# Patient Record
Sex: FEMALE | Race: BLACK OR AFRICAN AMERICAN | NOT HISPANIC OR LATINO | Employment: OTHER | URBAN - METROPOLITAN AREA
[De-identification: names, ages, dates, MRNs, and addresses within clinical notes are randomized per-mention and may not be internally consistent; named-entity substitution may affect disease eponyms.]

---

## 2017-01-22 PROBLEM — Z79.01 ANTICOAGULATED: Status: ACTIVE | Noted: 2017-01-22

## 2017-06-29 ENCOUNTER — HOSPITAL ENCOUNTER (OUTPATIENT)
Dept: PREADMISSION TESTING | Facility: OTHER | Age: 82
Discharge: HOME | End: 2017-06-29
Attending: INTERNAL MEDICINE

## 2017-06-29 VITALS
OXYGEN SATURATION: 95 % | BODY MASS INDEX: 29.19 KG/M2 | DIASTOLIC BLOOD PRESSURE: 56 MMHG | HEIGHT: 67 IN | TEMPERATURE: 98 F | SYSTOLIC BLOOD PRESSURE: 83 MMHG | HEART RATE: 47 BPM | WEIGHT: 186 LBS

## 2017-06-29 DIAGNOSIS — I20.0 UNSTABLE ANGINA: Primary | ICD-10-CM

## 2017-06-29 PROBLEM — N18.30 CHRONIC RENAL FAILURE, STAGE 3 (MODERATE): Status: ACTIVE | Noted: 2017-06-29

## 2017-06-29 LAB
ANION GAP SERPL CALC-SCNC: 7 MMOL/L
BASOPHILS # BLD AUTO: 0.03 K/UL
BASOPHILS NFR BLD: 0.6 %
BUN SERPL-MCNC: 40 MG/DL
CALCIUM SERPL-MCNC: 10 MG/DL
CHLORIDE SERPL-SCNC: 109 MMOL/L
CO2 SERPL-SCNC: 25 MMOL/L
CREAT SERPL-MCNC: 1.9 MG/DL
DIFFERENTIAL METHOD: NORMAL
EOSINOPHIL # BLD AUTO: 0.1 K/UL
EOSINOPHIL NFR BLD: 2.5 %
ERYTHROCYTE [DISTWIDTH] IN BLOOD BY AUTOMATED COUNT: 14.4 %
EST. GFR  (AFRICAN AMERICAN): 28 ML/MIN/1.73 M^2
EST. GFR  (NON AFRICAN AMERICAN): 24 ML/MIN/1.73 M^2
GLUCOSE SERPL-MCNC: 93 MG/DL
HCT VFR BLD AUTO: 38.9 %
HGB BLD-MCNC: 12.5 G/DL
LYMPHOCYTES # BLD AUTO: 2.5 K/UL
LYMPHOCYTES NFR BLD: 47 %
MCH RBC QN AUTO: 29.4 PG
MCHC RBC AUTO-ENTMCNC: 32.1 %
MCV RBC AUTO: 92 FL
MONOCYTES # BLD AUTO: 0.5 K/UL
MONOCYTES NFR BLD: 8.8 %
NEUTROPHILS # BLD AUTO: 2.1 K/UL
NEUTROPHILS NFR BLD: 40.9 %
PLATELET # BLD AUTO: 175 K/UL
PMV BLD AUTO: 11.8 FL
POTASSIUM SERPL-SCNC: 4.7 MMOL/L
RBC # BLD AUTO: 4.25 M/UL
SODIUM SERPL-SCNC: 141 MMOL/L
WBC # BLD AUTO: 5.21 K/UL

## 2017-06-29 PROCEDURE — 85025 COMPLETE CBC W/AUTO DIFF WBC: CPT

## 2017-06-29 PROCEDURE — 36415 COLL VENOUS BLD VENIPUNCTURE: CPT

## 2017-06-29 PROCEDURE — 80048 BASIC METABOLIC PNL TOTAL CA: CPT

## 2017-06-29 NOTE — DISCHARGE INSTRUCTIONS
PRE-ADMIT TESTING -  [phone]    [address]  [facility]        OUTPATIENT SURGERY UNIT - [phone]    Your surgery has been scheduled at [facility]. We are pleased to have the opportunity to serve you. For Further Information please call [phone].    On the day of surgery please report to the Information Desk on the 1st floor.    · CONTACT YOUR PHYSICIAN'S OFFICE THE DAY PRIOR TO YOUR SURGERY TO OBTAIN YOUR ARRIVAL TIME.     · The evening before surgery do not eat anything after 9 p.m. ( this includes hard candy, chewing gum and mints).  You may only have GATORADE, POWERADE AND WATER  from 9 p.m. until you leave your home.   DO NOT DRINK ANY LIQUIDS ON THE WAY TO THE HOSPITAL.      SPECIAL MEDICATION INSTRUCTIONS: TAKE medications checked off by the Anesthesiologist on your Medication List.    Angiogram Patients: Take medications as instructed by your physician, including aspirin.     Surgery Patients:    If you take ASPIRIN - Your PHYSICIAN/SURGEON will need to inform you IF/OR when you need to stop taking aspirin prior to your surgery.     Do Not take any medications containing IBUPROFEN.  Do Not Wear any make-up or dark nail polish   (especially eye make-up) to surgery. If you come to surgery with makeup on you will be required to remove the makeup or nail polish.    Do not shave your surgical area at least 5 days prior to your surgery. The surgical prep will be performed at the hospital according to Infection Control regulations.    Leave all valuables at home.   Do Not wear any jewelry or watches, including any metal in body piercings.  Contact Lens must be removed before surgery. Either do not wear the contact lens or bring a case and solution for storage.  Please bring a container for eyeglasses or dentures as required.  Bring any paperwork your physician has provided, such as consent forms,  history and physicals, doctor's orders, etc.   Bring comfortable clothes  that are loose fitting to wear upon discharge. Take into consideration the type of surgery being performed.  Maintain your diet as advised per your physician the day prior to surgery.      Adequate rest the night before surgery is advised.   Park in the Parking lot behind the hospital or in the [facility] across the street from the parking lot. Parking is complimentary.  If you will be discharged the same day as your procedure, please arrange for a responsible adult to drive you home or to accompany you if traveling by taxi.   YOU WILL NOT BE PERMITTED TO DRIVE OR TO LEAVE THE HOSPITAL ALONE AFTER SURGERY.   It is strongly recommended that you arrange for someone to remain with you for the first 24 hrs following your surgery.       Thank you for your cooperation.  The Staff of [facility].        Bathing Instructions                                                                 Please shower the evening before and morning of your procedure with    ANTIBACTERIAL SOAP. ( DIAL, etc )  Concentrate on the surgical area   for at least 3 minutes and rinse completely. Dry off as usual.   Do not use any deodorant, powder, body lotions, perfume, after shave or    cologne.

## 2017-06-30 ENCOUNTER — HOSPITAL ENCOUNTER (OUTPATIENT)
Facility: OTHER | Age: 82
Discharge: HOME | End: 2017-07-01
Attending: INTERNAL MEDICINE | Admitting: INTERNAL MEDICINE
Payer: MEDICARE

## 2017-06-30 DIAGNOSIS — I20.0 UNSTABLE ANGINA: Primary | ICD-10-CM

## 2017-06-30 DIAGNOSIS — I25.10 CAD (CORONARY ARTERY DISEASE): ICD-10-CM

## 2017-06-30 DIAGNOSIS — E78.5 HYPERLIPIDEMIA: ICD-10-CM

## 2017-06-30 LAB
CORONARY STENOSIS: ABNORMAL
CORONARY STENT: YES

## 2017-06-30 PROCEDURE — 63600175 PHARM REV CODE 636 W HCPCS

## 2017-06-30 PROCEDURE — 25000003 PHARM REV CODE 250

## 2017-06-30 PROCEDURE — 25500020 PHARM REV CODE 255

## 2017-06-30 PROCEDURE — 25000003 PHARM REV CODE 250: Performed by: INTERNAL MEDICINE

## 2017-06-30 PROCEDURE — 33210 INSERT ELECTRD/PM CATH SNGL: CPT

## 2017-06-30 RX ORDER — HYDROCODONE BITARTRATE AND ACETAMINOPHEN 10; 325 MG/1; MG/1
1 TABLET ORAL EVERY 4 HOURS PRN
Status: DISCONTINUED | OUTPATIENT
Start: 2017-06-30 | End: 2017-07-01 | Stop reason: HOSPADM

## 2017-06-30 RX ORDER — ASPIRIN 81 MG/1
81 TABLET ORAL DAILY
Status: DISCONTINUED | OUTPATIENT
Start: 2017-07-01 | End: 2017-07-01 | Stop reason: HOSPADM

## 2017-06-30 RX ORDER — ASPIRIN 81 MG/1
81 TABLET ORAL DAILY
Status: DISCONTINUED | OUTPATIENT
Start: 2017-07-02 | End: 2017-07-01 | Stop reason: HOSPADM

## 2017-06-30 RX ORDER — FOLIC ACID 1 MG/1
1 TABLET ORAL DAILY
Status: DISCONTINUED | OUTPATIENT
Start: 2017-07-01 | End: 2017-07-01 | Stop reason: HOSPADM

## 2017-06-30 RX ORDER — MAG HYDROX/ALUMINUM HYD/SIMETH 200-200-20
15 SUSPENSION, ORAL (FINAL DOSE FORM) ORAL EVERY 6 HOURS PRN
Status: DISCONTINUED | OUTPATIENT
Start: 2017-06-30 | End: 2017-07-01 | Stop reason: HOSPADM

## 2017-06-30 RX ORDER — ZOLPIDEM TARTRATE 5 MG/1
5 TABLET ORAL NIGHTLY PRN
Status: DISCONTINUED | OUTPATIENT
Start: 2017-06-30 | End: 2017-07-01 | Stop reason: HOSPADM

## 2017-06-30 RX ORDER — LOSARTAN POTASSIUM 50 MG/1
100 TABLET ORAL DAILY
Status: DISCONTINUED | OUTPATIENT
Start: 2017-07-01 | End: 2017-07-01 | Stop reason: HOSPADM

## 2017-06-30 RX ORDER — NITROGLYCERIN 0.4 MG/1
0.4 TABLET SUBLINGUAL EVERY 5 MIN PRN
Status: DISCONTINUED | OUTPATIENT
Start: 2017-06-30 | End: 2017-07-01 | Stop reason: HOSPADM

## 2017-06-30 RX ORDER — SPIRONOLACTONE 25 MG/1
25 TABLET ORAL DAILY
Status: ON HOLD | COMMUNITY
End: 2017-07-01 | Stop reason: HOSPADM

## 2017-06-30 RX ORDER — ALPRAZOLAM 0.25 MG/1
0.25 TABLET ORAL EVERY 8 HOURS PRN
Status: DISCONTINUED | OUTPATIENT
Start: 2017-06-30 | End: 2017-07-01 | Stop reason: HOSPADM

## 2017-06-30 RX ORDER — SODIUM CHLORIDE 9 MG/ML
INJECTION, SOLUTION INTRAVENOUS CONTINUOUS
Status: ACTIVE | OUTPATIENT
Start: 2017-06-30 | End: 2017-07-01

## 2017-06-30 RX ORDER — DIPHENHYDRAMINE HYDROCHLORIDE 50 MG/ML
25 INJECTION INTRAMUSCULAR; INTRAVENOUS EVERY 6 HOURS PRN
Status: DISCONTINUED | OUTPATIENT
Start: 2017-06-30 | End: 2017-07-01 | Stop reason: HOSPADM

## 2017-06-30 RX ORDER — NAPROXEN SODIUM 220 MG/1
81 TABLET, FILM COATED ORAL
Status: COMPLETED | OUTPATIENT
Start: 2017-06-30 | End: 2017-06-30

## 2017-06-30 RX ORDER — CLOPIDOGREL BISULFATE 75 MG/1
75 TABLET ORAL ONCE
Status: DISCONTINUED | OUTPATIENT
Start: 2017-06-30 | End: 2017-06-30

## 2017-06-30 RX ORDER — ATORVASTATIN CALCIUM 20 MG/1
40 TABLET, FILM COATED ORAL DAILY
Status: DISCONTINUED | OUTPATIENT
Start: 2017-07-01 | End: 2017-07-01 | Stop reason: HOSPADM

## 2017-06-30 RX ORDER — SODIUM CHLORIDE 9 MG/ML
INJECTION, SOLUTION INTRAVENOUS CONTINUOUS
Status: DISCONTINUED | OUTPATIENT
Start: 2017-06-30 | End: 2017-06-30

## 2017-06-30 RX ORDER — DIAZEPAM 5 MG/1
5 TABLET ORAL
Status: COMPLETED | OUTPATIENT
Start: 2017-06-30 | End: 2017-06-30

## 2017-06-30 RX ORDER — ONDANSETRON 2 MG/ML
4 INJECTION INTRAMUSCULAR; INTRAVENOUS EVERY 12 HOURS PRN
Status: DISCONTINUED | OUTPATIENT
Start: 2017-06-30 | End: 2017-07-01 | Stop reason: HOSPADM

## 2017-06-30 RX ORDER — NAPROXEN SODIUM 220 MG/1
162 TABLET, FILM COATED ORAL
Status: DISCONTINUED | OUTPATIENT
Start: 2017-06-30 | End: 2017-06-30 | Stop reason: HOSPADM

## 2017-06-30 RX ORDER — ALLOPURINOL 300 MG/1
300 TABLET ORAL DAILY
Status: DISCONTINUED | OUTPATIENT
Start: 2017-07-01 | End: 2017-07-01 | Stop reason: HOSPADM

## 2017-06-30 RX ORDER — AMIODARONE HYDROCHLORIDE 200 MG/1
200 TABLET ORAL DAILY
Status: DISCONTINUED | OUTPATIENT
Start: 2017-07-01 | End: 2017-07-01 | Stop reason: HOSPADM

## 2017-06-30 RX ORDER — CLOPIDOGREL BISULFATE 75 MG/1
75 TABLET ORAL DAILY
Status: DISCONTINUED | OUTPATIENT
Start: 2017-07-02 | End: 2017-07-01 | Stop reason: HOSPADM

## 2017-06-30 RX ORDER — DIPHENHYDRAMINE HCL 25 MG
25 CAPSULE ORAL
Status: COMPLETED | OUTPATIENT
Start: 2017-06-30 | End: 2017-06-30

## 2017-06-30 RX ORDER — HYDRALAZINE HYDROCHLORIDE 20 MG/ML
10 INJECTION INTRAMUSCULAR; INTRAVENOUS EVERY 4 HOURS PRN
Status: DISCONTINUED | OUTPATIENT
Start: 2017-06-30 | End: 2017-07-01 | Stop reason: HOSPADM

## 2017-06-30 RX ORDER — CARVEDILOL 6.25 MG/1
6.25 TABLET ORAL 2 TIMES DAILY
Status: DISCONTINUED | OUTPATIENT
Start: 2017-06-30 | End: 2017-07-01 | Stop reason: HOSPADM

## 2017-06-30 RX ORDER — ATROPINE SULFATE 0.1 MG/ML
0.5 INJECTION INTRAVENOUS
Status: DISCONTINUED | OUTPATIENT
Start: 2017-06-30 | End: 2017-07-01 | Stop reason: HOSPADM

## 2017-06-30 RX ORDER — HYDROCODONE BITARTRATE AND ACETAMINOPHEN 5; 325 MG/1; MG/1
1 TABLET ORAL EVERY 4 HOURS PRN
Status: DISCONTINUED | OUTPATIENT
Start: 2017-06-30 | End: 2017-07-01 | Stop reason: HOSPADM

## 2017-06-30 RX ORDER — CLOPIDOGREL BISULFATE 75 MG/1
300 TABLET ORAL ONCE
Status: DISCONTINUED | OUTPATIENT
Start: 2017-06-30 | End: 2017-07-01 | Stop reason: HOSPADM

## 2017-06-30 RX ORDER — NITROGLYCERIN 0.4 MG/1
0.4 TABLET SUBLINGUAL EVERY 5 MIN PRN
Status: DISCONTINUED | OUTPATIENT
Start: 2017-06-30 | End: 2017-06-30 | Stop reason: HOSPADM

## 2017-06-30 RX ADMIN — ASPIRIN 81 MG CHEWABLE TABLET 81 MG: 81 TABLET CHEWABLE at 02:06

## 2017-06-30 RX ADMIN — CARVEDILOL 6.25 MG: 6.25 TABLET, FILM COATED ORAL at 07:06

## 2017-06-30 RX ADMIN — HYDROCODONE BITARTRATE AND ACETAMINOPHEN 1 TABLET: 10; 325 TABLET ORAL at 10:06

## 2017-06-30 RX ADMIN — DIPHENHYDRAMINE HYDROCHLORIDE 25 MG: 25 CAPSULE ORAL at 02:06

## 2017-06-30 RX ADMIN — SODIUM CHLORIDE: 0.9 INJECTION, SOLUTION INTRAVENOUS at 07:06

## 2017-06-30 RX ADMIN — ALPRAZOLAM 0.25 MG: 0.25 TABLET ORAL at 11:06

## 2017-06-30 RX ADMIN — DIAZEPAM 5 MG: 5 TABLET ORAL at 02:06

## 2017-06-30 NOTE — NURSING
Pt received fro cath lab to ICU bed 12. Awake and responsive.  Arterial and venous sheath inplace to right femoral site.  golfball sized hematoma present at site.

## 2017-06-30 NOTE — H&P (VIEW-ONLY)
Subjective:    Patient ID:  Anahy Evans is a 83 y.o. female     HPI Here for Chest pain at rest.   Two nights ago, developed a tight heavy feeling in the center of the chest radiating to the back and to the left arm, relieved by NTG.  Stopped Xarelto, Dr Valerio wanted to switch to Eliquis (creat was 1.52)    H/O CYNTHIA Left Cx in 2010    Current Outpatient Prescriptions   Medication Sig    acetaminophen (TYLENOL) 500 MG tablet Take 1 tablet (500 mg total) by mouth every 6 (six) hours as needed.    allopurinol (ZYLOPRIM) 300 MG tablet Take 300 mg by mouth once daily.    amiodarone (PACERONE) 100 MG Tab Take 200 mg by mouth once daily.     aspirin (ECOTRIN) 81 MG EC tablet Take 81 mg by mouth once daily.    atorvastatin (LIPITOR) 20 MG tablet Take 40 mg by mouth once daily.     calcium carbonate (OS-GILMA) 600 mg (1,500 mg) Tab Take 600 mg by mouth 2 (two) times daily with meals.    fish oil-omega-3 fatty acids 300-1,000 mg capsule Take 2 g by mouth once daily.    folic acid (FOLVITE) 1 MG tablet Take 1 mg by mouth once daily.    hydrochlorothiazide (HYDRODIURIL) 25 MG tablet     losartan (COZAAR) 25 MG tablet Take 50 mg by mouth once daily.     metoprolol tartrate (LOPRESSOR) 50 MG tablet Take 50 mg by mouth 2 (two) times daily.    multivitamin capsule Take 1 capsule by mouth once daily.    rivaroxaban (XARELTO) 15 mg Tab Take 15 mg by mouth daily with dinner or evening meal.     No current facility-administered medications for this visit.          Review of Systems   Constitution: Negative for chills, decreased appetite, fever, weight gain and weight loss.   HENT: Negative for congestion, headaches, hearing loss and sore throat.    Eyes: Negative for blurred vision, double vision and visual disturbance.   Cardiovascular: Positive for chest pain. Negative for claudication, dyspnea on exertion, leg swelling, palpitations and syncope.   Respiratory: Negative for cough, hemoptysis, shortness of breath,  "sputum production and wheezing.    Endocrine: Negative for cold intolerance and heat intolerance.   Hematologic/Lymphatic: Negative for bleeding problem. Does not bruise/bleed easily.   Skin: Negative for color change, dry skin, flushing and itching.   Musculoskeletal: Negative for back pain, joint pain and myalgias.   Gastrointestinal: Negative for abdominal pain, anorexia, constipation, diarrhea, dysphagia, nausea and vomiting.        No bleeding per rectum   Genitourinary: Negative for dysuria, flank pain, frequency, hematuria and nocturia.   Neurological: Negative for dizziness, light-headedness, loss of balance, seizures and tremors.   Psychiatric/Behavioral: Negative for altered mental status and depression.         Vitals:    06/29/17 1200   BP: 117/64   Pulse: (!) 58   Weight: 84.4 kg (186 lb)   Height: 5' 7" (1.702 m)     Objective:    Physical Exam   Constitutional: She is oriented to person, place, and time. She appears well-developed and well-nourished.   HENT:   Head: Normocephalic and atraumatic.   Nose: Nose normal.   Mouth/Throat: Oropharynx is clear and moist.   Eyes: Conjunctivae and EOM are normal. Pupils are equal, round, and reactive to light.   Neck: Neck supple. No tracheal deviation present. No thyromegaly present.   Cardiovascular: Normal rate, regular rhythm and intact distal pulses.  Exam reveals no gallop and no friction rub.    No murmur heard.  Pulmonary/Chest: No respiratory distress. She has no wheezes. She has no rales. She exhibits no tenderness.   Abdominal: Soft. Bowel sounds are normal. She exhibits no distension and no mass. There is no tenderness. There is no rebound and no guarding.   Musculoskeletal: Normal range of motion.   Lymphadenopathy:     She has no cervical adenopathy.   Neurological: She is alert and oriented to person, place, and time.   Skin: Skin is warm and dry.   Psychiatric: Her behavior is normal.         Assessment:       1. Coronary artery disease involving " native coronary artery of native heart without angina pectoris    2. PAF (paroxysmal atrial fibrillation)    3. Essential hypertension    4. Chest pain at rest    5. Other and unspecified hyperlipidemia    6. Chronic renal failure, stage 3 (moderate)         Plan:       Rx Plavix 300 mg today  Angio tomorrow, possible PCI  Will Rx Eliquis after

## 2017-06-30 NOTE — PLAN OF CARE
Patient prefers to have daughter Marixa present for discharge teaching. Please contact them [phone].

## 2017-06-30 NOTE — INTERVAL H&P NOTE
The patient has been examined and the H&P has been reviewed:    I concur with the findings and no changes have occurred since H&P was written.    Anesthesia/Surgery risks, benefits and alternative options discussed and understood by patient/family.          Active Hospital Problems    Diagnosis  POA    Unstable angina [I20.0]  Yes      Resolved Hospital Problems    Diagnosis Date Resolved POA   No resolved problems to display.

## 2017-07-01 VITALS
BODY MASS INDEX: 29.27 KG/M2 | SYSTOLIC BLOOD PRESSURE: 144 MMHG | OXYGEN SATURATION: 97 % | HEART RATE: 46 BPM | DIASTOLIC BLOOD PRESSURE: 69 MMHG | WEIGHT: 186.5 LBS | RESPIRATION RATE: 34 BRPM | TEMPERATURE: 98 F | HEIGHT: 67 IN

## 2017-07-01 LAB
ANION GAP SERPL CALC-SCNC: 10 MMOL/L
BUN SERPL-MCNC: 32 MG/DL
CALCIUM SERPL-MCNC: 9.4 MG/DL
CHLORIDE SERPL-SCNC: 109 MMOL/L
CO2 SERPL-SCNC: 21 MMOL/L
CREAT SERPL-MCNC: 1.6 MG/DL
DIASTOLIC DYSFUNCTION: NO
EST. GFR  (AFRICAN AMERICAN): 34 ML/MIN/1.73 M^2
EST. GFR  (NON AFRICAN AMERICAN): 30 ML/MIN/1.73 M^2
ESTIMATED PA SYSTOLIC PRESSURE: 22.09
GLUCOSE SERPL-MCNC: 85 MG/DL
HCT VFR BLD AUTO: 37.5 %
HGB BLD-MCNC: 12.3 G/DL
POTASSIUM SERPL-SCNC: 4.4 MMOL/L
RETIRED EF AND QEF - SEE NOTES: 55 (ref 55–65)
SODIUM SERPL-SCNC: 140 MMOL/L

## 2017-07-01 PROCEDURE — 94761 N-INVAS EAR/PLS OXIMETRY MLT: CPT

## 2017-07-01 PROCEDURE — 80048 BASIC METABOLIC PNL TOTAL CA: CPT

## 2017-07-01 PROCEDURE — 36415 COLL VENOUS BLD VENIPUNCTURE: CPT

## 2017-07-01 PROCEDURE — 85018 HEMOGLOBIN: CPT

## 2017-07-01 PROCEDURE — 85014 HEMATOCRIT: CPT

## 2017-07-01 PROCEDURE — 93306 TTE W/DOPPLER COMPLETE: CPT

## 2017-07-01 PROCEDURE — 25000003 PHARM REV CODE 250: Performed by: INTERNAL MEDICINE

## 2017-07-01 RX ORDER — CARVEDILOL 6.25 MG/1
6.25 TABLET ORAL
Status: DISCONTINUED | OUTPATIENT
Start: 2017-07-01 | End: 2017-11-25 | Stop reason: HOSPADM

## 2017-07-01 RX ORDER — AMIODARONE HYDROCHLORIDE 200 MG/1
100 TABLET ORAL DAILY
Qty: 30 TABLET | Refills: 6 | Status: SHIPPED | OUTPATIENT
Start: 2017-07-01

## 2017-07-01 RX ORDER — CLOPIDOGREL BISULFATE 75 MG/1
75 TABLET ORAL DAILY
Qty: 30 TABLET | Refills: 6 | Status: SHIPPED | OUTPATIENT
Start: 2017-07-02 | End: 2017-07-26 | Stop reason: SDUPTHER

## 2017-07-01 RX ORDER — CARVEDILOL 6.25 MG/1
6.25 TABLET ORAL 2 TIMES DAILY
Qty: 60 TABLET | Refills: 6 | Status: SHIPPED | OUTPATIENT
Start: 2017-07-01 | End: 2017-07-26 | Stop reason: SDUPTHER

## 2017-07-01 RX ADMIN — ALLOPURINOL 300 MG: 300 TABLET ORAL at 08:07

## 2017-07-01 RX ADMIN — ASPIRIN 81 MG: 81 TABLET, COATED ORAL at 08:07

## 2017-07-01 RX ADMIN — CARVEDILOL 6.25 MG: 6.25 TABLET, FILM COATED ORAL at 11:07

## 2017-07-01 RX ADMIN — ATORVASTATIN CALCIUM 40 MG: 20 TABLET, FILM COATED ORAL at 08:07

## 2017-07-01 RX ADMIN — FOLIC ACID 1 MG: 1 TABLET ORAL at 08:07

## 2017-07-01 NOTE — PLAN OF CARE
Problem: Patient Care Overview  Goal: Plan of Care Review  Outcome: Ongoing (interventions implemented as appropriate)  Mrs. Higginbotham rested well overnight. Vitals stable. Pain well controlled through current regimen. Right groin sheath site with dressing CDI. Small hematoma present at site. Bed rest up at 0730. Voids per bedpan without difficulty. Repositions in bed independently. Up to date with POC.

## 2017-07-01 NOTE — PROGRESS NOTES
Pt resting in bed   Right groin with dressing no active drainage  No hematoma   Pedal pulses present  Foot warm to the touch  In no acute distress   Call bell within reach

## 2017-07-01 NOTE — PLAN OF CARE
Problem: Fall Risk (Adult)  Goal: Identify Related Risk Factors and Signs and Symptoms  Related risk factors and signs and symptoms are identified upon initiation of Human Response Clinical Practice Guideline (CPG)   Outcome: Ongoing (interventions implemented as appropriate)  Was on bedrest   Now able to ambulate   Side rails up  Call bell within reach   Instructed to call for help prior to getting out of the bed   At patients bedside

## 2017-07-01 NOTE — PROGRESS NOTES
Reviewed discharge instructions with the patient   Verbalizes understanding  Went over medications as well  Change of dose and some discontinued   See avs for details  Pt dressed and sitting in the bedside chair  Daughter Malina called to  the patient

## 2017-07-01 NOTE — PROGRESS NOTES
Paged Dr Canela regarding b/p 129/80 map 87 heart rate 48-50  Has losartan 100mg, coreg 6.25 mg, and amiodarone 200mg  due this am- these meds were held until clarified with Dr Canela

## 2017-07-01 NOTE — PROGRESS NOTES
Pt discharged to home daughter at the bedside  All questions answered  Right groin dressing intact   Will follow up with MD in two weeks  Off the unit with nurse to front for pickup

## 2017-07-11 NOTE — DISCHARGE SUMMARY
Buddy was seen in the office last week with the complaint of tight squeezing sensation in the center of her chest that occurred at rest. This was typical of angina she has had in the past.  She sought further medical attention in the office and was admitted for coronary angiography.  In the past she had a mid left anterior descending coronary artery stent placed during an acute evolving anterior wall myocardial infarction in 1999.  Subsequently she had placement of a drug-eluting stent in the left circumflex coronary artery.  She has been angina free.  She has had hypertensive heart disease, has had paroxysmal atrial fibrillation.  She is to be on several stroke, Dr. Bear Lopez recently switched her over to Trace was which she has not started as yet.  She was given a prescription for Plavix, advised to continue taking aspirin, and was admitted for angiography.  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  The left circumflex coronary artery stent was patent.  The ostial left anterior descending coronary artery exhibited a high grade stenosis.  This vessel was wired, and underwent balloon angioplasty and placement of a 3.0 mm resolute stent.  Excellent radiographic results were obtained. She had an uneventful course in the intensive care unit thereafter.  The following morning she was noted to have a BUNof 32 and a creatinine of 1.6 the BUN was 40 with a creatinine of 1.9 the day before the procedure.  The plan was to keep her on aspirin and Plavix.  We will consider switching to aspirin and Eliquis after the first 6 months of DAPT.  She will continue her previous regimen of ALLOPURINOL atorvastatin Folic Acid,  amiodarone losartan metoprolol.  These Spironolactone and Xarelto will be withheld.  I will see her for follow-up in the office in 2 weeks.  She was discharged in a  stable and satisfactory condition.

## 2017-11-22 ENCOUNTER — HOSPITAL ENCOUNTER (OUTPATIENT)
Facility: OTHER | Age: 82
Discharge: HOME | End: 2017-11-25
Attending: EMERGENCY MEDICINE | Admitting: EMERGENCY MEDICINE
Payer: MEDICARE

## 2017-11-22 DIAGNOSIS — E78.5 HYPERLIPIDEMIA: ICD-10-CM

## 2017-11-22 DIAGNOSIS — I20.0 UNSTABLE ANGINA: ICD-10-CM

## 2017-11-22 DIAGNOSIS — R07.9 CHEST PAIN, UNSPECIFIED TYPE: Primary | ICD-10-CM

## 2017-11-22 DIAGNOSIS — R07.9 CHEST PAIN: ICD-10-CM

## 2017-11-22 LAB
ALBUMIN SERPL BCP-MCNC: 3.5 G/DL
ALP SERPL-CCNC: 69 U/L
ALT SERPL W/O P-5'-P-CCNC: 23 U/L
ANION GAP SERPL CALC-SCNC: 9 MMOL/L
AST SERPL-CCNC: 18 U/L
BASOPHILS # BLD AUTO: 0.02 K/UL
BASOPHILS NFR BLD: 0.3 %
BILIRUB SERPL-MCNC: 0.4 MG/DL
BNP SERPL-MCNC: 97 PG/ML
BUN SERPL-MCNC: 30 MG/DL
CALCIUM SERPL-MCNC: 9.3 MG/DL
CHLORIDE SERPL-SCNC: 108 MMOL/L
CO2 SERPL-SCNC: 24 MMOL/L
CREAT SERPL-MCNC: 1.9 MG/DL
DIFFERENTIAL METHOD: ABNORMAL
EOSINOPHIL # BLD AUTO: 0.2 K/UL
EOSINOPHIL NFR BLD: 2.7 %
ERYTHROCYTE [DISTWIDTH] IN BLOOD BY AUTOMATED COUNT: 14.3 %
EST. GFR  (AFRICAN AMERICAN): 28 ML/MIN/1.73 M^2
EST. GFR  (NON AFRICAN AMERICAN): 24 ML/MIN/1.73 M^2
GLUCOSE SERPL-MCNC: 94 MG/DL
HCT VFR BLD AUTO: 36.8 %
HGB BLD-MCNC: 12.2 G/DL
LYMPHOCYTES # BLD AUTO: 2.5 K/UL
LYMPHOCYTES NFR BLD: 42.7 %
MCH RBC QN AUTO: 29.8 PG
MCHC RBC AUTO-ENTMCNC: 33.2 G/DL
MCV RBC AUTO: 90 FL
MONOCYTES # BLD AUTO: 0.4 K/UL
MONOCYTES NFR BLD: 7.5 %
NEUTROPHILS # BLD AUTO: 2.8 K/UL
NEUTROPHILS NFR BLD: 46.6 %
PLATELET # BLD AUTO: 167 K/UL
PMV BLD AUTO: 11 FL
POTASSIUM SERPL-SCNC: 4.6 MMOL/L
PROT SERPL-MCNC: 7.8 G/DL
RBC # BLD AUTO: 4.1 M/UL
SODIUM SERPL-SCNC: 141 MMOL/L
TROPONIN I SERPL DL<=0.01 NG/ML-MCNC: 0.02 NG/ML
WBC # BLD AUTO: 5.9 K/UL

## 2017-11-22 PROCEDURE — 80053 COMPREHEN METABOLIC PANEL: CPT

## 2017-11-22 PROCEDURE — 83880 ASSAY OF NATRIURETIC PEPTIDE: CPT

## 2017-11-22 PROCEDURE — 93005 ELECTROCARDIOGRAM TRACING: CPT

## 2017-11-22 PROCEDURE — 25000003 PHARM REV CODE 250: Performed by: EMERGENCY MEDICINE

## 2017-11-22 PROCEDURE — 93010 ELECTROCARDIOGRAM REPORT: CPT | Mod: ,,, | Performed by: INTERNAL MEDICINE

## 2017-11-22 PROCEDURE — G0378 HOSPITAL OBSERVATION PER HR: HCPCS

## 2017-11-22 PROCEDURE — 99285 EMERGENCY DEPT VISIT HI MDM: CPT | Mod: 25

## 2017-11-22 PROCEDURE — 84484 ASSAY OF TROPONIN QUANT: CPT

## 2017-11-22 PROCEDURE — 85025 COMPLETE CBC W/AUTO DIFF WBC: CPT

## 2017-11-22 RX ORDER — ACETAMINOPHEN 500 MG
1 TABLET ORAL DAILY
COMMUNITY

## 2017-11-22 RX ORDER — ATORVASTATIN CALCIUM 40 MG/1
40 TABLET, FILM COATED ORAL DAILY
COMMUNITY

## 2017-11-22 RX ORDER — NITROGLYCERIN 0.4 MG/1
0.4 TABLET SUBLINGUAL
Status: COMPLETED | OUTPATIENT
Start: 2017-11-22 | End: 2017-11-22

## 2017-11-22 RX ORDER — AMLODIPINE BESYLATE 5 MG/1
5 TABLET ORAL DAILY
Status: ON HOLD | COMMUNITY
End: 2019-05-16 | Stop reason: HOSPADM

## 2017-11-22 RX ORDER — MECLIZINE HCL 12.5 MG 12.5 MG/1
12.5 TABLET ORAL 3 TIMES DAILY PRN
Status: ON HOLD | COMMUNITY
End: 2020-02-17 | Stop reason: HOSPADM

## 2017-11-22 RX ORDER — ASPIRIN 325 MG
325 TABLET ORAL
Status: COMPLETED | OUTPATIENT
Start: 2017-11-22 | End: 2017-11-22

## 2017-11-22 RX ORDER — VALSARTAN 320 MG/1
320 TABLET ORAL DAILY
COMMUNITY
End: 2019-02-12

## 2017-11-22 RX ADMIN — NITROGLYCERIN 0.4 MG: 0.4 TABLET SUBLINGUAL at 10:11

## 2017-11-22 RX ADMIN — ASPIRIN 325 MG ORAL TABLET 325 MG: 325 PILL ORAL at 09:11

## 2017-11-23 LAB
TROPONIN I SERPL DL<=0.01 NG/ML-MCNC: 0.02 NG/ML
TROPONIN I SERPL DL<=0.01 NG/ML-MCNC: 0.05 NG/ML
TROPONIN I SERPL DL<=0.01 NG/ML-MCNC: 0.07 NG/ML

## 2017-11-23 PROCEDURE — 84484 ASSAY OF TROPONIN QUANT: CPT

## 2017-11-23 PROCEDURE — 25000003 PHARM REV CODE 250: Performed by: EMERGENCY MEDICINE

## 2017-11-23 PROCEDURE — 36415 COLL VENOUS BLD VENIPUNCTURE: CPT

## 2017-11-23 PROCEDURE — G0378 HOSPITAL OBSERVATION PER HR: HCPCS

## 2017-11-23 RX ORDER — ATORVASTATIN CALCIUM 20 MG/1
40 TABLET, FILM COATED ORAL DAILY
Status: DISCONTINUED | OUTPATIENT
Start: 2017-11-23 | End: 2017-11-25 | Stop reason: HOSPADM

## 2017-11-23 RX ORDER — FOLIC ACID 1 MG/1
1 TABLET ORAL DAILY
Status: DISCONTINUED | OUTPATIENT
Start: 2017-11-23 | End: 2017-11-25 | Stop reason: HOSPADM

## 2017-11-23 RX ORDER — AMIODARONE HYDROCHLORIDE 100 MG/1
100 TABLET ORAL DAILY
Status: DISCONTINUED | OUTPATIENT
Start: 2017-11-23 | End: 2017-11-25 | Stop reason: HOSPADM

## 2017-11-23 RX ORDER — ASPIRIN 81 MG/1
81 TABLET ORAL DAILY
Status: DISCONTINUED | OUTPATIENT
Start: 2017-11-23 | End: 2017-11-25 | Stop reason: HOSPADM

## 2017-11-23 RX ORDER — ALLOPURINOL 300 MG/1
300 TABLET ORAL DAILY
Status: DISCONTINUED | OUTPATIENT
Start: 2017-11-23 | End: 2017-11-25 | Stop reason: HOSPADM

## 2017-11-23 RX ORDER — SPIRONOLACTONE 25 MG/1
25 TABLET ORAL
Status: DISCONTINUED | OUTPATIENT
Start: 2017-11-24 | End: 2017-11-25 | Stop reason: HOSPADM

## 2017-11-23 RX ORDER — CARVEDILOL 12.5 MG/1
12.5 TABLET ORAL 2 TIMES DAILY WITH MEALS
Status: DISCONTINUED | OUTPATIENT
Start: 2017-11-23 | End: 2017-11-25 | Stop reason: HOSPADM

## 2017-11-23 RX ORDER — SODIUM CHLORIDE 9 MG/ML
1000 INJECTION, SOLUTION INTRAVENOUS
Status: COMPLETED | OUTPATIENT
Start: 2017-11-23 | End: 2017-11-23

## 2017-11-23 RX ORDER — VALSARTAN 80 MG/1
320 TABLET ORAL DAILY
Status: DISCONTINUED | OUTPATIENT
Start: 2017-11-23 | End: 2017-11-25 | Stop reason: HOSPADM

## 2017-11-23 RX ORDER — NITROGLYCERIN 0.4 MG/1
0.4 TABLET SUBLINGUAL EVERY 5 MIN PRN
Status: DISCONTINUED | OUTPATIENT
Start: 2017-11-23 | End: 2017-11-25 | Stop reason: HOSPADM

## 2017-11-23 RX ORDER — CLOPIDOGREL BISULFATE 75 MG/1
75 TABLET ORAL DAILY
Status: DISCONTINUED | OUTPATIENT
Start: 2017-11-23 | End: 2017-11-25 | Stop reason: HOSPADM

## 2017-11-23 RX ORDER — AMLODIPINE BESYLATE 5 MG/1
5 TABLET ORAL DAILY
Status: DISCONTINUED | OUTPATIENT
Start: 2017-11-23 | End: 2017-11-25 | Stop reason: HOSPADM

## 2017-11-23 RX ADMIN — ATORVASTATIN CALCIUM 40 MG: 20 TABLET, FILM COATED ORAL at 08:11

## 2017-11-23 RX ADMIN — ASPIRIN 81 MG: 81 TABLET, COATED ORAL at 08:11

## 2017-11-23 RX ADMIN — VALSARTAN 320 MG: 80 TABLET, FILM COATED ORAL at 08:11

## 2017-11-23 RX ADMIN — CARVEDILOL 12.5 MG: 12.5 TABLET, FILM COATED ORAL at 08:11

## 2017-11-23 RX ADMIN — CARVEDILOL 12.5 MG: 12.5 TABLET, FILM COATED ORAL at 06:11

## 2017-11-23 RX ADMIN — ALLOPURINOL 300 MG: 300 TABLET ORAL at 11:11

## 2017-11-23 RX ADMIN — FOLIC ACID 1 MG: 1 TABLET ORAL at 08:11

## 2017-11-23 RX ADMIN — SODIUM CHLORIDE 1000 ML: 0.9 INJECTION, SOLUTION INTRAVENOUS at 01:11

## 2017-11-23 RX ADMIN — NITROGLYCERIN 0.4 MG: 0.4 TABLET SUBLINGUAL at 12:11

## 2017-11-23 RX ADMIN — CLOPIDOGREL BISULFATE 75 MG: 75 TABLET, FILM COATED ORAL at 08:11

## 2017-11-23 RX ADMIN — AMIODARONE HYDROCHLORIDE 100 MG: 100 TABLET ORAL at 11:11

## 2017-11-23 RX ADMIN — AMLODIPINE BESYLATE 5 MG: 5 TABLET ORAL at 08:11

## 2017-11-23 NOTE — ED TRIAGE NOTES
"Pt presents to ED with c/o midsternal chest pain x 2 days. Pt reports she woke up with "tightness" in her chest yesterday and today, reports she thought it was reflux. Pt reports taking SL Nitroglycerin that relieved the pain both today and yesterday, reports Hx of CAD and HTN, reports previous MI and cardiac stents with most recent stent place in June of 2017. Pt denies CP at this time, denies SOB, denies n/v or diaphoresis. Pt AAO x4.     "

## 2017-11-23 NOTE — NURSING
Remains free from fall, injury, and skin breakdown. Voiding via bedpan; minimized movement as it seemed to engage chest pain.  VSS on RA throughout the night.  Chest pain well controlled with sublingual nitro. Tele maintained; all alarms active and audible. SCDs in place. Plan of care reviewed with patient and all questions answered. Bed low, locked w/ bed alarm on. Call light within reach. Purposeful rounding performed. Resting comfortably in bed, no other complaints at this time.

## 2017-11-23 NOTE — PLAN OF CARE
Problem: Patient Care Overview  Goal: Plan of Care Review  Pt has not had any c/o of chest pain or any other pain today. Tolerating getting up on bedside commode. Remains on telemetry c sinus bradycardia. Pt tolerating cardiac diet and instructed on NPO p MN for cath lab in am. All meds taken . Vs remain stable. Plan of care reviewed and verbalizes understanding of NPO p MN status and procedure for cath. Dr Canela here to see pt and explain procedure.   THERESA

## 2017-11-23 NOTE — ED PROVIDER NOTES
"Encounter Date: 11/22/2017    SCRIBE #1 NOTE: I, Carmencita Hobson , am scribing for, and in the presence of, Dr. Claudio.       History     Chief Complaint   Patient presents with    Chest Pain     since yesterday, woke up with all over chest pain yesterday morning, took 1 nitro yesterday and 1 today with relief     Time seen by provider: 9:12 PM    This is a 84 y.o. female who presents with complaint of intermittent chest pain that began yesterday. Pt noticed pain when she woke up."Burning" pain radiates to the left elbow, lasts one hour in duration, and is consistent with prior episodes. She reports back pain, but denies diaphoresis, nausea, vomiting, abdominal pain, leg swelling, SOB, or numbness. Chest pain became progressively better after the patient took TUMS and a dose of her daughter's GERD medication, and has not reoccurred since she took NTG one hour ago. The patient reports taking aspirin (81 mg) daily. She follows up with Dr. Canela, underwent cardiac stent placement 6/30/17, and reports history of MI.       The history is provided by the patient and a relative (daughter at bedside).     Review of patient's allergies indicates:   Allergen Reactions    Clindamycin Anaphylaxis    Pcn [penicillins] Rash     Past Medical History:   Diagnosis Date    Cervical cancer 1968    breast cancer right    Coronary artery disease     Gout     High cholesterol     Hypertension     MI (myocardial infarction)      Past Surgical History:   Procedure Laterality Date    BREAST LUMPECTOMY      right    CARDIAC SURGERY      multiple cardiac stents    CORONARY STENT PLACEMENT       History reviewed. No pertinent family history.  Social History   Substance Use Topics    Smoking status: Former Smoker    Smokeless tobacco: Never Used    Alcohol use Yes      Comment: rare     Review of Systems   Constitutional: Negative for chills, diaphoresis and fever.   HENT: Negative for congestion and sore throat.    Eyes: Negative " for photophobia and redness.   Respiratory: Negative for cough and shortness of breath.    Cardiovascular: Positive for chest pain. Negative for leg swelling.   Gastrointestinal: Negative for abdominal pain, nausea and vomiting.   Genitourinary: Negative for dysuria.   Musculoskeletal: Positive for back pain.        Positive for pain to the left elbow.   Skin: Negative for rash.   Neurological: Negative for weakness, light-headedness, numbness and headaches.   Psychiatric/Behavioral: Negative for confusion.       Physical Exam     Initial Vitals [11/22/17 2049]   BP Pulse Resp Temp SpO2   (!) 159/78 66 18 98.1 °F (36.7 °C) 100 %      MAP       105         Physical Exam    Nursing note and vitals reviewed.  Constitutional: She appears well-developed and well-nourished. She is not diaphoretic. No distress.   HENT:   Head: Normocephalic and atraumatic.   Right Ear: External ear normal.   Left Ear: External ear normal.   Eyes: EOM are normal. Pupils are equal, round, and reactive to light. Right eye exhibits no discharge. Left eye exhibits no discharge.   Neck: Normal range of motion.   Cardiovascular: Normal rate, regular rhythm and normal heart sounds. Exam reveals no gallop and no friction rub.    No murmur heard.  Pulses:       Radial pulses are 2+ on the right side, and 2+ on the left side.   Pulmonary/Chest: Breath sounds normal. No respiratory distress. She has no wheezes. She has no rhonchi. She has no rales. She exhibits no tenderness.   Abdominal: Soft. There is no tenderness. There is no rebound and no guarding.   Musculoskeletal: Normal range of motion. She exhibits no edema or tenderness.   No lower extremity edema.    Neurological: She is alert and oriented to person, place, and time.   Skin: Skin is warm and dry. No rash and no abscess noted. No erythema. No pallor.   Psychiatric: She has a normal mood and affect. Her behavior is normal. Judgment and thought content normal.         ED Course    Procedures  Labs Reviewed   CBC W/ AUTO DIFFERENTIAL - Abnormal; Notable for the following:        Result Value    Hematocrit 36.8 (*)     All other components within normal limits   COMPREHENSIVE METABOLIC PANEL - Abnormal; Notable for the following:     BUN, Bld 30 (*)     Creatinine 1.9 (*)     eGFR if  28 (*)     eGFR if non  24 (*)     All other components within normal limits   TROPONIN I   B-TYPE NATRIURETIC PEPTIDE     Imaging Results          X-Ray Chest AP Portable (Final result)  Result time 11/22/17 21:14:36    Final result by Glenny Calle MD (11/22/17 21:14:36)                 Impression:      Hypoventilatory exam, no convincing acute cardiopulmonary process.        Electronically signed by: GLENNY CALLE MD  Date:     11/22/17  Time:    21:14              Narrative:    Chest AP portable    Indication:Chest pain    Comparison:2/7/2014    Findings:  The cardiomediastinal silhouette is stable in configuration, noting calcification of the aortic arch.  There is no pleural effusion.  The trachea is midline.  The lungs are symmetrically expanded bilaterally with coarse interstitial attenuation, likely accentuated by shallow inspiratory effort. No large focal consolidation seen.  There is no pneumothorax.  The osseous structures remarkable for degenerative changes.                              EKG Readings: (Independently Interpreted)   Initial Reading: No STEMI.   Normal sinus rhythm at a rate of 60 bpm. First degree AV block. Compared to EKG from 2/10/14, bradycardia has resolved and first degree AV block is new.       X-Rays:   Independently Interpreted Readings:   Chest X-Ray: No effusions or opacities.      Medical Decision Making:   Clinical Tests:   Lab Tests: Ordered and Reviewed  Radiological Study: Ordered and Reviewed  Medical Tests: Ordered and Reviewed  ED Management:  Elderly patient with significant cardiac history presents with stuttering chest pain for  the past day.  Relieved with nitroglycerin several times.  Radiates to left arm.  EKG has no ischemic changes.  Initial troponin negative.  Discussed with cardiology who will admit.    MARISSA Claudio M.D.  11/23/2017  2:18 AM      10:59 PM- discussed and consulted case with Dr. Canela, who will admit the patient to his service.             Scribe Attestation:   Scribe #1: I performed the above scribed service and the documentation accurately describes the services I performed. I attest to the accuracy of the note.    Attending Attestation:           Physician Attestation for Scribe:  Physician Attestation Statement for Scribe #1: I, Dr. Claudio, reviewed documentation, as scribed by Carmencita Hobson  in my presence, and it is both accurate and complete.                 ED Course      Clinical Impression:     1. Chest pain, unspecified type    2. Chest pain                                 Angelito Claudio MD  11/23/17 0219

## 2017-11-23 NOTE — NURSING
Dr Canela paged to notify that pt has had a bump up in her last troponin level to 0.054. Pt denies any chest pain , no cardiac symptoms. Vs stable, took all am meds. Remains on telemetry c SB noted on monitor. Voiding on bedside commode chair. Color good, no diaphoresis, denies pain anywhere.  RLA

## 2017-11-23 NOTE — PLAN OF CARE
DC Planning:    Per record review, patient has been seen by Dr. Canela for care on out-patient basis. Patient's daughter Marixa is primary caregiver. No needs identified at this time. MD at bedside upon attempt to meet with patient earlier this morning. CM to follow closely and remain supportive.     11/23/17 1405   Discharge Assessment   Assessment Type Discharge Planning Assessment   Assessment information obtained from? Medical Record   Prior to hospitilization cognitive status: Unable to Assess   Prior to hospitalization functional status: Independent   Current cognitive status: Unable to Assess   Able to Return to Prior Arrangements unable to determine at this time (comments)   Is patient able to care for self after discharge? Unable to determine at this time (comments)   Who are your caregiver(s) and their phone number(s)? Marixa xavier Anaya, [phone]   Patient currently being followed by outpatient case management? No   Patient currently receives any other outside agency services? No   Do you have any problems affording any of your prescribed medications? TBD   Does the patient have transportation home? Yes   Transportation Available family or friend will provide   Discharge Plan A Home   Patient/Family In Agreement With Plan unable to assess

## 2017-11-23 NOTE — H&P
HISTORY OF PRESENT ILLNESS:  Ms. Evans is an 84-year-old lady that was in her   usual state of good health until the last 48 hours when she has described   intermittent episodes of a heavy pressure sensation in the center of her chest   radiating to the left elbow.  She initially was awakened with a burning pressure   sensation in the chest, lasting for about an hour.  She felt that this may have   been indigestion, she took TUMS and also her daughters reflux medication, and   had no relief.  She took a nitroglycerin tablet prior to coming to the hospital,   and had prompt relief of pain.  After coming to the hospital, she has had   several episodes of pain one upon defecation and another on walking to the   bathroom.  She is concerned that the pain is similar to that when she had a   stent of a proximal left anterior descending coronary artery in June 2017.    PAST HISTORY:  She has had cervical cancer, right breast cancer, gout, high   cholesterol, and hypertension.  In 1999, she had an anterior wall myocardial   infarction and had a stent in the left anterior descending coronary artery, she   had a drug-eluting stent placed in the left circumflex coronary artery in 2010,   and a drug-eluting stent placed in the proximal left anterior descending   coronary artery in June 2017.  She is a nonsmoker and does not drink alcohol.    PHYSICAL EXAMINATION:  GENERAL:  Reveals an alert, pleasant lady in no apparent acute distress.  VITAL SIGNS:  Blood pressure of 150/70 and a pulse of 70 beats per minute.  HEENT:  The sclerae is nonicteric.  The conjunctivae pink.  The ENT exam is   unremarkable.  NECK:  Supple.  There is no jugular venous distention.  The carotid upstroke is   brisk.  There are no carotid bruits.  CHEST:  Clear.  HEART:  Size is grossly normal.  S1 and S2 are normal.  There is no audible   murmur or gallop.  ABDOMEN:  Soft and nontender.  The bowel sounds are normal.  EXTREMITIES:  There is no clubbing,  cyanosis or edema of feet.  NEUROLOGIC:  Grossly, the neurological exam is intact.    IMPRESSION ON ADMISSION:  1.  Unstable angina pectoris versus a Non-STEMI.  2.  Coronary artery disease, having had previous stents in the left anterior   descending and left circumflex coronary arteries.  3.  Essential hypertension.  4.  Chronic renal failure.  5.  Paroxysmal atrial fibrillation.  6.  Previous breast cancer.  7.  Previous cervical cancer.  8.  Hyperlipidemia.  9.  Gout.      SB/HN  dd: 11/23/2017 11:36:18 (CST)  td: 11/23/2017 11:52:48 (CST)  Doc ID   #[id number]  Job ID #[id number]    CC:

## 2017-11-24 ENCOUNTER — SURGERY (OUTPATIENT)
Age: 82
End: 2017-11-24

## 2017-11-24 LAB
ANION GAP SERPL CALC-SCNC: 7 MMOL/L
BUN SERPL-MCNC: 26 MG/DL
CALCIUM SERPL-MCNC: 8.7 MG/DL
CHLORIDE SERPL-SCNC: 109 MMOL/L
CK MB SERPL-MCNC: 1.3 NG/ML
CK MB SERPL-RTO: 2 %
CK SERPL-CCNC: 65 U/L
CO2 SERPL-SCNC: 23 MMOL/L
CORONARY STENOSIS: ABNORMAL
CORONARY STENT: YES
CREAT SERPL-MCNC: 1.4 MG/DL
EST. GFR  (AFRICAN AMERICAN): 40 ML/MIN/1.73 M^2
EST. GFR  (NON AFRICAN AMERICAN): 35 ML/MIN/1.73 M^2
GLUCOSE SERPL-MCNC: 88 MG/DL
POTASSIUM SERPL-SCNC: 4.1 MMOL/L
SODIUM SERPL-SCNC: 139 MMOL/L
TROPONIN I SERPL DL<=0.01 NG/ML-MCNC: 0.06 NG/ML

## 2017-11-24 PROCEDURE — 63600175 PHARM REV CODE 636 W HCPCS

## 2017-11-24 PROCEDURE — 93010 ELECTROCARDIOGRAM REPORT: CPT | Mod: ,,, | Performed by: INTERNAL MEDICINE

## 2017-11-24 PROCEDURE — 82553 CREATINE MB FRACTION: CPT

## 2017-11-24 PROCEDURE — 25000003 PHARM REV CODE 250

## 2017-11-24 PROCEDURE — C1760 CLOSURE DEV, VASC: HCPCS

## 2017-11-24 PROCEDURE — 99152 MOD SED SAME PHYS/QHP 5/>YRS: CPT

## 2017-11-24 PROCEDURE — G0378 HOSPITAL OBSERVATION PER HR: HCPCS

## 2017-11-24 PROCEDURE — 80048 BASIC METABOLIC PNL TOTAL CA: CPT

## 2017-11-24 PROCEDURE — 99900035 HC TECH TIME PER 15 MIN (STAT)

## 2017-11-24 PROCEDURE — 27201224 CATH LAB PROCEDURE

## 2017-11-24 PROCEDURE — 93005 ELECTROCARDIOGRAM TRACING: CPT

## 2017-11-24 PROCEDURE — 25500020 PHARM REV CODE 255

## 2017-11-24 PROCEDURE — 25000003 PHARM REV CODE 250: Performed by: EMERGENCY MEDICINE

## 2017-11-24 PROCEDURE — 25000003 PHARM REV CODE 250: Performed by: INTERNAL MEDICINE

## 2017-11-24 PROCEDURE — 36415 COLL VENOUS BLD VENIPUNCTURE: CPT

## 2017-11-24 PROCEDURE — 84484 ASSAY OF TROPONIN QUANT: CPT

## 2017-11-24 RX ORDER — ATROPINE SULFATE 0.1 MG/ML
0.5 INJECTION INTRAVENOUS
Status: DISCONTINUED | OUTPATIENT
Start: 2017-11-24 | End: 2017-11-25 | Stop reason: HOSPADM

## 2017-11-24 RX ORDER — CLOPIDOGREL BISULFATE 75 MG/1
75 TABLET ORAL DAILY
Status: DISCONTINUED | OUTPATIENT
Start: 2017-11-26 | End: 2017-11-24 | Stop reason: SDUPTHER

## 2017-11-24 RX ORDER — NITROGLYCERIN 0.4 MG/1
0.4 TABLET SUBLINGUAL EVERY 5 MIN PRN
Status: DISCONTINUED | OUTPATIENT
Start: 2017-11-24 | End: 2017-11-24 | Stop reason: SDUPTHER

## 2017-11-24 RX ORDER — ONDANSETRON 2 MG/ML
4 INJECTION INTRAMUSCULAR; INTRAVENOUS EVERY 12 HOURS PRN
Status: DISCONTINUED | OUTPATIENT
Start: 2017-11-24 | End: 2017-11-25 | Stop reason: HOSPADM

## 2017-11-24 RX ORDER — DIPHENHYDRAMINE HYDROCHLORIDE 50 MG/ML
25 INJECTION INTRAMUSCULAR; INTRAVENOUS EVERY 6 HOURS PRN
Status: DISCONTINUED | OUTPATIENT
Start: 2017-11-24 | End: 2017-11-25 | Stop reason: HOSPADM

## 2017-11-24 RX ORDER — HYDROCODONE BITARTRATE AND ACETAMINOPHEN 5; 325 MG/1; MG/1
1 TABLET ORAL EVERY 4 HOURS PRN
Status: DISCONTINUED | OUTPATIENT
Start: 2017-11-24 | End: 2017-11-25 | Stop reason: HOSPADM

## 2017-11-24 RX ORDER — ASPIRIN 81 MG/1
81 TABLET ORAL DAILY
Status: DISCONTINUED | OUTPATIENT
Start: 2017-11-26 | End: 2017-11-24 | Stop reason: SDUPTHER

## 2017-11-24 RX ORDER — HYDROCODONE BITARTRATE AND ACETAMINOPHEN 10; 325 MG/1; MG/1
1 TABLET ORAL EVERY 4 HOURS PRN
Status: DISCONTINUED | OUTPATIENT
Start: 2017-11-24 | End: 2017-11-25 | Stop reason: HOSPADM

## 2017-11-24 RX ORDER — SODIUM CHLORIDE 9 MG/ML
INJECTION, SOLUTION INTRAVENOUS CONTINUOUS
Status: ACTIVE | OUTPATIENT
Start: 2017-11-24 | End: 2017-11-24

## 2017-11-24 RX ORDER — MAG HYDROX/ALUMINUM HYD/SIMETH 200-200-20
15 SUSPENSION, ORAL (FINAL DOSE FORM) ORAL EVERY 6 HOURS PRN
Status: DISCONTINUED | OUTPATIENT
Start: 2017-11-24 | End: 2017-11-25 | Stop reason: HOSPADM

## 2017-11-24 RX ORDER — ACETAMINOPHEN 325 MG/1
650 TABLET ORAL EVERY 4 HOURS PRN
Status: DISCONTINUED | OUTPATIENT
Start: 2017-11-24 | End: 2017-11-25 | Stop reason: HOSPADM

## 2017-11-24 RX ORDER — ALPRAZOLAM 0.25 MG/1
0.25 TABLET ORAL EVERY 8 HOURS PRN
Status: DISCONTINUED | OUTPATIENT
Start: 2017-11-24 | End: 2017-11-25 | Stop reason: HOSPADM

## 2017-11-24 RX ORDER — ALLOPURINOL 300 MG/1
TABLET ORAL
Status: DISPENSED
Start: 2017-11-24 | End: 2017-11-24

## 2017-11-24 RX ADMIN — SODIUM CHLORIDE: 0.9 INJECTION, SOLUTION INTRAVENOUS at 04:11

## 2017-11-24 RX ADMIN — ALLOPURINOL 300 MG: 300 TABLET ORAL at 10:11

## 2017-11-24 RX ADMIN — FOLIC ACID 1 MG: 1 TABLET ORAL at 10:11

## 2017-11-24 RX ADMIN — SPIRONOLACTONE 25 MG: 25 TABLET, FILM COATED ORAL at 04:11

## 2017-11-24 RX ADMIN — ATORVASTATIN CALCIUM 40 MG: 20 TABLET, FILM COATED ORAL at 10:11

## 2017-11-24 RX ADMIN — CARVEDILOL 12.5 MG: 12.5 TABLET, FILM COATED ORAL at 04:11

## 2017-11-24 RX ADMIN — CARVEDILOL 12.5 MG: 12.5 TABLET, FILM COATED ORAL at 08:11

## 2017-11-24 RX ADMIN — NITROGLYCERIN 0.4 MG: 0.4 TABLET SUBLINGUAL at 04:11

## 2017-11-24 RX ADMIN — NITROGLYCERIN 0.4 MG: 0.4 TABLET SUBLINGUAL at 07:11

## 2017-11-24 RX ADMIN — VALSARTAN 320 MG: 80 TABLET, FILM COATED ORAL at 10:11

## 2017-11-24 RX ADMIN — ASPIRIN 81 MG: 81 TABLET, COATED ORAL at 10:11

## 2017-11-24 RX ADMIN — AMLODIPINE BESYLATE 5 MG: 5 TABLET ORAL at 10:11

## 2017-11-24 RX ADMIN — CLOPIDOGREL BISULFATE 75 MG: 75 TABLET, FILM COATED ORAL at 10:11

## 2017-11-24 RX ADMIN — AMIODARONE HYDROCHLORIDE 100 MG: 100 TABLET ORAL at 10:11

## 2017-11-24 NOTE — PLAN OF CARE
Problem: Patient Care Overview  Goal: Plan of Care Review  Patient received on room air with adequate saturation post procedure. EKG completed./

## 2017-11-24 NOTE — NURSING
Remains free from fall, injury, and skin breakdown. Voiding via bedside commode.  VSS on RA throughout the night. 1 episode chest pain well controlled with sublingual nitro. Tele maintained; all alarms active and audible. Plan of care reviewed with patient and all questions answered. Bed low, locked w/ bed alarm on. Call light within reach. Purposeful rounding performed. Resting comfortably in bed, no other complaints at this time.

## 2017-11-24 NOTE — NURSING
Called report to [facility].  Will send patient's belongings when she arrives on the unit from Cath Lab, and  telemetry box at the same time.

## 2017-11-24 NOTE — INTERVAL H&P NOTE
The patient has been examined and the H&P has been reviewed:    I concur with the findings and no changes have occurred since H&P was written.    Anesthesia/Surgery risks, benefits and alternative options discussed and understood by patient/family.          Active Hospital Problems    Diagnosis  POA    Chest pain [R07.9]  Yes      Resolved Hospital Problems    Diagnosis Date Resolved POA   No resolved problems to display.

## 2017-11-24 NOTE — PLAN OF CARE
Problem: Patient Care Overview  Goal: Plan of Care Review  Outcome: Ongoing (interventions implemented as appropriate)  Pt complained of chest pain 10/10 after getting up to bedside commode; admin nitro SL; relief of symptoms reported; pain 2/10.  Pt bathed herself, maintained NPO for procedure later today, bed in low locked position, non skid socks to feet, call light within reach, no needs voiced at this time.

## 2017-11-25 VITALS
TEMPERATURE: 98 F | HEIGHT: 66 IN | RESPIRATION RATE: 21 BRPM | OXYGEN SATURATION: 96 % | DIASTOLIC BLOOD PRESSURE: 71 MMHG | WEIGHT: 191.5 LBS | SYSTOLIC BLOOD PRESSURE: 158 MMHG | BODY MASS INDEX: 30.78 KG/M2 | HEART RATE: 64 BPM

## 2017-11-25 LAB
ANION GAP SERPL CALC-SCNC: 8 MMOL/L
BUN SERPL-MCNC: 23 MG/DL
CALCIUM SERPL-MCNC: 9.4 MG/DL
CHLORIDE SERPL-SCNC: 107 MMOL/L
CK MB SERPL-MCNC: 1.5 NG/ML
CK MB SERPL-RTO: 2.5 %
CK SERPL-CCNC: 59 U/L
CO2 SERPL-SCNC: 22 MMOL/L
CREAT SERPL-MCNC: 1.2 MG/DL
EST. GFR  (AFRICAN AMERICAN): 48 ML/MIN/1.73 M^2
EST. GFR  (NON AFRICAN AMERICAN): 42 ML/MIN/1.73 M^2
GLUCOSE SERPL-MCNC: 82 MG/DL
HCT VFR BLD AUTO: 37.3 %
HGB BLD-MCNC: 12.2 G/DL
POTASSIUM SERPL-SCNC: 3.9 MMOL/L
SODIUM SERPL-SCNC: 137 MMOL/L
TROPONIN I SERPL DL<=0.01 NG/ML-MCNC: 0.12 NG/ML

## 2017-11-25 PROCEDURE — 93010 ELECTROCARDIOGRAM REPORT: CPT | Mod: ,,, | Performed by: INTERNAL MEDICINE

## 2017-11-25 PROCEDURE — 93005 ELECTROCARDIOGRAM TRACING: CPT

## 2017-11-25 PROCEDURE — 82553 CREATINE MB FRACTION: CPT

## 2017-11-25 PROCEDURE — 36415 COLL VENOUS BLD VENIPUNCTURE: CPT

## 2017-11-25 PROCEDURE — 80048 BASIC METABOLIC PNL TOTAL CA: CPT

## 2017-11-25 PROCEDURE — 25000003 PHARM REV CODE 250: Performed by: EMERGENCY MEDICINE

## 2017-11-25 PROCEDURE — 84484 ASSAY OF TROPONIN QUANT: CPT

## 2017-11-25 PROCEDURE — 85018 HEMOGLOBIN: CPT

## 2017-11-25 PROCEDURE — 85014 HEMATOCRIT: CPT

## 2017-11-25 RX ADMIN — ALLOPURINOL 300 MG: 300 TABLET ORAL at 08:11

## 2017-11-25 RX ADMIN — CLOPIDOGREL BISULFATE 75 MG: 75 TABLET, FILM COATED ORAL at 08:11

## 2017-11-25 RX ADMIN — AMLODIPINE BESYLATE 5 MG: 5 TABLET ORAL at 08:11

## 2017-11-25 RX ADMIN — CARVEDILOL 12.5 MG: 12.5 TABLET, FILM COATED ORAL at 08:11

## 2017-11-25 RX ADMIN — ATORVASTATIN CALCIUM 40 MG: 20 TABLET, FILM COATED ORAL at 08:11

## 2017-11-25 RX ADMIN — FOLIC ACID 1 MG: 1 TABLET ORAL at 08:11

## 2017-11-25 RX ADMIN — ASPIRIN 81 MG: 81 TABLET, COATED ORAL at 08:11

## 2017-11-25 RX ADMIN — VALSARTAN 320 MG: 80 TABLET, FILM COATED ORAL at 08:11

## 2017-11-25 RX ADMIN — AMIODARONE HYDROCHLORIDE 100 MG: 100 TABLET ORAL at 08:11

## 2017-11-25 NOTE — NURSING
Pt discharged to home   Disconnected from cardiac monitor   Saline lock removed  Reviewed AVS   Pt did state that she had stopped her spirolactone but then states that she was told to take it every other day since she was having vertigo   But admits that she takes it everyday   I instructed the pt to follow up with the nephrologist and follow instructions provided   Discharge instructions for cath provided   In no distress   Off the unit per wheelchair with the nurse   Family pickup per family car in front of hospital

## 2017-11-25 NOTE — PLAN OF CARE
11/25/17 1127   Medicare Message   Important Message from Medicare regarding Discharge Appeal Rights Given to patient/caregiver;Explained to patient/caregiver;Signed/date by patient/caregiver   Date IMM was signed 11/25/17   Time IMM was signed 0800

## 2017-11-25 NOTE — PLAN OF CARE
11/25/17 1041   Final Note   Assessment Type Final Discharge Note   Discharge Disposition Home   Hospital Follow Up  Appt(s) scheduled? Yes   Discharge plans and expectations educations in teach back method with documentation complete? Yes   Right Care Referral Info   Post Acute Recommendation No Care   Referral Type se avs

## 2017-11-25 NOTE — DISCHARGE SUMMARY
HISTORY OF PRESENT ILLNESS:  For 2 days prior to this admission, she had   exertional heavy pressure sensation in the center of her chest radiating to the   left elbow.  She recognized these as typical of angina and initially she took   Tums and her daughters reflux medication with no relief, when she took   nitroglycerin for exertional chest tightness, she had prompt relief of pain,   sought further medical attention in the Emergency Room.  In the 24 hours prior   to this admission, she would have angina upon defecation, and upon walking to   the bathroom.  The nature of the pain was similar to the angina she had when she   had a stent placed in the proximal left anterior descending coronary artery in   June of 2017.  She had an anterior infarct in 1999, at which time she had a   stent placed in the left anterior descending coronary artery.  She had a   drug-eluting stent placed in the left circumflex coronary artery in 2010 and in   June of 2017, she had a stent placed in the proximal left anterior descending   coronary artery.  She had a creatinine on admission of 1.9.  She was given IV   fluids, taken to the cardiac Catheterization Laboratory.  The preCath creatinine   was 1.4.  The left anterior descending coronary artery was noted to have a 90+   % stenosis at its ostium.  This was wired using a BMW wire, dilated first with a   3.0 balloon.  There was a waste in this balloon, and this balloon was then   replaced by a 2.5 AngioSculpt balloon.  This was dilated up to 14 atmosphere   pressure and adequate balloon expansion was noted.  Thereafter, a 3.5 x 15 mm   long Resolute stent was placed in the ostial left circumflex coronary artery and   dilated to 14 atmosphere pressure.  Excellent radiographic results were   obtained.  Note, there was a mild ostial left circumflex stenosis, which was   unchanged in both before and after the procedure.  The proximal right coronary   artery was totally occluded with some  left to right filling.  Left   ventriculogram was not performed.  The followup creatinine the next day was 1.2.    Mynx closure of the right groin arteriotomy site was achieved and adequate   hemostasis was achieved.  The morning after the procedure, she was angina free,   tolerated ambulation well and at the time of discharge, her home medications,   which included aspirin, clopidogrel, atorvastatin, valsartan, amlodipine and   carvedilol were all continued.  I will see her for followup in the office in 2   weeks.      KRISTIE  dd: 11/25/2017 09:30:38 (CST)  td: 11/25/2017 10:39:02 (CST)  Doc ID   #[id number]  Job ID #[id number]    CC:

## 2018-01-27 PROCEDURE — 93005 ELECTROCARDIOGRAM TRACING: CPT

## 2018-01-27 PROCEDURE — 99284 EMERGENCY DEPT VISIT MOD MDM: CPT | Mod: 25

## 2018-01-28 ENCOUNTER — HOSPITAL ENCOUNTER (EMERGENCY)
Facility: OTHER | Age: 83
Discharge: HOME | End: 2018-01-28
Attending: EMERGENCY MEDICINE
Payer: MEDICARE

## 2018-01-28 VITALS
BODY MASS INDEX: 29.19 KG/M2 | OXYGEN SATURATION: 98 % | RESPIRATION RATE: 18 BRPM | SYSTOLIC BLOOD PRESSURE: 143 MMHG | HEIGHT: 67 IN | HEART RATE: 62 BPM | DIASTOLIC BLOOD PRESSURE: 65 MMHG | WEIGHT: 186 LBS | TEMPERATURE: 98 F

## 2018-01-28 DIAGNOSIS — N18.30 CHRONIC RENAL FAILURE, STAGE 3 (MODERATE): ICD-10-CM

## 2018-01-28 DIAGNOSIS — R42 DIZZINESS: Primary | ICD-10-CM

## 2018-01-28 DIAGNOSIS — I10 ESSENTIAL HYPERTENSION: ICD-10-CM

## 2018-01-28 LAB
ALBUMIN SERPL BCP-MCNC: 3.8 G/DL
ALP SERPL-CCNC: 63 U/L
ALT SERPL W/O P-5'-P-CCNC: 22 U/L
ANION GAP SERPL CALC-SCNC: 9 MMOL/L
AST SERPL-CCNC: 20 U/L
BACTERIA #/AREA URNS HPF: NORMAL /HPF
BASOPHILS # BLD AUTO: 0.02 K/UL
BASOPHILS NFR BLD: 0.4 %
BILIRUB SERPL-MCNC: 0.6 MG/DL
BILIRUB UR QL STRIP: NEGATIVE
BUN SERPL-MCNC: 34 MG/DL
CALCIUM SERPL-MCNC: 9.4 MG/DL
CHLORIDE SERPL-SCNC: 109 MMOL/L
CLARITY UR: CLEAR
CO2 SERPL-SCNC: 23 MMOL/L
COLOR UR: YELLOW
CREAT SERPL-MCNC: 2 MG/DL
DIFFERENTIAL METHOD: ABNORMAL
EOSINOPHIL # BLD AUTO: 0.2 K/UL
EOSINOPHIL NFR BLD: 4.2 %
ERYTHROCYTE [DISTWIDTH] IN BLOOD BY AUTOMATED COUNT: 15 %
EST. GFR  (AFRICAN AMERICAN): 26 ML/MIN/1.73 M^2
EST. GFR  (NON AFRICAN AMERICAN): 22 ML/MIN/1.73 M^2
GLUCOSE SERPL-MCNC: 108 MG/DL
GLUCOSE UR QL STRIP: NEGATIVE
HCT VFR BLD AUTO: 35.8 %
HGB BLD-MCNC: 11.4 G/DL
HGB UR QL STRIP: NEGATIVE
KETONES UR QL STRIP: NEGATIVE
LEUKOCYTE ESTERASE UR QL STRIP: ABNORMAL
LYMPHOCYTES # BLD AUTO: 1.7 K/UL
LYMPHOCYTES NFR BLD: 30.3 %
MCH RBC QN AUTO: 29.3 PG
MCHC RBC AUTO-ENTMCNC: 31.8 G/DL
MCV RBC AUTO: 92 FL
MICROSCOPIC COMMENT: NORMAL
MONOCYTES # BLD AUTO: 0.6 K/UL
MONOCYTES NFR BLD: 10.2 %
NEUTROPHILS # BLD AUTO: 3.1 K/UL
NEUTROPHILS NFR BLD: 54.5 %
NITRITE UR QL STRIP: NEGATIVE
PH UR STRIP: 6 [PH] (ref 5–8)
PLATELET # BLD AUTO: 170 K/UL
PMV BLD AUTO: 11.1 FL
POTASSIUM SERPL-SCNC: 4.4 MMOL/L
PROT SERPL-MCNC: 7.6 G/DL
PROT UR QL STRIP: ABNORMAL
RBC # BLD AUTO: 3.89 M/UL
SODIUM SERPL-SCNC: 141 MMOL/L
SP GR UR STRIP: <=1.005 (ref 1–1.03)
URN SPEC COLLECT METH UR: ABNORMAL
UROBILINOGEN UR STRIP-ACNC: NEGATIVE EU/DL
WBC # BLD AUTO: 5.67 K/UL
WBC #/AREA URNS HPF: 3 /HPF (ref 0–5)

## 2018-01-28 PROCEDURE — 93010 ELECTROCARDIOGRAM REPORT: CPT | Mod: ,,, | Performed by: INTERNAL MEDICINE

## 2018-01-28 PROCEDURE — 25000003 PHARM REV CODE 250: Performed by: EMERGENCY MEDICINE

## 2018-01-28 PROCEDURE — 96360 HYDRATION IV INFUSION INIT: CPT

## 2018-01-28 PROCEDURE — 85025 COMPLETE CBC W/AUTO DIFF WBC: CPT

## 2018-01-28 PROCEDURE — 80053 COMPREHEN METABOLIC PANEL: CPT

## 2018-01-28 PROCEDURE — 81000 URINALYSIS NONAUTO W/SCOPE: CPT

## 2018-01-28 RX ORDER — MECLIZINE HYDROCHLORIDE 25 MG/1
50 TABLET ORAL
Status: COMPLETED | OUTPATIENT
Start: 2018-01-28 | End: 2018-01-28

## 2018-01-28 RX ADMIN — MECLIZINE HYDROCHLORIDE 50 MG: 25 TABLET ORAL at 03:01

## 2018-01-28 RX ADMIN — SODIUM CHLORIDE 1000 ML: 0.9 INJECTION, SOLUTION INTRAVENOUS at 03:01

## 2018-01-28 NOTE — ED NOTES
Pt c/o dizziness today. Pt states she usually gets dizzy when her B/P is low, but it wasn't today. Pt also said she took her vertigo medicine today, but it didn't work. NO trauma. Pt is A & O x 3, denies SOB, respirations are even and unlabored. Skin is warm and dry w/ pink mucosa. VS. AUGUSTA x 3mm. BBS- CTA. Abd- SNT. PSM x 4 exts. Pt denies numbness/tingling. Bed is locked and in the low position w/ the side rails up and locked for safety. Pt given a prophylactic emesis bag. Pt is connected to the pulse ox, B/P cuff and EKG monitor. Will continue to monitor closely.

## 2018-01-28 NOTE — ED NOTES
Pt in the semi- fowlers position c/o dizziness, but states it has decreased. Pt denies SOB, respirations are even and non- labored. Skin is warm and dry w/ pink mucosa.  Pt denies pain/ discomfort. Pt ambulated to and from the restroom w/out assistance. NO staggered gait. Pt stood up slowly and was able to walk to the the restroom. Pt states the dizziness continues to get better. Will continue to monitor closely.

## 2018-01-28 NOTE — ED NOTES
Pt sitting up in bed, states she feels so much better, the dizziness is almost completely gone. Pt denies SOB, chest pain, abd pain and N/V/D. Pt states she is able to move her head L to R and R to L w/out hardly any dizziness. Bathroom needs addressed. Will continue to monitor closely.

## 2018-01-28 NOTE — ED NOTES
Pt in semi- fowlers position, A & O x 3, denies SOB, states her dizziness is still present. Respirations are even and unlabored. Skin is warm and dry w/ pink mucosa. VS. Pt is connected to the pulse ox, B/P cuff and EKG monitor. Pt denies pain/discomfort.  Bathroom needs addressed. Bed is locked and in the low position w/ the side rails up and locked for pt safety. Call bell on pts lap. Will continue to monitor closely.

## 2018-01-28 NOTE — ED NOTES
Pt in the semi- fowlers position A & O x 3, states her dizziness has greatly decreased. Pt denies pain, SOB, chest pain and N/V/D. Pt is connected to the pulse ox, B/P cuff and EKG monitor. Call bell is @ the BS. Bathroom needs were addressed. Will continue to monitor pt closely.

## 2018-01-28 NOTE — ED PROVIDER NOTES
"Encounter Date: 1/27/2018    SCRIBE #1 NOTE: I, Marge Cardenas, am scribing for, and in the presence of, Dr. Mitchell.       History     Chief Complaint   Patient presents with    Dizziness     with hypotension x 2 days SBP low 100's at home.      Time seen by provider: 3:00 AM    This is a 84 y.o. female who presents with complaint of dizziness which started yesterday and worsened when the patient woke up this morning. Patient states that dizziness was exacerbated with attempting to do dishes and ambulate around the house. She additionally reports some nausea and light-headedness ("like I was going to pass out"). She says that she currently feels "much better." Patient states that her blood pressure has been unusually low for the past 2-3 weeks. She also reports history of vertigo and states that symptoms are consistent with previous episodes. She reports taking meclizine this morning. Patient denies fever, chills, vomiting, headache, ear pain, ear ringing, cough, congestion, rhinorrhea, abdominal pain, diarrhea, constipation, chest pain, or shortness of breath. She has no additional complaints at this time.      The history is provided by the patient.     Review of patient's allergies indicates:   Allergen Reactions    Clindamycin Anaphylaxis    Pcn [penicillins] Rash     Past Medical History:   Diagnosis Date    Cervical cancer 1968    breast cancer right    Coronary artery disease     Gout     High cholesterol     Hypertension     MI (myocardial infarction)      Past Surgical History:   Procedure Laterality Date    BREAST LUMPECTOMY      right    CARDIAC SURGERY      multiple cardiac stents    CORONARY STENT PLACEMENT       No family history on file.  Social History   Substance Use Topics    Smoking status: Former Smoker    Smokeless tobacco: Never Used    Alcohol use Yes      Comment: rare     Review of Systems   Constitutional: Negative for chills and fever.   HENT: Negative for congestion, ear " pain and rhinorrhea.         Negative for ear ringing.   Respiratory: Negative for cough and shortness of breath.    Cardiovascular: Negative for chest pain.   Gastrointestinal: Positive for nausea. Negative for abdominal pain, constipation, diarrhea and vomiting.   Neurological: Positive for dizziness and light-headedness. Negative for headaches.       Physical Exam     Initial Vitals [01/27/18 2336]   BP Pulse Resp Temp SpO2   (!) 179/86 64 18 98.2 °F (36.8 °C) 100 %      MAP       117         Physical Exam    Nursing note and vitals reviewed.  Constitutional: She appears well-developed and well-nourished. She is not diaphoretic. No distress.   HENT:   Head: Normocephalic and atraumatic.   Left Ear: External ear normal.   Mouth/Throat: Oropharynx is clear and moist.   Mild clouding to right TM.   Eyes: EOM are normal. Pupils are equal, round, and reactive to light. Right eye exhibits no discharge. Left eye exhibits no discharge.   Neck: Normal range of motion. Neck supple.   Cardiovascular: Normal rate, regular rhythm, normal heart sounds and intact distal pulses. Exam reveals no gallop and no friction rub.    No murmur heard.  Pulmonary/Chest: Breath sounds normal. No respiratory distress. She has no wheezes. She has no rhonchi. She has no rales.   Abdominal: Soft. Bowel sounds are normal. She exhibits no distension. There is no tenderness. There is no rebound and no guarding.   Musculoskeletal: Normal range of motion. She exhibits no edema or tenderness.   Neurological: She is alert and oriented to person, place, and time. No cranial nerve deficit or sensory deficit.   No pronator drift. Normal finger-to-nose. No nystagmus. CNII-XII intact. Negative Tru-Hallpike test. Normal speech. Symmetric lower extremity strength.    Skin: Skin is warm and dry. Capillary refill takes less than 2 seconds. No rash and no abscess noted. No erythema. No pallor.   Psychiatric: She has a normal mood and affect. Her behavior is  normal. Judgment and thought content normal.         ED Course   Procedures  Labs Reviewed   CBC W/ AUTO DIFFERENTIAL - Abnormal; Notable for the following:        Result Value    RBC 3.89 (*)     Hemoglobin 11.4 (*)     Hematocrit 35.8 (*)     MCHC 31.8 (*)     RDW 15.0 (*)     All other components within normal limits   COMPREHENSIVE METABOLIC PANEL - Abnormal; Notable for the following:     BUN, Bld 34 (*)     Creatinine 2.0 (*)     eGFR if  26 (*)     eGFR if non  22 (*)     All other components within normal limits   URINALYSIS - Abnormal; Notable for the following:     Specific Gravity, UA <=1.005 (*)     Protein, UA Trace (*)     Leukocytes, UA 1+ (*)     All other components within normal limits   URINALYSIS MICROSCOPIC     EKG Readings: (Independently Interpreted)   Initial Reading: No STEMI.   Sinus rhythm with first degree AV block. Rate of 61 bpm. Normal axis. No STEMI. Unchanged from prior 2017          Medical Decision Making:   Initial Assessment:   Urgent evaluation of 84-year-old female with history of cervical cancer, breast cancer, hyperlipidemia, hypertension, MI with stent placement and vertigo here with complaints of dizziness worse than usual.  Patient reports onset yesterday morning, with mild room spinning sensation, not completely resolved with meclizine.  Patient denies headaches, no chest pain, no recent illness, no tinnitus, weakness, or recent falls.  Currently patient endorses feeling improved on my examination, notable for well-appearing elderly female, with nonfocal neurologic exam, no nystagmus nor Hutchinson-Hallpike to evoke symptoms.  I do not suspect acute CVA nor cardiac pathology at this time.  Independently Interpreted Test(s):   I have ordered and independently interpreted EKG Reading(s) - see prior notes  Clinical Tests:   Lab Tests: Ordered and Reviewed  Medical Tests: Ordered and Reviewed  ED Management:  Labs notable for creatinine 2, previously  1.9, otherwise no acute anemia, no UTI.  Agrees to follow with primary care physician, strict return precautions given and understood.            Scribe Attestation:   Scribe #1: I performed the above scribed service and the documentation accurately describes the services I performed. I attest to the accuracy of the note.    Attending Attestation:           Physician Attestation for Scribe:  Physician Attestation Statement for Scribe #1: I, Dr. Mitchell, reviewed documentation, as scribed by Marge Cardenas in my presence, and it is both accurate and complete.                 ED Course      Clinical Impression:     1. Dizziness    2. Essential hypertension    3. Chronic renal failure, stage 3 (moderate)          Disposition:   Disposition: Discharged  Condition: Stable                        Gretta Mitchell MD  01/28/18 0412

## 2018-01-29 ENCOUNTER — PES CALL (OUTPATIENT)
Dept: ADMINISTRATIVE | Facility: CLINIC | Age: 83
End: 2018-01-29

## 2018-02-14 ENCOUNTER — OFFICE VISIT (OUTPATIENT)
Dept: CARDIOLOGY | Facility: CLINIC | Age: 83
End: 2018-02-14
Attending: INTERNAL MEDICINE
Payer: MEDICARE

## 2018-02-14 VITALS
HEIGHT: 67 IN | HEART RATE: 52 BPM | SYSTOLIC BLOOD PRESSURE: 95 MMHG | DIASTOLIC BLOOD PRESSURE: 57 MMHG | BODY MASS INDEX: 29.51 KG/M2 | WEIGHT: 188 LBS

## 2018-02-14 DIAGNOSIS — N18.30 CHRONIC RENAL FAILURE, STAGE 3 (MODERATE): ICD-10-CM

## 2018-02-14 DIAGNOSIS — I48.0 PAF (PAROXYSMAL ATRIAL FIBRILLATION): ICD-10-CM

## 2018-02-14 DIAGNOSIS — I25.10 ATHEROSCLEROSIS OF NATIVE CORONARY ARTERY OF NATIVE HEART WITHOUT ANGINA PECTORIS: ICD-10-CM

## 2018-02-14 DIAGNOSIS — I25.10 CORONARY ARTERY DISEASE INVOLVING NATIVE CORONARY ARTERY OF NATIVE HEART WITHOUT ANGINA PECTORIS: Primary | ICD-10-CM

## 2018-02-14 DIAGNOSIS — I10 ESSENTIAL HYPERTENSION: ICD-10-CM

## 2018-02-14 DIAGNOSIS — Z79.01 ANTICOAGULATED: ICD-10-CM

## 2018-02-14 DIAGNOSIS — I48.0 PAROXYSMAL ATRIAL FIBRILLATION: ICD-10-CM

## 2018-02-14 PROCEDURE — 1159F MED LIST DOCD IN RCRD: CPT | Mod: S$GLB,,, | Performed by: INTERNAL MEDICINE

## 2018-02-14 PROCEDURE — 99213 OFFICE O/P EST LOW 20 MIN: CPT | Mod: S$GLB,,, | Performed by: INTERNAL MEDICINE

## 2018-02-14 PROCEDURE — 3008F BODY MASS INDEX DOCD: CPT | Mod: S$GLB,,, | Performed by: INTERNAL MEDICINE

## 2018-02-19 RX ORDER — CLOPIDOGREL BISULFATE 75 MG/1
75 TABLET ORAL DAILY
Qty: 90 TABLET | Refills: 3 | Status: SHIPPED | OUTPATIENT
Start: 2018-02-19 | End: 2018-03-15 | Stop reason: SDUPTHER

## 2018-02-19 RX ORDER — CARVEDILOL 12.5 MG/1
12.5 TABLET ORAL 2 TIMES DAILY WITH MEALS
Qty: 180 TABLET | Refills: 3 | Status: SHIPPED | OUTPATIENT
Start: 2018-02-19 | End: 2018-06-11

## 2018-02-19 NOTE — PROGRESS NOTES
Subjective:    Patient ID:  Anahy Evans is a 84 y.o. female     HPI  Here for F/U of recent CYNTHIA proximal LAD (in 11/2017), HBP, PAF,    I feel well. No complaints.    Current Outpatient Prescriptions   Medication Sig    acetaminophen (TYLENOL) 500 MG tablet Take 1 tablet (500 mg total) by mouth every 6 (six) hours as needed.    allopurinol (ZYLOPRIM) 300 MG tablet Take 300 mg by mouth once daily.    amiodarone (PACERONE) 200 MG Tab Take 0.5 tablets (100 mg total) by mouth once daily.    amLODIPine (NORVASC) 5 MG tablet Take 5 mg by mouth once daily.    aspirin (ECOTRIN) 81 MG EC tablet Take 81 mg by mouth once daily.    atorvastatin (LIPITOR) 40 MG tablet Take 40 mg by mouth once daily.    carvedilol (COREG) 12.5 MG tablet Take 12.5 mg by mouth 2 (two) times daily with meals.    cholecalciferol, vitamin D3, (VITAMIN D3) 2,000 unit Cap Take 1 capsule by mouth once daily.    clopidogrel (PLAVIX) 75 mg tablet Take 1 tablet (75 mg total) by mouth once daily.    folic acid (FOLVITE) 1 MG tablet Take 1 mg by mouth once daily.    meclizine (ANTIVERT) 12.5 mg tablet Take 12.5 mg by mouth 3 (three) times daily as needed.    nitroGLYCERIN (NITROSTAT) 0.4 MG SL tablet Place 0.4 mg under the tongue every 5 (five) minutes as needed for Chest pain.    spironolactone (ALDACTONE) 25 MG tablet Take 25 mg by mouth every Mon, Wed, Fri.    valsartan (DIOVAN) 320 MG tablet Take 320 mg by mouth once daily.     No current facility-administered medications for this visit.          Review of Systems   Constitution: Negative for chills, decreased appetite, fever, weight gain and weight loss.   HENT: Negative for congestion, hearing loss and sore throat.    Eyes: Negative for blurred vision, double vision and visual disturbance.   Cardiovascular: Negative for chest pain, claudication, dyspnea on exertion, leg swelling, palpitations and syncope.   Respiratory: Negative for cough, hemoptysis, shortness of breath, sputum  "production and wheezing.    Endocrine: Negative for cold intolerance and heat intolerance.   Hematologic/Lymphatic: Negative for bleeding problem. Does not bruise/bleed easily.   Skin: Negative for color change, dry skin, flushing and itching.   Musculoskeletal: Negative for back pain, joint pain and myalgias.   Gastrointestinal: Negative for abdominal pain, anorexia, constipation, diarrhea, dysphagia, nausea and vomiting.        No bleeding per rectum   Genitourinary: Negative for dysuria, flank pain, frequency, hematuria and nocturia.   Neurological: Negative for dizziness, headaches, light-headedness, loss of balance, seizures and tremors.   Psychiatric/Behavioral: Negative for altered mental status and depression.         Vitals:    02/14/18 1119   BP: (!) 95/57   Pulse: (!) 52   Weight: 85.3 kg (188 lb)   Height: 5' 7" (1.702 m)     Objective:    Physical Exam   Constitutional: She is oriented to person, place, and time. She appears well-developed and well-nourished.   HENT:   Head: Normocephalic and atraumatic.   Nose: Nose normal.   Mouth/Throat: Oropharynx is clear and moist.   Eyes: Conjunctivae and EOM are normal. Pupils are equal, round, and reactive to light.   Neck: Neck supple. No tracheal deviation present. No thyromegaly present.   Cardiovascular: Normal rate, regular rhythm and intact distal pulses.  Exam reveals no gallop and no friction rub.    No murmur heard.  Pulmonary/Chest: No respiratory distress. She has no wheezes. She has no rales. She exhibits no tenderness.   Abdominal: Soft. Bowel sounds are normal. She exhibits no distension and no mass. There is no tenderness. There is no rebound and no guarding.   Musculoskeletal: Normal range of motion.   Lymphadenopathy:     She has no cervical adenopathy.   Neurological: She is alert and oriented to person, place, and time.   Skin: Skin is warm and dry.   Psychiatric: Her behavior is normal.         Assessment:       1. Coronary artery disease " involving native coronary artery of native heart without angina pectoris    2. Atherosclerosis of native coronary artery of native heart without angina pectoris    3. Paroxysmal atrial fibrillation        5. Essential hypertension    6. Chronic renal failure, stage 3 (moderate)    7. Anticoagulated         Plan:       Stable  Continue the same

## 2018-03-15 DIAGNOSIS — I48.0 PAROXYSMAL ATRIAL FIBRILLATION: ICD-10-CM

## 2018-03-15 DIAGNOSIS — I25.10 ATHEROSCLEROSIS OF NATIVE CORONARY ARTERY OF NATIVE HEART WITHOUT ANGINA PECTORIS: ICD-10-CM

## 2018-03-15 RX ORDER — CLOPIDOGREL BISULFATE 75 MG/1
75 TABLET ORAL DAILY
Qty: 90 TABLET | Refills: 3 | Status: SHIPPED | OUTPATIENT
Start: 2018-03-15 | End: 2019-02-12

## 2018-05-16 ENCOUNTER — OFFICE VISIT (OUTPATIENT)
Dept: CARDIOLOGY | Facility: CLINIC | Age: 83
End: 2018-05-16
Attending: INTERNAL MEDICINE
Payer: MEDICARE

## 2018-05-16 VITALS
BODY MASS INDEX: 29.35 KG/M2 | HEIGHT: 67 IN | DIASTOLIC BLOOD PRESSURE: 88 MMHG | SYSTOLIC BLOOD PRESSURE: 142 MMHG | WEIGHT: 187 LBS | HEART RATE: 54 BPM

## 2018-05-16 DIAGNOSIS — N18.30 CHRONIC RENAL FAILURE, STAGE 3 (MODERATE): ICD-10-CM

## 2018-05-16 DIAGNOSIS — I10 ESSENTIAL HYPERTENSION: ICD-10-CM

## 2018-05-16 DIAGNOSIS — I25.10 CORONARY ARTERY DISEASE INVOLVING NATIVE CORONARY ARTERY OF NATIVE HEART WITHOUT ANGINA PECTORIS: Primary | ICD-10-CM

## 2018-05-16 DIAGNOSIS — I48.0 PAF (PAROXYSMAL ATRIAL FIBRILLATION): ICD-10-CM

## 2018-05-16 PROCEDURE — 3077F SYST BP >= 140 MM HG: CPT | Mod: CPTII,S$GLB,, | Performed by: INTERNAL MEDICINE

## 2018-05-16 PROCEDURE — 99214 OFFICE O/P EST MOD 30 MIN: CPT | Mod: S$GLB,,, | Performed by: INTERNAL MEDICINE

## 2018-05-16 PROCEDURE — 3079F DIAST BP 80-89 MM HG: CPT | Mod: CPTII,S$GLB,, | Performed by: INTERNAL MEDICINE

## 2018-05-16 NOTE — PROGRESS NOTES
Subjective:    Patient ID:  Anahy Evans is a 84 y.o. female     HPI  here for follow-up of coronary artery disease, had a drug-eluting stent placed in the left proximal left anterior descending coronary artery for unstable angina in November of 2017, essential hypertension, paroxysmally atrial fibrillation, chronic renal failure stage 3.    I get lightheaded when my blood pressure is low.  I must reduce my blood pressure medications that do not allow me to feel good.  I have no further chest pain or breathlessness.    Current Outpatient Prescriptions   Medication Sig    acetaminophen (TYLENOL) 500 MG tablet Take 1 tablet (500 mg total) by mouth every 6 (six) hours as needed.    allopurinol (ZYLOPRIM) 300 MG tablet Take 300 mg by mouth once daily.    amiodarone (PACERONE) 200 MG Tab Take 0.5 tablets (100 mg total) by mouth once daily.    amLODIPine (NORVASC) 5 MG tablet Take 5 mg by mouth once daily.    aspirin (ECOTRIN) 81 MG EC tablet Take 81 mg by mouth once daily.    atorvastatin (LIPITOR) 40 MG tablet Take 40 mg by mouth once daily.    carvedilol (COREG) 12.5 MG tablet Take 1 tablet (12.5 mg total) by mouth 2 (two) times daily with meals.    cholecalciferol, vitamin D3, (VITAMIN D3) 2,000 unit Cap Take 1 capsule by mouth once daily.    clopidogrel (PLAVIX) 75 mg tablet Take 1 tablet (75 mg total) by mouth once daily.    folic acid (FOLVITE) 1 MG tablet Take 1 mg by mouth once daily.    meclizine (ANTIVERT) 12.5 mg tablet Take 12.5 mg by mouth 3 (three) times daily as needed.    nitroGLYCERIN (NITROSTAT) 0.4 MG SL tablet Place 0.4 mg under the tongue every 5 (five) minutes as needed for Chest pain.    spironolactone (ALDACTONE) 25 MG tablet Take 25 mg by mouth every other day.     valsartan (DIOVAN) 320 MG tablet Take 320 mg by mouth once daily.     No current facility-administered medications for this visit.          Review of Systems   Constitution: Negative for chills, decreased appetite,  "fever, weight gain and weight loss.   HENT: Negative for congestion, hearing loss and sore throat.    Eyes: Negative for blurred vision, double vision and visual disturbance.   Cardiovascular: Negative for chest pain, claudication, dyspnea on exertion, leg swelling, palpitations and syncope.   Respiratory: Negative for cough, hemoptysis, shortness of breath, sputum production and wheezing.    Endocrine: Negative for cold intolerance and heat intolerance.   Hematologic/Lymphatic: Negative for bleeding problem. Does not bruise/bleed easily.   Skin: Negative for color change, dry skin, flushing and itching.   Musculoskeletal: Negative for back pain, joint pain and myalgias.   Gastrointestinal: Negative for abdominal pain, anorexia, constipation, diarrhea, dysphagia, nausea and vomiting.        No bleeding per rectum   Genitourinary: Negative for dysuria, flank pain, frequency, hematuria and nocturia.   Neurological: Positive for light-headedness. Negative for dizziness, headaches, loss of balance, seizures and tremors.   Psychiatric/Behavioral: Negative for altered mental status and depression.     Vitals:    05/16/18 1114   BP: (!) 142/88   Pulse: (!) 54   Weight: 84.8 kg (187 lb)   Height: 5' 7" (1.702 m)        Objective:    Physical Exam   Constitutional: She is oriented to person, place, and time. She appears well-developed and well-nourished.   HENT:   Head: Normocephalic and atraumatic.   Nose: Nose normal.   Mouth/Throat: Oropharynx is clear and moist.   Eyes: Conjunctivae and EOM are normal. Pupils are equal, round, and reactive to light.   Neck: Neck supple. No tracheal deviation present. No thyromegaly present.   Cardiovascular: Normal rate, regular rhythm and intact distal pulses.  Exam reveals no gallop and no friction rub.    No murmur heard.  Pulmonary/Chest: No respiratory distress. She has no wheezes. She has no rales. She exhibits no tenderness.   Abdominal: Soft. Bowel sounds are normal. She exhibits " no distension and no mass. There is no tenderness. There is no rebound and no guarding.   Musculoskeletal: Normal range of motion.   Lymphadenopathy:     She has no cervical adenopathy.   Neurological: She is alert and oriented to person, place, and time.   Skin: Skin is warm and dry.   Psychiatric: Her behavior is normal.         Assessment:       1. Coronary artery disease involving native coronary artery of native heart without angina pectoris    2. PAF (paroxysmal atrial fibrillation)    3. Essential hypertension    4. Chronic renal failure, stage 3 (moderate)         Plan:       Reduce spironolactone to 25 mg every other day  Continue the rest.

## 2018-06-11 ENCOUNTER — OFFICE VISIT (OUTPATIENT)
Dept: CARDIOLOGY | Facility: CLINIC | Age: 83
End: 2018-06-11
Attending: INTERNAL MEDICINE
Payer: MEDICARE

## 2018-06-11 VITALS
SYSTOLIC BLOOD PRESSURE: 135 MMHG | DIASTOLIC BLOOD PRESSURE: 78 MMHG | BODY MASS INDEX: 29.66 KG/M2 | HEART RATE: 49 BPM | WEIGHT: 189 LBS | HEIGHT: 67 IN

## 2018-06-11 DIAGNOSIS — N18.30 CHRONIC RENAL FAILURE, STAGE 3 (MODERATE): ICD-10-CM

## 2018-06-11 DIAGNOSIS — I48.0 PAF (PAROXYSMAL ATRIAL FIBRILLATION): ICD-10-CM

## 2018-06-11 DIAGNOSIS — I10 ESSENTIAL HYPERTENSION: ICD-10-CM

## 2018-06-11 DIAGNOSIS — R42 DIZZINESS: ICD-10-CM

## 2018-06-11 DIAGNOSIS — I25.10 CORONARY ARTERY DISEASE INVOLVING NATIVE CORONARY ARTERY OF NATIVE HEART WITHOUT ANGINA PECTORIS: ICD-10-CM

## 2018-06-11 DIAGNOSIS — I48.0 PAROXYSMAL ATRIAL FIBRILLATION: Primary | ICD-10-CM

## 2018-06-11 PROCEDURE — 3078F DIAST BP <80 MM HG: CPT | Mod: CPTII,S$GLB,, | Performed by: INTERNAL MEDICINE

## 2018-06-11 PROCEDURE — 93000 ELECTROCARDIOGRAM COMPLETE: CPT | Mod: S$GLB,,, | Performed by: INTERNAL MEDICINE

## 2018-06-11 PROCEDURE — 99214 OFFICE O/P EST MOD 30 MIN: CPT | Mod: S$GLB,,, | Performed by: INTERNAL MEDICINE

## 2018-06-11 PROCEDURE — 3075F SYST BP GE 130 - 139MM HG: CPT | Mod: CPTII,S$GLB,, | Performed by: INTERNAL MEDICINE

## 2018-06-11 RX ORDER — CARVEDILOL 6.25 MG/1
6.25 TABLET ORAL 2 TIMES DAILY WITH MEALS
Status: ON HOLD | COMMUNITY
End: 2019-05-16 | Stop reason: HOSPADM

## 2018-06-12 PROBLEM — R42 DIZZINESS: Status: ACTIVE | Noted: 2018-06-12

## 2018-06-12 NOTE — PROGRESS NOTES
Subjective:    Patient ID:  Anahy Evans is a 84 y.o. female     HPI  here for follow-up of coronary artery disease, status post drug-eluting stent placement in the proximal left anterior descending coronary artery in November of 2017, paroxysmal atrial fibrillation, essential hypertension, stage III chronic renal failure, dizziness.    I feel well but I still have episodic dizziness.  I do not like to take meclizine.    Current Outpatient Prescriptions   Medication Sig    carvedilol (COREG) 6.25 MG tablet Take 6.25 mg by mouth 2 (two) times daily with meals.    acetaminophen (TYLENOL) 500 MG tablet Take 1 tablet (500 mg total) by mouth every 6 (six) hours as needed.    allopurinol (ZYLOPRIM) 300 MG tablet Take 300 mg by mouth once daily.    amiodarone (PACERONE) 200 MG Tab Take 0.5 tablets (100 mg total) by mouth once daily.    amLODIPine (NORVASC) 5 MG tablet Take 5 mg by mouth once daily.    aspirin (ECOTRIN) 81 MG EC tablet Take 81 mg by mouth once daily.    atorvastatin (LIPITOR) 40 MG tablet Take 40 mg by mouth once daily.    cholecalciferol, vitamin D3, (VITAMIN D3) 2,000 unit Cap Take 1 capsule by mouth once daily.    clopidogrel (PLAVIX) 75 mg tablet Take 1 tablet (75 mg total) by mouth once daily.    folic acid (FOLVITE) 1 MG tablet Take 1 mg by mouth once daily.    meclizine (ANTIVERT) 12.5 mg tablet Take 12.5 mg by mouth 3 (three) times daily as needed.    nitroGLYCERIN (NITROSTAT) 0.4 MG SL tablet Place 0.4 mg under the tongue every 5 (five) minutes as needed for Chest pain.    spironolactone (ALDACTONE) 25 MG tablet Take 25 mg by mouth every other day.     valsartan (DIOVAN) 320 MG tablet Take 320 mg by mouth once daily.     No current facility-administered medications for this visit.          Review of Systems   Constitution: Negative for chills, decreased appetite, fever, weight gain and weight loss.   HENT: Negative for congestion, hearing loss and sore throat.    Eyes: Negative for  "blurred vision, double vision and visual disturbance.   Cardiovascular: Negative for chest pain, claudication, dyspnea on exertion, leg swelling, palpitations and syncope.   Respiratory: Negative for cough, hemoptysis, shortness of breath, sputum production and wheezing.    Endocrine: Negative for cold intolerance and heat intolerance.   Hematologic/Lymphatic: Negative for bleeding problem. Does not bruise/bleed easily.   Skin: Negative for color change, dry skin, flushing and itching.   Musculoskeletal: Negative for back pain, joint pain and myalgias.   Gastrointestinal: Negative for abdominal pain, anorexia, constipation, diarrhea, dysphagia, nausea and vomiting.        No bleeding per rectum   Genitourinary: Negative for dysuria, flank pain, frequency, hematuria and nocturia.   Neurological: Positive for dizziness. Negative for headaches, light-headedness, loss of balance, seizures and tremors.   Psychiatric/Behavioral: Negative for altered mental status and depression.         Vitals:    06/11/18 1300   BP: 135/78   Pulse: (!) 49   Weight: 85.7 kg (189 lb)   Height: 5' 7" (1.702 m)     Objective:    Physical Exam   Constitutional: She is oriented to person, place, and time. She appears well-developed and well-nourished.   HENT:   Head: Normocephalic and atraumatic.   Nose: Nose normal.   Mouth/Throat: Oropharynx is clear and moist.   Eyes: Conjunctivae and EOM are normal. Pupils are equal, round, and reactive to light.   Neck: Neck supple. No tracheal deviation present. No thyromegaly present.   Cardiovascular: Normal rate, regular rhythm and intact distal pulses.  Exam reveals no gallop and no friction rub.    No murmur heard.  Pulmonary/Chest: No respiratory distress. She has no wheezes. She has no rales. She exhibits no tenderness.   Abdominal: Soft. Bowel sounds are normal. She exhibits no distension and no mass. There is no tenderness. There is no rebound and no guarding.   Musculoskeletal: Normal range of " motion.   Lymphadenopathy:     She has no cervical adenopathy.   Neurological: She is alert and oriented to person, place, and time.   Skin: Skin is warm and dry.   Psychiatric: Her behavior is normal.         Assessment:           2. Coronary artery disease involving native coronary artery of native heart without angina pectoris    3. PAF (paroxysmal atrial fibrillation)    4. Essential hypertension    5. Chronic renal failure, stage 3 (moderate)    6. Dizziness         Plan:       Urged trying meclizine.    Continue the same medical regimen.    Consult Dr. Patricia, ENT.

## 2018-08-15 ENCOUNTER — CLINICAL SUPPORT (OUTPATIENT)
Dept: AUDIOLOGY | Facility: CLINIC | Age: 83
End: 2018-08-15
Payer: MEDICARE

## 2018-08-15 DIAGNOSIS — H81.391 PERIPHERAL VERTIGO, RIGHT: Primary | ICD-10-CM

## 2018-08-15 PROCEDURE — 92540 BASIC VESTIBULAR EVALUATION: CPT | Mod: S$GLB,,, | Performed by: AUDIOLOGIST

## 2018-08-15 PROCEDURE — 92537 CALORIC VSTBLR TEST W/REC: CPT | Mod: S$GLB,,, | Performed by: AUDIOLOGIST

## 2018-08-15 NOTE — PROGRESS NOTES
VNG/Posturography Evaluation    Referring physician:  Dr. Patricia     84 y.o. female complains of dizziness, lightheadedness, imbalance, and fatigue.  Symptoms are provoked by bending over and have been recurring over the past few years but have intensified in the last 4-5 months.  Ms. Evans stated that she constantly feels off-balance and lightheaded.  She denied taking any medication that would affect today's testing.    Gaze nystagmus  was absent.    Oculomotor function was abnormal for saccadic velocities only.    Spontaneous nystagmus was absent.    The head-hanging left Hallpike revealed <clinically insignificant, non-fatiguing> nystagmus: 6 d/s down-beating in the supine position.     The head-hanging right Hallpike revealed <clincially insignificant, non-fatiguing> nystagmus: 5 d/s down-beating in the supine position and 5 d/s down-beating in the return to upright position.    Static positional testing revealed <clinically insignificant, non-fatiguing> nystagmus in all 3 head positions: 6 d/s down-beating in the head right position, 6 d/s down-beating in the head center position, and 5 d/s down-beating in the head left position.    Unilateral weakness: 57% ( right)  Directional preponderance 43% (left beating)    RC: 5 d/s   d/s  RW: 0 d/s  LW: 14 d/s    Fixation suppression was normal.    Impression: Right peripheral vestibular abnormality with possible central component due to the presence of down-beating positional nystagmus.    Recommend: 1. A trial with C[place] exercises to help reduce subjective symptoms.  A copy of these exercises was provided at today's appointment.  2. Referral to Neurology to rule out central involvement.        Shaunna Horowitz., CCC-A  Licensed Audiologist

## 2018-08-15 NOTE — Clinical Note
Please see results of today's VNG testing.  Please let me know if I can provide additional information.Thank you,Daisy Horowitz, CCC-A

## 2018-09-10 ENCOUNTER — OFFICE VISIT (OUTPATIENT)
Dept: CARDIOLOGY | Facility: CLINIC | Age: 83
End: 2018-09-10
Attending: INTERNAL MEDICINE
Payer: MEDICARE

## 2018-09-10 VITALS
WEIGHT: 183 LBS | SYSTOLIC BLOOD PRESSURE: 149 MMHG | HEIGHT: 67 IN | HEART RATE: 64 BPM | BODY MASS INDEX: 28.72 KG/M2 | DIASTOLIC BLOOD PRESSURE: 87 MMHG

## 2018-09-10 DIAGNOSIS — I10 ESSENTIAL HYPERTENSION: ICD-10-CM

## 2018-09-10 DIAGNOSIS — I25.10 CORONARY ARTERY DISEASE INVOLVING NATIVE CORONARY ARTERY OF NATIVE HEART WITHOUT ANGINA PECTORIS: Primary | ICD-10-CM

## 2018-09-10 DIAGNOSIS — I48.0 PAF (PAROXYSMAL ATRIAL FIBRILLATION): ICD-10-CM

## 2018-09-10 DIAGNOSIS — R42 DIZZINESS: ICD-10-CM

## 2018-09-10 DIAGNOSIS — N18.30 CHRONIC RENAL FAILURE, STAGE 3 (MODERATE): ICD-10-CM

## 2018-09-10 PROCEDURE — 3077F SYST BP >= 140 MM HG: CPT | Mod: CPTII,S$GLB,, | Performed by: INTERNAL MEDICINE

## 2018-09-10 PROCEDURE — 3079F DIAST BP 80-89 MM HG: CPT | Mod: CPTII,S$GLB,, | Performed by: INTERNAL MEDICINE

## 2018-09-10 PROCEDURE — 99214 OFFICE O/P EST MOD 30 MIN: CPT | Mod: S$GLB,,, | Performed by: INTERNAL MEDICINE

## 2018-09-10 NOTE — PROGRESS NOTES
Subjective:    Patient ID:  Anahy Evans is a 84 y.o. female     HPI   Here for follow-up of coronary artery disease, essential hypertension, paroxysmally atrial fibrillation, chronic renal failure, dizziness.    My ENT MD wants me to have an ENG study done.  I had of fleeting chest discomfort, another fleeting left elbow pain which lasted a couple of seconds.  I have had no exertional pains in the chest.    Current Outpatient Medications   Medication Sig    acetaminophen (TYLENOL) 500 MG tablet Take 1 tablet (500 mg total) by mouth every 6 (six) hours as needed.    allopurinol (ZYLOPRIM) 300 MG tablet Take 300 mg by mouth once daily.    amiodarone (PACERONE) 200 MG Tab Take 0.5 tablets (100 mg total) by mouth once daily.    amLODIPine (NORVASC) 5 MG tablet Take 5 mg by mouth once daily.    aspirin (ECOTRIN) 81 MG EC tablet Take 81 mg by mouth once daily.    atorvastatin (LIPITOR) 40 MG tablet Take 40 mg by mouth once daily.    carvedilol (COREG) 6.25 MG tablet Take 6.25 mg by mouth 2 (two) times daily with meals.    cholecalciferol, vitamin D3, (VITAMIN D3) 2,000 unit Cap Take 1 capsule by mouth once daily.    clopidogrel (PLAVIX) 75 mg tablet Take 1 tablet (75 mg total) by mouth once daily.    folic acid (FOLVITE) 1 MG tablet Take 1 mg by mouth once daily.    meclizine (ANTIVERT) 12.5 mg tablet Take 12.5 mg by mouth 3 (three) times daily as needed.    nitroGLYCERIN (NITROSTAT) 0.4 MG SL tablet Place 0.4 mg under the tongue every 5 (five) minutes as needed for Chest pain.    spironolactone (ALDACTONE) 25 MG tablet Take 25 mg by mouth every other day.     valsartan (DIOVAN) 320 MG tablet Take 320 mg by mouth once daily.     No current facility-administered medications for this visit.          Review of Systems   Constitution: Negative for chills, decreased appetite, fever, weight gain and weight loss.   HENT: Negative for congestion, hearing loss and sore throat.    Eyes: Negative for blurred  "vision, double vision and visual disturbance.   Cardiovascular: Positive for chest pain. Negative for claudication, dyspnea on exertion, leg swelling, palpitations and syncope.   Respiratory: Negative for cough, hemoptysis, shortness of breath, sputum production and wheezing.    Endocrine: Negative for cold intolerance and heat intolerance.   Hematologic/Lymphatic: Negative for bleeding problem. Does not bruise/bleed easily.   Skin: Negative for color change, dry skin, flushing and itching.   Musculoskeletal: Negative for back pain, joint pain and myalgias.   Gastrointestinal: Negative for abdominal pain, anorexia, constipation, diarrhea, dysphagia, nausea and vomiting.        No bleeding per rectum   Genitourinary: Negative for dysuria, flank pain, frequency, hematuria and nocturia.   Neurological: Negative for dizziness, headaches, light-headedness, loss of balance, seizures and tremors.   Psychiatric/Behavioral: Negative for altered mental status and depression.         Vitals:    09/10/18 1439   BP: (!) 149/87   Pulse: 64   Weight: 83 kg (183 lb)   Height: 5' 7" (1.702 m)     Objective:    Physical Exam   Constitutional: She is oriented to person, place, and time. She appears well-developed and well-nourished.   HENT:   Head: Normocephalic and atraumatic.   Nose: Nose normal.   Mouth/Throat: Oropharynx is clear and moist.   Eyes: Conjunctivae and EOM are normal. Pupils are equal, round, and reactive to light.   Neck: Neck supple. No tracheal deviation present. No thyromegaly present.   Cardiovascular: Normal rate and regular rhythm. Exam reveals no gallop and no friction rub.   No murmur heard.  Pulmonary/Chest: No respiratory distress. She has no wheezes. She has no rales. She exhibits no tenderness.   Abdominal: Soft. Bowel sounds are normal. She exhibits no distension and no mass. There is no tenderness. There is no rebound and no guarding.   Musculoskeletal: Normal range of motion.   Lymphadenopathy:     She " has no cervical adenopathy.   Neurological: She is alert and oriented to person, place, and time.   Skin: Skin is warm and dry.   Psychiatric: Her behavior is normal.         Assessment:       1. Coronary artery disease involving native coronary artery of native heart without angina pectoris    2. Essential hypertension    3. PAF (paroxysmal atrial fibrillation)    4. Chronic renal failure, stage 3 (moderate)    5. Dizziness         Plan:       Lexiscan Cardiolite test.  Continue the same medical regimen.  If there should be any reproduction of chest pains, come to the emergency room.

## 2018-10-18 ENCOUNTER — CLINICAL SUPPORT (OUTPATIENT)
Dept: CARDIOLOGY | Facility: CLINIC | Age: 83
End: 2018-10-18
Payer: MEDICARE

## 2018-10-18 DIAGNOSIS — I25.10 CAD (CORONARY ARTERY DISEASE): ICD-10-CM

## 2018-10-18 DIAGNOSIS — R07.9 CHEST PAIN: Primary | ICD-10-CM

## 2018-10-18 PROCEDURE — 93015 CV STRESS TEST SUPVJ I&R: CPT | Mod: S$GLB,ICN,, | Performed by: INTERNAL MEDICINE

## 2018-10-18 PROCEDURE — A9500 TC99M SESTAMIBI: HCPCS | Mod: S$GLB,ICN,, | Performed by: INTERNAL MEDICINE

## 2018-10-18 PROCEDURE — 78452 HT MUSCLE IMAGE SPECT MULT: CPT | Mod: S$GLB,ICN,, | Performed by: INTERNAL MEDICINE

## 2018-10-22 ENCOUNTER — TELEPHONE (OUTPATIENT)
Dept: CARDIOLOGY | Facility: CLINIC | Age: 83
End: 2018-10-22

## 2018-12-10 ENCOUNTER — OFFICE VISIT (OUTPATIENT)
Dept: CARDIOLOGY | Facility: CLINIC | Age: 83
End: 2018-12-10
Attending: INTERNAL MEDICINE
Payer: MEDICARE

## 2018-12-10 VITALS
BODY MASS INDEX: 29.35 KG/M2 | HEART RATE: 55 BPM | HEIGHT: 67 IN | DIASTOLIC BLOOD PRESSURE: 66 MMHG | WEIGHT: 187 LBS | SYSTOLIC BLOOD PRESSURE: 120 MMHG

## 2018-12-10 DIAGNOSIS — I25.10 CORONARY ARTERY DISEASE INVOLVING NATIVE CORONARY ARTERY OF NATIVE HEART WITHOUT ANGINA PECTORIS: Primary | ICD-10-CM

## 2018-12-10 DIAGNOSIS — E78.5 HYPERLIPIDEMIA, UNSPECIFIED HYPERLIPIDEMIA TYPE: ICD-10-CM

## 2018-12-10 DIAGNOSIS — I48.0 PAF (PAROXYSMAL ATRIAL FIBRILLATION): ICD-10-CM

## 2018-12-10 DIAGNOSIS — I10 ESSENTIAL HYPERTENSION: ICD-10-CM

## 2018-12-10 DIAGNOSIS — N18.30 CHRONIC RENAL FAILURE, STAGE 3 (MODERATE): ICD-10-CM

## 2018-12-10 PROCEDURE — 3078F DIAST BP <80 MM HG: CPT | Mod: CPTII,S$GLB,, | Performed by: INTERNAL MEDICINE

## 2018-12-10 PROCEDURE — 99213 OFFICE O/P EST LOW 20 MIN: CPT | Mod: S$GLB,,, | Performed by: INTERNAL MEDICINE

## 2018-12-10 PROCEDURE — 3074F SYST BP LT 130 MM HG: CPT | Mod: CPTII,S$GLB,, | Performed by: INTERNAL MEDICINE

## 2018-12-10 NOTE — LETTER
December 10, 2018      Ryan Lopez MD  [facility] [address]           [facility]  [address] [place] [address], [address]  [facility] [address]  Phone: [phone]  Fax: [phone]          Patient: Anahy Evans   MR Number: [id number]   YOB: 1933   Date of Visit: 12/10/2018       Dear Dr. Ryan Lopez:    Thank you for referring Anahy Evans to me for evaluation. Attached you will find relevant portions of my assessment and plan of care.    If you have questions, please do not hesitate to call me. I look forward to following Anahy Evans along with you.    Sincerely,    Asim Canela MD    Enclosure  CC:  No Recipients    If you would like to receive this communication electronically, please contact [email or web address] or [phone] to request more information on [employer] Link access.    For providers and/or their staff who would like to refer a patient to [facility], please contact us through our one-stop-shop provider referral line, [facility], at [phone].    If you feel you have received this communication in error or would no longer like to receive these types of communications, please e-mail [email or web address]

## 2018-12-10 NOTE — PROGRESS NOTES
Subjective:    Patient ID:  Anahy Evans is a 85 y.o. female     HPI   Here for follow-up of coronary artery disease, STEMI in 1999 underwent a bare metal stent in the LAD, drug-eluting stent in the left circumflex coronary artery in 2010, hypertension, paroxysmally atrial fibrillation, chronic renal failure, hyperlipidemia.    I feel well.  I have no complaints.    Current Outpatient Medications   Medication Sig    acetaminophen (TYLENOL) 500 MG tablet Take 1 tablet (500 mg total) by mouth every 6 (six) hours as needed.    allopurinol (ZYLOPRIM) 300 MG tablet Take 300 mg by mouth once daily.    amiodarone (PACERONE) 200 MG Tab Take 0.5 tablets (100 mg total) by mouth once daily.    amLODIPine (NORVASC) 5 MG tablet Take 5 mg by mouth once daily.    aspirin (ECOTRIN) 81 MG EC tablet Take 81 mg by mouth once daily.    atorvastatin (LIPITOR) 40 MG tablet Take 40 mg by mouth once daily.    carvedilol (COREG) 6.25 MG tablet Take 6.25 mg by mouth 2 (two) times daily with meals.    cholecalciferol, vitamin D3, (VITAMIN D3) 2,000 unit Cap Take 1 capsule by mouth once daily.    clopidogrel (PLAVIX) 75 mg tablet Take 1 tablet (75 mg total) by mouth once daily.    folic acid (FOLVITE) 1 MG tablet Take 1 mg by mouth once daily.    meclizine (ANTIVERT) 12.5 mg tablet Take 12.5 mg by mouth 3 (three) times daily as needed.    nitroGLYCERIN (NITROSTAT) 0.4 MG SL tablet Place 0.4 mg under the tongue every 5 (five) minutes as needed for Chest pain.    spironolactone (ALDACTONE) 25 MG tablet Take 25 mg by mouth every other day.     valsartan (DIOVAN) 320 MG tablet Take 320 mg by mouth once daily.     No current facility-administered medications for this visit.          Review of Systems   Constitution: Negative for chills, decreased appetite, fever, weight gain and weight loss.   HENT: Negative for congestion, hearing loss and sore throat.    Eyes: Negative for blurred vision, double vision and visual disturbance.  "  Cardiovascular: Negative for chest pain, claudication, dyspnea on exertion, leg swelling, palpitations and syncope.   Respiratory: Negative for cough, hemoptysis, shortness of breath, sputum production and wheezing.    Endocrine: Negative for cold intolerance and heat intolerance.   Hematologic/Lymphatic: Negative for bleeding problem. Does not bruise/bleed easily.   Skin: Negative for color change, dry skin, flushing and itching.   Musculoskeletal: Negative for back pain, joint pain and myalgias.   Gastrointestinal: Negative for abdominal pain, anorexia, constipation, diarrhea, dysphagia, nausea and vomiting.        No bleeding per rectum   Genitourinary: Negative for dysuria, flank pain, frequency, hematuria and nocturia.   Neurological: Negative for dizziness, headaches, light-headedness, loss of balance, seizures and tremors.   Psychiatric/Behavioral: Negative for altered mental status and depression.         Vitals:    12/10/18 1147   BP: 120/66   Pulse: (!) 55   Weight: 84.8 kg (187 lb)   Height: 5' 7" (1.702 m)     Objective:    Physical Exam   Constitutional: She is oriented to person, place, and time. She appears well-developed and well-nourished.   HENT:   Head: Normocephalic and atraumatic.   Nose: Nose normal.   Mouth/Throat: Oropharynx is clear and moist.   Eyes: Conjunctivae and EOM are normal. Pupils are equal, round, and reactive to light.   Neck: Neck supple. No tracheal deviation present. No thyromegaly present.   Cardiovascular: Normal rate, regular rhythm and intact distal pulses. Exam reveals no gallop and no friction rub.   No murmur heard.  Pulmonary/Chest: No respiratory distress. She has no wheezes. She has no rales. She exhibits no tenderness.   Abdominal: Soft. Bowel sounds are normal. She exhibits no distension and no mass. There is no tenderness. There is no rebound and no guarding.   Musculoskeletal: Normal range of motion.   Lymphadenopathy:     She has no cervical adenopathy. "   Neurological: She is alert and oriented to person, place, and time.   Skin: Skin is warm and dry.   Psychiatric: Her behavior is normal.         Assessment:       1. Coronary artery disease involving native coronary artery of native heart without angina pectoris    2. Essential hypertension    3. PAF (paroxysmal atrial fibrillation)    4. Chronic renal failure, stage 3 (moderate)    5. Hyperlipidemia, unspecified hyperlipidemia type         Plan:         Stable.  Continue the present pharmacological regimen.

## 2019-01-01 NOTE — OR NURSING
Patient and family updated on procedure process. Waiting for Cath Lab to call to preop.   
Report given to Gretta Gannon RN  
2019 21:43

## 2019-01-10 ENCOUNTER — OFFICE VISIT (OUTPATIENT)
Dept: NEUROLOGY | Facility: CLINIC | Age: 84
End: 2019-01-10
Payer: MEDICARE

## 2019-01-10 VITALS
WEIGHT: 183.44 LBS | HEIGHT: 67 IN | HEART RATE: 63 BPM | DIASTOLIC BLOOD PRESSURE: 72 MMHG | SYSTOLIC BLOOD PRESSURE: 131 MMHG | BODY MASS INDEX: 28.79 KG/M2

## 2019-01-10 DIAGNOSIS — N18.30 CHRONIC RENAL FAILURE, STAGE 3 (MODERATE): ICD-10-CM

## 2019-01-10 DIAGNOSIS — I10 ESSENTIAL HYPERTENSION: ICD-10-CM

## 2019-01-10 DIAGNOSIS — R42 DIZZINESS: Primary | ICD-10-CM

## 2019-01-10 DIAGNOSIS — I25.10 CORONARY ARTERY DISEASE INVOLVING NATIVE CORONARY ARTERY OF NATIVE HEART WITHOUT ANGINA PECTORIS: ICD-10-CM

## 2019-01-10 DIAGNOSIS — E78.5 HYPERLIPIDEMIA, UNSPECIFIED HYPERLIPIDEMIA TYPE: ICD-10-CM

## 2019-01-10 PROBLEM — R80.9 PROTEINURIA: Status: ACTIVE | Noted: 2019-01-10

## 2019-01-10 PROBLEM — N25.81 SECONDARY HYPERPARATHYROIDISM OF RENAL ORIGIN: Status: ACTIVE | Noted: 2019-01-10

## 2019-01-10 PROBLEM — E11.22 TYPE 2 DIABETES MELLITUS WITH DIABETIC CHRONIC KIDNEY DISEASE: Status: ACTIVE | Noted: 2019-01-10

## 2019-01-10 PROBLEM — A51.5 EARLY SYPHILIS, LATENT: Status: ACTIVE | Noted: 2019-01-10

## 2019-01-10 PROBLEM — R73.03 PREDIABETES: Status: ACTIVE | Noted: 2019-01-10

## 2019-01-10 PROBLEM — I20.9 ANGINA PECTORIS: Status: ACTIVE | Noted: 2017-11-22

## 2019-01-10 PROCEDURE — 1100F PR PT FALLS ASSESS DOC 2+ FALLS/FALL W/INJURY/YR: ICD-10-PCS | Mod: CPTII,S$GLB,, | Performed by: PSYCHIATRY & NEUROLOGY

## 2019-01-10 PROCEDURE — 99204 OFFICE O/P NEW MOD 45 MIN: CPT | Mod: S$GLB,,, | Performed by: PSYCHIATRY & NEUROLOGY

## 2019-01-10 PROCEDURE — 1100F PTFALLS ASSESS-DOCD GE2>/YR: CPT | Mod: CPTII,S$GLB,, | Performed by: PSYCHIATRY & NEUROLOGY

## 2019-01-10 PROCEDURE — 3078F DIAST BP <80 MM HG: CPT | Mod: CPTII,S$GLB,, | Performed by: PSYCHIATRY & NEUROLOGY

## 2019-01-10 PROCEDURE — 3288F PR FALLS RISK ASSESSMENT DOCUMENTED: ICD-10-PCS | Mod: CPTII,S$GLB,, | Performed by: PSYCHIATRY & NEUROLOGY

## 2019-01-10 PROCEDURE — 99999 PR PBB SHADOW E&M-EST. PATIENT-LVL III: CPT | Mod: PBBFAC,,, | Performed by: PSYCHIATRY & NEUROLOGY

## 2019-01-10 PROCEDURE — 3288F FALL RISK ASSESSMENT DOCD: CPT | Mod: CPTII,S$GLB,, | Performed by: PSYCHIATRY & NEUROLOGY

## 2019-01-10 PROCEDURE — 99204 PR OFFICE/OUTPT VISIT, NEW, LEVL IV, 45-59 MIN: ICD-10-PCS | Mod: S$GLB,,, | Performed by: PSYCHIATRY & NEUROLOGY

## 2019-01-10 PROCEDURE — 3075F PR MOST RECENT SYSTOLIC BLOOD PRESS GE 130-139MM HG: ICD-10-PCS | Mod: CPTII,S$GLB,, | Performed by: PSYCHIATRY & NEUROLOGY

## 2019-01-10 PROCEDURE — 3078F PR MOST RECENT DIASTOLIC BLOOD PRESSURE < 80 MM HG: ICD-10-PCS | Mod: CPTII,S$GLB,, | Performed by: PSYCHIATRY & NEUROLOGY

## 2019-01-10 PROCEDURE — 3075F SYST BP GE 130 - 139MM HG: CPT | Mod: CPTII,S$GLB,, | Performed by: PSYCHIATRY & NEUROLOGY

## 2019-01-10 PROCEDURE — 99999 PR PBB SHADOW E&M-EST. PATIENT-LVL III: ICD-10-PCS | Mod: PBBFAC,,, | Performed by: PSYCHIATRY & NEUROLOGY

## 2019-01-10 RX ORDER — IRBESARTAN 300 MG/1
1 TABLET ORAL
COMMUNITY
Start: 2018-07-26 | End: 2019-02-12

## 2019-01-10 RX ORDER — ATORVASTATIN CALCIUM 40 MG/1
TABLET, FILM COATED ORAL
COMMUNITY
Start: 2018-12-28 | End: 2019-02-12 | Stop reason: SDUPTHER

## 2019-01-10 RX ORDER — FOLIC ACID 1 MG/1
1 TABLET ORAL
Status: ON HOLD | COMMUNITY
Start: 2018-11-20 | End: 2020-02-17 | Stop reason: HOSPADM

## 2019-01-10 NOTE — PROGRESS NOTES
"Subjective:       Patient ID: Anahy Evans is a 85 y.o. female.    Chief Complaint:  Dizziness      History of Present Illness  HPI   This is an 85-year-old female was referred for evaluation of intermittent dizziness.  She does report that she has had intermittent vertigo for many years.  She had been seen at the emergency last year at which time she also reported feeling lightheaded and off balance.  She was noted to be dehydrated and her blood pressures were running a little low.  She had reported that the dizziness and imbalance was exacerbated with attempting to do dishes and ambulate around the house. She additionally reports some nausea and light-headedness ("like I was going to pass out.  She was being followed by ENT and subsequently had audiological studies done in August 2018.  This showed evidence of right peripheral vestibular abnormality with possible central component due to the presence of down-beating positional nystagmus. She was advised C[place]orne exercises to help reduce subjective symptoms.  In addition, was advised to see Neurology to rule out any central components however she never did see one.  She does use Antivert on an as needed basis.    Vascular risk factors include coronary artery disease , STEMI in 1999 underwent a bare metal stent in the LAD, drug-eluting stent in the left circumflex coronary artery in 2010, hypertension, paroxysmally atrial fibrillation, chronic renal failure, hyperlipidemia.  She denies any history of strokes and has had no blackouts or seizures or any significant head trauma.  She has mild hearing impairment but did not want to get hearing aids at this.  She lives her daughter and is able to take care of her day-to-day needs without any problems.  She came to the clinic alone.       Review of Systems  Review of Systems   Constitutional: Negative.    HENT: Positive for hearing loss.    Eyes: Negative.  Negative for visual disturbance.   Respiratory: Negative.  " Negative for shortness of breath.    Cardiovascular: Negative.  Negative for chest pain and palpitations.   Gastrointestinal: Negative.    Genitourinary: Negative.    Musculoskeletal: Positive for arthralgias. Negative for back pain, gait problem and neck pain.   Skin: Negative.    Neurological: Positive for dizziness and light-headedness. Negative for tremors, seizures, syncope, speech difficulty, weakness, numbness and headaches.   Psychiatric/Behavioral: Negative.  Negative for confusion and decreased concentration.       Objective:      Neurologic Exam     Cranial Nerves     CN III, IV, VI   Pupils are equal, round, and reactive to light.  Extraocular motions are normal.       Physical Exam   Constitutional: She appears well-developed and well-nourished.   HENT:   Head: Normocephalic and atraumatic.   Right Ear: Hearing normal.   Left Ear: Hearing normal.   Eyes: EOM are normal. Pupils are equal, round, and reactive to light.   Fundus examination showed sharp disc margins.   Neck: Normal range of motion. Neck supple. Carotid bruit is not present.   Cardiovascular: Normal rate, regular rhythm and normal heart sounds.   Neurological: She is alert. She has normal reflexes. She displays no atrophy (No focal weakness noted). No cranial nerve deficit (Visual fields at bedside testing essentially normal.  No facial asymmetry noted with facial movements and sensory exam being normal/symmetrical.  Corneals/gag reflexes normal.  Tongue & palate movements normal.  Shoulder shrug was normal.) or sensory deficit (Primary sensation symmetrical). She exhibits normal muscle tone. Coordination (Normal finger-to-nose.  No abnormal involuntary movements noted.  Romberg equivocal with eyes closed) and gait (Gait is stable with only minimal difficulty tandem.) normal. She displays no Babinski's sign on the right side. She displays no Babinski's sign on the left side.   Mental status examination: Patient is fully oriented and able to  give an adequate history.  Recall of recent and past information is good.  Immediate recall is normal.  Attention span and concentration was normal.  Judgment and insight is normal.  Language functions are intact with no evidence of aphasia or dysarthria.  Comprehension is unimpaired.  Affect is appropriate, mood was even.  No thought disorder is noted.   Vitals reviewed.        Assessment:        1. Dizziness    2. Essential hypertension    3. Hyperlipidemia, unspecified hyperlipidemia type    4. Chronic renal failure, stage 3 (moderate)    5. Coronary artery disease involving native coronary artery of native heart without angina pectoris            Plan:       Discussed with patient. Her intermittent symptoms of vertigo with associated gait imbalance and lightheadedness are chronic and longstanding.  She has been evaluated by ENT and noted have primarily peripheral disease however the possibility of central etiology needed to be ruled out and hence she was advised neurology evaluation.  She has vascular disease including coronary artery disease, hypertension and hyperlipidemia and has chronic renal disease.  Will get a CT scan of the head, noncontrast and a carotid ultrasound and the patient will follow-up in 1 month.

## 2019-01-10 NOTE — PATIENT INSTRUCTIONS
Discussed with patient. Her intermittent symptoms of vertigo with associated gait imbalance and lightheadedness are chronic and longstanding.  She has been evaluated by ENT and noted have primarily peripheral disease however the possibility of central etiology needed to be ruled out and hence she was advised neurology evaluation.  She has vascular disease including coronary artery disease, hypertension and hyperlipidemia and has chronic renal disease.  Will get a CT scan of the head, noncontrast and a carotid ultrasound and the patient will follow-up in 1 month.

## 2019-01-14 ENCOUNTER — HOSPITAL ENCOUNTER (OUTPATIENT)
Dept: RADIOLOGY | Facility: OTHER | Age: 84
Discharge: HOME | End: 2019-01-14
Attending: PSYCHIATRY & NEUROLOGY
Payer: MEDICARE

## 2019-01-14 DIAGNOSIS — N18.30 CHRONIC RENAL FAILURE, STAGE 3 (MODERATE): ICD-10-CM

## 2019-01-14 DIAGNOSIS — R42 DIZZINESS: ICD-10-CM

## 2019-01-14 DIAGNOSIS — I10 ESSENTIAL HYPERTENSION: ICD-10-CM

## 2019-01-14 DIAGNOSIS — I25.10 CORONARY ARTERY DISEASE INVOLVING NATIVE CORONARY ARTERY OF NATIVE HEART WITHOUT ANGINA PECTORIS: ICD-10-CM

## 2019-01-14 DIAGNOSIS — E78.5 HYPERLIPIDEMIA, UNSPECIFIED HYPERLIPIDEMIA TYPE: ICD-10-CM

## 2019-01-14 PROCEDURE — 93880 EXTRACRANIAL BILAT STUDY: CPT | Mod: 26,,, | Performed by: RADIOLOGY

## 2019-01-14 PROCEDURE — 70450 CT HEAD/BRAIN W/O DYE: CPT | Mod: 26,,, | Performed by: RADIOLOGY

## 2019-01-14 PROCEDURE — 93880 EXTRACRANIAL BILAT STUDY: CPT | Mod: TC

## 2019-01-14 PROCEDURE — 70450 CT HEAD WITHOUT CONTRAST: ICD-10-PCS | Mod: 26,,, | Performed by: RADIOLOGY

## 2019-01-14 PROCEDURE — 93880 US CAROTID BILATERAL: ICD-10-PCS | Mod: 26,,, | Performed by: RADIOLOGY

## 2019-01-14 PROCEDURE — 70450 CT HEAD/BRAIN W/O DYE: CPT | Mod: TC

## 2019-02-12 ENCOUNTER — OFFICE VISIT (OUTPATIENT)
Dept: NEUROLOGY | Facility: CLINIC | Age: 84
End: 2019-02-12
Payer: MEDICARE

## 2019-02-12 VITALS
SYSTOLIC BLOOD PRESSURE: 124 MMHG | WEIGHT: 191.13 LBS | BODY MASS INDEX: 30 KG/M2 | HEART RATE: 71 BPM | DIASTOLIC BLOOD PRESSURE: 69 MMHG | HEIGHT: 67 IN

## 2019-02-12 DIAGNOSIS — I25.10 CORONARY ARTERIOSCLEROSIS IN NATIVE ARTERY: ICD-10-CM

## 2019-02-12 DIAGNOSIS — E78.49 OTHER HYPERLIPIDEMIA: ICD-10-CM

## 2019-02-12 DIAGNOSIS — I65.23 BILATERAL CAROTID ARTERY STENOSIS: ICD-10-CM

## 2019-02-12 DIAGNOSIS — R42 VERTIGO: Primary | ICD-10-CM

## 2019-02-12 DIAGNOSIS — E11.22 TYPE 2 DIABETES MELLITUS WITH CHRONIC KIDNEY DISEASE, WITHOUT LONG-TERM CURRENT USE OF INSULIN, UNSPECIFIED CKD STAGE: ICD-10-CM

## 2019-02-12 DIAGNOSIS — I10 ESSENTIAL HYPERTENSION: ICD-10-CM

## 2019-02-12 DIAGNOSIS — I48.0 PAF (PAROXYSMAL ATRIAL FIBRILLATION): ICD-10-CM

## 2019-02-12 PROCEDURE — 3078F DIAST BP <80 MM HG: CPT | Mod: CPTII,S$GLB,, | Performed by: PSYCHIATRY & NEUROLOGY

## 2019-02-12 PROCEDURE — 3074F SYST BP LT 130 MM HG: CPT | Mod: CPTII,S$GLB,, | Performed by: PSYCHIATRY & NEUROLOGY

## 2019-02-12 PROCEDURE — 1101F PR PT FALLS ASSESS DOC 0-1 FALLS W/OUT INJ PAST YR: ICD-10-PCS | Mod: CPTII,S$GLB,, | Performed by: PSYCHIATRY & NEUROLOGY

## 2019-02-12 PROCEDURE — 1101F PT FALLS ASSESS-DOCD LE1/YR: CPT | Mod: CPTII,S$GLB,, | Performed by: PSYCHIATRY & NEUROLOGY

## 2019-02-12 PROCEDURE — 99999 PR PBB SHADOW E&M-EST. PATIENT-LVL III: ICD-10-PCS | Mod: PBBFAC,,, | Performed by: PSYCHIATRY & NEUROLOGY

## 2019-02-12 PROCEDURE — 3074F PR MOST RECENT SYSTOLIC BLOOD PRESSURE < 130 MM HG: ICD-10-PCS | Mod: CPTII,S$GLB,, | Performed by: PSYCHIATRY & NEUROLOGY

## 2019-02-12 PROCEDURE — 99214 OFFICE O/P EST MOD 30 MIN: CPT | Mod: S$GLB,,, | Performed by: PSYCHIATRY & NEUROLOGY

## 2019-02-12 PROCEDURE — 99999 PR PBB SHADOW E&M-EST. PATIENT-LVL III: CPT | Mod: PBBFAC,,, | Performed by: PSYCHIATRY & NEUROLOGY

## 2019-02-12 PROCEDURE — 3078F PR MOST RECENT DIASTOLIC BLOOD PRESSURE < 80 MM HG: ICD-10-PCS | Mod: CPTII,S$GLB,, | Performed by: PSYCHIATRY & NEUROLOGY

## 2019-02-12 PROCEDURE — 99214 PR OFFICE/OUTPT VISIT, EST, LEVL IV, 30-39 MIN: ICD-10-PCS | Mod: S$GLB,,, | Performed by: PSYCHIATRY & NEUROLOGY

## 2019-02-12 RX ORDER — CLONAZEPAM 0.5 MG/1
0.5 TABLET ORAL NIGHTLY
Qty: 30 TABLET | Refills: 3 | Status: ON HOLD | OUTPATIENT
Start: 2019-02-12 | End: 2019-05-16 | Stop reason: SDUPTHER

## 2019-02-12 NOTE — PROGRESS NOTES
"Subjective:       Patient ID: Anahy Evans is a 85 y.o. female.    Chief Complaint:  Dizziness      History of Present Illness  HPI   This is an 85-year-old female who had been seen by me with complaints of intermittent dizziness.  She does report that she has had intermittent vertigo for many years.  She had been seen at the emergency last year at which time she also reported feeling lightheaded and off balance.  She was noted to be dehydrated and her blood pressures were running a little low.  She had reported that the dizziness and imbalance was exacerbated with attempting to do dishes and ambulate around the house. She additionally reports some nausea and light-headedness ("like I was going to pass out.  She was being followed by ENT and subsequently had audiological studies done in August 2018.  This showed evidence of right peripheral vestibular abnormality with possible central component due to the presence of down-beating positional nystagmus. She was advised vestibular exercises to help reduce subjective symptoms.  In addition, was advised to see Neurology to rule out any central components however she never did see one.  She does use Antivert on an as needed basis.    Vascular risk factors include coronary artery disease , STEMI in 1999 underwent a bare metal stent in the LAD, drug-eluting stent in the left circumflex coronary artery in 2010, hypertension, paroxysmally atrial fibrillation, chronic renal failure, hyperlipidemia.  She denies any history of strokes and has had no blackouts or seizures or any significant head trauma.  She has mild hearing impairment but did not want to get hearing aids at this.  She lives her daughter and is able to take care of her day-to-day needs without any problems.  She came to the clinic alone.  She had a CT scan of the brain done that was normal for age showing some mild small vessel ischemic changes but no strokes.  In addition a carotid ultrasound done showed " stenosis 70% or greater on the right and around 50% on the left.       Review of Systems  Review of Systems   Constitutional: Negative.    HENT: Positive for hearing loss.    Eyes: Negative.  Negative for visual disturbance.   Respiratory: Negative.  Negative for shortness of breath.    Cardiovascular: Negative.  Negative for chest pain and palpitations.   Gastrointestinal: Negative.    Genitourinary: Negative.    Musculoskeletal: Positive for arthralgias. Negative for back pain, gait problem and neck pain.   Skin: Negative.    Neurological: Positive for dizziness and light-headedness. Negative for tremors, seizures, syncope, speech difficulty, weakness, numbness and headaches.   Psychiatric/Behavioral: Negative.  Negative for confusion and decreased concentration.       Objective:      Neurologic Exam     Cranial Nerves     CN III, IV, VI   Pupils are equal, round, and reactive to light.  Extraocular motions are normal.       Physical Exam   Constitutional: She appears well-developed and well-nourished.   HENT:   Head: Normocephalic and atraumatic.   Right Ear: Hearing normal.   Left Ear: Hearing normal.   Eyes: EOM are normal. Pupils are equal, round, and reactive to light.   Fundus examination showed sharp disc margins.   Neck: Normal range of motion. Neck supple. Carotid bruit is not present.   Cardiovascular: Normal rate, regular rhythm and normal heart sounds.   Neurological: She is alert. She has normal reflexes. She displays no atrophy (No focal weakness noted). No cranial nerve deficit (Visual fields at bedside testing essentially normal.  No facial asymmetry noted with facial movements and sensory exam being normal/symmetrical.  Corneals/gag reflexes normal.  Tongue & palate movements normal.  Shoulder shrug was normal.) or sensory deficit (Primary sensation symmetrical). She exhibits normal muscle tone. Coordination (Normal finger-to-nose.  No abnormal involuntary movements noted.  Romberg equivocal with  eyes closed) and gait (Gait is stable with only minimal difficulty tandem.) normal. She displays no Babinski's sign on the right side. She displays no Babinski's sign on the left side.   Mental status examination: Patient is fully oriented and able to give an adequate history.  Recall of recent and past information is good.  Immediate recall is normal.  Attention span and concentration was normal.  Judgment and insight is normal.  Language functions are intact with no evidence of aphasia or dysarthria.  Comprehension is unimpaired.  Affect is appropriate, mood was even.  No thought disorder is noted.   Vitals reviewed.        Assessment:        1. Vertigo    2. Bilateral carotid artery stenosis    3. Coronary arteriosclerosis in native artery    4. Essential hypertension    5. Other hyperlipidemia    6. PAF (paroxysmal atrial fibrillation)    7. Type 2 diabetes mellitus with chronic kidney disease, without long-term current use of insulin, unspecified CKD stage            Plan:       Discussed with patient. Her intermittent symptoms of vertigo with associated gait imbalance and lightheadedness are chronic and longstanding.  She has been evaluated by ENT and noted have primarily peripheral disease.  Workup done the suggest carotid artery disease that may contribute to some of the episodic lightheadedness..  She has vascular risk factors including coronary artery disease, hypertension and hyperlipidemia and has chronic renal disease.  Will refer patient to vascular surgery review and given opinion regarding the carotid ultrasound results.  In addition will start her on a very low-dose clonazepam 0.5 mg at bedtime daily to see if that might help her vertiginous symptoms.  Discussed side effects primarily sedation.  She will follow up in 3 months.

## 2019-02-12 NOTE — PATIENT INSTRUCTIONS
Discussed with patient. Her intermittent symptoms of vertigo with associated gait imbalance and lightheadedness are chronic and longstanding.  She has been evaluated by ENT and noted have primarily peripheral disease.  Workup done the suggest carotid artery disease that may contribute to some of the episodic lightheadedness..  She has vascular risk factors including coronary artery disease, hypertension and hyperlipidemia and has chronic renal disease.  Will refer patient to vascular surgery review and given opinion regarding the carotid ultrasound results.  In addition will start her on a very low-dose clonazepam 0.5 mg at bedtime daily to see if that might help her vertiginous symptoms.  Discussed side effects primarily sedation.

## 2019-02-19 ENCOUNTER — OFFICE VISIT (OUTPATIENT)
Dept: VASCULAR SURGERY | Facility: CLINIC | Age: 84
End: 2019-02-19
Payer: MEDICARE

## 2019-02-19 VITALS
TEMPERATURE: 98 F | BODY MASS INDEX: 28.72 KG/M2 | DIASTOLIC BLOOD PRESSURE: 86 MMHG | HEART RATE: 62 BPM | WEIGHT: 183 LBS | SYSTOLIC BLOOD PRESSURE: 192 MMHG | HEIGHT: 67 IN

## 2019-02-19 DIAGNOSIS — I65.23 BILATERAL CAROTID ARTERY STENOSIS: Primary | ICD-10-CM

## 2019-02-19 PROCEDURE — 1101F PT FALLS ASSESS-DOCD LE1/YR: CPT | Mod: CPTII,S$GLB,, | Performed by: SURGERY

## 2019-02-19 PROCEDURE — 3077F PR MOST RECENT SYSTOLIC BLOOD PRESSURE >= 140 MM HG: ICD-10-PCS | Mod: CPTII,S$GLB,, | Performed by: SURGERY

## 2019-02-19 PROCEDURE — 3077F SYST BP >= 140 MM HG: CPT | Mod: CPTII,S$GLB,, | Performed by: SURGERY

## 2019-02-19 PROCEDURE — 99205 PR OFFICE/OUTPT VISIT, NEW, LEVL V, 60-74 MIN: ICD-10-PCS | Mod: S$GLB,,, | Performed by: SURGERY

## 2019-02-19 PROCEDURE — 1101F PR PT FALLS ASSESS DOC 0-1 FALLS W/OUT INJ PAST YR: ICD-10-PCS | Mod: CPTII,S$GLB,, | Performed by: SURGERY

## 2019-02-19 PROCEDURE — 3079F PR MOST RECENT DIASTOLIC BLOOD PRESSURE 80-89 MM HG: ICD-10-PCS | Mod: CPTII,S$GLB,, | Performed by: SURGERY

## 2019-02-19 PROCEDURE — 99205 OFFICE O/P NEW HI 60 MIN: CPT | Mod: S$GLB,,, | Performed by: SURGERY

## 2019-02-19 PROCEDURE — 3079F DIAST BP 80-89 MM HG: CPT | Mod: CPTII,S$GLB,, | Performed by: SURGERY

## 2019-02-19 PROCEDURE — 99999 PR PBB SHADOW E&M-EST. PATIENT-LVL III: CPT | Mod: PBBFAC,,, | Performed by: SURGERY

## 2019-02-19 PROCEDURE — 99999 PR PBB SHADOW E&M-EST. PATIENT-LVL III: ICD-10-PCS | Mod: PBBFAC,,, | Performed by: SURGERY

## 2019-02-19 NOTE — PROGRESS NOTES
HISTORY OF PRESENT ILLNESS:  This is an 85-year-old female with a greater than   5-year history of lightheadedness, dizziness and vertigo who was found to have a   right 80% carotid stenosis on evaluation.  She has no history of stroke, TIA or   amaurosis.  Recent CT scan shows no evidence of prior stroke.    She has had an exhaustive evaluation by ENT.  She has known vertigo and takes   meclizine, although says it does not help so much.    She states that she can drive without difficulty, but when looking down when   walking, she gets very unsure on her feet.  She was recently seen by Neurology   (Dr. Reid) and was started on 0.5 mg of clonazepam at bedtime.  This happened   just 1 week ago.  She says that she has seen some early modest improvements in   her symptoms.    PAST MEDICAL HISTORY:  1.  Known coronary artery disease, status post PCI x5 and stents.  2.  Hypertension.  3.  History of atrial fibrillation.  4.  Hypercholesterolemia.    PAST SURGICAL HISTORY:  1.  PCI.  2.  Breast lumpectomy.    FAMILY HISTORY:  Positive for cancer.    SOCIAL HISTORY:  She is a nonsmoker.    MEDICATIONS:  Include aspirin, Plavix and statin.  See EPIC for full list.    ALLERGIES:  CLINDAMYCIN AND PENICILLIN.    REVIEW OF SYSTEMS:  Denies postprandial pain or DVT.  All other systems   including eyes, ENT, respiratory, musculoskeletal, psychiatric, heme, lymph,   allergy and immune are negative.    PHYSICAL EXAMINATION:  VITAL SIGNS:  See nursing notes.  GENERAL:  She is in no acute distress.  RESPIRATORY:  Normal effort.  Clear to auscultation.  CARDIAC:  Regular rate and rhythm, nondisplaced PMI, no murmur.  VASCULAR:  2+ radial, brachial and femoral pulses.  ABDOMEN:  No masses or tenderness.  No hepatosplenomegaly.  Aorta cannot be   palpated.  EYES:  Normal conjunctivae and lids.  ENT:  Good dentition.  NECK:  No JVD.  No thyromegaly.  MUSCULOSKELETAL:  No kyphosis or scoliosis.  Extremities are without clubbing or    cyanosis.  SKIN:  Warm and dry.  NEUROLOGIC:  Alert and oriented x3.  Normal mood and affect.  Midline tongue.    No speech difficulty or hoarseness, 5/5 motor strength is all extremities.    IMAGING:  Carotid duplex (Radiology) demonstrates a right 80% carotid stenosis,   peak systolic velocity of 381, a ratio of 7.5.  End-diastolic velocity was not   reported and a left nonsignificant carotid stenosis with peak systolic velocity   of 65.    ASSESSMENT:  An 80% right carotid stenosis.  This is an asymptomatic lesion.  I   reassured her that this anterior circulation lesion is extremely unlikely to be   contributory to her lightheadedness and dizziness.  Notably, she does have   bilateral antegrade vertebral flow.    She has no history of stroke, TIA or amaurosis.    As such, I would treat this carotid stenosis as clinically asymptomatic.  Given   her age of 85, I would not recommend entrance into the CREST-2 trial nor   recommend consideration of endarterectomy or certainly not stenting.  Rather, I   believe that continued medical therapy is most appropriate.    We discussed this in great detail and she was relieved to hear that she does not   need surgery.    RECOMMENDATIONS:  Continue current atherosclerotic medical treatment.      CHAI/IN  dd: 02/19/2019 14:24:59 (CST)  td: 02/20/2019 12:17:50 (CST)  Doc ID   #[id number]  Job ID #[id number]    CC:

## 2019-02-19 NOTE — LETTER
[facility]  [address]  [facility] [address]  Phone: [phone]  Fax: [phone] February 19, 2019      Ronan Dobbins MD  [address]  [address]  [facility] [address]    Patient: Anhay Evans   MR Number: [id number]   YOB: 1933   Date of Visit: 2/19/2019       Dear Dr. Ronan Dobbins:    Thank you for referring Anahy Evans to me for evaluation. Attached you will find relevant portions of my assessment and plan of care.    If you have questions, please do not hesitate to call me. I look forward to following Anahy Evans along with you.    Sincerely,      LEO Blue III, MD    wcs/etb

## 2019-03-30 DIAGNOSIS — I48.0 PAROXYSMAL ATRIAL FIBRILLATION: ICD-10-CM

## 2019-03-30 DIAGNOSIS — I25.10 ATHEROSCLEROSIS OF NATIVE CORONARY ARTERY OF NATIVE HEART WITHOUT ANGINA PECTORIS: ICD-10-CM

## 2019-04-01 RX ORDER — CLOPIDOGREL BISULFATE 75 MG/1
TABLET ORAL
Qty: 90 TABLET | Refills: 3 | Status: SHIPPED | OUTPATIENT
Start: 2019-04-01 | End: 2020-04-13

## 2019-04-08 ENCOUNTER — OFFICE VISIT (OUTPATIENT)
Dept: CARDIOLOGY | Facility: CLINIC | Age: 84
End: 2019-04-08
Attending: INTERNAL MEDICINE
Payer: MEDICARE

## 2019-04-08 VITALS
WEIGHT: 187 LBS | SYSTOLIC BLOOD PRESSURE: 125 MMHG | BODY MASS INDEX: 29.35 KG/M2 | DIASTOLIC BLOOD PRESSURE: 68 MMHG | HEART RATE: 49 BPM | HEIGHT: 67 IN

## 2019-04-08 DIAGNOSIS — I25.10 CORONARY ARTERIOSCLEROSIS IN NATIVE ARTERY: Primary | ICD-10-CM

## 2019-04-08 DIAGNOSIS — I48.0 PAF (PAROXYSMAL ATRIAL FIBRILLATION): ICD-10-CM

## 2019-04-08 DIAGNOSIS — I65.23 BILATERAL CAROTID ARTERY STENOSIS: ICD-10-CM

## 2019-04-08 DIAGNOSIS — E78.49 OTHER HYPERLIPIDEMIA: ICD-10-CM

## 2019-04-08 DIAGNOSIS — N18.30 CHRONIC KIDNEY DISEASE, STAGE III (MODERATE): ICD-10-CM

## 2019-04-08 DIAGNOSIS — I10 ESSENTIAL HYPERTENSION: ICD-10-CM

## 2019-04-08 DIAGNOSIS — E11.22 TYPE 2 DIABETES MELLITUS WITH CHRONIC KIDNEY DISEASE, WITHOUT LONG-TERM CURRENT USE OF INSULIN, UNSPECIFIED CKD STAGE: ICD-10-CM

## 2019-04-08 PROCEDURE — 99214 OFFICE O/P EST MOD 30 MIN: CPT | Mod: S$GLB,,, | Performed by: INTERNAL MEDICINE

## 2019-04-08 PROCEDURE — 3074F PR MOST RECENT SYSTOLIC BLOOD PRESSURE < 130 MM HG: ICD-10-PCS | Mod: CPTII,S$GLB,, | Performed by: INTERNAL MEDICINE

## 2019-04-08 PROCEDURE — 1101F PR PT FALLS ASSESS DOC 0-1 FALLS W/OUT INJ PAST YR: ICD-10-PCS | Mod: CPTII,S$GLB,, | Performed by: INTERNAL MEDICINE

## 2019-04-08 PROCEDURE — 3078F DIAST BP <80 MM HG: CPT | Mod: CPTII,S$GLB,, | Performed by: INTERNAL MEDICINE

## 2019-04-08 PROCEDURE — 99214 PR OFFICE/OUTPT VISIT, EST, LEVL IV, 30-39 MIN: ICD-10-PCS | Mod: S$GLB,,, | Performed by: INTERNAL MEDICINE

## 2019-04-08 PROCEDURE — 1101F PT FALLS ASSESS-DOCD LE1/YR: CPT | Mod: CPTII,S$GLB,, | Performed by: INTERNAL MEDICINE

## 2019-04-08 PROCEDURE — 3078F PR MOST RECENT DIASTOLIC BLOOD PRESSURE < 80 MM HG: ICD-10-PCS | Mod: CPTII,S$GLB,, | Performed by: INTERNAL MEDICINE

## 2019-04-08 PROCEDURE — 3074F SYST BP LT 130 MM HG: CPT | Mod: CPTII,S$GLB,, | Performed by: INTERNAL MEDICINE

## 2019-04-08 NOTE — PROGRESS NOTES
Subjective:    Patient ID:  Anahy Evans is a 85 y.o. female     HPI   Here for follow-up of coronary artery disease, status post bare metal stent in the left anterior descending coronary artery during a STEMI in 1999, drug-eluting stent in the left circumflex coronary artery in 2010, drug-eluting stent in the proximal left anterior descending coronary artery in June 2017, drug-eluting stent in the ostial left anterior descending coronary artery in November 2017, carotid occlusive disease, hypertension, paroxysmal atrial fibrillation, chronic renal failure, diabetes mellitus, hyperlipidemia.    I had dizziness and I saw Dr. Dobbins, who had me see  for a right carotid blockage.  It was felt that I would do best being treated with medications rather than surgery or stent placement.  My dizziness is now getting better.  I stay active, have had no chest pains.    Current Outpatient Medications   Medication Sig    acetaminophen (TYLENOL) 500 MG tablet Take 1 tablet (500 mg total) by mouth every 6 (six) hours as needed.    allopurinol (ZYLOPRIM) 300 MG tablet Take 300 mg by mouth once daily.    amiodarone (PACERONE) 200 MG Tab Take 0.5 tablets (100 mg total) by mouth once daily.    amLODIPine (NORVASC) 5 MG tablet Take 5 mg by mouth once daily.    aspirin (ECOTRIN) 81 MG EC tablet Take 81 mg by mouth once daily.    atorvastatin (LIPITOR) 40 MG tablet Take 40 mg by mouth once daily.    calcium carbonate-vitamin D3 600 mg calcium- 200 unit Cap Take by mouth.    carvedilol (COREG) 6.25 MG tablet Take 6.25 mg by mouth 2 (two) times daily with meals.    cholecalciferol, vitamin D3, (VITAMIN D3) 2,000 unit Cap Take 1 capsule by mouth once daily.    clonazePAM (KLONOPIN) 0.5 MG tablet Take 1 tablet (0.5 mg total) by mouth every evening.    clopidogrel (PLAVIX) 75 mg tablet TAKE 1 TABLET DAILY    folic acid (FOLVITE) 1 MG tablet Take 1 mg by mouth once daily.    folic acid (FOLVITE) 1 MG tablet Take 1  "tablet by mouth.    meclizine (ANTIVERT) 12.5 mg tablet Take 12.5 mg by mouth 3 (three) times daily as needed.    nitroGLYCERIN (NITROSTAT) 0.4 MG SL tablet Place 0.4 mg under the tongue every 5 (five) minutes as needed for Chest pain.    spironolactone (ALDACTONE) 25 MG tablet Take 25 mg by mouth every other day.      No current facility-administered medications for this visit.          Review of Systems   Constitution: Negative for chills, decreased appetite, fever, weight gain and weight loss.   HENT: Negative for congestion, hearing loss and sore throat.    Eyes: Negative for blurred vision, double vision and visual disturbance.   Cardiovascular: Negative for chest pain, claudication, dyspnea on exertion, leg swelling, palpitations and syncope.   Respiratory: Negative for cough, hemoptysis, shortness of breath, sputum production and wheezing.    Endocrine: Negative for cold intolerance and heat intolerance.   Hematologic/Lymphatic: Negative for bleeding problem. Does not bruise/bleed easily.   Skin: Negative for color change, dry skin, flushing and itching.   Musculoskeletal: Negative for back pain, joint pain and myalgias.   Gastrointestinal: Negative for abdominal pain, anorexia, constipation, diarrhea, dysphagia, nausea and vomiting.        No bleeding per rectum   Genitourinary: Negative for dysuria, flank pain, frequency, hematuria and nocturia.   Neurological: Positive for dizziness. Negative for headaches, light-headedness, loss of balance, seizures and tremors.   Psychiatric/Behavioral: Negative for altered mental status and depression.         Vitals:    04/08/19 1106   BP: 125/68   Pulse: (!) 49   Weight: 84.8 kg (187 lb)   Height: 5' 7" (1.702 m)     Objective:    Physical Exam   Constitutional: She is oriented to person, place, and time. She appears well-developed and well-nourished.   HENT:   Head: Normocephalic and atraumatic.   Nose: Nose normal.   Mouth/Throat: Oropharynx is clear and moist. "   Eyes: Pupils are equal, round, and reactive to light. Conjunctivae and EOM are normal.   Neck: Neck supple. No tracheal deviation present. No thyromegaly present.   Cardiovascular: Normal rate, regular rhythm and intact distal pulses. Exam reveals no gallop and no friction rub.   No murmur heard.  Pulmonary/Chest: No respiratory distress. She has no wheezes. She has no rales. She exhibits no tenderness.   Abdominal: Soft. Bowel sounds are normal. She exhibits no distension and no mass. There is no tenderness. There is no rebound and no guarding.   Musculoskeletal: Normal range of motion.   Lymphadenopathy:     She has no cervical adenopathy.   Neurological: She is alert and oriented to person, place, and time.   Skin: Skin is warm and dry.   Psychiatric: Her behavior is normal.         Assessment:       1. Coronary arteriosclerosis in native artery    2. Bilateral carotid artery stenosis    3. PAF (paroxysmal atrial fibrillation)    4. Essential hypertension    5. Other hyperlipidemia    6. Chronic kidney disease, stage III (moderate)    7. Type 2 diabetes mellitus with chronic kidney disease, without long-term current use of insulin, unspecified CKD stage         Plan:       Stable.  Continue the present pharmacological regimen.

## 2019-05-09 ENCOUNTER — HOSPITAL ENCOUNTER (INPATIENT)
Facility: OTHER | Age: 84
LOS: 7 days | Discharge: NURSING, REHAB OR LONG-TERM CARE | DRG: 470 | End: 2019-05-16
Attending: EMERGENCY MEDICINE | Admitting: EMERGENCY MEDICINE
Payer: MEDICARE

## 2019-05-09 DIAGNOSIS — S72.001A CLOSED FRACTURE OF RIGHT HIP: ICD-10-CM

## 2019-05-09 DIAGNOSIS — R42 VERTIGO: ICD-10-CM

## 2019-05-09 DIAGNOSIS — I10 ESSENTIAL HYPERTENSION: ICD-10-CM

## 2019-05-09 DIAGNOSIS — I48.91 ATRIAL FIBRILLATION: ICD-10-CM

## 2019-05-09 DIAGNOSIS — D64.9 ANEMIA, UNSPECIFIED TYPE: ICD-10-CM

## 2019-05-09 DIAGNOSIS — S72.001A CLOSED FRACTURE OF RIGHT HIP, INITIAL ENCOUNTER: Primary | ICD-10-CM

## 2019-05-09 PROBLEM — R73.03 PREDIABETES: Status: RESOLVED | Noted: 2019-01-10 | Resolved: 2019-05-09

## 2019-05-09 PROBLEM — Z79.01 ANTICOAGULATED: Status: RESOLVED | Noted: 2017-01-22 | Resolved: 2019-05-09

## 2019-05-09 PROBLEM — I20.9 ANGINA PECTORIS: Status: RESOLVED | Noted: 2017-11-22 | Resolved: 2019-05-09

## 2019-05-09 PROBLEM — A51.5 EARLY SYPHILIS, LATENT: Status: RESOLVED | Noted: 2019-01-10 | Resolved: 2019-05-09

## 2019-05-09 PROBLEM — I20.0 UNSTABLE ANGINA: Status: RESOLVED | Noted: 2017-06-30 | Resolved: 2019-05-09

## 2019-05-09 PROBLEM — R05.9 COUGH: Status: ACTIVE | Noted: 2019-05-09

## 2019-05-09 PROBLEM — I25.10 CORONARY ARTERY DISEASE INVOLVING NATIVE CORONARY ARTERY: Status: ACTIVE | Noted: 2019-05-09

## 2019-05-09 LAB
ABO + RH BLD: NORMAL
ALBUMIN SERPL BCP-MCNC: 3.8 G/DL (ref 3.5–5.2)
ALP SERPL-CCNC: 73 U/L (ref 55–135)
ALT SERPL W/O P-5'-P-CCNC: 22 U/L (ref 10–44)
ANION GAP SERPL CALC-SCNC: 10 MMOL/L (ref 8–16)
AST SERPL-CCNC: 29 U/L (ref 10–40)
BACTERIA #/AREA URNS HPF: NORMAL /HPF
BASOPHILS # BLD AUTO: 0.02 K/UL (ref 0–0.2)
BASOPHILS NFR BLD: 0.3 % (ref 0–1.9)
BILIRUB SERPL-MCNC: 0.5 MG/DL (ref 0.1–1)
BILIRUB UR QL STRIP: NEGATIVE
BLD GP AB SCN CELLS X3 SERPL QL: NORMAL
BUN SERPL-MCNC: 28 MG/DL (ref 8–23)
CALCIUM SERPL-MCNC: 9.7 MG/DL (ref 8.7–10.5)
CHLORIDE SERPL-SCNC: 106 MMOL/L (ref 95–110)
CLARITY UR: CLEAR
CO2 SERPL-SCNC: 24 MMOL/L (ref 23–29)
COLOR UR: YELLOW
CREAT SERPL-MCNC: 1.9 MG/DL (ref 0.5–1.4)
DIFFERENTIAL METHOD: NORMAL
EOSINOPHIL # BLD AUTO: 0.1 K/UL (ref 0–0.5)
EOSINOPHIL NFR BLD: 1.7 % (ref 0–8)
ERYTHROCYTE [DISTWIDTH] IN BLOOD BY AUTOMATED COUNT: 14 % (ref 11.5–14.5)
EST. GFR  (AFRICAN AMERICAN): 27 ML/MIN/1.73 M^2
EST. GFR  (NON AFRICAN AMERICAN): 24 ML/MIN/1.73 M^2
ESTIMATED AVG GLUCOSE: 105 MG/DL (ref 68–131)
GLUCOSE SERPL-MCNC: 103 MG/DL (ref 70–110)
GLUCOSE UR QL STRIP: NEGATIVE
HBA1C MFR BLD HPLC: 5.3 % (ref 4–5.6)
HCT VFR BLD AUTO: 37.3 % (ref 37–48.5)
HGB BLD-MCNC: 12.1 G/DL (ref 12–16)
HGB UR QL STRIP: NEGATIVE
HYALINE CASTS #/AREA URNS LPF: 0 /LPF
INR PPP: 0.9 (ref 0.8–1.2)
KETONES UR QL STRIP: NEGATIVE
LEUKOCYTE ESTERASE UR QL STRIP: NEGATIVE
LYMPHOCYTES # BLD AUTO: 1.5 K/UL (ref 1–4.8)
LYMPHOCYTES NFR BLD: 23.4 % (ref 18–48)
MCH RBC QN AUTO: 30 PG (ref 27–31)
MCHC RBC AUTO-ENTMCNC: 32.4 G/DL (ref 32–36)
MCV RBC AUTO: 92 FL (ref 82–98)
MICROSCOPIC COMMENT: NORMAL
MONOCYTES # BLD AUTO: 0.6 K/UL (ref 0.3–1)
MONOCYTES NFR BLD: 9.2 % (ref 4–15)
NEUTROPHILS # BLD AUTO: 4.1 K/UL (ref 1.8–7.7)
NEUTROPHILS NFR BLD: 65.1 % (ref 38–73)
NITRITE UR QL STRIP: NEGATIVE
PH UR STRIP: 7 [PH] (ref 5–8)
PLATELET # BLD AUTO: 181 K/UL (ref 150–350)
PMV BLD AUTO: 11.4 FL (ref 9.2–12.9)
POTASSIUM SERPL-SCNC: 5 MMOL/L (ref 3.5–5.1)
PROT SERPL-MCNC: 7.9 G/DL (ref 6–8.4)
PROT UR QL STRIP: ABNORMAL
PROTHROMBIN TIME: 9.9 SEC (ref 9–12.5)
RBC # BLD AUTO: 4.04 M/UL (ref 4–5.4)
RBC #/AREA URNS HPF: 1 /HPF (ref 0–4)
SODIUM SERPL-SCNC: 140 MMOL/L (ref 136–145)
SP GR UR STRIP: 1.01 (ref 1–1.03)
SQUAMOUS #/AREA URNS HPF: 1 /HPF
TSH SERPL DL<=0.005 MIU/L-ACNC: 3.47 UIU/ML (ref 0.4–4)
URN SPEC COLLECT METH UR: ABNORMAL
UROBILINOGEN UR STRIP-ACNC: NEGATIVE EU/DL
WBC # BLD AUTO: 6.32 K/UL (ref 3.9–12.7)
WBC #/AREA URNS HPF: 3 /HPF (ref 0–5)

## 2019-05-09 PROCEDURE — 99223 PR INITIAL HOSPITAL CARE,LEVL III: ICD-10-PCS | Mod: ,,, | Performed by: HOSPITALIST

## 2019-05-09 PROCEDURE — 25000003 PHARM REV CODE 250: Performed by: EMERGENCY MEDICINE

## 2019-05-09 PROCEDURE — 99223 1ST HOSP IP/OBS HIGH 75: CPT | Mod: ,,, | Performed by: HOSPITALIST

## 2019-05-09 PROCEDURE — 85610 PROTHROMBIN TIME: CPT

## 2019-05-09 PROCEDURE — 63600175 PHARM REV CODE 636 W HCPCS: Performed by: HOSPITALIST

## 2019-05-09 PROCEDURE — 36415 COLL VENOUS BLD VENIPUNCTURE: CPT

## 2019-05-09 PROCEDURE — 63600175 PHARM REV CODE 636 W HCPCS: Performed by: EMERGENCY MEDICINE

## 2019-05-09 PROCEDURE — 93010 ELECTROCARDIOGRAM REPORT: CPT | Mod: ,,, | Performed by: INTERNAL MEDICINE

## 2019-05-09 PROCEDURE — 11000001 HC ACUTE MED/SURG PRIVATE ROOM

## 2019-05-09 PROCEDURE — 94761 N-INVAS EAR/PLS OXIMETRY MLT: CPT

## 2019-05-09 PROCEDURE — 81000 URINALYSIS NONAUTO W/SCOPE: CPT

## 2019-05-09 PROCEDURE — 84443 ASSAY THYROID STIM HORMONE: CPT

## 2019-05-09 PROCEDURE — 25000003 PHARM REV CODE 250: Performed by: HOSPITALIST

## 2019-05-09 PROCEDURE — 86850 RBC ANTIBODY SCREEN: CPT

## 2019-05-09 PROCEDURE — 93010 EKG 12-LEAD: ICD-10-PCS | Mod: ,,, | Performed by: INTERNAL MEDICINE

## 2019-05-09 PROCEDURE — 83036 HEMOGLOBIN GLYCOSYLATED A1C: CPT

## 2019-05-09 PROCEDURE — 93005 ELECTROCARDIOGRAM TRACING: CPT

## 2019-05-09 PROCEDURE — 51702 INSERT TEMP BLADDER CATH: CPT

## 2019-05-09 PROCEDURE — 85025 COMPLETE CBC W/AUTO DIFF WBC: CPT

## 2019-05-09 PROCEDURE — 96361 HYDRATE IV INFUSION ADD-ON: CPT

## 2019-05-09 PROCEDURE — 99285 EMERGENCY DEPT VISIT HI MDM: CPT | Mod: 25

## 2019-05-09 PROCEDURE — 80053 COMPREHEN METABOLIC PANEL: CPT

## 2019-05-09 PROCEDURE — 96374 THER/PROPH/DIAG INJ IV PUSH: CPT

## 2019-05-09 RX ORDER — SODIUM CHLORIDE 0.9 % (FLUSH) 0.9 %
10 SYRINGE (ML) INJECTION
Status: DISCONTINUED | OUTPATIENT
Start: 2019-05-09 | End: 2019-05-13

## 2019-05-09 RX ORDER — NITROGLYCERIN 0.4 MG/1
0.4 TABLET SUBLINGUAL EVERY 5 MIN PRN
Status: DISCONTINUED | OUTPATIENT
Start: 2019-05-09 | End: 2019-05-16 | Stop reason: HOSPADM

## 2019-05-09 RX ORDER — ATORVASTATIN CALCIUM 20 MG/1
40 TABLET, FILM COATED ORAL DAILY
Status: DISCONTINUED | OUTPATIENT
Start: 2019-05-09 | End: 2019-05-16 | Stop reason: HOSPADM

## 2019-05-09 RX ORDER — AMLODIPINE BESYLATE 5 MG/1
5 TABLET ORAL DAILY
Status: DISCONTINUED | OUTPATIENT
Start: 2019-05-09 | End: 2019-05-14

## 2019-05-09 RX ORDER — HYDRALAZINE HYDROCHLORIDE 20 MG/ML
15 INJECTION INTRAMUSCULAR; INTRAVENOUS EVERY 4 HOURS PRN
Status: DISCONTINUED | OUTPATIENT
Start: 2019-05-09 | End: 2019-05-16 | Stop reason: HOSPADM

## 2019-05-09 RX ORDER — CARVEDILOL 6.25 MG/1
6.25 TABLET ORAL 2 TIMES DAILY WITH MEALS
Status: DISCONTINUED | OUTPATIENT
Start: 2019-05-09 | End: 2019-05-14

## 2019-05-09 RX ORDER — TELMISARTAN 80 MG/1
40 TABLET ORAL DAILY
Status: ON HOLD | COMMUNITY
End: 2019-05-16 | Stop reason: HOSPADM

## 2019-05-09 RX ORDER — BENZONATATE 100 MG/1
100 CAPSULE ORAL 3 TIMES DAILY PRN
Status: DISCONTINUED | OUTPATIENT
Start: 2019-05-09 | End: 2019-05-16 | Stop reason: HOSPADM

## 2019-05-09 RX ORDER — ACETAMINOPHEN 325 MG/1
650 TABLET ORAL EVERY 4 HOURS PRN
Status: DISCONTINUED | OUTPATIENT
Start: 2019-05-09 | End: 2019-05-16 | Stop reason: HOSPADM

## 2019-05-09 RX ORDER — TELMISARTAN 40 MG/1
40 TABLET ORAL DAILY
Status: DISCONTINUED | OUTPATIENT
Start: 2019-05-09 | End: 2019-05-13

## 2019-05-09 RX ORDER — HYDROCODONE BITARTRATE AND ACETAMINOPHEN 5; 325 MG/1; MG/1
1 TABLET ORAL EVERY 6 HOURS PRN
Status: DISCONTINUED | OUTPATIENT
Start: 2019-05-09 | End: 2019-05-10

## 2019-05-09 RX ORDER — MORPHINE SULFATE 2 MG/ML
2 INJECTION, SOLUTION INTRAMUSCULAR; INTRAVENOUS
Status: COMPLETED | OUTPATIENT
Start: 2019-05-09 | End: 2019-05-09

## 2019-05-09 RX ORDER — SPIRONOLACTONE 25 MG/1
25 TABLET ORAL EVERY OTHER DAY
Status: DISCONTINUED | OUTPATIENT
Start: 2019-05-10 | End: 2019-05-13

## 2019-05-09 RX ORDER — INSULIN ASPART 100 [IU]/ML
0-5 INJECTION, SOLUTION INTRAVENOUS; SUBCUTANEOUS
Status: DISCONTINUED | OUTPATIENT
Start: 2019-05-09 | End: 2019-05-16 | Stop reason: HOSPADM

## 2019-05-09 RX ORDER — ALLOPURINOL 100 MG/1
300 TABLET ORAL DAILY
Status: DISCONTINUED | OUTPATIENT
Start: 2019-05-09 | End: 2019-05-16 | Stop reason: HOSPADM

## 2019-05-09 RX ORDER — IBUPROFEN 200 MG
16 TABLET ORAL
Status: DISCONTINUED | OUTPATIENT
Start: 2019-05-09 | End: 2019-05-16 | Stop reason: HOSPADM

## 2019-05-09 RX ORDER — ACETAMINOPHEN 325 MG/1
650 TABLET ORAL EVERY 8 HOURS PRN
Status: DISCONTINUED | OUTPATIENT
Start: 2019-05-09 | End: 2019-05-16 | Stop reason: HOSPADM

## 2019-05-09 RX ORDER — HEPARIN SODIUM 5000 [USP'U]/ML
5000 INJECTION, SOLUTION INTRAVENOUS; SUBCUTANEOUS EVERY 8 HOURS
Status: DISCONTINUED | OUTPATIENT
Start: 2019-05-09 | End: 2019-05-10

## 2019-05-09 RX ORDER — IBUPROFEN 200 MG
24 TABLET ORAL
Status: DISCONTINUED | OUTPATIENT
Start: 2019-05-09 | End: 2019-05-16 | Stop reason: HOSPADM

## 2019-05-09 RX ORDER — FOLIC ACID 1 MG/1
1 TABLET ORAL DAILY
Status: DISCONTINUED | OUTPATIENT
Start: 2019-05-09 | End: 2019-05-16 | Stop reason: HOSPADM

## 2019-05-09 RX ORDER — ONDANSETRON 8 MG/1
8 TABLET, ORALLY DISINTEGRATING ORAL EVERY 8 HOURS PRN
Status: DISCONTINUED | OUTPATIENT
Start: 2019-05-09 | End: 2019-05-13

## 2019-05-09 RX ORDER — CLONAZEPAM 0.5 MG/1
0.5 TABLET ORAL NIGHTLY
Status: DISCONTINUED | OUTPATIENT
Start: 2019-05-09 | End: 2019-05-16 | Stop reason: HOSPADM

## 2019-05-09 RX ORDER — AMIODARONE HYDROCHLORIDE 100 MG/1
100 TABLET ORAL DAILY
Status: DISCONTINUED | OUTPATIENT
Start: 2019-05-09 | End: 2019-05-16 | Stop reason: HOSPADM

## 2019-05-09 RX ORDER — GLUCAGON 1 MG
1 KIT INJECTION
Status: DISCONTINUED | OUTPATIENT
Start: 2019-05-09 | End: 2019-05-16 | Stop reason: HOSPADM

## 2019-05-09 RX ADMIN — CARVEDILOL 6.25 MG: 6.25 TABLET, FILM COATED ORAL at 05:05

## 2019-05-09 RX ADMIN — FOLIC ACID 1 MG: 1 TABLET ORAL at 02:05

## 2019-05-09 RX ADMIN — CLONAZEPAM 0.5 MG: 0.5 TABLET ORAL at 09:05

## 2019-05-09 RX ADMIN — HYDRALAZINE HYDROCHLORIDE 15 MG: 20 INJECTION INTRAMUSCULAR; INTRAVENOUS at 02:05

## 2019-05-09 RX ADMIN — MORPHINE SULFATE 2 MG: 2 INJECTION, SOLUTION INTRAMUSCULAR; INTRAVENOUS at 11:05

## 2019-05-09 RX ADMIN — SODIUM CHLORIDE 500 ML: 0.9 INJECTION, SOLUTION INTRAVENOUS at 11:05

## 2019-05-09 RX ADMIN — ATORVASTATIN CALCIUM 40 MG: 20 TABLET, FILM COATED ORAL at 02:05

## 2019-05-09 RX ADMIN — ACETAMINOPHEN 650 MG: 325 TABLET ORAL at 05:05

## 2019-05-09 RX ADMIN — HYDROCODONE BITARTRATE AND ACETAMINOPHEN 1 TABLET: 5; 325 TABLET ORAL at 02:05

## 2019-05-09 RX ADMIN — BENZONATATE 100 MG: 100 CAPSULE ORAL at 09:05

## 2019-05-09 RX ADMIN — AMLODIPINE BESYLATE 5 MG: 5 TABLET ORAL at 02:05

## 2019-05-09 NOTE — ED NOTES
Attempted to call [facility] orthopedics several times at [phone] ext [phone] with no answer

## 2019-05-09 NOTE — HPI
Mrs. Evans is an 85 year old woman with history of coronary artery disease, hypertension, hyperlipidemia, gout, AAA and CKDIII who came in today for evaluation of fall and right hip pain.  She lives independently and tripped on some boxes in her home this morning.  She fell to the floor and landed on her right hip.  Subsequently she had such severe pain she could not get up and walk.  She had no chest pain, shortness of breath, loss of consciousness, nausea, vomiting or head trauma.  At present she has pain with any movement of her right leg.  Otherwise her only complaints are of a cough for three days that is productive of white sputum in the mornings and that it is very cold in her room.  Her daughter is at bedside and complements the history as well.

## 2019-05-09 NOTE — ASSESSMENT & PLAN NOTE
-History noted in chart, yet patient denies this and is not on treatment at home  -Will check A1c.    -Will monitor sugars AC and HS and provide ISS if needed.

## 2019-05-09 NOTE — ED NOTES
Pt sitting up in ED stretcher, aaox4, speaking in clear full sentences. Respirations even and non labored w/ no distress noted. Skin warm and dry. Denies active pains or discomfort currently. Family remains at bedside with pt and all belongings ready for transport to floor. IV remains in tact, secured and patent, saline locked. Baker cathter remains secured to left upper leg per policy. ED Hugh hardy, transporting patient to admit bed 370. Ayaan RN receiving  nurse aware pt transporting up to floor now.

## 2019-05-09 NOTE — ASSESSMENT & PLAN NOTE
-Noted to have had stent placed in July 2018 by Dr. Canela  -No chest pain or sob at this time  -Continue statin and coreg  -Has been on plavix since that time, but must hold now for surgery  -Monitor on telemetry

## 2019-05-09 NOTE — ED TRIAGE NOTES
Pt reports trip and fall at home. Reports turning and loosing balance resulting in R hip pain. Pt with external rotation but no shortening noted. Reports taking Plavix today.

## 2019-05-09 NOTE — HOSPITAL COURSE
Mrs. Evans is an 85 year old woman with history of coronary artery disease, hypertension, hyperlipidemia, gout, AAA and CKDIII who came in for evaluation of fall and right hip pain who was found to have closed right femoral neck fracture.  Dr. Christianson is consulted and has requested we hold plavix. S/P right hip hemiarthroplasty on  5/13.She had dizziness and low bp after procedure which improved with ivf and holding bp meds.She was able to participate better with therapy and dcd to skilled nursing facility.

## 2019-05-09 NOTE — NURSING
Pt arrived to floor via stretcher with ED tech and transferred to bed. SCDs applied, oriented to room, call light placed within reach, bed low and locked, and family at bedside. Pt complains of pain 10/10 to right hip. Patient A&O x 4. Baker noted draining clear yellow urine to gravity. BP elevated, MD Marker informed. Otherwise VSS on room air. No acute distress noted at this time. Will continue to monitor.

## 2019-05-09 NOTE — H&P
[facility] Hospital Medicine  History & Physical    Patient Name: Anahy Evans  MRN: [id number]  Admission Date: 5/9/2019  Attending Physician: Ryan Basilio MD  Primary Care Provider: Ryan Lopez MD         Patient information was obtained from patient, relative(s), past medical records and ER records.     Subjective:     Principal Problem:Closed fracture of right hip    Chief Complaint:   Chief Complaint   Patient presents with    Fall     right hip pain s/p fall approx 90 min PTA. External rotation noted, no shortening. Pt denies LOC or head trauma. Distal pulses intact. Daily Plavix        HPI: Mrs. Evans is an 85 year old woman with history of coronary artery disease, hypertension, hyperlipidemia, gout, AAA and CKDIII who came in today for evaluation of fall and right hip pain.  She lives independently and tripped on some boxes in her home this morning.  She fell to the floor and landed on her right hip.  Subsequently she had such severe pain she could not get up and walk.  She had no chest pain, shortness of breath, loss of consciousness, nausea, vomiting or head trauma.  At present she has pain with any movement of her right leg.  Otherwise her only complaints are of a cough for three days that is productive of white sputum in the mornings and that it is very cold in her room.  Her daughter is at bedside and complements the history as well.    Past Medical History:   Diagnosis Date    Cervical cancer 1968    breast cancer right    Coronary artery disease     Gout     High cholesterol     Hypertension     MI (myocardial infarction)        Past Surgical History:   Procedure Laterality Date    BREAST LUMPECTOMY      right    CARDIAC SURGERY      multiple cardiac stents    CORONARY STENT PLACEMENT      HEART CATH-LEFT Left 11/24/2017    [facility]    HEART CATH-LEFT lv cor poss Left 6/30/2017    [facility]formed [facility] at  [facility]ATH [facility]       Review of patient's allergies indicates:   Allergen Reactions    Clindamycin Anaphylaxis    Penicillins Rash       No current facility-administered medications on file prior to encounter.      Current Outpatient Medications on File Prior to Encounter   Medication Sig    allopurinol (ZYLOPRIM) 300 MG tablet Take 300 mg by mouth once daily.    amiodarone (PACERONE) 200 MG Tab Take 0.5 tablets (100 mg total) by mouth once daily.    amLODIPine (NORVASC) 5 MG tablet Take 5 mg by mouth once daily.    aspirin (ECOTRIN) 81 MG EC tablet Take 81 mg by mouth once daily.    atorvastatin (LIPITOR) 40 MG tablet Take 40 mg by mouth once daily.    carvedilol (COREG) 6.25 MG tablet Take 6.25 mg by mouth 2 (two) times daily with meals.    cholecalciferol, vitamin D3, (VITAMIN D3) 2,000 unit Cap Take 1 capsule by mouth once daily.    clopidogrel (PLAVIX) 75 mg tablet TAKE 1 TABLET DAILY    folic acid (FOLVITE) 1 MG tablet Take 1 mg by mouth once daily.    meclizine (ANTIVERT) 12.5 mg tablet Take 12.5 mg by mouth 3 (three) times daily as needed.    spironolactone (ALDACTONE) 25 MG tablet Take 25 mg by mouth every other day.     telmisartan (MICARDIS) 80 MG Tab Take 40 mg by mouth once daily.    acetaminophen (TYLENOL) 500 MG tablet Take 1 tablet (500 mg total) by mouth every 6 (six) hours as needed.    calcium carbonate-vitamin D3 600 mg calcium- 200 unit Cap Take by mouth.    clonazePAM (KLONOPIN) 0.5 MG tablet Take 1 tablet (0.5 mg total) by mouth every evening.    folic acid (FOLVITE) 1 MG tablet Take 1 tablet by mouth.    nitroGLYCERIN (NITROSTAT) 0.4 MG SL tablet Place 0.4 mg under the tongue every 5 (five) minutes as needed for Chest pain.     Family History     Problem Relation (Age of Onset)    Cancer Mother, Father        Tobacco Use    Smoking status: Former Smoker    Smokeless tobacco: Never Used   Substance and Sexual Activity    Alcohol use: Yes     Comment: rare    Drug use: No     Sexual activity: Not Currently     Review of Systems   Constitutional: Negative for activity change, chills, diaphoresis and fatigue.   HENT: Positive for congestion. Negative for drooling and hearing loss.    Eyes: Negative for discharge.   Respiratory: Positive for cough. Negative for apnea, chest tightness, shortness of breath and wheezing.    Cardiovascular: Negative for palpitations and leg swelling.   Gastrointestinal: Negative for abdominal distention, abdominal pain, constipation and diarrhea.   Musculoskeletal: Positive for arthralgias. Negative for gait problem.   Skin: Negative for rash.   Neurological: Negative for seizures, light-headedness and numbness.   Hematological: Negative for adenopathy.   Psychiatric/Behavioral: Negative for agitation and behavioral problems.     Objective:     Vital Signs (Most Recent):  Temp: 98.2 °F (36.8 °C) (05/09/19 1336)  Pulse: 74 (05/09/19 1336)  Resp: 16 (05/09/19 1336)  BP: (!) 203/93 (05/09/19 1336)  SpO2: 96 % (05/09/19 1336) Vital Signs (24h Range):  Temp:  [98 °F (36.7 °C)-98.2 °F (36.8 °C)] 98.2 °F (36.8 °C)  Pulse:  [60-74] 74  Resp:  [16-17] 16  SpO2:  [96 %-100 %] 96 %  BP: (150-203)/(65-93) 203/93     Weight: 83 kg (183 lb)  Body mass index is 28.66 kg/m².    Physical Exam   Constitutional: She is oriented to person, place, and time. She appears well-developed and well-nourished.   HENT:   Head: Normocephalic and atraumatic.   Eyes: Pupils are equal, round, and reactive to light. EOM are normal.   Neck: Normal range of motion. Neck supple.   Cardiovascular: Normal rate, regular rhythm and normal heart sounds.   Pulmonary/Chest: Effort normal and breath sounds normal. No respiratory distress.   Abdominal: Soft. Bowel sounds are normal. She exhibits no distension. There is no tenderness.   Musculoskeletal: Normal range of motion. She exhibits no edema.   Large bruise to right hip.  Right hip is very TTP.  Right leg externally rotated.  Distal pulses and  sensation intact.   Neurological: She is oriented to person, place, and time. No cranial nerve deficit. Coordination normal.   Skin: Skin is warm and dry.   Psychiatric: She has a normal mood and affect. Her behavior is normal.   Vitals reviewed.        CRANIAL NERVES     CN III, IV, VI   Pupils are equal, round, and reactive to light.  Extraocular motions are normal.        Significant Labs: All pertinent labs within the past 24 hours have been reviewed.    Significant Imaging: I have reviewed and interpreted all pertinent imaging results/findings within the past 24 hours.    Assessment/Plan:     * Closed fracture of right hip  -Mrs. Evans is admitted to inpatient telemetry status  -Dr. Christianson will be consulted and has requested we hold plavix  -She will require surgical repair.  -Will check CXR and EKG, but anticipate rapid clearance to proceed with surgery from a medical perspective.  -Will attempt pain control with norco, but if required will escalate to IV morphine.      Essential hypertension  -BP rather elevated due to pain  -Will continue home coreg, norvasc, telmesartan and spironolactone  -Will provide hydralazine PRN SBP > 180      Chronic kidney disease, stage III (moderate)  -She follows with Dr. Valerio  -Baseline Cr noted to be around 1.6 and she is just a bit above that now.  -Will continue home ARB and spironolactone  -Repeat labs in AM  -If any deterioration will consult [facility].      Coronary artery disease involving native coronary artery  -Noted to have had stent placed in July 2018 by Dr. Canela  -No chest pain or sob at this time  -Continue statin and coreg  -Has been on plavix since that time, but must hold now for surgery  -Monitor on telemetry      Type 2 diabetes mellitus with diabetic chronic kidney disease  -History noted in chart, yet patient denies this and is not on treatment at home  -Will check A1c.    -Will monitor sugars AC and HS and provide ISS if needed.      PAF  (paroxysmal atrial fibrillation)  -History noted and she follows with Dr. Gay.  -In NSR presently  -Continue amiodarone and coreg   -Monitor on telemetry  -Not on anticoagulation per se at home.      Cough  -Slight morning productive cough noted for 3 days  -Afebrile and normal wbc  -Will order CXR and tessalon.      Pure hypercholesterolemia  -Continue home statin      Gastroesophageal reflux disease with esophagitis  -History noted  -Not on treatment at home        VTE Risk Mitigation (From admission, onward)        Ordered     heparin (porcine) injection 5,000 Units  Every 8 hours      05/09/19 1408     IP VTE HIGH RISK PATIENT  Once      05/09/19 1408     Place sequential compression device  Until discontinued      05/09/19 1335             Ryan Basilio MD  Department of Hospital Medicine   [facility]

## 2019-05-09 NOTE — ASSESSMENT & PLAN NOTE
-She follows with Dr. Valerio  -Baseline Cr noted to be around 1.6 and she is just a bit above that now.  -Will continue home ARB and spironolactone  -Repeat labs in AM  -If any deterioration will consult [facility].

## 2019-05-09 NOTE — ED PROVIDER NOTES
"Encounter Date: 5/9/2019    SCRIBE #1 NOTE: INeo am scribing for, and in the presence of, Dr. Cleary .       History     Chief Complaint   Patient presents with    Fall     right hip pain s/p fall approx 90 min PTA. External rotation noted, no shortening. Pt denies LOC or head trauma. Distal pulses intact. Daily Plavix     Time seen by provider: 10:48 AM    This is a 85 y.o. female, with history of MI, HTN, HLD, and CAD, who presents via EMS with complaint of right hip pain s/p trip and fall that occurred at home around 8 am this morning. She denies head trauma, LOC, neck pain, or back pain. Patient states she was unable to ambulate s/p fall due to the pain. She notes her daughter found her on the ground and called EMS. She reports "bruise" on her hip. She denies history of hip fractures. She has not been experiencing fevers, chills, headaches, dizziness, congestion, rhinorrhea, sore throat, cough, SOB, chest pain, abdominal pain, N/V/D, dysuria, urinary frequency, or urinary urgency. She reports history of cardiac disease and states she has been compliant with medications, last taken this morning. Patient states she has "three or four" cardiac stents. She admits occasional use of alcohol, but denies use of tobacco or illicit drugs.       The history is provided by the patient.     Review of patient's allergies indicates:   Allergen Reactions    Clindamycin Anaphylaxis    Penicillins Rash     Past Medical History:   Diagnosis Date    Cervical cancer 1968    breast cancer right    Coronary artery disease     Gout     High cholesterol     Hypertension     MI (myocardial infarction)      Past Surgical History:   Procedure Laterality Date    BREAST LUMPECTOMY      right    CARDIAC SURGERY      multiple cardiac stents    CORONARY STENT PLACEMENT      HEART CATH-LEFT Left 11/24/2017    [facility]    HEART CATH-LEFT lv cor poss Left 6/30/2017    Performed " by [facility] at [facility] CATH LAB     Family History   Problem Relation Age of Onset    Cancer Mother     Cancer Father      Social History     Tobacco Use    Smoking status: Former Smoker    Smokeless tobacco: Never Used   Substance Use Topics    Alcohol use: Yes     Comment: rare    Drug use: No     Review of Systems   Constitutional: Negative for chills and fever.   HENT: Negative for congestion, rhinorrhea and sore throat.    Respiratory: Negative for cough and shortness of breath.    Cardiovascular: Negative for chest pain.   Gastrointestinal: Negative for abdominal pain, diarrhea, nausea and vomiting.   Genitourinary: Negative for dysuria, frequency and urgency.   Musculoskeletal: Negative for back pain and neck pain.        Positive for right hip pain.    Skin: Positive for color change (bruise, right hip). Negative for rash.   Neurological: Negative for dizziness, syncope and headaches.   Psychiatric/Behavioral: Negative for confusion.       Physical Exam     Initial Vitals [05/09/19 0956]   BP Pulse Resp Temp SpO2   (!) 180/80 65 17 98 °F (36.7 °C) 96 %      MAP       --         Physical Exam    Nursing note and vitals reviewed.  Constitutional: She appears well-developed and well-nourished. No distress.   HENT:   Head: Normocephalic and atraumatic.   Mouth/Throat: Oropharynx is clear and moist.   Eyes: Conjunctivae and EOM are normal.   Neck: Normal range of motion. Neck supple.   Cardiovascular: Normal rate, regular rhythm, normal heart sounds and intact distal pulses.   Pulses:       Dorsalis pedis pulses are 2+ on the right side.        Posterior tibial pulses are 2+ on the right side.   Pulmonary/Chest: Breath sounds normal. No respiratory distress. She has no wheezes. She has no rhonchi. She has no rales.   Musculoskeletal: She exhibits tenderness.   RLE has some external rotation. Right hip tenderness to palpation.    Neurological: She is alert and oriented to person, place, and time.    RLE: sensation intact to light touch. Strength limited secondary to pain.    Skin: Skin is warm and dry. Capillary refill takes less than 2 seconds.   Psychiatric: She has a normal mood and affect. Her behavior is normal. Judgment and thought content normal.         ED Course   Procedures  Labs Reviewed   COMPREHENSIVE METABOLIC PANEL - Abnormal; Notable for the following components:       Result Value    BUN, Bld 28 (*)     Creatinine 1.9 (*)     eGFR if  27 (*)     eGFR if non  24 (*)     All other components within normal limits   CBC W/ AUTO DIFFERENTIAL   PROTIME-INR   TYPE & SCREEN        ECG Results    None       Imaging Results          X-Ray Hip 2 or 3 views Right (Final result)  Result time 05/09/19 11:46:08    Final result by Juan Carlos Hernandez MD (05/09/19 11:46:08)                 Impression:      Mildly displaced and angulated right femoral neck fracture.      Electronically signed by: Juan Carlos Hernandez MD  Date:    05/09/2019  Time:    11:46             Narrative:    EXAMINATION:  XR HIP 2 VIEW RIGHT    CLINICAL HISTORY:  Trauma;    TECHNIQUE:  AP view of the pelvis and frog leg lateral view of the right hip were performed.    COMPARISON:  None    FINDINGS:  There is a right femoral transcervical neck fracture with mild displacement and angulation.  There is no evidence for dislocation.  The remainder of the bones appear intact.  Soft tissues appear grossly unremarkable.                              X-Rays:   Independently Interpreted Readings:   Other Readings:  Right hip: fracture of neck of right hip.     Medical Decision Making:   Clinical Tests:   Radiological Study: Ordered and Reviewed            Scribe Attestation:   Scribe #1: I performed the above scribed service and the documentation accurately describes the services I performed. I attest to the accuracy of the note.    Attending Attestation:           Physician Attestation for Scribe:  Physician Attestation  Statement for Scribe #1: I, Dr. Scott, reviewed documentation, as scribed by Neo Fuller  in my presence, and it is both accurate and complete.         Attending ED Notes:   Emergent evaluation of 85-year-old female with right hip pain status post trauma.  Patient is afebrile, nontoxic-appearing stable vital signs at elevation of blood pressure.  Patient is neurovascularly intact without focal neurologic deficit.  Patient has right hip tenderness to palpation without deformity, crepitus or step-offs.  X-ray reveals a fracture through femoral neck.  Urinary analysis reveals protein and occasional bacteria.  Patient has no elevation white blood cell count.  H&H within normal limits.  No acute findings on CMP except for BUN and creatinine of 28 and 1.9.  The patient is extensively counseled her diagnosis and treatment including all diagnostic, laboratory and physical exam findings.  The patient is admitted in stable condition.          ED Course as of May 10 2150   Thu May 09, 2019   1139 Paged orthopedics.     [CL]   1148 Case discussed with Dr. Martin (Hospitalist). Will admit the patient to Dr. Basilio.     [CL]   1252 Case discussed with Dr. Christianson (Orthopedics). Asked to stop Plavix. Recommends Lovenox or Heparin.     [CL]   1300 Spoke to Dr. Martin (Hospitalist) about consultation with Dr. Christianson and his recommendation to stop Plavix prior to taking patient to surgery.     [CL]      ED Course User Index  [CL] Neo Fuller     Clinical Impression:     1. Closed fracture of right hip, initial encounter    2. Anemia, unspecified type    3. Essential hypertension    4. Atrial fibrillation                                   Glen Scott MD  05/10/19 5532

## 2019-05-09 NOTE — ASSESSMENT & PLAN NOTE
-History noted and she follows with Dr. Gay.  -In NSR presently  -Continue amiodarone and coreg   -Monitor on telemetry  -Not on anticoagulation per se at home.     Quality 226: Preventive Care And Screening: Tobacco Use: Screening And Cessation Intervention: Patient screened for tobacco and never smoked Quality 111:Pneumonia Vaccination Status For Older Adults: Pneumococcal Vaccination Previously Received Quality 431: Preventive Care And Screening: Unhealthy Alcohol Use - Screening: Patient screened for unhealthy alcohol use using a single question and scores less than 2 times per year Quality 265: Biopsy Follow-Up: Biopsy results reviewed, communicated, tracked, and documented Quality 128: Preventive Care And Screening: Body Mass Index (Bmi) Screening And Follow-Up Plan: BMI is documented above normal parameters and a follow-up plan is documented Detail Level: Generalized Quality 110: Preventive Care And Screening: Influenza Immunization: Influenza Immunization Administered during Influenza season Quality 130: Documentation Of Current Medications In The Medical Record: Current Medications Documented

## 2019-05-09 NOTE — SUBJECTIVE & OBJECTIVE
Past Medical History:   Diagnosis Date    Cervical cancer 1968    breast cancer right    Coronary artery disease     Gout     High cholesterol     Hypertension     MI (myocardial infarction)        Past Surgical History:   Procedure Laterality Date    BREAST LUMPECTOMY      right    CARDIAC SURGERY      multiple cardiac stents    CORONARY STENT PLACEMENT      HEART CATH-LEFT Left 11/24/2017    [facility] CATH LAB    HEART CATH-LEFT lv cor poss Left 6/30/2017    Per[facility] by [facility] at [facility] CATH LAB       Review of patient's allergies indicates:   Allergen Reactions    Clindamycin Anaphylaxis    Penicillins Rash       No current facility-administered medications on file prior to encounter.      Current Outpatient Medications on File Prior to Encounter   Medication Sig    allopurinol (ZYLOPRIM) 300 MG tablet Take 300 mg by mouth once daily.    amiodarone (PACERONE) 200 MG Tab Take 0.5 tablets (100 mg total) by mouth once daily.    amLODIPine (NORVASC) 5 MG tablet Take 5 mg by mouth once daily.    aspirin (ECOTRIN) 81 MG EC tablet Take 81 mg by mouth once daily.    atorvastatin (LIPITOR) 40 MG tablet Take 40 mg by mouth once daily.    carvedilol (COREG) 6.25 MG tablet Take 6.25 mg by mouth 2 (two) times daily with meals.    cholecalciferol, vitamin D3, (VITAMIN D3) 2,000 unit Cap Take 1 capsule by mouth once daily.    clopidogrel (PLAVIX) 75 mg tablet TAKE 1 TABLET DAILY    folic acid (FOLVITE) 1 MG tablet Take 1 mg by mouth once daily.    meclizine (ANTIVERT) 12.5 mg tablet Take 12.5 mg by mouth 3 (three) times daily as needed.    spironolactone (ALDACTONE) 25 MG tablet Take 25 mg by mouth every other day.     telmisartan (MICARDIS) 80 MG Tab Take 40 mg by mouth once daily.    acetaminophen (TYLENOL) 500 MG tablet Take 1 tablet (500 mg total) by mouth every 6 (six) hours as needed.    calcium carbonate-vitamin D3 600 mg calcium- 200 unit Cap  Take by mouth.    clonazePAM (KLONOPIN) 0.5 MG tablet Take 1 tablet (0.5 mg total) by mouth every evening.    folic acid (FOLVITE) 1 MG tablet Take 1 tablet by mouth.    nitroGLYCERIN (NITROSTAT) 0.4 MG SL tablet Place 0.4 mg under the tongue every 5 (five) minutes as needed for Chest pain.     Family History     Problem Relation (Age of Onset)    Cancer Mother, Father        Tobacco Use    Smoking status: Former Smoker    Smokeless tobacco: Never Used   Substance and Sexual Activity    Alcohol use: Yes     Comment: rare    Drug use: No    Sexual activity: Not Currently     Review of Systems   Constitutional: Negative for activity change, chills, diaphoresis and fatigue.   HENT: Positive for congestion. Negative for drooling and hearing loss.    Eyes: Negative for discharge.   Respiratory: Positive for cough. Negative for apnea, chest tightness, shortness of breath and wheezing.    Cardiovascular: Negative for palpitations and leg swelling.   Gastrointestinal: Negative for abdominal distention, abdominal pain, constipation and diarrhea.   Musculoskeletal: Positive for arthralgias. Negative for gait problem.   Skin: Negative for rash.   Neurological: Negative for seizures, light-headedness and numbness.   Hematological: Negative for adenopathy.   Psychiatric/Behavioral: Negative for agitation and behavioral problems.     Objective:     Vital Signs (Most Recent):  Temp: 98.2 °F (36.8 °C) (05/09/19 1336)  Pulse: 74 (05/09/19 1336)  Resp: 16 (05/09/19 1336)  BP: (!) 203/93 (05/09/19 1336)  SpO2: 96 % (05/09/19 1336) Vital Signs (24h Range):  Temp:  [98 °F (36.7 °C)-98.2 °F (36.8 °C)] 98.2 °F (36.8 °C)  Pulse:  [60-74] 74  Resp:  [16-17] 16  SpO2:  [96 %-100 %] 96 %  BP: (150-203)/(65-93) 203/93     Weight: 83 kg (183 lb)  Body mass index is 28.66 kg/m².    Physical Exam   Constitutional: She is oriented to person, place, and time. She appears well-developed and well-nourished.   HENT:   Head: Normocephalic and  atraumatic.   Eyes: Pupils are equal, round, and reactive to light. EOM are normal.   Neck: Normal range of motion. Neck supple.   Cardiovascular: Normal rate, regular rhythm and normal heart sounds.   Pulmonary/Chest: Effort normal and breath sounds normal. No respiratory distress.   Abdominal: Soft. Bowel sounds are normal. She exhibits no distension. There is no tenderness.   Musculoskeletal: Normal range of motion. She exhibits no edema.   Large bruise to right hip.  Right hip is very TTP.  Right leg externally rotated.  Distal pulses and sensation intact.   Neurological: She is oriented to person, place, and time. No cranial nerve deficit. Coordination normal.   Skin: Skin is warm and dry.   Psychiatric: She has a normal mood and affect. Her behavior is normal.   Vitals reviewed.        CRANIAL NERVES     CN III, IV, VI   Pupils are equal, round, and reactive to light.  Extraocular motions are normal.        Significant Labs: All pertinent labs within the past 24 hours have been reviewed.    Significant Imaging: I have reviewed and interpreted all pertinent imaging results/findings within the past 24 hours.

## 2019-05-09 NOTE — PROGRESS NOTES
s/p right femoral neck fx on Plavix  - d/c plavix  - ok for lovenox/heparin and SCDs in the interim  - medical clearance  - surgery for right hip endo when medically cleared and Plavix cleared

## 2019-05-09 NOTE — ASSESSMENT & PLAN NOTE
-BP rather elevated due to pain  -Will continue home coreg, norvasc, telmesartan and spironolactone  -Will provide hydralazine PRN SBP > 180

## 2019-05-09 NOTE — ASSESSMENT & PLAN NOTE
-Slight morning productive cough noted for 3 days  -Afebrile and normal wbc  -Will order CXR and tessalon.

## 2019-05-10 ENCOUNTER — ANESTHESIA EVENT (OUTPATIENT)
Dept: SURGERY | Facility: OTHER | Age: 84
DRG: 470 | End: 2019-05-10
Payer: MEDICARE

## 2019-05-10 LAB
ANION GAP SERPL CALC-SCNC: 11 MMOL/L (ref 8–16)
BASOPHILS # BLD AUTO: 0.02 K/UL (ref 0–0.2)
BASOPHILS NFR BLD: 0.3 % (ref 0–1.9)
BUN SERPL-MCNC: 21 MG/DL (ref 8–23)
CALCIUM SERPL-MCNC: 9.5 MG/DL (ref 8.7–10.5)
CHLORIDE SERPL-SCNC: 104 MMOL/L (ref 95–110)
CO2 SERPL-SCNC: 21 MMOL/L (ref 23–29)
CREAT SERPL-MCNC: 1.3 MG/DL (ref 0.5–1.4)
DIFFERENTIAL METHOD: NORMAL
EOSINOPHIL # BLD AUTO: 0.1 K/UL (ref 0–0.5)
EOSINOPHIL NFR BLD: 1.1 % (ref 0–8)
ERYTHROCYTE [DISTWIDTH] IN BLOOD BY AUTOMATED COUNT: 14.1 % (ref 11.5–14.5)
EST. GFR  (AFRICAN AMERICAN): 43 ML/MIN/1.73 M^2
EST. GFR  (NON AFRICAN AMERICAN): 37 ML/MIN/1.73 M^2
GLUCOSE SERPL-MCNC: 92 MG/DL (ref 70–110)
HCT VFR BLD AUTO: 37.1 % (ref 37–48.5)
HGB BLD-MCNC: 12.1 G/DL (ref 12–16)
LYMPHOCYTES # BLD AUTO: 1.5 K/UL (ref 1–4.8)
LYMPHOCYTES NFR BLD: 24.6 % (ref 18–48)
MAGNESIUM SERPL-MCNC: 1.9 MG/DL (ref 1.6–2.6)
MCH RBC QN AUTO: 29.9 PG (ref 27–31)
MCHC RBC AUTO-ENTMCNC: 32.6 G/DL (ref 32–36)
MCV RBC AUTO: 92 FL (ref 82–98)
MONOCYTES # BLD AUTO: 0.9 K/UL (ref 0.3–1)
MONOCYTES NFR BLD: 13.8 % (ref 4–15)
NEUTROPHILS # BLD AUTO: 3.7 K/UL (ref 1.8–7.7)
NEUTROPHILS NFR BLD: 60 % (ref 38–73)
PLATELET # BLD AUTO: 172 K/UL (ref 150–350)
PMV BLD AUTO: 11.7 FL (ref 9.2–12.9)
POCT GLUCOSE: 109 MG/DL (ref 70–110)
POCT GLUCOSE: 125 MG/DL (ref 70–110)
POCT GLUCOSE: 157 MG/DL (ref 70–110)
POTASSIUM SERPL-SCNC: 3.8 MMOL/L (ref 3.5–5.1)
RBC # BLD AUTO: 4.05 M/UL (ref 4–5.4)
SODIUM SERPL-SCNC: 136 MMOL/L (ref 136–145)
WBC # BLD AUTO: 6.18 K/UL (ref 3.9–12.7)

## 2019-05-10 PROCEDURE — 85025 COMPLETE CBC W/AUTO DIFF WBC: CPT

## 2019-05-10 PROCEDURE — 11000001 HC ACUTE MED/SURG PRIVATE ROOM

## 2019-05-10 PROCEDURE — 63600175 PHARM REV CODE 636 W HCPCS: Performed by: INTERNAL MEDICINE

## 2019-05-10 PROCEDURE — 94761 N-INVAS EAR/PLS OXIMETRY MLT: CPT

## 2019-05-10 PROCEDURE — 80048 BASIC METABOLIC PNL TOTAL CA: CPT

## 2019-05-10 PROCEDURE — 99233 SBSQ HOSP IP/OBS HIGH 50: CPT | Mod: ,,, | Performed by: INTERNAL MEDICINE

## 2019-05-10 PROCEDURE — 36415 COLL VENOUS BLD VENIPUNCTURE: CPT

## 2019-05-10 PROCEDURE — 83735 ASSAY OF MAGNESIUM: CPT

## 2019-05-10 PROCEDURE — 25000003 PHARM REV CODE 250: Performed by: HOSPITALIST

## 2019-05-10 PROCEDURE — 99233 PR SUBSEQUENT HOSPITAL CARE,LEVL III: ICD-10-PCS | Mod: ,,, | Performed by: INTERNAL MEDICINE

## 2019-05-10 RX ORDER — OXYCODONE HYDROCHLORIDE 5 MG/1
5 TABLET ORAL EVERY 4 HOURS PRN
Status: DISCONTINUED | OUTPATIENT
Start: 2019-05-10 | End: 2019-05-13

## 2019-05-10 RX ORDER — MORPHINE SULFATE 2 MG/ML
2 INJECTION, SOLUTION INTRAMUSCULAR; INTRAVENOUS EVERY 6 HOURS PRN
Status: DISCONTINUED | OUTPATIENT
Start: 2019-05-10 | End: 2019-05-11

## 2019-05-10 RX ORDER — ENOXAPARIN SODIUM 100 MG/ML
40 INJECTION SUBCUTANEOUS EVERY 24 HOURS
Status: DISCONTINUED | OUTPATIENT
Start: 2019-05-10 | End: 2019-05-12

## 2019-05-10 RX ADMIN — ENOXAPARIN SODIUM 40 MG: 100 INJECTION SUBCUTANEOUS at 05:05

## 2019-05-10 RX ADMIN — ATORVASTATIN CALCIUM 40 MG: 20 TABLET, FILM COATED ORAL at 08:05

## 2019-05-10 RX ADMIN — HYDROCODONE BITARTRATE AND ACETAMINOPHEN 1 TABLET: 5; 325 TABLET ORAL at 12:05

## 2019-05-10 RX ADMIN — CARVEDILOL 6.25 MG: 6.25 TABLET, FILM COATED ORAL at 05:05

## 2019-05-10 RX ADMIN — SPIRONOLACTONE 25 MG: 25 TABLET ORAL at 08:05

## 2019-05-10 RX ADMIN — MORPHINE SULFATE 2 MG: 2 INJECTION, SOLUTION INTRAMUSCULAR; INTRAVENOUS at 05:05

## 2019-05-10 RX ADMIN — CLONAZEPAM 0.5 MG: 0.5 TABLET ORAL at 09:05

## 2019-05-10 RX ADMIN — HYDROCODONE BITARTRATE AND ACETAMINOPHEN 1 TABLET: 5; 325 TABLET ORAL at 06:05

## 2019-05-10 RX ADMIN — AMLODIPINE BESYLATE 5 MG: 5 TABLET ORAL at 08:05

## 2019-05-10 RX ADMIN — MORPHINE SULFATE 2 MG: 2 INJECTION, SOLUTION INTRAMUSCULAR; INTRAVENOUS at 11:05

## 2019-05-10 RX ADMIN — CARVEDILOL 6.25 MG: 6.25 TABLET, FILM COATED ORAL at 08:05

## 2019-05-10 RX ADMIN — ALLOPURINOL 300 MG: 100 TABLET ORAL at 08:05

## 2019-05-10 RX ADMIN — AMIODARONE HYDROCHLORIDE 100 MG: 100 TABLET ORAL at 08:05

## 2019-05-10 RX ADMIN — TELMISARTAN 40 MG: 40 TABLET ORAL at 08:05

## 2019-05-10 RX ADMIN — FOLIC ACID 1 MG: 1 TABLET ORAL at 08:05

## 2019-05-10 NOTE — PLAN OF CARE
Initial Discharge Planning Assesment:  Patient admitted on 5/9/2019    Chart reviewed, Care plan discussed. Discussed care plan with treatment team,  attending Dr. Corley    PCP updated in [facility]: Dr. Lopez  Pharmacy, updated in [facility]: [facility] (mail order) or [facility] on [address]    DME at home: none    Current dispo IRF vs HH  Transportation: Daughter to drive home.    Pt to have ortho procedure on Monday, pending PT and OT recommendations on Tuesday, pt likely to need IRF vs HH.    Case management  to follow.       05/10/19 1540   Discharge Assessment   Assessment Type Discharge Planning Assessment   Confirmed/corrected address and phone number on facesheet? Yes   Assessment information obtained from? Patient   Communicated expected length of stay with patient/caregiver yes   Prior to hospitilization cognitive status: Alert/Oriented   Prior to hospitalization functional status: Independent   Current cognitive status: Alert/Oriented   Current Functional Status: Independent   Lives With child(star), adult   Is patient able to care for self after discharge? Unable to determine at this time (comments)   Who are your caregiver(s) and their phone number(s)? Daughter: Marixa Anaya [phone]   Readmission Within the Last 30 Days no previous admission in last 30 days   Patient currently being followed by outpatient case management? No   Patient currently receives any other outside agency services? No   Equipment Currently Used at Home none   Do you have any problems affording any of your prescribed medications? No   Is the patient taking medications as prescribed? yes   Does the patient have transportation home? Yes   Transportation Anticipated family or friend will provide   Does the patient receive services at the [facility]? No   Discharge Plan A Rehab   Discharge Plan B Home Health   Patient/Family in Agreement with Plan yes

## 2019-05-10 NOTE — PLAN OF CARE
"Problem: Adult Inpatient Plan of Care  Goal: Plan of Care Review  Outcome: Ongoing (interventions implemented as appropriate)  Patient remains free from injury or falls. Vital signs stable throughout night on room air. Weight shift assistance provided, turned patient Q2. Baker draining to gravity. Pain managed with  PO medications. Bruising to right hip. Patient refused both doses of Heparin through shift stating" This medication gave me a heart attack and a bad headache".  Bed in low locked position and call light within reach. Purposeful rounding performed. Will continue to monitor.      "

## 2019-05-10 NOTE — CONSULTS
[facility]  Orthopedics  Consult Note    Patient Name: Anahy Evans  MRN: [id number]  Admission Date: 5/9/2019  Hospital Length of Stay: 1 days  Attending Provider: Lizz Corley MD  Primary Care Provider: Ryan Lopez MD    Patient information was obtained from ER records.     Consults  Subjective:     Principal Problem:Closed fracture of right hip    Chief Complaint:   Chief Complaint   Patient presents with    Fall     right hip pain s/p fall approx 90 min PTA. External rotation noted, no shortening. Pt denies LOC or head trauma. Distal pulses intact. Daily Plavix        HPI:  85-year-old pleasant female status post ground level fall 05/09/2019.  She presented to the [facility] complaining of pain inability to weightbear.  Workup including x-ray showed a displaced right femoral neck fracture. She is on daily Plavix.  She has a history of coronary artery disease, AAA, chronic kidney disease, hypertension.    Past Medical History:   Diagnosis Date    Cervical cancer 1968    breast cancer right    Coronary artery disease     Gout     High cholesterol     Hypertension     MI (myocardial infarction)        Past Surgical History:   Procedure Laterality Date    BREAST LUMPECTOMY      right    CARDIAC SURGERY      multiple cardiac stents    CORONARY STENT PLACEMENT      HEART CATH-LEFT Left 11/24/2017    [facility] C[facility]    HEART CATH-LEFT lv cor poss Left 6/30/2017    Performed by [facility] at [facility] CATH LAB       Review of patient's allergies indicates:   Allergen Reactions    Clindamycin Anaphylaxis    Penicillins Rash       Current Facility-Administered Medications   Medication    acetaminophen tablet 650 mg    acetaminophen tablet 650 mg    allopurinol tablet 300 mg    amiodarone tablet 100 mg    amLODIPine tablet 5 mg    atorvastatin tablet 40 mg    benzonatate capsule 100 mg    carvedilol tablet  "6.25 mg    clonazePAM tablet 0.5 mg    dextrose 50% injection 12.5 g    dextrose 50% injection 25 g    enoxaparin injection 40 mg    folic acid tablet 1 mg    glucagon (human recombinant) injection 1 mg    glucose chewable tablet 16 g    glucose chewable tablet 24 g    hydrALAZINE injection 15 mg    insulin aspart U-100 pen 0-5 Units    morphine injection 2 mg    nitroGLYCERIN SL tablet 0.4 mg    ondansetron disintegrating tablet 8 mg    oxyCODONE immediate release tablet 5 mg    sodium chloride 0.9% flush 10 mL    sodium chloride 0.9% flush 10 mL    spironolactone tablet 25 mg    telmisartan tablet 40 mg     Family History     Problem Relation (Age of Onset)    Cancer Mother, Father        Tobacco Use    Smoking status: Former Smoker    Smokeless tobacco: Never Used   Substance and Sexual Activity    Alcohol use: Yes     Comment: rare    Drug use: No    Sexual activity: Not Currently     ROS  Objective:     Vital Signs (Most Recent):  Temp: 98 °F (36.7 °C) (05/10/19 0721)  Pulse: (!) 52 (05/10/19 1000)  Resp: 16 (05/10/19 0721)  BP: 130/60 (05/10/19 0721)  SpO2: (!) 94 % (05/10/19 0721) Vital Signs (24h Range):  Temp:  [98 °F (36.7 °C)-98.6 °F (37 °C)] 98 °F (36.7 °C)  Pulse:  [52-74] 52  Resp:  [16-18] 16  SpO2:  [94 %-99 %] 94 %  BP: (130-203)/(60-93) 130/60     Weight: 83 kg (183 lb)  Height: 5' 7" (170.2 cm)  Body mass index is 28.66 kg/m².      Intake/Output Summary (Last 24 hours) at 5/10/2019 1149  Last data filed at 5/10/2019 0500  Gross per 24 hour   Intake 660 ml   Output 1450 ml   Net -790 ml       Ortho/SPM Exam  Right hip:  No skin lesions noted.  Pain with logroll.  Slightly shortened and externally rotated.  1+ dorsalis pedis pulse.  Wiggles toes.    Significant Labs:   CBC:   Recent Labs   Lab 05/09/19  1158 05/10/19  0433   WBC 6.32 6.18   HGB 12.1 12.1   HCT 37.3 37.1    172     All pertinent labs within the past 24 hours have been reviewed.    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 Bridge with SCDs and Lovenox/Heparin in the interim. Surgery likely Monday to allow Plavix to clear.    Isauro Germain MD  Orthopedics  [facility]

## 2019-05-10 NOTE — PLAN OF CARE
Problem: Adult Inpatient Plan of Care  Goal: Plan of Care Review  Outcome: Ongoing (interventions implemented as appropriate)  Plan of care reviewed with patient and questions answered. Pt free from falls or injury. Pain controlled with morphine iv as ordered. Blood sugars monitored and within normal limits. Purposeful rounding performed. Safety maintained. Bed in lowest position and locked. Call light in reach.

## 2019-05-11 PROBLEM — R05.9 COUGH: Status: RESOLVED | Noted: 2019-05-09 | Resolved: 2019-05-11

## 2019-05-11 LAB
POCT GLUCOSE: 106 MG/DL (ref 70–110)
POCT GLUCOSE: 107 MG/DL (ref 70–110)
POCT GLUCOSE: 91 MG/DL (ref 70–110)

## 2019-05-11 PROCEDURE — 25000003 PHARM REV CODE 250: Performed by: HOSPITALIST

## 2019-05-11 PROCEDURE — 11000001 HC ACUTE MED/SURG PRIVATE ROOM

## 2019-05-11 PROCEDURE — 99231 SBSQ HOSP IP/OBS SF/LOW 25: CPT | Mod: ,,, | Performed by: HOSPITALIST

## 2019-05-11 PROCEDURE — 63600175 PHARM REV CODE 636 W HCPCS: Performed by: INTERNAL MEDICINE

## 2019-05-11 PROCEDURE — 63600175 PHARM REV CODE 636 W HCPCS: Performed by: HOSPITALIST

## 2019-05-11 PROCEDURE — 99231 PR SUBSEQUENT HOSPITAL CARE,LEVL I: ICD-10-PCS | Mod: ,,, | Performed by: HOSPITALIST

## 2019-05-11 PROCEDURE — 94761 N-INVAS EAR/PLS OXIMETRY MLT: CPT

## 2019-05-11 RX ORDER — MORPHINE SULFATE 4 MG/ML
8 INJECTION, SOLUTION INTRAMUSCULAR; INTRAVENOUS EVERY 4 HOURS PRN
Status: DISCONTINUED | OUTPATIENT
Start: 2019-05-11 | End: 2019-05-13

## 2019-05-11 RX ADMIN — ATORVASTATIN CALCIUM 40 MG: 20 TABLET, FILM COATED ORAL at 08:05

## 2019-05-11 RX ADMIN — AMLODIPINE BESYLATE 5 MG: 5 TABLET ORAL at 08:05

## 2019-05-11 RX ADMIN — MORPHINE SULFATE 8 MG: 4 INJECTION INTRAVENOUS at 04:05

## 2019-05-11 RX ADMIN — ENOXAPARIN SODIUM 40 MG: 100 INJECTION SUBCUTANEOUS at 04:05

## 2019-05-11 RX ADMIN — TELMISARTAN 40 MG: 40 TABLET ORAL at 08:05

## 2019-05-11 RX ADMIN — AMIODARONE HYDROCHLORIDE 100 MG: 100 TABLET ORAL at 08:05

## 2019-05-11 RX ADMIN — CARVEDILOL 6.25 MG: 6.25 TABLET, FILM COATED ORAL at 08:05

## 2019-05-11 RX ADMIN — ALLOPURINOL 300 MG: 100 TABLET ORAL at 08:05

## 2019-05-11 RX ADMIN — CLONAZEPAM 0.5 MG: 0.5 TABLET ORAL at 08:05

## 2019-05-11 RX ADMIN — FOLIC ACID 1 MG: 1 TABLET ORAL at 08:05

## 2019-05-11 RX ADMIN — MORPHINE SULFATE 8 MG: 4 INJECTION INTRAVENOUS at 11:05

## 2019-05-11 RX ADMIN — CARVEDILOL 6.25 MG: 6.25 TABLET, FILM COATED ORAL at 04:05

## 2019-05-11 RX ADMIN — MORPHINE SULFATE 8 MG: 4 INJECTION INTRAVENOUS at 08:05

## 2019-05-11 RX ADMIN — MORPHINE SULFATE 2 MG: 2 INJECTION, SOLUTION INTRAMUSCULAR; INTRAVENOUS at 05:05

## 2019-05-11 NOTE — SUBJECTIVE & OBJECTIVE
Interval History: c/o pain in right leg not relieved vy po pain med, no chest pain, shortness of breath.    Review of Systems   Constitutional: Negative for chills and fever.   HENT: Negative for trouble swallowing.    Respiratory: Negative for cough and shortness of breath.    Cardiovascular: Negative for chest pain.   Gastrointestinal: Negative for abdominal pain, blood in stool, nausea and vomiting.   Genitourinary: Negative for dysuria and hematuria.   Musculoskeletal: Positive for arthralgias and gait problem.   Skin: Negative for rash.   Neurological: Negative for numbness and headaches.   Psychiatric/Behavioral: Negative for confusion.     Objective:     Vital Signs (Most Recent):  Temp: 98.5 °F (36.9 °C) (05/10/19 1940)  Pulse: 67 (05/10/19 2019)  Resp: 16 (05/10/19 1940)  BP: (!) 152/97 (05/10/19 1940)  SpO2: 97 % (05/10/19 2019) Vital Signs (24h Range):  Temp:  [98 °F (36.7 °C)-98.9 °F (37.2 °C)] 98.5 °F (36.9 °C)  Pulse:  [52-67] 67  Resp:  [16-18] 16  SpO2:  [94 %-98 %] 97 %  BP: (130-181)/(60-97) 152/97     Weight: 83 kg (183 lb)  Body mass index is 28.66 kg/m².    Intake/Output Summary (Last 24 hours) at 5/10/2019 2200  Last data filed at 5/10/2019 1747  Gross per 24 hour   Intake 900 ml   Output 1850 ml   Net -950 ml      Physical Exam   Constitutional: She is oriented to person, place, and time.   Mild distress with pain.   HENT:   Head: Normocephalic and atraumatic.   Eyes: Pupils are equal, round, and reactive to light. EOM are normal.   Neck: Normal range of motion. Neck supple.   Cardiovascular: Normal rate and regular rhythm.   Pulmonary/Chest: Effort normal and breath sounds normal. No respiratory distress.   Abdominal: Soft. Bowel sounds are normal. She exhibits no distension. There is no tenderness.   Musculoskeletal:   Decreased rom right leg   Neurological: She is alert and oriented to person, place, and time. No cranial nerve deficit.   Skin: Skin is warm.   Psychiatric: She has a normal  mood and affect.   Vitals reviewed.      Significant Labs:   BMP:   Recent Labs   Lab 05/10/19  0433   GLU 92      K 3.8      CO2 21*   BUN 21   CREATININE 1.3   CALCIUM 9.5   MG 1.9     CBC:   Recent Labs   Lab 05/09/19  1158 05/10/19  0433   WBC 6.32 6.18   HGB 12.1 12.1   HCT 37.3 37.1    172       Significant Imaging: I have reviewed all pertinent imaging results/findings within the past 24 hours.

## 2019-05-11 NOTE — ASSESSMENT & PLAN NOTE
· Stent placed in July 2017 by Dr. Canela  · No chest pain or sob at this time  · Continue Coreg 6.25mg PO BID  · Continue Lipitor 40mg PO daily  · Has been on plavix since that time, but must hold now for surgery  · Monitor on telemetry

## 2019-05-11 NOTE — ASSESSMENT & PLAN NOTE
-Slight morning productive cough noted for 3 days  -Afebrile and normal wbc  -Will order tessalon.  - cxr normal

## 2019-05-11 NOTE — ASSESSMENT & PLAN NOTE
· BP rather elevated due to pain  · Continue Norvasc 5mg PO daily  · Continue Aldactone 25mg PO daily  · Continue Telmisartan 40mg PO daily

## 2019-05-11 NOTE — ASSESSMENT & PLAN NOTE
· Follows with Dr. Gay  · In NSR presently  · Continue Amiodarone 100mg PO daily  · Continue Coreg 6.25mg PO BID  · Monitor on telemetry  · Not on anticoagulation per se at home.

## 2019-05-11 NOTE — ASSESSMENT & PLAN NOTE
-History noted in chart, yet patient denies this and is not on treatment at home  -  Lab Results   Component Value Date    HGBA1C 5.3 05/09/2019       -Will monitor sugars AC and HS and provide ISS if needed.

## 2019-05-11 NOTE — PROGRESS NOTES
"[facility]  Orthopedics  Progress Note    Patient Name: Anahy Evans  MRN: [id number]  Admission Date: 5/9/2019  Hospital Length of Stay: 2 days  Attending Provider: Charlette Martinez MD  Primary Care Provider: Ryan Lopez MD  Follow-up For: Procedure(s) (LRB):  HEMIARTHROPLASTY - HIP FRACTURE (Right)    Post-Operative Day:    Subjective:     Principal Problem:Closed fracture of right hip    Interval History: pain a bit better    Review of patient's allergies indicates:   Allergen Reactions    Clindamycin Anaphylaxis    Penicillins Rash       Current Facility-Administered Medications   Medication    acetaminophen tablet 650 mg    acetaminophen tablet 650 mg    allopurinol tablet 300 mg    amiodarone tablet 100 mg    amLODIPine tablet 5 mg    atorvastatin tablet 40 mg    benzonatate capsule 100 mg    carvedilol tablet 6.25 mg    clonazePAM tablet 0.5 mg    dextrose 50% injection 12.5 g    dextrose 50% injection 25 g    enoxaparin injection 40 mg    folic acid tablet 1 mg    glucagon (human recombinant) injection 1 mg    glucose chewable tablet 16 g    glucose chewable tablet 24 g    hydrALAZINE injection 15 mg    insulin aspart U-100 pen 0-5 Units    morphine injection 8 mg    nitroGLYCERIN SL tablet 0.4 mg    ondansetron disintegrating tablet 8 mg    oxyCODONE immediate release tablet 5 mg    sodium chloride 0.9% flush 10 mL    sodium chloride 0.9% flush 10 mL    spironolactone tablet 25 mg    telmisartan tablet 40 mg     Objective:     Vital Signs (Most Recent):  Temp: 99.2 °F (37.3 °C) (05/11/19 0709)  Pulse: 67 (05/11/19 0800)  Resp: 18 (05/11/19 0709)  BP: (!) 176/77 (05/11/19 0709)  SpO2: 95 % (05/11/19 0709) Vital Signs (24h Range):  Temp:  [98.1 °F (36.7 °C)-99.2 °F (37.3 °C)] 99.2 °F (37.3 °C)  Pulse:  [52-69] 67  Resp:  [16-20] 18  SpO2:  [95 %-98 %] 95 %  BP: (141-180)/(60-97) 176/77     Weight: 86.3 kg (190 lb 4.1 oz)  Height: 5' 7" (170.2 cm)  Body mass " index is 29.8 kg/m².      Intake/Output Summary (Last 24 hours) at 5/11/2019 0902  Last data filed at 5/11/2019 0541  Gross per 24 hour   Intake 1080 ml   Output 3250 ml   Net -2170 ml       Ortho/SPM Exam  Right hip:short, ER. 1+ DP    Significant Labs:   CBC:   Recent Labs   Lab 05/09/19  1158 05/10/19  0433   WBC 6.32 6.18   HGB 12.1 12.1   HCT 37.3 37.1    172     All pertinent labs within the past 24 hours have been reviewed.    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 Heparin/SCDs in the interim      Isauro Germain MD  Orthopedics  [facility]

## 2019-05-11 NOTE — PLAN OF CARE
Problem: Adult Inpatient Plan of Care  Goal: Plan of Care Review  Outcome: Ongoing (interventions implemented as appropriate)  VSS and afebrile, aaox4. Neurovascular checks WNL. Telemetry monitoring applied, no rhythm changes. Pain mildly controlled with IV pain medication. Cardiac diet, decreased appetite due to pain. Glucose monitoring, no coverage needed. Baker to gravity draining clear yellow urine. Plan of care reviewed with patient. Purposeful hourly rounding done. Call light at bedside, bed at lowest position, brakes on, non skid socks on. Will continue to monitor.

## 2019-05-11 NOTE — ASSESSMENT & PLAN NOTE
-Noted to have had stent placed in July 2017 by Dr. Canela  -No chest pain or sob at this time  -Continue statin and coreg  -Has been on plavix since that time, but must hold now for surgery  -Monitor on telemetry

## 2019-05-11 NOTE — ASSESSMENT & PLAN NOTE
-Mrs. Evans is admitted to inpatient telemetry status  -Dr. Christianson  consulted and has requested we hold plavix  - Plan for sx on monday  - CXR and EKG ok, ist degree av block   - patient active at home, rides stationary bike at home daily for 20 minutes  - intermediate risk with intermediate risk sx  - prn oxycodone and iv morphine for pain  - heparin changed to lovenox as patient c/o prior headache with heparin.

## 2019-05-11 NOTE — PLAN OF CARE
Problem: Adult Inpatient Plan of Care  Goal: Plan of Care Review  Outcome: Ongoing (interventions implemented as appropriate)  Plan of care reviewed with patient and questions answered. Pt free from falls or injury. Pain controlled with morphine iv as ordered. Purposeful rounding performed. F/C patent and draining clear yellow urine to g/u bag. VSS. Bilateral boots and SCD's in place bilateral. Safety maintained. Bed in lowest position and locked. Call light in reach.

## 2019-05-11 NOTE — ASSESSMENT & PLAN NOTE
-She follows with Dr. Valerio  -Baseline Cr noted to be around 1.6 and she is just a bit above that on admit.  -Will continue home ARB and spironolactone  -creatinine improved  -If any deterioration will consult [facility].

## 2019-05-11 NOTE — ASSESSMENT & PLAN NOTE
-History noted and she follows with Dr. Gay.  -In NSR presently  -Continue amiodarone and coreg   -Monitor on telemetry  -Not on anticoagulation per se at home.

## 2019-05-11 NOTE — PROGRESS NOTES
[facility] Hospital Medicine  Progress Note    Patient Name: Anahy Evans  MRN: [id number]  Patient Class: IP- Inpatient   Admission Date: 5/9/2019  Length of Stay: 1 days  Attending Physician: Lizz Corley MD  Primary Care Provider: Ryan Lopez MD        Subjective:     Principal Problem:Closed fracture of right hip    HPI:  Mrs. Evans is an 85 year old woman with history of coronary artery disease, hypertension, hyperlipidemia, gout, AAA and CKDIII who came in today for evaluation of fall and right hip pain.  She lives independently and tripped on some boxes in her home this morning.  She fell to the floor and landed on her right hip.  Subsequently she had such severe pain she could not get up and walk.  She had no chest pain, shortness of breath, loss of consciousness, nausea, vomiting or head trauma.  At present she has pain with any movement of her right leg.  Otherwise her only complaints are of a cough for three days that is productive of white sputum in the mornings and that it is very cold in her room.  Her daughter is at bedside and complements the history as well.    Hospital Course:  Mrs. Evans is an 85 year old woman with history of coronary artery disease, hypertension, hyperlipidemia, gout, AAA and CKDIII who came in today for evaluation of fall and right hip pain who was found to have closed right femoral neck fracture.  Dr. Germain is consulted and has requested we hold plavix.  Ordering CXR and EKG to complete medical clearance for surgery.  Continuing home BP meds.  Monitoring renal function as she has CKDIII that is near baseline.  Noted to have stents placed in 07/17 and has been on plavix since that time.  No chest pain or sob.  Does have a slight cough but is afebrile and no evidence of infection.          Interval History: c/o pain in right leg not relieved vy po pain med, no chest pain, shortness of breath.    Review of Systems   Constitutional: Negative for chills  and fever.   HENT: Negative for trouble swallowing.    Respiratory: Negative for cough and shortness of breath.    Cardiovascular: Negative for chest pain.   Gastrointestinal: Negative for abdominal pain, blood in stool, nausea and vomiting.   Genitourinary: Negative for dysuria and hematuria.   Musculoskeletal: Positive for arthralgias and gait problem.   Skin: Negative for rash.   Neurological: Negative for numbness and headaches.   Psychiatric/Behavioral: Negative for confusion.     Objective:     Vital Signs (Most Recent):  Temp: 98.5 °F (36.9 °C) (05/10/19 1940)  Pulse: 67 (05/10/19 2019)  Resp: 16 (05/10/19 1940)  BP: (!) 152/97 (05/10/19 1940)  SpO2: 97 % (05/10/19 2019) Vital Signs (24h Range):  Temp:  [98 °F (36.7 °C)-98.9 °F (37.2 °C)] 98.5 °F (36.9 °C)  Pulse:  [52-67] 67  Resp:  [16-18] 16  SpO2:  [94 %-98 %] 97 %  BP: (130-181)/(60-97) 152/97     Weight: 83 kg (183 lb)  Body mass index is 28.66 kg/m².    Intake/Output Summary (Last 24 hours) at 5/10/2019 2200  Last data filed at 5/10/2019 1747  Gross per 24 hour   Intake 900 ml   Output 1850 ml   Net -950 ml      Physical Exam   Constitutional: She is oriented to person, place, and time.   Mild distress with pain.   HENT:   Head: Normocephalic and atraumatic.   Eyes: Pupils are equal, round, and reactive to light. EOM are normal.   Neck: Normal range of motion. Neck supple.   Cardiovascular: Normal rate and regular rhythm.   Pulmonary/Chest: Effort normal and breath sounds normal. No respiratory distress.   Abdominal: Soft. Bowel sounds are normal. She exhibits no distension. There is no tenderness.   Musculoskeletal:   Decreased rom right leg   Neurological: She is alert and oriented to person, place, and time. No cranial nerve deficit.   Skin: Skin is warm.   Psychiatric: She has a normal mood and affect.   Vitals reviewed.      Significant Labs:   BMP:   Recent Labs   Lab 05/10/19  0433   GLU 92      K 3.8      CO2 21*   BUN 21    CREATININE 1.3   CALCIUM 9.5   MG 1.9     CBC:   Recent Labs   Lab 05/09/19  1158 05/10/19  0433   WBC 6.32 6.18   HGB 12.1 12.1   HCT 37.3 37.1    172       Significant Imaging: I have reviewed all pertinent imaging results/findings within the past 24 hours.    Assessment/Plan:      * Closed fracture of right hip  -Mrs. Evans is admitted to inpatient telemetry status  -Dr. Christianson  consulted and has requested we hold plavix  - Plan for sx on monday  - CXR and EKG ok, ist degree av block   - patient active at home, rides stationary bike at home daily for 20 minutes  - intermediate risk with intermediate risk sx  - prn oxycodone and iv morphine for pain  - heparin changed to lovenox as patient c/o prior headache with heparin.    Cough  -Slight morning productive cough noted for 3 days  -Afebrile and normal wbc  -Will order tessalon.  - cxr normal      Coronary artery disease involving native coronary artery  -Noted to have had stent placed in July 2017 by Dr. Canela  -No chest pain or sob at this time  -Continue statin and coreg  -Has been on plavix since that time, but must hold now for surgery  -Monitor on telemetry      Pure hypercholesterolemia  -Continue home statin      Type 2 diabetes mellitus with diabetic chronic kidney disease  -History noted in chart, yet patient denies this and is not on treatment at home  -  Lab Results   Component Value Date    HGBA1C 5.3 05/09/2019       -Will monitor sugars AC and HS and provide ISS if needed.      Gastroesophageal reflux disease with esophagitis  -History noted  -Not on treatment at home      Chronic kidney disease, stage III (moderate)  -She follows with Dr. Valerio  -Baseline Cr noted to be around 1.6 and she is just a bit above that on admit.  -Will continue home ARB and spironolactone  -creatinine improved  -If any deterioration will consult [facility].      Essential hypertension  -BP rather elevated due to pain  -Will continue home coreg,  norvasc, telmesartan and spironolactone  -Will provide hydralazine PRN SBP > 180      PAF (paroxysmal atrial fibrillation)  -History noted and she follows with Dr. Gay.  -In NSR presently  -Continue amiodarone and coreg   -Monitor on telemetry  -Not on anticoagulation per se at home.        VTE Risk Mitigation (From admission, onward)        Ordered     enoxaparin injection 40 mg  Daily      05/10/19 1131     IP VTE HIGH RISK PATIENT  Once      05/09/19 1408     Place sequential compression device  Until discontinued      05/09/19 1335              Lizz Corley MD  Department of Hospital Medicine   [facility]

## 2019-05-11 NOTE — PROGRESS NOTES
[facility] Hospital Medicine  Progress Note    Patient Name: Anahy Evans  MRN: [id number]  Patient Class: IP- Inpatient   Admission Date: 5/9/2019  Length of Stay: 2 days  Attending Physician: Chralette Martinez MD  Primary Care Provider: Ryan Lopez MD        Subjective:     Principal Problem:Closed fracture of right hip    HPI:  Mrs. Evans is an 85 year old woman with history of coronary artery disease, hypertension, hyperlipidemia, gout, AAA and CKDIII who came in today for evaluation of fall and right hip pain.  She lives independently and tripped on some boxes in her home this morning.  She fell to the floor and landed on her right hip.  Subsequently she had such severe pain she could not get up and walk.  She had no chest pain, shortness of breath, loss of consciousness, nausea, vomiting or head trauma.  At present she has pain with any movement of her right leg.  Otherwise her only complaints are of a cough for three days that is productive of white sputum in the mornings and that it is very cold in her room.  Her daughter is at bedside and complements the history as well.    Hospital Course:  Mrs. Evans is an 85 year old woman with history of coronary artery disease, hypertension, hyperlipidemia, gout, AAA and CKDIII who came in for evaluation of fall and right hip pain who was found to have closed right femoral neck fracture.  Dr. Christianson is consulted and has requested we hold plavix.  Plan for surgery 5/13.    Interval History: Reports pain not controlled.  Otherwise no complaints.      Review of Systems   Constitutional: Negative for chills and fever.   HENT: Negative for trouble swallowing.    Respiratory: Negative for cough and shortness of breath.    Cardiovascular: Negative for chest pain.   Gastrointestinal: Negative for abdominal pain, blood in stool, nausea and vomiting.   Genitourinary: Negative for dysuria and hematuria.   Musculoskeletal: Positive for arthralgias and gait  problem.   Skin: Negative for rash.   Neurological: Negative for numbness and headaches.   Psychiatric/Behavioral: Negative for confusion.     Objective:     Vital Signs (Most Recent):  Temp: 97.8 °F (36.6 °C) (05/11/19 1214)  Pulse: (!) 59 (05/11/19 1214)  Resp: 18 (05/11/19 1214)  BP: (!) 112/58 (05/11/19 1214)  SpO2: (!) 93 % (05/11/19 1214) Vital Signs (24h Range):  Temp:  [97.8 °F (36.6 °C)-99.2 °F (37.3 °C)] 97.8 °F (36.6 °C)  Pulse:  [56-69] 59  Resp:  [16-20] 18  SpO2:  [93 %-98 %] 93 %  BP: (112-180)/(58-97) 112/58     Weight: 86.3 kg (190 lb 4.1 oz)  Body mass index is 29.8 kg/m².    Intake/Output Summary (Last 24 hours) at 5/11/2019 1317  Last data filed at 5/11/2019 0541  Gross per 24 hour   Intake 1080 ml   Output 3250 ml   Net -2170 ml      Physical Exam   Constitutional: She is oriented to person, place, and time.   Mild distress with pain.   HENT:   Head: Normocephalic and atraumatic.   Eyes: Pupils are equal, round, and reactive to light. EOM are normal.   Neck: Normal range of motion. Neck supple.   Cardiovascular: Normal rate and regular rhythm.   Pulmonary/Chest: Effort normal and breath sounds normal. No respiratory distress.   Abdominal: Soft. Bowel sounds are normal. She exhibits no distension. There is no tenderness.   Musculoskeletal:   Decreased rom right leg   Neurological: She is alert and oriented to person, place, and time. No cranial nerve deficit.   Skin: Skin is warm.   Psychiatric: She has a normal mood and affect.   Vitals reviewed.      Significant Labs:   BMP:   Recent Labs   Lab 05/10/19  0433   GLU 92      K 3.8      CO2 21*   BUN 21   CREATININE 1.3   CALCIUM 9.5   MG 1.9     CBC:   Recent Labs   Lab 05/10/19  0433   WBC 6.18   HGB 12.1   HCT 37.1          Significant Imaging: I have reviewed all pertinent imaging results/findings within the past 24 hours.    Assessment/Plan:      * Closed fracture of right hip  · XR with right hip fracture  · Dr. Christianson   consulted and has requested we hold plavix  · Plan for sx on Monday 5/13  · Intermediate risk with intermediate risk sx  · Pain control with prn oxycodone and iv morphine for pain  · Heparin changed to lovenox as patient c/o prior headache with heparin.    Coronary artery disease involving native coronary artery  · Stent placed in July 2017 by Dr. Canela  · No chest pain or sob at this time  · Continue Coreg 6.25mg PO BID  · Continue Lipitor 40mg PO daily  · Has been on plavix since that time, but must hold now for surgery  · Monitor on telemetry      Pure hypercholesterolemia  · Chronic and stable  · Continue Lipitor 40mg PO daily    Old myocardial infarction  · Per CAD      Essential hypertension  · BP rather elevated due to pain  · Continue Norvasc 5mg PO daily  · Continue Aldactone 25mg PO daily  · Continue Telmisartan 40mg PO daily    PAF (paroxysmal atrial fibrillation)  · Follows with Dr. Gay  · In NSR presently  · Continue Amiodarone 100mg PO daily  · Continue Coreg 6.25mg PO BID  · Monitor on telemetry  · Not on anticoagulation per se at home.      Chronic kidney disease, stage III (moderate)  · Follows with Dr. Valerio  · Creatinine back to baseline - Monitor    Type 2 diabetes mellitus with diabetic chronic kidney disease  · HbA1c 5.3  · Not on therapy at home  · SSI and POCT accuchecks with diabetic diet    Gastroesophageal reflux disease with esophagitis  · Chronic and stable  · Not on therapy        VTE Risk Mitigation (From admission, onward)        Ordered     enoxaparin injection 40 mg  Daily      05/10/19 1131     IP VTE HIGH RISK PATIENT  Once      05/09/19 1408     Place sequential compression device  Until discontinued      05/09/19 1335          Dispo - Placement post surgery      Charlette Martinez MD  Department of Hospital Medicine   [facility]

## 2019-05-11 NOTE — ASSESSMENT & PLAN NOTE
· XR with right hip fracture  · Dr. Christianson  consulted and has requested we hold plavix  · Plan for sx on Monday 5/13  · Intermediate risk with intermediate risk sx  · Pain control with prn oxycodone and iv morphine for pain  · Heparin changed to lovenox as patient c/o prior headache with heparin.

## 2019-05-12 LAB
POCT GLUCOSE: 105 MG/DL (ref 70–110)
POCT GLUCOSE: 153 MG/DL (ref 70–110)

## 2019-05-12 PROCEDURE — 94799 UNLISTED PULMONARY SVC/PX: CPT

## 2019-05-12 PROCEDURE — 63600175 PHARM REV CODE 636 W HCPCS: Performed by: HOSPITALIST

## 2019-05-12 PROCEDURE — 11000001 HC ACUTE MED/SURG PRIVATE ROOM

## 2019-05-12 PROCEDURE — 27000221 HC OXYGEN, UP TO 24 HOURS

## 2019-05-12 PROCEDURE — 25000003 PHARM REV CODE 250: Performed by: HOSPITALIST

## 2019-05-12 PROCEDURE — 99231 PR SUBSEQUENT HOSPITAL CARE,LEVL I: ICD-10-PCS | Mod: ,,, | Performed by: HOSPITALIST

## 2019-05-12 PROCEDURE — 25000003 PHARM REV CODE 250: Performed by: INTERNAL MEDICINE

## 2019-05-12 PROCEDURE — 99231 SBSQ HOSP IP/OBS SF/LOW 25: CPT | Mod: ,,, | Performed by: HOSPITALIST

## 2019-05-12 PROCEDURE — 94761 N-INVAS EAR/PLS OXIMETRY MLT: CPT

## 2019-05-12 RX ADMIN — MORPHINE SULFATE 8 MG: 4 INJECTION INTRAVENOUS at 04:05

## 2019-05-12 RX ADMIN — AMLODIPINE BESYLATE 5 MG: 5 TABLET ORAL at 08:05

## 2019-05-12 RX ADMIN — ATORVASTATIN CALCIUM 40 MG: 20 TABLET, FILM COATED ORAL at 08:05

## 2019-05-12 RX ADMIN — CARVEDILOL 6.25 MG: 6.25 TABLET, FILM COATED ORAL at 06:05

## 2019-05-12 RX ADMIN — MORPHINE SULFATE 8 MG: 4 INJECTION INTRAVENOUS at 11:05

## 2019-05-12 RX ADMIN — OXYCODONE HYDROCHLORIDE 5 MG: 5 TABLET ORAL at 01:05

## 2019-05-12 RX ADMIN — SPIRONOLACTONE 25 MG: 25 TABLET ORAL at 08:05

## 2019-05-12 RX ADMIN — ALLOPURINOL 300 MG: 100 TABLET ORAL at 08:05

## 2019-05-12 RX ADMIN — FOLIC ACID 1 MG: 1 TABLET ORAL at 08:05

## 2019-05-12 RX ADMIN — OXYCODONE HYDROCHLORIDE 5 MG: 5 TABLET ORAL at 08:05

## 2019-05-12 RX ADMIN — ACETAMINOPHEN 650 MG: 325 TABLET ORAL at 03:05

## 2019-05-12 RX ADMIN — CLONAZEPAM 0.5 MG: 0.5 TABLET ORAL at 08:05

## 2019-05-12 RX ADMIN — TELMISARTAN 40 MG: 40 TABLET ORAL at 08:05

## 2019-05-12 RX ADMIN — CARVEDILOL 6.25 MG: 6.25 TABLET, FILM COATED ORAL at 08:05

## 2019-05-12 RX ADMIN — AMIODARONE HYDROCHLORIDE 100 MG: 100 TABLET ORAL at 08:05

## 2019-05-12 RX ADMIN — OXYCODONE HYDROCHLORIDE 5 MG: 5 TABLET ORAL at 03:05

## 2019-05-12 RX ADMIN — MORPHINE SULFATE 8 MG: 4 INJECTION INTRAVENOUS at 06:05

## 2019-05-12 NOTE — PROGRESS NOTES
[facility] Hospital Medicine  Progress Note    Patient Name: Anahy Evans  MRN: [id number]  Patient Class: IP- Inpatient   Admission Date: 5/9/2019  Length of Stay: 3 days  Attending Physician: Charlette Martinez MD  Primary Care Provider: Ryan Lopez MD        Subjective:     Principal Problem:Closed fracture of right hip    HPI:  Mrs. Evans is an 85 year old woman with history of coronary artery disease, hypertension, hyperlipidemia, gout, AAA and CKDIII who came in today for evaluation of fall and right hip pain.  She lives independently and tripped on some boxes in her home this morning.  She fell to the floor and landed on her right hip.  Subsequently she had such severe pain she could not get up and walk.  She had no chest pain, shortness of breath, loss of consciousness, nausea, vomiting or head trauma.  At present she has pain with any movement of her right leg.  Otherwise her only complaints are of a cough for three days that is productive of white sputum in the mornings and that it is very cold in her room.  Her daughter is at bedside and complements the history as well.    Hospital Course:  Mrs. Evans is an 85 year old woman with history of coronary artery disease, hypertension, hyperlipidemia, gout, AAA and CKDIII who came in for evaluation of fall and right hip pain who was found to have closed right femoral neck fracture.  Dr. Christianson is consulted and has requested we hold plavix.  Plan for surgery 5/13.    Interval History: Reports much improved pain control today.  Otherwise no complaints.  NPO at 12 for surgery tomorrow.    Review of Systems   Constitutional: Negative for chills and fever.   HENT: Negative for trouble swallowing.    Respiratory: Negative for cough and shortness of breath.    Cardiovascular: Negative for chest pain.   Gastrointestinal: Negative for abdominal pain, blood in stool, nausea and vomiting.   Genitourinary: Negative for dysuria and hematuria.    Musculoskeletal: Positive for arthralgias and gait problem.   Skin: Negative for rash.   Neurological: Negative for numbness and headaches.   Psychiatric/Behavioral: Negative for confusion.     Objective:     Vital Signs (Most Recent):  Temp: 98.4 °F (36.9 °C) (05/12/19 0714)  Pulse: 61 (05/12/19 1200)  Resp: 15 (05/12/19 0714)  BP: 125/60 (05/12/19 0714)  SpO2: (!) 94 % (05/12/19 0721) Vital Signs (24h Range):  Temp:  [98.4 °F (36.9 °C)-99.7 °F (37.6 °C)] 98.4 °F (36.9 °C)  Pulse:  [56-71] 61  Resp:  [15-16] 15  SpO2:  [89 %-95 %] 94 %  BP: (106-172)/(56-77) 125/60     Weight: 86.3 kg (190 lb 4.1 oz)  Body mass index is 29.8 kg/m².    Intake/Output Summary (Last 24 hours) at 5/12/2019 1310  Last data filed at 5/12/2019 0700  Gross per 24 hour   Intake 760 ml   Output 950 ml   Net -190 ml      Physical Exam   Constitutional: She is oriented to person, place, and time. She appears well-developed and well-nourished. No distress.   HENT:   Head: Normocephalic and atraumatic.   Eyes: Pupils are equal, round, and reactive to light. EOM are normal.   Neck: Normal range of motion. Neck supple.   Cardiovascular: Normal rate and regular rhythm.   Pulmonary/Chest: Effort normal and breath sounds normal. No respiratory distress.   Abdominal: Soft. Bowel sounds are normal. She exhibits no distension. There is no tenderness.   Musculoskeletal: She exhibits deformity (Shortened and externally rotated right leg).   Decreased rom right leg   Neurological: She is alert and oriented to person, place, and time. No cranial nerve deficit.   Skin: Skin is warm. She is not diaphoretic.   Psychiatric: She has a normal mood and affect.   Vitals reviewed.      Significant Labs:   BMP:   No results for input(s): GLU, NA, K, CL, CO2, BUN, CREATININE, CALCIUM, MG in the last 48 hours.  CBC:   No results for input(s): WBC, HGB, HCT, PLT in the last 48 hours.    Significant Imaging: I have reviewed all pertinent imaging results/findings within  the past 24 hours.    Assessment/Plan:      * Closed fracture of right hip  · XR with right hip fracture  · Dr. Christianson  consulted and has requested we hold plavix  · Plan for sx on Monday 5/13  · Intermediate risk with intermediate risk sx  · Pain control with prn oxycodone and iv morphine for pain  · Heparin changed to lovenox as patient c/o prior headache with heparin.    Coronary artery disease involving native coronary artery  · Stent placed in July 2017 by Dr. Canela  · No chest pain or sob at this time  · Continue Coreg 6.25mg PO BID  · Continue Lipitor 40mg PO daily  · Has been on plavix since that time, but must hold now for surgery  · Monitor on telemetry      Pure hypercholesterolemia  · Chronic and stable  · Continue Lipitor 40mg PO daily    Old myocardial infarction  · Per CAD      Essential hypertension  · BP rather elevated due to pain  · Continue Norvasc 5mg PO daily  · Continue Aldactone 25mg PO daily  · Continue Telmisartan 40mg PO daily    PAF (paroxysmal atrial fibrillation)  · Follows with Dr. Gay  · In NSR presently  · Continue Amiodarone 100mg PO daily  · Continue Coreg 6.25mg PO BID  · Monitor on telemetry  · Not on anticoagulation per se at home.      Chronic kidney disease, stage III (moderate)  · Follows with Dr. Valerio  · Creatinine back to baseline - Monitor    Type 2 diabetes mellitus with diabetic chronic kidney disease  · HbA1c 5.3  · Not on therapy at home  · SSI and POCT accuchecks with diabetic diet    Gastroesophageal reflux disease with esophagitis  · Chronic and stable  · Not on therapy        VTE Risk Mitigation (From admission, onward)        Ordered     enoxaparin injection 40 mg  Daily      05/10/19 1131     IP VTE HIGH RISK PATIENT  Once      05/09/19 1408     Place sequential compression device  Until discontinued      05/09/19 1335          Dispo: Placement after surgery      Charlette Martinez MD  Department of Hospital Medicine   [facility]

## 2019-05-12 NOTE — PLAN OF CARE
Problem: Adult Inpatient Plan of Care  Goal: Plan of Care Review  Pt medicated for pain PRN today c good relief. Remains on bedrest due to rt hip fracture. Repositioned today frequently. Baker catheter patent c good output noted. O2 on Prn for sats <92% on room  air. IS also given to pt to use. Rt hip bruising noted appears to be not increasing . Dr. Christianson here to see pt and consents signed for surgery on rt hip in am. NPO p MN explained to pt. Appetite good today. Remains alert and oriented c no distress.  IV site patent. Safety precaution maintained. Plan of care reviewed c pt.

## 2019-05-12 NOTE — SUBJECTIVE & OBJECTIVE
Interval History: Reports much improved pain control today.  Otherwise no complaints.  NPO at 12 for surgery tomorrow.    Review of Systems   Constitutional: Negative for chills and fever.   HENT: Negative for trouble swallowing.    Respiratory: Negative for cough and shortness of breath.    Cardiovascular: Negative for chest pain.   Gastrointestinal: Negative for abdominal pain, blood in stool, nausea and vomiting.   Genitourinary: Negative for dysuria and hematuria.   Musculoskeletal: Positive for arthralgias and gait problem.   Skin: Negative for rash.   Neurological: Negative for numbness and headaches.   Psychiatric/Behavioral: Negative for confusion.     Objective:     Vital Signs (Most Recent):  Temp: 98.4 °F (36.9 °C) (05/12/19 0714)  Pulse: 61 (05/12/19 1200)  Resp: 15 (05/12/19 0714)  BP: 125/60 (05/12/19 0714)  SpO2: (!) 94 % (05/12/19 0721) Vital Signs (24h Range):  Temp:  [98.4 °F (36.9 °C)-99.7 °F (37.6 °C)] 98.4 °F (36.9 °C)  Pulse:  [56-71] 61  Resp:  [15-16] 15  SpO2:  [89 %-95 %] 94 %  BP: (106-172)/(56-77) 125/60     Weight: 86.3 kg (190 lb 4.1 oz)  Body mass index is 29.8 kg/m².    Intake/Output Summary (Last 24 hours) at 5/12/2019 1310  Last data filed at 5/12/2019 0700  Gross per 24 hour   Intake 760 ml   Output 950 ml   Net -190 ml      Physical Exam   Constitutional: She is oriented to person, place, and time. She appears well-developed and well-nourished. No distress.   HENT:   Head: Normocephalic and atraumatic.   Eyes: Pupils are equal, round, and reactive to light. EOM are normal.   Neck: Normal range of motion. Neck supple.   Cardiovascular: Normal rate and regular rhythm.   Pulmonary/Chest: Effort normal and breath sounds normal. No respiratory distress.   Abdominal: Soft. Bowel sounds are normal. She exhibits no distension. There is no tenderness.   Musculoskeletal: She exhibits deformity (Shortened and externally rotated right leg).   Decreased rom right leg   Neurological: She is  alert and oriented to person, place, and time. No cranial nerve deficit.   Skin: Skin is warm. She is not diaphoretic.   Psychiatric: She has a normal mood and affect.   Vitals reviewed.      Significant Labs:   BMP:   No results for input(s): GLU, NA, K, CL, CO2, BUN, CREATININE, CALCIUM, MG in the last 48 hours.  CBC:   No results for input(s): WBC, HGB, HCT, PLT in the last 48 hours.    Significant Imaging: I have reviewed all pertinent imaging results/findings within the past 24 hours.

## 2019-05-12 NOTE — PROGRESS NOTES
[facility]  Orthopedics  Progress Note    Patient Name: Anahy Evans  MRN: [id number]  Admission Date: 5/9/2019  Hospital Length of Stay: 3 days  Attending Provider: Charlette Martinez MD  Primary Care Provider: Ryan Lopez MD  Follow-up For: Procedure(s) (LRB):  HEMIARTHROPLASTY - HIP FRACTURE (Right)    Post-Operative Day:    Subjective:     Principal Problem:Closed fracture of right hip    Interval History:  Pain well-controlled ready for surgery tomorrow    Review of patient's allergies indicates:   Allergen Reactions    Clindamycin Anaphylaxis    Penicillins Rash       Current Facility-Administered Medications   Medication    acetaminophen tablet 650 mg    acetaminophen tablet 650 mg    allopurinol tablet 300 mg    amiodarone tablet 100 mg    amLODIPine tablet 5 mg    atorvastatin tablet 40 mg    benzonatate capsule 100 mg    carvedilol tablet 6.25 mg    clonazePAM tablet 0.5 mg    dextrose 50% injection 12.5 g    dextrose 50% injection 25 g    enoxaparin injection 40 mg    folic acid tablet 1 mg    glucagon (human recombinant) injection 1 mg    glucose chewable tablet 16 g    glucose chewable tablet 24 g    hydrALAZINE injection 15 mg    insulin aspart U-100 pen 0-5 Units    morphine injection 8 mg    nitroGLYCERIN SL tablet 0.4 mg    ondansetron disintegrating tablet 8 mg    oxyCODONE immediate release tablet 5 mg    sodium chloride 0.9% flush 10 mL    sodium chloride 0.9% flush 10 mL    spironolactone tablet 25 mg    telmisartan tablet 40 mg     Objective:     Vital Signs (Most Recent):  Temp: 99.2 °F (37.3 °C) (05/12/19 1313)  Pulse: 64 (05/12/19 1400)  Resp: 18 (05/12/19 1313)  BP: (!) 121/56 (05/12/19 1313)  SpO2: 97 % (05/12/19 1313) Vital Signs (24h Range):  Temp:  [98.4 °F (36.9 °C)-99.7 °F (37.6 °C)] 99.2 °F (37.3 °C)  Pulse:  [61-71] 64  Resp:  [15-18] 18  SpO2:  [89 %-97 %] 97 %  BP: (106-172)/(56-77) 121/56     Weight: 86.3 kg (190 lb 4.1  "oz)  Height: 5' 7" (170.2 cm)  Body mass index is 29.8 kg/m².      Intake/Output Summary (Last 24 hours) at 5/12/2019 1429  Last data filed at 5/12/2019 1300  Gross per 24 hour   Intake 760 ml   Output 1300 ml   Net -540 ml       Ortho/SPM Exam  Right hip:  Shortened/externally rotated.  2+ DP pulse.  5/5 EHL    Significant Labs: CBC: No results for input(s): WBC, HGB, HCT, PLT in the last 48 hours.  All pertinent labs within the past 24 hours have been reviewed.    Significant Imaging: None    Assessment/Plan:     Active Diagnoses:    Diagnosis Date Noted POA    PRINCIPAL PROBLEM:  Closed fracture of right hip [S72.001A] 05/09/2019 Yes    Coronary artery disease involving native coronary artery [I25.10] 05/09/2019 Yes    Type 2 diabetes mellitus with diabetic chronic kidney disease [E11.22] 01/10/2019 Yes    Chronic kidney disease, stage III (moderate) [N18.3] 06/29/2017 Yes    Essential hypertension [I10] 11/10/2016 Yes    Pure hypercholesterolemia [E78.00] 10/21/2014 Yes    PAF (paroxysmal atrial fibrillation) [I48.0] 07/05/2013 Yes    Gastroesophageal reflux disease with esophagitis [K21.0] 09/14/2012 Yes    Old myocardial infarction [I25.2] 06/06/2012 Not Applicable      Problems Resolved During this Admission:    Diagnosis Date Noted Date Resolved POA    Cough [R05] 05/09/2019 05/11/2019 Yes     Surgery tomorrow. NPO after midnight. Hold all anticoagulants    Isauro Germain MD  Orthopedics  [facility]  "

## 2019-05-12 NOTE — PLAN OF CARE
Problem: Adult Inpatient Plan of Care  Goal: Plan of Care Review  Outcome: Ongoing (interventions implemented as appropriate)  Pt received on room air with saturation 91-92%. Incentive spirometer initiated. Pt verbalized and demonstrated understanding achieved 1000cc.

## 2019-05-12 NOTE — PLAN OF CARE
Problem: Adult Inpatient Plan of Care  Goal: Plan of Care Review  Outcome: Ongoing (interventions implemented as appropriate)  Pt remained free from falls or injuries this shift. Pt turned q2hrs. No skin breakdown noticed. Pt rested well through the night. Pain controlled w prn meds

## 2019-05-13 ENCOUNTER — ANESTHESIA (OUTPATIENT)
Dept: SURGERY | Facility: OTHER | Age: 84
DRG: 470 | End: 2019-05-13
Payer: MEDICARE

## 2019-05-13 PROBLEM — N17.9 AKI (ACUTE KIDNEY INJURY): Status: ACTIVE | Noted: 2019-05-13

## 2019-05-13 PROBLEM — R73.03 PREDIABETES: Status: ACTIVE | Noted: 2019-01-10

## 2019-05-13 PROBLEM — N18.30 ACUTE RENAL FAILURE SUPERIMPOSED ON STAGE 3 CHRONIC KIDNEY DISEASE: Status: ACTIVE | Noted: 2019-05-13

## 2019-05-13 LAB
ALBUMIN SERPL BCP-MCNC: 3 G/DL (ref 3.5–5.2)
ALP SERPL-CCNC: 92 U/L (ref 55–135)
ALT SERPL W/O P-5'-P-CCNC: 37 U/L (ref 10–44)
ANION GAP SERPL CALC-SCNC: 5 MMOL/L (ref 8–16)
AST SERPL-CCNC: 37 U/L (ref 10–40)
BASOPHILS # BLD AUTO: 0.02 K/UL (ref 0–0.2)
BASOPHILS NFR BLD: 0.2 % (ref 0–1.9)
BILIRUB SERPL-MCNC: 0.9 MG/DL (ref 0.1–1)
BUN SERPL-MCNC: 46 MG/DL (ref 8–23)
CALCIUM SERPL-MCNC: 9.7 MG/DL (ref 8.7–10.5)
CHLORIDE SERPL-SCNC: 95 MMOL/L (ref 95–110)
CO2 SERPL-SCNC: 28 MMOL/L (ref 23–29)
CREAT SERPL-MCNC: 2.4 MG/DL (ref 0.5–1.4)
DIFFERENTIAL METHOD: ABNORMAL
EOSINOPHIL # BLD AUTO: 0.1 K/UL (ref 0–0.5)
EOSINOPHIL NFR BLD: 0.8 % (ref 0–8)
ERYTHROCYTE [DISTWIDTH] IN BLOOD BY AUTOMATED COUNT: 13.8 % (ref 11.5–14.5)
EST. GFR  (AFRICAN AMERICAN): 21 ML/MIN/1.73 M^2
EST. GFR  (NON AFRICAN AMERICAN): 18 ML/MIN/1.73 M^2
GLUCOSE SERPL-MCNC: 107 MG/DL (ref 70–110)
HCT VFR BLD AUTO: 37.4 % (ref 37–48.5)
HGB BLD-MCNC: 12.4 G/DL (ref 12–16)
INR PPP: 0.9 (ref 0.8–1.2)
LYMPHOCYTES # BLD AUTO: 1.5 K/UL (ref 1–4.8)
LYMPHOCYTES NFR BLD: 16 % (ref 18–48)
MCH RBC QN AUTO: 30 PG (ref 27–31)
MCHC RBC AUTO-ENTMCNC: 33.2 G/DL (ref 32–36)
MCV RBC AUTO: 90 FL (ref 82–98)
MONOCYTES # BLD AUTO: 1.9 K/UL (ref 0.3–1)
MONOCYTES NFR BLD: 20.7 % (ref 4–15)
NEUTROPHILS # BLD AUTO: 5.7 K/UL (ref 1.8–7.7)
NEUTROPHILS NFR BLD: 62 % (ref 38–73)
PLATELET # BLD AUTO: 167 K/UL (ref 150–350)
PMV BLD AUTO: 11.5 FL (ref 9.2–12.9)
POTASSIUM SERPL-SCNC: 5.1 MMOL/L (ref 3.5–5.1)
PROT SERPL-MCNC: 7.4 G/DL (ref 6–8.4)
PROTHROMBIN TIME: 10 SEC (ref 9–12.5)
RBC # BLD AUTO: 4.14 M/UL (ref 4–5.4)
SODIUM SERPL-SCNC: 128 MMOL/L (ref 136–145)
WBC # BLD AUTO: 9.12 K/UL (ref 3.9–12.7)

## 2019-05-13 PROCEDURE — 37000009 HC ANESTHESIA EA ADD 15 MINS: Performed by: ORTHOPAEDIC SURGERY

## 2019-05-13 PROCEDURE — 71000033 HC RECOVERY, INTIAL HOUR: Performed by: ORTHOPAEDIC SURGERY

## 2019-05-13 PROCEDURE — 36000710: Performed by: ORTHOPAEDIC SURGERY

## 2019-05-13 PROCEDURE — 63600175 PHARM REV CODE 636 W HCPCS: Performed by: HOSPITALIST

## 2019-05-13 PROCEDURE — 25000003 PHARM REV CODE 250: Performed by: NURSE ANESTHETIST, CERTIFIED REGISTERED

## 2019-05-13 PROCEDURE — 85025 COMPLETE CBC W/AUTO DIFF WBC: CPT

## 2019-05-13 PROCEDURE — 94799 UNLISTED PULMONARY SVC/PX: CPT

## 2019-05-13 PROCEDURE — 99232 PR SUBSEQUENT HOSPITAL CARE,LEVL II: ICD-10-PCS | Mod: ,,, | Performed by: HOSPITALIST

## 2019-05-13 PROCEDURE — 63600175 PHARM REV CODE 636 W HCPCS

## 2019-05-13 PROCEDURE — 36000711: Performed by: ORTHOPAEDIC SURGERY

## 2019-05-13 PROCEDURE — 94761 N-INVAS EAR/PLS OXIMETRY MLT: CPT

## 2019-05-13 PROCEDURE — 85610 PROTHROMBIN TIME: CPT

## 2019-05-13 PROCEDURE — 63600175 PHARM REV CODE 636 W HCPCS: Performed by: ORTHOPAEDIC SURGERY

## 2019-05-13 PROCEDURE — 99232 SBSQ HOSP IP/OBS MODERATE 35: CPT | Mod: ,,, | Performed by: HOSPITALIST

## 2019-05-13 PROCEDURE — 71000039 HC RECOVERY, EACH ADD'L HOUR: Performed by: ORTHOPAEDIC SURGERY

## 2019-05-13 PROCEDURE — 11000001 HC ACUTE MED/SURG PRIVATE ROOM

## 2019-05-13 PROCEDURE — 37000008 HC ANESTHESIA 1ST 15 MINUTES: Performed by: ORTHOPAEDIC SURGERY

## 2019-05-13 PROCEDURE — 63600175 PHARM REV CODE 636 W HCPCS: Performed by: SPECIALIST

## 2019-05-13 PROCEDURE — 99900035 HC TECH TIME PER 15 MIN (STAT)

## 2019-05-13 PROCEDURE — 25000003 PHARM REV CODE 250

## 2019-05-13 PROCEDURE — C1776 JOINT DEVICE (IMPLANTABLE): HCPCS | Performed by: ORTHOPAEDIC SURGERY

## 2019-05-13 PROCEDURE — 63600175 PHARM REV CODE 636 W HCPCS: Performed by: NURSE ANESTHETIST, CERTIFIED REGISTERED

## 2019-05-13 PROCEDURE — 27000221 HC OXYGEN, UP TO 24 HOURS

## 2019-05-13 PROCEDURE — C9290 INJ, BUPIVACAINE LIPOSOME: HCPCS | Performed by: ORTHOPAEDIC SURGERY

## 2019-05-13 PROCEDURE — 36415 COLL VENOUS BLD VENIPUNCTURE: CPT

## 2019-05-13 PROCEDURE — 27201423 OPTIME MED/SURG SUP & DEVICES STERILE SUPPLY: Performed by: ORTHOPAEDIC SURGERY

## 2019-05-13 PROCEDURE — 80053 COMPREHEN METABOLIC PANEL: CPT

## 2019-05-13 PROCEDURE — 25000003 PHARM REV CODE 250: Performed by: INTERNAL MEDICINE

## 2019-05-13 PROCEDURE — 25000003 PHARM REV CODE 250: Performed by: HOSPITALIST

## 2019-05-13 PROCEDURE — 25000003 PHARM REV CODE 250: Performed by: ORTHOPAEDIC SURGERY

## 2019-05-13 DEVICE — IMPLANTABLE DEVICE: Type: IMPLANTABLE DEVICE | Site: HIP | Status: FUNCTIONAL

## 2019-05-13 RX ORDER — HYDROMORPHONE HYDROCHLORIDE 1 MG/ML
0.5 INJECTION, SOLUTION INTRAMUSCULAR; INTRAVENOUS; SUBCUTANEOUS EVERY 4 HOURS PRN
Status: DISPENSED | OUTPATIENT
Start: 2019-05-13 | End: 2019-05-14

## 2019-05-13 RX ORDER — PROPOFOL 10 MG/ML
VIAL (ML) INTRAVENOUS CONTINUOUS PRN
Status: DISCONTINUED | OUTPATIENT
Start: 2019-05-13 | End: 2019-05-13

## 2019-05-13 RX ORDER — MEPERIDINE HYDROCHLORIDE 25 MG/ML
12.5 INJECTION INTRAMUSCULAR; INTRAVENOUS; SUBCUTANEOUS ONCE AS NEEDED
Status: DISCONTINUED | OUTPATIENT
Start: 2019-05-13 | End: 2019-05-13

## 2019-05-13 RX ORDER — POLYETHYLENE GLYCOL 3350 17 G/17G
17 POWDER, FOR SOLUTION ORAL DAILY
Status: DISCONTINUED | OUTPATIENT
Start: 2019-05-14 | End: 2019-05-16 | Stop reason: HOSPADM

## 2019-05-13 RX ORDER — BISACODYL 10 MG
10 SUPPOSITORY, RECTAL RECTAL DAILY PRN
Status: DISCONTINUED | OUTPATIENT
Start: 2019-05-13 | End: 2019-05-16 | Stop reason: HOSPADM

## 2019-05-13 RX ORDER — EPHEDRINE SULFATE 50 MG/ML
INJECTION, SOLUTION INTRAVENOUS
Status: DISCONTINUED | OUTPATIENT
Start: 2019-05-13 | End: 2019-05-13

## 2019-05-13 RX ORDER — BACITRACIN 50000 [IU]/1
INJECTION, POWDER, FOR SOLUTION INTRAMUSCULAR
Status: DISCONTINUED | OUTPATIENT
Start: 2019-05-13 | End: 2019-05-13 | Stop reason: HOSPADM

## 2019-05-13 RX ORDER — ASPIRIN 81 MG/1
81 TABLET ORAL DAILY
Status: DISCONTINUED | OUTPATIENT
Start: 2019-05-14 | End: 2019-05-16 | Stop reason: HOSPADM

## 2019-05-13 RX ORDER — BUPIVACAINE HYDROCHLORIDE AND EPINEPHRINE 2.5; 5 MG/ML; UG/ML
INJECTION, SOLUTION EPIDURAL; INFILTRATION; INTRACAUDAL; PERINEURAL
Status: DISCONTINUED | OUTPATIENT
Start: 2019-05-13 | End: 2019-05-13 | Stop reason: HOSPADM

## 2019-05-13 RX ORDER — SODIUM CHLORIDE, SODIUM LACTATE, POTASSIUM CHLORIDE, CALCIUM CHLORIDE 600; 310; 30; 20 MG/100ML; MG/100ML; MG/100ML; MG/100ML
INJECTION, SOLUTION INTRAVENOUS CONTINUOUS PRN
Status: DISCONTINUED | OUTPATIENT
Start: 2019-05-13 | End: 2019-05-13

## 2019-05-13 RX ORDER — ACETAMINOPHEN 500 MG
1000 TABLET ORAL EVERY 8 HOURS
Status: COMPLETED | OUTPATIENT
Start: 2019-05-13 | End: 2019-05-14

## 2019-05-13 RX ORDER — DEXTROSE MONOHYDRATE AND SODIUM CHLORIDE 5; .9 G/100ML; G/100ML
INJECTION, SOLUTION INTRAVENOUS CONTINUOUS
Status: DISCONTINUED | OUTPATIENT
Start: 2019-05-13 | End: 2019-05-14

## 2019-05-13 RX ORDER — SODIUM CHLORIDE 0.9 % (FLUSH) 0.9 %
3 SYRINGE (ML) INJECTION
Status: DISCONTINUED | OUTPATIENT
Start: 2019-05-13 | End: 2019-05-13

## 2019-05-13 RX ORDER — DOCUSATE SODIUM 100 MG/1
100 CAPSULE, LIQUID FILLED ORAL EVERY 12 HOURS
Status: DISCONTINUED | OUTPATIENT
Start: 2019-05-13 | End: 2019-05-16 | Stop reason: HOSPADM

## 2019-05-13 RX ORDER — DIPHENHYDRAMINE HYDROCHLORIDE 50 MG/ML
25 INJECTION INTRAMUSCULAR; INTRAVENOUS EVERY 6 HOURS PRN
Status: DISCONTINUED | OUTPATIENT
Start: 2019-05-13 | End: 2019-05-13

## 2019-05-13 RX ORDER — FENTANYL CITRATE 50 UG/ML
INJECTION, SOLUTION INTRAMUSCULAR; INTRAVENOUS
Status: DISCONTINUED | OUTPATIENT
Start: 2019-05-13 | End: 2019-05-13

## 2019-05-13 RX ORDER — MUPIROCIN 20 MG/G
1 OINTMENT TOPICAL 2 TIMES DAILY
Status: DISCONTINUED | OUTPATIENT
Start: 2019-05-13 | End: 2019-05-16 | Stop reason: HOSPADM

## 2019-05-13 RX ORDER — CELECOXIB 200 MG/1
200 CAPSULE ORAL 2 TIMES DAILY
Status: DISCONTINUED | OUTPATIENT
Start: 2019-05-13 | End: 2019-05-14

## 2019-05-13 RX ORDER — OXYCODONE HYDROCHLORIDE 5 MG/1
5 TABLET ORAL
Status: DISCONTINUED | OUTPATIENT
Start: 2019-05-13 | End: 2019-05-13

## 2019-05-13 RX ORDER — PHENYLEPHRINE HYDROCHLORIDE 10 MG/ML
INJECTION INTRAVENOUS
Status: DISCONTINUED | OUTPATIENT
Start: 2019-05-13 | End: 2019-05-13

## 2019-05-13 RX ORDER — CLOPIDOGREL BISULFATE 75 MG/1
75 TABLET ORAL DAILY
Status: DISCONTINUED | OUTPATIENT
Start: 2019-05-14 | End: 2019-05-16 | Stop reason: HOSPADM

## 2019-05-13 RX ORDER — OXYCODONE HYDROCHLORIDE 5 MG/1
5 TABLET ORAL EVERY 4 HOURS PRN
Status: DISCONTINUED | OUTPATIENT
Start: 2019-05-13 | End: 2019-05-16 | Stop reason: HOSPADM

## 2019-05-13 RX ORDER — ONDANSETRON 2 MG/ML
4 INJECTION INTRAMUSCULAR; INTRAVENOUS DAILY PRN
Status: DISCONTINUED | OUTPATIENT
Start: 2019-05-13 | End: 2019-05-13

## 2019-05-13 RX ORDER — ROPIVACAINE HYDROCHLORIDE 5 MG/ML
INJECTION, SOLUTION EPIDURAL; INFILTRATION; PERINEURAL
Status: COMPLETED | OUTPATIENT
Start: 2019-05-13 | End: 2019-05-13

## 2019-05-13 RX ORDER — KETOROLAC TROMETHAMINE 30 MG/ML
INJECTION, SOLUTION INTRAMUSCULAR; INTRAVENOUS
Status: DISCONTINUED | OUTPATIENT
Start: 2019-05-13 | End: 2019-05-13 | Stop reason: HOSPADM

## 2019-05-13 RX ORDER — FAMOTIDINE 20 MG/1
20 TABLET, FILM COATED ORAL DAILY
Status: DISCONTINUED | OUTPATIENT
Start: 2019-05-14 | End: 2019-05-16 | Stop reason: HOSPADM

## 2019-05-13 RX ORDER — SODIUM CHLORIDE 0.9 % (FLUSH) 0.9 %
10 SYRINGE (ML) INJECTION
Status: DISCONTINUED | OUTPATIENT
Start: 2019-05-13 | End: 2019-05-16 | Stop reason: HOSPADM

## 2019-05-13 RX ORDER — CEFAZOLIN SODIUM 2 G/50ML
2 SOLUTION INTRAVENOUS
Status: DISCONTINUED | OUTPATIENT
Start: 2019-05-13 | End: 2019-05-13

## 2019-05-13 RX ORDER — SODIUM CHLORIDE 9 MG/ML
INJECTION, SOLUTION INTRAVENOUS CONTINUOUS
Status: DISCONTINUED | OUTPATIENT
Start: 2019-05-13 | End: 2019-05-13

## 2019-05-13 RX ORDER — TRANEXAMIC ACID 100 MG/ML
INJECTION, SOLUTION INTRAVENOUS
Status: DISCONTINUED | OUTPATIENT
Start: 2019-05-13 | End: 2019-05-13 | Stop reason: HOSPADM

## 2019-05-13 RX ORDER — ONDANSETRON 2 MG/ML
8 INJECTION INTRAMUSCULAR; INTRAVENOUS EVERY 8 HOURS PRN
Status: DISCONTINUED | OUTPATIENT
Start: 2019-05-13 | End: 2019-05-16 | Stop reason: HOSPADM

## 2019-05-13 RX ORDER — HYDROMORPHONE HYDROCHLORIDE 2 MG/ML
0.4 INJECTION, SOLUTION INTRAMUSCULAR; INTRAVENOUS; SUBCUTANEOUS EVERY 5 MIN PRN
Status: DISCONTINUED | OUTPATIENT
Start: 2019-05-13 | End: 2019-05-13

## 2019-05-13 RX ADMIN — CARVEDILOL 6.25 MG: 6.25 TABLET, FILM COATED ORAL at 05:05

## 2019-05-13 RX ADMIN — AMLODIPINE BESYLATE 5 MG: 5 TABLET ORAL at 09:05

## 2019-05-13 RX ADMIN — FOLIC ACID 1 MG: 1 TABLET ORAL at 09:05

## 2019-05-13 RX ADMIN — EPHEDRINE SULFATE 5 MG: 50 INJECTION INTRAMUSCULAR; INTRAVENOUS; SUBCUTANEOUS at 02:05

## 2019-05-13 RX ADMIN — EPHEDRINE SULFATE 10 MG: 50 INJECTION INTRAMUSCULAR; INTRAVENOUS; SUBCUTANEOUS at 02:05

## 2019-05-13 RX ADMIN — EPHEDRINE SULFATE 5 MG: 50 INJECTION INTRAMUSCULAR; INTRAVENOUS; SUBCUTANEOUS at 01:05

## 2019-05-13 RX ADMIN — ACETAMINOPHEN 1000 MG: 500 TABLET ORAL at 10:05

## 2019-05-13 RX ADMIN — DEXTROSE AND SODIUM CHLORIDE: 5; .9 INJECTION, SOLUTION INTRAVENOUS at 05:05

## 2019-05-13 RX ADMIN — ROPIVACAINE HYDROCHLORIDE 3 ML: 5 INJECTION, SOLUTION EPIDURAL; INFILTRATION; PERINEURAL at 01:05

## 2019-05-13 RX ADMIN — CLONAZEPAM 0.5 MG: 0.5 TABLET ORAL at 09:05

## 2019-05-13 RX ADMIN — MORPHINE SULFATE 8 MG: 4 INJECTION INTRAVENOUS at 08:05

## 2019-05-13 RX ADMIN — CELECOXIB 200 MG: 200 CAPSULE ORAL at 09:05

## 2019-05-13 RX ADMIN — ALLOPURINOL 300 MG: 100 TABLET ORAL at 09:05

## 2019-05-13 RX ADMIN — PHENYLEPHRINE HYDROCHLORIDE 200 MCG: 10 INJECTION INTRAVENOUS at 02:05

## 2019-05-13 RX ADMIN — FENTANYL CITRATE 100 MCG: 50 INJECTION, SOLUTION INTRAMUSCULAR; INTRAVENOUS at 01:05

## 2019-05-13 RX ADMIN — OXYCODONE HYDROCHLORIDE 5 MG: 5 TABLET ORAL at 06:05

## 2019-05-13 RX ADMIN — CARVEDILOL 6.25 MG: 6.25 TABLET, FILM COATED ORAL at 09:05

## 2019-05-13 RX ADMIN — PROPOFOL 50 MCG/KG/MIN: 10 INJECTION, EMULSION INTRAVENOUS at 02:05

## 2019-05-13 RX ADMIN — VANCOMYCIN HYDROCHLORIDE 1250 MG: 100 INJECTION, POWDER, LYOPHILIZED, FOR SOLUTION INTRAVENOUS at 01:05

## 2019-05-13 RX ADMIN — DOCUSATE SODIUM 100 MG: 100 CAPSULE, LIQUID FILLED ORAL at 09:05

## 2019-05-13 RX ADMIN — MUPIROCIN 1 G: 20 OINTMENT TOPICAL at 09:05

## 2019-05-13 RX ADMIN — TELMISARTAN 40 MG: 40 TABLET ORAL at 09:05

## 2019-05-13 RX ADMIN — ATORVASTATIN CALCIUM 40 MG: 20 TABLET, FILM COATED ORAL at 09:05

## 2019-05-13 RX ADMIN — AMIODARONE HYDROCHLORIDE 100 MG: 100 TABLET ORAL at 09:05

## 2019-05-13 RX ADMIN — SODIUM CHLORIDE, SODIUM LACTATE, POTASSIUM CHLORIDE, AND CALCIUM CHLORIDE: 600; 310; 30; 20 INJECTION, SOLUTION INTRAVENOUS at 01:05

## 2019-05-13 RX ADMIN — PHENYLEPHRINE HYDROCHLORIDE 100 MCG: 10 INJECTION INTRAVENOUS at 02:05

## 2019-05-13 RX ADMIN — EPHEDRINE SULFATE 10 MG: 50 INJECTION INTRAMUSCULAR; INTRAVENOUS; SUBCUTANEOUS at 01:05

## 2019-05-13 NOTE — PLAN OF CARE
Problem: Adult Inpatient Plan of Care  Goal: Plan of Care Review  Outcome: Ongoing (interventions implemented as appropriate)  Pt on 2 lpm nasal cannula, SpO2 97%. IS done.

## 2019-05-13 NOTE — PLAN OF CARE
Pt to OR today for repair of right hip fracture.    Awaiting PT and OT recommendations for discharge disposition.      CM to continue to follow and re-eval postop.       05/13/19 1301   Discharge Reassessment   Assessment Type Discharge Planning Reassessment   Provided patient/caregiver education on the expected discharge date and the discharge plan Yes   Do you have any problems affording any of your prescribed medications? No   Discharge Plan A Rehab   Discharge Plan B Home Health

## 2019-05-13 NOTE — ASSESSMENT & PLAN NOTE
· XR with right hip fracture  · Dr. Christianson  consulted and has requested we hold plavix  · Plan for sx on Monday 5/13.  Will need new PT/OT orders post surgery  · Intermediate risk with intermediate risk sx  · Pain control with prn oxycodone and iv morphine for pain  · Heparin changed to lovenox as patient c/o prior headache with heparin.

## 2019-05-13 NOTE — ANESTHESIA PREPROCEDURE EVALUATION
05/13/2019  Anahy Evans is a 85 y.o., female.    Anesthesia Evaluation    I have reviewed the Patient Summary Reports.    I have reviewed the Nursing Notes.   I have reviewed the Medications.     Review of Systems  Anesthesia Hx:  No problems with previous Anesthesia    Social:  Former Smoker, Social Alcohol Use    Hematology/Oncology:  Hematology Normal      Oncology Comments: Cervical Cancer    EENT/Dental:EENT/Dental Normal   Cardiovascular:   Exercise tolerance: poor Hypertension, well controlled Past MI CAD asymptomatic Dysrhythmias atrial fibrillation hyperlipidemia    Pulmonary:  Pulmonary Normal    Renal/:   Chronic Renal Disease, CRI    Endocrine:   Diabetes, well controlled, type 2 Hypothyroidism Gout   Dermatological:  Skin Normal    Psych:  Psychiatric Normal           Physical Exam  General:  Well nourished    Airway/Jaw/Neck:  Airway Findings: Mouth Opening: Normal Tongue: Normal  General Airway Assessment: Adult, Good  Mallampati: I  TM Distance: Normal, at least 6 cm  Jaw/Neck Findings:  Neck ROM: Normal ROM      Dental:  Dental Findings: Upper Dentures, Lower Dentures        Mental Status:  Mental Status Findings:  Cooperative, Alert and Oriented         Anesthesia Plan  Type of Anesthesia, risks & benefits discussed:  Anesthesia Type:  spinal  Patient's Preference:   Intra-op Monitoring Plan: standard ASA monitors  Intra-op Monitoring Plan Comments:   Post Op Pain Control Plan: per primary service following discharge from PACU  Post Op Pain Control Plan Comments:   Induction:    Beta Blocker:         Informed Consent: Patient understands risks and agrees with Anesthesia plan.  Questions answered. Anesthesia consent signed with patient.  ASA Score: 3     Day of Surgery Review of History & Physical:    H&P update referred to the surgeon.         Ready For Surgery From Anesthesia  Perspective.

## 2019-05-13 NOTE — ASSESSMENT & PLAN NOTE
· Creatinine 2.4, baseline closer to 1.3  · Likely 2/2 decreased PO intake and home nephrotoxic meds (Aldactone, Telmisartan)  · Gentle IVF overnight and repeat labs in the morning

## 2019-05-13 NOTE — TRANSFER OF CARE
"Anesthesia Transfer of Care Note    Patient: Anahy Evans    Procedure(s) Performed: Procedure(s) (LRB):  HEMIARTHROPLASTY - HIP FRACTURE (Right)    Patient location: PACU    Anesthesia Type: spinal    Transport from OR: Transported from OR on 2-3 L/min O2 by NC with adequate spontaneous ventilation    Post pain: adequate analgesia    Post assessment: no apparent anesthetic complications    Post vital signs: stable    Level of consciousness: awake, alert and oriented    Nausea/Vomiting: no nausea/vomiting    Complications: none    Transfer of care protocol was followed      Last vitals:   Visit Vitals  BP (!) 107/56 (BP Location: Left arm, Patient Position: Lying)   Pulse (!) 56   Temp 36.6 °C (97.9 °F) (Oral)   Resp 16   Ht 5' 7" (1.702 m)   Wt 86.3 kg (190 lb 4.1 oz)   LMP  (LMP Unknown)   SpO2 96%   Breastfeeding? No   BMI 29.80 kg/m²     "

## 2019-05-13 NOTE — OP NOTE
[facility]  Operative Report    SUMMARY     Surgery Date: 5/13/2019     Surgeon(s) and Role:     * Pa Torres MD - Primary    Assistant: LUCILA Heard FA    Pre-op Diagnosis:  Displaced femoral neck fracture right hip    Post-op Diagnosis:  Post-Op Diagnosis Codes:     Displaced femoral neck fracture right hip    Procedure(s) (LRB):  HEMIARTHROPLASTY - HIP FRACTURE (Right) (Anna beaded full coat)    Anesthesia: General    Description of Procedure: Appropriate consent was signed. The patient understood   and accepted all risks and complications. The patient was brought to the Operating Room after undergoing spinal anesthetic. Baker catheter was placed. The patient was then placed in a lateral decubitus position on a peg board with all bony   prominences padded. Perineal drape was applied. The Operative hip and lower extremity were then prepped and draped in a sterile manner and a mini posterior approach was utilized. Dissection was taken down to fascia, which was incised and   gluteus christina muscle split in line of its fibers.The Charnley retractor was then   placed and the hip internally rotated. The external rotators and capsule were   taken off as one flap and peeled back to protect the sciatic nerve. It was   tagged with #5 Ethibond suture. . A femoral  neck osteotomy was performed in 20 mm above the lesser trochanter as   templated on preoperative x-rays. Femoral head was removed with a corkscrew and proximal femur   was exposed. The femoral head was measured to be 47 mm and a trial was performed. The proximal femur was opened up with the cookie cutter, starter reamer and   lateralizing reamer.  Gold metal reamers were utilized up to 12 mm and   broaching was performed to 12 mm using an LM broach.  Trials were performed in the +7 mm neck as well as with the extended offset was felt to be appropriate along with a 47 mm endoprosthetic head.    The 12 mm LM beaded full coat with the  extended offset was impacted in proximal femur obtaining excellent fixation.  A 47 mm cobalt chrome endoprosthetic head with a +7 mm neck was impacted on the dry trunnion and relocated brought through a full range of motion found be quite stable.      The hip was then washed out copiously with antibiotic solution through the   Pulsavac. The external rotators and capsule were reattached to trochanteric   attachment through drill holes utilizing #5 Ethibond suture. Exparel cocktail   was injected into the fascia and subcutaneous tissue. Fascia was closed with a   running #2 Quill suture. Subcutaneous closure was obtained with #1 and 2-0   Vicryl. Skin was then closed with staples and a sterile compressive dressing   was applied. The patient was placed in abduction pillow and brought to Recovery  Room in good condition.    Estimated Blood Loss:  200 cc         Specimens:   Specimen (12h ago, onward)    None

## 2019-05-13 NOTE — ASSESSMENT & PLAN NOTE
· Stent placed in July 2017 by Dr. Canela  · No chest pain or sob at this time  · Continue Coreg 6.25mg PO BID  · Continue Lipitor 40mg PO daily  · Has been on plavix since that time, but must hold now for surgery.  Need to restart post-op  · Monitor on telemetry

## 2019-05-13 NOTE — ANESTHESIA PROCEDURE NOTES
Spinal    Diagnosis: R HIP Fx  Patient location during procedure: holding area  Start time: 5/13/2019 1:44 PM  Timeout: 5/13/2019 1:44 PM  End time: 5/13/2019 1:57 PM  Staffing  Anesthesiologist: Shadi Younger MD  Performed: anesthesiologist   Preanesthetic Checklist  Completed: patient identified, site marked, surgical consent, pre-op evaluation, timeout performed, IV checked, risks and benefits discussed and monitors and equipment checked  Spinal Block  Patient position: left lateral decubitus  Prep: DuraPrep  Patient monitoring: heart rate, cardiac monitor, continuous pulse ox and frequent blood pressure checks  Approach: right paramedian  Location: L3-4  Injection technique: single shot  CSF Fluid: clear free-flowing CSF  Needle  Needle type: pencil-tip   Needle gauge: 25 G  Needle length: 3.5 in  Additional Documentation: incremental injection, negative aspiration for heme and no paresthesia on injection  Needle localization: anatomical landmarks  Assessment  Sensory level: T6   Dermatomal levels determined by alcohol wipe and pinch or prick  Ease of block: easy  Patient's tolerance of the procedure: comfortable throughout block and no complaints

## 2019-05-13 NOTE — ASSESSMENT & PLAN NOTE
· BP rather elevated due to pain  · Continue Norvasc 5mg PO daily  · Hold Aldactone and Telmisartan with MELIDA

## 2019-05-13 NOTE — PROGRESS NOTES
"Progress Note  Orthopedics    Admit Date: 5/9/2019   Patient ID: Anahy Evans is a 85 y.o. female.  Dr. Germain had asked if I could perform the surgery since he is recently postop from shoulder surgery. I have gone over the procedure with the patient as well as the risks and complications. She fully understands and accepts these.  She has a displaced femoral neck fracture of the right hip and we are going to be performing a hemiarthroplasty today.            Pa BAILEY [facility]      Vital Sign (recent):  BP (!) 107/56 (BP Location: Left arm, Patient Position: Lying)   Pulse (!) 56   Temp 97.9 °F (36.6 °C) (Oral)   Resp 16   Ht 5' 7" (1.702 m)   Wt 86.3 kg (190 lb 4.1 oz)   LMP  (LMP Unknown)   SpO2 96%   Breastfeeding? No   BMI 29.80 kg/m²       Laboratory:    CBC:   Recent Labs   Lab 05/13/19  0836   WBC 9.12   RBC 4.14   HGB 12.4   HCT 37.4      MCV 90   MCH 30.0   MCHC 33.2       CMP:   Recent Labs   Lab 05/13/19  0836      CALCIUM 9.7   ALBUMIN 3.0*   PROT 7.4   *   K 5.1   CO2 28   CL 95   BUN 46*   CREATININE 2.4*   ALKPHOS 92   ALT 37   AST 37   BILITOT 0.9           Intake/Output Summary (Last 24 hours) at 5/13/2019 1348  Last data filed at 5/13/2019 0600  Gross per 24 hour   Intake 760 ml   Output 525 ml   Net 235 ml         Current Medications:   allopurinol  300 mg Oral Daily    amiodarone  100 mg Oral Daily    amLODIPine  5 mg Oral Daily    atorvastatin  40 mg Oral Daily    carvedilol  6.25 mg Oral BID WM    ceFAZolin (ANCEF) IVPB  2 g Intravenous Once Pre-Op    clonazePAM  0.5 mg Oral QHS    folic acid  1 mg Oral Daily    spironolactone  25 mg Oral Every other day    telmisartan  40 mg Oral Daily    vancomycin (VANCOCIN) IVPB  15 mg/kg Intravenous Once Pre-Op       Continuous Infusions:  PRN Meds:.acetaminophen, acetaminophen, benzonatate, dextrose 50%, dextrose 50%, glucagon (human recombinant), glucose, glucose, hydrALAZINE, insulin aspart U-100, morphine, " nitroGLYCERIN, ondansetron, oxyCODONE, sodium chloride 0.9%, sodium chloride 0.9%

## 2019-05-13 NOTE — SUBJECTIVE & OBJECTIVE
Interval History: Reports worse pain today and decreased PO intake.  Plan for surgery.  Labs with bumped creatinine and hyponatremia.    Review of Systems   Constitutional: Negative for chills and fever.   HENT: Negative for trouble swallowing.    Respiratory: Negative for cough and shortness of breath.    Cardiovascular: Negative for chest pain.   Gastrointestinal: Negative for abdominal pain, blood in stool, nausea and vomiting.   Genitourinary: Negative for dysuria and hematuria.   Musculoskeletal: Positive for arthralgias and gait problem.   Skin: Negative for rash.   Neurological: Negative for numbness and headaches.   Psychiatric/Behavioral: Negative for confusion.     Objective:     Vital Signs (Most Recent):  Temp: 97.9 °F (36.6 °C) (05/13/19 1147)  Pulse: (!) 56 (05/13/19 1200)  Resp: 16 (05/13/19 0714)  BP: (!) 107/56 (05/13/19 1147)  SpO2: 96 % (05/13/19 1147) Vital Signs (24h Range):  Temp:  [97.4 °F (36.3 °C)-99.1 °F (37.3 °C)] 97.9 °F (36.6 °C)  Pulse:  [53-70] 56  Resp:  [16] 16  SpO2:  [92 %-99 %] 96 %  BP: ()/(53-58) 107/56     Weight: 86.3 kg (190 lb 4.1 oz)  Body mass index is 29.8 kg/m².    Intake/Output Summary (Last 24 hours) at 5/13/2019 1454  Last data filed at 5/13/2019 1437  Gross per 24 hour   Intake 1260 ml   Output 525 ml   Net 735 ml      Physical Exam   Constitutional: She is oriented to person, place, and time. She appears well-developed and well-nourished. No distress.   HENT:   Head: Normocephalic and atraumatic.   Eyes: Pupils are equal, round, and reactive to light. EOM are normal.   Neck: Normal range of motion. Neck supple.   Cardiovascular: Normal rate and regular rhythm.   Pulmonary/Chest: Effort normal and breath sounds normal. No respiratory distress.   Abdominal: Soft. Bowel sounds are normal. She exhibits no distension. There is no tenderness.   Musculoskeletal: She exhibits deformity (Shortened and externally rotated right leg).   Decreased rom right leg    Neurological: She is alert and oriented to person, place, and time. No cranial nerve deficit.   Skin: Skin is warm. She is not diaphoretic.   Psychiatric: She has a normal mood and affect.   Vitals reviewed.      Significant Labs:   BMP:   Recent Labs   Lab 05/13/19  0836      *   K 5.1   CL 95   CO2 28   BUN 46*   CREATININE 2.4*   CALCIUM 9.7     CBC:   Recent Labs   Lab 05/13/19  0836   WBC 9.12   HGB 12.4   HCT 37.4          Significant Imaging: I have reviewed all pertinent imaging results/findings within the past 24 hours.

## 2019-05-13 NOTE — NURSING
Pt to surgery via bed. Report given to jessika SAINI in holding. Pt has been NPO p MN except for meds c sip of water. IV patent to rt ac. Baker catheter patent.telemetry monitor removed for surgery. Daughter at bedside.

## 2019-05-13 NOTE — PROGRESS NOTES
[facility] Hospital Medicine  Progress Note    Patient Name: Anahy Evans  MRN: [id number]  Patient Class: IP- Inpatient   Admission Date: 5/9/2019  Length of Stay: 4 days  Attending Physician: Charlette Martinez MD  Primary Care Provider: Ryan Lopez MD        Subjective:     Principal Problem:Closed fracture of right hip    HPI:  Mrs. Evans is an 85 year old woman with history of coronary artery disease, hypertension, hyperlipidemia, gout, AAA and CKDIII who came in today for evaluation of fall and right hip pain.  She lives independently and tripped on some boxes in her home this morning.  She fell to the floor and landed on her right hip.  Subsequently she had such severe pain she could not get up and walk.  She had no chest pain, shortness of breath, loss of consciousness, nausea, vomiting or head trauma.  At present she has pain with any movement of her right leg.  Otherwise her only complaints are of a cough for three days that is productive of white sputum in the mornings and that it is very cold in her room.  Her daughter is at bedside and complements the history as well.    Hospital Course:  Mrs. Evans is an 85 year old woman with history of coronary artery disease, hypertension, hyperlipidemia, gout, AAA and CKDIII who came in for evaluation of fall and right hip pain who was found to have closed right femoral neck fracture.  Dr. Christianson is consulted and has requested we hold plavix.  Plan for surgery 5/13.    Interval History: Reports worse pain today and decreased PO intake.  Plan for surgery.  Labs with bumped creatinine and hyponatremia.    Review of Systems   Constitutional: Negative for chills and fever.   HENT: Negative for trouble swallowing.    Respiratory: Negative for cough and shortness of breath.    Cardiovascular: Negative for chest pain.   Gastrointestinal: Negative for abdominal pain, blood in stool, nausea and vomiting.   Genitourinary: Negative for dysuria and  hematuria.   Musculoskeletal: Positive for arthralgias and gait problem.   Skin: Negative for rash.   Neurological: Negative for numbness and headaches.   Psychiatric/Behavioral: Negative for confusion.     Objective:     Vital Signs (Most Recent):  Temp: 97.9 °F (36.6 °C) (05/13/19 1147)  Pulse: (!) 56 (05/13/19 1200)  Resp: 16 (05/13/19 0714)  BP: (!) 107/56 (05/13/19 1147)  SpO2: 96 % (05/13/19 1147) Vital Signs (24h Range):  Temp:  [97.4 °F (36.3 °C)-99.1 °F (37.3 °C)] 97.9 °F (36.6 °C)  Pulse:  [53-70] 56  Resp:  [16] 16  SpO2:  [92 %-99 %] 96 %  BP: ()/(53-58) 107/56     Weight: 86.3 kg (190 lb 4.1 oz)  Body mass index is 29.8 kg/m².    Intake/Output Summary (Last 24 hours) at 5/13/2019 1454  Last data filed at 5/13/2019 1437  Gross per 24 hour   Intake 1260 ml   Output 525 ml   Net 735 ml      Physical Exam   Constitutional: She is oriented to person, place, and time. She appears well-developed and well-nourished. No distress.   HENT:   Head: Normocephalic and atraumatic.   Eyes: Pupils are equal, round, and reactive to light. EOM are normal.   Neck: Normal range of motion. Neck supple.   Cardiovascular: Normal rate and regular rhythm.   Pulmonary/Chest: Effort normal and breath sounds normal. No respiratory distress.   Abdominal: Soft. Bowel sounds are normal. She exhibits no distension. There is no tenderness.   Musculoskeletal: She exhibits deformity (Shortened and externally rotated right leg).   Decreased rom right leg   Neurological: She is alert and oriented to person, place, and time. No cranial nerve deficit.   Skin: Skin is warm. She is not diaphoretic.   Psychiatric: She has a normal mood and affect.   Vitals reviewed.      Significant Labs:   BMP:   Recent Labs   Lab 05/13/19  0836      *   K 5.1   CL 95   CO2 28   BUN 46*   CREATININE 2.4*   CALCIUM 9.7     CBC:   Recent Labs   Lab 05/13/19  0836   WBC 9.12   HGB 12.4   HCT 37.4          Significant Imaging: I have  reviewed all pertinent imaging results/findings within the past 24 hours.    Assessment/Plan:      * Closed fracture of right hip  · XR with right hip fracture  · Dr. Christianson  consulted and has requested we hold plavix  · Plan for sx on Monday 5/13.  Will need new PT/OT orders post surgery  · Intermediate risk with intermediate risk sx  · Pain control with prn oxycodone and iv morphine for pain  · Heparin changed to lovenox as patient c/o prior headache with heparin.    Coronary artery disease involving native coronary artery  · Stent placed in July 2017 by Dr. Canela  · No chest pain or sob at this time  · Continue Coreg 6.25mg PO BID  · Continue Lipitor 40mg PO daily  · Has been on plavix since that time, but must hold now for surgery.  Need to restart post-op  · Monitor on telemetry      Pure hypercholesterolemia  · Chronic and stable  · Continue Lipitor 40mg PO daily    Old myocardial infarction  · Per CAD      Essential hypertension  · BP rather elevated due to pain  · Continue Norvasc 5mg PO daily  · Hold Aldactone and Telmisartan with MELIDA    PAF (paroxysmal atrial fibrillation)  · Follows with Dr. Gay  · In NSR presently  · Continue Amiodarone 100mg PO daily  · Continue Coreg 6.25mg PO BID  · Monitor on telemetry  · Not on anticoagulation per se at home.      Acute renal failure superimposed on stage 3 chronic kidney disease  · Creatinine 2.4, baseline closer to 1.3  · Likely 2/2 decreased PO intake and home nephrotoxic meds (Aldactone, Telmisartan)  · Gentle IVF overnight and repeat labs in the morning    Chronic kidney disease, stage III (moderate)  · Follows with Dr. Valerio  · See MELIDA on CKD    Prediabetes  · HbA1c 5.3  · Not on therapy at home      Gastroesophageal reflux disease with esophagitis  · Chronic and stable  · Not on therapy        VTE Risk Mitigation (From admission, onward)        Ordered     IP VTE HIGH RISK PATIENT  Once      05/09/19 1408     Place sequential compression device   Until discontinued      05/09/19 8725        Dispo:  Placement after surgery.  Will need new PT/OT orders      Charlette Martinez MD  Department of Hospital Medicine   [facility]

## 2019-05-13 NOTE — PROGRESS NOTES
Pt is able to move feet.   Pt is able to feel touch on lower extremities.  Pt is sleeping in between care.

## 2019-05-13 NOTE — ANESTHESIA POSTPROCEDURE EVALUATION
Anesthesia Post Evaluation    Patient: Anahy Evans    Procedure(s) Performed: Procedure(s) (LRB):  HEMIARTHROPLASTY - HIP FRACTURE (Right)    Final Anesthesia Type: spinal  Patient location during evaluation: PACU  Patient participation: Yes- Able to Participate  Level of consciousness: awake and alert  Post-procedure vital signs: reviewed and stable  Pain management: adequate  Airway patency: patent  PONV status at discharge: No PONV  Anesthetic complications: no      Cardiovascular status: blood pressure returned to baseline and hemodynamically stable  Respiratory status: unassisted, spontaneous ventilation and nasal cannula  Hydration status: euvolemic  Follow-up not needed.          Vitals Value Taken Time   /66 5/13/2019  4:54 PM   Temp 36.8 °C (98.2 °F) 5/13/2019  4:54 PM   Pulse 68 5/13/2019  4:54 PM   Resp 16 5/13/2019  4:54 PM   SpO2 95 % 5/13/2019  4:54 PM         Event Time     Out of Recovery 05/13/2019 16:30:00          Pain/Olivia Score: Pain Rating Prior to Med Admin: 9 (5/13/2019  8:56 AM)  Pain Rating Post Med Admin: 2 (5/13/2019  9:26 AM)  Olivia Score: 9 (5/13/2019  4:08 PM)

## 2019-05-13 NOTE — PLAN OF CARE
Problem: Adult Inpatient Plan of Care  Goal: Plan of Care Review  1700 Pt returned from surgery in bed. Vs stable. O2 on at 2l per nc c O2 sat =96%. Arouses easily. Rt hip dressing dry and intact c aqualcell dressing present. Good neuro checks to LE. Telemetry monitor applied. IVF infusing. Safety precautions reinforced and Plan of cre to increase mobility discussed.

## 2019-05-14 PROBLEM — R42 DIZZINESS: Status: ACTIVE | Noted: 2019-05-14

## 2019-05-14 LAB
ALBUMIN SERPL BCP-MCNC: 2.5 G/DL (ref 3.5–5.2)
ALP SERPL-CCNC: 105 U/L (ref 55–135)
ALT SERPL W/O P-5'-P-CCNC: 60 U/L (ref 10–44)
ANION GAP SERPL CALC-SCNC: 7 MMOL/L (ref 8–16)
AST SERPL-CCNC: 79 U/L (ref 10–40)
BASOPHILS # BLD AUTO: 0.01 K/UL (ref 0–0.2)
BASOPHILS NFR BLD: 0.1 % (ref 0–1.9)
BILIRUB SERPL-MCNC: 1 MG/DL (ref 0.1–1)
BUN SERPL-MCNC: 46 MG/DL (ref 8–23)
CALCIUM SERPL-MCNC: 8.8 MG/DL (ref 8.7–10.5)
CHLORIDE SERPL-SCNC: 100 MMOL/L (ref 95–110)
CO2 SERPL-SCNC: 22 MMOL/L (ref 23–29)
CREAT SERPL-MCNC: 2 MG/DL (ref 0.5–1.4)
DIFFERENTIAL METHOD: ABNORMAL
EOSINOPHIL # BLD AUTO: 0.2 K/UL (ref 0–0.5)
EOSINOPHIL NFR BLD: 2.1 % (ref 0–8)
ERYTHROCYTE [DISTWIDTH] IN BLOOD BY AUTOMATED COUNT: 13.6 % (ref 11.5–14.5)
ERYTHROCYTE [DISTWIDTH] IN BLOOD BY AUTOMATED COUNT: 13.7 % (ref 11.5–14.5)
EST. GFR  (AFRICAN AMERICAN): 26 ML/MIN/1.73 M^2
EST. GFR  (NON AFRICAN AMERICAN): 22 ML/MIN/1.73 M^2
GLUCOSE SERPL-MCNC: 115 MG/DL (ref 70–110)
HCT VFR BLD AUTO: 32.5 % (ref 37–48.5)
HCT VFR BLD AUTO: 32.6 % (ref 37–48.5)
HGB BLD-MCNC: 10.8 G/DL (ref 12–16)
HGB BLD-MCNC: 10.9 G/DL (ref 12–16)
LYMPHOCYTES # BLD AUTO: 0.8 K/UL (ref 1–4.8)
LYMPHOCYTES NFR BLD: 9.8 % (ref 18–48)
MCH RBC QN AUTO: 29.7 PG (ref 27–31)
MCH RBC QN AUTO: 29.9 PG (ref 27–31)
MCHC RBC AUTO-ENTMCNC: 33.2 G/DL (ref 32–36)
MCHC RBC AUTO-ENTMCNC: 33.4 G/DL (ref 32–36)
MCV RBC AUTO: 89 FL (ref 82–98)
MCV RBC AUTO: 90 FL (ref 82–98)
MONOCYTES # BLD AUTO: 1.9 K/UL (ref 0.3–1)
MONOCYTES NFR BLD: 22.2 % (ref 4–15)
NEUTROPHILS # BLD AUTO: 5.6 K/UL (ref 1.8–7.7)
NEUTROPHILS NFR BLD: 65.7 % (ref 38–73)
PLATELET # BLD AUTO: 145 K/UL (ref 150–350)
PLATELET # BLD AUTO: 155 K/UL (ref 150–350)
PMV BLD AUTO: 11.8 FL (ref 9.2–12.9)
PMV BLD AUTO: 11.9 FL (ref 9.2–12.9)
POCT GLUCOSE: 122 MG/DL (ref 70–110)
POTASSIUM SERPL-SCNC: 4.7 MMOL/L (ref 3.5–5.1)
PROT SERPL-MCNC: 6.3 G/DL (ref 6–8.4)
RBC # BLD AUTO: 3.64 M/UL (ref 4–5.4)
RBC # BLD AUTO: 3.64 M/UL (ref 4–5.4)
SODIUM SERPL-SCNC: 129 MMOL/L (ref 136–145)
WBC # BLD AUTO: 8.55 K/UL (ref 3.9–12.7)
WBC # BLD AUTO: 8.67 K/UL (ref 3.9–12.7)

## 2019-05-14 PROCEDURE — 80053 COMPREHEN METABOLIC PANEL: CPT

## 2019-05-14 PROCEDURE — 30200315 PPD INTRADERMAL TEST REV CODE 302: Performed by: INTERNAL MEDICINE

## 2019-05-14 PROCEDURE — 36415 COLL VENOUS BLD VENIPUNCTURE: CPT

## 2019-05-14 PROCEDURE — 25000003 PHARM REV CODE 250: Performed by: HOSPITALIST

## 2019-05-14 PROCEDURE — 97161 PT EVAL LOW COMPLEX 20 MIN: CPT

## 2019-05-14 PROCEDURE — 85027 COMPLETE CBC AUTOMATED: CPT

## 2019-05-14 PROCEDURE — 86580 TB INTRADERMAL TEST: CPT | Performed by: INTERNAL MEDICINE

## 2019-05-14 PROCEDURE — 94799 UNLISTED PULMONARY SVC/PX: CPT

## 2019-05-14 PROCEDURE — 99233 PR SUBSEQUENT HOSPITAL CARE,LEVL III: ICD-10-PCS | Mod: ,,, | Performed by: INTERNAL MEDICINE

## 2019-05-14 PROCEDURE — 63600175 PHARM REV CODE 636 W HCPCS

## 2019-05-14 PROCEDURE — 94761 N-INVAS EAR/PLS OXIMETRY MLT: CPT

## 2019-05-14 PROCEDURE — 63600175 PHARM REV CODE 636 W HCPCS: Performed by: INTERNAL MEDICINE

## 2019-05-14 PROCEDURE — 97165 OT EVAL LOW COMPLEX 30 MIN: CPT

## 2019-05-14 PROCEDURE — 97530 THERAPEUTIC ACTIVITIES: CPT

## 2019-05-14 PROCEDURE — 85025 COMPLETE CBC W/AUTO DIFF WBC: CPT

## 2019-05-14 PROCEDURE — 25000003 PHARM REV CODE 250

## 2019-05-14 PROCEDURE — 11000001 HC ACUTE MED/SURG PRIVATE ROOM

## 2019-05-14 PROCEDURE — 99233 SBSQ HOSP IP/OBS HIGH 50: CPT | Mod: ,,, | Performed by: INTERNAL MEDICINE

## 2019-05-14 PROCEDURE — 25000003 PHARM REV CODE 250: Performed by: INTERNAL MEDICINE

## 2019-05-14 RX ORDER — ENOXAPARIN SODIUM 100 MG/ML
40 INJECTION SUBCUTANEOUS EVERY 24 HOURS
Status: DISCONTINUED | OUTPATIENT
Start: 2019-05-14 | End: 2019-05-14

## 2019-05-14 RX ORDER — SODIUM CHLORIDE 9 MG/ML
INJECTION, SOLUTION INTRAVENOUS CONTINUOUS
Status: DISCONTINUED | OUTPATIENT
Start: 2019-05-14 | End: 2019-05-16 | Stop reason: HOSPADM

## 2019-05-14 RX ORDER — ENOXAPARIN SODIUM 100 MG/ML
30 INJECTION SUBCUTANEOUS EVERY 24 HOURS
Status: DISCONTINUED | OUTPATIENT
Start: 2019-05-14 | End: 2019-05-16 | Stop reason: HOSPADM

## 2019-05-14 RX ADMIN — CLOPIDOGREL 75 MG: 75 TABLET, FILM COATED ORAL at 09:05

## 2019-05-14 RX ADMIN — MUPIROCIN 1 G: 20 OINTMENT TOPICAL at 09:05

## 2019-05-14 RX ADMIN — ENOXAPARIN SODIUM 30 MG: 100 INJECTION SUBCUTANEOUS at 06:05

## 2019-05-14 RX ADMIN — FOLIC ACID 1 MG: 1 TABLET ORAL at 09:05

## 2019-05-14 RX ADMIN — OXYCODONE HYDROCHLORIDE 5 MG: 5 TABLET ORAL at 10:05

## 2019-05-14 RX ADMIN — ATORVASTATIN CALCIUM 40 MG: 20 TABLET, FILM COATED ORAL at 09:05

## 2019-05-14 RX ADMIN — DOCUSATE SODIUM 100 MG: 100 CAPSULE, LIQUID FILLED ORAL at 09:05

## 2019-05-14 RX ADMIN — VANCOMYCIN HYDROCHLORIDE 1250 MG: 100 INJECTION, POWDER, LYOPHILIZED, FOR SOLUTION INTRAVENOUS at 01:05

## 2019-05-14 RX ADMIN — CLONAZEPAM 0.5 MG: 0.5 TABLET ORAL at 09:05

## 2019-05-14 RX ADMIN — TUBERCULIN PURIFIED PROTEIN DERIVATIVE 5 UNITS: 5 INJECTION, SOLUTION INTRADERMAL at 03:05

## 2019-05-14 RX ADMIN — ALLOPURINOL 300 MG: 100 TABLET ORAL at 09:05

## 2019-05-14 RX ADMIN — SODIUM CHLORIDE: 0.9 INJECTION, SOLUTION INTRAVENOUS at 06:05

## 2019-05-14 RX ADMIN — ACETAMINOPHEN 1000 MG: 500 TABLET ORAL at 06:05

## 2019-05-14 RX ADMIN — ASPIRIN 81 MG: 81 TABLET, COATED ORAL at 09:05

## 2019-05-14 RX ADMIN — POLYETHYLENE GLYCOL 3350 17 G: 17 POWDER, FOR SOLUTION ORAL at 09:05

## 2019-05-14 RX ADMIN — DEXTROSE AND SODIUM CHLORIDE: 5; .9 INJECTION, SOLUTION INTRAVENOUS at 01:05

## 2019-05-14 RX ADMIN — ACETAMINOPHEN 1000 MG: 500 TABLET ORAL at 03:05

## 2019-05-14 RX ADMIN — FAMOTIDINE 20 MG: 20 TABLET, FILM COATED ORAL at 09:05

## 2019-05-14 RX ADMIN — ONDANSETRON 8 MG: 2 INJECTION INTRAMUSCULAR; INTRAVENOUS at 10:05

## 2019-05-14 RX ADMIN — CELECOXIB 200 MG: 200 CAPSULE ORAL at 09:05

## 2019-05-14 RX ADMIN — AMIODARONE HYDROCHLORIDE 100 MG: 100 TABLET ORAL at 09:05

## 2019-05-14 RX ADMIN — HYDROMORPHONE HYDROCHLORIDE 0.5 MG: 1 INJECTION, SOLUTION INTRAMUSCULAR; INTRAVENOUS; SUBCUTANEOUS at 03:05

## 2019-05-14 NOTE — PLAN OF CARE
Problem: Physical Therapy Goal  Goal: Physical Therapy Goal  Patient goals to be met 5/21/19:    1. Patient will transfer supine <> sitting EOB with Min A at BLE with HOB elevated  2. Patient will transfer sit <> stand with RW requiring Min A while maintaining surgical precautions  3. Patient will transfer bed <> chair via stand step t/f with Min A while maintaining surgical precautions  4. Patient will ambulate > 50 ft with step-through gait in open environment on even surface with Min A while using RW  5. Patient will negotiate 5 steps with no HR while using QC requiring Min A   6. Patient will independently list all surgical precautions with no verbal cues from family or PT  Outcome: Ongoing (interventions implemented as appropriate)  PT eval complete; goals established. Patient tolerated session fairly poorly as 2/2 c/o dizziness throughout session. Despite highly active PLOF, patient unable to lift surgical limb to perform bed mobility without mod/max assistance from therapist. Attempted to ambulate in room 3x but unable 2/2 weakness and dizziness. Due to weakness and patient hesitance to ambulate, required Max A for stand-step t/f to chair. Patient requires constant cueing with mobility to maintain surgical precautions. If patient is unable to partake in more advanced OOB mobility by tomorrow, patient will be unsafe to be d/c'ed home and will likely required continued skilled therapy at SNF. Pt is at high risk of unplanned readmission due to fall risk; Patient would benefit from SNF upon discharge in order to optimize functional mobility outcomes and maximize patient safety.

## 2019-05-14 NOTE — PT/OT/SLP EVAL
Physical Therapy Evaluation    Patient Name:  Anahy Evans   MRN:  [id number]    Recommendations:     Discharge Recommendations:  nursing facility, skilled   Discharge Equipment Recommendations: walker, rolling, commode(Hip kit)   Barriers to discharge: Inaccessible home and Decreased caregiver support; increased caregiver burden    Assessment:     Anahy Evans is a 85 y.o. female admitted with a medical diagnosis of Closed fracture of right hip.  She presents with the following impairments/functional limitations:  weakness, gait instability, impaired balance, decreased lower extremity function, impaired self care skills, impaired functional mobilty, orthopedic precautions, pain. These impairments inhibit the patient from independently and safely participating in desired functional tasks such as transfers, ambulating within home, cooking, cleaning, and community ambulation.     PT eval complete; goals established. Patient tolerated session fairly poorly as 2/2 c/o dizziness throughout session. Despite highly active PLOF, patient unable to lift surgical limb to perform bed mobility without mod/max assistance from therapist. Attempted to ambulate in room 3x but unable 2/2 weakness and dizziness. Due to weakness and patient hesitance to ambulate, required Max A for stand-step t/f to chair. Patient requires constant cueing with mobility to maintain surgical precautions. If patient is unable to partake in more advanced OOB mobility by tomorrow, patient will be unsafe to be d/c'ed home and will likely required continued skilled therapy at SNF. Pt is at high risk of unplanned readmission due to fall risk; Patient would benefit from SNF upon discharge in order to optimize functional mobility outcomes and maximize patient safety.     Rehab Prognosis: Good; patient would benefit from acute skilled PT services to address these deficits and reach maximum level of function.    Recent Surgery: Procedure(s)  "(LRB):  HEMIARTHROPLASTY - HIP FRACTURE (Right) 1 Day Post-Op    Plan:     During this hospitalization, patient to be seen daily to address the identified rehab impairments via gait training, therapeutic activities, therapeutic exercises, neuromuscular re-education and progress toward the following goals:    · Plan of Care Expires:  19    Subjective     Chief Complaint: Dizziness and pain  Patient/Family Comments/goals: Patient stated, "I don't think I'll be able to walk."    Pain/Comfort:  · Pain Rating 1: 0/10  · Location - Side 1: Right  · Location - Orientation 1: generalized  · Location 1: hip  · Pain Addressed 1: Distraction, Reposition, Nurse notified  · Pain Rating Post-Intervention 1: other (see comments)(Not quantified but notable pain with mobility)    Patients cultural, spiritual, Rastafarian conflicts given the current situation: no    Living Environment:  · Lives with daughter in two story home; patient lives on first floor  · 4-5 steps to enter, no HR  · Has cane at home but does not use  · Per daughter, "She is a high functioning 84 y/o. You know I mean like driving, walking, all that."   · Patient was ambulating without an AD prior; Independent with ADLs    · Enjoys crafting   · Equipment used at home: none.     Objective:     Communicated with RN prior to session.  Patient found supine with telemetry, peripheral IV, oxygen  upon PT entry to room.    General Precautions: Standard, fall   Orthopedic Precautions:RLE weight bearing as tolerated, RLE posterior precautions   Braces: N/A     Exams:  Cognition:   Patient is oriented to name, , date, place, situation.  Pt follows approximately 90% of one step commands.    Mood: Pleasant and cooperative.   Musculoskeletal:  Posture: Protective guarding surgical joint  LE ROM/Strength: LLE: 4-/5 for hip flexion, knee flexion, knee extension, ankle DF. Did not assess RLE 2/2 pain  Neuromuscular:  Sensation: Intact to light touch bilateral LEs. Pt " denied paresthesias.   Coordination: No impairments identified with functional mobility.   Tone/Reflexes: No impairments identified with functional mobility.   Balance: Mod/Max for dynamic standing with bilateral UE support.   Visual-vestibular: No impairments identified with functional mobility.   Integument:  Visible skin intact and surgical extremity dressing clean and dry.   Cardiopulmonary:  Edema: Present surgical extremity.   Vital signs in supine position:   BP: 107/54  Heart rate 50's bpm  Vitals could not be obtained standing as patient could not maintain position long enough  · Vitals in bedside chair   BP: 129/62  Heart rate 59 bpm    Functional Mobility:  · Bed Mobility:     · Supine to Sit: maximal assistance  · Difficulty using BLE requiring Max A to move  · Transfers:     · Sit to Stand:  maximal assistance with rolling walker  · 4 total attempts  · 2 unsuccessful attempts 2/2 weakness  · 2 successful attempts  · First: Stood for few seconds attempts to get BP read but unable 2/2 dizziness  · Second: Max A   · Bed to chair: Max A via stand step with RW  · Difficulty clearing surgical limb while stepping  · Poor posture: kyphotic and cervical flexion  · Needed assistance with RW  · Dizziness noted  · Gait:   · Unable 2/2 dizziness   · Balance:   · Static sitting: CGA for occasional posterior lean  · Standing standing: Max A with RW and support at RW to prevent patient from leaning backwards      Therapeutic Activities and Exercises:  · Thoroughly discussed surgical precautions  · Pt performed supine therapeutic exercises for strengthening and to promote circulation, pressure relief, and functional ROM with verbal, visual and tactile cues for correct performance including:   · Bilateral ankle pumps x 10 reps  · Bilateral quad sets x 10 reps  · Bilateral glute sets x 10 reps   · RLE heel slides x 10 reps   · RLE hip ABD x 10 reps  · RLE SLR x 10 reps      AM-PAC 6 CLICK MOBILITY  Total  Score:11     Patient left up in chair with all lines intact, call button in reach, RN notified and family present.    GOALS:   Multidisciplinary Problems     Physical Therapy Goals        Problem: Physical Therapy Goal    Goal Priority Disciplines Outcome Goal Variances Interventions   Physical Therapy Goal     PT, PT/OT Ongoing (interventions implemented as appropriate)     Description:  Patient goals to be met 5/21/19:    1. Patient will transfer supine <> sitting EOB with Min A at BLE with HOB elevated  2. Patient will transfer sit <> stand with RW requiring Min A while maintaining surgical precautions  3. Patient will transfer bed <> chair via stand step t/f with Min A while maintaining surgical precautions  4. Patient will ambulate > 50 ft with step-through gait in open environment on even surface with Min A while using RW  5. Patient will negotiate 5 steps with no HR while using QC requiring Min A   6. Patient will independently list all surgical precautions with no verbal cues from family or PT                    History:     Past Medical History:   Diagnosis Date    Cervical cancer 1968    breast cancer right    Coronary artery disease     Gout     High cholesterol     Hypertension     MI (myocardial infarction)        Past Surgical History:   Procedure Laterality Date    BREAST LUMPECTOMY      right    CARDIAC SURGERY      multiple cardiac stents    CORONARY STENT PLACEMENT      HEART CATH-LEFT Left 11/24/2017    Performed [facility] at [facility]    HEART CATH-LEFT lv cor poss Left 6/30/2017    Performed by [facility]im [facility] MD at [facility] CATH LAB       Time Tracking:     PT Received On: 05/14/19  PT Start Time: 0857     PT Stop Time: 0947  PT Total Time (min): 50 min   RN and MD spoke with patient ~ 10 minutes in total     Billable Minutes: Evaluation 17 and Therapeutic Activity 23      Roselyn Miles, PT  05/14/2019

## 2019-05-14 NOTE — PROGRESS NOTES
"Progress Note  Orthopedics    Admit Date: 5/9/2019   Patient ID: Anahy Evans is a 85 y.o. female.  Postop day 1.  Hemiarthroplasty right hip for displaced femoral neck fracture. She is doing well.  Dressing dry, neurovascularly intact.  Up in physical therapy today.            Pa Torres      Vital Sign (recent):  BP (!) 115/55 (BP Location: Left arm, Patient Position: Lying)   Pulse (!) 53   Temp 97.9 °F (36.6 °C) (Oral)   Resp 16   Ht 5' 7" (1.702 m)   Wt 86.3 kg (190 lb 4.1 oz)   LMP  (LMP Unknown)   SpO2 99%   Breastfeeding? No   BMI 29.80 kg/m²       Laboratory:    CBC:   Recent Labs   Lab 05/14/19 0524   WBC 8.67  8.55   RBC 3.64*  3.64*   HGB 10.8*  10.9*   HCT 32.5*  32.6*     145*   MCV 89  90   MCH 29.7  29.9   MCHC 33.2  33.4       CMP:   Recent Labs   Lab 05/14/19 0524   *   CALCIUM 8.8   ALBUMIN 2.5*   PROT 6.3   *   K 4.7   CO2 22*      BUN 46*   CREATININE 2.0*   ALKPHOS 105   ALT 60*   AST 79*   BILITOT 1.0           Intake/Output Summary (Last 24 hours) at 5/14/2019 0824  Last data filed at 5/14/2019 0600  Gross per 24 hour   Intake 2750 ml   Output 1650 ml   Net 1100 ml         Current Medications:   acetaminophen  1,000 mg Oral Q8H    allopurinol  300 mg Oral Daily    amiodarone  100 mg Oral Daily    amLODIPine  5 mg Oral Daily    aspirin  81 mg Oral Daily    atorvastatin  40 mg Oral Daily    carvedilol  6.25 mg Oral BID WM    celecoxib  200 mg Oral BID    clonazePAM  0.5 mg Oral QHS    clopidogrel  75 mg Oral Daily    docusate sodium  100 mg Oral Q12H    famotidine  20 mg Oral Daily    folic acid  1 mg Oral Daily    mupirocin  1 g Nasal BID    polyethylene glycol  17 g Oral Daily       Continuous Infusions:   dextrose 5 % and 0.9 % NaCl 100 mL/hr at 05/14/19 0251     PRN Meds:.acetaminophen, acetaminophen, benzonatate, bisacodyl, dextrose 50%, dextrose 50%, glucagon (human recombinant), glucose, glucose, hydrALAZINE, " HYDROmorphone, insulin aspart U-100, nitroGLYCERIN, ondansetron, oxyCODONE, promethazine (PHENERGAN) IVPB, sodium chloride 0.9%

## 2019-05-14 NOTE — PLAN OF CARE
[facility]   Department of Hospital Medicine  [address] [place] [id number]  [phone] (phone)  [phone] (fax)      Facility Transfer Orders                        05/16/2019  Anahygloria Evans    Admit to: SNF    Diagnoses:  Active Hospital Problems    Diagnosis  POA    *Closed fracture of right hip [S72.001A]  Yes    Acute renal failure superimposed on stage 3 chronic kidney disease [N17.9, N18.3]  No    Coronary artery disease involving native coronary artery [I25.10]  Yes    Prediabetes [R73.03]  Yes    Chronic kidney disease, stage III (moderate) [N18.3]  Yes    Essential hypertension [I10]  Yes    Pure hypercholesterolemia [E78.00]  Yes    PAF (paroxysmal atrial fibrillation) [I48.0]  Yes    Gastroesophageal reflux disease with esophagitis [K21.0]  Yes    Old myocardial infarction [I25.2]  Not Applicable      Resolved Hospital Problems    Diagnosis Date Resolved POA    Cough [R05] 05/11/2019 Yes       Allergies:  Review of patient's allergies indicates:   Allergen Reactions    Clindamycin Anaphylaxis    Penicillins Rash       Vitals:     Every shift (Skilled Nursing patients)    Diet: cardiac      Supplement:  1 can every three times a day with meals                         Type:  House       Activity:    - Up in a chair each morning as tolerated   - Ambulate with assistance to bathroom   - May ambulate independently   - Weight bearing: as tolerated, posterior hip precaution    Nursing Precautions:     - Aspiration precautions:             - Total assistance with meals            -  Upright 90 degrees befor during and after meals             -  Suction at bedside          - Fall precautions   - Seizure precaution   - Decubitus precautions:        -  for positioning   - Pressure reducing foam mattress   - Turn patient every two hours. Use wedge pillows to anchor patient              CONSULTS:    PT to evaluate and treat      OT to evaluate  and treat      ST to evaluate and treat     Nutrition to evaluate and recommend diet      LABS: cmp in a week to monitor lfts.          Medications: Discontinue all previous medication orders, if any. See new list below.   Delma Evansgloria GUTIÉRREZ   Home Medication Instructions OLAYINKA:[id number]    Printed on:05/16/19 1212   Medication Information                      acetaminophen (TYLENOL) 500 MG tablet  Take 1 tablet (500 mg total) by mouth every 6 (six) hours as needed.             allopurinol (ZYLOPRIM) 300 MG tablet  Take 300 mg by mouth once daily.             amiodarone (PACERONE) 200 MG Tab  Take 0.5 tablets (100 mg total) by mouth once daily.             aspirin (ECOTRIN) 81 MG EC tablet  Take 81 mg by mouth once daily.             atorvastatin (LIPITOR) 40 MG tablet  Take 40 mg by mouth once daily.             benzonatate (TESSALON) 100 MG capsule  Take 1 capsule (100 mg total) by mouth 3 (three) times daily as needed for Cough.             bisacodyl (DULCOLAX) 10 mg Supp  Place 1 suppository (10 mg total) rectally daily as needed (Until bowel movement if patient has no bowel movement for 2 days).             calcium carbonate-vitamin D3 600 mg calcium- 200 unit Cap  Take by mouth bid             clonazePAM (KLONOPIN) 0.5 MG tablet  Take 1 tablet (0.5 mg total) by mouth every evening.             clopidogrel (PLAVIX) 75 mg tablet  TAKE 1 TABLET DAILY             docusate sodium (COLACE) 100 MG capsule  Take 1 capsule (100 mg total) by mouth every 12 (twelve) hours.             enoxaparin (LOVENOX) 40 mg/0.4 mL Syrg  Inject 0.3 mLs (30 mg total) into the skin once daily.  For 3 weeks             famotidine (PEPCID) 20 MG tablet  Take 1 tablet (20 mg total) by mouth once daily.             folic acid (FOLVITE) 1 MG tablet  Take 1 mg by mouth once daily.             meclizine (ANTIVERT) 12.5 mg tablet  Take 12.5 mg by mouth 3 (three) times daily as needed.             mupirocin (BACTROBAN) 2 % ointment  1 g by Nasal  route 2 (two) times daily.  For 3 days             nitroGLYCERIN (NITROSTAT) 0.4 MG SL tablet  Place 0.4 mg under the tongue every 5 (five) minutes as needed for Chest pain.             oxyCODONE (ROXICODONE) 5 MG immediate release tablet  Take 1 tablet (5 mg total) by mouth every 4 (four) hours as needed for Pain.             polyethylene glycol (GLYCOLAX) 17 gram PwPk  Take 17 g by mouth once daily.                 _________________________________  Dr. Corley  05/16/2019

## 2019-05-14 NOTE — PROGRESS NOTES
[facility] Hospital Medicine  Progress Note    Patient Name: Anahy Evans  MRN: [id number]  Patient Class: IP- Inpatient   Admission Date: 5/9/2019  Length of Stay: 5 days  Attending Physician: Lizz Corley MD  Primary Care Provider: Ryan Lopez MD        Subjective:     Principal Problem:Closed fracture of right hip    HPI:  Mrs. Evans is an 85 year old woman with history of coronary artery disease, hypertension, hyperlipidemia, gout, AAA and CKDIII who came in today for evaluation of fall and right hip pain.  She lives independently and tripped on some boxes in her home this morning.  She fell to the floor and landed on her right hip.  Subsequently she had such severe pain she could not get up and walk.  She had no chest pain, shortness of breath, loss of consciousness, nausea, vomiting or head trauma.  At present she has pain with any movement of her right leg.  Otherwise her only complaints are of a cough for three days that is productive of white sputum in the mornings and that it is very cold in her room.  Her daughter is at bedside and complements the history as well.    Hospital Course:  Mrs. Evans is an 85 year old woman with history of coronary artery disease, hypertension, hyperlipidemia, gout, AAA and CKDIII who came in for evaluation of fall and right hip pain who was found to have closed right femoral neck fracture.  Dr. Christianson is consulted and has requested we hold plavix. S/P right hip hemiarthroplasty on  5/13.    Interval History: c/o dizziness when tried to stand with therapy, bp on lower side before standing up, pain better, eating better today.    Review of Systems   Constitutional: Negative for chills and fever.   HENT: Negative for trouble swallowing.    Respiratory: Negative for cough and shortness of breath.    Cardiovascular: Negative for chest pain and leg swelling.   Gastrointestinal: Negative for abdominal pain, blood in stool, nausea and vomiting.    Genitourinary: Negative for dysuria and hematuria.   Musculoskeletal: Negative for gait problem.   Skin: Negative for rash.   Neurological: Positive for dizziness and weakness.   Psychiatric/Behavioral: Negative for confusion.     Objective:     Vital Signs (Most Recent):  Temp: 97.9 °F (36.6 °C) (05/14/19 1227)  Pulse: 60 (05/14/19 1400)  Resp: 18 (05/14/19 1227)  BP: 122/62 (05/14/19 1227)  SpO2: 96 % (05/14/19 1227) Vital Signs (24h Range):  Temp:  [97.7 °F (36.5 °C)-98.4 °F (36.9 °C)] 97.9 °F (36.6 °C)  Pulse:  [48-68] 60  Resp:  [16-18] 18  SpO2:  [95 %-100 %] 96 %  BP: (107-138)/(53-66) 122/62     Weight: 86.3 kg (190 lb 4.1 oz)  Body mass index is 29.8 kg/m².    Intake/Output Summary (Last 24 hours) at 5/14/2019 1617  Last data filed at 5/14/2019 0600  Gross per 24 hour   Intake 2050 ml   Output 900 ml   Net 1150 ml      Physical Exam   Constitutional: She is oriented to person, place, and time. No distress.   HENT:   Head: Normocephalic and atraumatic.   Eyes: Pupils are equal, round, and reactive to light. EOM are normal.   Neck: Normal range of motion. Neck supple.   Cardiovascular: Normal rate and regular rhythm.   Pulmonary/Chest: Effort normal and breath sounds normal. No respiratory distress.   Abdominal: Soft. Bowel sounds are normal. She exhibits no distension. There is no tenderness.   Musculoskeletal: She exhibits no edema.   Decreased rom right leg   Neurological: She is alert and oriented to person, place, and time. No cranial nerve deficit.   Skin: Skin is warm.   Psychiatric: She has a normal mood and affect.   Vitals reviewed.      Significant Labs:   BMP:   Recent Labs   Lab 05/14/19 0524   *   *   K 4.7      CO2 22*   BUN 46*   CREATININE 2.0*   CALCIUM 8.8     CBC:   Recent Labs   Lab 05/13/19  0836 05/14/19 0524   WBC 9.12 8.67  8.55   HGB 12.4 10.8*  10.9*   HCT 37.4 32.5*  32.6*    155  145*       Significant Imaging: I have reviewed all pertinent  imaging results/findings within the past 24 hours.    Assessment/Plan:      * Closed fracture of right hip  · XR with right hip fracture  · Dr. Christianson  consulted and has requested we hold plavix  · S/p sx on5/13.    · -ot/pt efforts  · Pain control with prn oxycodone and iv morphine for pain  · Heparin changed to lovenox as patient c/o prior headache with heparin.    Dizziness  Could be due to low bp/lying in bed for last few days  Also has h/o vertigo in past for which had evaluation done  Will hold bp meds, continued ivf and monitor      Acute renal failure superimposed on stage 3 chronic kidney disease  · baseline closer to 1.3  · Now melida  · Likely 2/2 decreased PO intake and home nephrotoxic meds (Aldactone, Telmisartan)  · Continue ivf, some improvement  · Will dc celecoxib with melida  · Also holding bp meds with bp on lower side    Coronary artery disease involving native coronary artery  · Stent placed in July 2017 by Dr. Canela  · No chest pain or sob at this time  · Hold coreg with low bp  · Continue Lipitor 40mg PO daily  · Has been on plavix since that time, held for sx .Started on asa, plavix post sx.  · Monitor on telemetry      Pure hypercholesterolemia  · Chronic and stable  · Continue Lipitor 40mg PO daily    Prediabetes  · HbA1c 5.3  · Not on therapy at home      Old myocardial infarction  · Per CAD      Gastroesophageal reflux disease with esophagitis  · Chronic and stable  · Not on therapy      Chronic kidney disease, stage III (moderate)  · Follows with Dr. Valerio  · See MELIDA on CKD    Essential hypertension  · BP running low now  · Hold norvasc, coreg, previously held telmisartan, spironolactone    PAF (paroxysmal atrial fibrillation)  · Follows with Dr. Gay  · In NSR presently  · Continue Amiodarone 100mg PO daily  · Hold coreg   · Monitor on telemetry  · Not on anticoagulation per se at home.        VTE Risk Mitigation (From admission, onward)        Ordered     enoxaparin injection  40 mg  Daily      05/14/19 1621     IP VTE HIGH RISK PATIENT  Once      05/13/19 1708     Place XIANG hose  Until discontinued      05/13/19 1708     Place sequential compression device  Until discontinued      05/13/19 1708     Place sequential compression device  Until discontinued      05/09/19 1335              Lizz Corley MD  Department of Hospital Medicine   [facility]

## 2019-05-14 NOTE — PLAN OF CARE
Problem: Occupational Therapy Goal  Goal: Occupational Therapy Goal  Goals to be met by: 5/21/2019    Patient will increase functional independence with ADLs by performing:    UE Dressing with Stand-by Assistance.  LE Dressing with Minimal Assistance using adaptive equipment as needed.  Grooming while seated with Supervision.  Toileting from bedside commode with Minimal Assistance for hygiene and clothing management.   Step transfer with Minimal Assistance.  Toilet transfer to bedside commode with Moderate Assistance.  Pt will identify posterior hip precautions via teach back method.  Pt will tolerate standing for one minute and perform functional task.      OT evaluation complete and POC established.  PTA pt reports being (I) for all ADLs and mobility.  She is not at PLOF at this time and requires increased assist for ADLs and mobilitiy.  SNF is recommended upon d/c from acute care to further address deficits and help pt improve overall functional independence.     DINA Puri  5/14/2019

## 2019-05-14 NOTE — ASSESSMENT & PLAN NOTE
· XR with right hip fracture  · Dr. Christianson  consulted and has requested we hold plavix  · S/p sx on5/13.    · -ot/pt efforts  · Pain control with prn oxycodone and iv morphine for pain  · Heparin changed to lovenox as patient c/o prior headache with heparin.

## 2019-05-14 NOTE — ASSESSMENT & PLAN NOTE
Could be due to low bp/lying in bed for last few days  Also has h/o vertigo in past for which had evaluation done  Will hold bp meds, continued ivf and monitor

## 2019-05-14 NOTE — ASSESSMENT & PLAN NOTE
· baseline closer to 1.3  · Now daisy  · Likely 2/2 decreased PO intake and home nephrotoxic meds (Aldactone, Telmisartan)  · Continue ivf, some improvement  · Will dc celecoxib with daisy  · Also holding bp meds with bp on lower side

## 2019-05-14 NOTE — ASSESSMENT & PLAN NOTE
· Follows with Dr. Gay  · In NSR presently  · Continue Amiodarone 100mg PO daily  · Hold coreg   · Monitor on telemetry  · Not on anticoagulation per se at home.

## 2019-05-14 NOTE — PLAN OF CARE
Spoke with pt and daughter at bedside.    Pt unable to manage at home with adult daughter, will need temporary SNF placement for surgical recovery.     First choice is [facility], referral sent.  Spoke with MORENITA Pettit.  No beds available. Informed daughter.  List provided to pt and daughter.  They will review and are willing to consider other facilities.  Multiple referrals sent in [facility].    SNF template shared with MD.  PPD order placed.  PASSR completed.  [facility] called in, awaiting 142.  CM to continue to follow.       05/14/19 1252   Discharge Assessment   Assessment Type Discharge Planning Reassessment   Confirmed/corrected address and phone number on facesheet? Yes   Assessment information obtained from? Patient;Caregiver   Communicated expected length of stay with patient/caregiver yes   Prior to hospitilization cognitive status: Alert/Oriented   Prior to hospitalization functional status: Independent   Current cognitive status: Alert/Oriented   Current Functional Status: Assistive Equipment   Lives With child(star), adult   Able to Return to Prior Arrangements no   Is patient able to care for self after discharge? No   Who are your caregiver(s) and their phone number(s)? Daughter: Marixa [phone]   Patient's perception of discharge disposition skilled nursing facility

## 2019-05-14 NOTE — SUBJECTIVE & OBJECTIVE
Interval History: c/o dizziness when tried to stand with therapy, bp on lower side before standing up, pain better, eating better today.    Review of Systems   Constitutional: Negative for chills and fever.   HENT: Negative for trouble swallowing.    Respiratory: Negative for cough and shortness of breath.    Cardiovascular: Negative for chest pain and leg swelling.   Gastrointestinal: Negative for abdominal pain, blood in stool, nausea and vomiting.   Genitourinary: Negative for dysuria and hematuria.   Musculoskeletal: Negative for gait problem.   Skin: Negative for rash.   Neurological: Positive for dizziness and weakness.   Psychiatric/Behavioral: Negative for confusion.     Objective:     Vital Signs (Most Recent):  Temp: 97.9 °F (36.6 °C) (05/14/19 1227)  Pulse: 60 (05/14/19 1400)  Resp: 18 (05/14/19 1227)  BP: 122/62 (05/14/19 1227)  SpO2: 96 % (05/14/19 1227) Vital Signs (24h Range):  Temp:  [97.7 °F (36.5 °C)-98.4 °F (36.9 °C)] 97.9 °F (36.6 °C)  Pulse:  [48-68] 60  Resp:  [16-18] 18  SpO2:  [95 %-100 %] 96 %  BP: (107-138)/(53-66) 122/62     Weight: 86.3 kg (190 lb 4.1 oz)  Body mass index is 29.8 kg/m².    Intake/Output Summary (Last 24 hours) at 5/14/2019 1617  Last data filed at 5/14/2019 0600  Gross per 24 hour   Intake 2050 ml   Output 900 ml   Net 1150 ml      Physical Exam   Constitutional: She is oriented to person, place, and time. No distress.   HENT:   Head: Normocephalic and atraumatic.   Eyes: Pupils are equal, round, and reactive to light. EOM are normal.   Neck: Normal range of motion. Neck supple.   Cardiovascular: Normal rate and regular rhythm.   Pulmonary/Chest: Effort normal and breath sounds normal. No respiratory distress.   Abdominal: Soft. Bowel sounds are normal. She exhibits no distension. There is no tenderness.   Musculoskeletal: She exhibits no edema.   Decreased rom right leg   Neurological: She is alert and oriented to person, place, and time. No cranial nerve deficit.    Skin: Skin is warm.   Psychiatric: She has a normal mood and affect.   Vitals reviewed.      Significant Labs:   BMP:   Recent Labs   Lab 05/14/19  0524   *   *   K 4.7      CO2 22*   BUN 46*   CREATININE 2.0*   CALCIUM 8.8     CBC:   Recent Labs   Lab 05/13/19  0836 05/14/19  0524   WBC 9.12 8.67  8.55   HGB 12.4 10.8*  10.9*   HCT 37.4 32.5*  32.6*    155  145*       Significant Imaging: I have reviewed all pertinent imaging results/findings within the past 24 hours.

## 2019-05-14 NOTE — PT/OT/SLP EVAL
Occupational Therapy   Evaluation    Name: Anahy Evans  MRN: [id number]  Admitting Diagnosis:  Closed fracture of right hip 1 Day Post-Op    Recommendations:     Discharge Recommendations: nursing facility, skilled  Discharge Equipment Recommendations:  walker, rolling, commode(hip kit)  Barriers to discharge:  (3-4 BAIRON; pt requires increased assist for ADLs and mobility at this time)    Assessment:     Anahy Evans is a 85 y.o. female with a medical diagnosis of Closed fracture of right hip.  She presents with dizziness and pain in right hip.  Performance deficits affecting function: weakness, impaired endurance, gait instability, impaired balance, impaired self care skills, impaired functional mobilty, orthopedic precautions, pain, decreased lower extremity function.  Attempted to see pt twice this date.  On first attempt pt experiencing increased pain and requested for OT to return later.  On second attempt pt reported pain and dizziness, but was agreeable to participating in therapy.  Pt demonstrates strength and ROM in (B) UE needed for ADLs that is WNL.  Pt required Max A and RW to perform sit <> stand transfer with cues provided to push into RW with (B) UE and elevate head.  Pt demonstrates adequate understanding of posterior hip precautions during transfer, but would benefit from reinforcement.  PTA pt reports being (I) with all ADLs, IADLS, and mobility.  Pt is not at PLOF and would benefit from skilled OT services to address problems listed above and increase independence with ADLs.  SNF is recommended upon d/c from acute care to further address deficits and help pt improve overall functional independence.     Rehab Prognosis: Good; patient would benefit from acute skilled OT services to address these deficits and reach maximum level of function.       Plan:     Patient to be seen 6 x/week to address the above listed problems via self-care/home management, therapeutic activities, therapeutic  exercises  · Plan of Care Expires: 06/13/19  · Plan of Care Reviewed with: patient, friend    Subjective     Chief Complaint: Pain in R hip and dizziness  Patient/Family Comments/goals: Be able to do crafts (pt makes her own jewelry)    Occupational Profile:  Living Environment: Pt lives with daughter in 2SH, 3-4 BAIRON in front, no HR present.  From back entrance there are 4STE with HR present.  Bathroom contains tub/shower combination.  Pt stays downstairs at home.  Previous level of function: Pt reports being (I) with all ADLs and mobility.    Roles and Routines: Pt enjoys making jewelry and doing crafts, drives, cooks, cleans  Equipment Used at Home:  none  Assistance upon Discharge: Daughter is home with pt during the day and can provide assist    Pain/Comfort:  · Pain Rating 1: 6/10  · Location - Side 1: Right  · Location - Orientation 1: generalized  · Location 1: hip  · Pain Addressed 1: Reposition, Distraction, Pre-medicate for activity  · Pain Rating Post-Intervention 1: 6/10    Patients cultural, spiritual, Oriental orthodox conflicts given the current situation: no    Objective:     Communicated with: RN prior to session.  Patient found up in chair with telemetry, peripheral IV, oxygen upon OT entry to room.  Family friend present.    General Precautions: Standard, fall   Orthopedic Precautions:RLE weight bearing as tolerated, RLE posterior precautions   Braces: N/A     Occupational Performance:    Bed Mobility:    · Not assessed 2* pt up in chair upon arrival    Functional Mobility/Transfers:  · Patient completed Sit <> Stand Transfer with maximal assistance  with  rolling walker x 1 trial from chair.  Cues provided to extend RLE and push up with (B) UE to maintain hip precautions; good follow through.    · Functional Mobility: Pt declined taking steps 2* feeling dizzy    Activities of Daily Living:  · Feeding:  SBA for taking sip of water while seated up in chair    Cognitive/Visual  Perceptual:  Cognitive/Psychosocial Skills:    -       Oriented to: Person, Place, Time and Situation   -       Follows Commands/attention:Follows multistep  commands  -       Communication: clear/fluent  -       Memory: No Deficits noted  -       Safety awareness/insight to disability: intact   -       Mood/Affect/Coping skills/emotional control: Appropriate to situation    Physical Exam:  Postural examination/scapula alignment:    -       No postural abnormalities identified  Skin integrity: Visible skin intact  Edema:  None noted  Sensation: -       Intact  Motor Planning: -       WFL  Dominant hand: -       Right  Upper Extremity Range of Motion:  WNL for (B) UE  Upper Extremity Strength: WNL; grossly 4+/5 for all muscle groups   Strength: 4/5 both hands  Fine Motor Coordination: Intact  Gross motor coordination: WFL  Balance:  Sitting- SBA; Standing- Min A for static; unable to assess dynamic  Vision: Intact  Other:  Pt reports having vertigo at baseline    AMPA 6 Click ADL:  AMPAC Total Score: 13    Treatment & Education:  *Pt educated on posterior hip precautions and benefits of participating in therapy  *Pt performed sit <> stand transfer from chair  *Posterior hip precautions reviewed with pt.  OT introduced and discussed use of hip kit with pt to practice in upcoming sessions.  Pt fatigued after performing sit <> stand transfer and requested to return to seated position and rest.  *POC reviewed with pt     Other: Vitals taken at start of session 2* pt reporting feeling dizzy  Blood pressure- 131/62  O2- 95%  HR- 55  Education:    Patient left up in chair with all lines intact, call button in reach and RN notified   *OT spoke with RN after session to discuss transfer and alert her that pt requested to remain seated up in chair at end of session.  Pt performed sit <> stand transfer with Max A and RW during session.  She would benefit from having 2nd person (RN and PCT) present for safety to perform step  transfer from chair to bed; RN verbalized understanding.    GOALS:   Multidisciplinary Problems     Occupational Therapy Goals        Problem: Occupational Therapy Goal    Goal Priority Disciplines Outcome Interventions   Occupational Therapy Goal     OT, PT/OT     Description:  Goals to be met by: 5/21/2019    Patient will increase functional independence with ADLs by performing:    UE Dressing with Stand-by Assistance.  LE Dressing with Minimal Assistance using adaptive equipment as needed.  Grooming while seated with Supervision.  Toileting from bedside commode with Minimal Assistance for hygiene and clothing management.   Step transfer with Minimal Assistance.  Toilet transfer to bedside commode with Moderate Assistance.  Pt will identify posterior hip precautions via teach back method.  Pt will tolerate standing for one minute and perform functional task.                      History:     Past Medical History:   Diagnosis Date    Cervical cancer 1968    breast cancer right    Coronary artery disease     Gout     High cholesterol     Hypertension     MI (myocardial infarction)        Past Surgical History:   Procedure Laterality Date    BREAST LUMPECTOMY      right    CARDIAC SURGERY      multiple cardiac stents    CORONARY STENT PLACEMENT      HEART CATH-LEFT Left 11/24/2017    Performed [facility] at [facility] CATH LAB    HEART CATH-LEFT lv cor poss Left 6/30/2017    Performed by [facility] at [facility] CATH LAB    HEMIARTHROPLASTY - HIP FRACTURE Right 5/13/2019    Performed by Pa Torres MD at [facility] OR       Time Tracking:     OT Date of Treatment: 05/14/19  OT Start Time: 1134  OT Stop Time: 1202  OT Total Time (min): 28 min    Billable Minutes:Evaluation 28    DINA Puri  5/14/2019

## 2019-05-14 NOTE — ASSESSMENT & PLAN NOTE
· Stent placed in July 2017 by Dr. Canela  · No chest pain or sob at this time  · Hold coreg with low bp  · Continue Lipitor 40mg PO daily  · Has been on plavix since that time, held for sx .Started on asa, plavix post sx.  · Monitor on telemetry

## 2019-05-15 LAB
ALBUMIN SERPL BCP-MCNC: 2.3 G/DL (ref 3.5–5.2)
ALP SERPL-CCNC: 161 U/L (ref 55–135)
ALT SERPL W/O P-5'-P-CCNC: 95 U/L (ref 10–44)
ANION GAP SERPL CALC-SCNC: 6 MMOL/L (ref 8–16)
AST SERPL-CCNC: 121 U/L (ref 10–40)
BILIRUB SERPL-MCNC: 1 MG/DL (ref 0.1–1)
BUN SERPL-MCNC: 43 MG/DL (ref 8–23)
CALCIUM SERPL-MCNC: 8.5 MG/DL (ref 8.7–10.5)
CHLORIDE SERPL-SCNC: 105 MMOL/L (ref 95–110)
CO2 SERPL-SCNC: 20 MMOL/L (ref 23–29)
CREAT SERPL-MCNC: 1.8 MG/DL (ref 0.5–1.4)
ERYTHROCYTE [DISTWIDTH] IN BLOOD BY AUTOMATED COUNT: 13.8 % (ref 11.5–14.5)
EST. GFR  (AFRICAN AMERICAN): 29 ML/MIN/1.73 M^2
EST. GFR  (NON AFRICAN AMERICAN): 25 ML/MIN/1.73 M^2
GLUCOSE SERPL-MCNC: 104 MG/DL (ref 70–110)
HCT VFR BLD AUTO: 30.7 % (ref 37–48.5)
HGB BLD-MCNC: 10.1 G/DL (ref 12–16)
MCH RBC QN AUTO: 29.8 PG (ref 27–31)
MCHC RBC AUTO-ENTMCNC: 32.9 G/DL (ref 32–36)
MCV RBC AUTO: 91 FL (ref 82–98)
PLATELET # BLD AUTO: 162 K/UL (ref 150–350)
PMV BLD AUTO: 12 FL (ref 9.2–12.9)
POCT GLUCOSE: 123 MG/DL (ref 70–110)
POTASSIUM SERPL-SCNC: 4.4 MMOL/L (ref 3.5–5.1)
PROT SERPL-MCNC: 5.9 G/DL (ref 6–8.4)
RBC # BLD AUTO: 3.39 M/UL (ref 4–5.4)
SODIUM SERPL-SCNC: 131 MMOL/L (ref 136–145)
WBC # BLD AUTO: 10.9 K/UL (ref 3.9–12.7)

## 2019-05-15 PROCEDURE — 94761 N-INVAS EAR/PLS OXIMETRY MLT: CPT

## 2019-05-15 PROCEDURE — 99233 SBSQ HOSP IP/OBS HIGH 50: CPT | Mod: ,,, | Performed by: INTERNAL MEDICINE

## 2019-05-15 PROCEDURE — 85027 COMPLETE CBC AUTOMATED: CPT

## 2019-05-15 PROCEDURE — 63600175 PHARM REV CODE 636 W HCPCS: Performed by: INTERNAL MEDICINE

## 2019-05-15 PROCEDURE — 25000003 PHARM REV CODE 250: Performed by: HOSPITALIST

## 2019-05-15 PROCEDURE — 11000001 HC ACUTE MED/SURG PRIVATE ROOM

## 2019-05-15 PROCEDURE — 25000003 PHARM REV CODE 250: Performed by: PHYSICIAN ASSISTANT

## 2019-05-15 PROCEDURE — 99233 PR SUBSEQUENT HOSPITAL CARE,LEVL III: ICD-10-PCS | Mod: ,,, | Performed by: INTERNAL MEDICINE

## 2019-05-15 PROCEDURE — 97110 THERAPEUTIC EXERCISES: CPT

## 2019-05-15 PROCEDURE — 94799 UNLISTED PULMONARY SVC/PX: CPT

## 2019-05-15 PROCEDURE — 97530 THERAPEUTIC ACTIVITIES: CPT

## 2019-05-15 PROCEDURE — 80053 COMPREHEN METABOLIC PANEL: CPT

## 2019-05-15 PROCEDURE — 97535 SELF CARE MNGMENT TRAINING: CPT

## 2019-05-15 PROCEDURE — 25000003 PHARM REV CODE 250

## 2019-05-15 PROCEDURE — 25000003 PHARM REV CODE 250: Performed by: INTERNAL MEDICINE

## 2019-05-15 RX ORDER — OXYCODONE HYDROCHLORIDE 5 MG/1
10 TABLET ORAL ONCE
Status: COMPLETED | OUTPATIENT
Start: 2019-05-15 | End: 2019-05-15

## 2019-05-15 RX ADMIN — ALLOPURINOL 300 MG: 100 TABLET ORAL at 08:05

## 2019-05-15 RX ADMIN — MUPIROCIN 1 G: 20 OINTMENT TOPICAL at 09:05

## 2019-05-15 RX ADMIN — OXYCODONE HYDROCHLORIDE 5 MG: 5 TABLET ORAL at 02:05

## 2019-05-15 RX ADMIN — AMIODARONE HYDROCHLORIDE 100 MG: 100 TABLET ORAL at 08:05

## 2019-05-15 RX ADMIN — ENOXAPARIN SODIUM 30 MG: 100 INJECTION SUBCUTANEOUS at 04:05

## 2019-05-15 RX ADMIN — ASPIRIN 81 MG: 81 TABLET, COATED ORAL at 08:05

## 2019-05-15 RX ADMIN — FAMOTIDINE 20 MG: 20 TABLET, FILM COATED ORAL at 08:05

## 2019-05-15 RX ADMIN — CLOPIDOGREL 75 MG: 75 TABLET, FILM COATED ORAL at 08:05

## 2019-05-15 RX ADMIN — CLONAZEPAM 0.5 MG: 0.5 TABLET ORAL at 09:05

## 2019-05-15 RX ADMIN — SODIUM CHLORIDE: 0.9 INJECTION, SOLUTION INTRAVENOUS at 04:05

## 2019-05-15 RX ADMIN — DOCUSATE SODIUM 100 MG: 100 CAPSULE, LIQUID FILLED ORAL at 09:05

## 2019-05-15 RX ADMIN — OXYCODONE HYDROCHLORIDE 10 MG: 5 TABLET ORAL at 06:05

## 2019-05-15 RX ADMIN — MUPIROCIN 1 G: 20 OINTMENT TOPICAL at 08:05

## 2019-05-15 RX ADMIN — POLYETHYLENE GLYCOL 3350 17 G: 17 POWDER, FOR SOLUTION ORAL at 08:05

## 2019-05-15 RX ADMIN — OXYCODONE HYDROCHLORIDE 5 MG: 5 TABLET ORAL at 03:05

## 2019-05-15 RX ADMIN — FOLIC ACID 1 MG: 1 TABLET ORAL at 08:05

## 2019-05-15 RX ADMIN — ATORVASTATIN CALCIUM 40 MG: 20 TABLET, FILM COATED ORAL at 08:05

## 2019-05-15 RX ADMIN — DOCUSATE SODIUM 100 MG: 100 CAPSULE, LIQUID FILLED ORAL at 08:05

## 2019-05-15 RX ADMIN — OXYCODONE HYDROCHLORIDE 5 MG: 5 TABLET ORAL at 11:05

## 2019-05-15 NOTE — PLAN OF CARE
Problem: Adult Inpatient Plan of Care  Goal: Plan of Care Review  Outcome: Ongoing (interventions implemented as appropriate)  Patient remains free from injury or falls. Vital signs stable throughout night on room air. Weight shift assistance provided.. Voiding adequately through shift via fracture pain and bartolome pads x 2. Pain managed with PO medications. Incision to right hip cdi, aquacel dressing in place. Telemetry maintained. Bed in low locked position and call light within reach. Purposeful rounding performed. Will continue to monitor.

## 2019-05-15 NOTE — ASSESSMENT & PLAN NOTE
Could be due to low bp/lying in bed for last few days  Also has h/o vertigo in past for which had evaluation done  Will hold bp meds, continued ivf and monitor  Improved

## 2019-05-15 NOTE — ASSESSMENT & PLAN NOTE
· baseline closer to 1.3  · Now with daisy  · Likely 2/2 decreased PO intake and home nephrotoxic meds (Aldactone, Telmisartan)  · Continue ivf, slowly  improving  ·  dcd celecoxib with daisy  · Also holding bp meds with bp on lower side

## 2019-05-15 NOTE — PROGRESS NOTES
"Progress Note  Orthopedics    Admit Date: 5/9/2019   Patient ID: Anahy Evans is a 85 y.o. female.    Patient eating, daughter at bedside.  NV intact.  Minimal drainage on dressing.  Not doing well with therapy.  Recommending SNF. Daughter agreeable.         Ruthy ASHU Dmitristephenfariha      Vital Sign (recent):  BP (!) 103/54 (BP Location: Right arm, Patient Position: Lying)   Pulse 60   Temp 97.4 °F (36.3 °C) (Oral)   Resp 18   Ht 5' 7" (1.702 m)   Wt 86.3 kg (190 lb 4.1 oz)   LMP  (LMP Unknown)   SpO2 (!) 93%   Breastfeeding? No   BMI 29.80 kg/m²       Laboratory:    CBC:   Recent Labs   Lab 05/15/19  0448   WBC 10.90   RBC 3.39*   HGB 10.1*   HCT 30.7*      MCV 91   MCH 29.8   MCHC 32.9       CMP:   Recent Labs   Lab 05/15/19  0448      CALCIUM 8.5*   ALBUMIN 2.3*   PROT 5.9*   *   K 4.4   CO2 20*      BUN 43*   CREATININE 1.8*   ALKPHOS 161*   ALT 95*   *   BILITOT 1.0         No intake or output data in the 24 hours ending 05/15/19 0922      Current Medications:   allopurinol  300 mg Oral Daily    amiodarone  100 mg Oral Daily    aspirin  81 mg Oral Daily    atorvastatin  40 mg Oral Daily    clonazePAM  0.5 mg Oral QHS    clopidogrel  75 mg Oral Daily    docusate sodium  100 mg Oral Q12H    enoxaparin  30 mg Subcutaneous Daily    famotidine  20 mg Oral Daily    folic acid  1 mg Oral Daily    mupirocin  1 g Nasal BID    polyethylene glycol  17 g Oral Daily       Continuous Infusions:   sodium chloride 0.9% 100 mL/hr at 05/15/19 0414     PRN Meds:.acetaminophen, acetaminophen, benzonatate, bisacodyl, dextrose 50%, dextrose 50%, glucagon (human recombinant), glucose, glucose, hydrALAZINE, insulin aspart U-100, nitroGLYCERIN, ondansetron, oxyCODONE, promethazine (PHENERGAN) IVPB, sodium chloride 0.9%  "

## 2019-05-15 NOTE — PROGRESS NOTES
[facility] Hospital Medicine  Progress Note    Patient Name: Anahy Evans  MRN: [id number]  Patient Class: IP- Inpatient   Admission Date: 5/9/2019  Length of Stay: 6 days  Attending Physician: Lizz Corley MD  Primary Care Provider: Ryan Lopez MD        Subjective:     Principal Problem:Closed fracture of right hip    HPI:  Mrs. Evans is an 85 year old woman with history of coronary artery disease, hypertension, hyperlipidemia, gout, AAA and CKDIII who came in today for evaluation of fall and right hip pain.  She lives independently and tripped on some boxes in her home this morning.  She fell to the floor and landed on her right hip.  Subsequently she had such severe pain she could not get up and walk.  She had no chest pain, shortness of breath, loss of consciousness, nausea, vomiting or head trauma.  At present she has pain with any movement of her right leg.  Otherwise her only complaints are of a cough for three days that is productive of white sputum in the mornings and that it is very cold in her room.  Her daughter is at bedside and complements the history as well.    Hospital Course:  Mrs. Evans is an 85 year old woman with history of coronary artery disease, hypertension, hyperlipidemia, gout, AAA and CKDIII who came in for evaluation of fall and right hip pain who was found to have closed right femoral neck fracture.  Dr. Christianson is consulted and has requested we hold plavix. S/P right hip hemiarthroplasty on  5/13.    Interval History:  dizziness when tried to stand with therapy much improved, c/o pain in right elbow when tried to push the walker  Review of Systems   Constitutional: Negative for chills and fever.   HENT: Negative for trouble swallowing.    Respiratory: Negative for cough and shortness of breath.    Cardiovascular: Negative for chest pain and leg swelling.   Gastrointestinal: Negative for abdominal pain, blood in stool, nausea and vomiting.   Genitourinary:  Negative for dysuria and hematuria.   Musculoskeletal: Negative for gait problem.   Skin: Negative for rash.   Neurological: Positive for dizziness and weakness.   Psychiatric/Behavioral: Negative for confusion.     Objective:     Vital Signs (Most Recent):  Temp: 98 °F (36.7 °C) (05/15/19 1202)  Pulse: 63 (05/15/19 1202)  Resp: 18 (05/15/19 1202)  BP: (!) 111/56 (05/15/19 1202)  SpO2: (!) 92 % (05/15/19 1202) Vital Signs (24h Range):  Temp:  [97.4 °F (36.3 °C)-98.4 °F (36.9 °C)] 98 °F (36.7 °C)  Pulse:  [60-72] 63  Resp:  [16-18] 18  SpO2:  [92 %-99 %] 92 %  BP: (101-130)/(51-68) 111/56     Weight: 86.3 kg (190 lb 4.1 oz)  Body mass index is 29.8 kg/m².    Intake/Output Summary (Last 24 hours) at 5/15/2019 1320  Last data filed at 5/15/2019 1030  Gross per 24 hour   Intake --   Output 300 ml   Net -300 ml      Physical Exam   Constitutional: She is oriented to person, place, and time. No distress.   HENT:   Head: Normocephalic and atraumatic.   Eyes: Pupils are equal, round, and reactive to light. EOM are normal.   Neck: Normal range of motion. Neck supple.   Cardiovascular: Normal rate and regular rhythm.   Pulmonary/Chest: Effort normal and breath sounds normal. No respiratory distress.   Abdominal: Soft. Bowel sounds are normal. She exhibits no distension. There is no tenderness.   Musculoskeletal: She exhibits no edema.   Decreased rom right leg, right elbow, no swelling seen, full rom  Of elbow and shoulder   Neurological: She is alert and oriented to person, place, and time. No cranial nerve deficit.   Skin: Skin is warm.   Psychiatric: She has a normal mood and affect.   Vitals reviewed.      Significant Labs:   BMP:   Recent Labs   Lab 05/15/19  0448      *   K 4.4      CO2 20*   BUN 43*   CREATININE 1.8*   CALCIUM 8.5*     CBC:   Recent Labs   Lab 05/14/19  0524 05/15/19  0448   WBC 8.67  8.55 10.90   HGB 10.8*  10.9* 10.1*   HCT 32.5*  32.6* 30.7*     145* 162        Significant Imaging: I have reviewed all pertinent imaging results/findings within the past 24 hours.    Assessment/Plan:      * Closed fracture of right hip  · XR with right hip fracture  · Dr. Christianson  consulted and plavix held for sx  · S/p sx on5/13.    · -ot/pt efforts  · Pain control with prn oxycodone and iv morphine for pain  · Heparin changed to lovenox as patient c/o prior headache with heparin.    Dizziness  Could be due to low bp/lying in bed for last few days  Also has h/o vertigo in past for which had evaluation done  Will hold bp meds, continued ivf and monitor  Improved       Acute renal failure superimposed on stage 3 chronic kidney disease  · baseline closer to 1.3  · Now with melida  · Likely 2/2 decreased PO intake and home nephrotoxic meds (Aldactone, Telmisartan)  · Continue ivf, slowly  improving  ·  dcd celecoxib with melida  · Also holding bp meds with bp on lower side    Coronary artery disease involving native coronary artery  · Stent placed in July 2017 by Dr. Canela  · No chest pain or sob at this time  · Hold coreg with low bp  · Continue Lipitor 40mg PO daily  · Has been on plavix since that time, held for sx .Started on asa, plavix post sx.  · Monitor on telemetry      Pure hypercholesterolemia  · Chronic and stable  · Continue Lipitor 40mg PO daily    Prediabetes  · HbA1c 5.3  · Not on therapy at home      Old myocardial infarction  · Per CAD      Gastroesophageal reflux disease with esophagitis  · Chronic and stable  · Not on therapy      Chronic kidney disease, stage III (moderate)  · Follows with Dr. Valerio  · See MELDIA on CKD    Essential hypertension  · BP running low now  · Hold norvasc, coreg, previously held telmisartan, spironolactone    PAF (paroxysmal atrial fibrillation)  · Follows with Dr. Gay  · In NSR presently  · Continue Amiodarone 100mg PO daily  · Hold coreg   · Monitor on telemetry  · Not on anticoagulation per se at home.        VTE Risk Mitigation (From  admission, onward)        Ordered     enoxaparin injection 30 mg  Daily      05/14/19 1626     IP VTE HIGH RISK PATIENT  Once      05/13/19 1708     Place XIANG hose  Until discontinued      05/13/19 1708     Place sequential compression device  Until discontinued      05/13/19 1708     Place sequential compression device  Until discontinued      05/09/19 3582              Lizz Corley MD  Department of Hospital Medicine   [facility]

## 2019-05-15 NOTE — PT/OT/SLP PROGRESS
"Physical Therapy Treatment    Patient Name:  Anahy Evans   MRN:  [id number]    Recommendations:     Discharge Recommendations:  nursing facility, skilled   Discharge Equipment Recommendations: (TBD in next level of care)   Barriers to discharge: Decreased caregiver support    Assessment:     Anahy Evans is a 85 y.o. female admitted with a medical diagnosis of Closed fracture of right hip.  She presents with the following impairments/functional limitations:  weakness, impaired endurance, impaired self care skills, impaired functional mobilty, gait instability, impaired balance, pain, decreased lower extremity function, decreased upper extremity function, edema, impaired skin, decreased ROM, orthopedic precautions ; pt with improved mobility today, dec. Assistance needed for t/f's today.    Rehab Prognosis: Good; patient would benefit from acute skilled PT services to address these deficits and reach maximum level of function.    Recent Surgery: Procedure(s) (LRB):  HEMIARTHROPLASTY - HIP FRACTURE (Right) 2 Days Post-Op    Plan:     During this hospitalization, patient to be seen daily to address the identified rehab impairments via gait training, therapeutic activities, therapeutic exercises, neuromuscular re-education and progress toward the following goals:    · Plan of Care Expires:  06/13/19    Subjective     Chief Complaint: pain in R UE when pushing on RW  Patient/Family Comments/goals: pt reports "I'm doing better than yesterday".  Pain/Comfort:  · Pain Rating 1: 5/10  · Location - Side 1: Right  · Location - Orientation 1: generalized  · Location 1: hip  · Pain Addressed 1: Reposition, Distraction  · Pain Rating Post-Intervention 1: 5/10  · Pain Rating 2: 0/10(at rest)  · Location - Side 2: Right  · Location - Orientation 2: upper  · Location 2: arm  · Pain Addressed 2: Reposition, Distraction, Other (see comments)(MD notified)  · Pain Rating Post-Intervention 2: 9/10(with pushing on RW)      Objective: "     Communicated with nurse prior to session.  Patient found supine with telemetry, peripheral IV and hip abd pillow upon PT entry to room.     General Precautions: Standard, fall   Orthopedic Precautions:RLE weight bearing as tolerated, RLE posterior precautions   Braces: N/A     Functional Mobility:  · Bed Mobility:     · Supine to Sit: minimum assistance and cueing for keeping hip prec.  · Transfers:     · Sit to Stand:  moderate assistance with rolling walker  · Gait: pt able to take a few small steps bed to commode to chair with RW and min.A, WBAT on RLE      AM-PAC 6 CLICK MOBILITY  Turning over in bed (including adjusting bedclothes, sheets and blankets)?: 3  Sitting down on and standing up from a chair with arms (e.g., wheelchair, bedside commode, etc.): 2  Moving from lying on back to sitting on the side of the bed?: 3  Moving to and from a bed to a chair (including a wheelchair)?: 2  Need to walk in hospital room?: 2  Climbing 3-5 steps with a railing?: 1  Basic Mobility Total Score: 13       Therapeutic Activities and Exercises:   inst'd in seated LAQ's; supine AP's, QS, GS, x 10 ea.   Reviewed post hip prec., pt able to recall 3/3.  BP seated:116/58 after t/f'ing to chair    Patient left up in chair with all lines intact, call button in reach and nurse notified..    GOALS:   Multidisciplinary Problems     Physical Therapy Goals        Problem: Physical Therapy Goal    Goal Priority Disciplines Outcome Goal Variances Interventions   Physical Therapy Goal     PT, PT/OT Ongoing (interventions implemented as appropriate)     Description:  Patient goals to be met 5/21/19:    1. Patient will transfer supine <> sitting EOB with Min A at BLE with HOB elevated  2. Patient will transfer sit <> stand with RW requiring Min A while maintaining surgical precautions  3. Patient will transfer bed <> chair via stand step t/f with Min A while maintaining surgical precautions  4. Patient will ambulate > 50 ft with  step-through gait in open environment on even surface with Min A while using RW  5. Patient will negotiate 5 steps with no HR while using QC requiring Min A   6. Patient will independently list all surgical precautions with no verbal cues from family or PT                    Time Tracking:     PT Received On: 05/15/19  PT Start Time: 1008     PT Stop Time: 1050  PT Total Time (min): 42 min     Billable Minutes: Therapeutic Activity 30 and Therapeutic Exercise 12    Treatment Type: Treatment  PT/PTA: PTA     PTA Visit Number: 1     Meseret Cristina PTA  05/15/2019

## 2019-05-15 NOTE — PLAN OF CARE
Spoke with pt and daughter at bedside.   Pt expects to be transferred to SNF on Thursday.    Daughter toured at [facility] home and that would be their first choice but daughter has not signed paperwork there yet.    Spoke with Kendall at [facility] [phone].  She states they have requested auth from [facility].      Updated Nydia at [place] [phone], they did receive auth but daughter has refused to go tour or sign paperwork.     VM left with Jeri at [facility] [phone], asked if auth could be moved from [facility] to [facility].  Awaiting callback.     CM to continue to follow.

## 2019-05-15 NOTE — PLAN OF CARE
Problem: Physical Therapy Goal  Goal: Physical Therapy Goal  Patient goals to be met 5/21/19:    1. Patient will transfer supine <> sitting EOB with Min A at BLE with HOB elevated  2. Patient will transfer sit <> stand with RW requiring Min A while maintaining surgical precautions  3. Patient will transfer bed <> chair via stand step t/f with Min A while maintaining surgical precautions  4. Patient will ambulate > 50 ft with step-through gait in open environment on even surface with Min A while using RW  5. Patient will negotiate 5 steps with no HR while using QC requiring Min A   6. Patient will independently list all surgical precautions with no verbal cues from family or PT   Pt progressing towards goals, sup to sit min.A (HOB partially elevated), sit to stand modA with RW, cueing for hip prec.; bed to commode to chair with RW and Hollis, WBAT on RLE. Pt able to recall 3/3  Hip prec. . BP seated: 116/58. Recommend cont PT in SNF.

## 2019-05-15 NOTE — PT/OT/SLP PROGRESS
Occupational Therapy   Treatment    Name: Anahy Evans  MRN: [id number]  Admitting Diagnosis:  Closed fracture of right hip  2 Days Post-Op    Recommendations:     Discharge Recommendations: nursing facility, skilled  Discharge Equipment Recommendations:  other (see comments)(defer to next level of care)  Barriers to discharge:  Inaccessible home environment, Other (Comment)(current functional level)    Assessment:   Step transfer bedside chair>BSC>bed with RW, MOD A for lifting, and verbal cues for safe BLE and hand placement along with maintaining upright trunk to initiate stance and keep with posterior hip precautions during activity.  Required assist for managing underwear and for front warner hygiene in stance with extended BUE supported at RW to maintain static stance with MIN A.  Continued recommendation for post acute therapy in SNF.  Progressing towards goals.  To benefit from continued acute care OT services to increase independence in self-care/functional transfers.  Continue POC.     Anahy Evans is a 85 y.o. female with a medical diagnosis of Closed fracture of right hip.  She presents with below deficits decreasing independence in self-care/functional transfers. Performance deficits affecting function are weakness, impaired endurance, impaired self care skills, impaired functional mobilty, gait instability, impaired balance, decreased upper extremity function, decreased lower extremity function, pain, impaired skin, orthopedic precautions.     Rehab Prognosis:  Good; patient would benefit from acute skilled OT services to address these deficits and reach maximum level of function.       Plan:     Patient to be seen 6 x/week to address the above listed problems via self-care/home management, therapeutic activities, therapeutic exercises  · Plan of Care Expires: 06/13/19  · Plan of Care Reviewed with: patient, daughter    Subjective     Pain/Comfort:  · Pain Rating 1: 5/10(at rest seated in bedside  chair)  · Location - Side 1: Right  · Location - Orientation 1: anterior  · Location 1: hip  · Pain Addressed 1: Reposition, Distraction, Cessation of Activity, Nurse notified  · Pain Rating Post-Intervention 1: 7/10(after activity while resting in bed )    Objective:     Communicated with: Nursing prior to session.  Patient found up in chair with telemetry, peripheral IV upon OT entry to room.    General Precautions: Standard, fall   Orthopedic Precautions:RLE weight bearing as tolerated, RLE posterior precautions   Braces: N/A     Occupational Performance:     Bed Mobility:    · Patient completed Sit to Supine with moderate assistance for LLE management and MIN A needed at trunk    Functional Mobility/Transfers:  · Patient completed Sit <> Stand Transfer with moderate assistance  with  rolling walker   · Patient completed Bed <> Chair Transfer using Step Transfer technique with moderate assistance with rolling walker  · Patient completed Toilet Transfer Step Transfer technique with moderate assistance with  rolling walker and bedside commode    Activities of Daily Living:  · Toileting: total assistance; requires assist for managing underwear and for front warner hygiene in stance with extended BUE      AMPAC 6 Click ADL: 13    Treatment & Education:  Educated on functional transfer/ADL safety.     Patient left HOB elevated with all lines intact with ABD placed, call button in reach, nursing notified and daughter presentEducation:      GOALS:   Multidisciplinary Problems     Occupational Therapy Goals        Problem: Occupational Therapy Goal    Goal Priority Disciplines Outcome Interventions   Occupational Therapy Goal     OT, PT/OT Ongoing (interventions implemented as appropriate)    Description:  Goals to be met by: 5/21/2019    Patient will increase functional independence with ADLs by performing:    UE Dressing with Stand-by Assistance.  LE Dressing with Minimal Assistance using adaptive equipment as  needed.  Grooming while seated with Supervision.  Toileting from bedside commode with Minimal Assistance for hygiene and clothing management.   Step transfer with Minimal Assistance.  Toilet transfer to bedside commode with Moderate Assistance.  GOAL MET 5/15/19.  Pt will identify posterior hip precautions via teach back method.  Pt will tolerate standing for one minute and perform functional task.                       Time Tracking:     OT Date of Treatment: 05/15/19  OT Start Time: 1455  OT Stop Time: 1536  OT Total Time (min): 41 min    Billable Minutes:Self Care/Home Management 41    Lilly Bird, OT  5/15/2019

## 2019-05-15 NOTE — PLAN OF CARE
Problem: Occupational Therapy Goal  Goal: Occupational Therapy Goal  Goals to be met by: 5/21/2019    Patient will increase functional independence with ADLs by performing:    UE Dressing with Stand-by Assistance.  LE Dressing with Minimal Assistance using adaptive equipment as needed.  Grooming while seated with Supervision.  Toileting from bedside commode with Minimal Assistance for hygiene and clothing management.   Step transfer with Minimal Assistance.  Toilet transfer to bedside commode with Moderate Assistance.  GOAL MET 5/15/19.  Pt will identify posterior hip precautions via teach back method.  Pt will tolerate standing for one minute and perform functional task.     Outcome: Ongoing (interventions implemented as appropriate)  Step transfer bedside chair>BSC>bed with RW, MOD A for lifting, and verbal cues for safe BLE and hand placement along with maintaining upright trunk to initiate stance and keep with posterior hip precautions during activity.  Required assist for managing underwear and for front warner hygiene in stance with extended BUE supported at RW to maintain static stance with MIN A.  Continued recommendation for post acute therapy in SNF.  Progressing towards goals.  To benefit from continued acute care OT services to increase independence in self-care/functional transfers.  Continue POC.

## 2019-05-15 NOTE — PLAN OF CARE
Problem: Adult Inpatient Plan of Care  Goal: Plan of Care Review  Outcome: Ongoing (interventions implemented as appropriate)  Plan of Care reviewed with patient and daughter. Questions encouraged and answered. Pt up with assist to BSCC per PT/OT. Tolerated well. Voiding adequately alayna urine without difficulty noted. Purposeful rounding performed. Aquacel dressing with scant amount of dried sanguineous drainage noted. Blood sugars monitored and WNL. Pain controlled with po pain medications as ordered. Daughter @ bedside. Safety maintained. Bed in lowest position and locked. Call light in reach.

## 2019-05-15 NOTE — SUBJECTIVE & OBJECTIVE
Interval History:  dizziness when tried to stand with therapy much improved, c/o pain in right elbow when tried to push the walker  Review of Systems   Constitutional: Negative for chills and fever.   HENT: Negative for trouble swallowing.    Respiratory: Negative for cough and shortness of breath.    Cardiovascular: Negative for chest pain and leg swelling.   Gastrointestinal: Negative for abdominal pain, blood in stool, nausea and vomiting.   Genitourinary: Negative for dysuria and hematuria.   Musculoskeletal: Negative for gait problem.   Skin: Negative for rash.   Neurological: Positive for dizziness and weakness.   Psychiatric/Behavioral: Negative for confusion.     Objective:     Vital Signs (Most Recent):  Temp: 98 °F (36.7 °C) (05/15/19 1202)  Pulse: 63 (05/15/19 1202)  Resp: 18 (05/15/19 1202)  BP: (!) 111/56 (05/15/19 1202)  SpO2: (!) 92 % (05/15/19 1202) Vital Signs (24h Range):  Temp:  [97.4 °F (36.3 °C)-98.4 °F (36.9 °C)] 98 °F (36.7 °C)  Pulse:  [60-72] 63  Resp:  [16-18] 18  SpO2:  [92 %-99 %] 92 %  BP: (101-130)/(51-68) 111/56     Weight: 86.3 kg (190 lb 4.1 oz)  Body mass index is 29.8 kg/m².    Intake/Output Summary (Last 24 hours) at 5/15/2019 1320  Last data filed at 5/15/2019 1030  Gross per 24 hour   Intake --   Output 300 ml   Net -300 ml      Physical Exam   Constitutional: She is oriented to person, place, and time. No distress.   HENT:   Head: Normocephalic and atraumatic.   Eyes: Pupils are equal, round, and reactive to light. EOM are normal.   Neck: Normal range of motion. Neck supple.   Cardiovascular: Normal rate and regular rhythm.   Pulmonary/Chest: Effort normal and breath sounds normal. No respiratory distress.   Abdominal: Soft. Bowel sounds are normal. She exhibits no distension. There is no tenderness.   Musculoskeletal: She exhibits no edema.   Decreased rom right leg, right elbow, no swelling seen, full rom  Of elbow and shoulder   Neurological: She is alert and oriented to  person, place, and time. No cranial nerve deficit.   Skin: Skin is warm.   Psychiatric: She has a normal mood and affect.   Vitals reviewed.      Significant Labs:   BMP:   Recent Labs   Lab 05/15/19  0448      *   K 4.4      CO2 20*   BUN 43*   CREATININE 1.8*   CALCIUM 8.5*     CBC:   Recent Labs   Lab 05/14/19  0524 05/15/19  0448   WBC 8.67  8.55 10.90   HGB 10.8*  10.9* 10.1*   HCT 32.5*  32.6* 30.7*     145* 162       Significant Imaging: I have reviewed all pertinent imaging results/findings within the past 24 hours.

## 2019-05-15 NOTE — ASSESSMENT & PLAN NOTE
· XR with right hip fracture  · Dr. Christianson  consulted and plavix held for sx  · S/p sx on5/13.    · -ot/pt efforts  · Pain control with prn oxycodone and iv morphine for pain  · Heparin changed to lovenox as patient c/o prior headache with heparin.

## 2019-05-16 VITALS
OXYGEN SATURATION: 98 % | HEIGHT: 67 IN | WEIGHT: 190.25 LBS | SYSTOLIC BLOOD PRESSURE: 120 MMHG | BODY MASS INDEX: 29.86 KG/M2 | HEART RATE: 68 BPM | RESPIRATION RATE: 18 BRPM | TEMPERATURE: 99 F | DIASTOLIC BLOOD PRESSURE: 60 MMHG

## 2019-05-16 LAB
ALBUMIN SERPL BCP-MCNC: 2.2 G/DL (ref 3.5–5.2)
ALP SERPL-CCNC: 160 U/L (ref 55–135)
ALT SERPL W/O P-5'-P-CCNC: 137 U/L (ref 10–44)
ANION GAP SERPL CALC-SCNC: 5 MMOL/L (ref 8–16)
AST SERPL-CCNC: 165 U/L (ref 10–40)
BASOPHILS # BLD AUTO: 0.02 K/UL (ref 0–0.2)
BASOPHILS NFR BLD: 0.2 % (ref 0–1.9)
BILIRUB SERPL-MCNC: 0.8 MG/DL (ref 0.1–1)
BUN SERPL-MCNC: 34 MG/DL (ref 8–23)
CALCIUM SERPL-MCNC: 8.7 MG/DL (ref 8.7–10.5)
CHLORIDE SERPL-SCNC: 109 MMOL/L (ref 95–110)
CO2 SERPL-SCNC: 20 MMOL/L (ref 23–29)
CREAT SERPL-MCNC: 1.5 MG/DL (ref 0.5–1.4)
DIFFERENTIAL METHOD: ABNORMAL
EOSINOPHIL # BLD AUTO: 0.2 K/UL (ref 0–0.5)
EOSINOPHIL NFR BLD: 1.7 % (ref 0–8)
ERYTHROCYTE [DISTWIDTH] IN BLOOD BY AUTOMATED COUNT: 13.9 % (ref 11.5–14.5)
EST. GFR  (AFRICAN AMERICAN): 36 ML/MIN/1.73 M^2
EST. GFR  (NON AFRICAN AMERICAN): 32 ML/MIN/1.73 M^2
GLUCOSE SERPL-MCNC: 91 MG/DL (ref 70–110)
HCT VFR BLD AUTO: 30.3 % (ref 37–48.5)
HGB BLD-MCNC: 9.9 G/DL (ref 12–16)
LYMPHOCYTES # BLD AUTO: 1.1 K/UL (ref 1–4.8)
LYMPHOCYTES NFR BLD: 11.9 % (ref 18–48)
MCH RBC QN AUTO: 29.7 PG (ref 27–31)
MCHC RBC AUTO-ENTMCNC: 32.7 G/DL (ref 32–36)
MCV RBC AUTO: 91 FL (ref 82–98)
MONOCYTES # BLD AUTO: 1.6 K/UL (ref 0.3–1)
MONOCYTES NFR BLD: 17.4 % (ref 4–15)
NEUTROPHILS # BLD AUTO: 6.3 K/UL (ref 1.8–7.7)
NEUTROPHILS NFR BLD: 68.5 % (ref 38–73)
PLATELET # BLD AUTO: 189 K/UL (ref 150–350)
PMV BLD AUTO: 11.2 FL (ref 9.2–12.9)
POCT GLUCOSE: 144 MG/DL (ref 70–110)
POTASSIUM SERPL-SCNC: 4.2 MMOL/L (ref 3.5–5.1)
PROT SERPL-MCNC: 5.8 G/DL (ref 6–8.4)
RBC # BLD AUTO: 3.33 M/UL (ref 4–5.4)
SODIUM SERPL-SCNC: 134 MMOL/L (ref 136–145)
WBC # BLD AUTO: 9.26 K/UL (ref 3.9–12.7)

## 2019-05-16 PROCEDURE — 63600175 PHARM REV CODE 636 W HCPCS: Performed by: INTERNAL MEDICINE

## 2019-05-16 PROCEDURE — 85025 COMPLETE CBC W/AUTO DIFF WBC: CPT

## 2019-05-16 PROCEDURE — 94761 N-INVAS EAR/PLS OXIMETRY MLT: CPT

## 2019-05-16 PROCEDURE — 25000003 PHARM REV CODE 250

## 2019-05-16 PROCEDURE — 99239 PR HOSPITAL DISCHARGE DAY,>30 MIN: ICD-10-PCS | Mod: ,,, | Performed by: INTERNAL MEDICINE

## 2019-05-16 PROCEDURE — 36415 COLL VENOUS BLD VENIPUNCTURE: CPT

## 2019-05-16 PROCEDURE — 99900035 HC TECH TIME PER 15 MIN (STAT)

## 2019-05-16 PROCEDURE — 99239 HOSP IP/OBS DSCHRG MGMT >30: CPT | Mod: ,,, | Performed by: INTERNAL MEDICINE

## 2019-05-16 PROCEDURE — 97116 GAIT TRAINING THERAPY: CPT

## 2019-05-16 PROCEDURE — 25000003 PHARM REV CODE 250: Performed by: HOSPITALIST

## 2019-05-16 PROCEDURE — 25000003 PHARM REV CODE 250: Performed by: INTERNAL MEDICINE

## 2019-05-16 PROCEDURE — 80053 COMPREHEN METABOLIC PANEL: CPT

## 2019-05-16 RX ORDER — OXYCODONE HYDROCHLORIDE 5 MG/1
5 TABLET ORAL EVERY 4 HOURS PRN
Qty: 40 TABLET | Refills: 0 | Status: SHIPPED | OUTPATIENT
Start: 2019-05-16 | End: 2020-02-15 | Stop reason: CLARIF

## 2019-05-16 RX ORDER — BISACODYL 10 MG
10 SUPPOSITORY, RECTAL RECTAL DAILY PRN
Refills: 0 | Status: ON HOLD | COMMUNITY
Start: 2019-05-16 | End: 2020-02-17 | Stop reason: HOSPADM

## 2019-05-16 RX ORDER — CLONAZEPAM 0.5 MG/1
0.5 TABLET ORAL NIGHTLY
Qty: 30 TABLET | Refills: 3 | Status: SHIPPED | OUTPATIENT
Start: 2019-05-16

## 2019-05-16 RX ORDER — MUPIROCIN 20 MG/G
1 OINTMENT TOPICAL 2 TIMES DAILY
Start: 2019-05-16 | End: 2020-02-15 | Stop reason: CLARIF

## 2019-05-16 RX ORDER — ENOXAPARIN SODIUM 100 MG/ML
30 INJECTION SUBCUTANEOUS DAILY
Status: ON HOLD
Start: 2019-05-16 | End: 2020-02-17 | Stop reason: HOSPADM

## 2019-05-16 RX ORDER — BENZONATATE 100 MG/1
100 CAPSULE ORAL 3 TIMES DAILY PRN
Start: 2019-05-16 | End: 2019-05-26

## 2019-05-16 RX ORDER — DOCUSATE SODIUM 100 MG/1
100 CAPSULE, LIQUID FILLED ORAL EVERY 12 HOURS
Refills: 0 | COMMUNITY
Start: 2019-05-16

## 2019-05-16 RX ORDER — OXYCODONE HYDROCHLORIDE 5 MG/1
5 TABLET ORAL EVERY 4 HOURS PRN
Refills: 0
Start: 2019-05-16 | End: 2019-05-16

## 2019-05-16 RX ORDER — POLYETHYLENE GLYCOL 3350 17 G/17G
17 POWDER, FOR SOLUTION ORAL DAILY
Refills: 0
Start: 2019-05-17 | End: 2020-02-15 | Stop reason: CLARIF

## 2019-05-16 RX ORDER — FAMOTIDINE 20 MG/1
20 TABLET, FILM COATED ORAL DAILY
Qty: 30 TABLET | Refills: 11 | Status: SHIPPED | OUTPATIENT
Start: 2019-05-17 | End: 2020-02-15 | Stop reason: CLARIF

## 2019-05-16 RX ORDER — OXYCODONE HYDROCHLORIDE 5 MG/1
10 TABLET ORAL ONCE
Status: COMPLETED | OUTPATIENT
Start: 2019-05-16 | End: 2019-05-16

## 2019-05-16 RX ADMIN — ATORVASTATIN CALCIUM 40 MG: 20 TABLET, FILM COATED ORAL at 08:05

## 2019-05-16 RX ADMIN — AMIODARONE HYDROCHLORIDE 100 MG: 100 TABLET ORAL at 08:05

## 2019-05-16 RX ADMIN — ALLOPURINOL 300 MG: 100 TABLET ORAL at 08:05

## 2019-05-16 RX ADMIN — POLYETHYLENE GLYCOL 3350 17 G: 17 POWDER, FOR SOLUTION ORAL at 08:05

## 2019-05-16 RX ADMIN — SODIUM CHLORIDE: 0.9 INJECTION, SOLUTION INTRAVENOUS at 02:05

## 2019-05-16 RX ADMIN — FOLIC ACID 1 MG: 1 TABLET ORAL at 08:05

## 2019-05-16 RX ADMIN — FAMOTIDINE 20 MG: 20 TABLET, FILM COATED ORAL at 08:05

## 2019-05-16 RX ADMIN — DOCUSATE SODIUM 100 MG: 100 CAPSULE, LIQUID FILLED ORAL at 08:05

## 2019-05-16 RX ADMIN — MUPIROCIN 1 G: 20 OINTMENT TOPICAL at 08:05

## 2019-05-16 RX ADMIN — ENOXAPARIN SODIUM 30 MG: 100 INJECTION SUBCUTANEOUS at 04:05

## 2019-05-16 RX ADMIN — OXYCODONE HYDROCHLORIDE 5 MG: 5 TABLET ORAL at 09:05

## 2019-05-16 RX ADMIN — CLOPIDOGREL 75 MG: 75 TABLET, FILM COATED ORAL at 08:05

## 2019-05-16 RX ADMIN — OXYCODONE HYDROCHLORIDE 5 MG: 5 TABLET ORAL at 02:05

## 2019-05-16 RX ADMIN — ASPIRIN 81 MG: 81 TABLET, COATED ORAL at 08:05

## 2019-05-16 RX ADMIN — OXYCODONE HYDROCHLORIDE 10 MG: 5 TABLET ORAL at 03:05

## 2019-05-16 NOTE — PT/OT/SLP PROGRESS
Physical Therapy Treatment    Patient Name:  Anahy Evans   MRN:  [id number]    Recommendations:     Discharge Recommendations:  nursing facility, skilled   Discharge Equipment Recommendations: (TBD at next level of care)   Barriers to discharge: Decreased caregiver support    Assessment:     Anahy Evans is a 85 y.o. female admitted with a medical diagnosis of Closed fracture of right hip.  She presents with the following impairments/functional limitations:  weakness, impaired endurance, impaired self care skills, impaired functional mobilty, gait instability, impaired balance, pain, edema, impaired skin, decreased ROM, decreased lower extremity function, orthopedic precautions ;pt with improved mobility today, though had 1 major LOB towards end of amb dist. , which req'd maxA to recover.    Rehab Prognosis: Good; patient would benefit from acute skilled PT services to address these deficits and reach maximum level of function.    Recent Surgery: Procedure(s) (LRB):  HEMIARTHROPLASTY - HIP FRACTURE (Right) 3 Days Post-Op    Plan:     During this hospitalization, patient to be seen daily to address the identified rehab impairments via gait training, therapeutic activities, therapeutic exercises, neuromuscular re-education and progress toward the following goals:    · Plan of Care Expires:  06/13/19    Subjective     Chief Complaint: fatigue and pain  Patient/Family Comments/goals: pt agreeable to session, though fatigued from sitting up most of day  Pain/Comfort:  · Pain Rating 1: 5/10  · Location - Side 1: Right  · Location - Orientation 1: generalized  · Location 1: hip  · Pain Addressed 1: Reposition, Distraction, Cessation of Activity, Nurse notified(pt due for pain meds at this time)  · Pain Rating Post-Intervention 1: 5/10(with amb)      Objective:     Communicated with nurse prior to session.  Patient found sitting up EOB, daughter present assisting with dressing for discharge to SNF with telemetry,  peripheral IV upon PT entry to room.     General Precautions: Standard, fall   Orthopedic Precautions:RLE weight bearing as tolerated, RLE posterior precautions   Braces: N/A     Functional Mobility:  · Bed Mobility:     · Sit to Supine: maximal assistance and at LE's  · Transfers:     · Sit to Stand:  minimum assistance and moderate assistance with rolling walker  · Gait: amb'd  10' with RW and min.A, WBAT on RLE (though pt had major LOB towards end and req'd max.A to recover)      AM-PAC 6 CLICK MOBILITY  Turning over in bed (including adjusting bedclothes, sheets and blankets)?: 3  Sitting down on and standing up from a chair with arms (e.g., wheelchair, bedside commode, etc.): 3  Moving from lying on back to sitting on the side of the bed?: 3  Moving to and from a bed to a chair (including a wheelchair)?: 3  Need to walk in hospital room?: 3  Climbing 3-5 steps with a railing?: 1  Basic Mobility Total Score: 16       Therapeutic Activities and Exercises:       Patient left supine with all lines intact, call button in reach and ddaughter present..    GOALS:   Multidisciplinary Problems     Physical Therapy Goals        Problem: Physical Therapy Goal    Goal Priority Disciplines Outcome Goal Variances Interventions   Physical Therapy Goal     PT, PT/OT Ongoing (interventions implemented as appropriate)     Description:  Patient goals to be met 5/21/19:    1. Patient will transfer supine <> sitting EOB with Min A at BLE with HOB elevated  2. Patient will transfer sit <> stand with RW requiring Min A while maintaining surgical precautions  3. Patient will transfer bed <> chair via stand step t/f with Min A while maintaining surgical precautions  4. Patient will ambulate > 50 ft with step-through gait in open environment on even surface with Min A while using RW  5. Patient will negotiate 5 steps with no HR while using QC requiring Min A   6. Patient will independently list all surgical precautions with no verbal  cues from family or PT                    Time Tracking:     PT Received On: 05/16/19  PT Start Time: 1349     PT Stop Time: 1406  PT Total Time (min): 17 min     Billable Minutes: Gait Training 17    Treatment Type: Treatment  PT/PTA: PTA     PTA Visit Number: 2     Meseret Cristina PTA  05/16/2019

## 2019-05-16 NOTE — PROGRESS NOTES
"Progress Note  Orthopedics    Admit Date: 5/9/2019   Patient ID: Anahy Evans is a 85 y.o. female.      C/O of pain.  Nursing to give her meds.   NV intact. Dressing Moderate drainage. Plan for SNF when accepted.     Ruthy Witt      Vital Sign (recent):  /60 (BP Location: Left arm, Patient Position: Lying)   Pulse 68   Temp 98.2 °F (36.8 °C) (Oral)   Resp 18   Ht 5' 7" (1.702 m)   Wt 86.3 kg (190 lb 4.1 oz)   LMP  (LMP Unknown)   SpO2 (!) 94%   Breastfeeding? No   BMI 29.80 kg/m²       Laboratory:    CBC:   Recent Labs   Lab 05/16/19  0348   WBC 9.26   RBC 3.33*   HGB 9.9*   HCT 30.3*      MCV 91   MCH 29.7   MCHC 32.7       CMP:   Recent Labs   Lab 05/16/19  0348   GLU 91   CALCIUM 8.7   ALBUMIN 2.2*   PROT 5.8*   *   K 4.2   CO2 20*      BUN 34*   CREATININE 1.5*   ALKPHOS 160*   *   *   BILITOT 0.8           Intake/Output Summary (Last 24 hours) at 5/16/2019 0905  Last data filed at 5/16/2019 0821  Gross per 24 hour   Intake 600 ml   Output 1600 ml   Net -1000 ml         Current Medications:   allopurinol  300 mg Oral Daily    amiodarone  100 mg Oral Daily    aspirin  81 mg Oral Daily    atorvastatin  40 mg Oral Daily    clonazePAM  0.5 mg Oral QHS    clopidogrel  75 mg Oral Daily    docusate sodium  100 mg Oral Q12H    enoxaparin  30 mg Subcutaneous Daily    famotidine  20 mg Oral Daily    folic acid  1 mg Oral Daily    mupirocin  1 g Nasal BID    polyethylene glycol  17 g Oral Daily       Continuous Infusions:   sodium chloride 0.9% 100 mL/hr at 05/16/19 0258     PRN Meds:.acetaminophen, acetaminophen, benzonatate, bisacodyl, dextrose 50%, dextrose 50%, glucagon (human recombinant), glucose, glucose, hydrALAZINE, insulin aspart U-100, nitroGLYCERIN, ondansetron, oxyCODONE, promethazine (PHENERGAN) IVPB, sodium chloride 0.9%  "

## 2019-05-16 NOTE — PLAN OF CARE
Spoke with pt and daughter at bedside. Informed of new bed assignment at [facility] Room 216.     ADT 30 placed, ambulance stretcher to arrive by 5pm.   Packet provided to Bedside RN.    RN to call report to [facility]. jesus Oates RN. [phone]  Ext [phone].  Room 216 Red Unit.      No further DC needs from  perspective.       05/16/19 1548   Final Note   Assessment Type Final Discharge Note   Anticipated Discharge Disposition SNF   What phone number can be called within the next 1-3 days to see how you are doing after discharge? [phone]   Hospital Follow Up  Appt(s) scheduled? Yes   Discharge plans and expectations educations in teach back method with documentation complete? Yes   Right Care Referral Info   Post Acute Recommendation SNF / Sub-Acute Rehab

## 2019-05-16 NOTE — PLAN OF CARE
05/16/19 1551   Medicare Message   Important Message from Medicare regarding Discharge Appeal Rights Given to patient/caregiver;Explained to patient/caregiver;Signed/date by patient/caregiver   Date IMM was signed 05/16/19   Time IMM was signed 0810

## 2019-05-16 NOTE — NURSING
[facility] here. Pt transferred via bed to stretcher per 2 assist. Pt transferred to [facility]. Safety maintained.

## 2019-05-16 NOTE — PLAN OF CARE
Problem: Adult Inpatient Plan of Care  Goal: Plan of Care Review  Outcome: Ongoing (interventions implemented as appropriate)  Patient remains free from injury or falls. Vital signs stable throughout night on room air. Weight shift assistance provided.. Voiding adequately through shift via fracture pain. Pain managed with PO medications. Incision to right hip cdi, aquacel dressing in place. Telemetry maintained. Bed in low locked position and call light within reach. Purposeful rounding performed. Will continue to monitor.

## 2019-05-16 NOTE — PLAN OF CARE
Signed orders sent to [facility].   Kendall with [facility] signed paperwork with family.  Insurance auth obtained for [facility].      Awaiting their bed assignment.    Pt will require Ambulance/stretcher transport.    CM to continue to follow.

## 2019-05-16 NOTE — PLAN OF CARE
Problem: Physical Therapy Goal  Goal: Physical Therapy Goal  Patient goals to be met 5/21/19:    1. Patient will transfer supine <> sitting EOB with Min A at BLE with HOB elevated  2. Patient will transfer sit <> stand with RW requiring Min A while maintaining surgical precautions  3. Patient will transfer bed <> chair via stand step t/f with Min A while maintaining surgical precautions  4. Patient will ambulate > 50 ft with step-through gait in open environment on even surface with Min A while using RW  5. Patient will negotiate 5 steps with no HR while using QC requiring Min A   6. Patient will independently list all surgical precautions with no verbal cues from family or PT   Pt progressing towards goals, pt able to recall 3/3 hip prec. With some help, sit to stand min/modA with RW, amb'd 10' with RW and min.A (max.A for 1 major LOB), WBAT on RLE. Sit to supine max.A at LE's. Recommend cont PT in SNF.

## 2019-05-16 NOTE — PT/OT/SLP DISCHARGE
Occupational Therapy Discharge Summary    Anahy Evans  MRN: [id number]   Principal Problem: Closed fracture of right hip      Patient Discharged from acute Occupational Therapy on 5/16/19.  Please refer to prior OT note dated 5/15/19 for functional status.    Assessment:      Patient appropriate for care in another setting.    Objective:     GOALS:   Multidisciplinary Problems     Occupational Therapy Goals        Problem: Occupational Therapy Goal    Goal Priority Disciplines Outcome Interventions   Occupational Therapy Goal     OT, PT/OT Ongoing (interventions implemented as appropriate)    Description:  Goals to be met by: 5/21/2019    Patient will increase functional independence with ADLs by performing:    UE Dressing with Stand-by Assistance.  LE Dressing with Minimal Assistance using adaptive equipment as needed.  Grooming while seated with Supervision.  Toileting from bedside commode with Minimal Assistance for hygiene and clothing management.   Step transfer with Minimal Assistance.  Toilet transfer to bedside commode with Moderate Assistance.  GOAL MET 5/15/19.  Pt will identify posterior hip precautions via teach back method.  Pt will tolerate standing for one minute and perform functional task.                       Reasons for Discontinuation of Therapy Services  Transfer to alternate level of care.      Plan:     Patient Discharged to: Skilled Nursing Facility    Lilly Bird, OT  5/16/2019

## 2019-05-17 NOTE — PT/OT/SLP DISCHARGE
Physical Therapy Discharge Summary    Name: Anahy Evans  MRN: [id number]   Principal Problem: Closed fracture of right hip     Patient Discharged from acute Physical Therapy on 5/16/19.  Please refer to prior PT noted date on 5/16/19 for functional status.     Assessment:     Goals partially met.    Objective:     GOALS:   Multidisciplinary Problems     Physical Therapy Goals        Problem: Physical Therapy Goal    Goal Priority Disciplines Outcome Goal Variances Interventions   Physical Therapy Goal     PT, PT/OT Ongoing (interventions implemented as appropriate)     Description:  Patient goals to be met 5/21/19:    1. Patient will transfer supine <> sitting EOB with Min A at BLE with HOB elevated  2. Patient will transfer sit <> stand with RW requiring Min A while maintaining surgical precautions-MET 5/16/19  3. Patient will transfer bed <> chair via stand step t/f with Min A while maintaining surgical precautions-MET 5/16/19  4. Patient will ambulate > 50 ft with step-through gait in open environment on even surface with Min A while using RW  5. Patient will negotiate 5 steps with no HR while using QC requiring Min A   6. Patient will independently list all surgical precautions with no verbal cues from family or PT                     Reasons for Discontinuation of Therapy Services  Transfer to alternate level of care.      Plan:     Patient Discharged to: Skilled Nursing Facility.  PT of record not available for documentation.      Allyson Elizabeth, PT  5/17/2019

## 2019-05-28 NOTE — ASSESSMENT & PLAN NOTE
· baseline closer to 1.3  · Now with daisy  · Likely 2/2 decreased PO intake and home nephrotoxic meds (Aldactone, Telmisartan)  · Improved with ivf  ·  dcd celecoxib with daisy  · Also holding bp meds with bp on lower side

## 2019-05-28 NOTE — DISCHARGE SUMMARY
[facility] Hospital Medicine  Discharge Summary      Patient Name: Anahy Evans  MRN: [id number]  Admission Date: 5/9/2019  Hospital Length of Stay: 7 days  Discharge Date and Time: 5/16/2019  6:37 PM  Attending Physician: No att. providers found   Discharging Provider: Shahzad Mancia MD  Primary Care Provider: Ryan Lopez MD      HPI:   Mrs. Evans is an 85 year old woman with history of coronary artery disease, hypertension, hyperlipidemia, gout, AAA and CKDIII who came in today for evaluation of fall and right hip pain.  She lives independently and tripped on some boxes in her home this morning.  She fell to the floor and landed on her right hip.  Subsequently she had such severe pain she could not get up and walk.  She had no chest pain, shortness of breath, loss of consciousness, nausea, vomiting or head trauma.  At present she has pain with any movement of her right leg.  Otherwise her only complaints are of a cough for three days that is productive of white sputum in the mornings and that it is very cold in her room.  Her daughter is at bedside and complements the history as well.    Procedure(s) (LRB):  HEMIARTHROPLASTY - HIP FRACTURE (Right)      Hospital Course:   Mrs. Evans is an 85 year old woman with history of coronary artery disease, hypertension, hyperlipidemia, gout, AAA and CKDIII who came in for evaluation of fall and right hip pain who was found to have closed right femoral neck fracture.  Dr. Christianson is consulted and has requested we hold plavix. S/P right hip hemiarthroplasty on  5/13.She had dizziness and low bp after procedure which improved with ivf and holding bp meds.She was able to participate better with therapy and dcd to skilled nursing facility.     Consults:   Consults (From admission, onward)        Status Ordering Provider     Inpatient consult to SNF  Once     Provider:  (Not yet assigned)    Completed SHAHZAD MANCIA          * Closed fracture of right  hip  · XR with right hip fracture  · Dr. Christianson  consulted and plavix held for sx and later restarted  · S/p sx on5/13.    · -ot/pt efforts  · Pain control with prn oxycodone   · Heparin changed to lovenox as patient c/o prior headache with heparin.    Dizziness  Could be due to low bp/lying in bed for last few days  Also has h/o vertigo in past for which had evaluation done  Improved with holding bp meds and ivf      Acute renal failure superimposed on stage 3 chronic kidney disease  · baseline closer to 1.3  · Now with melida  · Likely 2/2 decreased PO intake and home nephrotoxic meds (Aldactone, Telmisartan)  · Improved with ivf  ·  dcd celecoxib with melida  · Also holding bp meds with bp on lower side    Coronary artery disease involving native coronary artery  · Stent placed in July 2017 by Dr. Canela  · No chest pain or sob at this time  · Hold coreg with low bp  · Continue Lipitor 40mg PO daily  · Has been on plavix since that time, held for sx .Started on asa, plavix post sx.  · Monitor on telemetry      Pure hypercholesterolemia  · Chronic and stable  · Continue Lipitor 40mg PO daily    Prediabetes  · HbA1c 5.3  · Not on therapy at home      Old myocardial infarction  · Per CAD      Gastroesophageal reflux disease without esophagitis  · Chronic and stable  · Not on therapy      Chronic kidney disease, stage III (moderate)  · Follows with Dr. Valerio  · See MELIDA on CKD    Essential hypertension  · BP running low now  · Hold norvasc, coreg, previously held telmisartan, spironolactone  · - may need to restart once bp gets better    PAF (paroxysmal atrial fibrillation)  · Follows with Dr. Gay  · In NSR presently  · Continue Amiodarone 100mg PO daily  · Hold coreg   · Monitor on telemetry  · Not on anticoagulation per se at home.        Final Active Diagnoses:    Diagnosis Date Noted POA    PRINCIPAL PROBLEM:  Closed fracture of right hip [S72.001A] 05/09/2019 Yes    Dizziness [R42] 05/14/2019 No     Acute renal failure superimposed on stage 3 chronic kidney disease [N17.9, N18.3] 05/13/2019 No    Coronary artery disease involving native coronary artery [I25.10] 05/09/2019 Yes    Prediabetes [R73.03] 01/10/2019 Yes    Chronic kidney disease, stage III (moderate) [N18.3] 06/29/2017 Yes    Essential hypertension [I10] 11/10/2016 Yes    Pure hypercholesterolemia [E78.00] 10/21/2014 Yes    PAF (paroxysmal atrial fibrillation) [I48.0] 07/05/2013 Yes    Gastroesophageal reflux disease without esophagitis [K21.9] 09/14/2012 Yes    Old myocardial infarction [I25.2] 06/06/2012 Not Applicable      Problems Resolved During this Admission:    Diagnosis Date Noted Date Resolved POA    Cough [R05] 05/09/2019 05/11/2019 Yes       Discharged Condition: stable    Disposition: Skilled Nursing Facility    Follow Up:  Follow-up Information     Ryan Lopez MD In 2 weeks.    Specialty:  Internal Medicine  Contact information:  [address] [address] [phone]             Pa Torres MD In 2 weeks.    Specialty:  Orthopedic Surgery  Contact information:  [address] [facility]  [facility] [address] [phone]                 Patient Instructions:      Diet Cardiac     Activity as tolerated       Significant Diagnostic Studies:   BMP  Lab Results   Component Value Date     (L) 05/16/2019    K 4.2 05/16/2019     05/16/2019    CO2 20 (L) 05/16/2019    BUN 34 (H) 05/16/2019    CREATININE 1.5 (H) 05/16/2019    CALCIUM 8.7 05/16/2019    ANIONGAP 5 (L) 05/16/2019    ESTGFRAFRICA 36 (A) 05/16/2019    EGFRNONAA 32 (A) 05/16/2019     Lab Results   Component Value Date    WBC 9.26 05/16/2019    HGB 9.9 (L) 05/16/2019    HCT 30.3 (L) 05/16/2019    MCV 91 05/16/2019     05/16/2019     X-Ray Hip 1 View Right  Narrative: EXAMINATION:  XR HIP 1 VIEW RIGHT    CLINICAL HISTORY:  Hemiarthroplasty;    TECHNIQUE:  AP view of the right  hip    COMPARISON:  None    FINDINGS:  Right hip arthroplasty.  No evidence of postprocedure complication or fracture.  Postprocedural gas.  Overlying skin staples.  Impression: Right hip arthroplasty without evidence of complication    Electronically signed by: Tashi Velasco MD  Date:    05/13/2019  Time:    16:08          Pending Diagnostic Studies:     None         Medications:  Reconciled Home Medications:      Medication List      START taking these medications    bisacodyl 10 mg Supp  Commonly known as:  DULCOLAX  Place 1 suppository (10 mg total) rectally daily as needed (Until bowel movement if patient has no bowel movement for 2 days).     docusate sodium 100 MG capsule  Commonly known as:  COLACE  Take 1 capsule (100 mg total) by mouth every 12 (twelve) hours.     enoxaparin 40 mg/0.4 mL Syrg  Commonly known as:  LOVENOX  Inject 0.3 mLs (30 mg total) into the skin once daily.     famotidine 20 MG tablet  Commonly known as:  PEPCID  Take 1 tablet (20 mg total) by mouth once daily.     mupirocin 2 % ointment  Commonly known as:  BACTROBAN  1 g by Nasal route 2 (two) times daily.     oxyCODONE 5 MG immediate release tablet  Commonly known as:  ROXICODONE  Take 1 tablet (5 mg total) by mouth every 4 (four) hours as needed for Pain.     polyethylene glycol 17 gram Pwpk  Commonly known as:  GLYCOLAX  Take 17 g by mouth once daily.        CONTINUE taking these medications    acetaminophen 500 MG tablet  Commonly known as:  TYLENOL  Take 1 tablet (500 mg total) by mouth every 6 (six) hours as needed.     allopurinol 300 MG tablet  Commonly known as:  ZYLOPRIM  Take 300 mg by mouth once daily.     amiodarone 200 MG Tab  Commonly known as:  PACERONE  Take 0.5 tablets (100 mg total) by mouth once daily.     aspirin 81 MG EC tablet  Commonly known as:  ECOTRIN  Take 81 mg by mouth once daily.     atorvastatin 40 MG tablet  Commonly known as:  LIPITOR  Take 40 mg by mouth once daily.     calcium carbonate-vitamin D3  600 mg calcium- 200 unit Cap  Take by mouth.     clonazePAM 0.5 MG tablet  Commonly known as:  KLONOPIN  Take 1 tablet (0.5 mg total) by mouth every evening.     clopidogrel 75 mg tablet  Commonly known as:  PLAVIX  TAKE 1 TABLET DAILY     * folic acid 1 MG tablet  Commonly known as:  FOLVITE  Take 1 mg by mouth once daily.          meclizine 12.5 mg tablet  Commonly known as:  ANTIVERT  Take 12.5 mg by mouth 3 (three) times daily as needed.     nitroGLYCERIN 0.4 MG SL tablet  Commonly known as:  NITROSTAT  Place 0.4 mg under the tongue every 5 (five) minutes as needed for Chest pain.     VITAMIN D3 2,000 unit Cap  Generic drug:  cholecalciferol (vitamin D3)  Take 1 capsule by mouth once daily.        STOP taking these medications    amLODIPine 5 MG tablet  Commonly known as:  NORVASC     carvedilol 6.25 MG tablet  Commonly known as:  COREG     spironolactone 25 MG tablet  Commonly known as:  ALDACTONE     telmisartan 80 MG Tab  Commonly known as:  MICARDIS        ASK your doctor about these medications    benzonatate 100 MG capsule  Commonly known as:  TESSALON  Take 1 capsule (100 mg total) by mouth 3 (three) times daily as needed for Cough.  Ask about: Should I take this medication?            Indwelling Lines/Drains at time of discharge:   Lines/Drains/Airways          None          Time spent on the discharge of patient: 45  minutes  Patient was seen and examined on the date of discharge and determined to be suitable for discharge.         Lizz Corley MD  Department of Hospital Medicine  [facility]

## 2019-05-28 NOTE — ASSESSMENT & PLAN NOTE
Could be due to low bp/lying in bed for last few days  Also has h/o vertigo in past for which had evaluation done  Improved with holding bp meds and ivf

## 2019-05-28 NOTE — ASSESSMENT & PLAN NOTE
· XR with right hip fracture  · Dr. Christianson  consulted and plavix held for sx and later restarted  · S/p sx on5/13.    · -ot/pt efforts  · Pain control with prn oxycodone   · Heparin changed to lovenox as patient c/o prior headache with heparin.

## 2019-05-28 NOTE — ASSESSMENT & PLAN NOTE
· BP running low now  · Hold norvasc, coreg, previously held telmisartan, spironolactone  · - may need to restart once bp gets better

## 2019-07-23 ENCOUNTER — HOSPITAL ENCOUNTER (OUTPATIENT)
Dept: RADIOLOGY | Facility: OTHER | Age: 84
Discharge: HOME | End: 2019-07-23
Attending: INTERNAL MEDICINE
Payer: MEDICARE

## 2019-07-23 DIAGNOSIS — M84.359D: ICD-10-CM

## 2019-07-23 PROCEDURE — 77080 DEXA BONE DENSITY SPINE HIP: ICD-10-PCS | Mod: 26,,, | Performed by: RADIOLOGY

## 2019-07-23 PROCEDURE — 77080 DXA BONE DENSITY AXIAL: CPT | Mod: TC

## 2019-07-23 PROCEDURE — 77080 DXA BONE DENSITY AXIAL: CPT | Mod: 26,,, | Performed by: RADIOLOGY

## 2019-10-28 ENCOUNTER — OFFICE VISIT (OUTPATIENT)
Dept: CARDIOLOGY | Facility: CLINIC | Age: 84
End: 2019-10-28
Attending: INTERNAL MEDICINE
Payer: MEDICARE

## 2019-10-28 VITALS
WEIGHT: 177 LBS | HEART RATE: 48 BPM | DIASTOLIC BLOOD PRESSURE: 60 MMHG | BODY MASS INDEX: 27.78 KG/M2 | SYSTOLIC BLOOD PRESSURE: 97 MMHG | HEIGHT: 67 IN

## 2019-10-28 DIAGNOSIS — N18.30 CHRONIC KIDNEY DISEASE, STAGE III (MODERATE): ICD-10-CM

## 2019-10-28 DIAGNOSIS — I25.10 CORONARY ARTERY DISEASE INVOLVING NATIVE CORONARY ARTERY OF NATIVE HEART WITHOUT ANGINA PECTORIS: ICD-10-CM

## 2019-10-28 DIAGNOSIS — I48.0 PAF (PAROXYSMAL ATRIAL FIBRILLATION): ICD-10-CM

## 2019-10-28 DIAGNOSIS — Z85.3 PERSONAL HISTORY OF MALIGNANT NEOPLASM OF BREAST: ICD-10-CM

## 2019-10-28 DIAGNOSIS — I65.23 BILATERAL CAROTID ARTERY STENOSIS: Primary | ICD-10-CM

## 2019-10-28 DIAGNOSIS — I10 ESSENTIAL HYPERTENSION: ICD-10-CM

## 2019-10-28 PROCEDURE — 99214 OFFICE O/P EST MOD 30 MIN: CPT | Mod: 25,S$GLB,, | Performed by: INTERNAL MEDICINE

## 2019-10-28 PROCEDURE — 93000 PR ELECTROCARDIOGRAM, COMPLETE: ICD-10-PCS | Mod: S$GLB,,, | Performed by: INTERNAL MEDICINE

## 2019-10-28 PROCEDURE — 1101F PR PT FALLS ASSESS DOC 0-1 FALLS W/OUT INJ PAST YR: ICD-10-PCS | Mod: CPTII,S$GLB,, | Performed by: INTERNAL MEDICINE

## 2019-10-28 PROCEDURE — 93000 ELECTROCARDIOGRAM COMPLETE: CPT | Mod: S$GLB,,, | Performed by: INTERNAL MEDICINE

## 2019-10-28 PROCEDURE — 1101F PT FALLS ASSESS-DOCD LE1/YR: CPT | Mod: CPTII,S$GLB,, | Performed by: INTERNAL MEDICINE

## 2019-10-28 PROCEDURE — 99214 PR OFFICE/OUTPT VISIT, EST, LEVL IV, 30-39 MIN: ICD-10-PCS | Mod: 25,S$GLB,, | Performed by: INTERNAL MEDICINE

## 2019-10-28 NOTE — PROGRESS NOTES
Subjective:    Patient ID:  Anahy Evans is a 86 y.o. female     HPI  Here for follow-up of coronary artery disease, status post bare metal stent in the left anterior descending coronary artery during a STEMI in 1999, drug-eluting stent in the left circumflex coronary artery in 2010, drug-eluting stent in the proximal left anterior descending coronary artery in June 2017, drug-eluting stent in the ostial left anterior descending coronary artery in November 2017, carotid occlusive disease, hypertension, paroxysmal atrial fibrillation, chronic renal failure, diabetes mellitus, hyperlipidemia.     I tripped and fell in May, fractured my right hip and had surgery.  I have recovered from that.  I balance however is not great.  I have had no chest pains or shortness of breath.  I spent a couple of months this summer with my sister in [place].    Current Outpatient Medications   Medication Sig    acetaminophen (TYLENOL) 500 MG tablet Take 1 tablet (500 mg total) by mouth every 6 (six) hours as needed.    allopurinol (ZYLOPRIM) 300 MG tablet Take 300 mg by mouth once daily.    amiodarone (PACERONE) 200 MG Tab Take 0.5 tablets (100 mg total) by mouth once daily.    aspirin (ECOTRIN) 81 MG EC tablet Take 81 mg by mouth once daily.    atorvastatin (LIPITOR) 40 MG tablet Take 40 mg by mouth once daily.    bisacodyl (DULCOLAX) 10 mg Supp Place 1 suppository (10 mg total) rectally daily as needed (Until bowel movement if patient has no bowel movement for 2 days).    calcium carbonate-vitamin D3 600 mg calcium- 200 unit Cap Take by mouth.    cholecalciferol, vitamin D3, (VITAMIN D3) 2,000 unit Cap Take 1 capsule by mouth once daily.    clonazePAM (KLONOPIN) 0.5 MG tablet Take 1 tablet (0.5 mg total) by mouth every evening.    clopidogrel (PLAVIX) 75 mg tablet TAKE 1 TABLET DAILY    docusate sodium (COLACE) 100 MG capsule Take 1 capsule (100 mg total) by mouth every 12 (twelve) hours.    enoxaparin (LOVENOX) 40  mg/0.4 mL Syrg Inject 0.3 mLs (30 mg total) into the skin once daily.    famotidine (PEPCID) 20 MG tablet Take 1 tablet (20 mg total) by mouth once daily.    folic acid (FOLVITE) 1 MG tablet Take 1 mg by mouth once daily.    folic acid (FOLVITE) 1 MG tablet Take 1 tablet by mouth.    meclizine (ANTIVERT) 12.5 mg tablet Take 12.5 mg by mouth 3 (three) times daily as needed.    mupirocin (BACTROBAN) 2 % ointment 1 g by Nasal route 2 (two) times daily.    nitroGLYCERIN (NITROSTAT) 0.4 MG SL tablet Place 0.4 mg under the tongue every 5 (five) minutes as needed for Chest pain.    oxyCODONE (ROXICODONE) 5 MG immediate release tablet Take 1 tablet (5 mg total) by mouth every 4 (four) hours as needed for Pain.    polyethylene glycol (GLYCOLAX) 17 gram PwPk Take 17 g by mouth once daily.     No current facility-administered medications for this visit.        Review of Systems   Constitution: Negative for chills, decreased appetite, fever, weight gain and weight loss.   HENT: Negative for congestion, hearing loss and sore throat.    Eyes: Negative for blurred vision, double vision and visual disturbance.   Cardiovascular: Negative for chest pain, claudication, dyspnea on exertion, leg swelling, palpitations and syncope.   Respiratory: Negative for cough, hemoptysis, shortness of breath, sputum production and wheezing.    Endocrine: Negative for cold intolerance and heat intolerance.   Hematologic/Lymphatic: Negative for bleeding problem. Does not bruise/bleed easily.   Skin: Negative for color change, dry skin, flushing and itching.   Musculoskeletal: Negative for back pain, joint pain and myalgias.   Gastrointestinal: Negative for abdominal pain, anorexia, constipation, diarrhea, dysphagia, nausea and vomiting.        No bleeding per rectum   Genitourinary: Negative for dysuria, flank pain, frequency, hematuria and nocturia.   Neurological: Negative for dizziness, headaches, light-headedness, loss of balance,  "seizures and tremors.   Psychiatric/Behavioral: Negative for altered mental status and depression.         Vitals:    10/28/19 1225   BP: 97/60   Pulse: (!) 48   Weight: 80.3 kg (177 lb)   Height: 5' 7" (1.702 m)     Objective:    Physical Exam   Constitutional: She is oriented to person, place, and time. She appears well-developed and well-nourished.   HENT:   Head: Normocephalic and atraumatic.   Nose: Nose normal.   Mouth/Throat: Oropharynx is clear and moist.   Eyes: Pupils are equal, round, and reactive to light. Conjunctivae and EOM are normal.   Neck: Neck supple. No tracheal deviation present. No thyromegaly present.   Cardiovascular: Normal rate, regular rhythm and intact distal pulses. Exam reveals no gallop and no friction rub.   No murmur heard.  Pulmonary/Chest: No respiratory distress. She has no wheezes. She has no rales. She exhibits no tenderness.   Abdominal: Soft. Bowel sounds are normal. She exhibits no distension and no mass. There is no tenderness. There is no rebound and no guarding.   Musculoskeletal: Normal range of motion.   Lymphadenopathy:     She has no cervical adenopathy.   Neurological: She is alert and oriented to person, place, and time.   Skin: Skin is warm and dry.   Psychiatric: Her behavior is normal.         Assessment:       1. Bilateral carotid artery stenosis    2. Coronary artery disease involving native coronary artery of native heart without angina pectoris    3. Essential hypertension    4. PAF (paroxysmal atrial fibrillation)    5. Chronic kidney disease, stage III (moderate)    6. Personal history of malignant neoplasm of breast         Plan:       Reduce amiodarone to 100 mg daily.  Continue to monitor blood pressure at home.  Continue the remaining pharmacological regimen.            "

## 2019-10-28 NOTE — LETTER
October 28, 2019      Ryan Lopez MD  [facility] [address]           [facility]  [address] [place] [address], [id number]  [facility] [address]  Phone: [phone]  Fax: [phone]          Patient: Anahy Evans   MR Number: [id number]   YOB: 1933   Date of Visit: 10/28/2019       Dear Dr. Ryan Lopez:    Thank you for referring Anahy Evans to me for evaluation. Attached you will find relevant portions of my assessment and plan of care.    If you have questions, please do not hesitate to call me. I look forward to following Anahy Evans along with you.    Sincerely,    Asim Canela MD    Enclosure  CC:  No Recipients    If you would like to receive this communication electronically, please contact [email or web address] or [phone] to request more information on [employer] access.    For providers and/or their staff who would like to refer a patient to [facility], please contact us through our one-stop-shop provider referral line, [facility], at [phone].    If you feel you have received this communication in error or would no longer like to receive these types of communications, please e-mail [email or web address]

## 2020-01-30 ENCOUNTER — LAB VISIT (OUTPATIENT)
Dept: LAB | Facility: OTHER | Age: 85
End: 2020-01-30
Attending: INTERNAL MEDICINE
Payer: MEDICARE

## 2020-01-30 ENCOUNTER — CLINICAL SUPPORT (OUTPATIENT)
Dept: CARDIOLOGY | Facility: CLINIC | Age: 85
End: 2020-01-30
Attending: INTERNAL MEDICINE
Payer: MEDICARE

## 2020-01-30 VITALS
BODY MASS INDEX: 27.62 KG/M2 | HEIGHT: 67 IN | SYSTOLIC BLOOD PRESSURE: 140 MMHG | DIASTOLIC BLOOD PRESSURE: 78 MMHG | HEART RATE: 53 BPM | WEIGHT: 176 LBS

## 2020-01-30 DIAGNOSIS — I65.21 STENOSIS OF RIGHT CAROTID ARTERY: Primary | ICD-10-CM

## 2020-01-30 DIAGNOSIS — R42 VERTIGO: ICD-10-CM

## 2020-01-30 DIAGNOSIS — I48.0 PAF (PAROXYSMAL ATRIAL FIBRILLATION): ICD-10-CM

## 2020-01-30 DIAGNOSIS — I65.21 STENOSIS OF RIGHT CAROTID ARTERY: ICD-10-CM

## 2020-01-30 DIAGNOSIS — E78.00 PURE HYPERCHOLESTEROLEMIA: ICD-10-CM

## 2020-01-30 DIAGNOSIS — I10 ESSENTIAL HYPERTENSION: ICD-10-CM

## 2020-01-30 DIAGNOSIS — I65.23 BILATERAL CAROTID ARTERY STENOSIS: ICD-10-CM

## 2020-01-30 DIAGNOSIS — I25.10 CORONARY ARTERY DISEASE INVOLVING NATIVE CORONARY ARTERY OF NATIVE HEART WITHOUT ANGINA PECTORIS: ICD-10-CM

## 2020-01-30 DIAGNOSIS — I25.2 OLD MYOCARDIAL INFARCTION: ICD-10-CM

## 2020-01-30 DIAGNOSIS — N18.30 CHRONIC KIDNEY DISEASE, STAGE III (MODERATE): ICD-10-CM

## 2020-01-30 LAB
ANION GAP SERPL CALC-SCNC: 10 MMOL/L (ref 8–16)
BUN SERPL-MCNC: 25 MG/DL (ref 8–23)
CALCIUM SERPL-MCNC: 9.6 MG/DL (ref 8.7–10.5)
CHLORIDE SERPL-SCNC: 105 MMOL/L (ref 95–110)
CO2 SERPL-SCNC: 24 MMOL/L (ref 23–29)
CREAT SERPL-MCNC: 1.4 MG/DL (ref 0.5–1.4)
EST. GFR  (AFRICAN AMERICAN): 39 ML/MIN/1.73 M^2
EST. GFR  (NON AFRICAN AMERICAN): 34 ML/MIN/1.73 M^2
GLUCOSE SERPL-MCNC: 88 MG/DL (ref 70–110)
POTASSIUM SERPL-SCNC: 4 MMOL/L (ref 3.5–5.1)
SODIUM SERPL-SCNC: 139 MMOL/L (ref 136–145)

## 2020-01-30 PROCEDURE — 1159F PR MEDICATION LIST DOCUMENTED IN MEDICAL RECORD: ICD-10-PCS | Mod: S$GLB,,, | Performed by: INTERNAL MEDICINE

## 2020-01-30 PROCEDURE — 1101F PR PT FALLS ASSESS DOC 0-1 FALLS W/OUT INJ PAST YR: ICD-10-PCS | Mod: CPTII,S$GLB,, | Performed by: INTERNAL MEDICINE

## 2020-01-30 PROCEDURE — 36415 COLL VENOUS BLD VENIPUNCTURE: CPT

## 2020-01-30 PROCEDURE — 99214 PR OFFICE/OUTPT VISIT, EST, LEVL IV, 30-39 MIN: ICD-10-PCS | Mod: S$GLB,,, | Performed by: INTERNAL MEDICINE

## 2020-01-30 PROCEDURE — 99214 OFFICE O/P EST MOD 30 MIN: CPT | Mod: S$GLB,,, | Performed by: INTERNAL MEDICINE

## 2020-01-30 PROCEDURE — 80048 BASIC METABOLIC PNL TOTAL CA: CPT

## 2020-01-30 PROCEDURE — 1101F PT FALLS ASSESS-DOCD LE1/YR: CPT | Mod: CPTII,S$GLB,, | Performed by: INTERNAL MEDICINE

## 2020-01-30 PROCEDURE — 1159F MED LIST DOCD IN RCRD: CPT | Mod: S$GLB,,, | Performed by: INTERNAL MEDICINE

## 2020-01-30 NOTE — PROGRESS NOTES
Subjective:    Patient ID:  Anahy Evans is a 86 y.o. female     HPI  Here for follow-up of coronary artery disease, status post bare metal stent in the left anterior descending coronary artery during a STEMI in 1999, drug-eluting stent in the left circumflex coronary artery in 2010, drug-eluting stent in the proximal left anterior descending coronary artery in June 2017, drug-eluting stent in the ostial left anterior descending coronary artery in November 2017, carotid occlusive disease, hypertension, paroxysmal atrial fibrillation, chronic renal failure, diabetes mellitus, hyperlipidemia.     I feel well.  I get occasional vertigo.  Have no exertional shortness of breath or chest pain.    Current Outpatient Medications   Medication Sig    acetaminophen (TYLENOL) 500 MG tablet Take 1 tablet (500 mg total) by mouth every 6 (six) hours as needed.    allopurinol (ZYLOPRIM) 300 MG tablet Take 300 mg by mouth once daily.    amiodarone (PACERONE) 200 MG Tab Take 0.5 tablets (100 mg total) by mouth once daily.    aspirin (ECOTRIN) 81 MG EC tablet Take 81 mg by mouth once daily.    atorvastatin (LIPITOR) 40 MG tablet Take 40 mg by mouth once daily.    bisacodyl (DULCOLAX) 10 mg Supp Place 1 suppository (10 mg total) rectally daily as needed (Until bowel movement if patient has no bowel movement for 2 days).    calcium carbonate-vitamin D3 600 mg calcium- 200 unit Cap Take by mouth.    cholecalciferol, vitamin D3, (VITAMIN D3) 2,000 unit Cap Take 1 capsule by mouth once daily.    clonazePAM (KLONOPIN) 0.5 MG tablet Take 1 tablet (0.5 mg total) by mouth every evening.    clopidogrel (PLAVIX) 75 mg tablet TAKE 1 TABLET DAILY    docusate sodium (COLACE) 100 MG capsule Take 1 capsule (100 mg total) by mouth every 12 (twelve) hours.    enoxaparin (LOVENOX) 40 mg/0.4 mL Syrg Inject 0.3 mLs (30 mg total) into the skin once daily.    famotidine (PEPCID) 20 MG tablet Take 1 tablet (20 mg total) by mouth once daily.     folic acid (FOLVITE) 1 MG tablet Take 1 mg by mouth once daily.    folic acid (FOLVITE) 1 MG tablet Take 1 tablet by mouth.    meclizine (ANTIVERT) 12.5 mg tablet Take 12.5 mg by mouth 3 (three) times daily as needed.    mupirocin (BACTROBAN) 2 % ointment 1 g by Nasal route 2 (two) times daily.    nitroGLYCERIN (NITROSTAT) 0.4 MG SL tablet Place 0.4 mg under the tongue every 5 (five) minutes as needed for Chest pain.    oxyCODONE (ROXICODONE) 5 MG immediate release tablet Take 1 tablet (5 mg total) by mouth every 4 (four) hours as needed for Pain.    polyethylene glycol (GLYCOLAX) 17 gram PwPk Take 17 g by mouth once daily.     No current facility-administered medications for this visit.        Review of Systems   Constitution: Negative for chills, decreased appetite, fever, weight gain and weight loss.   HENT: Negative for congestion, hearing loss and sore throat.    Eyes: Negative for blurred vision, double vision and visual disturbance.   Cardiovascular: Negative for chest pain, claudication, dyspnea on exertion, leg swelling, palpitations and syncope.   Respiratory: Negative for cough, hemoptysis, shortness of breath, sputum production and wheezing.    Endocrine: Negative for cold intolerance and heat intolerance.   Hematologic/Lymphatic: Negative for bleeding problem. Does not bruise/bleed easily.   Skin: Negative for color change, dry skin, flushing and itching.   Musculoskeletal: Negative for back pain, joint pain and myalgias.   Gastrointestinal: Negative for abdominal pain, anorexia, constipation, diarrhea, dysphagia, nausea and vomiting.        No bleeding per rectum   Genitourinary: Negative for dysuria, flank pain, frequency, hematuria and nocturia.   Neurological: Positive for dizziness and vertigo. Negative for headaches, light-headedness, loss of balance, seizures and tremors.   Psychiatric/Behavioral: Negative for altered mental status and depression.     Vitals:    01/30/20 1107   BP: (!)  "140/78   Pulse: (!) 53   Weight: 79.8 kg (176 lb)   Height: 5' 7" (1.702 m)        Objective:    Physical Exam   Constitutional: She is oriented to person, place, and time. She appears well-developed and well-nourished.   HENT:   Head: Normocephalic and atraumatic.   Nose: Nose normal.   Mouth/Throat: Oropharynx is clear and moist.   Eyes: Pupils are equal, round, and reactive to light. Conjunctivae and EOM are normal.   Neck: Neck supple. No tracheal deviation present. No thyromegaly present.   Cardiovascular: Normal rate and regular rhythm. Exam reveals no gallop and no friction rub.   No murmur heard.  Pulmonary/Chest: No respiratory distress. She has no wheezes. She has no rales. She exhibits no tenderness.   Abdominal: Soft. Bowel sounds are normal. She exhibits no distension and no mass. There is no tenderness. There is no rebound and no guarding.   Musculoskeletal: Normal range of motion.   Lymphadenopathy:     She has no cervical adenopathy.   Neurological: She is alert and oriented to person, place, and time.   Skin: Skin is warm and dry.   Psychiatric: Her behavior is normal.       carotid Doppler studies reviewed, and discussed with the patient at length.    Assessment:           2. Coronary artery disease involving native coronary artery of native heart without angina pectoris    3. Essential hypertension    4. Bilateral carotid artery stenosis    5. PAF (paroxysmal atrial fibrillation)    6. Old myocardial infarction    7. Pure hypercholesterolemia    8. Chronic kidney disease, stage III (moderate)    9. Vertigo         Plan:       BMP.  CT angiogram of the carotid arteries.            "

## 2020-01-31 LAB
LEFT CBA DIAS: 43 CM/S
LEFT CBA SYS: 80 CM/S
LEFT CCA DIST DIAS: 45 CM/S
LEFT CCA DIST SYS: 103 CM/S
LEFT CCA MID DIAS: 55 CM/S
LEFT CCA MID SYS: 113 CM/S
LEFT CCA PROX DIAS: 54 CM/S
LEFT CCA PROX SYS: 111 CM/S
LEFT ECA DIAS: 38 CM/S
LEFT ECA SYS: 98 CM/S
LEFT ICA DIST DIAS: 69 CM/S
LEFT ICA DIST SYS: 152 CM/S
LEFT ICA MID DIAS: 56 CM/S
LEFT ICA MID SYS: 139 CM/S
LEFT ICA PROX DIAS: 69 CM/S
LEFT ICA PROX SYS: 142 CM/S
LEFT VERTEBRAL DIAS: 26 CM/S
LEFT VERTEBRAL SYS: 57 CM/S
OHS CV CAROTID RIGHT ICA EDV HIGHEST: 227
OHS CV CAROTID ULTRASOUND LEFT ICA/CCA RATIO: 1.35
OHS CV CAROTID ULTRASOUND RIGHT ICA/CCA RATIO: 6.07
OHS CV PV CAROTID LEFT HIGHEST CCA: 113
OHS CV PV CAROTID LEFT HIGHEST ICA: 152
OHS CV PV CAROTID RIGHT HIGHEST CCA: 75
OHS CV PV CAROTID RIGHT HIGHEST ICA: 455
OHS CV US CAROTID LEFT HIGHEST EDV: 69
RIGHT CBA DIAS: 40 CM/S
RIGHT CBA SYS: 78 CM/S
RIGHT CCA DIST DIAS: 42 CM/S
RIGHT CCA DIST SYS: 75 CM/S
RIGHT CCA MID DIAS: 38 CM/S
RIGHT CCA MID SYS: 67 CM/S
RIGHT CCA PROX DIAS: 33 CM/S
RIGHT CCA PROX SYS: 74 CM/S
RIGHT ECA DIAS: 39 CM/S
RIGHT ECA SYS: 93 CM/S
RIGHT ICA DIST DIAS: 70 CM/S
RIGHT ICA DIST SYS: 162 CM/S
RIGHT ICA MID DIAS: 135 CM/S
RIGHT ICA MID SYS: 280 CM/S
RIGHT ICA PROX DIAS: 227 CM/S
RIGHT ICA PROX SYS: 455 CM/S
RIGHT VERTEBRAL DIAS: 39 CM/S
RIGHT VERTEBRAL SYS: 90 CM/S

## 2020-02-05 ENCOUNTER — HOSPITAL ENCOUNTER (OUTPATIENT)
Dept: RADIOLOGY | Facility: OTHER | Age: 85
Discharge: HOME | End: 2020-02-05
Attending: INTERNAL MEDICINE
Payer: MEDICARE

## 2020-02-05 DIAGNOSIS — I65.21 STENOSIS OF RIGHT CAROTID ARTERY: ICD-10-CM

## 2020-02-05 PROCEDURE — 70496 CTA HEAD AND NECK (XPD): ICD-10-PCS | Mod: 26,,, | Performed by: RADIOLOGY

## 2020-02-05 PROCEDURE — 70496 CT ANGIOGRAPHY HEAD: CPT | Mod: 26,,, | Performed by: RADIOLOGY

## 2020-02-05 PROCEDURE — 70498 CTA HEAD AND NECK (XPD): ICD-10-PCS | Mod: 26,,, | Performed by: RADIOLOGY

## 2020-02-05 PROCEDURE — 70496 CT ANGIOGRAPHY HEAD: CPT | Mod: TC

## 2020-02-05 PROCEDURE — 25500020 PHARM REV CODE 255: Performed by: INTERNAL MEDICINE

## 2020-02-05 PROCEDURE — 70498 CT ANGIOGRAPHY NECK: CPT | Mod: 26,,, | Performed by: RADIOLOGY

## 2020-02-05 RX ADMIN — IOHEXOL 75 ML: 350 INJECTION, SOLUTION INTRAVENOUS at 01:02

## 2020-02-15 ENCOUNTER — HOSPITAL ENCOUNTER (OUTPATIENT)
Facility: OTHER | Age: 85
Discharge: HOME | End: 2020-02-17
Attending: EMERGENCY MEDICINE | Admitting: EMERGENCY MEDICINE
Payer: MEDICARE

## 2020-02-15 DIAGNOSIS — R07.89 CHEST PRESSURE: ICD-10-CM

## 2020-02-15 DIAGNOSIS — R79.89 ELEVATED TROPONIN: ICD-10-CM

## 2020-02-15 DIAGNOSIS — R07.89 ATYPICAL CHEST PAIN: Primary | ICD-10-CM

## 2020-02-15 DIAGNOSIS — K21.9 GASTROESOPHAGEAL REFLUX DISEASE WITHOUT ESOPHAGITIS: ICD-10-CM

## 2020-02-15 DIAGNOSIS — R07.9 CHEST PAIN AT REST: ICD-10-CM

## 2020-02-15 DIAGNOSIS — I25.10 CORONARY ARTERY DISEASE, ANGINA PRESENCE UNSPECIFIED, UNSPECIFIED VESSEL OR LESION TYPE, UNSPECIFIED WHETHER NATIVE OR TRANSPLANTED HEART: ICD-10-CM

## 2020-02-15 DIAGNOSIS — I25.10 CAD (CORONARY ARTERY DISEASE): ICD-10-CM

## 2020-02-15 DIAGNOSIS — N18.30 CHRONIC KIDNEY DISEASE, STAGE III (MODERATE): ICD-10-CM

## 2020-02-15 DIAGNOSIS — E78.00 PURE HYPERCHOLESTEROLEMIA: ICD-10-CM

## 2020-02-15 DIAGNOSIS — I10 ESSENTIAL HYPERTENSION: ICD-10-CM

## 2020-02-15 DIAGNOSIS — I48.0 PAF (PAROXYSMAL ATRIAL FIBRILLATION): ICD-10-CM

## 2020-02-15 DIAGNOSIS — R07.9 ACUTE CHEST PAIN: ICD-10-CM

## 2020-02-15 DIAGNOSIS — I65.21 STENOSIS OF RIGHT CAROTID ARTERY: ICD-10-CM

## 2020-02-15 LAB
ALBUMIN SERPL BCP-MCNC: 3.4 G/DL (ref 3.5–5.2)
ALP SERPL-CCNC: 77 U/L (ref 55–135)
ALT SERPL W/O P-5'-P-CCNC: 18 U/L (ref 10–44)
ANION GAP SERPL CALC-SCNC: 11 MMOL/L (ref 8–16)
ANION GAP SERPL CALC-SCNC: 9 MMOL/L (ref 8–16)
AST SERPL-CCNC: 16 U/L (ref 10–40)
BASOPHILS # BLD AUTO: 0.03 K/UL (ref 0–0.2)
BASOPHILS # BLD AUTO: 0.04 K/UL (ref 0–0.2)
BASOPHILS NFR BLD: 0.7 % (ref 0–1.9)
BASOPHILS NFR BLD: 0.8 % (ref 0–1.9)
BILIRUB SERPL-MCNC: 0.5 MG/DL (ref 0.1–1)
BUN SERPL-MCNC: 18 MG/DL (ref 8–23)
BUN SERPL-MCNC: 19 MG/DL (ref 8–23)
CALCIUM SERPL-MCNC: 9 MG/DL (ref 8.7–10.5)
CALCIUM SERPL-MCNC: 9.2 MG/DL (ref 8.7–10.5)
CHLORIDE SERPL-SCNC: 104 MMOL/L (ref 95–110)
CHLORIDE SERPL-SCNC: 107 MMOL/L (ref 95–110)
CHOLEST SERPL-MCNC: 151 MG/DL (ref 120–199)
CHOLEST/HDLC SERPL: 2.6 {RATIO} (ref 2–5)
CO2 SERPL-SCNC: 24 MMOL/L (ref 23–29)
CO2 SERPL-SCNC: 25 MMOL/L (ref 23–29)
CREAT SERPL-MCNC: 1.6 MG/DL (ref 0.5–1.4)
CREAT SERPL-MCNC: 1.7 MG/DL (ref 0.5–1.4)
DIFFERENTIAL METHOD: ABNORMAL
DIFFERENTIAL METHOD: ABNORMAL
EOSINOPHIL # BLD AUTO: 0.1 K/UL (ref 0–0.5)
EOSINOPHIL # BLD AUTO: 0.2 K/UL (ref 0–0.5)
EOSINOPHIL NFR BLD: 3.2 % (ref 0–8)
EOSINOPHIL NFR BLD: 3.9 % (ref 0–8)
ERYTHROCYTE [DISTWIDTH] IN BLOOD BY AUTOMATED COUNT: 14.4 % (ref 11.5–14.5)
ERYTHROCYTE [DISTWIDTH] IN BLOOD BY AUTOMATED COUNT: 14.5 % (ref 11.5–14.5)
EST. GFR  (AFRICAN AMERICAN): 31 ML/MIN/1.73 M^2
EST. GFR  (AFRICAN AMERICAN): 33 ML/MIN/1.73 M^2
EST. GFR  (NON AFRICAN AMERICAN): 27 ML/MIN/1.73 M^2
EST. GFR  (NON AFRICAN AMERICAN): 29 ML/MIN/1.73 M^2
GLUCOSE SERPL-MCNC: 88 MG/DL (ref 70–110)
GLUCOSE SERPL-MCNC: 93 MG/DL (ref 70–110)
HCT VFR BLD AUTO: 36.5 % (ref 37–48.5)
HCT VFR BLD AUTO: 37 % (ref 37–48.5)
HDLC SERPL-MCNC: 59 MG/DL (ref 40–75)
HDLC SERPL: 39.1 % (ref 20–50)
HGB BLD-MCNC: 11.6 G/DL (ref 12–16)
HGB BLD-MCNC: 11.7 G/DL (ref 12–16)
IMM GRANULOCYTES # BLD AUTO: 0 K/UL (ref 0–0.04)
IMM GRANULOCYTES # BLD AUTO: 0.01 K/UL (ref 0–0.04)
IMM GRANULOCYTES NFR BLD AUTO: 0 % (ref 0–0.5)
IMM GRANULOCYTES NFR BLD AUTO: 0.2 % (ref 0–0.5)
LDLC SERPL CALC-MCNC: 83.8 MG/DL (ref 63–159)
LYMPHOCYTES # BLD AUTO: 1.9 K/UL (ref 1–4.8)
LYMPHOCYTES # BLD AUTO: 2.1 K/UL (ref 1–4.8)
LYMPHOCYTES NFR BLD: 42.4 % (ref 18–48)
LYMPHOCYTES NFR BLD: 43.3 % (ref 18–48)
MAGNESIUM SERPL-MCNC: 1.7 MG/DL (ref 1.6–2.6)
MCH RBC QN AUTO: 29.5 PG (ref 27–31)
MCH RBC QN AUTO: 29.6 PG (ref 27–31)
MCHC RBC AUTO-ENTMCNC: 31.6 G/DL (ref 32–36)
MCHC RBC AUTO-ENTMCNC: 31.8 G/DL (ref 32–36)
MCV RBC AUTO: 93 FL (ref 82–98)
MCV RBC AUTO: 94 FL (ref 82–98)
MONOCYTES # BLD AUTO: 0.5 K/UL (ref 0.3–1)
MONOCYTES # BLD AUTO: 0.6 K/UL (ref 0.3–1)
MONOCYTES NFR BLD: 10.3 % (ref 4–15)
MONOCYTES NFR BLD: 11.8 % (ref 4–15)
NEUTROPHILS # BLD AUTO: 1.9 K/UL (ref 1.8–7.7)
NEUTROPHILS # BLD AUTO: 1.9 K/UL (ref 1.8–7.7)
NEUTROPHILS NFR BLD: 40 % (ref 38–73)
NEUTROPHILS NFR BLD: 43.4 % (ref 38–73)
NONHDLC SERPL-MCNC: 92 MG/DL
NRBC BLD-RTO: 0 /100 WBC
NRBC BLD-RTO: 0 /100 WBC
PHOSPHATE SERPL-MCNC: 3.1 MG/DL (ref 2.7–4.5)
PLATELET # BLD AUTO: 148 K/UL (ref 150–350)
PLATELET # BLD AUTO: 154 K/UL (ref 150–350)
PMV BLD AUTO: 10.6 FL (ref 9.2–12.9)
PMV BLD AUTO: 11 FL (ref 9.2–12.9)
POTASSIUM SERPL-SCNC: 4 MMOL/L (ref 3.5–5.1)
POTASSIUM SERPL-SCNC: 4.7 MMOL/L (ref 3.5–5.1)
PROT SERPL-MCNC: 6.8 G/DL (ref 6–8.4)
RBC # BLD AUTO: 3.93 M/UL (ref 4–5.4)
RBC # BLD AUTO: 3.95 M/UL (ref 4–5.4)
SODIUM SERPL-SCNC: 139 MMOL/L (ref 136–145)
SODIUM SERPL-SCNC: 141 MMOL/L (ref 136–145)
TRIGL SERPL-MCNC: 41 MG/DL (ref 30–150)
TROPONIN I SERPL DL<=0.01 NG/ML-MCNC: 0.02 NG/ML (ref 0–0.03)
TROPONIN I SERPL DL<=0.01 NG/ML-MCNC: 0.03 NG/ML (ref 0–0.03)
TROPONIN I SERPL DL<=0.01 NG/ML-MCNC: 0.03 NG/ML (ref 0–0.03)
WBC # BLD AUTO: 4.39 K/UL (ref 3.9–12.7)
WBC # BLD AUTO: 4.85 K/UL (ref 3.9–12.7)

## 2020-02-15 PROCEDURE — 84484 ASSAY OF TROPONIN QUANT: CPT | Mod: 91

## 2020-02-15 PROCEDURE — 25000003 PHARM REV CODE 250: Performed by: NURSE PRACTITIONER

## 2020-02-15 PROCEDURE — 36415 COLL VENOUS BLD VENIPUNCTURE: CPT

## 2020-02-15 PROCEDURE — 93010 EKG 12-LEAD: ICD-10-PCS | Mod: ,,, | Performed by: INTERNAL MEDICINE

## 2020-02-15 PROCEDURE — 99285 EMERGENCY DEPT VISIT HI MDM: CPT | Mod: 25

## 2020-02-15 PROCEDURE — 96372 THER/PROPH/DIAG INJ SC/IM: CPT | Mod: 59

## 2020-02-15 PROCEDURE — 93010 ELECTROCARDIOGRAM REPORT: CPT | Mod: ,,, | Performed by: INTERNAL MEDICINE

## 2020-02-15 PROCEDURE — 25000003 PHARM REV CODE 250: Performed by: EMERGENCY MEDICINE

## 2020-02-15 PROCEDURE — G0378 HOSPITAL OBSERVATION PER HR: HCPCS

## 2020-02-15 PROCEDURE — 80048 BASIC METABOLIC PNL TOTAL CA: CPT

## 2020-02-15 PROCEDURE — 84484 ASSAY OF TROPONIN QUANT: CPT

## 2020-02-15 PROCEDURE — 99220 PR INITIAL OBSERVATION CARE,LEVL III: ICD-10-PCS | Mod: ,,, | Performed by: NURSE PRACTITIONER

## 2020-02-15 PROCEDURE — 84100 ASSAY OF PHOSPHORUS: CPT

## 2020-02-15 PROCEDURE — 36000 PLACE NEEDLE IN VEIN: CPT

## 2020-02-15 PROCEDURE — 83735 ASSAY OF MAGNESIUM: CPT

## 2020-02-15 PROCEDURE — 93005 ELECTROCARDIOGRAM TRACING: CPT

## 2020-02-15 PROCEDURE — 80061 LIPID PANEL: CPT

## 2020-02-15 PROCEDURE — 63600175 PHARM REV CODE 636 W HCPCS: Performed by: HOSPITALIST

## 2020-02-15 PROCEDURE — 85025 COMPLETE CBC W/AUTO DIFF WBC: CPT

## 2020-02-15 PROCEDURE — 80053 COMPREHEN METABOLIC PANEL: CPT

## 2020-02-15 PROCEDURE — 99220 PR INITIAL OBSERVATION CARE,LEVL III: CPT | Mod: ,,, | Performed by: NURSE PRACTITIONER

## 2020-02-15 PROCEDURE — 94761 N-INVAS EAR/PLS OXIMETRY MLT: CPT

## 2020-02-15 RX ORDER — SODIUM CHLORIDE 0.9 % (FLUSH) 0.9 %
10 SYRINGE (ML) INJECTION
Status: DISCONTINUED | OUTPATIENT
Start: 2020-02-15 | End: 2020-02-17 | Stop reason: HOSPADM

## 2020-02-15 RX ORDER — FOLIC ACID 1 MG/1
1 TABLET ORAL DAILY
Status: DISCONTINUED | OUTPATIENT
Start: 2020-02-15 | End: 2020-02-17 | Stop reason: HOSPADM

## 2020-02-15 RX ORDER — CLOPIDOGREL BISULFATE 75 MG/1
75 TABLET ORAL DAILY
Status: DISCONTINUED | OUTPATIENT
Start: 2020-02-15 | End: 2020-02-17 | Stop reason: HOSPADM

## 2020-02-15 RX ORDER — CARVEDILOL 6.25 MG/1
6.25 TABLET ORAL 2 TIMES DAILY
COMMUNITY
End: 2020-06-16

## 2020-02-15 RX ORDER — AMIODARONE HYDROCHLORIDE 100 MG/1
100 TABLET ORAL DAILY
Status: DISCONTINUED | OUTPATIENT
Start: 2020-02-15 | End: 2020-02-17 | Stop reason: HOSPADM

## 2020-02-15 RX ORDER — ALLOPURINOL 300 MG/1
300 TABLET ORAL DAILY
Status: DISCONTINUED | OUTPATIENT
Start: 2020-02-15 | End: 2020-02-17 | Stop reason: HOSPADM

## 2020-02-15 RX ORDER — DOCUSATE SODIUM 100 MG/1
100 CAPSULE, LIQUID FILLED ORAL EVERY 12 HOURS
Status: DISCONTINUED | OUTPATIENT
Start: 2020-02-15 | End: 2020-02-17 | Stop reason: HOSPADM

## 2020-02-15 RX ORDER — SPIRONOLACTONE 25 MG/1
25 TABLET ORAL DAILY
Status: ON HOLD | COMMUNITY
End: 2020-02-17 | Stop reason: HOSPADM

## 2020-02-15 RX ORDER — NITROGLYCERIN 0.4 MG/1
0.4 TABLET SUBLINGUAL EVERY 5 MIN PRN
Status: DISCONTINUED | OUTPATIENT
Start: 2020-02-15 | End: 2020-02-17 | Stop reason: HOSPADM

## 2020-02-15 RX ORDER — ENOXAPARIN SODIUM 100 MG/ML
40 INJECTION SUBCUTANEOUS EVERY 24 HOURS
Status: DISCONTINUED | OUTPATIENT
Start: 2020-02-15 | End: 2020-02-15

## 2020-02-15 RX ORDER — CHOLECALCIFEROL (VITAMIN D3) 25 MCG
1000 TABLET ORAL DAILY
Status: DISCONTINUED | OUTPATIENT
Start: 2020-02-15 | End: 2020-02-17 | Stop reason: HOSPADM

## 2020-02-15 RX ORDER — ASPIRIN 81 MG/1
81 TABLET ORAL DAILY
Status: DISCONTINUED | OUTPATIENT
Start: 2020-02-15 | End: 2020-02-17 | Stop reason: HOSPADM

## 2020-02-15 RX ORDER — AMLODIPINE BESYLATE 5 MG/1
5 TABLET ORAL NIGHTLY
Status: DISCONTINUED | OUTPATIENT
Start: 2020-02-15 | End: 2020-02-17 | Stop reason: HOSPADM

## 2020-02-15 RX ORDER — AMLODIPINE BESYLATE 5 MG/1
5 TABLET ORAL DAILY
COMMUNITY

## 2020-02-15 RX ORDER — CARVEDILOL 6.25 MG/1
6.25 TABLET ORAL 2 TIMES DAILY
Status: DISCONTINUED | OUTPATIENT
Start: 2020-02-15 | End: 2020-02-17 | Stop reason: HOSPADM

## 2020-02-15 RX ORDER — ONDANSETRON 8 MG/1
8 TABLET, ORALLY DISINTEGRATING ORAL EVERY 8 HOURS PRN
Status: DISCONTINUED | OUTPATIENT
Start: 2020-02-15 | End: 2020-02-17 | Stop reason: HOSPADM

## 2020-02-15 RX ORDER — ATORVASTATIN CALCIUM 20 MG/1
40 TABLET, FILM COATED ORAL DAILY
Status: DISCONTINUED | OUTPATIENT
Start: 2020-02-15 | End: 2020-02-17 | Stop reason: HOSPADM

## 2020-02-15 RX ORDER — ACETAMINOPHEN 325 MG/1
650 TABLET ORAL EVERY 4 HOURS PRN
Status: DISCONTINUED | OUTPATIENT
Start: 2020-02-15 | End: 2020-02-17 | Stop reason: HOSPADM

## 2020-02-15 RX ORDER — TELMISARTAN 80 MG/1
40 TABLET ORAL DAILY
Status: ON HOLD | COMMUNITY
End: 2020-02-17 | Stop reason: HOSPADM

## 2020-02-15 RX ORDER — ASPIRIN 325 MG
325 TABLET ORAL
Status: COMPLETED | OUTPATIENT
Start: 2020-02-15 | End: 2020-02-15

## 2020-02-15 RX ORDER — ENOXAPARIN SODIUM 100 MG/ML
30 INJECTION SUBCUTANEOUS EVERY 24 HOURS
Status: DISCONTINUED | OUTPATIENT
Start: 2020-02-15 | End: 2020-02-17

## 2020-02-15 RX ORDER — CLONAZEPAM 0.5 MG/1
0.5 TABLET ORAL NIGHTLY
Status: DISCONTINUED | OUTPATIENT
Start: 2020-02-15 | End: 2020-02-17 | Stop reason: HOSPADM

## 2020-02-15 RX ORDER — CARVEDILOL 6.25 MG/1
6.25 TABLET ORAL 2 TIMES DAILY
Status: DISCONTINUED | OUTPATIENT
Start: 2020-02-15 | End: 2020-02-15

## 2020-02-15 RX ADMIN — CARVEDILOL 6.25 MG: 6.25 TABLET, FILM COATED ORAL at 10:02

## 2020-02-15 RX ADMIN — ALLOPURINOL 300 MG: 300 TABLET ORAL at 09:02

## 2020-02-15 RX ADMIN — MELATONIN 1000 UNITS: at 09:02

## 2020-02-15 RX ADMIN — ATORVASTATIN CALCIUM 40 MG: 20 TABLET, FILM COATED ORAL at 09:02

## 2020-02-15 RX ADMIN — CARVEDILOL 6.25 MG: 6.25 TABLET, FILM COATED ORAL at 01:02

## 2020-02-15 RX ADMIN — CLONAZEPAM 0.5 MG: 0.5 TABLET ORAL at 04:02

## 2020-02-15 RX ADMIN — CLOPIDOGREL BISULFATE 75 MG: 75 TABLET ORAL at 09:02

## 2020-02-15 RX ADMIN — ASPIRIN 81 MG: 81 TABLET, COATED ORAL at 09:02

## 2020-02-15 RX ADMIN — AMLODIPINE BESYLATE 5 MG: 5 TABLET ORAL at 10:02

## 2020-02-15 RX ADMIN — DOCUSATE SODIUM 100 MG: 100 CAPSULE, LIQUID FILLED ORAL at 10:02

## 2020-02-15 RX ADMIN — FOLIC ACID 1 MG: 1 TABLET ORAL at 09:02

## 2020-02-15 RX ADMIN — ENOXAPARIN SODIUM 30 MG: 100 INJECTION SUBCUTANEOUS at 06:02

## 2020-02-15 RX ADMIN — ASPIRIN 325 MG ORAL TABLET 325 MG: 325 PILL ORAL at 01:02

## 2020-02-15 RX ADMIN — AMIODARONE HYDROCHLORIDE 100 MG: 100 TABLET ORAL at 09:02

## 2020-02-15 RX ADMIN — CLONAZEPAM 0.5 MG: 0.5 TABLET ORAL at 10:02

## 2020-02-15 NOTE — PROGRESS NOTES
Pharmacist Renal Dose Adjustment Note    Anahy Evans is a 86 y.o. female being treated with the medication Lovenox for prophylaxis    Patient Data:    Vital Signs (Most Recent):  Temp: 97.9 °F (36.6 °C) (02/15/20 0909)  Pulse: (!) 56 (02/15/20 1000)  Resp: 18 (02/15/20 0909)  BP: (!) 169/82 (02/15/20 0909)  SpO2: 99 % (02/15/20 0909)   Vital Signs (72h Range):  Temp:  [97.7 °F (36.5 °C)-98.6 °F (37 °C)]   Pulse:  [56-72]   Resp:  [16-19]   BP: (151-192)/(69-85)   SpO2:  [98 %-100 %]      Recent Labs   Lab 02/15/20  0148 02/15/20  0600   CREATININE 1.7* 1.6*     Serum creatinine: 1.6 mg/dL (H) 02/15/20 0600  Estimated creatinine clearance: 27.7 mL/min (A)    Medication: enoxaparin dose: 40 mg frequency daily will be changed to medication: enoxaparin dose: 30 mg frequency: daily    Pharmacist's Name: Christina Price  Pharmacist's Extension: [phone]

## 2020-02-15 NOTE — ASSESSMENT & PLAN NOTE
- Follows with Dr. Gay  - In NSR presently  - Continue amiodarone 100mg PO daily  - Monitor on telemetry

## 2020-02-15 NOTE — PLAN OF CARE
Initial Discharge Planning Assessment:  Patient admitted on 2/15/2020    Chart reviewed, Care plan discussed with treatment team,  attending Dr. Figueroa    PCP updated in [facility]: Dr. Rosenbaum  Pharmacy, updated in [facility]: [facility] order or [facility] [address]     DME at home: pt admits to no DME at home with CM, however there is an extensive list from ER.    Current dispo: home with family  Transportation: daughter to drive home    Power of  or Living Will: On file with Epic    Case management  to follow.  No anticipated DC needs from CM perspective.       02/15/20 1540   Discharge Assessment   Assessment Type Discharge Planning Assessment   Confirmed/corrected address and phone number on facesheet? Yes   Assessment information obtained from? Patient   Communicated expected length of stay with patient/caregiver yes   Prior to hospitilization cognitive status: Alert/Oriented   Prior to hospitalization functional status: Independent   Current cognitive status: Alert/Oriented   Current Functional Status: Independent   Lives With child(star), adult   Able to Return to Prior Arrangements yes   Is patient able to care for self after discharge? Yes   Who are your caregiver(s) and their phone number(s)? Daughter: Marixa Anaya [phone]   Patient's perception of discharge disposition home or selfcare   Readmission Within the Last 30 Days no previous admission in last 30 days   Patient currently being followed by outpatient case management? No   Patient currently receives any other outside agency services? No   Do you have any problems affording any of your prescribed medications? No   Is the patient taking medications as prescribed? yes   Does the patient have transportation home? Yes   Transportation Anticipated family or friend will provide   Does the patient receive services at the [facility]? No   Discharge Plan A Home   DME Needed Upon Discharge  none   Patient/Family in  Agreement with Plan yes

## 2020-02-15 NOTE — PLAN OF CARE
02/15/20 1539   MERINO Message   Medicare Outpatient and Observation Notification regarding financial responsibility Given to patient/caregiver;Explained to patient/caregiver;Signed/date by patient/caregiver   Date MERINO was signed 02/15/20   Time MERINO was signed 120

## 2020-02-15 NOTE — PROGRESS NOTES
[facility] Hospital Medicine  Progress Note    Patient Name: Anahy Evans  MRN: [id number]  Patient Class: OP- Observation   Admission Date: 2/15/2020  Length of Stay: 0 days  Attending Physician: Luis Alberto Figueroa MD  Primary Care Provider: Elena Cabrera MD        Subjective:     Principal Problem:Acute chest pain        HPI:  Dave Doherty, NP:  The patient is a 86 y.o. female with a PMHx of MI, CAD, HTN, and HLD, who presents with complaint of intermittent mid-sternal CP with a tightening sensation that began 2 hours ago while sitting in bed. The patient reports she first felt the pain when she turned over on her side and it lasted 2-3 minutes. She reports it would last about 2-3 minutes every time it came then it became constant so she took NTG approximately 1 hour and 20 minutes ago and it completely alleviated the pain. She denies current CP. She states she has current lightheadedness which began after she took the NTG. The patient denies associated jaw pain, nausea, or vomiting. The patient reports she has 4 cardiac stents placed. The patient reports taking a daily 81 mg aspirin.       Interval History:  No return of chest pain.  Cardiology consulted and agree with Cardiolite stress test 2/17/2020.        Review of Systems   Constitutional: Negative for chills and fever.   HENT: Negative for congestion and trouble swallowing.    Respiratory: Negative for cough and shortness of breath.    Cardiovascular: Negative for chest pain, palpitations and leg swelling.   Gastrointestinal: Negative for abdominal pain, nausea and vomiting.   Genitourinary: Negative for dysuria and hematuria.   Musculoskeletal: Negative for gait problem.   Skin: Negative for rash.   Neurological: Negative for dizziness and weakness.   Hematological: Does not bruise/bleed easily.   Psychiatric/Behavioral: Negative.      Objective:     Vital Signs (Most Recent):  Temp: 97.7 °F (36.5 °C) (02/15/20 1210)  Pulse: 64  (02/15/20 1326)  Resp: 18 (02/15/20 1210)  BP: 139/61 (02/15/20 1210)  SpO2: 96 % (02/15/20 1210) Vital Signs (24h Range):  Temp:  [97.7 °F (36.5 °C)-98.6 °F (37 °C)] 97.7 °F (36.5 °C)  Pulse:  [52-72] 64  Resp:  [16-19] 18  SpO2:  [96 %-100 %] 96 %  BP: (139-192)/(61-85) 139/61     Weight: 81.3 kg (179 lb 3.2 oz)  Body mass index is 28.07 kg/m².    Intake/Output Summary (Last 24 hours) at 2/15/2020 1521  Last data filed at 2/15/2020 0600  Gross per 24 hour   Intake 200 ml   Output --   Net 200 ml      Physical Exam   Constitutional: She is oriented to person, place, and time. No distress.   HENT:   Head: Normocephalic and atraumatic.   Eyes: Pupils are equal, round, and reactive to light. EOM are normal.   Neck: Normal range of motion. Neck supple.   Cardiovascular: Normal rate and regular rhythm.   Pulmonary/Chest: Effort normal and breath sounds normal. No respiratory distress.   Abdominal: Soft. Bowel sounds are normal. She exhibits no distension. There is no tenderness.   Musculoskeletal: She exhibits no edema.   Decreased rom right leg, right elbow, no swelling seen, full rom  Of elbow and shoulder   Neurological: She is alert and oriented to person, place, and time. No cranial nerve deficit.   Skin: Skin is warm.   Psychiatric: She has a normal mood and affect.   Vitals reviewed.      Significant Labs:   BMP:   Recent Labs   Lab 02/15/20  0600   GLU 88      K 4.0      CO2 25   BUN 18   CREATININE 1.6*   CALCIUM 9.0   MG 1.7     CBC:   Recent Labs   Lab 02/15/20  0148 02/15/20  0600   WBC 4.85 4.39   HGB 11.7* 11.6*   HCT 37.0 36.5*    148*       Significant Imaging: I have reviewed all pertinent imaging results/findings within the past 24 hours.      Assessment/Plan:      * Acute chest pain  - Chest pain for several days. Started occasionally, but now, more constant.   - mildly elevated troponin, but in flat pattern   - Cardiology consulted and felt symptoms may represent angin pectoris and  agree with Cardiolite stres testing 2/17  - monitor on telemetry    Coronary artery disease involving native coronary artery  -Noted to have had stent placed in July 2017 by Dr. Canela  -No chest pain or sob at this time  -Continue statin, aspirin, Plavix and carvedilol  -Cardiolite stress test 2/17 per Dr. Canela  -Monitor on telemetry      Stenosis of right carotid artery  - per record,   high-grade stenosis of the right internal carotid artery.    - will follow with Dr. Moctezuma, 2/18 for further evaluation    - continue aspirin and Plavix      Pure hypercholesterolemia  - Chronic and stable  - Continue Lipitor 40mg PO daily      Gastroesophageal reflux disease without esophagitis  - Chronic and stable  - Not on therapy      Chronic kidney disease, stage III (moderate)  -She follows with Dr. Valerio  -Baseline Cr noted to be around 1.6 and at baseline  -Will continue home ARB and spironolactone  -If any deterioration will consult [facility].      Essential hypertension  - BP running low now  - Hold norvasc, coreg, previously held telmisartan, spironolactone      PAF (paroxysmal atrial fibrillation)  - Follows with Dr. Gay  - In NSR presently  - Continue amiodarone 100mg PO daily  - Monitor on telemetry        VTE Risk Mitigation (From admission, onward)         Ordered     enoxaparin injection 30 mg  Daily      02/15/20 1144     Place sequential compression device  Until discontinued      02/15/20 1806     IP VTE HIGH RISK PATIENT  Once      02/15/20 0416                      Zahira Moralez NP  Department of Hospital Medicine   [facility]

## 2020-02-15 NOTE — H&P
[facility] Hospital Medicine  History & Physical    Patient Name: Anahy Evans  MRN: [id number]  Admission Date: 2/15/2020  Attending Physician: Luis Alberto Figueroa MD   Primary Care Provider: Elena Cabrera MD         Patient information was obtained from patient, past medical records and ER records.     Subjective:     Principal Problem:Acute chest pain    Chief Complaint:   Chief Complaint   Patient presents with    Chest Pain     since 11:45 tonight        HPI: The patient is a 86 y.o. female with a PMHx of MI, CAD, HTN, and HLD, who presents with complaint of intermittent mid-sternal CP with a tightening sensation that began 2 hours ago while sitting in bed. The patient reports she first felt the pain when she turned over on her side and it lasted 2-3 minutes. She reports it would last about 2-3 minutes every time it came then it became constant so she took NTG approximately 1 hour and 20 minutes ago and it completely alleviated the pain. She denies current CP. She states she has current lightheadedness which began after she took the NTG. The patient denies associated jaw pain, nausea, or vomiting. The patient reports she has 4 cardiac stents placed. The patient reports taking a daily 81 mg aspirin.    Past Medical History:   Diagnosis Date    Cervical cancer 1968    breast cancer right    Coronary artery disease     Gout     High cholesterol     Hypertension     MI (myocardial infarction)        Past Surgical History:   Procedure Laterality Date    BREAST LUMPECTOMY      right    CARDIAC SURGERY      multiple cardiac stents    CORONARY STENT PLACEMENT         Review of patient's allergies indicates:   Allergen Reactions    Clindamycin Anaphylaxis    Penicillins Rash       No current facility-administered medications on file prior to encounter.      Current Outpatient Medications on File Prior to Encounter   Medication Sig    allopurinol (ZYLOPRIM) 300 MG tablet Take 300 mg  by mouth once daily.    amiodarone (PACERONE) 200 MG Tab Take 0.5 tablets (100 mg total) by mouth once daily.    aspirin (ECOTRIN) 81 MG EC tablet Take 81 mg by mouth once daily.    atorvastatin (LIPITOR) 40 MG tablet Take 40 mg by mouth once daily.    cholecalciferol, vitamin D3, (VITAMIN D3) 2,000 unit Cap Take 1 capsule by mouth once daily.    clonazePAM (KLONOPIN) 0.5 MG tablet Take 1 tablet (0.5 mg total) by mouth every evening.    clopidogrel (PLAVIX) 75 mg tablet TAKE 1 TABLET DAILY    folic acid (FOLVITE) 1 MG tablet Take 1 mg by mouth once daily.    folic acid (FOLVITE) 1 MG tablet Take 1 tablet by mouth.    meclizine (ANTIVERT) 12.5 mg tablet Take 12.5 mg by mouth 3 (three) times daily as needed.    nitroGLYCERIN (NITROSTAT) 0.4 MG SL tablet Place 0.4 mg under the tongue every 5 (five) minutes as needed for Chest pain.    acetaminophen (TYLENOL) 500 MG tablet Take 1 tablet (500 mg total) by mouth every 6 (six) hours as needed.    bisacodyl (DULCOLAX) 10 mg Supp Place 1 suppository (10 mg total) rectally daily as needed (Until bowel movement if patient has no bowel movement for 2 days).    calcium carbonate-vitamin D3 600 mg calcium- 200 unit Cap Take by mouth.    docusate sodium (COLACE) 100 MG capsule Take 1 capsule (100 mg total) by mouth every 12 (twelve) hours.    enoxaparin (LOVENOX) 40 mg/0.4 mL Syrg Inject 0.3 mLs (30 mg total) into the skin once daily.    [DISCONTINUED] famotidine (PEPCID) 20 MG tablet Take 1 tablet (20 mg total) by mouth once daily.    [DISCONTINUED] mupirocin (BACTROBAN) 2 % ointment 1 g by Nasal route 2 (two) times daily.    [DISCONTINUED] oxyCODONE (ROXICODONE) 5 MG immediate release tablet Take 1 tablet (5 mg total) by mouth every 4 (four) hours as needed for Pain.    [DISCONTINUED] polyethylene glycol (GLYCOLAX) 17 gram PwPk Take 17 g by mouth once daily.     Family History     Problem Relation (Age of Onset)    Cancer Mother, Father        Tobacco Use     Smoking status: Former Smoker    Smokeless tobacco: Never Used   Substance and Sexual Activity    Alcohol use: Yes     Comment: rare    Drug use: No    Sexual activity: Not Currently     Review of Systems   Constitutional: Negative for activity change, appetite change and fever.   HENT: Negative for congestion, ear pain, rhinorrhea and sinus pressure.    Eyes: Negative for pain and discharge.   Respiratory: Positive for chest tightness. Negative for cough, shortness of breath and wheezing.    Cardiovascular: Negative for chest pain and leg swelling.   Gastrointestinal: Negative for abdominal distention, abdominal pain, diarrhea, nausea and vomiting.   Endocrine: Negative for cold intolerance and heat intolerance.   Genitourinary: Negative for difficulty urinating, flank pain, frequency, hematuria and urgency.   Musculoskeletal: Negative for arthralgias, joint swelling and myalgias.   Allergic/Immunologic: Negative for environmental allergies and food allergies.   Neurological: Positive for dizziness (after taking one nitroglyercin). Negative for weakness, light-headedness and headaches.   Hematological: Does not bruise/bleed easily.   Psychiatric/Behavioral: Negative for agitation, behavioral problems and decreased concentration.     Objective:     Vital Signs (Most Recent):  Temp: 98.6 °F (37 °C) (02/15/20 0149)  Pulse: 61 (02/15/20 0332)  Resp: 19 (02/15/20 0332)  BP: (!) 151/69 (02/15/20 0332)  SpO2: 100 % (02/15/20 0332) Vital Signs (24h Range):  Temp:  [98.6 °F (37 °C)] 98.6 °F (37 °C)  Pulse:  [60-61] 61  Resp:  [16-19] 19  SpO2:  [100 %] 100 %  BP: (151-192)/(69-85) 151/69     Weight: 78.9 kg (174 lb)  Body mass index is 27.25 kg/m².    Physical Exam   Constitutional: She is oriented to person, place, and time. She appears well-developed and well-nourished.   HENT:   Head: Normocephalic.   Eyes: Conjunctivae are normal. Right eye exhibits no discharge. Left eye exhibits no discharge.   Neck: Normal range  of motion. Neck supple.   Cardiovascular: Normal rate, regular rhythm, normal heart sounds and intact distal pulses.   Pulses:       Radial pulses are 2+ on the right side, and 2+ on the left side.        Dorsalis pedis pulses are 2+ on the right side, and 2+ on the left side.   Pulmonary/Chest: Effort normal and breath sounds normal. No respiratory distress.   Abdominal: Soft. Bowel sounds are normal. She exhibits no distension. There is no tenderness.   Musculoskeletal: Normal range of motion.   Neurological: She is alert and oriented to person, place, and time. GCS eye subscore is 4. GCS verbal subscore is 5. GCS motor subscore is 6.   Skin: Skin is warm and dry.   Psychiatric: She has a normal mood and affect. Her speech is normal and behavior is normal.           Significant Labs:   CBC:   Recent Labs   Lab 02/15/20  0148   WBC 4.85   HGB 11.7*   HCT 37.0        CMP:   Recent Labs   Lab 02/15/20  0148      K 4.7      CO2 24   GLU 93   BUN 19   CREATININE 1.7*   CALCIUM 9.2   ANIONGAP 11   EGFRNONAA 27*       Significant Imaging: I have reviewed all pertinent imaging results/findings within the past 24 hours.    Assessment/Plan:     * Acute chest pain  Chest pain for several days. Started occasionally, but now, more constant. Troponin- .033    Trend Troponin  Consult Cardiology      Pure hypercholesterolemia  Lipid Panel pending    Continue Lipitor      Chronic kidney disease, stage III (moderate)  Creatinine 1.7, baseline- 1.4-2    Monitor for acute decompensation      Essential hypertension  Hypertensive currently    Hydralazine PRN    PAF (paroxysmal atrial fibrillation)  SR currently    Continue amiodarone/Lovenox/ASA/Plavix        VTE Risk Mitigation (From admission, onward)         Ordered     enoxaparin injection 40 mg  Daily      02/15/20 0416     Place sequential compression device  Until discontinued      02/15/20 0416     IP VTE HIGH RISK PATIENT  Once      02/15/20 0416                    Noah Doherty NP  Department of Hospital Medicine   [facility]

## 2020-02-15 NOTE — ASSESSMENT & PLAN NOTE
- per record,   high-grade stenosis of the right internal carotid artery.    - will follow with Dr. Moctezuma, 2/18 for further evaluation    - continue aspirin and Plavix

## 2020-02-15 NOTE — ED PROVIDER NOTES
Encounter Date: 2/15/2020    SCRIBE #1 NOTE: I, Maksim Almeida, am scribing for, and in the presence of, Dr. Kwan.       History     Chief Complaint   Patient presents with    Chest Pain     since 11:45 tonight     Time seen by provider: 1:50 AM    This is a 86 y.o. female with a PMHx of MI, CAD, HTN, and HLD, who presents with complaint of intermittent mid-sternal CP with a tightening sensation that began 2 hours ago while sitting in bed. The patient reports she first felt the pain when she turned over on her side and it lasted 2-3 minutes. She reports it would last about 2-3 minutes every time it came then it became constant so she took NTG approximately 1 hour and 20 minutes ago and it completely alleviated the pain. She denies current CP. She states she has current lightheadedness which began after she took the NTG. The patient denies associated jaw pain, nausea, or vomiting. The patient reports she has 4 cardiac stents placed. The patient reports taking a daily 81 mg aspirin.    The history is provided by the patient.     Review of patient's allergies indicates:   Allergen Reactions    Clindamycin Anaphylaxis    Penicillins Rash     Past Medical History:   Diagnosis Date    Cervical cancer 1968    breast cancer right    Coronary artery disease     Gout     High cholesterol     Hypertension     MI (myocardial infarction)      Past Surgical History:   Procedure Laterality Date    BREAST LUMPECTOMY      right    CARDIAC SURGERY      multiple cardiac stents    CORONARY STENT PLACEMENT       Family History   Problem Relation Age of Onset    Cancer Mother     Cancer Father      Social History     Tobacco Use    Smoking status: Former Smoker    Smokeless tobacco: Never Used   Substance Use Topics    Alcohol use: Yes     Comment: rare    Drug use: No     Review of Systems   Constitutional: Negative for fever.   HENT: Negative for sore throat.    Respiratory: Negative for shortness of breath.     Cardiovascular: Positive for chest pain.   Gastrointestinal: Negative for nausea and vomiting.   Genitourinary: Negative for dysuria.   Musculoskeletal: Negative for back pain.   Skin: Negative for rash.   Neurological: Positive for light-headedness. Negative for weakness.   Hematological: Does not bruise/bleed easily.   All other systems reviewed and are negative.      Physical Exam     Initial Vitals [02/15/20 0133]   BP Pulse Resp Temp SpO2   (!) 192/85 60 16 98.6 °F (37 °C) 100 %      MAP       --         Physical Exam    Nursing note and vitals reviewed.  Constitutional: She appears well-developed and well-nourished. She is not diaphoretic. No distress.   HENT:   Head: Normocephalic and atraumatic.   Eyes: Conjunctivae and EOM are normal.   Neck: Normal range of motion. Neck supple.   Cardiovascular: Normal rate, regular rhythm and normal heart sounds.   Pulmonary/Chest: Breath sounds normal. No respiratory distress. She has no wheezes. She has no rhonchi. She has no rales. She exhibits no tenderness.   No chest wall tenderness.   Abdominal: Soft. Bowel sounds are normal. She exhibits no distension. There is no tenderness. There is no rebound and no guarding.   Musculoskeletal: Normal range of motion.   Neurological: She is alert and oriented to person, place, and time.   Skin: Skin is warm and dry.         ED Course   Procedures  Labs Reviewed   TROPONIN I - Abnormal; Notable for the following components:       Result Value    Troponin I 0.033 (*)     All other components within normal limits   CBC W/ AUTO DIFFERENTIAL - Abnormal; Notable for the following components:    RBC 3.95 (*)     Hemoglobin 11.7 (*)     Mean Corpuscular Hemoglobin Conc 31.6 (*)     All other components within normal limits   BASIC METABOLIC PANEL - Abnormal; Notable for the following components:    Creatinine 1.7 (*)     eGFR if  31 (*)     eGFR if non  27 (*)     All other components within normal  limits     EKG Readings: (Independently Interpreted)   Sinus rhythm at a rate of 61 bpm. First degree AV block. Q waves in lead III and V3 are old. No new ST or T changes compared to May 2019.       Imaging Results    None          Medical Decision Making:   History:   Old Medical Records: I decided to obtain old medical records.  Initial Assessment:   86-year-old female with significant CAD history presents complaining of chest pain which began at rest.  She reports the pain was initially stuttering but then became constant was relieved only after taking nitroglycerin.  The patient has been chest pain-free since taking nitroglycerin at home.  Physical exam unremarkable. Vital signs significant for an elevated blood pressure only.  Independently Interpreted Test(s):   I have ordered and independently interpreted EKG Reading(s) - see prior notes  Clinical Tests:   Lab Tests: Ordered and Reviewed  Medical Tests: Ordered and Reviewed  ED Management:  Initial EKG showed no significant changes compared to previous.  Initial troponin is 0.033.  This is lower than her most recent troponin, however, the most recent troponin was in the setting of having an angiogram and it is higher than her older troponin.  Her heart score is 7.  Patient admitted for observation to the hospitalist service.    Additional MDM:   Heart Score:    History:          Highly suspicious.  ECG:             Normal  Age:               >65 years  Risk factors: >= 3 risk factors or history of atherosclerotic disease  Troponin:       1-2x normal limit  Final Score: 7             Scribe Attestation:   Scribe #1: I performed the above scribed service and the documentation accurately describes the services I performed. I attest to the accuracy of the note.    Attending Attestation:           Physician Attestation for Scribe:  Physician Attestation Statement for Scribe #1: I, Dr. Kwan, reviewed documentation, as scribed by Maksim Almeida in my presence, and  it is both accurate and complete.                                Clinical Impression:     1. Elevated troponin    2. Chest pressure    3. Chest pain at rest                                Marie Kwan MD  02/15/20 7205

## 2020-02-15 NOTE — HPI
Dave Doherty NP:  The patient is a 86 y.o. female with a PMHx of MI, CAD, HTN, and HLD, who presents with complaint of intermittent mid-sternal CP with a tightening sensation that began 2 hours ago while sitting in bed. The patient reports she first felt the pain when she turned over on her side and it lasted 2-3 minutes. She reports it would last about 2-3 minutes every time it came then it became constant so she took NTG approximately 1 hour and 20 minutes ago and it completely alleviated the pain. She denies current CP. She states she has current lightheadedness which began after she took the NTG. The patient denies associated jaw pain, nausea, or vomiting. The patient reports she has 4 cardiac stents placed. The patient reports taking a daily 81 mg aspirin.

## 2020-02-15 NOTE — ASSESSMENT & PLAN NOTE
-She follows with Dr. Valerio  -Baseline Cr noted to be around 1.6 and at baseline  -Will continue home ARB and spironolactone  -If any deterioration will consult [facility].

## 2020-02-15 NOTE — SUBJECTIVE & OBJECTIVE
Past Medical History:   Diagnosis Date    Cervical cancer 1968    breast cancer right    Coronary artery disease     Gout     High cholesterol     Hypertension     MI (myocardial infarction)        Past Surgical History:   Procedure Laterality Date    BREAST LUMPECTOMY      right    CARDIAC SURGERY      multiple cardiac stents    CORONARY STENT PLACEMENT         Review of patient's allergies indicates:   Allergen Reactions    Clindamycin Anaphylaxis    Penicillins Rash       No current facility-administered medications on file prior to encounter.      Current Outpatient Medications on File Prior to Encounter   Medication Sig    allopurinol (ZYLOPRIM) 300 MG tablet Take 300 mg by mouth once daily.    amiodarone (PACERONE) 200 MG Tab Take 0.5 tablets (100 mg total) by mouth once daily.    aspirin (ECOTRIN) 81 MG EC tablet Take 81 mg by mouth once daily.    atorvastatin (LIPITOR) 40 MG tablet Take 40 mg by mouth once daily.    cholecalciferol, vitamin D3, (VITAMIN D3) 2,000 unit Cap Take 1 capsule by mouth once daily.    clonazePAM (KLONOPIN) 0.5 MG tablet Take 1 tablet (0.5 mg total) by mouth every evening.    clopidogrel (PLAVIX) 75 mg tablet TAKE 1 TABLET DAILY    folic acid (FOLVITE) 1 MG tablet Take 1 mg by mouth once daily.    folic acid (FOLVITE) 1 MG tablet Take 1 tablet by mouth.    meclizine (ANTIVERT) 12.5 mg tablet Take 12.5 mg by mouth 3 (three) times daily as needed.    nitroGLYCERIN (NITROSTAT) 0.4 MG SL tablet Place 0.4 mg under the tongue every 5 (five) minutes as needed for Chest pain.    acetaminophen (TYLENOL) 500 MG tablet Take 1 tablet (500 mg total) by mouth every 6 (six) hours as needed.    bisacodyl (DULCOLAX) 10 mg Supp Place 1 suppository (10 mg total) rectally daily as needed (Until bowel movement if patient has no bowel movement for 2 days).    calcium carbonate-vitamin D3 600 mg calcium- 200 unit Cap Take by mouth.    docusate sodium (COLACE) 100 MG capsule Take 1  capsule (100 mg total) by mouth every 12 (twelve) hours.    enoxaparin (LOVENOX) 40 mg/0.4 mL Syrg Inject 0.3 mLs (30 mg total) into the skin once daily.    [DISCONTINUED] famotidine (PEPCID) 20 MG tablet Take 1 tablet (20 mg total) by mouth once daily.    [DISCONTINUED] mupirocin (BACTROBAN) 2 % ointment 1 g by Nasal route 2 (two) times daily.    [DISCONTINUED] oxyCODONE (ROXICODONE) 5 MG immediate release tablet Take 1 tablet (5 mg total) by mouth every 4 (four) hours as needed for Pain.    [DISCONTINUED] polyethylene glycol (GLYCOLAX) 17 gram PwPk Take 17 g by mouth once daily.     Family History     Problem Relation (Age of Onset)    Cancer Mother, Father        Tobacco Use    Smoking status: Former Smoker    Smokeless tobacco: Never Used   Substance and Sexual Activity    Alcohol use: Yes     Comment: rare    Drug use: No    Sexual activity: Not Currently     Review of Systems   Constitutional: Negative for activity change, appetite change and fever.   HENT: Negative for congestion, ear pain, rhinorrhea and sinus pressure.    Eyes: Negative for pain and discharge.   Respiratory: Positive for chest tightness. Negative for cough, shortness of breath and wheezing.    Cardiovascular: Negative for chest pain and leg swelling.   Gastrointestinal: Negative for abdominal distention, abdominal pain, diarrhea, nausea and vomiting.   Endocrine: Negative for cold intolerance and heat intolerance.   Genitourinary: Negative for difficulty urinating, flank pain, frequency, hematuria and urgency.   Musculoskeletal: Negative for arthralgias, joint swelling and myalgias.   Allergic/Immunologic: Negative for environmental allergies and food allergies.   Neurological: Positive for dizziness (after taking one nitroglyercin). Negative for weakness, light-headedness and headaches.   Hematological: Does not bruise/bleed easily.   Psychiatric/Behavioral: Negative for agitation, behavioral problems and decreased concentration.      Objective:     Vital Signs (Most Recent):  Temp: 98.6 °F (37 °C) (02/15/20 0149)  Pulse: 61 (02/15/20 0332)  Resp: 19 (02/15/20 0332)  BP: (!) 151/69 (02/15/20 0332)  SpO2: 100 % (02/15/20 0332) Vital Signs (24h Range):  Temp:  [98.6 °F (37 °C)] 98.6 °F (37 °C)  Pulse:  [60-61] 61  Resp:  [16-19] 19  SpO2:  [100 %] 100 %  BP: (151-192)/(69-85) 151/69     Weight: 78.9 kg (174 lb)  Body mass index is 27.25 kg/m².    Physical Exam   Constitutional: She is oriented to person, place, and time. She appears well-developed and well-nourished.   HENT:   Head: Normocephalic.   Eyes: Conjunctivae are normal. Right eye exhibits no discharge. Left eye exhibits no discharge.   Neck: Normal range of motion. Neck supple.   Cardiovascular: Normal rate, regular rhythm, normal heart sounds and intact distal pulses.   Pulses:       Radial pulses are 2+ on the right side, and 2+ on the left side.        Dorsalis pedis pulses are 2+ on the right side, and 2+ on the left side.   Pulmonary/Chest: Effort normal and breath sounds normal. No respiratory distress.   Abdominal: Soft. Bowel sounds are normal. She exhibits no distension. There is no tenderness.   Musculoskeletal: Normal range of motion.   Neurological: She is alert and oriented to person, place, and time. GCS eye subscore is 4. GCS verbal subscore is 5. GCS motor subscore is 6.   Skin: Skin is warm and dry.   Psychiatric: She has a normal mood and affect. Her speech is normal and behavior is normal.           Significant Labs:   CBC:   Recent Labs   Lab 02/15/20  0148   WBC 4.85   HGB 11.7*   HCT 37.0        CMP:   Recent Labs   Lab 02/15/20  0148      K 4.7      CO2 24   GLU 93   BUN 19   CREATININE 1.7*   CALCIUM 9.2   ANIONGAP 11   EGFRNONAA 27*       Significant Imaging: I have reviewed all pertinent imaging results/findings within the past 24 hours.

## 2020-02-15 NOTE — PROGRESS NOTES
Report received and care assumed from PARVEEN Simpson in ED. Pt transported from ED to unit via wheelchair in stable condition. Pt ambulated from wheelchair to bed with steady gait. ID verified and assessment completed per flowsheet. Pt denies any chest pain/tightness, SOB, or dizziness. Cardiac monitoring initiated. Plan of care reviewed with patient. Will continue to monitor.

## 2020-02-15 NOTE — ASSESSMENT & PLAN NOTE
- Chest pain for several days. Started occasionally, but now, more constant.   - mildly elevated troponin, but in flat pattern   - Cardiology consulted and felt symptoms may represent angin pectoris and agree with Cardiolite stres testing 2/17  - monitor on telemetry

## 2020-02-15 NOTE — SUBJECTIVE & OBJECTIVE
Interval History:  No return of chest pain.  Cardiology consulted and agree with Cardiolite stress test 2/17/2020.        Review of Systems   Constitutional: Negative for chills and fever.   HENT: Negative for congestion and trouble swallowing.    Respiratory: Negative for cough and shortness of breath.    Cardiovascular: Negative for chest pain, palpitations and leg swelling.   Gastrointestinal: Negative for abdominal pain, nausea and vomiting.   Genitourinary: Negative for dysuria and hematuria.   Musculoskeletal: Negative for gait problem.   Skin: Negative for rash.   Neurological: Negative for dizziness and weakness.   Hematological: Does not bruise/bleed easily.   Psychiatric/Behavioral: Negative.      Objective:     Vital Signs (Most Recent):  Temp: 97.7 °F (36.5 °C) (02/15/20 1210)  Pulse: 64 (02/15/20 1326)  Resp: 18 (02/15/20 1210)  BP: 139/61 (02/15/20 1210)  SpO2: 96 % (02/15/20 1210) Vital Signs (24h Range):  Temp:  [97.7 °F (36.5 °C)-98.6 °F (37 °C)] 97.7 °F (36.5 °C)  Pulse:  [52-72] 64  Resp:  [16-19] 18  SpO2:  [96 %-100 %] 96 %  BP: (139-192)/(61-85) 139/61     Weight: 81.3 kg (179 lb 3.2 oz)  Body mass index is 28.07 kg/m².    Intake/Output Summary (Last 24 hours) at 2/15/2020 1521  Last data filed at 2/15/2020 0600  Gross per 24 hour   Intake 200 ml   Output --   Net 200 ml      Physical Exam   Constitutional: She is oriented to person, place, and time. No distress.   HENT:   Head: Normocephalic and atraumatic.   Eyes: Pupils are equal, round, and reactive to light. EOM are normal.   Neck: Normal range of motion. Neck supple.   Cardiovascular: Normal rate and regular rhythm.   Pulmonary/Chest: Effort normal and breath sounds normal. No respiratory distress.   Abdominal: Soft. Bowel sounds are normal. She exhibits no distension. There is no tenderness.   Musculoskeletal: She exhibits no edema.   Decreased rom right leg, right elbow, no swelling seen, full rom  Of elbow and shoulder   Neurological:  She is alert and oriented to person, place, and time. No cranial nerve deficit.   Skin: Skin is warm.   Psychiatric: She has a normal mood and affect.   Vitals reviewed.      Significant Labs:   BMP:   Recent Labs   Lab 02/15/20  0600   GLU 88      K 4.0      CO2 25   BUN 18   CREATININE 1.6*   CALCIUM 9.0   MG 1.7     CBC:   Recent Labs   Lab 02/15/20  0148 02/15/20  0600   WBC 4.85 4.39   HGB 11.7* 11.6*   HCT 37.0 36.5*    148*       Significant Imaging: I have reviewed all pertinent imaging results/findings within the past 24 hours.

## 2020-02-15 NOTE — CONSULTS
[facility]  Consult    History of Present Illness:  Mrs. Evans developed a tight sensation in the chest at midnight when she reached over to the side.  She had recurrence of this tightness every 203 min till she took a nitroglycerin sublingually, which alleviated the pain.  She had no nausea vomiting or diaphoresis.  She now feels well.    She is status post bare metal stent in the left anterior descending coronary artery during a STEMI in 1999, drug-eluting stent in the left circumflex coronary artery in 2010, drug-eluting stent in the proximal left anterior descending coronary artery in June 2017, drug-eluting stent in the ostial left anterior descending coronary artery in November 2017, carotid occlusive disease, hypertension, paroxysmal atrial fibrillation, chronic renal failure, diabetes mellitus, hyperlipidemia.     At CT angiography last week, she was noted to have a high-grade stenosis of the right internal carotid artery.  She is due to see Dr. Moctezuma on Tuesday for its evaluation.    PTA Medications   Medication Sig    allopurinol (ZYLOPRIM) 300 MG tablet Take 300 mg by mouth once daily.    amiodarone (PACERONE) 200 MG Tab Take 0.5 tablets (100 mg total) by mouth once daily.    amLODIPine (NORVASC) 5 MG tablet Take 5 mg by mouth once daily.    aspirin (ECOTRIN) 81 MG EC tablet Take 81 mg by mouth once daily.    atorvastatin (LIPITOR) 40 MG tablet Take 40 mg by mouth once daily.    carvediloL (COREG) 6.25 MG tablet Take 6.25 mg by mouth 2 (two) times daily.    cholecalciferol, vitamin D3, (VITAMIN D3) 2,000 unit Cap Take 1 capsule by mouth once daily.    clonazePAM (KLONOPIN) 0.5 MG tablet Take 1 tablet (0.5 mg total) by mouth every evening.    clopidogrel (PLAVIX) 75 mg tablet TAKE 1 TABLET DAILY    folic acid (FOLVITE) 1 MG tablet Take 1 mg by mouth once daily.    folic acid (FOLVITE) 1 MG tablet Take 1 tablet by mouth.    meclizine (ANTIVERT) 12.5 mg tablet Take 12.5 mg by  mouth 3 (three) times daily as needed.    nitroGLYCERIN (NITROSTAT) 0.4 MG SL tablet Place 0.4 mg under the tongue every 5 (five) minutes as needed for Chest pain.    spironolactone (ALDACTONE) 25 MG tablet Take 25 mg by mouth once daily.    telmisartan (MICARDIS) 80 MG Tab Take 40 mg by mouth once daily.    acetaminophen (TYLENOL) 500 MG tablet Take 1 tablet (500 mg total) by mouth every 6 (six) hours as needed.    bisacodyl (DULCOLAX) 10 mg Supp Place 1 suppository (10 mg total) rectally daily as needed (Until bowel movement if patient has no bowel movement for 2 days).    calcium carbonate-vitamin D3 600 mg calcium- 200 unit Cap Take by mouth.    docusate sodium (COLACE) 100 MG capsule Take 1 capsule (100 mg total) by mouth every 12 (twelve) hours.    enoxaparin (LOVENOX) 40 mg/0.4 mL Syrg Inject 0.3 mLs (30 mg total) into the skin once daily.       Review of patient's allergies indicates:   Allergen Reactions    Clindamycin Anaphylaxis    Penicillins Rash       Past Medical History:   Diagnosis Date    Cervical cancer 1968    breast cancer right    Coronary artery disease     Gout     High cholesterol     Hypertension     MI (myocardial infarction)      Past Surgical History:   Procedure Laterality Date    BREAST LUMPECTOMY      right    CARDIAC SURGERY      multiple cardiac stents    CORONARY STENT PLACEMENT       Family History   Problem Relation Age of Onset    Cancer Mother     Cancer Father      Social History     Tobacco Use    Smoking status: Former Smoker    Smokeless tobacco: Never Used   Substance Use Topics    Alcohol use: Yes     Comment: rare    Drug use: No        Physical Exam:  Right carotid bruit  Chest clear  Heart regular.  No murmur or gallop.  The abdomen is soft and nontender the bowel sounds are normal.  Extremities is no clubbing cyanosis edema of feet.  The neurological exam is intact.    Impression:  Chest pains, possibly angina pectoris  Coronary artery disease  history of previous intracoronary stents  High-grade stenosis of the right internal carotid artery.  Hypertension  Hyperlipidemia  Stage III chronic renal failure      Will plan a Lexiscan Cardiolite test for Monday morning    Thank you for the opportunity of seeing Anahy Evans in consultation

## 2020-02-15 NOTE — ED NOTES
Pt states at 1145 pm sitting in bed when chest tightness began, 1230 took nitro and tightness relieved. On arrival complained of dizziness denies SOB, diaphoresis, nausea. Pt was resting in bed NAD noted. Call light within reach, bed locked in lowest position. Will continue to monitor.

## 2020-02-15 NOTE — ASSESSMENT & PLAN NOTE
Chest pain for several days. Started occasionally, but now, more constant. Troponin- .033    Trend Troponin  Consult Cardiology

## 2020-02-15 NOTE — ASSESSMENT & PLAN NOTE
-Noted to have had stent placed in July 2017 by Dr. Canela  -No chest pain or sob at this time  -Continue statin, aspirin, Plavix and carvedilol  -Cardiolite stress test 2/17 per Dr. Canela  -Monitor on telemetry

## 2020-02-16 LAB
ALBUMIN SERPL BCP-MCNC: 3.3 G/DL (ref 3.5–5.2)
ALP SERPL-CCNC: 73 U/L (ref 55–135)
ALT SERPL W/O P-5'-P-CCNC: 17 U/L (ref 10–44)
ANION GAP SERPL CALC-SCNC: 10 MMOL/L (ref 8–16)
AST SERPL-CCNC: 17 U/L (ref 10–40)
BASOPHILS # BLD AUTO: 0.05 K/UL (ref 0–0.2)
BASOPHILS NFR BLD: 1 % (ref 0–1.9)
BILIRUB SERPL-MCNC: 0.5 MG/DL (ref 0.1–1)
BUN SERPL-MCNC: 26 MG/DL (ref 8–23)
CALCIUM SERPL-MCNC: 9 MG/DL (ref 8.7–10.5)
CHLORIDE SERPL-SCNC: 106 MMOL/L (ref 95–110)
CO2 SERPL-SCNC: 23 MMOL/L (ref 23–29)
CREAT SERPL-MCNC: 1.6 MG/DL (ref 0.5–1.4)
DIFFERENTIAL METHOD: ABNORMAL
EOSINOPHIL # BLD AUTO: 0.2 K/UL (ref 0–0.5)
EOSINOPHIL NFR BLD: 3.8 % (ref 0–8)
ERYTHROCYTE [DISTWIDTH] IN BLOOD BY AUTOMATED COUNT: 14.5 % (ref 11.5–14.5)
EST. GFR  (AFRICAN AMERICAN): 33 ML/MIN/1.73 M^2
EST. GFR  (NON AFRICAN AMERICAN): 29 ML/MIN/1.73 M^2
GLUCOSE SERPL-MCNC: 88 MG/DL (ref 70–110)
HCT VFR BLD AUTO: 34.2 % (ref 37–48.5)
HGB BLD-MCNC: 10.6 G/DL (ref 12–16)
IMM GRANULOCYTES # BLD AUTO: 0.01 K/UL (ref 0–0.04)
IMM GRANULOCYTES NFR BLD AUTO: 0.2 % (ref 0–0.5)
LYMPHOCYTES # BLD AUTO: 2.2 K/UL (ref 1–4.8)
LYMPHOCYTES NFR BLD: 47 % (ref 18–48)
MAGNESIUM SERPL-MCNC: 1.7 MG/DL (ref 1.6–2.6)
MCH RBC QN AUTO: 29 PG (ref 27–31)
MCHC RBC AUTO-ENTMCNC: 31 G/DL (ref 32–36)
MCV RBC AUTO: 94 FL (ref 82–98)
MONOCYTES # BLD AUTO: 0.6 K/UL (ref 0.3–1)
MONOCYTES NFR BLD: 13 % (ref 4–15)
NEUTROPHILS # BLD AUTO: 1.7 K/UL (ref 1.8–7.7)
NEUTROPHILS NFR BLD: 35 % (ref 38–73)
NRBC BLD-RTO: 0 /100 WBC
PHOSPHATE SERPL-MCNC: 3.9 MG/DL (ref 2.7–4.5)
PLATELET # BLD AUTO: 147 K/UL (ref 150–350)
PMV BLD AUTO: 11.5 FL (ref 9.2–12.9)
POTASSIUM SERPL-SCNC: 4.4 MMOL/L (ref 3.5–5.1)
PROT SERPL-MCNC: 6.6 G/DL (ref 6–8.4)
RBC # BLD AUTO: 3.65 M/UL (ref 4–5.4)
SODIUM SERPL-SCNC: 139 MMOL/L (ref 136–145)
WBC # BLD AUTO: 4.77 K/UL (ref 3.9–12.7)

## 2020-02-16 PROCEDURE — 80053 COMPREHEN METABOLIC PANEL: CPT

## 2020-02-16 PROCEDURE — 94761 N-INVAS EAR/PLS OXIMETRY MLT: CPT

## 2020-02-16 PROCEDURE — 84100 ASSAY OF PHOSPHORUS: CPT

## 2020-02-16 PROCEDURE — 36415 COLL VENOUS BLD VENIPUNCTURE: CPT

## 2020-02-16 PROCEDURE — 85025 COMPLETE CBC W/AUTO DIFF WBC: CPT

## 2020-02-16 PROCEDURE — 96372 THER/PROPH/DIAG INJ SC/IM: CPT

## 2020-02-16 PROCEDURE — G0378 HOSPITAL OBSERVATION PER HR: HCPCS

## 2020-02-16 PROCEDURE — 25000003 PHARM REV CODE 250: Performed by: NURSE PRACTITIONER

## 2020-02-16 PROCEDURE — 99226 PR SUBSEQUENT OBSERVATION CARE,LEVEL III: CPT | Mod: ,,, | Performed by: NURSE PRACTITIONER

## 2020-02-16 PROCEDURE — 63600175 PHARM REV CODE 636 W HCPCS: Performed by: HOSPITALIST

## 2020-02-16 PROCEDURE — 99226 PR SUBSEQUENT OBSERVATION CARE,LEVEL III: ICD-10-PCS | Mod: ,,, | Performed by: NURSE PRACTITIONER

## 2020-02-16 PROCEDURE — 83735 ASSAY OF MAGNESIUM: CPT

## 2020-02-16 RX ADMIN — CARVEDILOL 6.25 MG: 6.25 TABLET, FILM COATED ORAL at 08:02

## 2020-02-16 RX ADMIN — ASPIRIN 81 MG: 81 TABLET, COATED ORAL at 08:02

## 2020-02-16 RX ADMIN — AMIODARONE HYDROCHLORIDE 100 MG: 100 TABLET ORAL at 08:02

## 2020-02-16 RX ADMIN — DOCUSATE SODIUM 100 MG: 100 CAPSULE, LIQUID FILLED ORAL at 08:02

## 2020-02-16 RX ADMIN — AMLODIPINE BESYLATE 5 MG: 5 TABLET ORAL at 08:02

## 2020-02-16 RX ADMIN — FOLIC ACID 1 MG: 1 TABLET ORAL at 08:02

## 2020-02-16 RX ADMIN — ATORVASTATIN CALCIUM 40 MG: 20 TABLET, FILM COATED ORAL at 08:02

## 2020-02-16 RX ADMIN — MELATONIN 1000 UNITS: at 08:02

## 2020-02-16 RX ADMIN — ENOXAPARIN SODIUM 30 MG: 100 INJECTION SUBCUTANEOUS at 05:02

## 2020-02-16 RX ADMIN — CLOPIDOGREL BISULFATE 75 MG: 75 TABLET ORAL at 08:02

## 2020-02-16 RX ADMIN — ALLOPURINOL 300 MG: 300 TABLET ORAL at 08:02

## 2020-02-16 NOTE — PROGRESS NOTES
[facility] Hospital Medicine  Progress Note    Patient Name: Anahy Evans  MRN: [id number]  Patient Class: OP- Observation   Admission Date: 2/15/2020  Length of Stay: 0 days  Attending Physician: Luis Alberto Figueroa MD  Primary Care Provider: Elena Cabrera MD        Subjective:     Principal Problem:Acute chest pain      HPI:  Dave Doherty, NP:  The patient is a 86 y.o. female with a PMHx of MI, CAD, HTN, and HLD, who presents with complaint of intermittent mid-sternal CP with a tightening sensation that began 2 hours ago while sitting in bed. The patient reports she first felt the pain when she turned over on her side and it lasted 2-3 minutes. She reports it would last about 2-3 minutes every time it came then it became constant so she took NTG approximately 1 hour and 20 minutes ago and it completely alleviated the pain. She denies current CP. She states she has current lightheadedness which began after she took the NTG. The patient denies associated jaw pain, nausea, or vomiting. The patient reports she has 4 cardiac stents placed. The patient reports taking a daily 81 mg aspirin.      Interval History:  Denies chest pain.  Cardiology consulted and agree with Cardiolite stress test 2/17/2020.        Review of Systems   Constitutional: Negative for chills and fever.   HENT: Negative for congestion and trouble swallowing.    Respiratory: Negative for cough and shortness of breath.    Cardiovascular: Negative for chest pain, palpitations and leg swelling.   Gastrointestinal: Negative for abdominal pain, nausea and vomiting.   Genitourinary: Negative for dysuria and hematuria.   Musculoskeletal: Negative for gait problem.   Skin: Negative for rash.   Neurological: Negative for dizziness and weakness.   Hematological: Does not bruise/bleed easily.   Psychiatric/Behavioral: Negative.      Objective:     Vital Signs (Most Recent):  Temp: 97.4 °F (36.3 °C) (02/16/20 1201)  Pulse: (!) 55  (02/16/20 1201)  Resp: 16 (02/16/20 1201)  BP: (!) 141/66 (02/16/20 1201)  SpO2: 98 % (02/16/20 1201) Vital Signs (24h Range):  Temp:  [97.4 °F (36.3 °C)-98.4 °F (36.9 °C)] 97.4 °F (36.3 °C)  Pulse:  [46-59] 55  Resp:  [16-18] 16  SpO2:  [97 %-99 %] 98 %  BP: (114-147)/(59-68) 141/66     Weight: 81.3 kg (179 lb 3.2 oz)  Body mass index is 28.07 kg/m².    Intake/Output Summary (Last 24 hours) at 2/16/2020 1357  Last data filed at 2/16/2020 1100  Gross per 24 hour   Intake --   Output 1200 ml   Net -1200 ml      Physical Exam   Constitutional: She is oriented to person, place, and time. No distress.   HENT:   Head: Normocephalic and atraumatic.   Eyes: Pupils are equal, round, and reactive to light. EOM are normal.   Neck: Normal range of motion. Neck supple.   Cardiovascular: Normal rate and regular rhythm.   Pulmonary/Chest: Effort normal and breath sounds normal. No respiratory distress.   Abdominal: Soft. Bowel sounds are normal. She exhibits no distension. There is no tenderness.   Musculoskeletal: She exhibits no edema.   Decreased rom right leg, right elbow, no swelling seen, full rom  Of elbow and shoulder   Neurological: She is alert and oriented to person, place, and time. No cranial nerve deficit.   Skin: Skin is warm.   Psychiatric: She has a normal mood and affect.   Vitals reviewed.      Significant Labs:   BMP:   Recent Labs   Lab 02/16/20  0458   GLU 88      K 4.4      CO2 23   BUN 26*   CREATININE 1.6*   CALCIUM 9.0   MG 1.7     CBC:   Recent Labs   Lab 02/15/20  0148 02/15/20  0600 02/16/20  0458   WBC 4.85 4.39 4.77   HGB 11.7* 11.6* 10.6*   HCT 37.0 36.5* 34.2*    148* 147*       Significant Imaging: I have reviewed all pertinent imaging results/findings within the past 24 hours.      Assessment/Plan:      * Acute chest pain  - Chest pain for several days. Started occasionally, but now, more constant.   - mildly elevated troponin, but in flat pattern   - Cardiology consulted and  felt symptoms may represent angin pectoris and agree with Cardiolite stres testing 2/17  - monitor on telemetry    Coronary artery disease involving native coronary artery  -Noted to have had stent placed in July 2017 by Dr. Canela  -No chest pain or sob at this time  -Continue statin, aspirin, Plavix and carvedilol  -Cardiolite stress test 2/17 per Dr. Canela  -Monitor on telemetry      Stenosis of right carotid artery  - per record,   high-grade stenosis of the right internal carotid artery.    - will follow with Dr. Moctezuma, 2/18 for further evaluation    - continue aspirin and Plavix      Pure hypercholesterolemia  - Chronic and stable  - Continue Lipitor 40mg PO daily      Gastroesophageal reflux disease without esophagitis  - Chronic and stable  - Not on therapy      Chronic kidney disease, stage III (moderate)  -She follows with Dr. Valerio  -Baseline Cr noted to be around 1.6 and at baseline  -Will continue home ARB and spironolactone  -If any deterioration will consult [facility].      Essential hypertension  - BP running low now  - Hold norvasc, coreg, previously held telmisartan, spironolactone      PAF (paroxysmal atrial fibrillation)  - Follows with Dr. Gay  - In NSR presently  - Continue amiodarone 100mg PO daily  - Monitor on telemetry        VTE Risk Mitigation (From admission, onward)         Ordered     enoxaparin injection 30 mg  Daily      02/15/20 1144     Place sequential compression device  Until discontinued      02/15/20 4906     IP VTE HIGH RISK PATIENT  Once      02/15/20 0416                      Zahira Moralez NP  Department of Hospital Medicine   [facility], [place], operated by [facility]

## 2020-02-16 NOTE — PROGRESS NOTES
Cardiology  Progress Notes            Subjective:  No further chest pain.  Stayed up till 4:00 a.m. watching television, feels well.        She is status post bare metal stent in the left anterior descending coronary artery during a STEMI in 1999, drug-eluting stent in the left circumflex coronary artery in 2010, drug-eluting stent in the proximal left anterior descending coronary artery in June 2017, drug-eluting stent in the ostial left anterior descending coronary artery in November 2017, carotid occlusive disease, hypertension, paroxysmal atrial fibrillation, chronic renal failure, diabetes mellitus, hyperlipidemia.     At CT angiography last week, she was noted to have a high-grade stenosis of the right internal carotid artery.  She is due to see Dr. Moctezuma on Tuesday for its evaluation.     Vital Signs:  Vitals:    02/16/20 0800 02/16/20 0838 02/16/20 1000 02/16/20 1201   BP:  135/62  (!) 141/66   BP Location:  Right arm  Right arm   Patient Position:  Lying  Lying   Pulse: (!) 58 (!) 59 (!) 53 (!) 55   Resp:  16  16   Temp:  97.4 °F (36.3 °C)  97.4 °F (36.3 °C)   TempSrc:  Oral  Oral   SpO2:  97%  98%   Weight:       Height:           Physical Exam:  Right carotid bruit  Chest clear  Heart no murmur or gallop  Abdomen soft and nontender  No ankle edema.  Extremities are warm.  Neurologically intact.    Laboratory:  CBC:   Recent Labs   Lab 02/16/20 0458   WBC 4.77   RBC 3.65*   HGB 10.6*   HCT 34.2*   *   MCV 94   MCH 29.0   MCHC 31.0*     BMP:   Recent Labs   Lab 02/16/20  0458   GLU 88      K 4.4      CO2 23   BUN 26*   CREATININE 1.6*   CALCIUM 9.0   MG 1.7     CMP:   Recent Labs   Lab 02/16/20 0458   GLU 88   CALCIUM 9.0   ALBUMIN 3.3*   PROT 6.6      K 4.4   CO2 23      BUN 26*   CREATININE 1.6*   ALKPHOS 73   ALT 17   AST 17   BILITOT 0.5     LFTs:   Recent Labs   Lab 02/16/20 0458   ALT 17   AST 17   ALKPHOS 73   BILITOT 0.5   PROT 6.6   ALBUMIN 3.3*     Coagulation: No  results for input(s): PT, INR, APTT in the last 168 hours.  Cardiac markers:   Recent Labs   Lab 02/15/20  1029   TROPONINI 0.027*       Imaging:  No orders to display         Problems:   Chest pains, possibly angina pectoris  Coronary artery disease history of previous intracoronary stents  High-grade stenosis of the right internal carotid artery.  Hypertension  Hyperlipidemia  Stage III chronic renal failure           Plan of Care:   Lexiscan Cardiolite test in the morning        Asim Canela

## 2020-02-16 NOTE — SUBJECTIVE & OBJECTIVE
Interval History:  Denies chest pain.  Cardiology consulted and agree with Cardiolite stress test 2/17/2020.        Review of Systems   Constitutional: Negative for chills and fever.   HENT: Negative for congestion and trouble swallowing.    Respiratory: Negative for cough and shortness of breath.    Cardiovascular: Negative for chest pain, palpitations and leg swelling.   Gastrointestinal: Negative for abdominal pain, nausea and vomiting.   Genitourinary: Negative for dysuria and hematuria.   Musculoskeletal: Negative for gait problem.   Skin: Negative for rash.   Neurological: Negative for dizziness and weakness.   Hematological: Does not bruise/bleed easily.   Psychiatric/Behavioral: Negative.      Objective:     Vital Signs (Most Recent):  Temp: 97.4 °F (36.3 °C) (02/16/20 1201)  Pulse: (!) 55 (02/16/20 1201)  Resp: 16 (02/16/20 1201)  BP: (!) 141/66 (02/16/20 1201)  SpO2: 98 % (02/16/20 1201) Vital Signs (24h Range):  Temp:  [97.4 °F (36.3 °C)-98.4 °F (36.9 °C)] 97.4 °F (36.3 °C)  Pulse:  [46-59] 55  Resp:  [16-18] 16  SpO2:  [97 %-99 %] 98 %  BP: (114-147)/(59-68) 141/66     Weight: 81.3 kg (179 lb 3.2 oz)  Body mass index is 28.07 kg/m².    Intake/Output Summary (Last 24 hours) at 2/16/2020 1357  Last data filed at 2/16/2020 1100  Gross per 24 hour   Intake --   Output 1200 ml   Net -1200 ml      Physical Exam   Constitutional: She is oriented to person, place, and time. No distress.   HENT:   Head: Normocephalic and atraumatic.   Eyes: Pupils are equal, round, and reactive to light. EOM are normal.   Neck: Normal range of motion. Neck supple.   Cardiovascular: Normal rate and regular rhythm.   Pulmonary/Chest: Effort normal and breath sounds normal. No respiratory distress.   Abdominal: Soft. Bowel sounds are normal. She exhibits no distension. There is no tenderness.   Musculoskeletal: She exhibits no edema.   Decreased rom right leg, right elbow, no swelling seen, full rom  Of elbow and shoulder    Neurological: She is alert and oriented to person, place, and time. No cranial nerve deficit.   Skin: Skin is warm.   Psychiatric: She has a normal mood and affect.   Vitals reviewed.      Significant Labs:   BMP:   Recent Labs   Lab 02/16/20  0458   GLU 88      K 4.4      CO2 23   BUN 26*   CREATININE 1.6*   CALCIUM 9.0   MG 1.7     CBC:   Recent Labs   Lab 02/15/20  0148 02/15/20  0600 02/16/20  0458   WBC 4.85 4.39 4.77   HGB 11.7* 11.6* 10.6*   HCT 37.0 36.5* 34.2*    148* 147*       Significant Imaging: I have reviewed all pertinent imaging results/findings within the past 24 hours.

## 2020-02-16 NOTE — PLAN OF CARE
Plan of care reviewed with pt any questions or concerns addressed. Pt verbalized understanding. Pt has had bradycardia during shift but stable. Medicated per MAR. Pt free of falls/injuries, bed in lowest position with bed locked, side rails up x2, call light and belongings within reach. Will continue to monitor throughout shift.

## 2020-02-17 ENCOUNTER — HOSPITAL ENCOUNTER (OUTPATIENT)
Dept: CARDIOLOGY | Facility: OTHER | Age: 85
Discharge: HOME | End: 2020-02-17
Attending: INTERNAL MEDICINE
Payer: MEDICARE

## 2020-02-17 VITALS
DIASTOLIC BLOOD PRESSURE: 87 MMHG | SYSTOLIC BLOOD PRESSURE: 205 MMHG | HEIGHT: 67 IN | HEART RATE: 64 BPM | WEIGHT: 179 LBS | BODY MASS INDEX: 28.09 KG/M2

## 2020-02-17 VITALS
RESPIRATION RATE: 16 BRPM | WEIGHT: 179.19 LBS | HEART RATE: 56 BPM | SYSTOLIC BLOOD PRESSURE: 147 MMHG | TEMPERATURE: 99 F | OXYGEN SATURATION: 97 % | BODY MASS INDEX: 28.12 KG/M2 | HEIGHT: 67 IN | DIASTOLIC BLOOD PRESSURE: 65 MMHG

## 2020-02-17 LAB
ALBUMIN SERPL BCP-MCNC: 3.4 G/DL (ref 3.5–5.2)
ALP SERPL-CCNC: 74 U/L (ref 55–135)
ALT SERPL W/O P-5'-P-CCNC: 19 U/L (ref 10–44)
ANION GAP SERPL CALC-SCNC: 9 MMOL/L (ref 8–16)
AST SERPL-CCNC: 18 U/L (ref 10–40)
BASOPHILS # BLD AUTO: 0.02 K/UL (ref 0–0.2)
BASOPHILS NFR BLD: 0.4 % (ref 0–1.9)
BILIRUB SERPL-MCNC: 0.5 MG/DL (ref 0.1–1)
BUN SERPL-MCNC: 27 MG/DL (ref 8–23)
CALCIUM SERPL-MCNC: 9.3 MG/DL (ref 8.7–10.5)
CHLORIDE SERPL-SCNC: 107 MMOL/L (ref 95–110)
CO2 SERPL-SCNC: 23 MMOL/L (ref 23–29)
CREAT SERPL-MCNC: 1.4 MG/DL (ref 0.5–1.4)
CV PHARM DOSE: 0.4 MG
CV STRESS BASE HR: 64 BPM
DIASTOLIC BLOOD PRESSURE: 87 MMHG
DIFFERENTIAL METHOD: ABNORMAL
EOSINOPHIL # BLD AUTO: 0.2 K/UL (ref 0–0.5)
EOSINOPHIL NFR BLD: 3.4 % (ref 0–8)
ERYTHROCYTE [DISTWIDTH] IN BLOOD BY AUTOMATED COUNT: 14.4 % (ref 11.5–14.5)
EST. GFR  (AFRICAN AMERICAN): 39 ML/MIN/1.73 M^2
EST. GFR  (NON AFRICAN AMERICAN): 34 ML/MIN/1.73 M^2
GLUCOSE SERPL-MCNC: 89 MG/DL (ref 70–110)
HCT VFR BLD AUTO: 36.8 % (ref 37–48.5)
HGB BLD-MCNC: 11.9 G/DL (ref 12–16)
IMM GRANULOCYTES # BLD AUTO: 0 K/UL (ref 0–0.04)
IMM GRANULOCYTES NFR BLD AUTO: 0 % (ref 0–0.5)
LYMPHOCYTES # BLD AUTO: 2.3 K/UL (ref 1–4.8)
LYMPHOCYTES NFR BLD: 42.6 % (ref 18–48)
MAGNESIUM SERPL-MCNC: 1.7 MG/DL (ref 1.6–2.6)
MCH RBC QN AUTO: 29.8 PG (ref 27–31)
MCHC RBC AUTO-ENTMCNC: 32.3 G/DL (ref 32–36)
MCV RBC AUTO: 92 FL (ref 82–98)
MONOCYTES # BLD AUTO: 0.6 K/UL (ref 0.3–1)
MONOCYTES NFR BLD: 12.1 % (ref 4–15)
NEUTROPHILS # BLD AUTO: 2.2 K/UL (ref 1.8–7.7)
NEUTROPHILS NFR BLD: 41.5 % (ref 38–73)
NRBC BLD-RTO: 0 /100 WBC
OHS CV CPX 85 PERCENT MAX PREDICTED HEART RATE MALE: 111
OHS CV CPX MAX PREDICTED HEART RATE: 130
OHS CV CPX PATIENT IS FEMALE: 1
OHS CV CPX PATIENT IS MALE: 0
OHS CV CPX PEAK DIASTOLIC BLOOD PRESSURE: 84 MMHG
OHS CV CPX PEAK HEAR RATE: 76 BPM
OHS CV CPX PEAK RATE PRESSURE PRODUCT: NORMAL
OHS CV CPX PEAK SYSTOLIC BLOOD PRESSURE: 166 MMHG
OHS CV CPX PERCENT MAX PREDICTED HEART RATE ACHIEVED: 58
OHS CV CPX RATE PRESSURE PRODUCT PRESENTING: NORMAL
PHOSPHATE SERPL-MCNC: 3.5 MG/DL (ref 2.7–4.5)
PLATELET # BLD AUTO: 147 K/UL (ref 150–350)
PMV BLD AUTO: 11.2 FL (ref 9.2–12.9)
POTASSIUM SERPL-SCNC: 4.3 MMOL/L (ref 3.5–5.1)
PROT SERPL-MCNC: 6.8 G/DL (ref 6–8.4)
RBC # BLD AUTO: 4 M/UL (ref 4–5.4)
SODIUM SERPL-SCNC: 139 MMOL/L (ref 136–145)
STRESS ECHO TARGET HR: 114 BPM
SYSTOLIC BLOOD PRESSURE: 207 MMHG
WBC # BLD AUTO: 5.3 K/UL (ref 3.9–12.7)

## 2020-02-17 PROCEDURE — 93018 NUCLEAR STRESS TEST (CUPID ONLY): ICD-10-PCS | Mod: ,,, | Performed by: INTERNAL MEDICINE

## 2020-02-17 PROCEDURE — 93016 CV STRESS TEST SUPVJ ONLY: CPT | Mod: ,,, | Performed by: INTERNAL MEDICINE

## 2020-02-17 PROCEDURE — 36415 COLL VENOUS BLD VENIPUNCTURE: CPT

## 2020-02-17 PROCEDURE — 99217 PR OBSERVATION CARE DISCHARGE: ICD-10-PCS | Mod: ,,, | Performed by: NURSE PRACTITIONER

## 2020-02-17 PROCEDURE — 99217 PR OBSERVATION CARE DISCHARGE: CPT | Mod: ,,, | Performed by: NURSE PRACTITIONER

## 2020-02-17 PROCEDURE — 80053 COMPREHEN METABOLIC PANEL: CPT

## 2020-02-17 PROCEDURE — 93017 CV STRESS TEST TRACING ONLY: CPT

## 2020-02-17 PROCEDURE — 83735 ASSAY OF MAGNESIUM: CPT

## 2020-02-17 PROCEDURE — 84100 ASSAY OF PHOSPHORUS: CPT

## 2020-02-17 PROCEDURE — 25000003 PHARM REV CODE 250: Performed by: NURSE PRACTITIONER

## 2020-02-17 PROCEDURE — 85025 COMPLETE CBC W/AUTO DIFF WBC: CPT

## 2020-02-17 PROCEDURE — 93016 NUCLEAR STRESS TEST (CUPID ONLY): ICD-10-PCS | Mod: ,,, | Performed by: INTERNAL MEDICINE

## 2020-02-17 PROCEDURE — G0378 HOSPITAL OBSERVATION PER HR: HCPCS

## 2020-02-17 PROCEDURE — 93018 CV STRESS TEST I&R ONLY: CPT | Mod: ,,, | Performed by: INTERNAL MEDICINE

## 2020-02-17 RX ORDER — ENOXAPARIN SODIUM 100 MG/ML
40 INJECTION SUBCUTANEOUS EVERY 24 HOURS
Status: DISCONTINUED | OUTPATIENT
Start: 2020-02-17 | End: 2020-02-17 | Stop reason: HOSPADM

## 2020-02-17 RX ORDER — HYDRALAZINE HYDROCHLORIDE 25 MG/1
25 TABLET, FILM COATED ORAL EVERY 8 HOURS PRN
Status: DISCONTINUED | OUTPATIENT
Start: 2020-02-17 | End: 2020-02-17 | Stop reason: HOSPADM

## 2020-02-17 RX ADMIN — ASPIRIN 81 MG: 81 TABLET, COATED ORAL at 10:02

## 2020-02-17 RX ADMIN — HYDRALAZINE HYDROCHLORIDE 25 MG: 25 TABLET, FILM COATED ORAL at 01:02

## 2020-02-17 RX ADMIN — FOLIC ACID 1 MG: 1 TABLET ORAL at 10:02

## 2020-02-17 RX ADMIN — CARVEDILOL 6.25 MG: 6.25 TABLET, FILM COATED ORAL at 10:02

## 2020-02-17 RX ADMIN — DOCUSATE SODIUM 100 MG: 100 CAPSULE, LIQUID FILLED ORAL at 10:02

## 2020-02-17 RX ADMIN — ALLOPURINOL 300 MG: 300 TABLET ORAL at 10:02

## 2020-02-17 RX ADMIN — MELATONIN 1000 UNITS: at 10:02

## 2020-02-17 RX ADMIN — CLOPIDOGREL BISULFATE 75 MG: 75 TABLET ORAL at 10:02

## 2020-02-17 RX ADMIN — ATORVASTATIN CALCIUM 40 MG: 20 TABLET, FILM COATED ORAL at 10:02

## 2020-02-17 RX ADMIN — AMIODARONE HYDROCHLORIDE 100 MG: 100 TABLET ORAL at 10:02

## 2020-02-17 NOTE — PLAN OF CARE
IDischarge Planning Assessment:  Final note;  Patient admitted on 2/15/2020   Chart reviewed, Care plan discussed with treatment team,  attending Dr. Figueroa/Zahira  PCP updated in [facility]: Dr. Rosenbaum  Pharmacy, updated in [facility]: [facility] mail order or [facility] on [address]   DME at home: pt admits to no DME at home with CM, however there is an extensive list from ER.  Current dispo: home with family today  See PCP, Dr Canela and Dr Moctezuma tomorrow as discussed  Transportation: daughter to drive home  Case management  to follow.  No anticipated DC needs from CM perspective.          02/17/20 1219   Final Note   Assessment Type Final Discharge Note   Hospital Follow Up  Appt(s) scheduled? Yes   Discharge plans and expectations educations in teach back method with documentation complete? Yes   Right Care Referral Info   Post Acute Recommendation No Care

## 2020-02-17 NOTE — PROGRESS NOTES
Pharmacist Renal Dose Adjustment Note    Anahy Evans is a 86 y.o. female being treated with the medication enoxaparin    Patient Data:    Vital Signs (Most Recent):  Temp: 97.5 °F (36.4 °C) (02/17/20 0731)  Pulse: 65 (02/17/20 0731)  Resp: 18 (02/17/20 0731)  BP: (!) 157/69 (02/17/20 0731)  SpO2: 99 % (02/17/20 0731)   Vital Signs (72h Range):  Temp:  [97.4 °F (36.3 °C)-98.6 °F (37 °C)]   Pulse:  [46-72]   Resp:  [16-19]   BP: (114-192)/(59-85)   SpO2:  [96 %-100 %]      Recent Labs   Lab 02/15/20  0600 02/16/20  0458 02/17/20  0449   CREATININE 1.6* 1.6* 1.4     Serum creatinine: 1.4 mg/dL 02/17/20 0449  Estimated creatinine clearance: 31.6 mL/min     Medication Dose Route Frequency   Previous Order Enoxaparin 30 mg SC daily   New Order Enoxaparin 40 mg SC daily     Renal dose adjustments performed as noted above.  Thank you for the consult.  We will continue monitoring and adjusting as necessary.    Pharmacist: VIPUL SCHMITZ  [phone]

## 2020-02-17 NOTE — NURSING
AAOx4. No acute distress noted. Patient denies any pain/discomfort at this time. Vitals available in flow sheet. Cardiac monitor in place. Carvedilol held last night- HR 50 at the time. Bed locked/lowest position, side rails up x 2, nonskid socks when out of bed, bedside commode in use, call light within reach. Patient encouraged to call for assistance when needed.

## 2020-02-23 NOTE — DISCHARGE SUMMARY
[facility] Hospital Medicine  Discharge Summary      Patient Name: Anahy Evans  MRN: [id number]  Admission Date: 2/15/2020  Hospital Length of Stay: 0 days  Discharge Date and Time: 2/17/2020  1:07 PM  Attending Physician: Luis Alberto Figueroa MD   Discharging Provider: Zahira Moralez NP  Primary Care Provider: Elena Cabrera MD      HPI:   Dave Doherty NP:  The patient is a 86 y.o. female with a PMHx of MI, CAD, HTN, and HLD, who presents with complaint of intermittent mid-sternal CP with a tightening sensation that began 2 hours ago while sitting in bed. The patient reports she first felt the pain when she turned over on her side and it lasted 2-3 minutes. She reports it would last about 2-3 minutes every time it came then it became constant so she took NTG approximately 1 hour and 20 minutes ago and it completely alleviated the pain. She denies current CP. She states she has current lightheadedness which began after she took the NTG. The patient denies associated jaw pain, nausea, or vomiting. The patient reports she has 4 cardiac stents placed. The patient reports taking a daily 81 mg aspirin.    Hospital Course:    In light of her cardiac risk factors, Anahy Evans was admitted to the hospital under observation.     The patient reports that her chest pain had completely resolved while in the ED.   Serial troponins were mildly elevated and electrocardiogram showed no evidence of acute myocardial ischemia.  Her Cardiologist, Dr. Canela, evaluated the patient and agree with recommendation for Cardiolite stress testing on 2/17/2020 that was negative for myocardial ischemia. I suspect presenting chest discomfort related to gastroesophageal reflux disease.  I have provided education on how to reduce of effects of GERD with dietary changes and starting a short fourteen day course of Prilosec. .  The patient was cleared for discharge by Cardiology and will follow up within one month for  continued management of coronary artery disease.      Of note, the patient had CT angiography of carotid vessels last week and was found to have high-grade stenosis of the right internal carotid artery. She was continued on aspirin and Plavix during her hospital stay.    I have recommended she keep the appointment with Vascular Surgery, Dr. Moctezuma, on 2/18/2020 for evaluation.   Diagnosis, plan of care and management were discussed with the patient who agreed with plan and was eager for discharge.        Consults:   Consults (From admission, onward)        Status Ordering Provider     Inpatient consult to Cardiology  Once     Provider:  Asim Canela MD    Completed TERA JUNIOR        Service: Hospital Medicine    Final Active Diagnoses:    Diagnosis Date Noted POA    PRINCIPAL PROBLEM:  Acute chest pain [R07.9] 02/15/2020 Yes    Atypical chest pain [R07.89]  Unknown    CAD (coronary artery disease) [I25.10] 05/09/2019 Yes    Stenosis of right carotid artery [I65.21] 02/12/2019 Yes    Chronic kidney disease, stage III (moderate) [N18.3] 06/29/2017 Yes    Essential hypertension [I10] 11/10/2016 Yes    Pure hypercholesterolemia [E78.00] 10/21/2014 Yes    PAF (paroxysmal atrial fibrillation) [I48.0] 07/05/2013 Yes    Gastroesophageal reflux disease without esophagitis [K21.9] 09/14/2012 Yes      Problems Resolved During this Admission:       Discharged Condition: good    Disposition: Home or Self Care    Follow Up:  Follow-up Information     Americo Dave Moctezuma MD. Go on 2/18/2020.    Specialty:  Cardiothoracic Surgery  Why:  for evaluation of carotid artery stenosis  Contact information:  [address]  [facility] [address] [phone]             Asim Canela MD. Go in 3 weeks.    Specialty:  Cardiology  Why:  Follow up after hosptial discharge  Contact information:  [address] [address] [phone]             Go to Elena Cabrera MD.     Specialty:  Internal Medicine  Why:  see in 1-2 weeks  Contact information:  [address] [place] [id number]  [phone]                 Patient Instructions:      Diet Cardiac     Notify your health care provider if you experience any of the following:  severe uncontrolled pain     Notify your health care provider if you experience any of the following:  difficulty breathing or increased cough     Notify your health care provider if you experience any of the following:  increased confusion or weakness     Activity as tolerated       Significant Diagnostic Studies: Labs: All labs within the past 24 hours have been reviewed       Medications:  Reconciled Home Medications:      Medication List      CHANGE how you take these medications    folic acid 1 MG tablet  Commonly known as:  FOLVITE  Take 1 mg by mouth once daily.  What changed:  Another medication with the same name was removed. Continue taking this medication, and follow the directions you see here.        CONTINUE taking these medications    allopurinoL 300 MG tablet  Commonly known as:  ZYLOPRIM  Take 300 mg by mouth once daily.     amiodarone 200 MG Tab  Commonly known as:  PACERONE  Take 0.5 tablets (100 mg total) by mouth once daily.     amLODIPine 5 MG tablet  Commonly known as:  NORVASC  Take 5 mg by mouth once daily.     aspirin 81 MG EC tablet  Commonly known as:  ECOTRIN  Take 81 mg by mouth once daily.     atorvastatin 40 MG tablet  Commonly known as:  LIPITOR  Take 40 mg by mouth once daily.     calcium carbonate-vitamin D3 600 mg calcium- 200 unit Cap  Take by mouth.     carvediloL 6.25 MG tablet  Commonly known as:  COREG  Take 6.25 mg by mouth 2 (two) times daily.     clonazePAM 0.5 MG tablet  Commonly known as:  KLONOPIN  Take 1 tablet (0.5 mg total) by mouth every evening.     clopidogreL 75 mg tablet  Commonly known as:  PLAVIX  TAKE 1 TABLET DAILY     docusate sodium 100 MG capsule  Commonly known as:  COLACE  Take 1 capsule  (100 mg total) by mouth every 12 (twelve) hours.     nitroGLYCERIN 0.4 MG SL tablet  Commonly known as:  NITROSTAT  Place 0.4 mg under the tongue every 5 (five) minutes as needed for Chest pain.     Vitamin D3 50 mcg (2,000 unit) Cap  Generic drug:  cholecalciferol (vitamin D3)  Take 1 capsule by mouth once daily.        STOP taking these medications    acetaminophen 500 MG tablet  Commonly known as:  TYLENOL     bisacodyL 10 mg Supp  Commonly known as:  DULCOLAX     enoxaparin 40 mg/0.4 mL Syrg  Commonly known as:  LOVENOX     meclizine 12.5 mg tablet  Commonly known as:  ANTIVERT     spironolactone 25 MG tablet  Commonly known as:  ALDACTONE     telmisartan 80 MG Tab  Commonly known as:  MICARDIS          Time spent on the discharge of patient: >30 minutes  Patient was seen and examined on the date of discharge and determined to be suitable for discharge.         Zahira Moralez NP  Department of Hospital Medicine  [facility]

## 2020-02-24 NOTE — HOSPITAL COURSE
In light of her cardiac risk factors, Anahy Evans was admitted to the hospital under observation.    The patient reports that her chest pain had completely resolved while in the ED.  Serial troponins were mildly elevated and electrocardiogram showed no evidence of acute myocardial ischemia.  Her Cardiologist, Dr. Canela, evaluated the patient and agree with recommendation for Cardiolite stress testing on 2/17/2020 that was negative for myocardial ischemia. I suspect presenting chest discomfort related to gastroesophageal reflux disease.  I have provided education on how to reduce of effects of GERD with dietary changes and starting a short fourteen day course of Prilosec. .  The patient was cleared for discharge by Cardiology and will follow up within one month for continued management of coronary artery disease.      Of note, the patient had CT angiography of carotid vessels last week and was found to have high-grade stenosis of the right internal carotid artery. She was continued on aspirin and Plavix during her hospital stay.   I have recommended she keep the appointment with Vascular Surgery, Dr. Moctezuma, on 2/18/2020 for evaluation.   Diagnosis, plan of care and management were discussed with the patient who agreed with plan and was eager for discharge.

## 2020-02-27 ENCOUNTER — OFFICE VISIT (OUTPATIENT)
Dept: CARDIOLOGY | Facility: CLINIC | Age: 85
End: 2020-02-27
Attending: INTERNAL MEDICINE
Payer: MEDICARE

## 2020-02-27 VITALS
DIASTOLIC BLOOD PRESSURE: 66 MMHG | HEIGHT: 67 IN | SYSTOLIC BLOOD PRESSURE: 117 MMHG | BODY MASS INDEX: 27.47 KG/M2 | WEIGHT: 175 LBS | HEART RATE: 53 BPM

## 2020-02-27 DIAGNOSIS — I65.21 STENOSIS OF RIGHT CAROTID ARTERY: ICD-10-CM

## 2020-02-27 DIAGNOSIS — I48.0 PAF (PAROXYSMAL ATRIAL FIBRILLATION): ICD-10-CM

## 2020-02-27 DIAGNOSIS — R07.89 ATYPICAL CHEST PAIN: ICD-10-CM

## 2020-02-27 DIAGNOSIS — I10 ESSENTIAL HYPERTENSION: ICD-10-CM

## 2020-02-27 DIAGNOSIS — I25.10 CORONARY ARTERY DISEASE INVOLVING NATIVE CORONARY ARTERY OF NATIVE HEART WITHOUT ANGINA PECTORIS: Primary | ICD-10-CM

## 2020-02-27 DIAGNOSIS — N18.30 CHRONIC KIDNEY DISEASE, STAGE III (MODERATE): ICD-10-CM

## 2020-02-27 PROCEDURE — 99214 OFFICE O/P EST MOD 30 MIN: CPT | Mod: S$GLB,,, | Performed by: INTERNAL MEDICINE

## 2020-02-27 PROCEDURE — 1159F MED LIST DOCD IN RCRD: CPT | Mod: S$GLB,,, | Performed by: INTERNAL MEDICINE

## 2020-02-27 PROCEDURE — 1159F PR MEDICATION LIST DOCUMENTED IN MEDICAL RECORD: ICD-10-PCS | Mod: S$GLB,,, | Performed by: INTERNAL MEDICINE

## 2020-02-27 PROCEDURE — 99214 PR OFFICE/OUTPT VISIT, EST, LEVL IV, 30-39 MIN: ICD-10-PCS | Mod: S$GLB,,, | Performed by: INTERNAL MEDICINE

## 2020-04-09 ENCOUNTER — OFFICE VISIT (OUTPATIENT)
Dept: CARDIOLOGY | Facility: CLINIC | Age: 85
End: 2020-04-09
Attending: INTERNAL MEDICINE
Payer: MEDICARE

## 2020-04-09 VITALS
SYSTOLIC BLOOD PRESSURE: 146 MMHG | HEIGHT: 67 IN | HEART RATE: 56 BPM | DIASTOLIC BLOOD PRESSURE: 84 MMHG | BODY MASS INDEX: 26.37 KG/M2 | WEIGHT: 168 LBS

## 2020-04-09 DIAGNOSIS — I25.10 CORONARY ARTERY DISEASE INVOLVING NATIVE CORONARY ARTERY OF NATIVE HEART WITHOUT ANGINA PECTORIS: ICD-10-CM

## 2020-04-09 DIAGNOSIS — I65.21 STENOSIS OF RIGHT CAROTID ARTERY: ICD-10-CM

## 2020-04-09 DIAGNOSIS — Z85.3 PERSONAL HISTORY OF MALIGNANT NEOPLASM OF BREAST: ICD-10-CM

## 2020-04-09 DIAGNOSIS — I10 ESSENTIAL HYPERTENSION: ICD-10-CM

## 2020-04-09 DIAGNOSIS — R07.89 ATYPICAL CHEST PAIN: ICD-10-CM

## 2020-04-09 DIAGNOSIS — I48.0 PAF (PAROXYSMAL ATRIAL FIBRILLATION): Primary | ICD-10-CM

## 2020-04-09 DIAGNOSIS — E78.00 PURE HYPERCHOLESTEROLEMIA: ICD-10-CM

## 2020-04-09 DIAGNOSIS — I48.0 PAROXYSMAL ATRIAL FIBRILLATION: ICD-10-CM

## 2020-04-09 DIAGNOSIS — N18.30 CHRONIC KIDNEY DISEASE, STAGE III (MODERATE): ICD-10-CM

## 2020-04-09 DIAGNOSIS — I25.10 ATHEROSCLEROSIS OF NATIVE CORONARY ARTERY OF NATIVE HEART WITHOUT ANGINA PECTORIS: ICD-10-CM

## 2020-04-09 PROCEDURE — 1159F MED LIST DOCD IN RCRD: CPT | Mod: S$GLB,,, | Performed by: INTERNAL MEDICINE

## 2020-04-09 PROCEDURE — 99214 PR OFFICE/OUTPT VISIT, EST, LEVL IV, 30-39 MIN: ICD-10-PCS | Mod: 25,S$GLB,, | Performed by: INTERNAL MEDICINE

## 2020-04-09 PROCEDURE — 93000 ELECTROCARDIOGRAM COMPLETE: CPT | Mod: S$GLB,,, | Performed by: INTERNAL MEDICINE

## 2020-04-09 PROCEDURE — 93000 PR ELECTROCARDIOGRAM, COMPLETE: ICD-10-PCS | Mod: S$GLB,,, | Performed by: INTERNAL MEDICINE

## 2020-04-09 PROCEDURE — 1159F PR MEDICATION LIST DOCUMENTED IN MEDICAL RECORD: ICD-10-PCS | Mod: S$GLB,,, | Performed by: INTERNAL MEDICINE

## 2020-04-09 PROCEDURE — 99214 OFFICE O/P EST MOD 30 MIN: CPT | Mod: 25,S$GLB,, | Performed by: INTERNAL MEDICINE

## 2020-04-09 NOTE — PROGRESS NOTES
Subjective:    Patient ID:  Anahy Evans is a 86 y.o. female     HPI  Here for follow-up of coronary artery disease, status post bare metal stent in the left anterior descending coronary artery during a STEMI in 1999, drug-eluting stent in the left circumflex coronary artery in 2010, drug-eluting stent in the proximal left anterior descending coronary artery in June 2017, drug-eluting stent in the ostial left anterior descending coronary artery in November 2017, carotid occlusive disease, hypertension, paroxysmal atrial fibrillation, chronic renal failure, diabetes mellitus, hyperlipidemia.     Recently admitted to the hospital with atypical chest pains, had a negative nuclear stress test.  Four weeks ago underwent a right carotid endarterectomy for a 90+ percent stenosis of the right internal carotid artery, by .  Has some numbness of the tongue, and a slight difficulty in swallowing since the surgery, but it is slowly getting better.    Current Outpatient Medications   Medication Sig    allopurinol (ZYLOPRIM) 300 MG tablet Take 300 mg by mouth once daily.    amiodarone (PACERONE) 200 MG Tab Take 0.5 tablets (100 mg total) by mouth once daily.    amLODIPine (NORVASC) 5 MG tablet Take 5 mg by mouth once daily.    aspirin (ECOTRIN) 81 MG EC tablet Take 81 mg by mouth once daily.    atorvastatin (LIPITOR) 40 MG tablet Take 40 mg by mouth once daily.    calcium carbonate-vitamin D3 600 mg calcium- 200 unit Cap Take by mouth.    carvediloL (COREG) 6.25 MG tablet Take 6.25 mg by mouth 2 (two) times daily.    cholecalciferol, vitamin D3, (VITAMIN D3) 2,000 unit Cap Take 1 capsule by mouth once daily.    clonazePAM (KLONOPIN) 0.5 MG tablet Take 1 tablet (0.5 mg total) by mouth every evening.    clopidogrel (PLAVIX) 75 mg tablet TAKE 1 TABLET DAILY    docusate sodium (COLACE) 100 MG capsule Take 1 capsule (100 mg total) by mouth every 12 (twelve) hours.    folic acid (FOLVITE) 1 MG tablet Take 1 mg by  "mouth once daily.    nitroGLYCERIN (NITROSTAT) 0.4 MG SL tablet Place 0.4 mg under the tongue every 5 (five) minutes as needed for Chest pain.     No current facility-administered medications for this visit.        Review of Systems   Constitution: Negative for chills, decreased appetite, fever, weight gain and weight loss.   HENT: Negative for congestion, hearing loss and sore throat.         Numbness of the tongue  Slight difficulty swallowing   Eyes: Negative for blurred vision, double vision and visual disturbance.   Cardiovascular: Negative for chest pain, claudication, dyspnea on exertion, leg swelling, palpitations and syncope.   Respiratory: Negative for cough, hemoptysis, shortness of breath, sputum production and wheezing.    Endocrine: Negative for cold intolerance and heat intolerance.   Hematologic/Lymphatic: Negative for bleeding problem. Does not bruise/bleed easily.   Skin: Negative for color change, dry skin, flushing and itching.   Musculoskeletal: Negative for back pain, joint pain and myalgias.   Gastrointestinal: Negative for abdominal pain, anorexia, constipation, diarrhea, dysphagia, nausea and vomiting.        No bleeding per rectum   Genitourinary: Negative for dysuria, flank pain, frequency, hematuria and nocturia.   Neurological: Negative for dizziness, headaches, light-headedness, loss of balance, seizures and tremors.   Psychiatric/Behavioral: Negative for altered mental status and depression.         Vitals:    04/09/20 1003   BP: (!) 146/84   Pulse: (!) 56   Weight: 76.2 kg (168 lb)   Height: 5' 7" (1.702 m)     Objective:    Physical Exam   Constitutional: She is oriented to person, place, and time. She appears well-developed and well-nourished.   HENT:   Head: Normocephalic and atraumatic.   Nose: Nose normal.   Mouth/Throat: Oropharynx is clear and moist.   Eyes: Pupils are equal, round, and reactive to light. Conjunctivae and EOM are normal.   Neck: Neck supple. No tracheal " deviation present. No thyromegaly present.   Cardiovascular: Normal rate, regular rhythm and intact distal pulses. Exam reveals no gallop and no friction rub.   No murmur heard.  Pulmonary/Chest: No respiratory distress. She has no wheezes. She has no rales. She exhibits no tenderness.   Abdominal: Soft. Bowel sounds are normal. She exhibits no distension and no mass. There is no tenderness. There is no rebound and no guarding.   Musculoskeletal: Normal range of motion.   Lymphadenopathy:     She has no cervical adenopathy.   Neurological: She is alert and oriented to person, place, and time.   Skin: Skin is warm and dry.   Psychiatric: Her behavior is normal.         Assessment:       1. PAF (paroxysmal atrial fibrillation)    2. Coronary artery disease involving native coronary artery of native heart without angina pectoris    3. Stenosis of right carotid artery    4. Essential hypertension    5. Chronic kidney disease, stage III (moderate)    6. Pure hypercholesterolemia    7. Personal history of malignant neoplasm of breast    8. Atypical chest pain         Plan:       Reassured.  Continue present pharmacological regimen.

## 2020-04-13 RX ORDER — CLOPIDOGREL BISULFATE 75 MG/1
TABLET ORAL
Qty: 90 TABLET | Refills: 3 | Status: SHIPPED | OUTPATIENT
Start: 2020-04-13 | End: 2021-11-21

## 2020-06-16 RX ORDER — CARVEDILOL 6.25 MG/1
TABLET ORAL
Qty: 180 TABLET | Refills: 3 | Status: SHIPPED | OUTPATIENT
Start: 2020-06-16

## 2020-11-06 ENCOUNTER — OFFICE VISIT (OUTPATIENT)
Dept: URGENT CARE | Facility: CLINIC | Age: 85
End: 2020-11-06
Payer: MEDICARE

## 2020-11-06 VITALS
RESPIRATION RATE: 16 BRPM | HEART RATE: 55 BPM | DIASTOLIC BLOOD PRESSURE: 68 MMHG | OXYGEN SATURATION: 96 % | BODY MASS INDEX: 26.37 KG/M2 | SYSTOLIC BLOOD PRESSURE: 141 MMHG | WEIGHT: 168 LBS | TEMPERATURE: 99 F | HEIGHT: 67 IN

## 2020-11-06 DIAGNOSIS — N30.01 ACUTE CYSTITIS WITH HEMATURIA: Primary | ICD-10-CM

## 2020-11-06 DIAGNOSIS — R30.0 DYSURIA: ICD-10-CM

## 2020-11-06 LAB
BILIRUB UR QL STRIP: NEGATIVE
GLUCOSE UR QL STRIP: NEGATIVE
KETONES UR QL STRIP: NEGATIVE
LEUKOCYTE ESTERASE UR QL STRIP: POSITIVE
PH, POC UA: 6 (ref 5–8)
POC BLOOD, URINE: POSITIVE
POC NITRATES, URINE: NEGATIVE
PROT UR QL STRIP: POSITIVE
SP GR UR STRIP: 1.01 (ref 1–1.03)
UROBILINOGEN UR STRIP-ACNC: ABNORMAL (ref 0.1–1.1)

## 2020-11-06 PROCEDURE — 87077 CULTURE AEROBIC IDENTIFY: CPT

## 2020-11-06 PROCEDURE — 87086 URINE CULTURE/COLONY COUNT: CPT

## 2020-11-06 PROCEDURE — 87088 URINE BACTERIA CULTURE: CPT

## 2020-11-06 PROCEDURE — 87186 SC STD MICRODIL/AGAR DIL: CPT

## 2020-11-06 PROCEDURE — 99214 OFFICE O/P EST MOD 30 MIN: CPT | Mod: 25,S$GLB,, | Performed by: NURSE PRACTITIONER

## 2020-11-06 PROCEDURE — 81003 URINALYSIS AUTO W/O SCOPE: CPT | Mod: QW,S$GLB,, | Performed by: NURSE PRACTITIONER

## 2020-11-06 PROCEDURE — 81003 POCT URINALYSIS, DIPSTICK, AUTOMATED, W/O SCOPE: ICD-10-PCS | Mod: QW,S$GLB,, | Performed by: NURSE PRACTITIONER

## 2020-11-06 PROCEDURE — 99214 PR OFFICE/OUTPT VISIT, EST, LEVL IV, 30-39 MIN: ICD-10-PCS | Mod: 25,S$GLB,, | Performed by: NURSE PRACTITIONER

## 2020-11-06 RX ORDER — CIPROFLOXACIN 500 MG/1
500 TABLET ORAL 2 TIMES DAILY
Qty: 14 TABLET | Refills: 0 | Status: SHIPPED | OUTPATIENT
Start: 2020-11-06 | End: 2020-11-13

## 2020-11-06 NOTE — PATIENT INSTRUCTIONS
Please return here or go to the Emergency Department for any concerns or worsening of condition.  If you were prescribed antibiotics, please take them to completion.  Please follow up with your primary care doctor or specialist as needed.  Please drink plenty of fluids.  Please get plenty of rest.      Push fluids aggressively to improve symptoms and wash through the infection.  Cranberry juice can help relieve symptoms  If you  smoke, please stop smoking.    Please follow up with your primary care doctor or specialist as needed.    Elena Cabrera MD  [phone]      Urinary Tract Infections in Women    Urinary tract infections (UTIs) are most often caused by bacteria (germs). These bacteria enter the urinary tract. The bacteria may come from outside the body. Or they may travel from the skin outside the rectum or vagina into the urethra. Female anatomy makes it easy for bacteria from the bowel to enter a womans urinary tract, which is the most common source of UTI. This means women develop UTIs more often than men. Pain in or around the urinary tract is a common UTI symptom. But the only way to know for sure if you have a UTI for the healthcare provider to test your urine. The two tests that may be done are the urinalysis and urine culture.  Types of UTIs  · Cystitis: A bladder infection (cystitis) is the most common UTI in women. You may have urgent or frequent urination. You may also have pain, burning when you urinate, and bloody urine.  · Urethritis: This is an inflamed urethra, which is the tube that carries urine from the bladder to outside the body. You may have lower stomach or back pain. You may also have urgent or frequent urination.  · Pyelonephritis: This is a kidney infection. If not treated, it can be serious and damage your kidneys. In severe cases, you may be hospitalized. You may have a fever and lower back pain.  Medicines to treat a UTI  Most UTIs are treated with antibiotics. These kill  the bacteria. The length of time you need to take them depends on the type of infection. It may be as short as 3 days. If you have repeated UTIs, a low-dose antibiotic may be needed for several months. Take antibiotics exactly as directed. Dont stop taking them until all of the medicine is gone. If you stop taking the antibiotic too soon, the infection may not go away, and you may develop a resistance to the antibiotic. This can make it much harder to treat.  Lifestyle changes to treat and prevent UTIs  The lifestyle changes below will help get rid of your UTI. They may also help prevent future UTIs.  · Drink plenty of fluids. This includes water, juice, or other caffeine-free drinks. Fluids help flush bacteria out of your body.  · Empty your bladder. Always empty your bladder when you feel the urge to urinate. And always urinate before going to sleep. Urine that stays in your bladder can lead to infection. Try to urinate before and after sex as well.  · Practice good personal hygiene. Wipe yourself from front to back after using the toilet. This helps keep bacteria from getting into the urethra.  · Use condoms during sex. These help prevent UTIs caused by sexually transmitted bacteria. Also, avoid using spermicides during sex. These can increase the risk of UTIs. Choose other forms of birth control instead. For women who tend to get UTIs after sex, a low-dose of a preventive antibiotic may be used. Be sure to discuss this option with your healthcare provider.  · Follow up with your healthcare provider as directed. He or she may test to make sure the infection has cleared. If needed, more treatment may be started.  Date Last Reviewed: 1/1/2017  © 0741-0932 [employer]. [address] [place] [address], [place] [address]. All rights reserved. This information is not intended as a substitute for professional medical care. Always follow your healthcare professional's instructions.

## 2020-11-06 NOTE — PROGRESS NOTES
"Subjective:       Patient ID: Anahy Evans is a 87 y.o. female.    Vitals:  height is 5' 7" (1.702 m) and weight is 76.2 kg (168 lb). Her temperature is 99.2 °F (37.3 °C). Her blood pressure is 141/68 (abnormal) and her pulse is 55 (abnormal). Her respiration is 16 and oxygen saturation is 96%.     Chief Complaint: Urinary Tract Infection    Urinary Tract Infection   This is a new problem. The current episode started in the past 7 days. The problem occurs every urination. The problem has been gradually worsening. The quality of the pain is described as aching. Associated symptoms include chills, frequency and urgency. Pertinent negatives include no hematuria, nausea, vomiting or rash. She has tried increased fluids for the symptoms. The treatment provided no relief.       Constitution: Positive for chills and sweating. Negative for fever.   Neck: Negative for painful lymph nodes.   Gastrointestinal: Negative for abdominal pain, nausea and vomiting.   Genitourinary: Positive for dysuria, frequency and urgency. Negative for urine decreased, hematuria, history of kidney stones, painful menstruation, irregular menstruation, missed menses, heavy menstrual bleeding, ovarian cysts, genital trauma, vaginal pain, vaginal discharge, vaginal bleeding, vaginal odor, painful intercourse, genital sore, painful ejaculation and pelvic pain.   Musculoskeletal: Negative for back pain.   Skin: Negative for rash and lesion.   Hematologic/Lymphatic: Negative for swollen lymph nodes.       Objective:      Physical Exam   Constitutional: She is oriented to person, place, and time. She appears well-developed. She is cooperative.  Non-toxic appearance. She does not appear ill. No distress.   HENT:   Head: Normocephalic and atraumatic.   Ears:   Right Ear: Hearing, tympanic membrane, external ear and ear canal normal.   Left Ear: Hearing, tympanic membrane, external ear and ear canal normal.   Nose: Nose normal. No mucosal edema, " rhinorrhea or nasal deformity. No epistaxis. Right sinus exhibits no maxillary sinus tenderness and no frontal sinus tenderness. Left sinus exhibits no maxillary sinus tenderness and no frontal sinus tenderness.   Mouth/Throat: Uvula is midline, oropharynx is clear and moist and mucous membranes are normal. No trismus in the jaw. Normal dentition. No uvula swelling. No posterior oropharyngeal erythema.   Eyes: Conjunctivae and lids are normal. Right eye exhibits no discharge. Left eye exhibits no discharge. No scleral icterus.   Neck: Trachea normal, normal range of motion, full passive range of motion without pain and phonation normal. Neck supple.   Cardiovascular: Normal rate, regular rhythm, normal heart sounds and normal pulses.   Pulmonary/Chest: Effort normal and breath sounds normal. No respiratory distress.   Abdominal: Soft. Normal appearance and bowel sounds are normal. She exhibits no distension, no pulsatile midline mass and no mass. There is no abdominal tenderness.   Musculoskeletal: Normal range of motion.         General: No deformity.   Neurological: She is alert and oriented to person, place, and time. She exhibits normal muscle tone. Coordination normal.   Skin: Skin is warm, dry, intact, not diaphoretic and not pale. Psychiatric: Her speech is normal and behavior is normal. Judgment and thought content normal.   Nursing note and vitals reviewed.        Results for orders placed or performed in visit on 11/06/20   POCT Urinalysis, Dipstick, Automated, W/O Scope   Result Value Ref Range    POC Blood, Urine Positive (A) Negative    POC Bilirubin, Urine Negative Negative    POC Urobilinogen, Urine nnorm 0.1 - 1.1    POC Ketones, Urine Negative Negative    POC Protein, Urine Positive (A) Negative    POC Nitrates, Urine Negative Negative    POC Glucose, Urine Negative Negative    pH, UA 6.0 5 - 8    POC Specific Gravity, Urine 1.015 1.003 - 1.029    POC Leukocytes, Urine Positive (A) Negative      Assessment:       1. Acute cystitis with hematuria    2. Dysuria        Plan:         Acute cystitis with hematuria  -     Culture, Urine  -     ciprofloxacin HCl (CIPRO) 500 MG tablet; Take 1 tablet (500 mg total) by mouth 2 (two) times daily. for 7 days  Dispense: 14 tablet; Refill: 0    Dysuria  -     POCT Urinalysis, Dipstick, Automated, W/O Scope      Patient Instructions   Please return here or go to the Emergency Department for any concerns or worsening of condition.  If you were prescribed antibiotics, please take them to completion.  Please follow up with your primary care doctor or specialist as needed.  Please drink plenty of fluids.  Please get plenty of rest.      Push fluids aggressively to improve symptoms and wash through the infection.  Cranberry juice can help relieve symptoms  If you  smoke, please stop smoking.    Please follow up with your primary care doctor or specialist as needed.    Elena Cabrera MD  [phone]      Urinary Tract Infections in Women    Urinary tract infections (UTIs) are most often caused by bacteria (germs). These bacteria enter the urinary tract. The bacteria may come from outside the body. Or they may travel from the skin outside the rectum or vagina into the urethra. Female anatomy makes it easy for bacteria from the bowel to enter a womans urinary tract, which is the most common source of UTI. This means women develop UTIs more often than men. Pain in or around the urinary tract is a common UTI symptom. But the only way to know for sure if you have a UTI for the healthcare provider to test your urine. The two tests that may be done are the urinalysis and urine culture.  Types of UTIs  · Cystitis: A bladder infection (cystitis) is the most common UTI in women. You may have urgent or frequent urination. You may also have pain, burning when you urinate, and bloody urine.  · Urethritis: This is an inflamed urethra, which is the tube that carries urine from the  bladder to outside the body. You may have lower stomach or back pain. You may also have urgent or frequent urination.  · Pyelonephritis: This is a kidney infection. If not treated, it can be serious and damage your kidneys. In severe cases, you may be hospitalized. You may have a fever and lower back pain.  Medicines to treat a UTI  Most UTIs are treated with antibiotics. These kill the bacteria. The length of time you need to take them depends on the type of infection. It may be as short as 3 days. If you have repeated UTIs, a low-dose antibiotic may be needed for several months. Take antibiotics exactly as directed. Dont stop taking them until all of the medicine is gone. If you stop taking the antibiotic too soon, the infection may not go away, and you may develop a resistance to the antibiotic. This can make it much harder to treat.  Lifestyle changes to treat and prevent UTIs  The lifestyle changes below will help get rid of your UTI. They may also help prevent future UTIs.  · Drink plenty of fluids. This includes water, juice, or other caffeine-free drinks. Fluids help flush bacteria out of your body.  · Empty your bladder. Always empty your bladder when you feel the urge to urinate. And always urinate before going to sleep. Urine that stays in your bladder can lead to infection. Try to urinate before and after sex as well.  · Practice good personal hygiene. Wipe yourself from front to back after using the toilet. This helps keep bacteria from getting into the urethra.  · Use condoms during sex. These help prevent UTIs caused by sexually transmitted bacteria. Also, avoid using spermicides during sex. These can increase the risk of UTIs. Choose other forms of birth control instead. For women who tend to get UTIs after sex, a low-dose of a preventive antibiotic may be used. Be sure to discuss this option with your healthcare provider.  · Follow up with your healthcare provider as directed. He or she may test to  make sure the infection has cleared. If needed, more treatment may be started.  Date Last Reviewed: 1/1/2017  © 7870-8750 [employer]. [address] [place] [address], [place] [address]. All rights reserved. This information is not intended as a substitute for professional medical care. Always follow your healthcare professional's instructions.

## 2020-11-09 ENCOUNTER — TELEPHONE (OUTPATIENT)
Dept: URGENT CARE | Facility: CLINIC | Age: 85
End: 2020-11-09

## 2020-11-09 LAB — BACTERIA UR CULT: ABNORMAL

## 2020-11-09 NOTE — TELEPHONE ENCOUNTER
Called for f/u and Urine Cx. Pt is feeling well. Sym,ptoms are resolving. Pt has been appropriately treated with Cipro. All questions answered.

## 2021-03-25 NOTE — PLAN OF CARE
142 received and added to [facility].     Pt accepted to [facility].    Spoke with daughter, she requested CDND.  They are not in network with her insurance.  Daughter, Marixa Anaya [phone], she will go tour [facility] and [facility] today.    Spoke with Kendall at [facility] [phone]     Spoke with Nydia at [facility] [phone].  They received auth from [place].  Nydia will reach out to family today.    CM to continue to follow.   Never

## 2021-04-16 ENCOUNTER — PATIENT MESSAGE (OUTPATIENT)
Dept: RESEARCH | Facility: HOSPITAL | Age: 86
End: 2021-04-16

## 2021-05-05 NOTE — SUBJECTIVE & OBJECTIVE
Interval History: Reports pain not controlled.  Otherwise no complaints.      Review of Systems   Constitutional: Negative for chills and fever.   HENT: Negative for trouble swallowing.    Respiratory: Negative for cough and shortness of breath.    Cardiovascular: Negative for chest pain.   Gastrointestinal: Negative for abdominal pain, blood in stool, nausea and vomiting.   Genitourinary: Negative for dysuria and hematuria.   Musculoskeletal: Positive for arthralgias and gait problem.   Skin: Negative for rash.   Neurological: Negative for numbness and headaches.   Psychiatric/Behavioral: Negative for confusion.     Objective:     Vital Signs (Most Recent):  Temp: 97.8 °F (36.6 °C) (05/11/19 1214)  Pulse: (!) 59 (05/11/19 1214)  Resp: 18 (05/11/19 1214)  BP: (!) 112/58 (05/11/19 1214)  SpO2: (!) 93 % (05/11/19 1214) Vital Signs (24h Range):  Temp:  [97.8 °F (36.6 °C)-99.2 °F (37.3 °C)] 97.8 °F (36.6 °C)  Pulse:  [56-69] 59  Resp:  [16-20] 18  SpO2:  [93 %-98 %] 93 %  BP: (112-180)/(58-97) 112/58     Weight: 86.3 kg (190 lb 4.1 oz)  Body mass index is 29.8 kg/m².    Intake/Output Summary (Last 24 hours) at 5/11/2019 1317  Last data filed at 5/11/2019 0541  Gross per 24 hour   Intake 1080 ml   Output 3250 ml   Net -2170 ml      Physical Exam   Constitutional: She is oriented to person, place, and time.   Mild distress with pain.   HENT:   Head: Normocephalic and atraumatic.   Eyes: Pupils are equal, round, and reactive to light. EOM are normal.   Neck: Normal range of motion. Neck supple.   Cardiovascular: Normal rate and regular rhythm.   Pulmonary/Chest: Effort normal and breath sounds normal. No respiratory distress.   Abdominal: Soft. Bowel sounds are normal. She exhibits no distension. There is no tenderness.   Musculoskeletal:   Decreased rom right leg   Neurological: She is alert and oriented to person, place, and time. No cranial nerve deficit.   Skin: Skin is warm.   Psychiatric: She has a normal mood and  affect.   Vitals reviewed.      Significant Labs:   BMP:   Recent Labs   Lab 05/10/19  0433   GLU 92      K 3.8      CO2 21*   BUN 21   CREATININE 1.3   CALCIUM 9.5   MG 1.9     CBC:   Recent Labs   Lab 05/10/19  0433   WBC 6.18   HGB 12.1   HCT 37.1          Significant Imaging: I have reviewed all pertinent imaging results/findings within the past 24 hours.   full weight-bearing

## 2021-10-04 ENCOUNTER — TELEPHONE (OUTPATIENT)
Dept: INTERVENTIONAL RADIOLOGY/VASCULAR | Facility: OTHER | Age: 86
End: 2021-10-04

## 2021-10-05 ENCOUNTER — HOSPITAL ENCOUNTER (OUTPATIENT)
Facility: OTHER | Age: 86
Discharge: HOME | End: 2021-10-05
Attending: RADIOLOGY | Admitting: RADIOLOGY
Payer: MEDICARE

## 2021-10-05 VITALS
SYSTOLIC BLOOD PRESSURE: 126 MMHG | OXYGEN SATURATION: 97 % | HEIGHT: 67 IN | WEIGHT: 183 LBS | HEART RATE: 67 BPM | TEMPERATURE: 98 F | DIASTOLIC BLOOD PRESSURE: 61 MMHG | BODY MASS INDEX: 28.72 KG/M2 | RESPIRATION RATE: 20 BRPM

## 2021-10-05 DIAGNOSIS — N18.32 CHRONIC KIDNEY DISEASE (CKD) STAGE G3B/A1, MODERATELY DECREASED GLOMERULAR FILTRATION RATE (GFR) BETWEEN 30-44 ML/MIN/1.73 SQUARE METER AND ALBUMINURIA CREATININE RATIO LESS THAN 30 MG/G: ICD-10-CM

## 2021-10-05 LAB
INR PPP: 1 (ref 0.8–1.2)
PROTHROMBIN TIME: 10.7 SEC (ref 9–12.5)

## 2021-10-05 PROCEDURE — 99153 MOD SED SAME PHYS/QHP EA: CPT | Performed by: RADIOLOGY

## 2021-10-05 PROCEDURE — 85610 PROTHROMBIN TIME: CPT | Performed by: RADIOLOGY

## 2021-10-05 PROCEDURE — 99152 MOD SED SAME PHYS/QHP 5/>YRS: CPT | Performed by: RADIOLOGY

## 2021-10-05 PROCEDURE — 63600175 PHARM REV CODE 636 W HCPCS: Performed by: RADIOLOGY

## 2021-10-05 PROCEDURE — 36415 COLL VENOUS BLD VENIPUNCTURE: CPT | Performed by: RADIOLOGY

## 2021-10-05 PROCEDURE — 50200 RENAL BIOPSY PERQ: CPT | Mod: RT | Performed by: RADIOLOGY

## 2021-10-05 PROCEDURE — 77012 CT SCAN FOR NEEDLE BIOPSY: CPT | Mod: TC | Performed by: RADIOLOGY

## 2021-10-05 RX ORDER — HYDROCODONE BITARTRATE AND ACETAMINOPHEN 5; 325 MG/1; MG/1
1 TABLET ORAL EVERY 4 HOURS PRN
Status: DISCONTINUED | OUTPATIENT
Start: 2021-10-05 | End: 2021-10-05 | Stop reason: HOSPADM

## 2021-10-05 RX ORDER — FENTANYL CITRATE 50 UG/ML
INJECTION, SOLUTION INTRAMUSCULAR; INTRAVENOUS
Status: DISCONTINUED | OUTPATIENT
Start: 2021-10-05 | End: 2021-10-05 | Stop reason: HOSPADM

## 2021-10-05 RX ORDER — LOSARTAN POTASSIUM 25 MG/1
75 TABLET ORAL DAILY
COMMUNITY

## 2021-10-05 RX ORDER — MIDAZOLAM HYDROCHLORIDE 1 MG/ML
INJECTION INTRAMUSCULAR; INTRAVENOUS
Status: DISCONTINUED | OUTPATIENT
Start: 2021-10-05 | End: 2021-10-05 | Stop reason: HOSPADM

## 2021-10-07 ENCOUNTER — IMMUNIZATION (OUTPATIENT)
Dept: PHARMACY | Facility: CLINIC | Age: 86
End: 2021-10-07
Payer: MEDICARE

## 2021-10-07 DIAGNOSIS — Z23 NEED FOR VACCINATION: Primary | ICD-10-CM

## 2021-10-26 LAB
FINAL PATHOLOGIC DIAGNOSIS: NORMAL
GROSS: NORMAL
Lab: NORMAL

## 2022-01-18 ENCOUNTER — HOSPITAL ENCOUNTER (OUTPATIENT)
Dept: PREADMISSION TESTING | Facility: OTHER | Age: 87
Discharge: HOME | End: 2022-01-18
Attending: INTERNAL MEDICINE
Payer: MEDICARE

## 2022-01-18 VITALS
HEIGHT: 67 IN | OXYGEN SATURATION: 98 % | SYSTOLIC BLOOD PRESSURE: 154 MMHG | BODY MASS INDEX: 29.03 KG/M2 | DIASTOLIC BLOOD PRESSURE: 70 MMHG | WEIGHT: 185 LBS | HEART RATE: 51 BPM | TEMPERATURE: 99 F

## 2022-01-18 DIAGNOSIS — Z01.818 PREOP TESTING: ICD-10-CM

## 2022-01-18 DIAGNOSIS — Z01.818 PRE-OP TESTING: ICD-10-CM

## 2022-01-18 DIAGNOSIS — I20.0 UNSTABLE ANGINA: ICD-10-CM

## 2022-01-18 LAB
ANION GAP SERPL CALC-SCNC: 9 MMOL/L (ref 8–16)
BASOPHILS # BLD AUTO: 0.04 K/UL (ref 0–0.2)
BASOPHILS NFR BLD: 1.1 % (ref 0–1.9)
BUN SERPL-MCNC: 25 MG/DL (ref 8–23)
CALCIUM SERPL-MCNC: 9.5 MG/DL (ref 8.7–10.5)
CHLORIDE SERPL-SCNC: 106 MMOL/L (ref 95–110)
CO2 SERPL-SCNC: 26 MMOL/L (ref 23–29)
CREAT SERPL-MCNC: 1.6 MG/DL (ref 0.5–1.4)
DIFFERENTIAL METHOD: ABNORMAL
EOSINOPHIL # BLD AUTO: 0.1 K/UL (ref 0–0.5)
EOSINOPHIL NFR BLD: 3.8 % (ref 0–8)
ERYTHROCYTE [DISTWIDTH] IN BLOOD BY AUTOMATED COUNT: 13.5 % (ref 11.5–14.5)
EST. GFR  (AFRICAN AMERICAN): 33 ML/MIN/1.73 M^2
EST. GFR  (NON AFRICAN AMERICAN): 29 ML/MIN/1.73 M^2
GLUCOSE SERPL-MCNC: 94 MG/DL (ref 70–110)
HCT VFR BLD AUTO: 34.6 % (ref 37–48.5)
HGB BLD-MCNC: 10.9 G/DL (ref 12–16)
IMM GRANULOCYTES # BLD AUTO: 0 K/UL (ref 0–0.04)
IMM GRANULOCYTES NFR BLD AUTO: 0 % (ref 0–0.5)
LYMPHOCYTES # BLD AUTO: 1.5 K/UL (ref 1–4.8)
LYMPHOCYTES NFR BLD: 40.3 % (ref 18–48)
MCH RBC QN AUTO: 28.9 PG (ref 27–31)
MCHC RBC AUTO-ENTMCNC: 31.5 G/DL (ref 32–36)
MCV RBC AUTO: 92 FL (ref 82–98)
MONOCYTES # BLD AUTO: 0.5 K/UL (ref 0.3–1)
MONOCYTES NFR BLD: 12.1 % (ref 4–15)
NEUTROPHILS # BLD AUTO: 1.6 K/UL (ref 1.8–7.7)
NEUTROPHILS NFR BLD: 42.7 % (ref 38–73)
NRBC BLD-RTO: 0 /100 WBC
PLATELET # BLD AUTO: 153 K/UL (ref 150–450)
PMV BLD AUTO: 10.6 FL (ref 9.2–12.9)
POTASSIUM SERPL-SCNC: 4.1 MMOL/L (ref 3.5–5.1)
RBC # BLD AUTO: 3.77 M/UL (ref 4–5.4)
SARS-COV-2 RNA RESP QL NAA+PROBE: NOT DETECTED
SARS-COV-2- CYCLE NUMBER: NORMAL
SODIUM SERPL-SCNC: 141 MMOL/L (ref 136–145)
WBC # BLD AUTO: 3.72 K/UL (ref 3.9–12.7)

## 2022-01-18 PROCEDURE — 80048 BASIC METABOLIC PNL TOTAL CA: CPT | Performed by: INTERNAL MEDICINE

## 2022-01-18 PROCEDURE — 85025 COMPLETE CBC W/AUTO DIFF WBC: CPT | Performed by: INTERNAL MEDICINE

## 2022-01-18 PROCEDURE — 36415 COLL VENOUS BLD VENIPUNCTURE: CPT | Performed by: INTERNAL MEDICINE

## 2022-01-18 PROCEDURE — 93010 EKG 12-LEAD: ICD-10-PCS | Mod: ,,, | Performed by: INTERNAL MEDICINE

## 2022-01-18 PROCEDURE — 93005 ELECTROCARDIOGRAM TRACING: CPT

## 2022-01-18 PROCEDURE — U0003 INFECTIOUS AGENT DETECTION BY NUCLEIC ACID (DNA OR RNA); SEVERE ACUTE RESPIRATORY SYNDROME CORONAVIRUS 2 (SARS-COV-2) (CORONAVIRUS DISEASE [COVID-19]), AMPLIFIED PROBE TECHNIQUE, MAKING USE OF HIGH THROUGHPUT TECHNOLOGIES AS DESCRIBED BY CMS-2020-01-R: HCPCS | Performed by: INTERNAL MEDICINE

## 2022-01-18 PROCEDURE — 93010 ELECTROCARDIOGRAM REPORT: CPT | Mod: ,,, | Performed by: INTERNAL MEDICINE

## 2022-01-18 PROCEDURE — U0005 INFEC AGEN DETEC AMPLI PROBE: HCPCS | Performed by: INTERNAL MEDICINE

## 2022-01-18 RX ORDER — AZELASTINE 1 MG/ML
1 SPRAY, METERED NASAL 2 TIMES DAILY
COMMUNITY

## 2022-01-18 RX ORDER — SODIUM BICARBONATE 650 MG/1
650 TABLET ORAL 2 TIMES DAILY
COMMUNITY

## 2022-01-18 RX ORDER — UBIDECARENONE 75 MG
500 CAPSULE ORAL DAILY
COMMUNITY

## 2022-01-18 NOTE — DISCHARGE INSTRUCTIONS
Information to Prepare you for your Surgery    PRE-ADMIT TESTING -  [phone]    [address] [facility]          Your surgery has been scheduled at [facility]. We are pleased to have the opportunity to serve you. For Further Information please call [phone].    On the day of surgery please report to the Information Desk on the 1st floor.    · CONTACT YOUR PHYSICIAN'S OFFICE THE DAY PRIOR TO YOUR SURGERY TO OBTAIN YOUR ARRIVAL TIME.     · The evening before surgery do not eat anything after 9 p.m. ( this includes hard candy, chewing gum and mints).  You may only have GATORADE, POWERADE AND WATER  from 9 p.m. until you leave your home.   DO NOT DRINK ANY LIQUIDS ON THE WAY TO THE HOSPITAL.      Why does your anesthesiologist allow you to drink Gatorade/Powerade before surgery?  Gatorade/Powerade helps to increase your comfort before surgery and to decrease your nausea after surgery. The carbohydrates in Gatorade/Powerade help reduce your body's stress response to surgery.  If you are a diabetic-drink only water prior to surgery.      Current Visitor policy(12/27/2021) - Patients may have 1 adult visitor pre and post procedure. Only 1 visitor will be allowed in the Surgical building with the patient.     SPECIAL MEDICATION INSTRUCTIONS: TAKE medications checked off by the Anesthesiologist on your Medication List.    Angiogram Patients: Take medications as instructed by your physician, including aspirin.     Surgery Patients:    If you take ASPIRIN - Your PHYSICIAN/SURGEON will need to inform you IF/OR when you need to stop taking aspirin prior to your surgery.     Do Not take any medications containing IBUPROFEN.    Do Not Wear any make-up (especially eye make-up) to surgery. Please remove any false eyelashes or eyelash extensions. If you arrive the day of surgery with makeup/eyelashes on you will be required to remove prior to surgery. (There is a risk of  corneal abrasions if eye makeup/eyelash extensions are not removed)      Leave all valuables at home.   Do Not wear any jewelry or watches, including any metal in body piercings. Jewelry must be removed prior to coming to the hospital.  There is a possibility that rings that are unable to be removed may be cut off if they are on the surgical extremity.    Please remove all hair extensions, wigs, clips and any other metal accessories/ ornaments from your hair.  These items may pose a flammable/fire risk in Surgery and must be removed.    Do not shave your surgical area at least 5 days prior to your surgery. The surgical prep will be performed at the hospital according to Infection Control regulations.    Contact Lens must be removed before surgery. Either do not wear the contact lens or bring a case and solution for storage.  Please bring a container for eyeglasses or dentures as required.  Bring any paperwork your physician has provided, such as consent forms,  history and physicals, doctor's orders, etc.   Bring comfortable clothes that are loose fitting to wear upon discharge. Take into consideration the type of surgery being performed.  Maintain your diet as advised per your physician the day prior to surgery.      Adequate rest the night before surgery is advised.   Park in the Parking lot behind the hospital or in the [employer] [facility] across the street from the parking lot. Parking is complimentary.  If you will be discharged the same day as your procedure, please arrange for a responsible adult to drive you home or to accompany you if traveling by taxi.   YOU WILL NOT BE PERMITTED TO DRIVE OR TO LEAVE THE HOSPITAL ALONE AFTER SURGERY.   If you are being discharged the same day, it is strongly recommended that you arrange for someone to remain with you for the first 24 hrs following your surgery.    The Surgeon will speak to your family/visitor after your surgery regarding the outcome of your surgery and  post op care.  The Surgeon may speak to you after your surgery, but there is a possibility you may not remember the details.  Please check with your family members regarding the conversation with the Surgeon.    We strongly recommend whoever is bringing you home be present for discharge instructions.  This will ensure a thorough understanding for your post op home care.    ALL CHILDREN MUST ALWAYS BE ACCOMPANIED BY AN ADULT.    Visitors-Refer to current Visitor policy handouts.    Thank you for your cooperation.  The Staff of [facility].                Bathing Instructions with Hibiclens     Shower the evening before and morning of your procedure with Hibiclens:   Wash your face with water and your regular face wash/soap   Apply Hibiclens directly on your skin or on a wet washcloth and wash gently. When showering: Move away from the shower stream when applying Hibiclens to avoid rinsing off too soon.   Rinse thoroughly with warm water   Do not dilute Hibiclens         Dry off as usual, do not use any deodorant, powder, body lotions, perfume, after shave or cologne.

## 2022-01-21 ENCOUNTER — HOSPITAL ENCOUNTER (OUTPATIENT)
Facility: OTHER | Age: 87
Discharge: HOME | End: 2022-01-21
Attending: INTERNAL MEDICINE | Admitting: INTERNAL MEDICINE
Payer: MEDICARE

## 2022-01-21 VITALS
SYSTOLIC BLOOD PRESSURE: 143 MMHG | BODY MASS INDEX: 29.82 KG/M2 | HEIGHT: 67 IN | RESPIRATION RATE: 16 BRPM | TEMPERATURE: 99 F | DIASTOLIC BLOOD PRESSURE: 61 MMHG | HEART RATE: 60 BPM | OXYGEN SATURATION: 98 % | WEIGHT: 190 LBS

## 2022-01-21 DIAGNOSIS — Z01.818 PREOP TESTING: Primary | ICD-10-CM

## 2022-01-21 DIAGNOSIS — I25.10 CORONARY ARTERY DISEASE INVOLVING NATIVE CORONARY ARTERY OF NATIVE HEART WITHOUT ANGINA PECTORIS: ICD-10-CM

## 2022-01-21 DIAGNOSIS — Z01.818 PRE-OP TESTING: ICD-10-CM

## 2022-01-21 DIAGNOSIS — R94.39 ABNORMAL STRESS ECG: ICD-10-CM

## 2022-01-21 DIAGNOSIS — R94.39 ABNORMAL STRESS TEST: ICD-10-CM

## 2022-01-21 PROCEDURE — C1894 INTRO/SHEATH, NON-LASER: HCPCS | Performed by: INTERNAL MEDICINE

## 2022-01-21 PROCEDURE — 99152 MOD SED SAME PHYS/QHP 5/>YRS: CPT | Performed by: INTERNAL MEDICINE

## 2022-01-21 PROCEDURE — 93454 CORONARY ARTERY ANGIO S&I: CPT | Performed by: INTERNAL MEDICINE

## 2022-01-21 PROCEDURE — 25000003 PHARM REV CODE 250: Performed by: INTERNAL MEDICINE

## 2022-01-21 PROCEDURE — 25500020 PHARM REV CODE 255: Performed by: INTERNAL MEDICINE

## 2022-01-21 PROCEDURE — C1769 GUIDE WIRE: HCPCS | Performed by: INTERNAL MEDICINE

## 2022-01-21 PROCEDURE — 63600175 PHARM REV CODE 636 W HCPCS: Performed by: INTERNAL MEDICINE

## 2022-01-21 PROCEDURE — C1760 CLOSURE DEV, VASC: HCPCS | Performed by: INTERNAL MEDICINE

## 2022-01-21 RX ORDER — MAG HYDROX/ALUMINUM HYD/SIMETH 200-200-20
30 SUSPENSION, ORAL (FINAL DOSE FORM) ORAL
Status: DISCONTINUED | OUTPATIENT
Start: 2022-01-21 | End: 2022-01-21 | Stop reason: HOSPADM

## 2022-01-21 RX ORDER — SODIUM CHLORIDE 9 MG/ML
INJECTION, SOLUTION INTRAVENOUS CONTINUOUS
Status: ACTIVE | OUTPATIENT
Start: 2022-01-21 | End: 2022-01-21

## 2022-01-21 RX ORDER — FENTANYL CITRATE 50 UG/ML
INJECTION, SOLUTION INTRAMUSCULAR; INTRAVENOUS
Status: DISCONTINUED | OUTPATIENT
Start: 2022-01-21 | End: 2022-01-21 | Stop reason: HOSPADM

## 2022-01-21 RX ORDER — NAPROXEN SODIUM 220 MG/1
81 TABLET, FILM COATED ORAL
Status: DISCONTINUED | OUTPATIENT
Start: 2022-01-21 | End: 2022-01-21 | Stop reason: HOSPADM

## 2022-01-21 RX ORDER — DIAZEPAM 5 MG/1
5 TABLET ORAL
Status: COMPLETED | OUTPATIENT
Start: 2022-01-21 | End: 2022-01-21

## 2022-01-21 RX ORDER — DIPHENHYDRAMINE HCL 25 MG
25 CAPSULE ORAL
Status: COMPLETED | OUTPATIENT
Start: 2022-01-21 | End: 2022-01-21

## 2022-01-21 RX ORDER — ONDANSETRON 8 MG/1
8 TABLET, ORALLY DISINTEGRATING ORAL EVERY 8 HOURS PRN
Status: DISCONTINUED | OUTPATIENT
Start: 2022-01-21 | End: 2022-01-21 | Stop reason: HOSPADM

## 2022-01-21 RX ORDER — ACETAMINOPHEN 325 MG/1
650 TABLET ORAL EVERY 4 HOURS PRN
Status: DISCONTINUED | OUTPATIENT
Start: 2022-01-21 | End: 2022-01-21 | Stop reason: HOSPADM

## 2022-01-21 RX ORDER — NITROGLYCERIN 0.4 MG/1
0.4 TABLET SUBLINGUAL EVERY 5 MIN PRN
Status: DISCONTINUED | OUTPATIENT
Start: 2022-01-21 | End: 2022-01-21 | Stop reason: HOSPADM

## 2022-01-21 RX ORDER — DIPHENHYDRAMINE HYDROCHLORIDE 50 MG/ML
25 INJECTION INTRAMUSCULAR; INTRAVENOUS EVERY 6 HOURS PRN
Status: DISCONTINUED | OUTPATIENT
Start: 2022-01-21 | End: 2022-01-21 | Stop reason: HOSPADM

## 2022-01-21 RX ORDER — NAPROXEN SODIUM 220 MG/1
162 TABLET, FILM COATED ORAL
Status: DISCONTINUED | OUTPATIENT
Start: 2022-01-21 | End: 2022-01-21 | Stop reason: HOSPADM

## 2022-01-21 RX ORDER — HEPARIN SOD,PORCINE/0.9 % NACL 1000/500ML
INTRAVENOUS SOLUTION INTRAVENOUS
Status: DISCONTINUED | OUTPATIENT
Start: 2022-01-21 | End: 2022-01-21 | Stop reason: HOSPADM

## 2022-01-21 RX ORDER — LIDOCAINE HYDROCHLORIDE 10 MG/ML
INJECTION, SOLUTION EPIDURAL; INFILTRATION; INTRACAUDAL; PERINEURAL
Status: DISCONTINUED | OUTPATIENT
Start: 2022-01-21 | End: 2022-01-21 | Stop reason: HOSPADM

## 2022-01-21 RX ORDER — MIDAZOLAM HYDROCHLORIDE 1 MG/ML
INJECTION INTRAMUSCULAR; INTRAVENOUS
Status: DISCONTINUED | OUTPATIENT
Start: 2022-01-21 | End: 2022-01-21 | Stop reason: HOSPADM

## 2022-01-21 RX ORDER — SODIUM CHLORIDE 9 MG/ML
INJECTION, SOLUTION INTRAVENOUS CONTINUOUS
Status: DISCONTINUED | OUTPATIENT
Start: 2022-01-21 | End: 2022-01-21 | Stop reason: HOSPADM

## 2022-01-21 RX ORDER — HYDROCODONE BITARTRATE AND ACETAMINOPHEN 10; 325 MG/1; MG/1
1 TABLET ORAL EVERY 4 HOURS PRN
Status: DISCONTINUED | OUTPATIENT
Start: 2022-01-21 | End: 2022-01-21 | Stop reason: HOSPADM

## 2022-01-21 RX ORDER — HYDROCODONE BITARTRATE AND ACETAMINOPHEN 5; 325 MG/1; MG/1
1 TABLET ORAL EVERY 4 HOURS PRN
Status: DISCONTINUED | OUTPATIENT
Start: 2022-01-21 | End: 2022-01-21 | Stop reason: HOSPADM

## 2022-01-21 RX ADMIN — DIAZEPAM 5 MG: 5 TABLET ORAL at 06:01

## 2022-01-21 RX ADMIN — SODIUM CHLORIDE: 0.9 INJECTION, SOLUTION INTRAVENOUS at 08:01

## 2022-01-21 RX ADMIN — DIPHENHYDRAMINE HYDROCHLORIDE 25 MG: 25 CAPSULE ORAL at 06:01

## 2022-01-21 NOTE — Clinical Note
The catheter was inserted into the ostium   right coronary artery. An angiography was performed of the right coronary arteries. The angiography was performed via hand injection with .

## 2022-01-21 NOTE — INTERVAL H&P NOTE
The patient has been examined and the H&P has been reviewed:    I concur with the findings and no changes have occurred since H&P was written.    risks, benefits and alternative options discussed and understood by patient/family.          There are no hospital problems to display for this patient.

## 2022-01-21 NOTE — PLAN OF CARE
Call received from Natacha in telemetry. Per monitor tech, the pt's rhythm converted to a 2nd degree AV block and the pts heart rate decreased to the 40s. Dr. Gallardo notified of tele change and that pt was not experiencing any symptoms. Per Dr. Canela, pt can follow up with him outpatient for this. No new orders at this time

## 2022-01-21 NOTE — Clinical Note
The site was marked. The groin was prepped. The site was prepped with ChloraPrep. The site was clipped. The patient was draped. The patient was positioned supine. The patient was secured using safety straps.

## 2022-01-21 NOTE — DISCHARGE SUMMARY
[facility] - Cath Lab ([place])  Cardiology  Discharge Summary      Patient Name: Anahy Evans    Admission Date: 1/21/2022    Discharge Date and Time: 1/21/22  Final Diagnoses: CAD   Principal Problem: CAD (coronary artery disease)    HPI:  Mrs. Evans is an 88-year-old lady with recent onset of shortness of breath.  A Cardiolite test showed reversible ischemia in the anterior left ventricular wall suggesting left anterior descending coronary artery stenosis.  She was admitted for coronary angiography.  She had a bare metal stent placed on the left anterior descending coronary artery during a STEMI in 1999, CYNTHIA in the left circumflex coronary artery in 2010, a drug-eluting stent in the proximal left anterior descending coronary artery in June 2017, and a drug-eluting stent in the ostial left anterior descending coronary artery in November 2017.  She has carotid occlusive disease, hypertension, paroxysmal atrial fibrillation, diabetes mellitus, chronic renal failure stage III, hyperlipidemia.      Impression on Admission:  Coronary artery disease  Hospital Course angiography showed the stents in the ostial, proximal, and mid left anterior descending coronary artery to be widely patent.  There was a 50% ostial stenosis of the left circumflex coronary artery, and a patent stent in the mid left circumflex coronary artery.  There was a  of the proximal RCA, as in 2017.  A left ventriculogram was not performed because of the elevated creatinine.  Plan medical treatment.    Disposition:  Discharged to home  Follow up/Patient Instructions:    Medications:     Medication List      CHANGE how you take these medications    carvediloL 6.25 MG tablet  Commonly known as: COREG  TAKE 1 TABLET TWICE A DAY  What changed: how much to take        CONTINUE taking these medications    allopurinoL 300 MG tablet  Commonly known as: ZYLOPRIM     amiodarone 200 MG Tab  Commonly known as: PACERONE  Take 0.5 tablets (100 mg total) by mouth  once daily.     amLODIPine 5 MG tablet  Commonly known as: NORVASC     aspirin 81 MG EC tablet  Commonly known as: ECOTRIN     atorvastatin 40 MG tablet  Commonly known as: LIPITOR     azelastine 137 mcg (0.1 %) nasal spray  Commonly known as: ASTELIN     calcium carbonate-vitamin D3 600 mg-5 mcg (200 unit) Cap     cholecalciferol (vitamin D3) 50 mcg (2,000 unit) Cap  Commonly known as: VITAMIN D3     clonazePAM 0.5 MG tablet  Commonly known as: KlonoPIN  Take 1 tablet (0.5 mg total) by mouth every evening.     clopidogreL 75 mg tablet  Commonly known as: PLAVIX  TAKE ONE TABLET BY MOUTH EVERYDAY     cyanocobalamin 500 MCG tablet     docusate sodium 100 MG capsule  Commonly known as: COLACE  Take 1 capsule (100 mg total) by mouth every 12 (twelve) hours.     folic acid 1 MG tablet  Commonly known as: FOLVITE     losartan 25 MG tablet  Commonly known as: COZAAR     nitroGLYCERIN 0.4 MG SL tablet  Commonly known as: NITROSTAT     sodium bicarbonate 650 MG tablet          Discharge Procedure Orders   COVID-19 Routine Screening   Standing Status: Future Number of Occurrences: 1 Standing Exp. Date: 03/18/22     Order Specific Question Answer Comments   Is the patient symptomatic? No    Is this needed for pre-procedure or pre-op testing? Yes    Diagnosis: Pre-op testing [636539]      Diet general     Other restrictions (specify):   Scheduling Instructions: Avoid strenuous activity.     Call MD for:  temperature >100.4     Call MD for:  severe uncontrolled pain     Call MD for:  difficulty breathing, headache or visual disturbances     Call MD for:  redness, tenderness, or signs of infection (pain, swelling, redness, odor or green/yellow discharge around incision site)     Call MD for:   Order Comments: Bleeding and oozing at site.     Shower on day dressing removed (No bath)   Scheduling Instructions: Remove dressing in am.      Follow-up Information     Asim Canela MD In 2 weeks.    Specialties: Cardiology,  Interventional Cardiology  Contact information:  [address]  [facility] [address]  [phone]                         Condition upon Discharge:  Good        Asim Canela MD

## 2022-01-21 NOTE — PLAN OF CARE
Anahy Evans has met all discharge criteria from Phase II. Vital Signs are stable, ambulating  without difficulty. Discharge instructions given, patient verbalized understanding. Discharged from facility via wheelchair in stable condition.

## 2022-01-21 NOTE — DISCHARGE INSTRUCTIONS
[employer] uses a soft sponge-like material to close the small hole in your blood vessel after your procedure. This will be completely absorbed by your body within 30 days.    TAKING CARE OF YOUR PUNCTURE SITE  -re-apply a clean dry band aid every day for 5 days, change as needed  -keep the site clean and dry  -you may shower 24 hours after your procedure ( do not bathe or use a pool until    wound is healed)  -gently clean site with warm soap and water  -pat dry with a clean towel then apply a band aid  -wear loose fitting underwear , and limit tight fitting clothes  Modify activity for 3 days  -NO heavy lifing over 5 pounds ( equal to 1/2 gallon of milk)  -NO pulling or pushing  -NO vigorous activity or straining  -No strenuous exercise  -avoid driving    Normal Observations  -a small lump and /or mild tenderness in your groin area  -some bruising or discomfort  Contact your doctor immediately if you experience any of the following  -persistent tenderness or swelling at the puncture site  -significant pain at the puncture site or leg  -bleeding or oozing at the puncture site  -increasing redness,warmth,bruising or swelling at the puncture site  -numbness or tingling in the leg  -drainage at the puncture site  -non healing wound  -fever or chills  -any other unusual symptoms

## 2022-03-08 NOTE — ASSESSMENT & PLAN NOTE
Lab Results   Component Value Date    EGFR 69 10/19/2020    EGFR 68 12/13/2018    EGFR 68 06/30/2018    CREATININE 1 05 10/19/2020    CREATININE 1 07 12/13/2018    CREATININE 1 07 06/30/2018     Patient has stage 2 chronic kidney disease  Continue with losartan 50 mg daily for blood pressure control and help protect the kidneys  Will continue to monitor      Advised patient that he should establish with nephrologist  · HbA1c 5.3  · Not on therapy at home

## 2022-07-14 NOTE — PROGRESS NOTES
Had a good night.  For CL stress test today. Home after test, to see Dr Moctezuma in am and see me in the office, thereafter.   Vital Signs Last 24 Hrs  T(C): 36.3 (14 Jul 2022 11:03), Max: 36.9 (13 Jul 2022 20:00)  T(F): 97.3 (14 Jul 2022 11:03), Max: 98.4 (13 Jul 2022 20:00)  HR: 92 (14 Jul 2022 11:03) (85 - 95)  BP: 108/78 (14 Jul 2022 11:03) (108/78 - 142/93)  BP(mean): 92 (13 Jul 2022 20:00) (92 - 92)  RR: 19 (14 Jul 2022 11:03) (18 - 19)  SpO2: 95% (14 Jul 2022 11:03) (95% - 100%)    Parameters below as of 14 Jul 2022 11:03  Patient On (Oxygen Delivery Method): room air    CONSTITUTIONAL: Well appearing, well nourished, awake, alert and in no apparent distress  ENMT: Airway patent, Nasal mucosa clear. Mouth with normal mucosa. Throat has no vesicles, no oropharyngeal exudates and uvula is midline.  EYES: Clear bilaterally, pupils equal, round and reactive to light. EOMI.  CARDIAC: Normal rate, regular rhythm.  Heart sounds S1, S2.  No murmurs, rubs or gallops   RESPIRATORY: Breath sounds clear and equal bilaterally. No wheezes, rales or rhonchi  MUSCULOSKELETAL: Spine appears normal, range of motion is not limited, no muscle or joint tenderness  EXTREMITIES: No edema, cyanosis or deformity   NEUROLOGICAL: Alert and oriented, no focal deficits, no motor or sensory deficits.  SKIN: No rash, skin turgor

## 2022-09-01 NOTE — PROGRESS NOTES
Lab Results   Component Value Date    SODIUM 128 (L) 09/01/2022    SODIUM 129 (L) 09/01/2022    SODIUM 127 (L) 08/31/2022      likely secondary to poor oral intake possibly also component of SIADH in the setting of nausea vomiting    Plan  Nephrology onboard  Fluid restrict 1 L per day, stop IV fluids, give sodium chloride tablets 2 g t i d   Possibly will start on diuretic Subjective:    Patient ID:  Anahy Evans is a 86 y.o. female     HPI    ROS     Objective:    Physical Exam      Assessment:       1. Stenosis of right carotid artery    2. Coronary artery disease involving native coronary artery of native heart without angina pectoris    3. Essential hypertension    4. Bilateral carotid artery stenosis    5. PAF (paroxysmal atrial fibrillation)    6. Old myocardial infarction    7. Pure hypercholesterolemia    8. Chronic kidney disease, stage III (moderate)    9. Vertigo         Plan:

## 2022-10-08 NOTE — Clinical Note
70 ml of contrast were injected throughout the case. 30 mL of contrast was the total wasted during the case. 100 mL was the total amount used during the case.   yes

## 2023-12-15 NOTE — PROGRESS NOTES
Subjective:    Patient ID:  Anahy Evans is a 86 y.o. female     HPI    Here for follow-up of coronary artery disease, status post bare metal stent in the left anterior descending coronary artery during a STEMI in 1999, drug-eluting stent in the left circumflex coronary artery in 2010, drug-eluting stent in the proximal left anterior descending coronary artery in June 2017, drug-eluting stent in the ostial left anterior descending coronary artery in November 2017, carotid occlusive disease, hypertension, paroxysmal atrial fibrillation, chronic renal failure, diabetes mellitus, hyperlipidemia.     Recently admitted to the hospital with atypical chest pains, had a negative nuclear stress test.  Was seen by Dr. Moctezuma for high-grade right carotid artery stenosis.  Is scheduled for an elective right carotid endarterectomy next week  Feels well.    Current Outpatient Medications   Medication Sig    allopurinol (ZYLOPRIM) 300 MG tablet Take 300 mg by mouth once daily.    amiodarone (PACERONE) 200 MG Tab Take 0.5 tablets (100 mg total) by mouth once daily.    amLODIPine (NORVASC) 5 MG tablet Take 5 mg by mouth once daily.    aspirin (ECOTRIN) 81 MG EC tablet Take 81 mg by mouth once daily.    atorvastatin (LIPITOR) 40 MG tablet Take 40 mg by mouth once daily.    calcium carbonate-vitamin D3 600 mg calcium- 200 unit Cap Take by mouth.    carvediloL (COREG) 6.25 MG tablet Take 6.25 mg by mouth 2 (two) times daily.    cholecalciferol, vitamin D3, (VITAMIN D3) 2,000 unit Cap Take 1 capsule by mouth once daily.    clonazePAM (KLONOPIN) 0.5 MG tablet Take 1 tablet (0.5 mg total) by mouth every evening.    clopidogrel (PLAVIX) 75 mg tablet TAKE 1 TABLET DAILY    docusate sodium (COLACE) 100 MG capsule Take 1 capsule (100 mg total) by mouth every 12 (twelve) hours.    folic acid (FOLVITE) 1 MG tablet Take 1 mg by mouth once daily.    nitroGLYCERIN (NITROSTAT) 0.4 MG SL tablet Place 0.4 mg under the tongue every 5  "(five) minutes as needed for Chest pain.     No current facility-administered medications for this visit.        Review of Systems   Constitution: Negative for chills, decreased appetite, fever, weight gain and weight loss.   HENT: Negative for congestion, hearing loss and sore throat.    Eyes: Negative for blurred vision, double vision and visual disturbance.   Cardiovascular: Negative for chest pain, claudication, dyspnea on exertion, leg swelling, palpitations and syncope.   Respiratory: Negative for cough, hemoptysis, shortness of breath, sputum production and wheezing.    Endocrine: Negative for cold intolerance and heat intolerance.   Hematologic/Lymphatic: Negative for bleeding problem. Does not bruise/bleed easily.   Skin: Negative for color change, dry skin, flushing and itching.   Musculoskeletal: Negative for back pain, joint pain and myalgias.   Gastrointestinal: Negative for abdominal pain, anorexia, constipation, diarrhea, dysphagia, nausea and vomiting.        No bleeding per rectum   Genitourinary: Negative for dysuria, flank pain, frequency, hematuria and nocturia.   Neurological: Negative for dizziness, headaches, light-headedness, loss of balance, seizures and tremors.   Psychiatric/Behavioral: Negative for altered mental status and depression.         Vitals:    02/27/20 1346   BP: 117/66   Pulse: (!) 53   Weight: 79.4 kg (175 lb)   Height: 5' 7" (1.702 m)     Objective:    Physical Exam   Constitutional: She is oriented to person, place, and time. She appears well-developed and well-nourished.   HENT:   Head: Normocephalic and atraumatic.   Nose: Nose normal.   Mouth/Throat: Oropharynx is clear and moist.   Eyes: Pupils are equal, round, and reactive to light. Conjunctivae and EOM are normal.   Neck: Neck supple. No tracheal deviation present. No thyromegaly present.   Cardiovascular: Normal rate, regular rhythm and intact distal pulses. Exam reveals no gallop and no friction rub.   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  Bilateral carotid bruits R>L.   Pulmonary/Chest: No respiratory distress. She has no wheezes. She has no rales. She exhibits no tenderness.   Abdominal: Soft. Bowel sounds are normal. She exhibits no distension and no mass. There is no tenderness. There is no rebound and no guarding.   Musculoskeletal: Normal range of motion.   Lymphadenopathy:     She has no cervical adenopathy.   Neurological: She is alert and oriented to person, place, and time.   Skin: Skin is warm and dry.   Psychiatric: Her behavior is normal.         Assessment:       1. Coronary artery disease involving native coronary artery of native heart without angina pectoris    2. Essential hypertension    3. PAF (paroxysmal atrial fibrillation)    4. Atypical chest pain    5. Chronic kidney disease, stage III (moderate)    6. Stenosis of right carotid artery         Plan:       Okay to proceed with right carotid endarterectomy  Continue present pharmacological regimen.             hb 9.3>10.7>7.2>7.9  monitor for signs of worsening anemia  transfuse if <7 or precipitous drop  no signs of active bleed at this time

## 2024-03-22 ENCOUNTER — HOSPITAL ENCOUNTER (OUTPATIENT)
Dept: RADIOLOGY | Facility: OTHER | Age: 89
Discharge: HOME | End: 2024-03-22
Attending: FAMILY MEDICINE
Payer: MEDICARE

## 2024-03-22 DIAGNOSIS — R50.9 FEBRILE: ICD-10-CM

## 2024-03-22 DIAGNOSIS — R05.9 COUGH: ICD-10-CM

## 2024-03-22 DIAGNOSIS — R50.9 FEBRILE: Primary | ICD-10-CM

## 2024-03-22 PROCEDURE — 71046 X-RAY EXAM CHEST 2 VIEWS: CPT | Mod: TC,FY

## 2024-03-22 PROCEDURE — 71046 X-RAY EXAM CHEST 2 VIEWS: CPT | Mod: 26,,, | Performed by: RADIOLOGY

## 2024-04-24 ENCOUNTER — HOSPITAL ENCOUNTER (OUTPATIENT)
Facility: OTHER | Age: 89
Discharge: HOME | End: 2024-04-25
Attending: INTERNAL MEDICINE | Admitting: INTERNAL MEDICINE
Payer: MEDICARE

## 2024-04-24 DIAGNOSIS — R07.9 CHEST PAIN: ICD-10-CM

## 2024-04-24 DIAGNOSIS — E87.1 HYPONATREMIA: Primary | ICD-10-CM

## 2024-04-24 DIAGNOSIS — K92.2 GI BLEED: ICD-10-CM

## 2024-04-24 DIAGNOSIS — R00.1 BRADYCARDIA: ICD-10-CM

## 2024-04-24 LAB
ABO + RH BLD: NORMAL
ALBUMIN SERPL BCP-MCNC: 3.8 G/DL (ref 3.5–5.2)
ALP SERPL-CCNC: 58 U/L (ref 55–135)
ALT SERPL W/O P-5'-P-CCNC: 12 U/L (ref 10–44)
ANION GAP SERPL CALC-SCNC: 10 MMOL/L (ref 8–16)
AST SERPL-CCNC: 18 U/L (ref 10–40)
BASOPHILS # BLD AUTO: 0.05 K/UL (ref 0–0.2)
BASOPHILS NFR BLD: 1.1 % (ref 0–1.9)
BILIRUB SERPL-MCNC: 0.6 MG/DL (ref 0.1–1)
BLD GP AB SCN CELLS X3 SERPL QL: NORMAL
BNP SERPL-MCNC: 261 PG/ML (ref 0–99)
BUN SERPL-MCNC: 38 MG/DL (ref 8–23)
CALCIUM SERPL-MCNC: 9.5 MG/DL (ref 8.7–10.5)
CHLORIDE SERPL-SCNC: 96 MMOL/L (ref 95–110)
CO2 SERPL-SCNC: 22 MMOL/L (ref 23–29)
CREAT SERPL-MCNC: 2.4 MG/DL (ref 0.5–1.4)
DIFFERENTIAL METHOD BLD: ABNORMAL
EOSINOPHIL # BLD AUTO: 0.1 K/UL (ref 0–0.5)
EOSINOPHIL NFR BLD: 2.8 % (ref 0–8)
ERYTHROCYTE [DISTWIDTH] IN BLOOD BY AUTOMATED COUNT: 13.5 % (ref 11.5–14.5)
EST. GFR  (NO RACE VARIABLE): 19 ML/MIN/1.73 M^2
GLUCOSE SERPL-MCNC: 102 MG/DL (ref 70–110)
HCT VFR BLD AUTO: 31.4 % (ref 37–48.5)
HGB BLD-MCNC: 10.3 G/DL (ref 12–16)
IMM GRANULOCYTES # BLD AUTO: 0.01 K/UL (ref 0–0.04)
IMM GRANULOCYTES NFR BLD AUTO: 0.2 % (ref 0–0.5)
LIPASE SERPL-CCNC: 31 U/L (ref 4–60)
LYMPHOCYTES # BLD AUTO: 1.8 K/UL (ref 1–4.8)
LYMPHOCYTES NFR BLD: 38.3 % (ref 18–48)
MCH RBC QN AUTO: 28.8 PG (ref 27–31)
MCHC RBC AUTO-ENTMCNC: 32.8 G/DL (ref 32–36)
MCV RBC AUTO: 88 FL (ref 82–98)
MONOCYTES # BLD AUTO: 0.7 K/UL (ref 0.3–1)
MONOCYTES NFR BLD: 15.4 % (ref 4–15)
NEUTROPHILS # BLD AUTO: 2 K/UL (ref 1.8–7.7)
NEUTROPHILS NFR BLD: 42.2 % (ref 38–73)
NRBC BLD-RTO: 0 /100 WBC
PLATELET # BLD AUTO: 183 K/UL (ref 150–450)
PMV BLD AUTO: 10.8 FL (ref 9.2–12.9)
POTASSIUM SERPL-SCNC: 5 MMOL/L (ref 3.5–5.1)
PROT SERPL-MCNC: 7.5 G/DL (ref 6–8.4)
RBC # BLD AUTO: 3.58 M/UL (ref 4–5.4)
SODIUM SERPL-SCNC: 128 MMOL/L (ref 136–145)
SPECIMEN OUTDATE: NORMAL
T4 FREE SERPL-MCNC: 1.15 NG/DL (ref 0.71–1.51)
TROPONIN I SERPL DL<=0.01 NG/ML-MCNC: 0.03 NG/ML (ref 0–0.03)
TSH SERPL DL<=0.005 MIU/L-ACNC: 4.02 UIU/ML (ref 0.4–4)
WBC # BLD AUTO: 4.62 K/UL (ref 3.9–12.7)

## 2024-04-24 PROCEDURE — 84484 ASSAY OF TROPONIN QUANT: CPT | Performed by: INTERNAL MEDICINE

## 2024-04-24 PROCEDURE — 93010 ELECTROCARDIOGRAM REPORT: CPT | Mod: ,,, | Performed by: INTERNAL MEDICINE

## 2024-04-24 PROCEDURE — 36415 COLL VENOUS BLD VENIPUNCTURE: CPT | Performed by: INTERNAL MEDICINE

## 2024-04-24 PROCEDURE — 85025 COMPLETE CBC W/AUTO DIFF WBC: CPT | Performed by: INTERNAL MEDICINE

## 2024-04-24 PROCEDURE — 93005 ELECTROCARDIOGRAM TRACING: CPT

## 2024-04-24 PROCEDURE — 84443 ASSAY THYROID STIM HORMONE: CPT | Performed by: INTERNAL MEDICINE

## 2024-04-24 PROCEDURE — 83930 ASSAY OF BLOOD OSMOLALITY: CPT | Performed by: INTERNAL MEDICINE

## 2024-04-24 PROCEDURE — 82533 TOTAL CORTISOL: CPT | Performed by: INTERNAL MEDICINE

## 2024-04-24 PROCEDURE — 84439 ASSAY OF FREE THYROXINE: CPT | Performed by: INTERNAL MEDICINE

## 2024-04-24 PROCEDURE — 80053 COMPREHEN METABOLIC PANEL: CPT | Performed by: INTERNAL MEDICINE

## 2024-04-24 PROCEDURE — G0378 HOSPITAL OBSERVATION PER HR: HCPCS

## 2024-04-24 PROCEDURE — 83690 ASSAY OF LIPASE: CPT | Performed by: INTERNAL MEDICINE

## 2024-04-24 PROCEDURE — 99285 EMERGENCY DEPT VISIT HI MDM: CPT | Mod: 25

## 2024-04-24 PROCEDURE — 83880 ASSAY OF NATRIURETIC PEPTIDE: CPT | Performed by: INTERNAL MEDICINE

## 2024-04-24 PROCEDURE — 86901 BLOOD TYPING SEROLOGIC RH(D): CPT | Performed by: INTERNAL MEDICINE

## 2024-04-25 VITALS
OXYGEN SATURATION: 98 % | WEIGHT: 173 LBS | HEART RATE: 61 BPM | DIASTOLIC BLOOD PRESSURE: 67 MMHG | SYSTOLIC BLOOD PRESSURE: 149 MMHG | RESPIRATION RATE: 20 BRPM | BODY MASS INDEX: 27.15 KG/M2 | TEMPERATURE: 98 F | HEIGHT: 67 IN

## 2024-04-25 PROBLEM — E87.1 HYPONATREMIA: Status: ACTIVE | Noted: 2024-04-25

## 2024-04-25 LAB
ALBUMIN SERPL BCP-MCNC: 3.6 G/DL (ref 3.5–5.2)
ALP SERPL-CCNC: 56 U/L (ref 55–135)
ALT SERPL W/O P-5'-P-CCNC: 12 U/L (ref 10–44)
ANION GAP SERPL CALC-SCNC: 9 MMOL/L (ref 8–16)
AST SERPL-CCNC: 16 U/L (ref 10–40)
BACTERIA #/AREA URNS HPF: ABNORMAL /HPF
BASOPHILS # BLD AUTO: 0.04 K/UL (ref 0–0.2)
BASOPHILS NFR BLD: 0.9 % (ref 0–1.9)
BILIRUB SERPL-MCNC: 0.5 MG/DL (ref 0.1–1)
BILIRUB UR QL STRIP: NEGATIVE
BUN SERPL-MCNC: 34 MG/DL (ref 8–23)
CALCIUM SERPL-MCNC: 9.7 MG/DL (ref 8.7–10.5)
CHLORIDE SERPL-SCNC: 102 MMOL/L (ref 95–110)
CHLORIDE UR-SCNC: 25 MMOL/L (ref 25–200)
CLARITY UR: ABNORMAL
CO2 SERPL-SCNC: 21 MMOL/L (ref 23–29)
COLOR UR: COLORLESS
CORTIS SERPL-MCNC: 12.9 UG/DL
CREAT SERPL-MCNC: 2.1 MG/DL (ref 0.5–1.4)
DIFFERENTIAL METHOD BLD: ABNORMAL
EOSINOPHIL # BLD AUTO: 0.1 K/UL (ref 0–0.5)
EOSINOPHIL NFR BLD: 3.3 % (ref 0–8)
ERYTHROCYTE [DISTWIDTH] IN BLOOD BY AUTOMATED COUNT: 13.7 % (ref 11.5–14.5)
EST. GFR  (NO RACE VARIABLE): 22 ML/MIN/1.73 M^2
GLUCOSE SERPL-MCNC: 94 MG/DL (ref 70–110)
GLUCOSE UR QL STRIP: ABNORMAL
HCT VFR BLD AUTO: 30.6 % (ref 37–48.5)
HGB BLD-MCNC: 10.1 G/DL (ref 12–16)
HGB UR QL STRIP: NEGATIVE
HYALINE CASTS #/AREA URNS LPF: 0 /LPF
IMM GRANULOCYTES # BLD AUTO: 0 K/UL (ref 0–0.04)
IMM GRANULOCYTES NFR BLD AUTO: 0 % (ref 0–0.5)
KETONES UR QL STRIP: NEGATIVE
LEUKOCYTE ESTERASE UR QL STRIP: ABNORMAL
LYMPHOCYTES # BLD AUTO: 1.7 K/UL (ref 1–4.8)
LYMPHOCYTES NFR BLD: 40 % (ref 18–48)
MAGNESIUM SERPL-MCNC: 1.8 MG/DL (ref 1.6–2.6)
MCH RBC QN AUTO: 29.3 PG (ref 27–31)
MCHC RBC AUTO-ENTMCNC: 33 G/DL (ref 32–36)
MCV RBC AUTO: 89 FL (ref 82–98)
MICROSCOPIC COMMENT: ABNORMAL
MONOCYTES # BLD AUTO: 0.6 K/UL (ref 0.3–1)
MONOCYTES NFR BLD: 15 % (ref 4–15)
NEUTROPHILS # BLD AUTO: 1.7 K/UL (ref 1.8–7.7)
NEUTROPHILS NFR BLD: 40.8 % (ref 38–73)
NITRITE UR QL STRIP: NEGATIVE
NRBC BLD-RTO: 0 /100 WBC
OHS QRS DURATION: 76 MS
OHS QRS DURATION: 86 MS
OHS QTC CALCULATION: 422 MS
OHS QTC CALCULATION: 428 MS
OSMOLALITY SERPL: 278 MOSM/KG (ref 275–295)
OSMOLALITY UR: 155 MOSM/KG (ref 50–1200)
PH UR STRIP: 6 [PH] (ref 5–8)
PHOSPHATE SERPL-MCNC: 3.6 MG/DL (ref 2.7–4.5)
PLATELET # BLD AUTO: 172 K/UL (ref 150–450)
PMV BLD AUTO: 10.8 FL (ref 9.2–12.9)
POTASSIUM SERPL-SCNC: 4.5 MMOL/L (ref 3.5–5.1)
PROT SERPL-MCNC: 7.1 G/DL (ref 6–8.4)
PROT UR QL STRIP: ABNORMAL
RBC # BLD AUTO: 3.45 M/UL (ref 4–5.4)
RBC #/AREA URNS HPF: 0 /HPF (ref 0–4)
SODIUM SERPL-SCNC: 132 MMOL/L (ref 136–145)
SODIUM UR-SCNC: 29 MMOL/L (ref 20–250)
SP GR UR STRIP: 1 (ref 1–1.03)
SQUAMOUS #/AREA URNS HPF: 3 /HPF
URN SPEC COLLECT METH UR: ABNORMAL
UROBILINOGEN UR STRIP-ACNC: NEGATIVE EU/DL
WBC # BLD AUTO: 4.27 K/UL (ref 3.9–12.7)
WBC #/AREA URNS HPF: 6 /HPF (ref 0–5)

## 2024-04-25 PROCEDURE — 81000 URINALYSIS NONAUTO W/SCOPE: CPT | Performed by: INTERNAL MEDICINE

## 2024-04-25 PROCEDURE — 84100 ASSAY OF PHOSPHORUS: CPT | Performed by: INTERNAL MEDICINE

## 2024-04-25 PROCEDURE — 80053 COMPREHEN METABOLIC PANEL: CPT | Performed by: INTERNAL MEDICINE

## 2024-04-25 PROCEDURE — G0378 HOSPITAL OBSERVATION PER HR: HCPCS

## 2024-04-25 PROCEDURE — 82436 ASSAY OF URINE CHLORIDE: CPT | Performed by: INTERNAL MEDICINE

## 2024-04-25 PROCEDURE — 83935 ASSAY OF URINE OSMOLALITY: CPT | Performed by: INTERNAL MEDICINE

## 2024-04-25 PROCEDURE — 84300 ASSAY OF URINE SODIUM: CPT | Performed by: INTERNAL MEDICINE

## 2024-04-25 PROCEDURE — 83735 ASSAY OF MAGNESIUM: CPT | Performed by: INTERNAL MEDICINE

## 2024-04-25 PROCEDURE — 85025 COMPLETE CBC W/AUTO DIFF WBC: CPT | Performed by: INTERNAL MEDICINE

## 2024-04-25 NOTE — ED PROVIDER NOTES
"Encounter date: 9:07 PM 04/25/2024    Source of History   Patient/Adult daughter    Chief Complaint   Pt presents with:   Melena (C/o black stools x 4-5 days. Reports nausea without vomiting. Reports hx of aortic aneurysm. Also reports feeling generalized weakness, dizziness, and sob. 100% RA)      History Of Present Illness   Anahy Evans is a 89 y.o. female with History of AFib on amiodarone and Plavix, hypertension on amlodipine, losartan and Coreg, CKD, vertigo, GERD, hypothyroidism, MI who presents to the ED with CC of dark stools beginning 5-6 days ago. She also notes an increase in her baseline lightheadedness and associated SOB beginning this evening. She notes that the SOB occurs when ambulating . Patient reports chest pain that she describes as "heavy" occurring 2-3 days ago and improving with nitro, ha s not had return of chest pain and feels breathing is at baseline in the ED .She also notes intermittent pain with urination but denies change in output, bloody urine, or bloody stool. She also notes that she takes prescribed Plavix but took it later today than usual. She denies vomiting, nausea, abdominal pain, or recent falls/trauma.    This is the extent to the patients complaints today here in the emergency department.  Past History     Review of patient's allergies indicates:   Allergen Reactions    Clindamycin Anaphylaxis    Penicillins Rash       No current facility-administered medications on file prior to encounter.     Current Outpatient Medications on File Prior to Encounter   Medication Sig Dispense Refill    allopurinol (ZYLOPRIM) 300 MG tablet Take 300 mg by mouth once daily.      amiodarone (PACERONE) 200 MG Tab Take 0.5 tablets (100 mg total) by mouth once daily. 30 tablet 6    amLODIPine (NORVASC) 5 MG tablet Take 5 mg by mouth once daily.      aspirin (ECOTRIN) 81 MG EC tablet Take 81 mg by mouth once daily.      atorvastatin (LIPITOR) 40 MG tablet Take 40 mg by mouth once daily.      " azelastine (ASTELIN) 137 mcg (0.1 %) nasal spray 1 spray by Nasal route 2 (two) times daily.      calcium carbonate-vitamin D3 600 mg calcium- 200 unit Cap Take by mouth.      carvediloL (COREG) 6.25 MG tablet TAKE 1 TABLET TWICE A DAY (Patient taking differently: Take 12.5 mg by mouth 2 (two) times daily.) 180 tablet 3    cholecalciferol, vitamin D3, (VITAMIN D3) 50 mcg (2,000 unit) Cap Take 1 capsule by mouth once daily.      clonazePAM (KLONOPIN) 0.5 MG tablet Take 1 tablet (0.5 mg total) by mouth every evening. 30 tablet 3    clopidogreL (PLAVIX) 75 mg tablet TAKE ONE TABLET BY MOUTH EVERYDAY 90 tablet 3    cyanocobalamin 500 MCG tablet Take 500 mcg by mouth once daily.      docusate sodium (COLACE) 100 MG capsule Take 1 capsule (100 mg total) by mouth every 12 (twelve) hours.  0    folic acid (FOLVITE) 1 MG tablet Take 1 mg by mouth once daily.      losartan (COZAAR) 25 MG tablet Take 75 mg by mouth once daily.      nitroGLYCERIN (NITROSTAT) 0.4 MG SL tablet Place 0.4 mg under the tongue every 5 (five) minutes as needed for Chest pain.      sodium bicarbonate 650 MG tablet Take 650 mg by mouth 2 (two) times daily.         As per HPI and below:  Past Medical History:   Diagnosis Date    Anticoagulant long-term use     Cervical cancer 1968    breast cancer right    Coronary artery disease     Gout     High cholesterol     Hypertension     MI (myocardial infarction) 1999     Past Surgical History:   Procedure Laterality Date    BREAST LUMPECTOMY      right    CARDIAC SURGERY      multiple cardiac stents    CORONARY ANGIOGRAPHY Right 1/21/2022    Procedure: ANGIOGRAM, CORONARY ARTERY;  Surgeon: Asim Canela MD;  Location: [facility], [place], operated by [facility] CATH LAB;  Service: Cardiology;  Laterality: Right;    CORONARY STENT PLACEMENT      HIP REPLACEMENT ARTHROPLASTY Right 2018    JOINT REPLACEMENT      right       Social History     Socioeconomic History    Marital status: Single   Tobacco Use    Smoking status: Former    Smokeless  tobacco: Never   Substance and Sexual Activity    Alcohol use: Yes     Comment: rare    Drug use: No    Sexual activity: Not Currently       Family History   Problem Relation Name Age of Onset    Cancer Mother      Cancer Father         Physical Exam     Vitals:    04/24/24 2303 04/25/24 0003 04/25/24 0103 04/25/24 0104   BP: (!) 155/72 (!) 160/70 (!) 146/67    BP Location:       Patient Position:       Pulse: (!) 50 (!) 44  (!) 58   Resp:    20   Temp:       TempSrc:       SpO2: 99% 98%  97%   Weight:       Height:         Physical Exam:   Nursing note and vitals reviewed.  Appearance: Well appearing, non-toxic female in no acute respiratory distress.  Making purposeful movements.  Speaking in full sentences.  Skin: No obvious rashes seen.  Good turgor.  No abrasions.  No ecchymoses.  Eyes: No conjunctival injection. EOMI, PERRL.  Head: NC/AT  Chest: CTAB. No increased work of breathing, bilateral chest rise.  Cardiovascular: Regular rate and rhythm.  Normal equal bilateral radial pulses. No JVD. No pitting edema in the lower extremities.  Abdomen: Soft.  Not distended.  Nontender.  No guarding.  No rebound. No Masses  Musculoskeletal: No obvious deformities.   Neck supple, full range of motion, no obvious deformity.   No tenderness to palpation of cervical through lumbar spine.  No step-offs or deformities. Good range of motion all joints.  Neurologic: Moves all extremities.  Normal sensation.  No facial droop.  Normal speech.    Mental Status:  Alert and oriented x 3.  Appropriate, conversant.      Results and Medications    Procedures    Labs Reviewed   CBC W/ AUTO DIFFERENTIAL - Abnormal; Notable for the following components:       Result Value    RBC 3.58 (*)     Hemoglobin 10.3 (*)     Hematocrit 31.4 (*)     Mono % 15.4 (*)     All other components within normal limits   COMPREHENSIVE METABOLIC PANEL - Abnormal; Notable for the following components:    Sodium 128 (*)     CO2 22 (*)     BUN 38 (*)      Creatinine 2.4 (*)     eGFR 19 (*)     All other components within normal limits   B-TYPE NATRIURETIC PEPTIDE - Abnormal; Notable for the following components:     (*)     All other components within normal limits   URINALYSIS, REFLEX TO URINE CULTURE - Abnormal; Notable for the following components:    Color, UA Colorless (*)     Appearance, UA Hazy (*)     Protein, UA 1+ (*)     Glucose, UA 2+ (*)     Leukocytes, UA 1+ (*)     All other components within normal limits    Narrative:     Specimen Source->Urine   TSH - Abnormal; Notable for the following components:    TSH 4.024 (*)     All other components within normal limits   URINALYSIS MICROSCOPIC - Abnormal; Notable for the following components:    WBC, UA 6 (*)     Bacteria Many (*)     All other components within normal limits    Narrative:     Specimen Source->Urine   LIPASE   TROPONIN I   TSH   CORTISOL, RANDOM   SODIUM, URINE, RANDOM    Narrative:     Specimen Source->Urine   CHLORIDE, URINE, RANDOM    Narrative:     Specimen Source->Urine   OSMOLALITY, SERUM   T4, FREE   OSMOLALITY, URINE RANDOM   CORTISOL, RANDOM   OSMOLALITY, SERUM   CBC W/ AUTO DIFFERENTIAL   COMPREHENSIVE METABOLIC PANEL   TYPE & SCREEN       Imaging Results               CT Abdomen Pelvis  Without Contrast (Final result)  Result time 04/24/24 22:56:23      Final result by Guillermina Fitch MD (04/24/24 22:56:23)                   Impression:      Colonic diverticular disease.  Focal thickening of a short segment of the sigmoid colon without adjacent pericolonic infiltration.  Findings may be related to early or resolved or resolving colitis, under distension, and or neoplasia, which is less likely statistically.    3.9 cm infrarenal abdominal aortic aneurysm.  Moderate atherosclerotic changes.    Hyperdense left renal interpolar lesion.  Findings may represent a complex cyst, which may be hemorrhagic or proteinaceous.  A soft tissue lesion cannot be entirely excluded.   Bilateral renal cysts.  Consider follow-up when clinically appropriate.    Cholelithiasis.    Small hiatal hernia.    This report was flagged in Epic as abnormal.      Electronically signed by: Guillermina Fitch  Date:    04/24/2024  Time:    22:56               Narrative:    EXAMINATION:  CT ABDOMEN PELVIS WITHOUT    CLINICAL HISTORY:  Black stools times 4-5 days.  Nausea without vomiting.  History of aortic aneurysm.  Generalized weakness, dizziness, shortness of breath.    TECHNIQUE:  5 mm unenhanced axial images from the lung bases through the greater trochanters were performed.  Coronal and sagittal reformatted images were provided.    COMPARISON:  None.    FINDINGS:  Within the limits of a noncontrast examination, the liver, spleen, pancreas, and adrenal glands are unremarkable.  The gallbladder contains a small amount of layering gallstones.  Calcified granulomas are seen within the liver.    A 1.9 x 2.0 x 2.9 cm mildly hyperdense left interpolar renal lesion is seen measuring between 47 and 54 Hounsfield units (series 2 axial image 58 and series 601 coronal image 74)..  Bilateral renal cysts are present.  The largest on the left measures 5.4 x 5.7 cm (series 2 axial image 41).    There is no gross abdominal adenopathy or ascites.  There is a small fat containing umbilical hernia.    Evaluation the pelvis is limited by metallic streak artifact from right hip arthroplasty.  Extensive colonic diverticular disease is present.  There is focal thickening of a short segment of sigmoid colonic wall (series 2 axial image 134, series 602 sagittal image 88 and series 601 coronal image 68).  There is moderate stool seen within the colon.  There are no pelvic masses or adenopathy.  There is no free pelvic fluid.    At the lung bases, there is mild bibasilar atelectatic change or scarring.  A pneumatocele or emphysematous bleb is seen in the right lobe.  A small hiatal hernia is present.    There are bridging thoracic  osteophytes and small marginal osteophytes.                                       X-Ray Chest AP Portable (Final result)  Result time 04/24/24 21:56:19      Final result by Juvenal Calderon MD (04/24/24 21:56:19)                   Impression:      No acute process.      Electronically signed by: Juvenal Calderon MD  Date:    04/24/2024  Time:    21:56               Narrative:    EXAMINATION:  XR CHEST AP PORTABLE    CLINICAL HISTORY:  Chest pain, unspecified    TECHNIQUE:  Single frontal view of the chest was performed.    COMPARISON:  03/22/2024.    FINDINGS:  Monitoring EKG leads are present.  The trachea is unremarkable.  The cardiomediastinal silhouette is within normal limits.  There is no evidence of free air beneath hemidiaphragms.  There are no pleural effusions.  There is no evidence of a pneumothorax.  There is no evidence of pneumomediastinum.  No airspace opacity is present.  There are degenerative changes in the osseous structures.                                          Medications - No data to display    MDM, Impression and Plan   Previous Records:   I decided to obtain old medical records which is listed here or in ED course:     Independently Interpreted Test(s):   EKG:  I independently reviewed and interpreted the EKG and my findings are as follows:   Detailed here or in ED course.   IMAGING:  I have ordered and independently interpreted X-rays and my findings are as follow:  Detailed here or in ED course. CXR shows no pneumothorax, pneumonia or pleural effusions.    Clinical Tests:   Lab Tests: Ordered and Reviewed  Radiological Study: Ordered and Reviewed  Medical Tests: Ordered and Reviewed    Differential diagnosis:  -symptomatic anemia   -GI bleed   -UTI   -electrolyte abnormalities  -bowel obstruction  -constipation    Initial Assessment & ED Management:    Urgent evaluation of 89 y.o. female with History of AFib on amiodarone and Plavix, hypertension amlodipine, losartan and Coreg, CKD, vertigo,  GERD, hypothyroidism, MI who presents to the ED with CC of dark stools beginning 5-6 days ago. Upon arrival to the ED, she presents hemodynamically stable, mildly hypertensive and afebrile with no respiratory distress.    EKG and troponin making ACS unlikely.  Lipase within normal limits making pancreatitis unlikely.  Her CBC was grossly unrevealing and her hemoglobin was at 10.3 which is near her recent baseline she was noted to have guaiac-negative stool yet she was typed and screened and consented for blood.  Her CMP revealed a low sodium which I believe is the cause of her worsening vertigo and she was noted to have a small MELIDA.  She was not given IV fluids in the ED as the decision was made to fluid restrict for her hyponatremia.  Urine studies were added on.  Free T4 within normal limits making thyroid abnormalities unlikely.  Chest x-ray grossly unremarkable.  Her CT abdomen and pelvis showed colonic diverticular disease, redemonstrated her known 3.9 cm infrarenal abdominal aortic aneurysm, it was noted that her left renal lesion may represent a hemorrhagic cyst yet with stable pressures a benign abdomen I do not believe that she was currently bleeding into her abdomen.  Of note her fecal occult blood test was noted to be negative.  I called and discussed case with Hospital Medicine who was agreeable with admission yet returned my call and recommended I discussed the case with Dr. Canela who recommended admission under his name. Pt admitted to Dr. Canela with pending urinalysis, cortisol, urine osmolality, serum osmolality and repeat labs in the a.m. for admission.  Patient deemed stable for admission      Medical Decision Making  Amount and/or Complexity of Data Reviewed  External Data Reviewed: radiology.     Details: Chart review on 10/16/2020 HEENT ultrasound of her aorta which showed:  The abdominal aorta measures 2.6 x 1.9 cm in its proximal segment, 3.0 x 2.8 cm in its mid segment, and 3.2 x 3.5 cm  in its distal segment. Normal flow and velocities are seen throughout the abdominal aorta.    The left iliac artery measures 1.1 x 0.9 cm in the proximal segment. Normal flow velocities are seen in the left iliac artery.    The right iliac artery measures 1.0 x 1.1 cm in the proximal segment. Normal flow and velocities are seen in the right iliac artery.  Labs: ordered. Decision-making details documented in ED Course.  Radiology: ordered.               I called and discussed the case with:  Hospital Medicine/cardiology    Please see ED course for discussion of workup and dispo if not listed above.          Final diagnoses:  [K92.2] GI bleed  [R07.9] Chest pain  [E87.1] Hyponatremia (Primary)  [R00.1] Bradycardia        ED Disposition Condition    Observation                ED Course as of 04/25/24 0202 Wed Apr 24, 2024 2113 On chart review patient had a cardiac catheterization on 1/21/2022 which showed:  Left Main  Previously placed Dist LM to Mid LAD stent (unknown type) is widely patent.  Left Circumflex  Previously placed Prox Cx to Mid Cx stent (unknown type) is widely patent.  Right Coronary Artery  Prox RCA lesion is 100% stenosed.       [HM]   2124 EKG shows sinus rhythm with first-degree AV block and ventricular rate of minute.  Narrow QRS.  No ST segment elevations  or depressions no STEMI. [HM]   2209 Sodium(!): 128 [HM]   2209 Creatinine(!): 2.4  MELIDA [HM]   2210 Hemoglobin(!): 10.3  Near baseline   [HM]   2341 Negative fecal occult blood test. [KB]      ED Course User Index  [HM] Connor Jalloh MD  [KB] Tala Rain Kylie Bogard, scribed for, and in the presence of, Connor Jalloh MD. I performed the scribed service and the documentation accurately describes the services I performed. I attest to the accuracy of the note.     Physician Attestation for Scribe: I, Connor Jalloh MD , reviewed documentation as scribed in my presence, which is both accurate and complete.      Connor Jalloh MD           Connor Jalloh MD  04/25/24 0208

## 2024-04-25 NOTE — ED TRIAGE NOTES
Anahy Evans, a 89 y.o. female presents to the ED w/ complaint of dark stools and weakness x 5 days.     Triage note:  Chief Complaint   Patient presents with    Melena     C/o black stools x 4-5 days. Reports nausea without vomiting. Reports hx of aortic aneurysm. Also reports feeling generalized weakness, dizziness, and sob. 100% RA     Review of patient's allergies indicates:   Allergen Reactions    Clindamycin Anaphylaxis    Penicillins Rash     Past Medical History:   Diagnosis Date    Anticoagulant long-term use     Cervical cancer 1968    breast cancer right    Coronary artery disease     Gout     High cholesterol     Hypertension     MI (myocardial infarction) 1999

## 2024-04-25 NOTE — PLAN OF CARE
04/25/24 1048   Final Note   Assessment Type Final Discharge Note   Anticipated Discharge Disposition Home   What phone number can be called within the next 1-3 days to see how you are doing after discharge? [phone]   Hospital Resources/Appts/Education Provided Provided patient/caregiver with written discharge plan information   Post-Acute Status   Discharge Delays None known at this time

## 2024-04-25 NOTE — H&P
[facility] Emergency Dept  History & Physical    History of Present Illness:  Mrs. Anahy Evans is a 89-year-old lady that presented to the emergency room with complaints of dizziness and weakness, following dark stools for the last couple of days.  Earlier in the day she had a tightness in the chest for which she took 2 nitroglycerin tablets with relief.  Thereafter she had a feeling that angina was going to come back however never did.  She has a history of hypertension, coronary artery disease having had previous coronary interventions in the past, carotid occlusive disease, paroxysmal atrial fibrillation, chronic renal failure.  History of breast cancer.  No current facility-administered medications for this encounter.     Current Outpatient Medications   Medication Sig Dispense Refill    allopurinol (ZYLOPRIM) 300 MG tablet Take 300 mg by mouth once daily.      amiodarone (PACERONE) 200 MG Tab Take 0.5 tablets (100 mg total) by mouth once daily. 30 tablet 6    amLODIPine (NORVASC) 5 MG tablet Take 5 mg by mouth once daily.      aspirin (ECOTRIN) 81 MG EC tablet Take 81 mg by mouth once daily.      atorvastatin (LIPITOR) 40 MG tablet Take 40 mg by mouth once daily.      azelastine (ASTELIN) 137 mcg (0.1 %) nasal spray 1 spray by Nasal route 2 (two) times daily.      calcium carbonate-vitamin D3 600 mg calcium- 200 unit Cap Take by mouth.      carvediloL (COREG) 6.25 MG tablet TAKE 1 TABLET TWICE A DAY (Patient taking differently: Take 12.5 mg by mouth 2 (two) times daily.) 180 tablet 3    cholecalciferol, vitamin D3, (VITAMIN D3) 50 mcg (2,000 unit) Cap Take 1 capsule by mouth once daily.      clonazePAM (KLONOPIN) 0.5 MG tablet Take 1 tablet (0.5 mg total) by mouth every evening. 30 tablet 3    clopidogreL (PLAVIX) 75 mg tablet TAKE ONE TABLET BY MOUTH EVERYDAY 90 tablet 3    cyanocobalamin 500 MCG tablet Take 500 mcg by mouth once daily.      docusate sodium (COLACE) 100 MG capsule Take 1 capsule (100 mg  total) by mouth every 12 (twelve) hours.  0    folic acid (FOLVITE) 1 MG tablet Take 1 mg by mouth once daily.      losartan (COZAAR) 25 MG tablet Take 75 mg by mouth once daily.      nitroGLYCERIN (NITROSTAT) 0.4 MG SL tablet Place 0.4 mg under the tongue every 5 (five) minutes as needed for Chest pain.      sodium bicarbonate 650 MG tablet Take 650 mg by mouth 2 (two) times daily.         Review of patient's allergies indicates:   Allergen Reactions    Clindamycin Anaphylaxis    Penicillins Rash       Past Medical History:   Diagnosis Date    Anticoagulant long-term use     Cervical cancer 1968    breast cancer right    Coronary artery disease     Gout     High cholesterol     Hypertension     MI (myocardial infarction) 1999     Past Surgical History:   Procedure Laterality Date    BREAST LUMPECTOMY      right    CARDIAC SURGERY      multiple cardiac stents    CORONARY ANGIOGRAPHY Right 1/21/2022    Procedure: ANGIOGRAM, CORONARY ARTERY;  Surgeon: Asim Canela MD;  Location: [facility] CATH LAB;  Service: Cardiology;  Laterality: Right;    CORONARY STENT PLACEMENT      HIP REPLACEMENT ARTHROPLASTY Right 2018    JOINT REPLACEMENT      right     Family History   Problem Relation Name Age of Onset    Cancer Mother      Cancer Father       Social History     Tobacco Use    Smoking status: Former    Smokeless tobacco: Never   Substance Use Topics    Alcohol use: Yes     Comment: rare    Drug use: No        Physical Exam:  Carotid upstroke is brisk, bilateral carotid bruits.  Chest is clear  Heart is regular there is no murmur or gallop  The abdomen is soft and nontender  Extremities are warm  In the neurological exam is intact.    Impression on Admission:   Dizziness and lightheadedness possibly from hyponatremia  Lower abdominal pain and black stools possibly from gastrointestinal bleeding  Chest pains, possibly angina pectoris  Hypertension  Chronic renal failure  Carotid occlusive disease

## 2024-04-25 NOTE — DISCHARGE SUMMARY
[facility] Emergency Dept  Cardiology  Discharge Summary      Patient Name: Anahy Evans    Admission Date: 4/24/2024    Discharge Date and Time: 4/25/24    Final Diagnoses:  Hyponatremia   Principal Problem: Hyponatremia    HPI:  Mrs. Evans is a 89-year-old lady that presented with dizziness lightheadedness and weakness.  She had dark stools for a couple of days prior to this admission.  Earlier in the day a she had chest tightness for which she took nitroglycerin with relief.    Impression on Admission:  Hyponatremia    Hospital Course:  She was kept on fluid restriction, the following morning the serum sodium went from 128-132.  She felt better, she has an appointment to see her gastroenterologist on the 1st.  She had no further chest pains, felt that she has been under a tremendous amount of stress that may indeed have contributed to her symptoms.    Disposition:  Discharged to home    Follow up/Patient Instructions:    Medications:     Medication List        CHANGE how you take these medications      carvediloL 6.25 MG tablet  Commonly known as: COREG  TAKE 1 TABLET TWICE A DAY  What changed: how much to take            CONTINUE taking these medications      allopurinoL 300 MG tablet  Commonly known as: ZYLOPRIM     amiodarone 200 MG Tab  Commonly known as: PACERONE  Take 0.5 tablets (100 mg total) by mouth once daily.     amLODIPine 5 MG tablet  Commonly known as: NORVASC     aspirin 81 MG EC tablet  Commonly known as: ECOTRIN     atorvastatin 40 MG tablet  Commonly known as: LIPITOR     azelastine 137 mcg (0.1 %) nasal spray  Commonly known as: ASTELIN     calcium carbonate-vitamin D3 600 mg-5 mcg (200 unit) Cap     cholecalciferol (vitamin D3) 50 mcg (2,000 unit) Cap capsule  Commonly known as: VITAMIN D3     clonazePAM 0.5 MG tablet  Commonly known as: KlonoPIN  Take 1 tablet (0.5 mg total) by mouth every evening.     cyanocobalamin 500 MCG tablet     docusate sodium 100 MG capsule  Commonly known as:  COLACE  Take 1 capsule (100 mg total) by mouth every 12 (twelve) hours.     folic acid 1 MG tablet  Commonly known as: FOLVITE     losartan 25 MG tablet  Commonly known as: COZAAR     nitroGLYCERIN 0.4 MG SL tablet  Commonly known as: NITROSTAT     sodium bicarbonate 650 MG tablet            STOP taking these medications      clopidogreL 75 mg tablet  Commonly known as: PLAVIX             No discharge procedures on file.      Condition upon Discharge:  Improved          Asim Canela MD

## 2024-04-25 NOTE — PLAN OF CARE
Assessment completed with luis enrique Radford via phone - lives with daughter - independent in ADLs - daughter will provide transportation home     [facility] - Emergency Dept  Initial Discharge Assessment       Primary Care Provider: Elena Cabrera MD    Admission Diagnosis: Hyponatremia [E87.1]    Admission Date: 4/24/2024  Expected Discharge Date:     Transition of Care Barriers: None    Payor: [facility] MCARE / Plan: [facility] / Product Type: Medicare Advantage /     Extended Emergency Contact Information  Primary Emergency Contact: Malina Anaya   [facility]  Home Phone: [phone]  Mobile Phone: [phone]  Relation: Daughter    Discharge Plan A: Home with family  Discharge Plan B: Home with family      [id number] - [place], [place] - [address]  [address] [address]  Phone: [phone] Fax: [phone]      Initial Assessment (most recent)       Adult Discharge Assessment - 04/25/24 0803          Discharge Assessment    Assessment Type Discharge Planning Assessment     Confirmed/corrected address, phone number and insurance Yes     Confirmed Demographics Correct on Facesheet     Source of Information family     Does patient/caregiver understand observation status Yes     Communicated ROSEMARIE with patient/caregiver No     People in Home child(star), adult     Do you expect to return to your current living situation? Yes     Do you have help at home or someone to help you manage your care at home? Yes     Prior to hospitilization cognitive status: Alert/Oriented     Current cognitive status: Alert/Oriented     Walking or Climbing Stairs Difficulty no     Dressing/Bathing Difficulty no     Equipment Currently Used at Home none     Readmission within 30 days? No     Patient currently being followed by outpatient case management? No     Do you currently have service(s) that help you manage your care at home? No      Do you take prescription medications? Yes     Do you have prescription coverage? Yes     Do you have any problems affording any of your prescribed medications? No     Is the patient taking medications as prescribed? yes     Who is going to help you get home at discharge? daughter     How do you get to doctors appointments? car, drives self     Are you on dialysis? No     Discharge Plan A Home with family     Discharge Plan B Home with family     DME Needed Upon Discharge  none     Discharge Plan discussed with: Adult children     Transition of Care Barriers None        Physical Activity    On average, how many days per week do you engage in moderate to strenuous exercise (like a brisk walk)? 0 days     On average, how many minutes do you engage in exercise at this level? 0 min        Financial Resource Strain    How hard is it for you to pay for the very basics like food, housing, medical care, and heating? Somewhat hard        Housing Stability    In the last 12 months, was there a time when you were not able to pay the mortgage or rent on time? No     In the past 12 months, how many times have you moved where you were living? 1     At any time in the past 12 months, were you homeless or living in a shelter (including now)? No        Transportation Needs    In the past 12 months, has lack of transportation kept you from medical appointments or from getting medications? No     In the past 12 months, has lack of transportation kept you from meetings, work, or from getting things needed for daily living? No        Food Insecurity    Within the past 12 months, you worried that your food would run out before you got the money to buy more. Never true     Within the past 12 months, the food you bought just didn't last and you didn't have money to get more. Never true        Stress    Do you feel stress - tense, restless, nervous, or anxious, or unable to sleep at night because your mind is troubled all the time - these  days? To some extent        Social Connections    In a typical week, how many times do you talk on the phone with family, friends, or neighbors? More than three times a week     How often do you get together with friends or relatives? More than three times a week     How often do you attend Adventist or Sikh services? Never     Do you belong to any clubs or organizations such as Adventist groups, unions, fraternal or athletic groups, or school groups? No     How often do you attend meetings of the clubs or organizations you belong to? Never     Are you , , , , never , or living with a partner? Never         Alcohol Use    Q1: How often do you have a drink containing alcohol? Never     Q2: How many drinks containing alcohol do you have on a typical day when you are drinking? Patient does not drink     Q3: How often do you have six or more drinks on one occasion? Never

## 2024-05-09 ENCOUNTER — HOSPITAL ENCOUNTER (OUTPATIENT)
Dept: PREADMISSION TESTING | Facility: OTHER | Age: 89
Discharge: HOME | End: 2024-05-09
Payer: MEDICARE

## 2024-05-10 ENCOUNTER — HOSPITAL ENCOUNTER (OUTPATIENT)
Facility: OTHER | Age: 89
Discharge: HOME | End: 2024-05-10
Attending: INTERNAL MEDICINE | Admitting: INTERNAL MEDICINE
Payer: MEDICARE

## 2024-05-10 VITALS
DIASTOLIC BLOOD PRESSURE: 97 MMHG | SYSTOLIC BLOOD PRESSURE: 147 MMHG | HEART RATE: 54 BPM | RESPIRATION RATE: 18 BRPM | TEMPERATURE: 99 F | OXYGEN SATURATION: 98 %

## 2024-05-10 DIAGNOSIS — I25.110 CORONARY ARTERY DISEASE WITH UNSTABLE ANGINA PECTORIS, UNSPECIFIED VESSEL OR LESION TYPE, UNSPECIFIED WHETHER NATIVE OR TRANSPLANTED HEART: ICD-10-CM

## 2024-05-10 DIAGNOSIS — R07.9 ACUTE CHEST PAIN: Primary | ICD-10-CM

## 2024-05-10 DIAGNOSIS — Z01.818 PREOP TESTING: ICD-10-CM

## 2024-05-10 PROBLEM — I44.1 SECOND DEGREE HEART BLOCK BY ELECTROCARDIOGRAM (ECG): Status: ACTIVE | Noted: 2024-05-10

## 2024-05-10 LAB
ALBUMIN SERPL BCP-MCNC: 3.9 G/DL (ref 3.5–5.2)
ALP SERPL-CCNC: 59 U/L (ref 55–135)
ALT SERPL W/O P-5'-P-CCNC: 19 U/L (ref 10–44)
ANION GAP SERPL CALC-SCNC: 9 MMOL/L (ref 8–16)
AST SERPL-CCNC: 18 U/L (ref 10–40)
BASOPHILS # BLD AUTO: 0.04 K/UL (ref 0–0.2)
BASOPHILS NFR BLD: 1.1 % (ref 0–1.9)
BILIRUB SERPL-MCNC: 0.5 MG/DL (ref 0.1–1)
BUN SERPL-MCNC: 40 MG/DL (ref 8–23)
CALCIUM SERPL-MCNC: 9.5 MG/DL (ref 8.7–10.5)
CHLORIDE SERPL-SCNC: 105 MMOL/L (ref 95–110)
CO2 SERPL-SCNC: 23 MMOL/L (ref 23–29)
CREAT SERPL-MCNC: 2 MG/DL (ref 0.5–1.4)
DIFFERENTIAL METHOD BLD: ABNORMAL
EOSINOPHIL # BLD AUTO: 0.1 K/UL (ref 0–0.5)
EOSINOPHIL NFR BLD: 3.7 % (ref 0–8)
ERYTHROCYTE [DISTWIDTH] IN BLOOD BY AUTOMATED COUNT: 14.3 % (ref 11.5–14.5)
EST. GFR  (NO RACE VARIABLE): 23 ML/MIN/1.73 M^2
GLUCOSE SERPL-MCNC: 97 MG/DL (ref 70–110)
HCT VFR BLD AUTO: 33.4 % (ref 37–48.5)
HGB BLD-MCNC: 10.7 G/DL (ref 12–16)
IMM GRANULOCYTES # BLD AUTO: 0.01 K/UL (ref 0–0.04)
IMM GRANULOCYTES NFR BLD AUTO: 0.3 % (ref 0–0.5)
INR PPP: 0.9 (ref 0.8–1.2)
LYMPHOCYTES # BLD AUTO: 1.3 K/UL (ref 1–4.8)
LYMPHOCYTES NFR BLD: 37.6 % (ref 18–48)
MCH RBC QN AUTO: 29.1 PG (ref 27–31)
MCHC RBC AUTO-ENTMCNC: 32 G/DL (ref 32–36)
MCV RBC AUTO: 91 FL (ref 82–98)
MONOCYTES # BLD AUTO: 0.4 K/UL (ref 0.3–1)
MONOCYTES NFR BLD: 12.4 % (ref 4–15)
NEUTROPHILS # BLD AUTO: 1.6 K/UL (ref 1.8–7.7)
NEUTROPHILS NFR BLD: 44.9 % (ref 38–73)
NRBC BLD-RTO: 0 /100 WBC
OHS QRS DURATION: 84 MS
OHS QTC CALCULATION: 427 MS
PLATELET # BLD AUTO: 168 K/UL (ref 150–450)
PMV BLD AUTO: 10.8 FL (ref 9.2–12.9)
POTASSIUM SERPL-SCNC: 4.6 MMOL/L (ref 3.5–5.1)
PROT SERPL-MCNC: 7.5 G/DL (ref 6–8.4)
PROTHROMBIN TIME: 10.5 SEC (ref 9–12.5)
RBC # BLD AUTO: 3.68 M/UL (ref 4–5.4)
SODIUM SERPL-SCNC: 137 MMOL/L (ref 136–145)
WBC # BLD AUTO: 3.56 K/UL (ref 3.9–12.7)

## 2024-05-10 PROCEDURE — 63600175 PHARM REV CODE 636 W HCPCS: Performed by: INTERNAL MEDICINE

## 2024-05-10 PROCEDURE — C1760 CLOSURE DEV, VASC: HCPCS | Performed by: INTERNAL MEDICINE

## 2024-05-10 PROCEDURE — 93454 CORONARY ARTERY ANGIO S&I: CPT | Performed by: INTERNAL MEDICINE

## 2024-05-10 PROCEDURE — C1769 GUIDE WIRE: HCPCS | Performed by: INTERNAL MEDICINE

## 2024-05-10 PROCEDURE — 36415 COLL VENOUS BLD VENIPUNCTURE: CPT | Performed by: INTERNAL MEDICINE

## 2024-05-10 PROCEDURE — 93005 ELECTROCARDIOGRAM TRACING: CPT

## 2024-05-10 PROCEDURE — 93010 ELECTROCARDIOGRAM REPORT: CPT | Mod: ,,, | Performed by: INTERNAL MEDICINE

## 2024-05-10 PROCEDURE — 85025 COMPLETE CBC W/AUTO DIFF WBC: CPT | Performed by: INTERNAL MEDICINE

## 2024-05-10 PROCEDURE — C1894 INTRO/SHEATH, NON-LASER: HCPCS | Performed by: INTERNAL MEDICINE

## 2024-05-10 PROCEDURE — 25000003 PHARM REV CODE 250: Performed by: INTERNAL MEDICINE

## 2024-05-10 PROCEDURE — 85610 PROTHROMBIN TIME: CPT | Performed by: INTERNAL MEDICINE

## 2024-05-10 PROCEDURE — 80053 COMPREHEN METABOLIC PANEL: CPT | Performed by: INTERNAL MEDICINE

## 2024-05-10 PROCEDURE — 25500020 PHARM REV CODE 255: Performed by: INTERNAL MEDICINE

## 2024-05-10 PROCEDURE — 99152 MOD SED SAME PHYS/QHP 5/>YRS: CPT | Performed by: INTERNAL MEDICINE

## 2024-05-10 RX ORDER — SODIUM CHLORIDE 9 MG/ML
INJECTION, SOLUTION INTRAVENOUS CONTINUOUS
Status: ACTIVE | OUTPATIENT
Start: 2024-05-10 | End: 2024-05-10

## 2024-05-10 RX ORDER — FENTANYL CITRATE 50 UG/ML
INJECTION, SOLUTION INTRAMUSCULAR; INTRAVENOUS
Status: DISCONTINUED | OUTPATIENT
Start: 2024-05-10 | End: 2024-05-10 | Stop reason: HOSPADM

## 2024-05-10 RX ORDER — DIPHENHYDRAMINE HCL 25 MG
25 CAPSULE ORAL
Status: COMPLETED | OUTPATIENT
Start: 2024-05-10 | End: 2024-05-10

## 2024-05-10 RX ORDER — SODIUM CHLORIDE 9 MG/ML
INJECTION, SOLUTION INTRAVENOUS CONTINUOUS
Status: DISCONTINUED | OUTPATIENT
Start: 2024-05-10 | End: 2024-05-10 | Stop reason: HOSPADM

## 2024-05-10 RX ORDER — DAPAGLIFLOZIN 10 MG/1
1 TABLET, FILM COATED ORAL DAILY
COMMUNITY
Start: 2024-03-07

## 2024-05-10 RX ORDER — MIDAZOLAM HYDROCHLORIDE 1 MG/ML
INJECTION INTRAMUSCULAR; INTRAVENOUS
Status: DISCONTINUED | OUTPATIENT
Start: 2024-05-10 | End: 2024-05-10 | Stop reason: HOSPADM

## 2024-05-10 RX ORDER — DIAZEPAM 5 MG/1
5 TABLET ORAL
Status: COMPLETED | OUTPATIENT
Start: 2024-05-10 | End: 2024-05-10

## 2024-05-10 RX ORDER — PANTOPRAZOLE SODIUM 20 MG/1
20 TABLET, DELAYED RELEASE ORAL EVERY MORNING
COMMUNITY
Start: 2024-05-01

## 2024-05-10 RX ORDER — CARVEDILOL 3.12 MG/1
3.12 TABLET ORAL 2 TIMES DAILY
Status: DISCONTINUED | OUTPATIENT
Start: 2024-05-10 | End: 2024-05-10 | Stop reason: HOSPADM

## 2024-05-10 RX ORDER — HYDROCODONE BITARTRATE AND ACETAMINOPHEN 10; 325 MG/1; MG/1
1 TABLET ORAL EVERY 4 HOURS PRN
Status: DISCONTINUED | OUTPATIENT
Start: 2024-05-10 | End: 2024-05-10 | Stop reason: HOSPADM

## 2024-05-10 RX ORDER — DIPHENHYDRAMINE HYDROCHLORIDE 50 MG/ML
25 INJECTION INTRAMUSCULAR; INTRAVENOUS EVERY 6 HOURS PRN
Status: DISCONTINUED | OUTPATIENT
Start: 2024-05-10 | End: 2024-05-10 | Stop reason: HOSPADM

## 2024-05-10 RX ORDER — FINERENONE 10 MG/1
TABLET, FILM COATED ORAL
COMMUNITY

## 2024-05-10 RX ORDER — CLOPIDOGREL BISULFATE 75 MG/1
75 TABLET ORAL DAILY
COMMUNITY

## 2024-05-10 RX ORDER — ALUMINUM HYDROXIDE, MAGNESIUM HYDROXIDE, AND SIMETHICONE 1200; 120; 1200 MG/30ML; MG/30ML; MG/30ML
30 SUSPENSION ORAL
Status: DISCONTINUED | OUTPATIENT
Start: 2024-05-10 | End: 2024-05-10 | Stop reason: HOSPADM

## 2024-05-10 RX ORDER — ONDANSETRON 8 MG/1
8 TABLET, ORALLY DISINTEGRATING ORAL EVERY 8 HOURS PRN
Status: DISCONTINUED | OUTPATIENT
Start: 2024-05-10 | End: 2024-05-10 | Stop reason: HOSPADM

## 2024-05-10 RX ORDER — ACETAMINOPHEN 325 MG/1
650 TABLET ORAL EVERY 4 HOURS PRN
Status: DISCONTINUED | OUTPATIENT
Start: 2024-05-10 | End: 2024-05-10 | Stop reason: HOSPADM

## 2024-05-10 RX ORDER — LIDOCAINE HYDROCHLORIDE 10 MG/ML
INJECTION, SOLUTION EPIDURAL; INFILTRATION; INTRACAUDAL; PERINEURAL
Status: DISCONTINUED | OUTPATIENT
Start: 2024-05-10 | End: 2024-05-10 | Stop reason: HOSPADM

## 2024-05-10 RX ORDER — NITROGLYCERIN 0.4 MG/1
0.4 TABLET SUBLINGUAL EVERY 5 MIN PRN
Status: DISCONTINUED | OUTPATIENT
Start: 2024-05-10 | End: 2024-05-10 | Stop reason: HOSPADM

## 2024-05-10 RX ORDER — HEPARIN SOD,PORCINE/0.9 % NACL 1000/500ML
INTRAVENOUS SOLUTION INTRAVENOUS
Status: DISCONTINUED | OUTPATIENT
Start: 2024-05-10 | End: 2024-05-10 | Stop reason: HOSPADM

## 2024-05-10 RX ORDER — HYDRALAZINE HYDROCHLORIDE 20 MG/ML
INJECTION INTRAMUSCULAR; INTRAVENOUS
Status: DISCONTINUED | OUTPATIENT
Start: 2024-05-10 | End: 2024-05-10 | Stop reason: HOSPADM

## 2024-05-10 RX ORDER — HYDROCODONE BITARTRATE AND ACETAMINOPHEN 5; 325 MG/1; MG/1
1 TABLET ORAL EVERY 4 HOURS PRN
Status: DISCONTINUED | OUTPATIENT
Start: 2024-05-10 | End: 2024-05-10 | Stop reason: HOSPADM

## 2024-05-10 RX ADMIN — SODIUM CHLORIDE: 9 INJECTION, SOLUTION INTRAVENOUS at 11:05

## 2024-05-10 RX ADMIN — DIPHENHYDRAMINE HYDROCHLORIDE 25 MG: 25 CAPSULE ORAL at 11:05

## 2024-05-10 RX ADMIN — DIAZEPAM 5 MG: 5 TABLET ORAL at 11:05

## 2024-05-10 NOTE — DISCHARGE INSTRUCTIONS
Instructions Upon Discharge for Patient Following Cardiac Cath  1. Activity   Following a period of ***Hrs of bedrest in the hospital, you may be discharged by your physician. It is not advisable to drive your self home.    After returning home, you should confine your activities to merely sitting up at mealtime or getting up to use the bathroom, until the following morning.     2. Dressing   When you are sent home from the hospital, you will have a firm dressing on your groin. This should remain dry and intact until the morning after the procedure. If you experience a sensation of warmth, wetness or have blood oozing from the area of the dressing, remove the dressing and apply firm pressure one inch above the entry site.  This pressure should be held continually for 10 to 15 minutes while you have someone call your physician.    Providing you have not experienced any of the complications listed above, the dressing may be removed on the morning after the procedure.  This can best be accomplished by saturating the dressing in the bathtub or shower.  After removing the dressing, a Band-aid can be place at the site.    3. Pain   If you should have any pain in your foot or leg, any swelling in the groin or any abdominal pain, you should contact your physician immediately.    4. Comments          5. Emergency   If you are unable to contact your physician, please report to the nearest Emergency Room.

## 2024-05-10 NOTE — OR NURSING
Called dr. Adams and notified him that patients heart rate has dropped into the forties No new orders received .  Patient on cardiac monitoring

## 2024-05-10 NOTE — DISCHARGE SUMMARY
[facility] - Cath Lab  Cardiology  Discharge Summary      Patient Name: Anahy Evans    Admission Date: 5/10/2024    Discharge Date and Time: 5/10/24      Final Diagnoses: CAD   Principal Problem: Acute chest pain    HPI:  Mrs. Evans has had nocturnal chest pains.  She has had previous stents in her proximal left anterior descending and mid left circumflex coronary arteries.  At angiography in January 2022 she was noted to have a 50% ostial stenosis of the left circumflex coronary artery, and an occluded proximal right coronary artery.  She has had a previous history of having had atrial fibrillation, was on amiodarone and on 3.125 twice a day of carvedilol.  She had a creatinine of 2.0 and a BUN of 40.    Impression on Admission:  Angina pectoris    Hospital Course:  IV fluids were instituted.  At coronary angiography she was noted to have no flow restrictive disease in the left anterior descending and left circumflex coronary artery stents.  The moderate ostial left circumflex stenosis remained unchanged.  The right coronary artery was occluded and had right to right and left-to-right collaterals.  During the angiography she was noted to have type 1 second-degree heart block without any substantial bradycardia.  The amiodarone was discontinued, the carvedilol dose was kept at 3.125 twice a day.  She will have a 30 day monitor as an outpatient.  Coronary artery disease will continue to be treated medically.    Disposition:  Discharged to home    Follow up/Patient Instructions:    Medications:     Medication List        CONTINUE taking these medications      allopurinoL 300 MG tablet  Commonly known as: ZYLOPRIM     amLODIPine 5 MG tablet  Commonly known as: NORVASC     aspirin 81 MG EC tablet  Commonly known as: ECOTRIN     atorvastatin 40 MG tablet  Commonly known as: LIPITOR     azelastine 137 mcg (0.1 %) nasal spray  Commonly known as: ASTELIN     calcium carbonate-vitamin D3 600 mg-5 mcg (200 unit) Cap      cholecalciferol (vitamin D3) 50 mcg (2,000 unit) Cap capsule  Commonly known as: VITAMIN D3     clonazePAM 0.5 MG tablet  Commonly known as: KlonoPIN  Take 1 tablet (0.5 mg total) by mouth every evening.     clopidogreL 75 mg tablet  Commonly known as: PLAVIX     cyanocobalamin 500 MCG tablet     docusate sodium 100 MG capsule  Commonly known as: COLACE  Take 1 capsule (100 mg total) by mouth every 12 (twelve) hours.     FARXIGA 10 mg tablet  Generic drug: dapagliflozin propanediol     folic acid 1 MG tablet  Commonly known as: FOLVITE     KERENDIA 10 mg Tab  Generic drug: finerenone     losartan 25 MG tablet  Commonly known as: COZAAR     nitroGLYCERIN 0.4 MG SL tablet  Commonly known as: NITROSTAT     pantoprazole 20 MG tablet  Commonly known as: PROTONIX     sodium bicarbonate 650 MG tablet            STOP taking these medications      amiodarone 200 MG Tab  Commonly known as: PACERONE     carvediloL 6.25 MG tablet  Commonly known as: COREG             Discharge Procedure Orders   Diet general     Other restrictions (specify):   Scheduling Instructions: Avoid strenuous activity.     Call MD for:  temperature >100.4     Call MD for:  severe uncontrolled pain     Call MD for:  difficulty breathing, headache or visual disturbances     Call MD for:  redness, tenderness, or signs of infection (pain, swelling, redness, odor or green/yellow discharge around incision site)     Call MD for:   Order Comments: Bleeding and oozing at site.     Shower on day dressing removed (No bath)   Scheduling Instructions: Remove dressing in am.         Condition upon Discharge: Good          Asim Canela MD

## 2024-05-11 NOTE — OR NURSING
Patient watching tv.  Respirations regular and unlabored.  Denies any SOB nor any difficulty breathing.  Denies any leg pain to right leg.  No distress noted

## 2025-01-05 ENCOUNTER — HOSPITAL ENCOUNTER (OUTPATIENT)
Facility: OTHER | Age: OVER 89
Discharge: HOME | End: 2025-01-06
Admitting: HOSPITALIST
Payer: MEDICARE

## 2025-01-05 DIAGNOSIS — N17.9 AKI (ACUTE KIDNEY INJURY): ICD-10-CM

## 2025-01-05 DIAGNOSIS — S72.001D CLOSED FRACTURE OF RIGHT HIP WITH ROUTINE HEALING, SUBSEQUENT ENCOUNTER: ICD-10-CM

## 2025-01-05 DIAGNOSIS — R42 VERTIGO: ICD-10-CM

## 2025-01-05 DIAGNOSIS — B34.9 VIRAL SYNDROME: ICD-10-CM

## 2025-01-05 DIAGNOSIS — R79.89 ELEVATED TROPONIN: Primary | ICD-10-CM

## 2025-01-05 DIAGNOSIS — R74.01 TRANSAMINITIS: ICD-10-CM

## 2025-01-05 DIAGNOSIS — R07.9 CHEST PAIN: ICD-10-CM

## 2025-01-05 DIAGNOSIS — R06.02 SHORTNESS OF BREATH: ICD-10-CM

## 2025-01-05 PROBLEM — I71.40 AAA (ABDOMINAL AORTIC ANEURYSM): Status: ACTIVE | Noted: 2019-02-11

## 2025-01-05 PROBLEM — N18.4 CKD (CHRONIC KIDNEY DISEASE), STAGE IV: Chronic | Status: ACTIVE | Noted: 2017-06-29

## 2025-01-05 PROBLEM — E05.90 SUBCLINICAL HYPERTHYROIDISM: Status: ACTIVE | Noted: 2025-01-05

## 2025-01-05 PROBLEM — E55.9 VITAMIN D DEFICIENCY: Chronic | Status: ACTIVE | Noted: 2019-02-11

## 2025-01-05 PROBLEM — R13.10 DYSPHAGIA: Status: ACTIVE | Noted: 2025-01-05

## 2025-01-05 PROBLEM — E53.8 B12 DEFICIENCY: Status: ACTIVE | Noted: 2023-04-18

## 2025-01-05 PROBLEM — M1A.9XX0 CHRONIC GOUT WITHOUT TOPHUS: Status: ACTIVE | Noted: 2025-01-05

## 2025-01-05 PROBLEM — Z79.01 ANTICOAGULANT LONG-TERM USE: Status: ACTIVE | Noted: 2025-01-05

## 2025-01-05 PROBLEM — I72.0 CAROTID ANEURYSM, LEFT: Status: ACTIVE | Noted: 2025-01-05

## 2025-01-05 PROBLEM — D50.9 IRON DEFICIENCY ANEMIA: Status: ACTIVE | Noted: 2023-04-18

## 2025-01-05 LAB
ALBUMIN SERPL BCP-MCNC: 2.7 G/DL (ref 3.5–5.2)
ALP SERPL-CCNC: 115 U/L (ref 40–150)
ALT SERPL W/O P-5'-P-CCNC: 153 U/L (ref 10–44)
ANION GAP SERPL CALC-SCNC: 11 MMOL/L (ref 8–16)
AST SERPL-CCNC: 142 U/L (ref 10–40)
BACTERIA #/AREA URNS HPF: NORMAL /HPF
BASOPHILS # BLD AUTO: 0.03 K/UL (ref 0–0.2)
BASOPHILS NFR BLD: 0.3 % (ref 0–1.9)
BILIRUB SERPL-MCNC: 0.7 MG/DL (ref 0.1–1)
BILIRUB UR QL STRIP: NEGATIVE
BNP SERPL-MCNC: 384 PG/ML (ref 0–99)
BUN SERPL-MCNC: 46 MG/DL (ref 10–30)
CALCIUM SERPL-MCNC: 9.7 MG/DL (ref 8.7–10.5)
CHLORIDE SERPL-SCNC: 98 MMOL/L (ref 95–110)
CLARITY UR: CLEAR
CO2 SERPL-SCNC: 21 MMOL/L (ref 23–29)
COLOR UR: YELLOW
CREAT SERPL-MCNC: 2.4 MG/DL (ref 0.5–1.4)
CTP QC/QA: YES
CTP QC/QA: YES
DIFFERENTIAL METHOD BLD: ABNORMAL
EOSINOPHIL # BLD AUTO: 0.1 K/UL (ref 0–0.5)
EOSINOPHIL NFR BLD: 1.5 % (ref 0–8)
ERYTHROCYTE [DISTWIDTH] IN BLOOD BY AUTOMATED COUNT: 14.1 % (ref 11.5–14.5)
EST. GFR  (NO RACE VARIABLE): 19 ML/MIN/1.73 M^2
GLUCOSE SERPL-MCNC: 110 MG/DL (ref 70–110)
GLUCOSE UR QL STRIP: ABNORMAL
HCT VFR BLD AUTO: 33.8 % (ref 37–48.5)
HGB BLD-MCNC: 11.3 G/DL (ref 12–16)
HGB UR QL STRIP: ABNORMAL
HYALINE CASTS #/AREA URNS LPF: 0 /LPF
IMM GRANULOCYTES # BLD AUTO: 0.07 K/UL (ref 0–0.04)
IMM GRANULOCYTES NFR BLD AUTO: 0.8 % (ref 0–0.5)
KETONES UR QL STRIP: NEGATIVE
LEUKOCYTE ESTERASE UR QL STRIP: ABNORMAL
LYMPHOCYTES # BLD AUTO: 1.8 K/UL (ref 1–4.8)
LYMPHOCYTES NFR BLD: 19.9 % (ref 18–48)
MCH RBC QN AUTO: 29.3 PG (ref 27–31)
MCHC RBC AUTO-ENTMCNC: 33.4 G/DL (ref 32–36)
MCV RBC AUTO: 88 FL (ref 82–98)
MICROSCOPIC COMMENT: NORMAL
MONOCYTES # BLD AUTO: 1.3 K/UL (ref 0.3–1)
MONOCYTES NFR BLD: 13.7 % (ref 4–15)
NEUTROPHILS # BLD AUTO: 5.8 K/UL (ref 1.8–7.7)
NEUTROPHILS NFR BLD: 63.8 % (ref 38–73)
NITRITE UR QL STRIP: NEGATIVE
NRBC BLD-RTO: 0 /100 WBC
OHS QRS DURATION: 68 MS
OHS QRS DURATION: 76 MS
OHS QTC CALCULATION: 398 MS
OHS QTC CALCULATION: 424 MS
PH UR STRIP: 7 [PH] (ref 5–8)
PLATELET # BLD AUTO: 185 K/UL (ref 150–450)
PMV BLD AUTO: 11.7 FL (ref 9.2–12.9)
POC MOLECULAR INFLUENZA A AGN: NEGATIVE
POC MOLECULAR INFLUENZA B AGN: NEGATIVE
POTASSIUM SERPL-SCNC: 4.3 MMOL/L (ref 3.5–5.1)
PROT SERPL-MCNC: 7.5 G/DL (ref 6–8.4)
PROT UR QL STRIP: ABNORMAL
RBC # BLD AUTO: 3.86 M/UL (ref 4–5.4)
RBC #/AREA URNS HPF: 1 /HPF (ref 0–4)
SARS-COV-2 RDRP RESP QL NAA+PROBE: NEGATIVE
SODIUM SERPL-SCNC: 130 MMOL/L (ref 136–145)
SP GR UR STRIP: 1 (ref 1–1.03)
TROPONIN I SERPL DL<=0.01 NG/ML-MCNC: 0.17 NG/ML (ref 0–0.03)
TROPONIN I SERPL DL<=0.01 NG/ML-MCNC: 0.19 NG/ML (ref 0–0.03)
URN SPEC COLLECT METH UR: ABNORMAL
UROBILINOGEN UR STRIP-ACNC: NEGATIVE EU/DL
WBC # BLD AUTO: 9.15 K/UL (ref 3.9–12.7)
WBC #/AREA URNS HPF: 5 /HPF (ref 0–5)

## 2025-01-05 PROCEDURE — G0378 HOSPITAL OBSERVATION PER HR: HCPCS

## 2025-01-05 PROCEDURE — 93005 ELECTROCARDIOGRAM TRACING: CPT

## 2025-01-05 PROCEDURE — 85025 COMPLETE CBC W/AUTO DIFF WBC: CPT

## 2025-01-05 PROCEDURE — 81000 URINALYSIS NONAUTO W/SCOPE: CPT

## 2025-01-05 PROCEDURE — 99285 EMERGENCY DEPT VISIT HI MDM: CPT | Mod: 25

## 2025-01-05 PROCEDURE — 99900035 HC TECH TIME PER 15 MIN (STAT)

## 2025-01-05 PROCEDURE — 94799 UNLISTED PULMONARY SVC/PX: CPT

## 2025-01-05 PROCEDURE — 87635 SARS-COV-2 COVID-19 AMP PRB: CPT

## 2025-01-05 PROCEDURE — 93010 ELECTROCARDIOGRAM REPORT: CPT | Mod: ,,, | Performed by: INTERNAL MEDICINE

## 2025-01-05 PROCEDURE — 83880 ASSAY OF NATRIURETIC PEPTIDE: CPT

## 2025-01-05 PROCEDURE — 84484 ASSAY OF TROPONIN QUANT: CPT | Mod: 91

## 2025-01-05 PROCEDURE — 25000003 PHARM REV CODE 250

## 2025-01-05 PROCEDURE — 25000003 PHARM REV CODE 250: Performed by: PHYSICIAN ASSISTANT

## 2025-01-05 PROCEDURE — 80053 COMPREHEN METABOLIC PANEL: CPT

## 2025-01-05 PROCEDURE — 94761 N-INVAS EAR/PLS OXIMETRY MLT: CPT

## 2025-01-05 RX ORDER — CLOPIDOGREL BISULFATE 75 MG/1
75 TABLET ORAL DAILY
Status: DISCONTINUED | OUTPATIENT
Start: 2025-01-05 | End: 2025-01-06 | Stop reason: HOSPADM

## 2025-01-05 RX ORDER — ASPIRIN 325 MG
325 TABLET ORAL
Status: COMPLETED | OUTPATIENT
Start: 2025-01-05 | End: 2025-01-05

## 2025-01-05 RX ORDER — ACETAMINOPHEN 500 MG
1000 TABLET ORAL EVERY 8 HOURS PRN
Status: DISCONTINUED | OUTPATIENT
Start: 2025-01-05 | End: 2025-01-06 | Stop reason: HOSPADM

## 2025-01-05 RX ORDER — GLUCAGON 1 MG
1 KIT INJECTION
Status: DISCONTINUED | OUTPATIENT
Start: 2025-01-05 | End: 2025-01-06 | Stop reason: HOSPADM

## 2025-01-05 RX ORDER — SIMETHICONE 80 MG
1 TABLET,CHEWABLE ORAL 4 TIMES DAILY PRN
Status: DISCONTINUED | OUTPATIENT
Start: 2025-01-05 | End: 2025-01-06 | Stop reason: HOSPADM

## 2025-01-05 RX ORDER — IBUPROFEN 200 MG
24 TABLET ORAL
Status: DISCONTINUED | OUTPATIENT
Start: 2025-01-05 | End: 2025-01-06 | Stop reason: HOSPADM

## 2025-01-05 RX ORDER — PROCHLORPERAZINE EDISYLATE 5 MG/ML
5 INJECTION INTRAMUSCULAR; INTRAVENOUS EVERY 6 HOURS PRN
Status: DISCONTINUED | OUTPATIENT
Start: 2025-01-05 | End: 2025-01-06 | Stop reason: HOSPADM

## 2025-01-05 RX ORDER — SODIUM BICARBONATE 650 MG/1
650 TABLET ORAL 2 TIMES DAILY
Status: DISCONTINUED | OUTPATIENT
Start: 2025-01-05 | End: 2025-01-06 | Stop reason: HOSPADM

## 2025-01-05 RX ORDER — AMIODARONE HYDROCHLORIDE 200 MG/1
100 TABLET ORAL DAILY
COMMUNITY
End: 2025-01-05

## 2025-01-05 RX ORDER — IBUPROFEN 200 MG
16 TABLET ORAL
Status: DISCONTINUED | OUTPATIENT
Start: 2025-01-05 | End: 2025-01-06 | Stop reason: HOSPADM

## 2025-01-05 RX ORDER — IPRATROPIUM BROMIDE AND ALBUTEROL SULFATE 2.5; .5 MG/3ML; MG/3ML
3 SOLUTION RESPIRATORY (INHALATION) EVERY 4 HOURS PRN
Status: DISCONTINUED | OUTPATIENT
Start: 2025-01-05 | End: 2025-01-06 | Stop reason: HOSPADM

## 2025-01-05 RX ORDER — BISACODYL 10 MG/1
10 SUPPOSITORY RECTAL DAILY PRN
Status: DISCONTINUED | OUTPATIENT
Start: 2025-01-05 | End: 2025-01-06 | Stop reason: HOSPADM

## 2025-01-05 RX ORDER — ALLOPURINOL 100 MG/1
150 TABLET ORAL DAILY
COMMUNITY

## 2025-01-05 RX ORDER — ATORVASTATIN CALCIUM 20 MG/1
40 TABLET, FILM COATED ORAL DAILY
Status: DISCONTINUED | OUTPATIENT
Start: 2025-01-05 | End: 2025-01-06 | Stop reason: HOSPADM

## 2025-01-05 RX ORDER — ASPIRIN 81 MG/1
81 TABLET ORAL DAILY
Status: DISCONTINUED | OUTPATIENT
Start: 2025-01-06 | End: 2025-01-06 | Stop reason: HOSPADM

## 2025-01-05 RX ORDER — ALUMINUM HYDROXIDE, MAGNESIUM HYDROXIDE, AND SIMETHICONE 1200; 120; 1200 MG/30ML; MG/30ML; MG/30ML
30 SUSPENSION ORAL 4 TIMES DAILY PRN
Status: DISCONTINUED | OUTPATIENT
Start: 2025-01-05 | End: 2025-01-06 | Stop reason: HOSPADM

## 2025-01-05 RX ORDER — FOLIC ACID 1 MG/1
1 TABLET ORAL DAILY
Status: DISCONTINUED | OUTPATIENT
Start: 2025-01-05 | End: 2025-01-06 | Stop reason: HOSPADM

## 2025-01-05 RX ORDER — DOCUSATE SODIUM 100 MG
400 CAPSULE ORAL
Status: COMPLETED | OUTPATIENT
Start: 2025-01-05 | End: 2025-01-05

## 2025-01-05 RX ORDER — ACETAMINOPHEN 325 MG/1
650 TABLET ORAL EVERY 4 HOURS PRN
Status: DISCONTINUED | OUTPATIENT
Start: 2025-01-05 | End: 2025-01-06 | Stop reason: HOSPADM

## 2025-01-05 RX ORDER — NALOXONE HCL 0.4 MG/ML
0.02 VIAL (ML) INJECTION
Status: DISCONTINUED | OUTPATIENT
Start: 2025-01-05 | End: 2025-01-06 | Stop reason: HOSPADM

## 2025-01-05 RX ORDER — POLYETHYLENE GLYCOL 3350 17 G/17G
17 POWDER, FOR SOLUTION ORAL DAILY
Status: DISCONTINUED | OUTPATIENT
Start: 2025-01-05 | End: 2025-01-06 | Stop reason: HOSPADM

## 2025-01-05 RX ORDER — ALBUTEROL SULFATE 90 UG/1
1 INHALANT RESPIRATORY (INHALATION) EVERY 4 HOURS PRN
COMMUNITY

## 2025-01-05 RX ORDER — ALLOPURINOL 300 MG/1
150 TABLET ORAL DAILY
Status: DISCONTINUED | OUTPATIENT
Start: 2025-01-05 | End: 2025-01-06 | Stop reason: HOSPADM

## 2025-01-05 RX ORDER — ONDANSETRON 8 MG/1
8 TABLET, ORALLY DISINTEGRATING ORAL EVERY 8 HOURS PRN
Status: DISCONTINUED | OUTPATIENT
Start: 2025-01-05 | End: 2025-01-06 | Stop reason: HOSPADM

## 2025-01-05 RX ORDER — CARVEDILOL 3.12 MG/1
3.12 TABLET ORAL 2 TIMES DAILY
Status: ON HOLD | COMMUNITY
End: 2025-01-05

## 2025-01-05 RX ORDER — SPIRONOLACTONE 25 MG/1
25 TABLET ORAL DAILY
Status: ON HOLD | COMMUNITY
End: 2025-01-05

## 2025-01-05 RX ORDER — TALC
6 POWDER (GRAM) TOPICAL NIGHTLY PRN
Status: DISCONTINUED | OUTPATIENT
Start: 2025-01-05 | End: 2025-01-06 | Stop reason: HOSPADM

## 2025-01-05 RX ORDER — AMLODIPINE BESYLATE 5 MG/1
5 TABLET ORAL DAILY
Status: DISCONTINUED | OUTPATIENT
Start: 2025-01-05 | End: 2025-01-06 | Stop reason: HOSPADM

## 2025-01-05 RX ORDER — SODIUM CHLORIDE 0.9 % (FLUSH) 0.9 %
5 SYRINGE (ML) INJECTION
Status: DISCONTINUED | OUTPATIENT
Start: 2025-01-05 | End: 2025-01-06 | Stop reason: HOSPADM

## 2025-01-05 RX ADMIN — CLOPIDOGREL BISULFATE 75 MG: 75 TABLET ORAL at 03:01

## 2025-01-05 RX ADMIN — ASPIRIN 325 MG ORAL TABLET 325 MG: 325 PILL ORAL at 08:01

## 2025-01-05 RX ADMIN — SODIUM BICARBONATE 650 MG TABLET 650 MG: at 09:01

## 2025-01-05 RX ADMIN — FOLIC ACID 1 MG: 1 TABLET ORAL at 03:01

## 2025-01-05 RX ADMIN — ALLOPURINOL 150 MG: 300 TABLET ORAL at 03:01

## 2025-01-05 RX ADMIN — POLYETHYLENE GLYCOL 3350 17 G: 17 POWDER, FOR SOLUTION ORAL at 12:01

## 2025-01-05 RX ADMIN — ATORVASTATIN CALCIUM 40 MG: 20 TABLET, FILM COATED ORAL at 03:01

## 2025-01-05 RX ADMIN — AMLODIPINE BESYLATE 5 MG: 5 TABLET ORAL at 03:01

## 2025-01-05 RX ADMIN — Medication 400 ML: at 07:01

## 2025-01-05 NOTE — ED NOTES
Lying supine at this time, talking w/ family member at bedside, in good spirits, denies c/o SOB, CP, CM w/ SR / few junctional beats, <6/min.  Sa02 98%.  AAO times 4, DORAN's

## 2025-01-05 NOTE — SUBJECTIVE & OBJECTIVE
Past Medical History:   Diagnosis Date    Anticoagulant long-term use     Cervical cancer 1968    breast cancer right    Coronary artery disease     Gout     High cholesterol     Hypertension     MI (myocardial infarction) 1999       Past Surgical History:   Procedure Laterality Date    BREAST LUMPECTOMY      right    CARDIAC SURGERY      multiple cardiac stents    CORONARY ANGIOGRAPHY Right 1/21/2022    Procedure: ANGIOGRAM, CORONARY ARTERY;  Surgeon: Asim Canela MD;  Location: [facility] CATH LAB;  Service: Cardiology;  Laterality: Right;    CORONARY ANGIOGRAPHY Right 5/10/2024    Procedure: ANGIOGRAM, CORONARY ARTERY POSSIBLE LV COR;  Surgeon: Asim Canela MD;  Location: [facility] CATH LAB;  Service: Cardiology;  Laterality: Right;    CORONARY STENT PLACEMENT      HIP REPLACEMENT ARTHROPLASTY Right 2018    JOINT REPLACEMENT      right       Review of patient's allergies indicates:   Allergen Reactions    Clindamycin Anaphylaxis    Penicillins Rash       No current facility-administered medications on file prior to encounter.     Current Outpatient Medications on File Prior to Encounter   Medication Sig    albuterol (PROVENTIL/VENTOLIN HFA) 90 mcg/actuation inhaler 1 puff every 4 (four) hours as needed for Shortness of Breath or Wheezing.    allopurinoL (ZYLOPRIM) 100 MG tablet Take 150 mg by mouth once daily.    amLODIPine (NORVASC) 5 MG tablet Take 5 mg by mouth once daily.    aspirin (ECOTRIN) 81 MG EC tablet Take 81 mg by mouth once daily.    atorvastatin (LIPITOR) 40 MG tablet Take 40 mg by mouth once daily.    azelastine (ASTELIN) 137 mcg (0.1 %) nasal spray 1 spray by Nasal route 2 (two) times daily.    calcium carbonate-vitamin D3 600 mg calcium- 200 unit Cap Take by mouth.    cholecalciferol, vitamin D3, (VITAMIN D3) 50 mcg (2,000 unit) Cap Take 1 capsule by mouth once daily.    clonazePAM (KLONOPIN) 0.5 MG tablet Take 1 tablet (0.5 mg total) by mouth every evening.    clopidogreL (PLAVIX) 75 mg  tablet Take 75 mg by mouth once daily.    cyanocobalamin 500 MCG tablet Take 500 mcg by mouth once daily.    docusate sodium (COLACE) 100 MG capsule Take 1 capsule (100 mg total) by mouth every 12 (twelve) hours.    FARXIGA 10 mg tablet Take 1 tablet by mouth once daily.    folic acid (FOLVITE) 1 MG tablet Take 1 mg by mouth once daily.    KERENDIA 10 mg Tab Take by mouth.    losartan (COZAAR) 25 MG tablet Take 50 mg by mouth once daily.    nitroGLYCERIN (NITROSTAT) 0.4 MG SL tablet Place 0.4 mg under the tongue every 5 (five) minutes as needed for Chest pain.    pantoprazole (PROTONIX) 20 MG tablet Take 20 mg by mouth every morning.    sodium bicarbonate 650 MG tablet Take 650 mg by mouth 2 (two) times daily.    [DISCONTINUED] allopurinol (ZYLOPRIM) 300 MG tablet Take 300 mg by mouth once daily.    [DISCONTINUED] amiodarone (PACERONE) 200 MG Tab Take 100 mg by mouth once daily.    [DISCONTINUED] carvediloL (COREG) 3.125 MG tablet Take 3.125 mg by mouth 2 (two) times daily.    [DISCONTINUED] spironolactone (ALDACTONE) 25 MG tablet Take 25 mg by mouth once daily.     Family History       Problem Relation (Age of Onset)    Cancer Mother, Father          Tobacco Use    Smoking status: Former    Smokeless tobacco: Never   Substance and Sexual Activity    Alcohol use: Yes     Comment: rare    Drug use: No    Sexual activity: Not Currently     Review of Systems   Constitutional:  Positive for chills, diaphoresis and fatigue. Negative for fever.   HENT:  Negative for congestion and sore throat.    Respiratory:  Positive for shortness of breath. Negative for cough and chest tightness.    Cardiovascular:  Negative for chest pain, palpitations and leg swelling.   Gastrointestinal:  Positive for vomiting. Negative for abdominal pain and nausea.   Genitourinary:  Negative for difficulty urinating and dysuria.   Musculoskeletal:  Negative for arthralgias and back pain.   Skin:  Negative for color change.   Neurological:   Positive for weakness (generalized). Negative for headaches.   Psychiatric/Behavioral:  Negative for agitation and confusion.      Objective:     Vital Signs (Most Recent):  Temp: 98.1 °F (36.7 °C) (01/05/25 1350)  Pulse: 61 (01/05/25 1506)  Resp: 20 (01/05/25 1350)  BP: (!) 154/73 (01/05/25 1350)  SpO2: 99 % (01/05/25 1350) Vital Signs (24h Range):  Temp:  [98.1 °F (36.7 °C)] 98.1 °F (36.7 °C)  Pulse:  [50-82] 61  Resp:  [18-22] 20  SpO2:  [96 %-100 %] 99 %  BP: (119-154)/(61-74) 154/73     Weight: 75.3 kg (166 lb 0.1 oz)  Body mass index is 26 kg/m².     Physical Exam  Constitutional:       General: She is not in acute distress.     Appearance: She is not ill-appearing.   HENT:      Head: Normocephalic and atraumatic.   Eyes:      Extraocular Movements: Extraocular movements intact.   Cardiovascular:      Rate and Rhythm: Normal rate and regular rhythm.   Pulmonary:      Effort: Pulmonary effort is normal. No respiratory distress.      Breath sounds: No wheezing or rales.   Abdominal:      General: Bowel sounds are normal. There is no distension.      Palpations: Abdomen is soft.      Tenderness: There is no abdominal tenderness.   Musculoskeletal:      Right lower leg: No edema.      Left lower leg: No edema.   Skin:     General: Skin is warm and dry.   Neurological:      General: No focal deficit present.      Mental Status: She is alert.   Psychiatric:         Mood and Affect: Mood normal.         Behavior: Behavior normal.                Significant Labs: All pertinent labs within the past 24 hours have been reviewed.    Significant Imaging: I have reviewed all pertinent imaging results/findings within the past 24 hours.

## 2025-01-05 NOTE — ASSESSMENT & PLAN NOTE
-Followed by Dr. Canela  -Was on Amiodarone 100mg every day in evening, but due to recent 1oAVB was discontinued on 5/1-/24.  -Also no longer on Coreg 3.125 mg bid  -ON ASA/ PLavix, not OAC

## 2025-01-05 NOTE — HPI
Anahy Evans is an 86F with history of MI ( prior stents in her proximal LAD, mid left circumflex coronary arteries), HTN, HLD, CKD 4, and gout who presents for fatigue and shortness of breath for over a week.  Two days before Mahamed she took her 7-year-old grandson on a 10 hour train ride to [place].  During the ride, she sat under an A/C vent in the train and could not warm up.  Ever since that train ride she has felt fatigued and short of breath especially with exertion.  Before returning home she did report an upset stomach that improved with Pepto-Bismol.  She returned home about a week ago, but has spent the majority of the week in her bed.  She felt certain that she had the flu.  She has been taking her medications as prescribed.  No changes in her bowel habits, but she did have 2 episodes of vomiting.  At baseline she is active, drives and cooks.  Currently lives with her daughter.    In ER: afebrile without leukocytosis, maintaining oxygen sats on room air, BP stable 119-154 systolic; CMP with Na 130, Cr 2.4 (BL around 2.0), AST//153; , troponin 0.188, 0.175; CXR unremarkable; COVID and FLU negative. ER discussed with patient's cardiologist Dr. Canela who felt patient was stable to dc from his perspective, but admitted due to weakness and difficulty with ADLs

## 2025-01-05 NOTE — ASSESSMENT & PLAN NOTE
Patient's blood pressure range in the last 24 hours was: BP  Min: 119/61  Max: 154/73.The patient's inpatient anti-hypertensive regimen is listed below:  Current Antihypertensives  amLODIPine tablet 5 mg, Daily, Oral    In the past has been on   On Amlodipine 10 mg, Losartan to 25 mg and Kerendia 10mg daily and Coreg 12.5mg   Currently she just on amlodipine and losartan, she did take lasix for a few days in the beginning of December for hypervolemia  Will hold losartan for now given stable BP and slight increase in Cr

## 2025-01-05 NOTE — ED PROVIDER NOTES
Encounter Date: 1/5/2025       History     Chief Complaint   Patient presents with    Shortness of Breath     Reports SOB and cough x 4-5 days. Feels fatigued. HX bronchitis.       89 y.o. female with History of AFib on amiodarone and Plavix, hypertension, CKD, vertigo, GERD, hypothyroidism, MI who presents to the ED for generalized malaise, myalgias, cough for 1 week.  Three days ago had nausea with 2 episodes of vomiting that has since resolved.  She also reports burning with urination, is baseline incontinent of urine.  No diarrhea, abdominal pain, chest pain.  States she took Pepto-Bismol several days ago.  Denies fevers, lower extremity swelling.    The history is provided by the patient.     Review of patient's allergies indicates:   Allergen Reactions    Clindamycin Anaphylaxis    Penicillins Rash     Past Medical History:   Diagnosis Date    Anticoagulant long-term use     Cervical cancer 1968    breast cancer right    Coronary artery disease     Gout     High cholesterol     Hypertension     MI (myocardial infarction) 1999     Past Surgical History:   Procedure Laterality Date    BREAST LUMPECTOMY      right    CARDIAC SURGERY      multiple cardiac stents    CORONARY ANGIOGRAPHY Right 1/21/2022    Procedure: ANGIOGRAM, CORONARY ARTERY;  Surgeon: Asim Canela MD;  Location: [facility] CATH LAB;  Service: Cardiology;  Laterality: Right;    CORONARY ANGIOGRAPHY Right 5/10/2024    Procedure: ANGIOGRAM, CORONARY ARTERY POSSIBLE LV COR;  Surgeon: Asim Canela MD;  Location: [facility] CATH LAB;  Service: Cardiology;  Laterality: Right;    CORONARY STENT PLACEMENT      HIP REPLACEMENT ARTHROPLASTY Right 2018    JOINT REPLACEMENT      right     Family History   Problem Relation Name Age of Onset    Cancer Mother      Cancer Father       Social History     Tobacco Use    Smoking status: Former    Smokeless tobacco: Never   Substance Use Topics    Alcohol use: Yes     Comment: rare    Drug use: No     Review of  Systems    Physical Exam     Initial Vitals [01/05/25 0618]   BP Pulse Resp Temp SpO2   137/66 82 20 98.1 °F (36.7 °C) 100 %      MAP       --         Physical Exam    Nursing note and vitals reviewed.  Constitutional: She is not diaphoretic. No distress.   Elderly male in no acute distress   HENT:   Head: Normocephalic and atraumatic.   Eyes: Conjunctivae are normal.   Cardiovascular:  Normal rate and regular rhythm.           Pulmonary/Chest: No respiratory distress. She has no wheezes. She has no rhonchi. She has no rales.   Abdominal: Abdomen is soft. She exhibits no distension. There is no abdominal tenderness. There is no rebound and no guarding.   Musculoskeletal:         General: No edema. Normal range of motion.     Neurological: She is alert.   Alert, answers questions appropriately   Skin: Skin is warm and dry.   Psychiatric: She has a normal mood and affect. Thought content normal.         ED Course   Procedures  Labs Reviewed   CBC W/ AUTO DIFFERENTIAL - Abnormal       Result Value    WBC 9.15      RBC 3.86 (*)     Hemoglobin 11.3 (*)     Hematocrit 33.8 (*)     MCV 88      MCH 29.3      MCHC 33.4      RDW 14.1      Platelets 185      MPV 11.7      Immature Granulocytes 0.8 (*)     Gran # (ANC) 5.8      Immature Grans (Abs) 0.07 (*)     Lymph # 1.8      Mono # 1.3 (*)     Eos # 0.1      Baso # 0.03      nRBC 0      Gran % 63.8      Lymph % 19.9      Mono % 13.7      Eosinophil % 1.5      Basophil % 0.3      Differential Method Automated     COMPREHENSIVE METABOLIC PANEL - Abnormal    Sodium 130 (*)     Potassium 4.3      Chloride 98      CO2 21 (*)     Glucose 110      BUN 46 (*)     Creatinine 2.4 (*)     Calcium 9.7      Total Protein 7.5      Albumin 2.7 (*)     Total Bilirubin 0.7      Alkaline Phosphatase 115       (*)      (*)     eGFR 19 (*)     Anion Gap 11     TROPONIN I - Abnormal    Troponin I 0.188 (*)    B-TYPE NATRIURETIC PEPTIDE - Abnormal     (*)    URINALYSIS,  REFLEX TO URINE CULTURE - Abnormal    Specimen UA Urine, Clean Catch      Color, UA Yellow      Appearance, UA Clear      pH, UA 7.0      Specific Gravity, UA 1.005      Protein, UA 2+ (*)     Glucose, UA 2+ (*)     Ketones, UA Negative      Bilirubin (UA) Negative      Occult Blood UA 1+ (*)     Nitrite, UA Negative      Urobilinogen, UA Negative      Leukocytes, UA 3+ (*)     Narrative:     Specimen Source->Urine   URINALYSIS MICROSCOPIC    RBC, UA 1      WBC, UA 5      Bacteria Rare      Hyaline Casts, UA 0      Microscopic Comment SEE COMMENT      Narrative:     Specimen Source->Urine   TROPONIN I   SARS-COV-2 RDRP GENE    POC Rapid COVID Negative       Acceptable Yes     POCT INFLUENZA A/B MOLECULAR    POC Molecular Influenza A Ag Negative      POC Molecular Influenza B Ag Negative       Acceptable Yes       EKG Readings: (Independently Interpreted)   Initial Reading: No STEMI. Previous EKG: Compared with most recent EKG Rhythm: Junctional Rhythm. Heart Rate: 52. ST Segments: Normal ST Segments. T Waves: Normal.       Imaging Results              X-Ray Chest AP Portable (Final result)  Result time 01/05/25 07:06:35      Final result by Fazal Clark MD (01/05/25 07:06:35)                   Impression:      No acute abnormality.      Electronically signed by: Fazal Clark MD  Date:    01/05/2025  Time:    07:06               Narrative:    EXAMINATION:  XR CHEST AP PORTABLE    CLINICAL HISTORY:  CHF;    TECHNIQUE:  Single frontal view of the chest was performed.    COMPARISON:  04/24/2024    FINDINGS:  Atherosclerotic change within the aorta.The lungs are clear with normal appearance of pulmonary vasculature. No pleural effusion. No evident pneumothorax.    The cardiac silhouette is normal in size. The hilar and mediastinal contours are unremarkable.    Bones are intact.                                       Medications   electrolytes-dextrose (Pedialyte) oral solution 400  mL (400 mLs Oral Given 1/5/25 0724)   aspirin tablet 325 mg (325 mg Oral Given 1/5/25 0844)     Medical Decision Making   89 y.o. female with History of AFib on amiodarone and Plavix, hypertension, CKD, vertigo, GERD, hypothyroidism, MI who presents to the ED for generalized malaise, myalgias, cough for 1 week.  She presents afebrile, nontoxic appearing, satting normally on room air.  Lungs clear to auscultation bilaterally.  Hemodynamically stable.  Abdomen benign on exam.  Given cardiac history ACS/congestive heart failure work workup initiated in addition to urinalysis and viral swabs.  Suspect viral syndrome in this patient, however given comorbidities will rule out MELIDA, NSTEMI, CHF exacerbation, bacterial pneumonia, UTI/pyelonephritis. See ED course.    Amount and/or Complexity of Data Reviewed  Labs: ordered. Decision-making details documented in ED Course.  Radiology: ordered. Decision-making details documented in ED Course.    Risk  OTC drugs.               ED Course as of 01/05/25 1124   Sun Jan 05, 2025   0657 CBC auto differential(!)  CBC is grossly unremarkable.  No leukocytosis.  Chronic, stable anemia.  [KL]   0728 Troponin I(!): 0.188  Patient denies any active chest pain.  EKGs without ischemic changes. [KL]   0728 Creatinine(!): 2.4 [KL]   0728 BUN(!): 46  Worsening of patient's chronic kidney disease. [KL]   0738 BNP(!): 384  Concern for congestive heart failure exacerbation. [KL]   0738 X-Ray Chest AP Portable  CXR shows no acute disease per my independent interpretation.  No focal consolidation to raise suspicion for bacterial pneumonia. [KL]   0739 POCT COVID-19 Rapid Screening [KL]   0739 POCT Influenza A/B Molecular  COVID and influenza negative. [KL]   0818 1 cc kenalog celestone.  [KL]   0820 Discussed with Dr. Canela, pt cardiologist.  Reviewed all labs including elevated troponin.  Felt that patient should be stable for discharge if family felt comfortable.  I plan will be to trend the  patient's troponin, orally hydrate.  Review urinalysis for signs of infection.  I would likely recommend observation possibly in ED OU were with hospital medicine overnight for the patient since she is 89 with comorbidities and some derangements in her labs from baseline.  Will do shared decision-making with family. [KL]   0850 Bacteria, UA: Rare [KL]   0850 WBC, UA: 5  Urinalysis not consistent with infection. [KL]   0944 Patient reassessed.  No new complaints.  Discussed recommendation for observation and she happy with this plan. [KL]   1124 Discussed case with Hospital Medicine. Plan for observation in the hospital for continued monitoring, evaluation and treatment.  Repeat troponin pending at the time of observation. To be followed by inpatient team.  [KL]      ED Course User Index  [KL] Mayi Dunlap MD                           Clinical Impression:  Final diagnoses:  [R06.02] Shortness of breath  [R79.89] Elevated troponin (Primary)  [N17.9] MELIDA (acute kidney injury)  [R74.01] Transaminitis  [B34.9] Viral syndrome          ED Disposition Condition    Observation Stable                Mayi Dunlap MD  01/05/25 4260

## 2025-01-05 NOTE — PLAN OF CARE
Received patient from ED. POC reviewed with the patient. AOX4 VSS on RA. No further complaints noted. Purposeful rounding done. Safety precautions maintained.     Problem: Skin Injury Risk Increased  Goal: Skin Health and Integrity  Outcome: Progressing     Problem: Adult Inpatient Plan of Care  Goal: Plan of Care Review  Outcome: Progressing  Goal: Patient-Specific Goal (Individualized)  Outcome: Progressing  Goal: Absence of Hospital-Acquired Illness or Injury  Outcome: Progressing  Goal: Optimal Comfort and Wellbeing  Outcome: Progressing  Goal: Readiness for Transition of Care  Outcome: Progressing     Problem: Infection  Goal: Absence of Infection Signs and Symptoms  Outcome: Progressing     Problem: Acute Kidney Injury/Impairment  Goal: Fluid and Electrolyte Balance  Outcome: Progressing  Goal: Improved Oral Intake  Outcome: Progressing  Goal: Effective Renal Function  Outcome: Progressing     Problem: Wound  Goal: Optimal Coping  Outcome: Progressing  Goal: Optimal Functional Ability  Outcome: Progressing  Goal: Absence of Infection Signs and Symptoms  Outcome: Progressing  Goal: Improved Oral Intake  Outcome: Progressing  Goal: Optimal Pain Control and Function  Outcome: Progressing  Goal: Skin Health and Integrity  Outcome: Progressing  Goal: Optimal Wound Healing  Outcome: Progressing     Problem: Fall Injury Risk  Goal: Absence of Fall and Fall-Related Injury  Outcome: Progressing

## 2025-01-05 NOTE — ASSESSMENT & PLAN NOTE
"Anahy Evans presents for dyspnea on exertion, lethargy after feeling like she "had the flu" for over a week. Found to have slight increase in creatinine     - no signs of infection on admission: afebrile without leukocytosis  - COVID/Flu negative  - CXR unremarkable  - UA unremarkable  -  and troponin slightly elevated but flat in setting of CKD   - states she usually drives and cooks for herself and ambulates sometimes with a cane, but has been mostly bedbound this week  - PTOT consulted   - will continue to trend labs    "

## 2025-01-05 NOTE — H&P
[facility] [address] Medicine  History & Physical    Patient Name: Anahy Evans  MRN: [id number]  Patient Class: OP- Observation  Admission Date: 1/5/2025  Attending Physician: Ryan Basilio MD   Primary Care Provider: Elena Cabrera MD         Patient information was obtained from patient, past medical records, and ER records.     Subjective:     Principal Problem:Viral illness    Chief Complaint:   Chief Complaint   Patient presents with    Shortness of Breath     Reports SOB and cough x 4-5 days. Feels fatigued. HX bronchitis.         HPI: Anahy Evans is an 86F with history of MI ( prior stents in her proximal LAD, mid left circumflex coronary arteries), HTN, HLD, CKD 4, and gout who presents for fatigue and shortness of breath for over a week.  Two days before [place] she took her 7-year-old grandson on a 10 hour train ride to [place].  During the ride, she sat under an A/C vent in the train and could not warm up.  Ever since that train ride she has felt fatigued and short of breath especially with exertion.  Before returning home she did report an upset stomach that improved with Pepto-Bismol.  She returned home about a week ago, but has spent the majority of the week in her bed.  She felt certain that she had the flu.  She has been taking her medications as prescribed.  No changes in her bowel habits, but she did have 2 episodes of vomiting.  At baseline she is active, drives and cooks.  Currently lives with her daughter.    In ER: afebrile without leukocytosis, maintaining oxygen sats on room air, BP stable 119-154 systolic; CMP with Na 130, Cr 2.4 (BL around 2.0), AST//153; , troponin 0.188, 0.175; CXR unremarkable; COVID and FLU negative. ER discussed with patient's cardiologist Dr. Canela who felt patient was stable to dc from his perspective, but admitted due to weakness and difficulty with ADLs      Past Medical History:   Diagnosis Date     Anticoagulant long-term use     Cervical cancer 1968    breast cancer right    Coronary artery disease     Gout     High cholesterol     Hypertension     MI (myocardial infarction) 1999       Past Surgical History:   Procedure Laterality Date    BREAST LUMPECTOMY      right    CARDIAC SURGERY      multiple cardiac stents    CORONARY ANGIOGRAPHY Right 1/21/2022    Procedure: ANGIOGRAM, CORONARY ARTERY;  Surgeon: Asim Canela MD;  Location: [facility] CATH LAB;  Service: Cardiology;  Laterality: Right;    CORONARY ANGIOGRAPHY Right 5/10/2024    Procedure: ANGIOGRAM, CORONARY ARTERY POSSIBLE LV COR;  Surgeon: Asim Canela MD;  Location: [facility] CATH LAB;  Service: Cardiology;  Laterality: Right;    CORONARY STENT PLACEMENT      HIP REPLACEMENT ARTHROPLASTY Right 2018    JOINT REPLACEMENT      right       Review of patient's allergies indicates:   Allergen Reactions    Clindamycin Anaphylaxis    Penicillins Rash       No current facility-administered medications on file prior to encounter.     Current Outpatient Medications on File Prior to Encounter   Medication Sig    albuterol (PROVENTIL/VENTOLIN HFA) 90 mcg/actuation inhaler 1 puff every 4 (four) hours as needed for Shortness of Breath or Wheezing.    allopurinoL (ZYLOPRIM) 100 MG tablet Take 150 mg by mouth once daily.    amLODIPine (NORVASC) 5 MG tablet Take 5 mg by mouth once daily.    aspirin (ECOTRIN) 81 MG EC tablet Take 81 mg by mouth once daily.    atorvastatin (LIPITOR) 40 MG tablet Take 40 mg by mouth once daily.    azelastine (ASTELIN) 137 mcg (0.1 %) nasal spray 1 spray by Nasal route 2 (two) times daily.    calcium carbonate-vitamin D3 600 mg calcium- 200 unit Cap Take by mouth.    cholecalciferol, vitamin D3, (VITAMIN D3) 50 mcg (2,000 unit) Cap Take 1 capsule by mouth once daily.    clonazePAM (KLONOPIN) 0.5 MG tablet Take 1 tablet (0.5 mg total) by mouth every evening.    clopidogreL (PLAVIX) 75 mg tablet Take 75 mg by mouth once daily.     cyanocobalamin 500 MCG tablet Take 500 mcg by mouth once daily.    docusate sodium (COLACE) 100 MG capsule Take 1 capsule (100 mg total) by mouth every 12 (twelve) hours.    FARXIGA 10 mg tablet Take 1 tablet by mouth once daily.    folic acid (FOLVITE) 1 MG tablet Take 1 mg by mouth once daily.    KERENDIA 10 mg Tab Take by mouth.    losartan (COZAAR) 25 MG tablet Take 50 mg by mouth once daily.    nitroGLYCERIN (NITROSTAT) 0.4 MG SL tablet Place 0.4 mg under the tongue every 5 (five) minutes as needed for Chest pain.    pantoprazole (PROTONIX) 20 MG tablet Take 20 mg by mouth every morning.    sodium bicarbonate 650 MG tablet Take 650 mg by mouth 2 (two) times daily.    [DISCONTINUED] allopurinol (ZYLOPRIM) 300 MG tablet Take 300 mg by mouth once daily.    [DISCONTINUED] amiodarone (PACERONE) 200 MG Tab Take 100 mg by mouth once daily.    [DISCONTINUED] carvediloL (COREG) 3.125 MG tablet Take 3.125 mg by mouth 2 (two) times daily.    [DISCONTINUED] spironolactone (ALDACTONE) 25 MG tablet Take 25 mg by mouth once daily.     Family History       Problem Relation (Age of Onset)    Cancer Mother, Father          Tobacco Use    Smoking status: Former    Smokeless tobacco: Never   Substance and Sexual Activity    Alcohol use: Yes     Comment: rare    Drug use: No    Sexual activity: Not Currently     Review of Systems   Constitutional:  Positive for chills, diaphoresis and fatigue. Negative for fever.   HENT:  Negative for congestion and sore throat.    Respiratory:  Positive for shortness of breath. Negative for cough and chest tightness.    Cardiovascular:  Negative for chest pain, palpitations and leg swelling.   Gastrointestinal:  Positive for vomiting. Negative for abdominal pain and nausea.   Genitourinary:  Negative for difficulty urinating and dysuria.   Musculoskeletal:  Negative for arthralgias and back pain.   Skin:  Negative for color change.   Neurological:  Positive for weakness (generalized). Negative for  "headaches.   Psychiatric/Behavioral:  Negative for agitation and confusion.      Objective:     Vital Signs (Most Recent):  Temp: 98.1 °F (36.7 °C) (01/05/25 1350)  Pulse: 61 (01/05/25 1506)  Resp: 20 (01/05/25 1350)  BP: (!) 154/73 (01/05/25 1350)  SpO2: 99 % (01/05/25 1350) Vital Signs (24h Range):  Temp:  [98.1 °F (36.7 °C)] 98.1 °F (36.7 °C)  Pulse:  [50-82] 61  Resp:  [18-22] 20  SpO2:  [96 %-100 %] 99 %  BP: (119-154)/(61-74) 154/73     Weight: 75.3 kg (166 lb 0.1 oz)  Body mass index is 26 kg/m².     Physical Exam  Constitutional:       General: She is not in acute distress.     Appearance: She is not ill-appearing.   HENT:      Head: Normocephalic and atraumatic.   Eyes:      Extraocular Movements: Extraocular movements intact.   Cardiovascular:      Rate and Rhythm: Normal rate and regular rhythm.   Pulmonary:      Effort: Pulmonary effort is normal. No respiratory distress.      Breath sounds: No wheezing or rales.   Abdominal:      General: Bowel sounds are normal. There is no distension.      Palpations: Abdomen is soft.      Tenderness: There is no abdominal tenderness.   Musculoskeletal:      Right lower leg: No edema.      Left lower leg: No edema.   Skin:     General: Skin is warm and dry.   Neurological:      General: No focal deficit present.      Mental Status: She is alert.   Psychiatric:         Mood and Affect: Mood normal.         Behavior: Behavior normal.                Significant Labs: All pertinent labs within the past 24 hours have been reviewed.    Significant Imaging: I have reviewed all pertinent imaging results/findings within the past 24 hours.  Assessment/Plan:     * Viral illness  Anahy Evans presents for dyspnea on exertion, lethargy after feeling like she "had the flu" for over a week. Found to have slight increase in creatinine     - no signs of infection on admission: afebrile without leukocytosis  - COVID/Flu negative  - CXR unremarkable  - UA unremarkable  -  and " troponin slightly elevated but flat in setting of CKD   - states she usually drives and cooks for herself and ambulates sometimes with a cane, but has been mostly bedbound this week  - PTOT consulted   - will continue to trend labs      CAD (coronary artery disease)  Follows with cardiology, Dr. Canela  Continue home  ASA, plavix, statin  Troponin slightly elevated but flat     CKD (chronic kidney disease), stage IV  Follows with Dr. Valerio  Baseline as of recently around 2.0  Cr 2.4 today, suspect most likely from hypovolemia from vomiting/poor PO intake   Holding losartan for now  Strict I/Os  Encouraged PO intake  Trend on AM labs    Essential hypertension  Patient's blood pressure range in the last 24 hours was: BP  Min: 119/61  Max: 154/73.The patient's inpatient anti-hypertensive regimen is listed below:  Current Antihypertensives  amLODIPine tablet 5 mg, Daily, Oral    In the past has been on   On Amlodipine 10 mg, Losartan to 25 mg and Kerendia 10mg daily and Coreg 12.5mg   Currently she just on amlodipine and losartan, she did take lasix for a few days in the beginning of December for hypervolemia  Will hold losartan for now given stable BP and slight increase in Cr    PAF (paroxysmal atrial fibrillation)  -Followed by Dr. Canela  -Was on Amiodarone 100mg every day in evening, but due to recent 1oAVB was discontinued on 5/1-/24.  -Also no longer on Coreg 3.125 mg bid  -ON ASA/ PLavix, not OAC      VTE Risk Mitigation (From admission, onward)           Ordered     IP VTE HIGH RISK PATIENT  Once         01/05/25 1200                         On 01/05/2025, patient should be placed in hospital observation services under my care in collaboration with Dr Tim Murphy PA-C  Department of Hospital Medicine  [facility] ([address])

## 2025-01-05 NOTE — ED NOTES
Remains lying on left lateral, AAO times 4, updated on plan of care, awaiting room assignment, HONEY w/ SB-SR without ectopy at this time, side rails up times 2, call light near left hand

## 2025-01-05 NOTE — ASSESSMENT & PLAN NOTE
Follows with cardiology, Dr. Canela  Continue home  ASA, plavix, statin  Troponin slightly elevated but flat

## 2025-01-05 NOTE — ASSESSMENT & PLAN NOTE
Follows with Dr. Valerio  Baseline as of recently around 2.0  Cr 2.4 today, suspect most likely from hypovolemia from vomiting/poor PO intake   Holding losartan for now  Strict I/Os  Encouraged PO intake  Trend on AM labs

## 2025-01-06 VITALS
DIASTOLIC BLOOD PRESSURE: 62 MMHG | HEART RATE: 68 BPM | BODY MASS INDEX: 26.06 KG/M2 | WEIGHT: 166 LBS | SYSTOLIC BLOOD PRESSURE: 133 MMHG | TEMPERATURE: 98 F | RESPIRATION RATE: 18 BRPM | OXYGEN SATURATION: 96 % | HEIGHT: 67 IN

## 2025-01-06 LAB
ALBUMIN SERPL BCP-MCNC: 2.4 G/DL (ref 3.5–5.2)
ALP SERPL-CCNC: 108 U/L (ref 40–150)
ALT SERPL W/O P-5'-P-CCNC: 103 U/L (ref 10–44)
ANION GAP SERPL CALC-SCNC: 9 MMOL/L (ref 8–16)
AST SERPL-CCNC: 73 U/L (ref 10–40)
BASOPHILS # BLD AUTO: 0.03 K/UL (ref 0–0.2)
BASOPHILS NFR BLD: 0.4 % (ref 0–1.9)
BILIRUB SERPL-MCNC: 0.6 MG/DL (ref 0.1–1)
BUN SERPL-MCNC: 43 MG/DL (ref 10–30)
CALCIUM SERPL-MCNC: 9.2 MG/DL (ref 8.7–10.5)
CHLORIDE SERPL-SCNC: 103 MMOL/L (ref 95–110)
CO2 SERPL-SCNC: 22 MMOL/L (ref 23–29)
CREAT SERPL-MCNC: 2.3 MG/DL (ref 0.5–1.4)
DIFFERENTIAL METHOD BLD: ABNORMAL
EOSINOPHIL # BLD AUTO: 0.2 K/UL (ref 0–0.5)
EOSINOPHIL NFR BLD: 2 % (ref 0–8)
ERYTHROCYTE [DISTWIDTH] IN BLOOD BY AUTOMATED COUNT: 14.2 % (ref 11.5–14.5)
EST. GFR  (NO RACE VARIABLE): 20 ML/MIN/1.73 M^2
GLUCOSE SERPL-MCNC: 143 MG/DL (ref 70–110)
HCT VFR BLD AUTO: 30.8 % (ref 37–48.5)
HGB BLD-MCNC: 10.4 G/DL (ref 12–16)
IMM GRANULOCYTES # BLD AUTO: 0.04 K/UL (ref 0–0.04)
IMM GRANULOCYTES NFR BLD AUTO: 0.5 % (ref 0–0.5)
LYMPHOCYTES # BLD AUTO: 1.1 K/UL (ref 1–4.8)
LYMPHOCYTES NFR BLD: 14.6 % (ref 18–48)
MCH RBC QN AUTO: 29.6 PG (ref 27–31)
MCHC RBC AUTO-ENTMCNC: 33.8 G/DL (ref 32–36)
MCV RBC AUTO: 88 FL (ref 82–98)
MONOCYTES # BLD AUTO: 0.7 K/UL (ref 0.3–1)
MONOCYTES NFR BLD: 9.9 % (ref 4–15)
NEUTROPHILS # BLD AUTO: 5.4 K/UL (ref 1.8–7.7)
NEUTROPHILS NFR BLD: 72.6 % (ref 38–73)
NRBC BLD-RTO: 0 /100 WBC
PLATELET # BLD AUTO: 205 K/UL (ref 150–450)
PMV BLD AUTO: 11.9 FL (ref 9.2–12.9)
POTASSIUM SERPL-SCNC: 4.1 MMOL/L (ref 3.5–5.1)
PROT SERPL-MCNC: 6.7 G/DL (ref 6–8.4)
RBC # BLD AUTO: 3.51 M/UL (ref 4–5.4)
SODIUM SERPL-SCNC: 134 MMOL/L (ref 136–145)
WBC # BLD AUTO: 7.46 K/UL (ref 3.9–12.7)

## 2025-01-06 PROCEDURE — 36415 COLL VENOUS BLD VENIPUNCTURE: CPT | Performed by: PHYSICIAN ASSISTANT

## 2025-01-06 PROCEDURE — 97535 SELF CARE MNGMENT TRAINING: CPT

## 2025-01-06 PROCEDURE — 97166 OT EVAL MOD COMPLEX 45 MIN: CPT

## 2025-01-06 PROCEDURE — G0378 HOSPITAL OBSERVATION PER HR: HCPCS

## 2025-01-06 PROCEDURE — 97162 PT EVAL MOD COMPLEX 30 MIN: CPT

## 2025-01-06 PROCEDURE — 25000003 PHARM REV CODE 250: Performed by: PHYSICIAN ASSISTANT

## 2025-01-06 PROCEDURE — 80053 COMPREHEN METABOLIC PANEL: CPT | Performed by: PHYSICIAN ASSISTANT

## 2025-01-06 PROCEDURE — 85025 COMPLETE CBC W/AUTO DIFF WBC: CPT | Performed by: PHYSICIAN ASSISTANT

## 2025-01-06 RX ADMIN — ALLOPURINOL 150 MG: 300 TABLET ORAL at 08:01

## 2025-01-06 RX ADMIN — SODIUM BICARBONATE 650 MG TABLET 650 MG: at 08:01

## 2025-01-06 RX ADMIN — FOLIC ACID 1 MG: 1 TABLET ORAL at 08:01

## 2025-01-06 RX ADMIN — ASPIRIN 81 MG: 81 TABLET, COATED ORAL at 08:01

## 2025-01-06 RX ADMIN — POLYETHYLENE GLYCOL 3350 17 G: 17 POWDER, FOR SOLUTION ORAL at 08:01

## 2025-01-06 RX ADMIN — ATORVASTATIN CALCIUM 40 MG: 20 TABLET, FILM COATED ORAL at 08:01

## 2025-01-06 RX ADMIN — CLOPIDOGREL BISULFATE 75 MG: 75 TABLET ORAL at 08:01

## 2025-01-06 NOTE — PLAN OF CARE
Sw spoke with patient at bedside. Patient is agreeable to SNF. I provided the patient a choice of post acute providers and offered a list of CMS rated SNF.  Patient has declined to select a preferred provider and elects placement with the first accepting in network provider that is available to provide services as ordered by the physician.     Sw sent referrals.     Sw called and faxed in patient's locet.

## 2025-01-06 NOTE — PLAN OF CARE
[facility] ([address])      HOME HEALTH ORDERS  FACE TO FACE ENCOUNTER    Patient Name: Anahy Evans  YOB: 1933    PCP: Elena Cabrera MD   PCP Address: [address] [place] [address]  PCP Phone Number: [phone]  PCP Fax: [phone]    Encounter Date: 1/5/25    Admit to Home Health    Diagnoses:  Active Hospital Problems    Diagnosis  POA    *Viral illness [B34.9]  Yes    Elevated troponin [R79.89]  Yes    Chronic kidney disease (CKD) stage G3b/A1, moderately decreased glomerular filtration rate (GFR) between 30-44 mL/min/1.73 square meter and albuminuria creatinine ratio less than 30 mg/g [N18.32]  Yes    CAD (coronary artery disease) [I25.10]  Yes    CKD (chronic kidney disease), stage IV [N18.4]  Yes     Chronic    Essential hypertension [I10]  Yes    Pure hypercholesterolemia [E78.00]  Yes    PAF (paroxysmal atrial fibrillation) [I48.0]  Yes    Old myocardial infarction [I25.2]  Not Applicable      Resolved Hospital Problems   No resolved problems to display.       Follow Up Appointments:  No future appointments.    Allergies:  Review of patient's allergies indicates:   Allergen Reactions    Clindamycin Anaphylaxis    Penicillins Rash       Medications: Review discharge medications with patient and family and provide education.    Current Facility-Administered Medications   Medication Dose Route Frequency Provider Last Rate Last Admin    acetaminophen tablet 1,000 mg  1,000 mg Oral Q8H PRN Delma Murphy PA-C        acetaminophen tablet 650 mg  650 mg Oral Q4H PRN Delma Murphy PA-C        albuterol-ipratropium 2.5 mg-0.5 mg/3 mL nebulizer solution 3 mL  3 mL Nebulization Q4H PRN Delma Murphy PA-C        allopurinol split tablet 150 mg  150 mg Oral Daily Delma Murphy PA-C   150 mg at 01/06/25 0839    aluminum-magnesium hydroxide-simethicone 200-200-20 mg/5 mL suspension 30 mL  30 mL Oral QID PRN Delma Murphy PA-C         amLODIPine tablet 5 mg  5 mg Oral Daily Murphy, Delma M., PA-C   5 mg at 01/05/25 1518    aspirin EC tablet 81 mg  81 mg Oral Daily Murphy, Delma M., PA-C   81 mg at 01/06/25 0840    atorvastatin tablet 40 mg  40 mg Oral Daily Murphy, Delma M., PA-C   40 mg at 01/06/25 0839    bisacodyL suppository 10 mg  10 mg Rectal Daily PRN Murphy, Delma M., PA-C        clopidogreL tablet 75 mg  75 mg Oral Daily Murphy, Delma M., PA-C   75 mg at 01/06/25 0840    dextrose 50% injection 12.5 g  12.5 g Intravenous PRN Murphy, Delma M., PA-C        dextrose 50% injection 25 g  25 g Intravenous PRN Murphy, Delma M., PA-C        folic acid tablet 1 mg  1 mg Oral Daily Murphy, Delma M., PA-C   1 mg at 01/06/25 0839    glucagon (human recombinant) injection 1 mg  1 mg Intramuscular PRN Murphy, Delma M., PA-C        glucose chewable tablet 16 g  16 g Oral PRN [facility], PA-C        glucose chewable tablet 24 g  24 g Oral PRN [facility], PA-C        melatonin tablet 6 mg  6 mg Oral Nightly PRN Murphy, Delma MRodriguez, PA-C        naloxone 0.4 mg/mL injection 0.02 mg  0.02 mg Intravenous PRN Murphy, Delma M., PA-C        ondansetron disintegrating tablet 8 mg  8 mg Oral Q8H PRN Murphy, Delma MRodriguez, PA-C        polyethylene glycol packet 17 g  17 g Oral Daily Murphy, Delma M., PA-C   17 g at 01/06/25 0839    prochlorperazine injection Soln 5 mg  5 mg Intravenous Q6H PRN Murphy, Delma MRodriguez, PA-C        simethicone chewable tablet 80 mg  1 tablet Oral QID PRN Murphy, Delma MRodriguez, PA-C        sodium bicarbonate tablet 650 mg  650 mg Oral BID [facility], PA-C   650 mg at 01/06/25 0840    sodium chloride 0.9% flush 5 mL  5 mL Intravenous PRN Delma Murphy PA-C            Medication List        CONTINUE taking these medications      albuterol 90 mcg/actuation inhaler  Commonly known as: PROVENTIL/VENTOLIN HFA  1 puff every 4 (four) hours as needed for Shortness of Breath or Wheezing.     allopurinoL  100 MG tablet  Commonly known as: ZYLOPRIM  Take 150 mg by mouth once daily.     amLODIPine 5 MG tablet  Commonly known as: NORVASC  Take 5 mg by mouth once daily.     aspirin 81 MG EC tablet  Commonly known as: ECOTRIN  Take 81 mg by mouth once daily.     atorvastatin 40 MG tablet  Commonly known as: LIPITOR  Take 40 mg by mouth once daily.     azelastine 137 mcg (0.1 %) nasal spray  Commonly known as: ASTELIN  1 spray by Nasal route 2 (two) times daily.     calcium carbonate-vitamin D3 600 mg-5 mcg (200 unit) Cap  Take by mouth.     cholecalciferol (vitamin D3) 50 mcg (2,000 unit) Cap capsule  Commonly known as: VITAMIN D3  Take 1 capsule by mouth once daily.     clonazePAM 0.5 MG tablet  Commonly known as: KlonoPIN  Take 1 tablet (0.5 mg total) by mouth every evening.     clopidogreL 75 mg tablet  Commonly known as: PLAVIX  Take 75 mg by mouth once daily.     cyanocobalamin 500 MCG tablet  Take 500 mcg by mouth once daily.     docusate sodium 100 MG capsule  Commonly known as: COLACE  Take 1 capsule (100 mg total) by mouth every 12 (twelve) hours.     FARXIGA 10 mg tablet  Generic drug: dapagliflozin propanediol  Take 1 tablet by mouth once daily.     folic acid 1 MG tablet  Commonly known as: FOLVITE  Take 1 mg by mouth once daily.     KERENDIA 10 mg Tab  Generic drug: finerenone  Take by mouth.     losartan 25 MG tablet  Commonly known as: COZAAR  Take 50 mg by mouth once daily.     nitroGLYCERIN 0.4 MG SL tablet  Commonly known as: NITROSTAT  Place 0.4 mg under the tongue every 5 (five) minutes as needed for Chest pain.     pantoprazole 20 MG tablet  Commonly known as: PROTONIX  Take 20 mg by mouth every morning.     sodium bicarbonate 650 MG tablet  Take 650 mg by mouth 2 (two) times daily.                I have seen and examined this patient within the last 30 days. My clinical findings that support the need for the home health skilled services and home bound status are the following:no   Weakness/numbness  causing balance and gait disturbance due to Infection making it taxing to leave home.     Diet:   cardiac diet and renal diet    Labs:  N/a    Referrals/ Consults  Physical Therapy to evaluate and treat. Evaluate for home safety and equipment needs; Establish/upgrade home exercise program. Perform / instruct on therapeutic exercises, gait training, transfer training, and Range of Motion.  Occupational Therapy to evaluate and treat. Evaluate home environment for safety and equipment needs. Perform/Instruct on transfers, ADL training, ROM, and therapeutic exercises.    Activities:   activity as tolerated    Nursing:   Agency to admit patient within 24 hours of hospital discharge unless specified on physician order or at patient request    SN to complete comprehensive assessment including routine vital signs. Instruct on disease process and s/s of complications to report to MD. Review/verify medication list sent home with the patient at time of discharge  and instruct patient/caregiver as needed. Frequency may be adjusted depending on start of care date.     Skilled nurse to perform up to 3 visits PRN for symptoms related to diagnosis    Notify MD if SBP > 160 or < 90; DBP > 90 or < 50; HR > 120 or < 50; Temp > 101; O2 < 88%    Ok to schedule additional visits based on staff availability and patient request on consecutive days within the home health episode.    When multiple disciplines ordered:    Start of Care occurs on Sunday - Wednesday schedule remaining discipline evaluations as ordered on separate consecutive days following the start of care.    Thursday SOC -schedule subsequent evaluations Friday and Monday the following week.     Friday - Saturday SOC - schedule subsequent discipline evaluations on consecutive days starting Monday of the following week.    Miscellaneous   Routine Skin for Bedridden Patients: Instruct patient/caregiver to apply moisture barrier cream to all skin folds and wet areas in perineal  area daily and after baths and all bowel movements.    Home Health Aide:  Physical Therapy Three times weekly and Occupational Therapy Three times weekly    Wound Care Orders  no    I certify that this patient is confined to her home and needs physical therapy and occupational therapy.

## 2025-01-06 NOTE — PLAN OF CARE
Case Management Final Discharge Note      Discharge Disposition: Home with HH ([facility])    New DME ordered / company name: Patient refused BSC    Relevant SDOH / Transition of Care Barriers:  None    Person available to provide assistance at home when needed and their contact information: daughter    Referrals placed: NA    Transportation: daughter      Patient educated on discharge services and updated on DC plan. Bedside RN notified, patient clear to discharge from Case Management Perspective.     [facility] ([address])  Discharge Final Note    Primary Care Provider: Elena Cabrera MD    Expected Discharge Date: 1/6/2025    Final Discharge Note (most recent)       Final Note - 01/06/25 1538          Final Note    Assessment Type Final Discharge Note     Anticipated Discharge Disposition Home-Health Care Svc (P)      Hospital Resources/Appts/Education Provided Provided patient/caregiver with written discharge plan information;Appointments scheduled and added to AVS (P)         Post-Acute Status    Post-Acute Authorization Home Health (P)      Home Health Status Set-up Complete/Auth obtained (P)      Patient choice form signed by patient/caregiver List with quality metrics by geographic area provided (P)      Discharge Delays None known at this time (P)                    Contact Info       *[facility]   Specialty: Home Health Services, Home Therapy Services, Home Living Aide Services    [address]  [place] [address]   Phone: [phone]       Next Steps: Follow up on 1/7/2025    Instructions: They will contact you for home healthcare appointments.

## 2025-01-06 NOTE — PLAN OF CARE
Case Management Assessment     PCP: Elena Cabrera MD  Pharmacy: bedside    Patient Arrived From: Home  Existing Help at Home: Daughter    Barriers to Discharge: None    Discharge Plan:    A. Home   B. Home with      [facility] ([address])  Initial Discharge Assessment       Primary Care Provider: Elena Cabrera MD    Admission Diagnosis: Shortness of breath [R06.02]  Viral syndrome [B34.9]  Transaminitis [R74.01]  Elevated troponin [R79.89]  MELIDA (acute kidney injury) [N17.9]  Chest pain [R07.9]    Admission Date: 1/5/2025  Expected Discharge Date:     Transition of Care Barriers: (P) None    Payor: [employer] / Plan: [employer] MEDICARE ADVANTAGE / Product Type: Medicare Advantage /     Extended Emergency Contact Information  Primary Emergency Contact: Malina Anaya   [facility]  Home Phone: [phone]  Mobile Phone: [phone]  Relation: Daughter    Discharge Plan A: Home with family, Home  Discharge Plan B: Home Health      [id number] [address]  [address]  [place] [address]  Phone: [phone] Fax: [phone]      Initial Assessment (most recent)       Adult Discharge Assessment - 01/06/25 0940          Discharge Assessment    Assessment Type Discharge Planning Assessment     Confirmed/corrected address, phone number and insurance Yes     Confirmed Demographics Correct on Facesheet     Source of Information patient;health record     People in Home child(star), adult     Do you expect to return to your current living situation? Yes     Do you have help at home or someone to help you manage your care at home? Yes     Who are your caregiver(s) and their phone number(s)? Malina Anaya (Daughter)  [phone]     Prior to hospitilization cognitive status: Alert/Oriented;No Deficits     Current cognitive status: Alert/Oriented;No Deficits     Equipment Currently Used at Home walker,  rolling;cane, straight;raised toilet     Readmission within 30 days? No     Patient currently being followed by outpatient case management? No     Do you currently have service(s) that help you manage your care at home? No     Do you take prescription medications? Yes     Do you have prescription coverage? Yes     Do you have any problems affording any of your prescribed medications? No     Is the patient taking medications as prescribed? yes     How do you get to doctors appointments? family or friend will provide     Are you on dialysis? No     Do you take coumadin? No     Discharge Plan A Home with family;Home     Discharge Plan B Home Health     DME Needed Upon Discharge  bedside commode (P)      Discharge Plan discussed with: Patient (P)      Transition of Care Barriers None (P)

## 2025-01-06 NOTE — NURSING
Discharge instructions given to patient and daughter at bedside. All questions answered. Denies pain or distress at this time. All belongings returned to patient per patients reports. Awaiting patient transport . Will note any changes as they occur.

## 2025-01-06 NOTE — PLAN OF CARE
D/C Rec: Moderate Intensity Therapy  DME Rec: TBD at next level of care, pt reports she needs a toilet seat riser    Eval completed, pt presents with weakness and impaired balance from prolonged bedrest. Pt would benefit from continued PT services to address mobility deficits and restore PLOF.     Problem: Physical Therapy  Goal: Physical Therapy Goal  Description: P.T goals to be met in 1 month as follows:  1. Mod I Bed Mobility  2. Bed<>chair Mod I   3. Tolerate OOB x 2 hours  4. Gait 150' SBA with LRAD      Outcome: Progressing

## 2025-01-06 NOTE — PLAN OF CARE
Marie met with patient at bedside. Patient decided not to dc to SNF anf is agreeable to HH. I provided the patient a choice of post acute providers and offered a list of CMS rated HH. Patient has declined to select a preferred provider and elects placement with the first accepting in network provider that is available to provide services as ordered by the physician.     Marie sent out referrals and [facility] accepted.     Marie requested patient be reviewed for a BSC.

## 2025-01-06 NOTE — PLAN OF CARE
Problem: Occupational Therapy  Goal: Occupational Therapy Goal  Description: Goals to be met by: 1/20/2025     Patient will increase functional independence with ADLs by performing:    UE Dressing with Supervision.  LE Dressing with Supervision.  Grooming while standing at sink with Supervision.  Toileting from bedside commode with Supervision for hygiene and clothing management.   Toilet transfer to bedside commode with Supervision.    Outcome: Progressing     Initial OT eval/treat complete.  Has comfort height toilet and SPC; has TTB from 2002.  Will defer AD needs to PT.  Pt. With decreased endurance limiting functional ambulation needed to perform toileting ADL in typical bathroom facilities.  Needs BSC and TTB.  Recommend post acute Moderate Intensity therapy.  Lives with daughter.  To benefit from continued acute care OT services to increase independence in self-care/functional transfers.  OT to follow.

## 2025-01-06 NOTE — PLAN OF CARE
[facility] Message     Medicare Outpatient and Observation Notification regarding financial responsibility Explained to patient/caregiver; Signed/date by patient/caregiver   Date MERINO was signed 1/6/2025   Time MERINO was signed 1258

## 2025-01-06 NOTE — PT/OT/SLP EVAL
Physical Therapy Evaluation    Patient Name:  Anahy Evans   MRN:  [id number]    Recommendations:     Discharge Recommendations: Moderate Intensity Therapy   Discharge Equipment Recommendations: TBD at next level of care, pt reports she needs a raised toilet   Barriers to discharge: Decreased caregiver support and mobility deficits    Assessment:     Anahy Evans is a 89 y.o. female admitted with a medical diagnosis of Viral illness.  She presents with the following impairments/functional limitations: weakness, impaired endurance, impaired functional mobility, gait instability, impaired balance, decreased lower extremity function, impaired cardiopulmonary response to activity.    Eval completed, pt presents with weakness and impaired balance from prolonged bedrest. Pt would benefit from continued PT services to address mobility deficits and restore PLOF.     Rehab Prognosis: Good; patient would benefit from acute skilled PT services to address these deficits and reach maximum level of function.    Recent Surgery: * No surgery found *      Plan:     During this hospitalization, patient to be seen 5 x/week to address the identified rehab impairments via gait training, therapeutic activities, therapeutic exercises, neuromuscular re-education and progress toward the following goals:    Plan of Care Expires:  02/06/25    Subjective     Chief Complaint: Pt reports feeling very weak  Patient/Family Comments/goals: Pt agreeable to PT eval  Pain/Comfort:  Pain Rating 1: 0/10    Patients cultural, spiritual, Mu-ism conflicts given the current situation: no    Living Environment:  [facility] with no BAIRON  Prior to admission, patients level of function was I and driving. Has become progressively weak over the past week following a long train ride to [facility], recently had to start ambulating with a cane and been spending most time in bed following a fall .  Equipment used at home: cane, straight.  DME owned (not currently used):  rolling walker.  Upon discharge, patient will have assistance from lives with DTR.    Objective:     Communicated with Delma CARDENAS prior to session.  Patient found supine with PureWick, bed alarm, peripheral IV  upon PT entry to room.    General Precautions: Standard, fall  Orthopedic Precautions:N/A   Braces: N/A  Respiratory Status: Room air    Exams:  B LE ROM and strength WFL    Functional Mobility:  Bed Mobility: Supine to sit SBA  Transfers: Sit<>stand Min A, Bed to bedside commode stand pivot t/f Min A  Gait: 15' Mod A without AD, pt holding onto furniture for support, leaning posteriorly      AM-PAC 6 CLICK MOBILITY  Total Score:16       Treatment & Education:  Pt educated on role of PT and POC     Patient left up in chair with all lines intact and call button in reach.    GOALS:   Multidisciplinary Problems       Physical Therapy Goals          Problem: Physical Therapy    Goal Priority Disciplines Outcome Interventions   Physical Therapy Goal     PT, PT/OT Progressing    Description: P.T goals to be met in 1 month as follows:  1. Mod I Bed Mobility  2. Bed<>chair Mod I   3. Tolerate OOB x 2 hours  4. Gait 150' SBA with LRAD                           History:     Past Medical History:   Diagnosis Date    Anticoagulant long-term use     Cervical cancer 1968    breast cancer right    Coronary artery disease     Gout     High cholesterol     Hypertension     MI (myocardial infarction) 1999       Past Surgical History:   Procedure Laterality Date    BREAST LUMPECTOMY      right    CARDIAC SURGERY      multiple cardiac stents    CORONARY ANGIOGRAPHY Right 1/21/2022    Procedure: ANGIOGRAM, CORONARY ARTERY;  Surgeon: Asim Canela MD;  Location: [facility] CATH LAB;  Service: Cardiology;  Laterality: Right;    CORONARY ANGIOGRAPHY Right 5/10/2024    Procedure: ANGIOGRAM, CORONARY ARTERY POSSIBLE LV COR;  Surgeon: Asim Canela MD;  Location: [facility] CATH LAB;  Service: Cardiology;  Laterality: Right;     CORONARY STENT PLACEMENT      HIP REPLACEMENT ARTHROPLASTY Right 2018    JOINT REPLACEMENT      right       Time Tracking:     PT Received On: 01/06/25  PT Start Time: 0953     PT Stop Time: 1012  PT Total Time (min): 19 min     Billable Minutes: Evaluation 15      01/06/2025

## 2025-01-06 NOTE — PT/OT/SLP EVAL
Occupational Therapy   Evaluation and Treatment    Name: Anahy Evans  MRN: [id number]  Admitting Diagnosis: Viral illness  Recent Surgery: * No surgery found *      Recommendations:     Discharge Recommendations: Moderate Intensity Therapy  Discharge Equipment Recommendations:  bedside commode, bath bench (will defer AD needs to PT)  Barriers to discharge:  Inaccessible home environment (4 BAIRON home; current functional level)    Assessment:   Initial OT eval/treat complete.  Ambulating approx. 10 feet bathroom doorway to BSC frame over toilet with RW and CGA for steadying/safety and performing step transfer with CGA and MIN cues for safe hand placement.  Toileting with CGA during standing clothing management and retrieval of tissue needed for seated from warner hygiene.  Tolerated standing at sink for hand hygiene with SBA and MIN verbal cuing with good follow through for safe RW positioning at sink.  Seated rest break needed after ADL tasks d/t decreased activity tolerance with deep breathing tech encouraged and good follow through noted.  Has comfort height toilet and SPC; has TTB from 2002.  Will defer AD needs to PT.  Pt. With decreased endurance limiting functional ambulation needed to perform toileting ADL in typical bathroom facilities.  Needs BSC and TTB.  Recommend post acute Moderate Intensity therapy.  Lives with daughter; Pt. Reports that daughter suffers with sciatic n. Pain - may not be able to assist Pt. At her current functional level.  Pt. Previously independent in ADL, iADL, cooking, cleaning, and driving.  Will require intense interdisciplinary therapy services to return safely to previous living situation and PLOF.  To benefit from continued acute care OT services to increase independence in self-care/functional transfers.  OT to follow.     Anahy Evans is a 89 y.o. female with a medical diagnosis of Viral illness.  She presents with below deficits decreasing independence in  self-care/functional transfers. Performance deficits affecting function: weakness, impaired endurance, impaired self care skills, impaired functional mobility, gait instability, impaired balance, impaired cardiopulmonary response to activity, decreased upper extremity function, decreased lower extremity function.      Rehab Prognosis: Good; patient would benefit from acute skilled OT services to address these deficits and reach maximum level of function.       Plan:     Patient to be seen 4 x/week to address the above listed problems via self-care/home management, therapeutic activities, therapeutic exercises  Plan of Care Expires: 01/20/25  Plan of Care Reviewed with: patient    Subjective     Chief Complaint: No c/o pain.   Patient/Family Comments/goals: No goals stated a this time.     Occupational Profile:  Lives with daughter  SS[facility]; 4 BAIRON; unilateral handrail  Bathroom setup  Tub/shower combo  Has TTB from 2002  Comfort height toilet  Previously MOD I to independent with ADL  Ambulating with SPC  Still cooking, cleaning, and driving  Equipment Used at Home: cane, straight (comfort height toilet; has TTB from 2002)  Assistance upon Discharge: Lives with daughter; reports that daughter suffers with sciatic n. Pain - may not be able to assist at current functional level.     Pain/Comfort:  Pain Rating 1: 0/10  Pain Rating Post-Intervention 1: 0/10    Patients cultural, spiritual, Adventist conflicts given the current situation:  (None stated.)    Objective:     Communicated with: Loulou XAVIER RN prior to session.  Patient found up in chair with peripheral IV, telemetry, PureWick upon OT entry to room.    General Precautions: Standard, fall, anti-coagulation medicine (strict I&O's; 1500mL fluid restriction)  Orthopedic Precautions: N/A  Braces: N/A  Respiratory Status: Room air    Occupational Performance:    Functional Mobility/Transfers:  Sit>stand bedside chair>RW CGA   Needed for steadying/safety  MIN cues for  safe hand placement  Good follow through  Ambulating bathroom doorway>BSC frame over toilet approx. 10-feet RW CGA  For steadying/safety  Incidental seated rest break once reaching toilet  Ambulating BSC>bathroom>bathroom doorway approx. 10-feet RW CGA  For steadying/safety  Seated rest break after task  Pt. Reporting fatigue  MIN cues for deep breathing tech  Good follow through  Attempted to obtain O2 sats at this time  Dynamap malfunctioning  Did not turn on even when plugged in  Step transfer BSC frame over toilet RW CGA  For steadying/safety  MIN cues for safe hand placement during transitions  Good follow through  Step transfer to bedside chair RW CGA  For steadying/safety  MIN cues for safe hand placement during transitions  Good follow through    Activities of Daily Living:  Toileting CGA  Steadying assist needed during standing clothing management  Seated front warner hygiene with retrieval of tissue  Noted forward trunk lean with backward reach  No LOB during task  Hand hygiene in stance at sink SBA  Verbal cue needed for safe RW positioning at sink  Good follow through    Cognitive/Visual Perceptual:  Cognitive/Psychosocial Skills:  -       Oriented to: Person, Place, Time, and Situation   -       Follows Commands/attention:Follows one-step commands  -       Communication: able to make basic needs known and answers questions appropriately; requires mild redirection  -       Memory: No Deficits noted  -       Safety awareness/insight to disability: impaired   -       Mood/Affect/Coping skills/emotional control: Appropriate to situation, Cooperative, and Very pleasant    Physical Exam:  Postural examination/scapula alignment: -       Rounded shoulders  -       Forward head  Upper Extremity Range of Motion:  WFL  Upper Extremity Strength: 3+/5 to 4-/5 gross    Strength: WFL to  RW      [facility] 6 Click ADL:  [facility] Total Score: 18    Treatment & Education:  Educated on role of OT, POC, functional  transfer/ADL safety, review of call light, and importance of calling for assist as needed.      Patient left up in chair with all lines intact, call button in reach, nursing notified, and PA present    GOALS:   Multidisciplinary Problems       Occupational Therapy Goals          Problem: Occupational Therapy    Goal Priority Disciplines Outcome Interventions   Occupational Therapy Goal     OT, PT/OT Progressing    Description: Goals to be met by: 1/20/2025     Patient will increase functional independence with ADLs by performing:    UE Dressing with Supervision.  LE Dressing with Supervision.  Grooming while standing at sink with Supervision.  Toileting from bedside commode with Supervision for hygiene and clothing management.   Toilet transfer to bedside commode with Supervision.                         History:     Past Medical History:   Diagnosis Date    Anticoagulant long-term use     Cervical cancer 1968    breast cancer right    Coronary artery disease     Gout     High cholesterol     Hypertension     MI (myocardial infarction) 1999         Past Surgical History:   Procedure Laterality Date    BREAST LUMPECTOMY      right    CARDIAC SURGERY      multiple cardiac stents    CORONARY ANGIOGRAPHY Right 1/21/2022    Procedure: ANGIOGRAM, CORONARY ARTERY;  Surgeon: Asim Canela MD;  Location: [facility] CATH LAB;  Service: Cardiology;  Laterality: Right;    CORONARY ANGIOGRAPHY Right 5/10/2024    Procedure: ANGIOGRAM, CORONARY ARTERY POSSIBLE LV COR;  Surgeon: Asim Canela MD;  Location: [facility] CATH LAB;  Service: Cardiology;  Laterality: Right;    CORONARY STENT PLACEMENT      HIP REPLACEMENT ARTHROPLASTY Right 2018    JOINT REPLACEMENT      right       Time Tracking:     OT Date of Treatment: 01/06/25  OT Start Time: 1032  OT Stop Time: 1114  OT Total Time (min): 42 min    Billable Minutes:Evaluation 15  Self Care/Home Management 27    1/6/2025

## 2025-01-07 NOTE — HOSPITAL COURSE
Anahy Evans was admitted for debility. Flu and COVID negative. CXR unremarkable. UA without infectious etiology. Maintaining sats on room air. Suspect viral illness after recent train ride. PTOT recommended SNF however patient preferred to go home. She has capacity, stable for discharge with PCP follow up and HHC. Return precautions discussed, no further questions at discharge

## 2025-01-07 NOTE — ASSESSMENT & PLAN NOTE
"Anahy Evans presents for dyspnea on exertion, lethargy after feeling like she "had the flu" for over a week. Found to have slight increase in creatinine     - no signs of infection on admission: afebrile without leukocytosis  - COVID/Flu negative  - CXR unremarkable  - UA unremarkable  -  and troponin slightly elevated but flat in setting of CKD   - states she usually drives and cooks for herself and ambulates sometimes with a cane, but has been mostly bedbound this week  - PTOT consulted recommended SNF however patient would prefer to go home with [facility], states she is moving out of her house at the end of Jan and needs to prepare    "

## 2025-01-07 NOTE — DISCHARGE SUMMARY
[facility] Surg [address] Medicine  Discharge Summary      Patient Name: Anahy Evans  MRN: [id number]  OLAYINKA: [id number]  Patient Class: OP- Observation  Admission Date: 1/5/2025  Hospital Length of Stay: 0 days  Discharge Date and Time: 1/6/2025  5:53 PM  Attending Physician: No att. providers found   Discharging Provider: Delma Murphy PA-C  Primary Care Provider: Elena Cabrera MD    Primary Care Team: Networked reference to record PCT     HPI:   Anahy Evans is an 86F with history of MI ( prior stents in her proximal LAD, mid left circumflex coronary arteries), HTN, HLD, CKD 4, and gout who presents for fatigue and shortness of breath for over a week.  Two days before [place] she took her 7-year-old grandson on a 10 hour train ride to [place].  During the ride, she sat under an A/C vent in the train and could not warm up.  Ever since that train ride she has felt fatigued and short of breath especially with exertion.  Before returning home she did report an upset stomach that improved with Pepto-Bismol.  She returned home about a week ago, but has spent the majority of the week in her bed.  She felt certain that she had the flu.  She has been taking her medications as prescribed.  No changes in her bowel habits, but she did have 2 episodes of vomiting.  At baseline she is active, drives and cooks.  Currently lives with her daughter.    In ER: afebrile without leukocytosis, maintaining oxygen sats on room air, BP stable 119-154 systolic; CMP with Na 130, Cr 2.4 (BL around 2.0), AST//153; , troponin 0.188, 0.175; CXR unremarkable; COVID and FLU negative. ER discussed with patient's cardiologist Dr. Canela who felt patient was stable to dc from his perspective, but admitted due to weakness and difficulty with ADLs      * No surgery found *      Hospital Course:   Anahy Evans was admitted for debility. Flu and COVID negative. CXR unremarkable. UA without  "infectious etiology. Maintaining sats on room air. Suspect viral illness after recent train ride. PTOT recommended SNF however patient preferred to go home. She has capacity, stable for discharge with PCP follow up and [facility]. Return precautions discussed, no further questions at discharge      Goals of Care Treatment Preferences:  Code Status: DNR    Living Will: Yes              SDOH Screening:  The patient was screened for utility difficulties, food insecurity, transport difficulties, housing insecurity, and interpersonal safety and there were no concerns identified this admission.     Consults:     * Viral illness  Anahy Evans presents for dyspnea on exertion, lethargy after feeling like she "had the flu" for over a week. Found to have slight increase in creatinine     - no signs of infection on admission: afebrile without leukocytosis  - COVID/Flu negative  - CXR unremarkable  - UA unremarkable  -  and troponin slightly elevated but flat in setting of CKD   - states she usually drives and cooks for herself and ambulates sometimes with a cane, but has been mostly bedbound this week  - PTOT consulted recommended SNF however patient would prefer to go home with [facility], states she is moving out of her house at the end of Jan and needs to prepare      CAD (coronary artery disease)  Follows with cardiology, Dr. Canela  Continue home  ASA, plavix, statin  Troponin slightly elevated but flat     CKD (chronic kidney disease), stage IV  Follows with Dr. Valerio  Baseline as of recently around 2.0  Cr 2.4 today, suspect most likely from hypovolemia from vomiting/poor PO intake   Holding losartan for now  Strict I/Os  Encouraged PO intake  Trend on AM labs    Essential hypertension  Patient's blood pressure range in the last 24 hours was: BP  Min: 119/61  Max: 154/73.The patient's inpatient anti-hypertensive regimen is listed below:  Current Antihypertensives  amLODIPine tablet 5 mg, Daily, Oral    In the past has " "been on   On Amlodipine 10 mg, Losartan to 25 mg and Kerendia 10mg daily and Coreg 12.5mg   Currently she just on amlodipine and losartan, she did take lasix for a few days in the beginning of December for hypervolemia  Will hold losartan for now given stable BP and slight increase in Cr    PAF (paroxysmal atrial fibrillation)  -Followed by Dr. Canela  -Was on Amiodarone 100mg every day in evening, but due to recent 1oAVB was discontinued on 5/1-/24.  -Also no longer on Coreg 3.125 mg bid  -ON ASA/ PLavix, not OAC      Final Active Diagnoses:    Diagnosis Date Noted POA    PRINCIPAL PROBLEM:  Viral illness [B34.9] 01/05/2025 Yes    Elevated troponin [R79.89] 01/05/2025 Yes    Chronic kidney disease (CKD) stage G3b/A1, moderately decreased glomerular filtration rate (GFR) between 30-44 mL/min/1.73 square meter and albuminuria creatinine ratio less than 30 mg/g [N18.32] 10/05/2021 Yes    CAD (coronary artery disease) [I25.10] 05/09/2019 Yes    CKD (chronic kidney disease), stage IV [N18.4] 06/29/2017 Yes     Chronic    Essential hypertension [I10] 11/10/2016 Yes    Pure hypercholesterolemia [E78.00] 10/21/2014 Yes    PAF (paroxysmal atrial fibrillation) [I48.0] 07/05/2013 Yes    Old myocardial infarction [I25.2] 06/06/2012 Not Applicable      Problems Resolved During this Admission:       Discharged Condition: stable    Disposition: Home or Self Care    Follow Up:   Follow-up Information       [facility] Follow up on 1/7/2025.    Specialties: Home Health Services, Home Therapy Services, Home Living Aide Services  Why: They will contact you for home healthcare appointments.  Contact information:  [id number]  [facility] [id number] [phone]                         Patient Instructions:      COMMODE FOR HOME USE     Order Specific Question Answer Comments   Type: Standard    Height: 5' 7" (1.702 m)    Weight: 75.3 kg (166 lb 0.1 oz)    Does patient have medical equipment at home? cane, straight  "   Length of need (1-99 months): 99      Notify your health care provider if you experience any of the following:  temperature >100.4     Notify your health care provider if you experience any of the following:  severe uncontrolled pain     Notify your health care provider if you experience any of the following:  redness, tenderness, or signs of infection (pain, swelling, redness, odor or green/yellow discharge around incision site)     Notify your health care provider if you experience any of the following:  worsening rash     Notify your health care provider if you experience any of the following:  difficulty breathing or increased cough     Notify your health care provider if you experience any of the following:  persistent dizziness, light-headedness, or visual disturbances     Notify your health care provider if you experience any of the following:  increased confusion or weakness     Activity as tolerated       Significant Diagnostic Studies: Microbiology: Flu/COVID negative  Radiology: X-Ray: CXR: X-Ray Chest 1 View (CXR):No acute abnormality    Pending Diagnostic Studies:       None           Medications:  Reconciled Home Medications:      Medication List        CONTINUE taking these medications      albuterol 90 mcg/actuation inhaler  Commonly known as: PROVENTIL/VENTOLIN HFA  1 puff every 4 (four) hours as needed for Shortness of Breath or Wheezing.     allopurinoL 100 MG tablet  Commonly known as: ZYLOPRIM  Take 150 mg by mouth once daily.     amLODIPine 5 MG tablet  Commonly known as: NORVASC  Take 5 mg by mouth once daily.     aspirin 81 MG EC tablet  Commonly known as: ECOTRIN  Take 81 mg by mouth once daily.     atorvastatin 40 MG tablet  Commonly known as: LIPITOR  Take 40 mg by mouth once daily.     azelastine 137 mcg (0.1 %) nasal spray  Commonly known as: ASTELIN  1 spray by Nasal route 2 (two) times daily.     calcium carbonate-vitamin D3 600 mg-5 mcg (200 unit) Cap  Take by mouth.      cholecalciferol (vitamin D3) 50 mcg (2,000 unit) Cap capsule  Commonly known as: VITAMIN D3  Take 1 capsule by mouth once daily.     clonazePAM 0.5 MG tablet  Commonly known as: KlonoPIN  Take 1 tablet (0.5 mg total) by mouth every evening.     clopidogreL 75 mg tablet  Commonly known as: PLAVIX  Take 75 mg by mouth once daily.     cyanocobalamin 500 MCG tablet  Take 500 mcg by mouth once daily.     docusate sodium 100 MG capsule  Commonly known as: COLACE  Take 1 capsule (100 mg total) by mouth every 12 (twelve) hours.     FARXIGA 10 mg tablet  Generic drug: dapagliflozin propanediol  Take 1 tablet by mouth once daily.     folic acid 1 MG tablet  Commonly known as: FOLVITE  Take 1 mg by mouth once daily.     KERENDIA 10 mg Tab  Generic drug: finerenone  Take by mouth.     losartan 25 MG tablet  Commonly known as: COZAAR  Take 50 mg by mouth once daily.     nitroGLYCERIN 0.4 MG SL tablet  Commonly known as: NITROSTAT  Place 0.4 mg under the tongue every 5 (five) minutes as needed for Chest pain.     pantoprazole 20 MG tablet  Commonly known as: PROTONIX  Take 20 mg by mouth every morning.     sodium bicarbonate 650 MG tablet  Take 650 mg by mouth 2 (two) times daily.              Indwelling Lines/Drains at time of discharge:   Lines/Drains/Airways       None                   Time spent on the discharge of patient: >35 minutes         Delma Murphy PA-C  Department of Hospital Medicine  [facility] ([address])

## 2025-01-13 ENCOUNTER — HOSPITAL ENCOUNTER (INPATIENT)
Facility: HOSPITAL | Age: OVER 89
LOS: 2 days | Discharge: HOME HEALTH | DRG: 303 | End: 2025-01-17
Attending: EMERGENCY MEDICINE | Admitting: STUDENT IN AN ORGANIZED HEALTH CARE EDUCATION/TRAINING PROGRAM
Payer: MEDICARE

## 2025-01-13 DIAGNOSIS — R07.9 CHEST PAIN: ICD-10-CM

## 2025-01-13 DIAGNOSIS — N30.01 ACUTE CYSTITIS WITH HEMATURIA: Primary | ICD-10-CM

## 2025-01-13 DIAGNOSIS — R06.02 SHORTNESS OF BREATH: ICD-10-CM

## 2025-01-13 DIAGNOSIS — S72.001S CLOSED FRACTURE OF RIGHT HIP, SEQUELA: ICD-10-CM

## 2025-01-13 DIAGNOSIS — I25.10 CORONARY ARTERY DISEASE INVOLVING NATIVE CORONARY ARTERY OF NATIVE HEART WITHOUT ANGINA PECTORIS: ICD-10-CM

## 2025-01-13 DIAGNOSIS — R06.09 DYSPNEA ON EXERTION: ICD-10-CM

## 2025-01-13 DIAGNOSIS — R79.89 POSITIVE D DIMER: ICD-10-CM

## 2025-01-13 PROBLEM — N39.0 UTI (URINARY TRACT INFECTION): Status: ACTIVE | Noted: 2025-01-13

## 2025-01-13 LAB
ALBUMIN SERPL BCP-MCNC: 3 G/DL (ref 3.5–5.2)
ALP SERPL-CCNC: 114 U/L (ref 40–150)
ALT SERPL W/O P-5'-P-CCNC: 49 U/L (ref 10–44)
ANION GAP SERPL CALC-SCNC: 11 MMOL/L (ref 8–16)
AST SERPL-CCNC: 27 U/L (ref 10–40)
BACTERIA #/AREA URNS AUTO: ABNORMAL /HPF
BASOPHILS # BLD AUTO: 0.1 K/UL (ref 0–0.2)
BASOPHILS NFR BLD: 0.9 % (ref 0–1.9)
BILIRUB SERPL-MCNC: 0.6 MG/DL (ref 0.1–1)
BILIRUB UR QL STRIP: NEGATIVE
BNP SERPL-MCNC: 126 PG/ML (ref 0–99)
BUN SERPL-MCNC: 33 MG/DL (ref 10–30)
CALCIUM SERPL-MCNC: 10.4 MG/DL (ref 8.7–10.5)
CHLORIDE SERPL-SCNC: 103 MMOL/L (ref 95–110)
CLARITY UR REFRACT.AUTO: CLEAR
CO2 SERPL-SCNC: 23 MMOL/L (ref 23–29)
COLOR UR AUTO: YELLOW
CREAT SERPL-MCNC: 2.2 MG/DL (ref 0.5–1.4)
D DIMER PPP IA.FEU-MCNC: >33 MG/L FEU
DIFFERENTIAL METHOD BLD: ABNORMAL
EOSINOPHIL # BLD AUTO: 0.1 K/UL (ref 0–0.5)
EOSINOPHIL NFR BLD: 1.1 % (ref 0–8)
ERYTHROCYTE [DISTWIDTH] IN BLOOD BY AUTOMATED COUNT: 14.3 % (ref 11.5–14.5)
EST. GFR  (NO RACE VARIABLE): 20.6 ML/MIN/1.73 M^2
GLUCOSE SERPL-MCNC: 94 MG/DL (ref 70–110)
GLUCOSE UR QL STRIP: ABNORMAL
HCT VFR BLD AUTO: 35.3 % (ref 37–48.5)
HCV AB SERPL QL IA: NORMAL
HGB BLD-MCNC: 11.4 G/DL (ref 12–16)
HGB UR QL STRIP: NEGATIVE
HIV 1+2 AB+HIV1 P24 AG SERPL QL IA: NORMAL
HYALINE CASTS UR QL AUTO: 0 /LPF
IMM GRANULOCYTES # BLD AUTO: 0.08 K/UL (ref 0–0.04)
IMM GRANULOCYTES NFR BLD AUTO: 0.8 % (ref 0–0.5)
INFLUENZA A, MOLECULAR: NOT DETECTED
INFLUENZA B, MOLECULAR: NOT DETECTED
KETONES UR QL STRIP: NEGATIVE
LEUKOCYTE ESTERASE UR QL STRIP: ABNORMAL
LYMPHOCYTES # BLD AUTO: 2.5 K/UL (ref 1–4.8)
LYMPHOCYTES NFR BLD: 23.9 % (ref 18–48)
MCH RBC QN AUTO: 29.2 PG (ref 27–31)
MCHC RBC AUTO-ENTMCNC: 32.3 G/DL (ref 32–36)
MCV RBC AUTO: 90 FL (ref 82–98)
MICROSCOPIC COMMENT: ABNORMAL
MONOCYTES # BLD AUTO: 1 K/UL (ref 0.3–1)
MONOCYTES NFR BLD: 9.2 % (ref 4–15)
NEUTROPHILS # BLD AUTO: 6.8 K/UL (ref 1.8–7.7)
NEUTROPHILS NFR BLD: 64.1 % (ref 38–73)
NITRITE UR QL STRIP: POSITIVE
NRBC BLD-RTO: 0 /100 WBC
PH UR STRIP: 7 [PH] (ref 5–8)
PLATELET # BLD AUTO: 419 K/UL (ref 150–450)
PMV BLD AUTO: 10.5 FL (ref 9.2–12.9)
POTASSIUM SERPL-SCNC: 4.5 MMOL/L (ref 3.5–5.1)
PROT SERPL-MCNC: 8.8 G/DL (ref 6–8.4)
PROT UR QL STRIP: ABNORMAL
RBC # BLD AUTO: 3.91 M/UL (ref 4–5.4)
RBC #/AREA URNS AUTO: 6 /HPF (ref 0–4)
RSV AG BY MOLECULAR METHOD: NOT DETECTED
SARS-COV-2 RNA RESP QL NAA+PROBE: NOT DETECTED
SODIUM SERPL-SCNC: 137 MMOL/L (ref 136–145)
SP GR UR STRIP: 1.01 (ref 1–1.03)
SQUAMOUS #/AREA URNS AUTO: 0 /HPF
TROPONIN I SERPL DL<=0.01 NG/ML-MCNC: 38 NG/L (ref 0–14)
TROPONIN I SERPL DL<=0.01 NG/ML-MCNC: 53 NG/L (ref 0–14)
URN SPEC COLLECT METH UR: ABNORMAL
WBC # BLD AUTO: 10.61 K/UL (ref 3.9–12.7)
WBC #/AREA URNS AUTO: 39 /HPF (ref 0–5)

## 2025-01-13 PROCEDURE — 63600175 PHARM REV CODE 636 W HCPCS: Performed by: PHYSICIAN ASSISTANT

## 2025-01-13 PROCEDURE — 0241U SARS-COV2 (COVID) WITH FLU/RSV BY PCR: CPT | Performed by: PHYSICIAN ASSISTANT

## 2025-01-13 PROCEDURE — 85379 FIBRIN DEGRADATION QUANT: CPT | Performed by: PHYSICIAN ASSISTANT

## 2025-01-13 PROCEDURE — 96374 THER/PROPH/DIAG INJ IV PUSH: CPT

## 2025-01-13 PROCEDURE — 86803 HEPATITIS C AB TEST: CPT | Performed by: PHYSICIAN ASSISTANT

## 2025-01-13 PROCEDURE — 83880 ASSAY OF NATRIURETIC PEPTIDE: CPT | Performed by: PHYSICIAN ASSISTANT

## 2025-01-13 PROCEDURE — 87186 SC STD MICRODIL/AGAR DIL: CPT | Performed by: PHYSICIAN ASSISTANT

## 2025-01-13 PROCEDURE — G0378 HOSPITAL OBSERVATION PER HR: HCPCS

## 2025-01-13 PROCEDURE — A9567 TECHNETIUM TC-99M AEROSOL: HCPCS | Performed by: STUDENT IN AN ORGANIZED HEALTH CARE EDUCATION/TRAINING PROGRAM

## 2025-01-13 PROCEDURE — 87088 URINE BACTERIA CULTURE: CPT | Performed by: PHYSICIAN ASSISTANT

## 2025-01-13 PROCEDURE — 80053 COMPREHEN METABOLIC PANEL: CPT | Performed by: PHYSICIAN ASSISTANT

## 2025-01-13 PROCEDURE — 84484 ASSAY OF TROPONIN QUANT: CPT | Performed by: PHYSICIAN ASSISTANT

## 2025-01-13 PROCEDURE — A9540 TC99M MAA: HCPCS | Performed by: STUDENT IN AN ORGANIZED HEALTH CARE EDUCATION/TRAINING PROGRAM

## 2025-01-13 PROCEDURE — 87086 URINE CULTURE/COLONY COUNT: CPT | Performed by: PHYSICIAN ASSISTANT

## 2025-01-13 PROCEDURE — 85025 COMPLETE CBC W/AUTO DIFF WBC: CPT | Performed by: PHYSICIAN ASSISTANT

## 2025-01-13 PROCEDURE — 81001 URINALYSIS AUTO W/SCOPE: CPT | Performed by: PHYSICIAN ASSISTANT

## 2025-01-13 PROCEDURE — 25000003 PHARM REV CODE 250: Performed by: STUDENT IN AN ORGANIZED HEALTH CARE EDUCATION/TRAINING PROGRAM

## 2025-01-13 PROCEDURE — 87389 HIV-1 AG W/HIV-1&-2 AB AG IA: CPT | Performed by: PHYSICIAN ASSISTANT

## 2025-01-13 PROCEDURE — 99285 EMERGENCY DEPT VISIT HI MDM: CPT | Mod: 25

## 2025-01-13 RX ORDER — DOCUSATE SODIUM 100 MG/1
100 CAPSULE, LIQUID FILLED ORAL EVERY 12 HOURS
Status: DISCONTINUED | OUTPATIENT
Start: 2025-01-13 | End: 2025-01-13

## 2025-01-13 RX ORDER — CEFTRIAXONE 1 G/1
1 INJECTION, POWDER, FOR SOLUTION INTRAMUSCULAR; INTRAVENOUS
Status: COMPLETED | OUTPATIENT
Start: 2025-01-13 | End: 2025-01-13

## 2025-01-13 RX ORDER — PANTOPRAZOLE SODIUM 20 MG/1
20 TABLET, DELAYED RELEASE ORAL EVERY MORNING
Status: DISCONTINUED | OUTPATIENT
Start: 2025-01-14 | End: 2025-01-17 | Stop reason: HOSPADM

## 2025-01-13 RX ORDER — FOLIC ACID 1 MG/1
1 TABLET ORAL DAILY
Status: DISCONTINUED | OUTPATIENT
Start: 2025-01-14 | End: 2025-01-17 | Stop reason: HOSPADM

## 2025-01-13 RX ORDER — AZELASTINE 1 MG/ML
1 SPRAY, METERED NASAL 2 TIMES DAILY
Status: DISCONTINUED | OUTPATIENT
Start: 2025-01-13 | End: 2025-01-13

## 2025-01-13 RX ORDER — HEPARIN SODIUM 5000 [USP'U]/ML
5000 INJECTION, SOLUTION INTRAVENOUS; SUBCUTANEOUS EVERY 8 HOURS
Status: DISCONTINUED | OUTPATIENT
Start: 2025-01-13 | End: 2025-01-17 | Stop reason: HOSPADM

## 2025-01-13 RX ORDER — NITROGLYCERIN 0.4 MG/1
0.4 TABLET SUBLINGUAL EVERY 5 MIN PRN
Status: DISCONTINUED | OUTPATIENT
Start: 2025-01-13 | End: 2025-01-17 | Stop reason: HOSPADM

## 2025-01-13 RX ORDER — CYANOCOBALAMIN (VITAMIN B-12) 250 MCG
500 TABLET ORAL DAILY
Status: DISCONTINUED | OUTPATIENT
Start: 2025-01-14 | End: 2025-01-17 | Stop reason: HOSPADM

## 2025-01-13 RX ORDER — CLOPIDOGREL BISULFATE 75 MG/1
75 TABLET ORAL DAILY
Status: DISCONTINUED | OUTPATIENT
Start: 2025-01-14 | End: 2025-01-17 | Stop reason: HOSPADM

## 2025-01-13 RX ORDER — CEFTRIAXONE 1 G/1
1 INJECTION, POWDER, FOR SOLUTION INTRAMUSCULAR; INTRAVENOUS
Status: DISCONTINUED | OUTPATIENT
Start: 2025-01-14 | End: 2025-01-17 | Stop reason: HOSPADM

## 2025-01-13 RX ORDER — IBUPROFEN 200 MG
24 TABLET ORAL
Status: DISCONTINUED | OUTPATIENT
Start: 2025-01-13 | End: 2025-01-17 | Stop reason: HOSPADM

## 2025-01-13 RX ORDER — AMLODIPINE BESYLATE 5 MG/1
5 TABLET ORAL DAILY
Status: DISCONTINUED | OUTPATIENT
Start: 2025-01-14 | End: 2025-01-17

## 2025-01-13 RX ORDER — ALBUTEROL SULFATE 90 UG/1
1 INHALANT RESPIRATORY (INHALATION) EVERY 4 HOURS PRN
Status: DISCONTINUED | OUTPATIENT
Start: 2025-01-13 | End: 2025-01-17 | Stop reason: HOSPADM

## 2025-01-13 RX ORDER — SODIUM BICARBONATE 650 MG/1
650 TABLET ORAL 2 TIMES DAILY
Status: DISCONTINUED | OUTPATIENT
Start: 2025-01-13 | End: 2025-01-17 | Stop reason: HOSPADM

## 2025-01-13 RX ORDER — NALOXONE HCL 0.4 MG/ML
0.02 VIAL (ML) INJECTION
Status: DISCONTINUED | OUTPATIENT
Start: 2025-01-13 | End: 2025-01-17 | Stop reason: HOSPADM

## 2025-01-13 RX ORDER — IBUPROFEN 200 MG
16 TABLET ORAL
Status: DISCONTINUED | OUTPATIENT
Start: 2025-01-13 | End: 2025-01-17 | Stop reason: HOSPADM

## 2025-01-13 RX ORDER — ASPIRIN 81 MG/1
81 TABLET ORAL DAILY
Status: DISCONTINUED | OUTPATIENT
Start: 2025-01-14 | End: 2025-01-17 | Stop reason: HOSPADM

## 2025-01-13 RX ORDER — CHOLECALCIFEROL (VITAMIN D3) 25 MCG
2000 TABLET ORAL DAILY
Status: DISCONTINUED | OUTPATIENT
Start: 2025-01-14 | End: 2025-01-17 | Stop reason: HOSPADM

## 2025-01-13 RX ORDER — SODIUM CHLORIDE 0.9 % (FLUSH) 0.9 %
10 SYRINGE (ML) INJECTION EVERY 12 HOURS PRN
Status: DISCONTINUED | OUTPATIENT
Start: 2025-01-13 | End: 2025-01-17 | Stop reason: HOSPADM

## 2025-01-13 RX ORDER — GLUCAGON 1 MG
1 KIT INJECTION
Status: DISCONTINUED | OUTPATIENT
Start: 2025-01-13 | End: 2025-01-17 | Stop reason: HOSPADM

## 2025-01-13 RX ORDER — ATORVASTATIN CALCIUM 40 MG/1
40 TABLET, FILM COATED ORAL DAILY
Status: DISCONTINUED | OUTPATIENT
Start: 2025-01-14 | End: 2025-01-17 | Stop reason: HOSPADM

## 2025-01-13 RX ORDER — CIPROFLOXACIN 2 MG/ML
400 INJECTION, SOLUTION INTRAVENOUS
Status: DISCONTINUED | OUTPATIENT
Start: 2025-01-13 | End: 2025-01-13

## 2025-01-13 RX ADMIN — CEFTRIAXONE 1 G: 1 INJECTION, POWDER, FOR SOLUTION INTRAMUSCULAR; INTRAVENOUS at 10:01

## 2025-01-13 RX ADMIN — KIT FOR THE PREPARATION OF TECHNETIUM TC 99M ALBUMIN AGGREGATED 4.5 MILLICURIE: 2.5 INJECTION, POWDER, FOR SOLUTION INTRAVENOUS at 02:01

## 2025-01-13 RX ADMIN — SODIUM BICARBONATE 650 MG TABLET 650 MG: at 09:01

## 2025-01-13 RX ADMIN — KIT FOR THE PREPARATION OF TECHNETIUM TC 99M PENTETATE 43.1 MILLICURIE: 20 INJECTION, POWDER, LYOPHILIZED, FOR SOLUTION INTRAVENOUS; RESPIRATORY (INHALATION) at 01:01

## 2025-01-13 NOTE — PLAN OF CARE
SW attempted to complete assessment; pt out of room    SW/CM to follow-up      Ruthy Figueroa CD, MSW, LMSW, RSW   Case Management  [facility]  Email: [email or web address]

## 2025-01-13 NOTE — Clinical Note
Diagnosis: Shortness of breath [786.05.ICD-9-CM]   Future Attending Provider: JV GUADARRAMA [[id number]]

## 2025-01-13 NOTE — ED PROVIDER NOTES
"Encounter Date: 1/13/2025       History     Chief Complaint   Patient presents with    Shortness of Breath     Constinued shob and "nervousness in abdomen" pt stating started on 12/26 and she was admitted for this but it has gotten no better     89 y.o. Female with a PMHx of CAD, a Fib, HTN, HLD, CKD 4, and breast cancer presents to the ED with shortness of breath. Her symptoms initially started the day before [place]. She was recently admitted on 1/6-1/7 for this complaint and weakness. At that time, she was diagnosed with a viral illness and discharged home. Shortness of breath is at rest and with exertion. She also notes an intermittently productive and chronic cough and a "quivering" in her abdomen. She denies fever, chest pain, palpitations, LE swelling. abdominal pain, nausea,vomiting, dysuria, hematuria, bowel changes.     The history is provided by the patient.     Review of patient's allergies indicates:   Allergen Reactions    Clindamycin Anaphylaxis    Penicillins Rash     Past Medical History:   Diagnosis Date    Anticoagulant long-term use     Cervical cancer 1968    breast cancer right    Coronary artery disease     Gout     High cholesterol     Hypertension     MI (myocardial infarction) 1999     Past Surgical History:   Procedure Laterality Date    BREAST LUMPECTOMY      right    CARDIAC SURGERY      multiple cardiac stents    CORONARY ANGIOGRAPHY Right 1/21/2022    Procedure: ANGIOGRAM, CORONARY ARTERY;  Surgeon: Asim Canela MD;  Location: [facility] CATH LAB;  Service: Cardiology;  Laterality: Right;    CORONARY ANGIOGRAPHY Right 5/10/2024    Procedure: ANGIOGRAM, CORONARY ARTERY POSSIBLE LV COR;  Surgeon: Asim Canela MD;  Location: [facility] CATH LAB;  Service: Cardiology;  Laterality: Right;    CORONARY STENT PLACEMENT      HIP REPLACEMENT ARTHROPLASTY Right 2018    JOINT REPLACEMENT      right     Family History   Problem Relation Name Age of Onset    Cancer Mother      Cancer Father   "     Social History     Tobacco Use    Smoking status: Former    Smokeless tobacco: Never   Substance Use Topics    Alcohol use: Yes     Comment: rare    Drug use: No     Review of Systems   Constitutional:  Negative for fever.   Respiratory:  Positive for cough and shortness of breath.    Cardiovascular:  Negative for chest pain, palpitations and leg swelling.   Gastrointestinal:  Negative for abdominal pain, constipation, diarrhea, nausea and vomiting.   Genitourinary:  Negative for dysuria and hematuria.   Neurological:  Positive for weakness.       Physical Exam     Initial Vitals [01/13/25 0559]   BP Pulse Resp Temp SpO2   (!) 151/68 82 18 97.6 °F (36.4 °C) 99 %      MAP       --         Physical Exam    Nursing note and vitals reviewed.  Constitutional: She appears well-developed and well-nourished. She is not diaphoretic. No distress.   HENT:   Head: Normocephalic and atraumatic.   Nose: Nose normal.   Eyes: Conjunctivae and EOM are normal.   Neck: Neck supple.   Cardiovascular:  Normal rate, regular rhythm, normal heart sounds and intact distal pulses.           Pulmonary/Chest: Breath sounds normal. No respiratory distress.   She speaks in full and complete sentences with good air movement.   Abdominal: Abdomen is soft. She exhibits no distension. There is no abdominal tenderness. There is no guarding.   Musculoskeletal:      Cervical back: Neck supple.      Right lower leg: No edema.      Left lower leg: No edema.     Neurological: She is alert and oriented to person, place, and time. Gait normal.   Skin: No rash noted.   Psychiatric: She has a normal mood and affect. Her behavior is normal. Judgment and thought content normal.         ED Course   Procedures  Labs Reviewed   CBC W/ AUTO DIFFERENTIAL - Abnormal       Result Value    WBC 10.61      RBC 3.91 (*)     Hemoglobin 11.4 (*)     Hematocrit 35.3 (*)     MCV 90      MCH 29.2      MCHC 32.3      RDW 14.3      Platelets 419      MPV 10.5      Immature  Granulocytes 0.8 (*)     Gran # (ANC) 6.8      Immature Grans (Abs) 0.08 (*)     Lymph # 2.5      Mono # 1.0      Eos # 0.1      Baso # 0.10      nRBC 0      Gran % 64.1      Lymph % 23.9      Mono % 9.2      Eosinophil % 1.1      Basophil % 0.9      Differential Method Automated     COMPREHENSIVE METABOLIC PANEL - Abnormal    Sodium 137      Potassium 4.5      Chloride 103      CO2 23      Glucose 94      BUN 33 (*)     Creatinine 2.2 (*)     Calcium 10.4      Total Protein 8.8 (*)     Albumin 3.0 (*)     Total Bilirubin 0.6      Alkaline Phosphatase 114      AST 27      ALT 49 (*)     eGFR 20.6 (*)     Anion Gap 11     TROPONIN I HIGH SENSITIVITY - Abnormal    Troponin I High Sensitivity 53 (*)    B-TYPE NATRIURETIC PEPTIDE - Abnormal     (*)    URINALYSIS, REFLEX TO URINE CULTURE - Abnormal    Specimen UA Urine, Clean Catch      Color, UA Yellow      Appearance, UA Clear      pH, UA 7.0      Specific Gravity, UA 1.010      Protein, UA 2+ (*)     Glucose, UA 2+ (*)     Ketones, UA Negative      Bilirubin (UA) Negative      Occult Blood UA Negative      Nitrite, UA Positive (*)     Leukocytes, UA 2+ (*)     Narrative:     Specimen Source->Urine   URINALYSIS MICROSCOPIC - Abnormal    RBC, UA 6 (*)     WBC, UA 39 (*)     Bacteria Rare      Squam Epithel, UA 0      Hyaline Casts, UA 0      Microscopic Comment SEE COMMENT      Narrative:     Specimen Source->Urine   D DIMER, QUANTITATIVE - Abnormal    D-Dimer >33.00 (*)    TROPONIN I HIGH SENSITIVITY - Abnormal    Troponin I High Sensitivity 38 (*)    HEPATITIS C ANTIBODY    Hepatitis C Ab Non-reactive      Narrative:     Release to patient->Immediate   HIV 1 / 2 ANTIBODY    HIV 1/2 Ag/Ab Non-reactive      Narrative:     Release to patient->Immediate   SARS-COV2 (COVID) WITH FLU/RSV BY PCR    SARS-CoV2 (COVID-19) Qualitative PCR Not Detected      Influenza A, Molecular Not Detected      Influenza B, Molecular Not Detected      RSV Ag by Molecular Method Not  Detected            Imaging Results              NM Lung Scan Ventilation Perfusion (Final result)  Result time 01/13/25 15:19:40      Final result by Iveth Cesar MD (01/13/25 15:19:40)                   Impression:        No scintigraphic evidence of pulmonary embolism      Electronically signed by: Iveth Cesar  Date:    01/13/2025  Time:    15:19               Narrative:      EXAMINATION:    NM LUNG VENTILATION AND PERFUSION IMAGING    CLINICAL HISTORY:    89 y/o FemalePulmonary embolism (PE) suspected, positive D-dimer;89-year-old female with history of CAD, CKD,  hypertension and  breast cancer presented to the emergency room with shortness of breath    COMPARISON:    Chest Xray  01/13/25    TECHNIQUE:    43.1  MCi of Tc DTPA aerosol was administered through nebulizer and multiple images of the both lung fields were performed.    Following the IV administration of 4.5 mCi of Tc-99m-MAA, multiple images of the thorax and lungs were obtained in various projections.    FINDINGS:    Ventilation images demonstrate mild heterogeneous distribution of the radiopharmaceutical in both lung fields with activity noted in the nebulizer, central airway retention  and swallowed activity in the stomach    Perfusion images demonstrate similar to heterogeneous uptake in both lung fields.  There are no peripheral mismatched subsegmental or segmental perfusion defects to suggest pulmonary embolism                                       X-Ray Chest AP Portable (Final result)  Result time 01/13/25 08:50:55      Final result by Ryan Franks MD (01/13/25 08:50:55)                   Impression:      See above      Electronically signed by: Ryan Franks MD  Date:    01/13/2025  Time:    08:50               Narrative:    EXAMINATION:  XR CHEST AP PORTABLE    CLINICAL HISTORY:  Shortness of breath    TECHNIQUE:  Single frontal view of the chest was performed.    COMPARISON:  No 01/05/2025 ne    FINDINGS:  Heart size normal.   The lungs are clear.  No pleural effusion                                       Medications   sodium chloride 0.9% flush 10 mL (has no administration in time range)   naloxone 0.4 mg/mL injection 0.02 mg (has no administration in time range)   glucose chewable tablet 16 g (has no administration in time range)   glucose chewable tablet 24 g (has no administration in time range)   dextrose 50% injection 12.5 g (has no administration in time range)   dextrose 50% injection 25 g (has no administration in time range)   glucagon (human recombinant) injection 1 mg (has no administration in time range)   heparin (porcine) injection 5,000 Units (5,000 Units Subcutaneous Not Given 1/14/25 0600)   albuterol inhaler 1 puff (has no administration in time range)   allopurinol split tablet 150 mg (150 mg Oral Given 1/14/25 1000)   amLODIPine tablet 5 mg (5 mg Oral Given 1/14/25 1000)   aspirin EC tablet 81 mg (81 mg Oral Given 1/14/25 1000)   atorvastatin tablet 40 mg (40 mg Oral Given 1/14/25 1000)   vitamin D 1000 units tablet 2,000 Units (2,000 Units Oral Given 1/14/25 1000)   clopidogreL tablet 75 mg (75 mg Oral Given 1/14/25 1000)   cyanocobalamin tablet 500 mcg (500 mcg Oral Given 1/14/25 1000)   folic acid tablet 1 mg (1 mg Oral Given 1/14/25 1000)   nitroGLYCERIN SL tablet 0.4 mg (has no administration in time range)   pantoprazole EC tablet 20 mg (20 mg Oral Given 1/14/25 0604)   sodium bicarbonate tablet 650 mg (650 mg Oral Given 1/14/25 1000)   cefTRIAXone injection 1 g (1 g Intravenous Given 1/14/25 1020)   losartan tablet 25 mg (25 mg Oral Given 1/14/25 1027)   cefTRIAXone injection 1 g (1 g Intravenous Given 1/13/25 1008)   kit for prep of Tc-99m-albumin 2.5 mg SolR 4.5 millicurie (4.5 millicuries Intravenous Given 1/13/25 1402)   kit prep Tc 99m-pentetic acid (aerosol) 20 mg SolR 43.1 millicurie (43.1 millicuries Nebulization Given 1/13/25 1315)     Medical Decision Making  89 y.o. Female with a PMHx of CAD, a Fib,  HTN, HLD, CKD 4, and breast cancer presents to the ED with shortness of breath. Nontoxic appearing.  Vitals with hypertension. Afebrile. Exam as above. I will initiate workup and reassess.    Ddx:  ACS, PE, arrhythmia, pneumonia, pleural effusion, pneumothorax, UTI, MELIDA, dehydration, electrolyte derangement,    - Labs without leukocytosis.  Anemia noted though H&H stable. No significant electrolyte derangements.  - Creatinine at baseline, no MELIDA  - Troponin of 53.  She has had an elevated troponin in the past.  I will trend.  She denies chest pain at this time  - Dimer greater than 33.  Patient has a history of CKD.  I will obtain VQ scan   - UA positive for UTI--nitrites positive and 2+ leukocytes.  Rocephin given  - I will admit at this time to  for VQ scan, troponin trending, treatment for UTI and generalized weakness         Amount and/or Complexity of Data Reviewed  Labs: ordered. Decision-making details documented in ED Course.  Radiology: ordered. Decision-making details documented in ED Course.    Risk  Prescription drug management.               ED Course as of 01/14/25 1306   Mon Jan 13, 2025   0759 WBC: 10.61 [HM]   0759 Hemoglobin(!): 11.4 [HM]   0759 Hematocrit(!): 35.3 [HM]   0813 BNP(!): 126 [HM]   0916 Creatinine(!): 2.2  baseline [HM]   0917 eGFR(!): 20.6 [HM]   0917 ALT(!): 49  downtrending [HM]   0917 Blood, UA: Negative [HM]   0917 NITRITE UA(!): Positive  UA consistent with UTI [HM]   0917 Leukocyte Esterase, UA(!): 2+ [HM]   0917 RBC, UA(!): 6 [HM]   0917 WBC, UA(!): 39 [HM]   0917 Bacteria, UA: Rare [HM]   0917 Squam Epithel, UA: 0 [HM]   0918 X-Ray Chest AP Portable  Negative []   Tue Jan 14, 2025   1305 D-Dimer(!): >33.00 [HM]   1306 Initial troponin of 53 [HM]      ED Course User Index  [] Ratna Box PA-C                             Clinical Impression:  Final diagnoses:  [R06.02] Shortness of breath  [N30.01] Acute cystitis with hematuria (Primary)  [R79.89] Positive D  dimer          ED Disposition Condition    Observation                 Ratna Box PA-C  01/14/25 9421

## 2025-01-13 NOTE — ED TRIAGE NOTES
"Anayh GUTIÉRREZ Nathan, a 89 y.o. female presents to the ED w/ complaint of shortness of breath, cough and fluttering in her abdomen since 12/26. Pt states recently admitted on 1/5 for similar symptoms    Triage note:  Chief Complaint   Patient presents with    Shortness of Breath     Constinued shob and "nervousness in abdomen" pt stating started on 12/26 and she was admitted for this but it has gotten no better     Review of patient's allergies indicates:   Allergen Reactions    Clindamycin Anaphylaxis    Penicillins Rash     Past Medical History:   Diagnosis Date    Anticoagulant long-term use     Cervical cancer 1968    breast cancer right    Coronary artery disease     Gout     High cholesterol     Hypertension     MI (myocardial infarction) 1999       "

## 2025-01-14 PROBLEM — R06.09 DYSPNEA ON EXERTION: Status: ACTIVE | Noted: 2025-01-14

## 2025-01-14 LAB
ALBUMIN SERPL BCP-MCNC: 2.6 G/DL (ref 3.5–5.2)
ALP SERPL-CCNC: 94 U/L (ref 40–150)
ALT SERPL W/O P-5'-P-CCNC: 39 U/L (ref 10–44)
ANION GAP SERPL CALC-SCNC: 10 MMOL/L (ref 8–16)
ASCENDING AORTA: 3.1 CM
AST SERPL-CCNC: 23 U/L (ref 10–40)
AV AREA BY CONTINUOUS VTI: 2.5 CM2
AV INDEX (PROSTH): 0.44
AV LVOT MEAN GRADIENT: 2 MMHG
AV LVOT PEAK GRADIENT: 5 MMHG
AV MEAN GRADIENT: 7.7 MMHG
AV PEAK GRADIENT: 16 MMHG
AV VALVE AREA BY VELOCITY RATIO: 2.3 CM²
AV VALVE AREA: 1.8 CM²
AV VELOCITY RATIO: 0.55
BASOPHILS # BLD AUTO: 0.09 K/UL (ref 0–0.2)
BASOPHILS NFR BLD: 1.1 % (ref 0–1.9)
BILIRUB SERPL-MCNC: 0.4 MG/DL (ref 0.1–1)
BSA FOR ECHO PROCEDURE: 1.83 M2
BUN SERPL-MCNC: 38 MG/DL (ref 10–30)
CALCIUM SERPL-MCNC: 9.4 MG/DL (ref 8.7–10.5)
CHLORIDE SERPL-SCNC: 105 MMOL/L (ref 95–110)
CO2 SERPL-SCNC: 23 MMOL/L (ref 23–29)
CREAT SERPL-MCNC: 2.2 MG/DL (ref 0.5–1.4)
CV ECHO LV RWT: 0.38 CM
DIFFERENTIAL METHOD BLD: ABNORMAL
DOP CALC AO PEAK VEL: 2 M/S
DOP CALC AO VTI: 45 CM
DOP CALC LVOT AREA: 4.2 CM2
DOP CALC LVOT DIAMETER: 2.3 CM
DOP CALC LVOT PEAK VEL: 1.1 M/S
DOP CALC LVOT STROKE VOLUME: 83.1 CM3
DOP CALCLVOT PEAK VEL VTI: 20 CM
E WAVE DECELERATION TIME: 186.77 MS
E/A RATIO: 0.65
E/E' RATIO: 7.53 M/S
ECHO EF ESTIMATED: 62 %
ECHO LV POSTERIOR WALL: 0.9 CM (ref 0.6–1.1)
EOSINOPHIL # BLD AUTO: 0.1 K/UL (ref 0–0.5)
EOSINOPHIL NFR BLD: 1.6 % (ref 0–8)
ERYTHROCYTE [DISTWIDTH] IN BLOOD BY AUTOMATED COUNT: 14.4 % (ref 11.5–14.5)
EST. GFR  (NO RACE VARIABLE): 20.6 ML/MIN/1.73 M^2
FRACTIONAL SHORTENING: 34 % (ref 28–44)
GLUCOSE SERPL-MCNC: 88 MG/DL (ref 70–110)
HCT VFR BLD AUTO: 32.1 % (ref 37–48.5)
HGB BLD-MCNC: 10.3 G/DL (ref 12–16)
IMM GRANULOCYTES # BLD AUTO: 0.04 K/UL (ref 0–0.04)
IMM GRANULOCYTES NFR BLD AUTO: 0.5 % (ref 0–0.5)
INTERVENTRICULAR SEPTUM: 1 CM (ref 0.6–1.1)
LA MAJOR: 4.71 CM
LA MINOR: 5.86 CM
LA WIDTH: 4.62 CM
LEFT ATRIUM SIZE: 3.82 CM
LEFT ATRIUM VOLUME INDEX MOD: 41.6 ML/M2
LEFT ATRIUM VOLUME INDEX: 43 ML/M2
LEFT ATRIUM VOLUME MOD: 75.67 ML
LEFT ATRIUM VOLUME: 78.34 CM3
LEFT INTERNAL DIMENSION IN SYSTOLE: 3.1 CM (ref 2.1–4)
LEFT VENTRICLE DIASTOLIC VOLUME INDEX: 55.6 ML/M2
LEFT VENTRICLE DIASTOLIC VOLUME: 101.2 ML
LEFT VENTRICLE MASS INDEX: 84.3 G/M2
LEFT VENTRICLE SYSTOLIC VOLUME INDEX: 21.4 ML/M2
LEFT VENTRICLE SYSTOLIC VOLUME: 38.9 ML
LEFT VENTRICULAR INTERNAL DIMENSION IN DIASTOLE: 4.7 CM (ref 3.5–6)
LEFT VENTRICULAR MASS: 153.4 G
LV LATERAL E/E' RATIO: 6.4
LV SEPTAL E/E' RATIO: 9.14
LYMPHOCYTES # BLD AUTO: 1.9 K/UL (ref 1–4.8)
LYMPHOCYTES NFR BLD: 23.5 % (ref 18–48)
MAGNESIUM SERPL-MCNC: 2.1 MG/DL (ref 1.6–2.6)
MCH RBC QN AUTO: 29 PG (ref 27–31)
MCHC RBC AUTO-ENTMCNC: 32.1 G/DL (ref 32–36)
MCV RBC AUTO: 90 FL (ref 82–98)
MONOCYTES # BLD AUTO: 1 K/UL (ref 0.3–1)
MONOCYTES NFR BLD: 12.3 % (ref 4–15)
MV PEAK A VEL: 0.99 M/S
MV PEAK E VEL: 0.64 M/S
NEUTROPHILS # BLD AUTO: 4.9 K/UL (ref 1.8–7.7)
NEUTROPHILS NFR BLD: 61 % (ref 38–73)
NRBC BLD-RTO: 0 /100 WBC
OHS CV RV/LV RATIO: 0.87 CM
PHOSPHATE SERPL-MCNC: 4.7 MG/DL (ref 2.7–4.5)
PISA TR MAX VEL: 3.38 M/S
PLATELET # BLD AUTO: 362 K/UL (ref 150–450)
PMV BLD AUTO: 11 FL (ref 9.2–12.9)
POTASSIUM SERPL-SCNC: 4.4 MMOL/L (ref 3.5–5.1)
PROT SERPL-MCNC: 7.4 G/DL (ref 6–8.4)
RA MAJOR: 5.2 CM
RA PRESSURE ESTIMATED: 3 MMHG
RA WIDTH: 3.96 CM
RBC # BLD AUTO: 3.55 M/UL (ref 4–5.4)
RIGHT VENTRICLE DIASTOLIC BASEL DIMENSION: 4.1 CM
RV TB RVSP: 6 MMHG
SINUS: 3.12 CM
SODIUM SERPL-SCNC: 138 MMOL/L (ref 136–145)
STJ: 2.36 CM
TDI LATERAL: 0.1 M/S
TDI SEPTAL: 0.07 M/S
TDI: 0.09 M/S
TR MAX PG: 46 MMHG
TRICUSPID ANNULAR PLANE SYSTOLIC EXCURSION: 1.43 CM
TV PEAK GRADIENT: 46 MMHG
TV REST PULMONARY ARTERY PRESSURE: 49 MMHG
WBC # BLD AUTO: 8.05 K/UL (ref 3.9–12.7)
Z-SCORE OF LEFT VENTRICULAR DIMENSION IN END DIASTOLE: -0.7
Z-SCORE OF LEFT VENTRICULAR DIMENSION IN END SYSTOLE: -0.03

## 2025-01-14 PROCEDURE — 84100 ASSAY OF PHOSPHORUS: CPT | Performed by: STUDENT IN AN ORGANIZED HEALTH CARE EDUCATION/TRAINING PROGRAM

## 2025-01-14 PROCEDURE — 85025 COMPLETE CBC W/AUTO DIFF WBC: CPT | Performed by: STUDENT IN AN ORGANIZED HEALTH CARE EDUCATION/TRAINING PROGRAM

## 2025-01-14 PROCEDURE — 25000003 PHARM REV CODE 250: Performed by: STUDENT IN AN ORGANIZED HEALTH CARE EDUCATION/TRAINING PROGRAM

## 2025-01-14 PROCEDURE — 97116 GAIT TRAINING THERAPY: CPT

## 2025-01-14 PROCEDURE — 36415 COLL VENOUS BLD VENIPUNCTURE: CPT | Performed by: STUDENT IN AN ORGANIZED HEALTH CARE EDUCATION/TRAINING PROGRAM

## 2025-01-14 PROCEDURE — 99214 OFFICE O/P EST MOD 30 MIN: CPT | Mod: ,,, | Performed by: INTERNAL MEDICINE

## 2025-01-14 PROCEDURE — 80053 COMPREHEN METABOLIC PANEL: CPT | Performed by: STUDENT IN AN ORGANIZED HEALTH CARE EDUCATION/TRAINING PROGRAM

## 2025-01-14 PROCEDURE — 97161 PT EVAL LOW COMPLEX 20 MIN: CPT

## 2025-01-14 PROCEDURE — 96376 TX/PRO/DX INJ SAME DRUG ADON: CPT

## 2025-01-14 PROCEDURE — 97535 SELF CARE MNGMENT TRAINING: CPT

## 2025-01-14 PROCEDURE — 97165 OT EVAL LOW COMPLEX 30 MIN: CPT

## 2025-01-14 PROCEDURE — G0378 HOSPITAL OBSERVATION PER HR: HCPCS

## 2025-01-14 PROCEDURE — 63600175 PHARM REV CODE 636 W HCPCS: Performed by: STUDENT IN AN ORGANIZED HEALTH CARE EDUCATION/TRAINING PROGRAM

## 2025-01-14 PROCEDURE — 83735 ASSAY OF MAGNESIUM: CPT | Performed by: STUDENT IN AN ORGANIZED HEALTH CARE EDUCATION/TRAINING PROGRAM

## 2025-01-14 RX ORDER — LOSARTAN POTASSIUM 25 MG/1
25 TABLET ORAL DAILY
Status: DISCONTINUED | OUTPATIENT
Start: 2025-01-14 | End: 2025-01-17 | Stop reason: HOSPADM

## 2025-01-14 RX ORDER — BENZONATATE 200 MG/1
200 CAPSULE ORAL 2 TIMES DAILY PRN
Status: ON HOLD | COMMUNITY
Start: 2024-12-19 | End: 2025-01-17 | Stop reason: HOSPADM

## 2025-01-14 RX ORDER — POLYETHYLENE GLYCOL 3350 17 G/17G
17 POWDER, FOR SOLUTION ORAL 2 TIMES DAILY
Status: DISCONTINUED | OUTPATIENT
Start: 2025-01-14 | End: 2025-01-17 | Stop reason: HOSPADM

## 2025-01-14 RX ADMIN — ATORVASTATIN CALCIUM 40 MG: 40 TABLET, FILM COATED ORAL at 10:01

## 2025-01-14 RX ADMIN — LOSARTAN POTASSIUM 25 MG: 25 TABLET, FILM COATED ORAL at 10:01

## 2025-01-14 RX ADMIN — CEFTRIAXONE 1 G: 1 INJECTION, POWDER, FOR SOLUTION INTRAMUSCULAR; INTRAVENOUS at 10:01

## 2025-01-14 RX ADMIN — CLOPIDOGREL BISULFATE 75 MG: 75 TABLET ORAL at 10:01

## 2025-01-14 RX ADMIN — SODIUM BICARBONATE 650 MG TABLET 650 MG: at 10:01

## 2025-01-14 RX ADMIN — ASPIRIN 81 MG: 81 TABLET, COATED ORAL at 10:01

## 2025-01-14 RX ADMIN — CYANOCOBALAMIN TAB 250 MCG 500 MCG: 250 TAB at 10:01

## 2025-01-14 RX ADMIN — AMLODIPINE BESYLATE 5 MG: 5 TABLET ORAL at 10:01

## 2025-01-14 RX ADMIN — ALLOPURINOL 150 MG: 100 TABLET ORAL at 10:01

## 2025-01-14 RX ADMIN — PANTOPRAZOLE SODIUM 20 MG: 20 TABLET, DELAYED RELEASE ORAL at 06:01

## 2025-01-14 RX ADMIN — POLYETHYLENE GLYCOL 3350 17 G: 17 POWDER, FOR SOLUTION ORAL at 10:01

## 2025-01-14 RX ADMIN — FOLIC ACID 1 MG: 1 TABLET ORAL at 10:01

## 2025-01-14 RX ADMIN — CHOLECALCIFEROL TAB 25 MCG (1000 UNIT) 2000 UNITS: 25 TAB at 10:01

## 2025-01-14 NOTE — HPI
"89 year old female with PMH of CKD4, HTN, AAA, hyperuricemia, Vitamin D def, Afib, CAD, HLD, presents to ED for shortness of breath at rest since 12/26/24. She describes it as shortness of breath on exertion and rest that is progressively worsening. She was recently admitted 1/6-1/7 for shortness of breath and weakness - was diagnosed with viral illness and discharged home. She reports feeling chill last week but not anymore. She feels a "nervousness" in her abdomen. Denies pain. She reports she feels a stinging sensation when she urinates occasionally. Denies chest pain, fevers, nausea, vomiting, cough, sputum production. She denies constipation, diarrhea. She lives with her daughter. At baseline she is active, ambulates without assistance. Trying to use walker now.     ED course:  Sating well on RA. Hemodynamically stable.   Labs: Trop 53 --> 38, , D-dimer 33+, UA positive for UTI  VQ scan: no PE   Rx: IV ceftriaxone   "

## 2025-01-14 NOTE — SUBJECTIVE & OBJECTIVE
Past Medical History:   Diagnosis Date    Anticoagulant long-term use     Cervical cancer 1968    breast cancer right    Coronary artery disease     Gout     High cholesterol     Hypertension     MI (myocardial infarction) 1999       Past Surgical History:   Procedure Laterality Date    BREAST LUMPECTOMY      right    CARDIAC SURGERY      multiple cardiac stents    CORONARY ANGIOGRAPHY Right 1/21/2022    Procedure: ANGIOGRAM, CORONARY ARTERY;  Surgeon: Asim Canela MD;  Location: [facility] CATH LAB;  Service: Cardiology;  Laterality: Right;    CORONARY ANGIOGRAPHY Right 5/10/2024    Procedure: ANGIOGRAM, CORONARY ARTERY POSSIBLE LV COR;  Surgeon: Asim Canela MD;  Location: [facility] CATH LAB;  Service: Cardiology;  Laterality: Right;    CORONARY STENT PLACEMENT      HIP REPLACEMENT ARTHROPLASTY Right 2018    JOINT REPLACEMENT      right       Review of patient's allergies indicates:   Allergen Reactions    Clindamycin Anaphylaxis    Penicillins Rash       No current facility-administered medications on file prior to encounter.     Current Outpatient Medications on File Prior to Encounter   Medication Sig    albuterol (PROVENTIL/VENTOLIN HFA) 90 mcg/actuation inhaler 1 puff every 4 (four) hours as needed for Shortness of Breath or Wheezing.    allopurinoL (ZYLOPRIM) 100 MG tablet Take 150 mg by mouth once daily.    amLODIPine (NORVASC) 5 MG tablet Take 5 mg by mouth once daily.    aspirin (ECOTRIN) 81 MG EC tablet Take 81 mg by mouth once daily.    atorvastatin (LIPITOR) 40 MG tablet Take 40 mg by mouth once daily.    azelastine (ASTELIN) 137 mcg (0.1 %) nasal spray 1 spray by Nasal route 2 (two) times daily.    calcium carbonate-vitamin D3 600 mg calcium- 200 unit Cap Take by mouth.    cholecalciferol, vitamin D3, (VITAMIN D3) 50 mcg (2,000 unit) Cap Take 1 capsule by mouth once daily.    clonazePAM (KLONOPIN) 0.5 MG tablet Take 1 tablet (0.5 mg total) by mouth every evening.    clopidogreL (PLAVIX) 75 mg  tablet Take 75 mg by mouth once daily.    cyanocobalamin 500 MCG tablet Take 500 mcg by mouth once daily.    docusate sodium (COLACE) 100 MG capsule Take 1 capsule (100 mg total) by mouth every 12 (twelve) hours.    FARXIGA 10 mg tablet Take 1 tablet by mouth once daily.    folic acid (FOLVITE) 1 MG tablet Take 1 mg by mouth once daily.    KERENDIA 10 mg Tab Take by mouth.    losartan (COZAAR) 25 MG tablet Take 50 mg by mouth once daily.    nitroGLYCERIN (NITROSTAT) 0.4 MG SL tablet Place 0.4 mg under the tongue every 5 (five) minutes as needed for Chest pain.    pantoprazole (PROTONIX) 20 MG tablet Take 20 mg by mouth every morning.    sodium bicarbonate 650 MG tablet Take 650 mg by mouth 2 (two) times daily.     Family History       Problem Relation (Age of Onset)    Cancer Mother, Father          Tobacco Use    Smoking status: Former    Smokeless tobacco: Never   Substance and Sexual Activity    Alcohol use: Yes     Comment: rare    Drug use: No    Sexual activity: Not Currently     Review of Systems   Constitutional:  Positive for chills. Negative for appetite change, fatigue and fever.   Respiratory:  Positive for shortness of breath. Negative for apnea, cough, chest tightness and wheezing.    Cardiovascular:  Negative for chest pain, palpitations and leg swelling.   Gastrointestinal:  Negative for abdominal pain, diarrhea, nausea and vomiting.   Genitourinary:  Positive for dysuria.   Musculoskeletal:  Negative for back pain.   Neurological:  Negative for tremors, seizures, syncope, weakness and light-headedness.     Objective:     Vital Signs (Most Recent):  Temp: 98 °F (36.7 °C) (01/13/25 1043)  Pulse: 80 (01/13/25 1043)  Resp: 18 (01/13/25 1043)  BP: 119/76 (01/13/25 1043)  SpO2: 97 % (01/13/25 1043) Vital Signs (24h Range):  Temp:  [97.6 °F (36.4 °C)-98.2 °F (36.8 °C)] 98 °F (36.7 °C)  Pulse:  [80-82] 80  Resp:  [15-18] 18  SpO2:  [97 %-99 %] 97 %  BP: (119-151)/(68-76) 119/76     Weight: 72.6 kg (160  lb)  Body mass index is 25.06 kg/m².     Physical Exam  Constitutional:       General: She is not in acute distress.     Appearance: Normal appearance. She is obese. She is not ill-appearing.   Cardiovascular:      Rate and Rhythm: Normal rate.      Heart sounds: Normal heart sounds.   Pulmonary:      Effort: Pulmonary effort is normal. No respiratory distress.   Abdominal:      General: Abdomen is flat.      Palpations: Abdomen is soft.      Tenderness: There is no abdominal tenderness.   Musculoskeletal:      Right lower leg: No edema.      Left lower leg: No edema.   Neurological:      General: No focal deficit present.      Mental Status: She is alert and oriented to person, place, and time. Mental status is at baseline.      Cranial Nerves: No cranial nerve deficit.   Psychiatric:         Mood and Affect: Mood normal.                Significant Labs: All pertinent labs within the past 24 hours have been reviewed.  BMP:   Recent Labs   Lab 01/13/25  0731   GLU 94      K 4.5      CO2 23   BUN 33*   CREATININE 2.2*   CALCIUM 10.4     CBC:   Recent Labs   Lab 01/13/25  0731   WBC 10.61   HGB 11.4*   HCT 35.3*          Significant Imaging: I have reviewed all pertinent imaging results/findings within the past 24 hours.

## 2025-01-14 NOTE — ASSESSMENT & PLAN NOTE
Unclear etiology given patients symptoms. Maybe post viral illness.   - V/Q scan negative   - echo ordered   - PT/OT

## 2025-01-14 NOTE — ASSESSMENT & PLAN NOTE
Creatine stable for now. BMP reviewed- noted Estimated Creatinine Clearance: 16.2 mL/min (A) (based on SCr of 2.2 mg/dL (H)). according to latest data. Based on current GFR, CKD stage is stage 4 - GFR 15-29.  Monitor UOP and serial BMP and adjust therapy as needed. Renally dose meds. Avoid nephrotoxic medications and procedures.    - at baseline

## 2025-01-14 NOTE — ASSESSMENT & PLAN NOTE
Due anginal episode with sudden onset with activity relived by nitroglycerine and previous history of CAD requiring stent placement. Feel the origin of her TAYLOR is likely cardiac in nature; CAD. Discussed possible diagnostic with patient. She is agreeable to a PET stress test. She is agreeable to more invasive intervention if the stress test show the intervention to be warranted.     She has drank some caffeine today. Will need to be at least 24 hours caffeine free prior to PET stress test. Will need to be NPO at midnight prior to the day of the PET stress.; aiming for Thursday.     Plan:  PET Stress test; aiming for Thursday   No caffeine till after PET stress is completed  NPO Wednesday at midnight.

## 2025-01-14 NOTE — PLAN OF CARE
Problem: Occupational Therapy  Goal: Occupational Therapy Goal  Description: Goals to be met by: 2/13/25     Patient will increase functional independence with ADLs by performing:    UE Dressing with Modified [place].  LE Dressing with Modified [place].  Grooming while standing with Modified [place].  Toileting from toilet with Modified [place] for hygiene and clothing management.   Supine to sit with Modified [place].  Step transfer with Modified [place]  Toilet transfer to toilet with Modified [place].    Outcome: Progressing     OT eval completed.

## 2025-01-14 NOTE — SUBJECTIVE & OBJECTIVE
Past Medical History:   Diagnosis Date    Anticoagulant long-term use     Cervical cancer 1968    breast cancer right    Coronary artery disease     Gout     High cholesterol     Hypertension     MI (myocardial infarction) 1999       Past Surgical History:   Procedure Laterality Date    BREAST LUMPECTOMY      right    CARDIAC SURGERY      multiple cardiac stents    CORONARY ANGIOGRAPHY Right 1/21/2022    Procedure: ANGIOGRAM, CORONARY ARTERY;  Surgeon: Asim Canela MD;  Location: [facility], [place] CATH LAB;  Service: Cardiology;  Laterality: Right;    CORONARY ANGIOGRAPHY Right 5/10/2024    Procedure: ANGIOGRAM, CORONARY ARTERY POSSIBLE LV COR;  Surgeon: Asim Canela MD;  Location: [facility], [place] CATH LAB;  Service: Cardiology;  Laterality: Right;    CORONARY STENT PLACEMENT      HIP REPLACEMENT ARTHROPLASTY Right 2018    JOINT REPLACEMENT      right       Review of patient's allergies indicates:   Allergen Reactions    Clindamycin Anaphylaxis    Penicillins Rash       No current facility-administered medications on file prior to encounter.     Current Outpatient Medications on File Prior to Encounter   Medication Sig    allopurinoL (ZYLOPRIM) 100 MG tablet Take 0.5 tablets by mouth once daily.    amLODIPine (NORVASC) 5 MG tablet Take 5 mg by mouth once daily.    aspirin (ECOTRIN) 81 MG EC tablet Take 81 mg by mouth once daily.    atorvastatin (LIPITOR) 40 MG tablet Take 40 mg by mouth once daily.    benzonatate (TESSALON) 200 MG capsule Take 200 mg by mouth 2 (two) times daily as needed.    cholecalciferol, vitamin D3, (VITAMIN D3) 50 mcg (2,000 unit) Cap Take 1 capsule by mouth once daily.    clonazePAM (KLONOPIN) 0.5 MG tablet Take 1 tablet (0.5 mg total) by mouth every evening. (Patient taking differently: Take 0.5 mg by mouth as needed. Rarely taken)    clopidogreL (PLAVIX) 75 mg tablet Take 75 mg by mouth once daily.    FARXIGA 10 mg tablet Take 1 tablet by mouth once daily.    folic acid (FOLVITE) 1 MG tablet Take 1  mg by mouth once daily.    KERENDIA 10 mg Tab Take 1 tablet by mouth Daily.    losartan (COZAAR) 25 MG tablet Take 50 mg by mouth once daily.    pantoprazole (PROTONIX) 20 MG tablet Take 20 mg by mouth every morning.    sodium bicarbonate 650 MG tablet Take 650 mg by mouth 2 (two) times daily.    nitroGLYCERIN (NITROSTAT) 0.4 MG SL tablet Place 0.4 mg under the tongue every 5 (five) minutes as needed for Chest pain.    [DISCONTINUED] albuterol (PROVENTIL/VENTOLIN HFA) 90 mcg/actuation inhaler 1 puff every 4 (four) hours as needed for Shortness of Breath or Wheezing.    [DISCONTINUED] azelastine (ASTELIN) 137 mcg (0.1 %) nasal spray 1 spray by Nasal route 2 (two) times daily.    [DISCONTINUED] calcium carbonate-vitamin D3 600 mg calcium- 200 unit Cap Take by mouth.    [DISCONTINUED] cyanocobalamin 500 MCG tablet Take 500 mcg by mouth once daily.    [DISCONTINUED] docusate sodium (COLACE) 100 MG capsule Take 1 capsule (100 mg total) by mouth every 12 (twelve) hours.     Family History       Problem Relation (Age of Onset)    Cancer Mother, Father          Tobacco Use    Smoking status: Former    Smokeless tobacco: Never   Substance and Sexual Activity    Alcohol use: Yes     Comment: rare    Drug use: No    Sexual activity: Not Currently     Review of Systems   Cardiovascular:  Positive for chest pain (resolved currently) and dyspnea on exertion. Negative for leg swelling and orthopnea.   Respiratory:  Positive for cough and shortness of breath.      Objective:     Vital Signs (Most Recent):  Temp: 97.8 °F (36.6 °C) (01/14/25 1203)  Pulse: (!) 58 (01/14/25 1430)  Resp: 18 (01/14/25 1203)  BP: 136/82 (01/14/25 1203)  SpO2: 98 % (01/14/25 1203) Vital Signs (24h Range):  Temp:  [97.8 °F (36.6 °C)-98.2 °F (36.8 °C)] 97.8 °F (36.6 °C)  Pulse:  [40-75] 58  Resp:  [16-18] 18  SpO2:  [95 %-98 %] 98 %  BP: (122-172)/(64-82) 136/82     Weight: 71 kg (156 lb 8.4 oz)  Body mass index is 24.52 kg/m².    SpO2: 98 %          Intake/Output Summary (Last 24 hours) at 1/14/2025 1440  Last data filed at 1/14/2025 0604  Gross per 24 hour   Intake --   Output 350 ml   Net -350 ml       Lines/Drains/Airways       Drain  Duration             Female External Urinary Catheter w/ Suction 01/13/25 1700 <1 day              Peripheral Intravenous Line  Duration                  Peripheral IV - Single Lumen 01/13/25 0731 20 G Right Antecubital 1 day                     Physical Exam  Cardiovascular:      Rate and Rhythm: Normal rate and regular rhythm.      Heart sounds: No murmur heard.  Pulmonary:      Effort: Pulmonary effort is normal.      Breath sounds: Normal breath sounds.   Abdominal:      General: Abdomen is flat. There is no distension.      Palpations: Abdomen is soft.      Tenderness: There is no abdominal tenderness.   Musculoskeletal:      Right lower leg: No edema.      Left lower leg: No edema.   Neurological:      Mental Status: She is oriented to person, place, and time.          Significant Labs: All pertinent lab results from the last 24 hours have been reviewed.    Significant Imaging:  Teviewed

## 2025-01-14 NOTE — SUBJECTIVE & OBJECTIVE
Past Medical History:   Diagnosis Date    Anticoagulant long-term use     Cervical cancer 1968    breast cancer right    Coronary artery disease     Gout     High cholesterol     Hypertension     MI (myocardial infarction) 1999       Past Surgical History:   Procedure Laterality Date    BREAST LUMPECTOMY      right    CARDIAC SURGERY      multiple cardiac stents    CORONARY ANGIOGRAPHY Right 1/21/2022    Procedure: ANGIOGRAM, CORONARY ARTERY;  Surgeon: Asim Canela MD;  Location: [facility] CATH LAB;  Service: Cardiology;  Laterality: Right;    CORONARY ANGIOGRAPHY Right 5/10/2024    Procedure: ANGIOGRAM, CORONARY ARTERY POSSIBLE LV COR;  Surgeon: Asim Canela MD;  Location: [facility] CATH LAB;  Service: Cardiology;  Laterality: Right;    CORONARY STENT PLACEMENT      HIP REPLACEMENT ARTHROPLASTY Right 2018    JOINT REPLACEMENT      right       Review of patient's allergies indicates:   Allergen Reactions    Clindamycin Anaphylaxis    Penicillins Rash       No current facility-administered medications on file prior to encounter.     Current Outpatient Medications on File Prior to Encounter   Medication Sig    albuterol (PROVENTIL/VENTOLIN HFA) 90 mcg/actuation inhaler 1 puff every 4 (four) hours as needed for Shortness of Breath or Wheezing.    allopurinoL (ZYLOPRIM) 100 MG tablet Take 150 mg by mouth once daily.    amLODIPine (NORVASC) 5 MG tablet Take 5 mg by mouth once daily.    aspirin (ECOTRIN) 81 MG EC tablet Take 81 mg by mouth once daily.    atorvastatin (LIPITOR) 40 MG tablet Take 40 mg by mouth once daily.    azelastine (ASTELIN) 137 mcg (0.1 %) nasal spray 1 spray by Nasal route 2 (two) times daily.    calcium carbonate-vitamin D3 600 mg calcium- 200 unit Cap Take by mouth.    cholecalciferol, vitamin D3, (VITAMIN D3) 50 mcg (2,000 unit) Cap Take 1 capsule by mouth once daily.    clonazePAM (KLONOPIN) 0.5 MG tablet Take 1 tablet (0.5 mg total) by mouth every evening.    clopidogreL (PLAVIX) 75 mg  tablet Take 75 mg by mouth once daily.    cyanocobalamin 500 MCG tablet Take 500 mcg by mouth once daily.    docusate sodium (COLACE) 100 MG capsule Take 1 capsule (100 mg total) by mouth every 12 (twelve) hours.    FARXIGA 10 mg tablet Take 1 tablet by mouth once daily.    folic acid (FOLVITE) 1 MG tablet Take 1 mg by mouth once daily.    KERENDIA 10 mg Tab Take by mouth.    losartan (COZAAR) 25 MG tablet Take 50 mg by mouth once daily.    nitroGLYCERIN (NITROSTAT) 0.4 MG SL tablet Place 0.4 mg under the tongue every 5 (five) minutes as needed for Chest pain.    pantoprazole (PROTONIX) 20 MG tablet Take 20 mg by mouth every morning.    sodium bicarbonate 650 MG tablet Take 650 mg by mouth 2 (two) times daily.     Family History       Problem Relation (Age of Onset)    Cancer Mother, Father          Tobacco Use    Smoking status: Former    Smokeless tobacco: Never   Substance and Sexual Activity    Alcohol use: Yes     Comment: rare    Drug use: No    Sexual activity: Not Currently     Review of Systems   Constitutional:  Positive for chills and fatigue. Negative for appetite change and fever.   Respiratory:  Positive for shortness of breath. Negative for cough, chest tightness and wheezing.    Cardiovascular:  Negative for chest pain and leg swelling.   Gastrointestinal:  Negative for abdominal pain, constipation, diarrhea, nausea and vomiting.   Genitourinary:  Positive for dysuria.   Musculoskeletal: Negative.    Psychiatric/Behavioral:  Negative for confusion.      Objective:     Vital Signs (Most Recent):  Temp: 98 °F (36.7 °C) (01/13/25 1043)  Pulse: 80 (01/13/25 1043)  Resp: 18 (01/13/25 1043)  BP: 119/76 (01/13/25 1043)  SpO2: 97 % (01/13/25 1043) Vital Signs (24h Range):  Temp:  [97.6 °F (36.4 °C)-98.2 °F (36.8 °C)] 98 °F (36.7 °C)  Pulse:  [80-82] 80  Resp:  [15-18] 18  SpO2:  [97 %-99 %] 97 %  BP: (119-151)/(68-76) 119/76     Weight: 72.6 kg (160 lb)  Body mass index is 25.06 kg/m².     Physical  Exam  Constitutional:       General: She is not in acute distress.     Appearance: Normal appearance. She is not ill-appearing.   Cardiovascular:      Rate and Rhythm: Normal rate.      Heart sounds: Normal heart sounds.   Pulmonary:      Effort: Pulmonary effort is normal. No respiratory distress.      Breath sounds: Normal breath sounds.   Abdominal:      General: Abdomen is flat.      Palpations: Abdomen is soft.      Tenderness: There is no abdominal tenderness.   Musculoskeletal:      Right lower leg: No edema.      Left lower leg: No edema.   Neurological:      General: No focal deficit present.      Mental Status: She is alert and oriented to person, place, and time.      Motor: No weakness.   Psychiatric:         Mood and Affect: Mood normal.                Significant Labs: All pertinent labs within the past 24 hours have been reviewed.  BMP:   Recent Labs   Lab 01/13/25  0731   GLU 94      K 4.5      CO2 23   BUN 33*   CREATININE 2.2*   CALCIUM 10.4     CBC:   Recent Labs   Lab 01/13/25  0731   WBC 10.61   HGB 11.4*   HCT 35.3*          Significant Imaging: I have reviewed all pertinent imaging results/findings within the past 24 hours.

## 2025-01-14 NOTE — PLAN OF CARE
[facility]  Initial Discharge Assessment       Primary Care Provider: Elena Cabrera MD    Admission Diagnosis: Shortness of breath [R06.02]  Chest pain [R07.9]    Admission Date: 1/13/2025  Expected Discharge Date: 1/14/2025         Payor: [facility] MCARE / Plan: [employer] MEDICARE ADVANTAGE / Product Type: Medicare Advantage /     Extended Emergency Contact Information  Primary Emergency Contact: Malina Anaya   [facility]  Home Phone: [phone]  Mobile Phone: [phone]  Relation: Daughter   needed? No    Discharge Plan A: (P) Home with family  Discharge Plan B: (P) Home with family      [id number] [place] [facility] [address]  [address]  [facility] [place] [address]  Phone: [phone] Fax: [phone]                 SW completed Discharge Planning Assessment with patient via bedside. Discharge planning booklet given to patient/family and whiteboard updated with ROSEMARIE and phone #. All questions answered.    Patient reported that she will have transportation upon discharge.     Patient reported that she live with her daughter, and prior to hospitalization she was independent with her ADL's. Patient reported that she has a cane that she recently started using. Patient reported that she is not on dialysis and does not go to a Coumadin clinic.      Patient lives in a [facility] with three steps to enter.     Discharge Plan A and Plan B have been determined by review of patient's clinical status, future medical and therapeutic needs, and coverage/benefits for post-acute care in coordination with multidisciplinary team members.      Roseann Shankar LMSW  [facility]  Ext. [phone]

## 2025-01-14 NOTE — ED NOTES
Telemetry Verification   Patient placed on Telemetry Box  Verified with War Room  Box # [id number]   Monitor Tech WARROO   Rate 55   Rhythm Sinus Prabhakar

## 2025-01-14 NOTE — CONSULTS
[facility]  Cardiology  Consult Note    Patient Name: Anahy Evans  MRN: [id number]  Admission Date: 1/13/2025  Hospital Length of Stay: 0 days  Code Status: Full Code   Attending Provider: Maria E Bernstein MD   Consulting Provider: Dot Goel DO  Primary Care Physician: Elena Cabrera MD  Principal Problem:Shortness of breath    Patient information was obtained from patient, relative(s), past medical records, ER records, and primary team.     Inpatient consult to Cardiology  Consult performed by: Dot Goel DO  Consult ordered by: Maria E Bernstein MD        Subjective:     Chief Complaint:  TAYLOR     HPI:   Ms. Anahy Evans is a pleasant 89 F with a past medical history that includes AAA, HTN, A. Fib, CAD s/p 2 stents, HLD, CKD4, hyperuricemia, and low vitamin D. She initially came in for SOB. Cardiology was consulted for a possible right heart cath. TRAVIS Higginbotham reports that around December 22nd she had an episode of angina with SOB. She took a nitroglycerine which resolved the symptoms. She reports having to take nitroglycerine for chest pain 2-3 times a year. She has not experienced angina since that time. Starting December 26th she has experienced TAYLOR that has not gone away. Prior to this she reports being able to do all of her ADLs (cooking, cleaning, laundry), but is now unable to do so; her daughter is helping her. She additionally admits to TAYLOR, cough, sporadic abdominal pain, and a few episodes of nausea last week (emesis x 2). Regarding the nausea, she is not sure what caused it and states she had it for a few days and vomited twice. Since then the nausea has gone away tough she does report occasional episodes of lower abdominal pain. Last BM 3 days ago. She was told sometime last year that she had a thickening in her colon. She was seen by GI and was informed she was too ajit risk for a colonoscopy. She reports episodes of stools about the diameter of her pink and two  episodes of black stool over the last year; one was after taking Pepto bismol. She is unsure if the other one was relate to Pepto bismol. She denies orthopnea, abdominal tenderness, decreased urination, any further nausea. Last ECHO was completed today and shows a regional wall motion abnormality, EF 50-55%, and mild to mod pulmonary HTN. EKG done on 1/5/2025 sows PACs and a possible old anterior infarct.     Past Medical History:   Diagnosis Date    Anticoagulant long-term use     Cervical cancer 1968    breast cancer right    Coronary artery disease     Gout     High cholesterol     Hypertension     MI (myocardial infarction) 1999       Past Surgical History:   Procedure Laterality Date    BREAST LUMPECTOMY      right    CARDIAC SURGERY      multiple cardiac stents    CORONARY ANGIOGRAPHY Right 1/21/2022    Procedure: ANGIOGRAM, CORONARY ARTERY;  Surgeon: Asim Canela MD;  Location: [facility] CATH LAB;  Service: Cardiology;  Laterality: Right;    CORONARY ANGIOGRAPHY Right 5/10/2024    Procedure: ANGIOGRAM, CORONARY ARTERY POSSIBLE LV COR;  Surgeon: Asim Canela MD;  Location: [facility] CATH LAB;  Service: Cardiology;  Laterality: Right;    CORONARY STENT PLACEMENT      HIP REPLACEMENT ARTHROPLASTY Right 2018    JOINT REPLACEMENT      right       Review of patient's allergies indicates:   Allergen Reactions    Clindamycin Anaphylaxis    Penicillins Rash       No current facility-administered medications on file prior to encounter.     Current Outpatient Medications on File Prior to Encounter   Medication Sig    allopurinoL (ZYLOPRIM) 100 MG tablet Take 0.5 tablets by mouth once daily.    amLODIPine (NORVASC) 5 MG tablet Take 5 mg by mouth once daily.    aspirin (ECOTRIN) 81 MG EC tablet Take 81 mg by mouth once daily.    atorvastatin (LIPITOR) 40 MG tablet Take 40 mg by mouth once daily.    benzonatate (TESSALON) 200 MG capsule Take 200 mg by mouth 2 (two) times daily as needed.    cholecalciferol, vitamin  D3, (VITAMIN D3) 50 mcg (2,000 unit) Cap Take 1 capsule by mouth once daily.    clonazePAM (KLONOPIN) 0.5 MG tablet Take 1 tablet (0.5 mg total) by mouth every evening. (Patient taking differently: Take 0.5 mg by mouth as needed. Rarely taken)    clopidogreL (PLAVIX) 75 mg tablet Take 75 mg by mouth once daily.    FARXIGA 10 mg tablet Take 1 tablet by mouth once daily.    folic acid (FOLVITE) 1 MG tablet Take 1 mg by mouth once daily.    KERENDIA 10 mg Tab Take 1 tablet by mouth Daily.    losartan (COZAAR) 25 MG tablet Take 50 mg by mouth once daily.    pantoprazole (PROTONIX) 20 MG tablet Take 20 mg by mouth every morning.    sodium bicarbonate 650 MG tablet Take 650 mg by mouth 2 (two) times daily.    nitroGLYCERIN (NITROSTAT) 0.4 MG SL tablet Place 0.4 mg under the tongue every 5 (five) minutes as needed for Chest pain.    [DISCONTINUED] albuterol (PROVENTIL/VENTOLIN HFA) 90 mcg/actuation inhaler 1 puff every 4 (four) hours as needed for Shortness of Breath or Wheezing.    [DISCONTINUED] azelastine (ASTELIN) 137 mcg (0.1 %) nasal spray 1 spray by Nasal route 2 (two) times daily.    [DISCONTINUED] calcium carbonate-vitamin D3 600 mg calcium- 200 unit Cap Take by mouth.    [DISCONTINUED] cyanocobalamin 500 MCG tablet Take 500 mcg by mouth once daily.    [DISCONTINUED] docusate sodium (COLACE) 100 MG capsule Take 1 capsule (100 mg total) by mouth every 12 (twelve) hours.     Family History       Problem Relation (Age of Onset)    Cancer Mother, Father          Tobacco Use    Smoking status: Former    Smokeless tobacco: Never   Substance and Sexual Activity    Alcohol use: Yes     Comment: rare    Drug use: No    Sexual activity: Not Currently     Review of Systems   Cardiovascular:  Positive for chest pain (resolved currently) and dyspnea on exertion. Negative for leg swelling and orthopnea.   Respiratory:  Positive for cough and shortness of breath.      Objective:     Vital Signs (Most Recent):  Temp: 97.8 °F  (36.6 °C) (01/14/25 1203)  Pulse: (!) 58 (01/14/25 1430)  Resp: 18 (01/14/25 1203)  BP: 136/82 (01/14/25 1203)  SpO2: 98 % (01/14/25 1203) Vital Signs (24h Range):  Temp:  [97.8 °F (36.6 °C)-98.2 °F (36.8 °C)] 97.8 °F (36.6 °C)  Pulse:  [40-75] 58  Resp:  [16-18] 18  SpO2:  [95 %-98 %] 98 %  BP: (122-172)/(64-82) 136/82     Weight: 71 kg (156 lb 8.4 oz)  Body mass index is 24.52 kg/m².    SpO2: 98 %         Intake/Output Summary (Last 24 hours) at 1/14/2025 1440  Last data filed at 1/14/2025 0604  Gross per 24 hour   Intake --   Output 350 ml   Net -350 ml       Lines/Drains/Airways       Drain  Duration             Female External Urinary Catheter w/ Suction 01/13/25 1700 <1 day              Peripheral Intravenous Line  Duration                  Peripheral IV - Single Lumen 01/13/25 0731 20 G Right Antecubital 1 day                     Physical Exam  Cardiovascular:      Rate and Rhythm: Normal rate and regular rhythm.      Heart sounds: No murmur heard.  Pulmonary:      Effort: Pulmonary effort is normal.      Breath sounds: Normal breath sounds.   Abdominal:      General: Abdomen is flat. There is no distension.      Palpations: Abdomen is soft.      Tenderness: There is no abdominal tenderness.   Musculoskeletal:      Right lower leg: No edema.      Left lower leg: No edema.   Neurological:      Mental Status: She is oriented to person, place, and time.          Significant Labs: All pertinent lab results from the last 24 hours have been reviewed.    Significant Imaging:  Teviewed  Assessment and Plan:     Dyspnea on exertion  Due anginal episode with sudden onset with activity relived by nitroglycerine and previous history of CAD requiring stent placement. Feel the origin of her TAYLOR is likely cardiac in nature; CAD. Discussed possible diagnostic with patient. She is agreeable to a PET stress test. She is agreeable to more invasive intervention if the stress test show the intervention to be warranted.     She  has drank some caffeine today. Will need to be at least 24 hours caffeine free prior to PET stress test. Will need to be NPO at midnight prior to the day of the PET stress.; aiming for Thursday.     Plan:  PET Stress test; aiming for Thursday   No caffeine till after PET stress is completed  NPO Wednesday at midnight.         VTE Risk Mitigation (From admission, onward)           Ordered     heparin (porcine) injection 5,000 Units  Every 8 hours         01/13/25 1127     IP VTE HIGH RISK PATIENT  Once         01/13/25 1127     Place sequential compression device  Until discontinued         01/13/25 1127                    Thank you for your consult. I will follow-up with patient. Please contact us if you have any additional questions.    Dot Goel, DO  Cardiology   [facility]

## 2025-01-14 NOTE — HPI
Ms. Anahy Evans is a pleasant 89 F with a past medical history that includes AAA, HTN, A. Fib, CAD s/p 2 stents, HLD, CKD4, hyperuricemia, and low vitamin D. She initially came in for SOB. Cardiology was consulted for a possible right heart cath. TRAVIS Higginbotham reports that around December 22nd she had an episode of angina with SOB. She took a nitroglycerine which resolved the symptoms. She reports having to take nitroglycerine for chest pain 2-3 times a year. She has not experienced angina since that time. Starting December 26th she has experienced TAYLOR that has not gone away. Prior to this she reports being able to do all of her ADLs (cooking, cleaning, laundry), but is now unable to do so; her daughter is helping her. She additionally admits to TAYLOR, cough, sporadic abdominal pain, and a few episodes of nausea last week (emesis x 2). Regarding the nausea, she is not sure what caused it and states she had it for a few days and vomited twice. Since then the nausea has gone away tough she does report occasional episodes of lower abdominal pain. Last BM 3 days ago. She was told sometime last year that she had a thickening in her colon. She was seen by GI and was informed she was too ajit risk for a colonoscopy. She reports episodes of stools about the diameter of her pink and two episodes of black stool over the last year; one was after taking Pepto bismol. She is unsure if the other one was relate to Pepto bismol. She denies orthopnea, abdominal tenderness, decreased urination, any further nausea. Last ECHO was completed today and shows a regional wall motion abnormality, EF 50-55%, and mild to mod pulmonary HTN. EKG done on 1/5/2025 sows PACs and a possible old anterior infarct.

## 2025-01-14 NOTE — PLAN OF CARE
Problem: Adult Inpatient Plan of Care  Goal: Plan of Care Review  Outcome: Progressing  Goal: Patient-Specific Goal (Individualized)  Outcome: Progressing  Goal: Absence of Hospital-Acquired Illness or Injury  Outcome: Progressing  Goal: Optimal Comfort and Wellbeing  Outcome: Progressing  Goal: Readiness for Transition of Care  Outcome: Progressing     Problem: Acute Kidney Injury/Impairment  Goal: Fluid and Electrolyte Balance  Outcome: Progressing  Goal: Improved Oral Intake  Outcome: Progressing  Goal: Effective Renal Function  Outcome: Progressing     Problem: Fall Injury Risk  Goal: Absence of Fall and Fall-Related Injury  Outcome: Progressing     Problem: Skin Injury Risk Increased  Goal: Skin Health and Integrity  Outcome: Progressing  Spoke with Tele unable to obtain air strip or create a snippet , Pat is sinus 2 nd  degree block  unable to to clip rhythm not showing up on Epic monitor

## 2025-01-14 NOTE — PLAN OF CARE
Problem: Physical Therapy  Goal: Physical Therapy Goal  Description: Goals to be met by: 25     Patient will increase functional independence with mobility by performin. Supine to sit with Modified [place]  2. Sit to stand transfer with Stand-by Assistance with AD if needed.   3. Gait  x 100 feet with Stand-by Assistance using AD if needed.   4. Ascend/descend 4 stair with left Handrails Contact Guard Assistance using AD if needed.     Outcome: Progressing   Evaluation completed and goals appropriate. 2025

## 2025-01-14 NOTE — PT/OT/SLP EVAL
Physical Therapy  Co-Evaluation and treatment with OT    Patient Name:  Anahy Evans   MRN:  [id number]    Recommendations:     Discharge Recommendations: Low Intensity Therapy   Discharge Equipment Recommendations: walker, rolling   Barriers to discharge: None    Assessment:     Anahy Evans is a 89 y.o. female admitted with a medical diagnosis of Shortness of breath.  She presents with the following impairments/functional limitations: gait instability, impaired endurance, impaired balance, impaired functional mobility pt tolerated treatment well but c/o SOB which limited functional mobility. Pt will benefit from skilled PT 3x/wk to progress physically. Pt should be able to discharge home with low intensity therapy needs and RW . Pt came to ED with c/o SOB.      DME Justifications (see above for complete DME recommendations)    Rolling Walker- Patient demonstrates a mobility limitation that significantly impairs their ability to participate in one or more mobility related activities of daily living. Patient's mobility limitation cannot be sufficiently resolved with the use of a cane, but can be sufficiently resolved with the use of a rolling walker.The use of a rolling walker will considerably improve their ability to participate in MRADLs. Patient will use the walker on a regular basis at home.        Rehab Prognosis: Good; patient would benefit from acute skilled PT services to address these deficits and reach maximum level of function.    Recent Surgery: * No surgery found *      Plan:     During this hospitalization, patient to be seen 3 x/week to address the identified rehab impairments via gait training, therapeutic activities and progress toward the following goals:    Plan of Care Expires:  02/12/25    Subjective     Chief Complaint: pt c/o SOB during treatment but O2 sat was 98% during treatment.   Patient/Family Comments/goals: to get better and go home.   Pain/Comfort:  Pain Rating 1: 0/10  Pain  Rating Post-Intervention 1: 0/10    Patients cultural, spiritual, Jehovah's witness conflicts given the current situation: no    Living Environment:  Pt is retired and lives with her retired daughter ( has medical problems and can not physically assist. ) They live in 2 story with B&B downstairs and 4 steps with L handrail to entrance.   Prior to admission, patients level of function was modified Independent with SC.  Equipment used at home: cane, straight, bath bench (comfort height toilet).  DME owned (not currently used): none.  Upon discharge, patient will have assistance from daughter but no physical assist.     Objective:     Communicated with nurse  prior to session.  Patient found supine with telemetry, PureWick (hep lock IV)  upon PT entry to room.    General Precautions: Standard, fall  Orthopedic Precautions:    Braces:    Respiratory Status: Room air    Exams:  Cognitive Exam:  Patient is oriented to Person, Place, Time, and Situation  RLE ROM: WFL  RLE Strength: WFL  LLE ROM: WFL  LLE Strength: WFL    Functional Mobility:  Bed Mobility:     Rolling Left:  supervision  Supine to Sit: supervision head of bed was elevated.    Transfers:     Sit to Stand:  contact guard assistance with hand-held assist    Gait: pt received gait training ~ 32 ft with CGA.    Balance: pt stood at sink and performed ADLS with OT with SBA. Pt c/o SOB but pulse ox 98%.      PT concentrated on BLE MMT, ROM and gait training with evaluation and treatment  Pt white board updated with current therapists name and level of mobility assistance needed.      AM-PAC 6 CLICK MOBILITY  Total Score:16       Treatment & Education:  Pt received verbal instructions in role of PT and POC. Pt verbally expressed understanding of such.     Patient left up in chair with all lines intact and call button in reach.    GOALS:   Multidisciplinary Problems       Physical Therapy Goals          Problem: Physical Therapy    Goal Priority Disciplines Outcome  Interventions   Physical Therapy Goal     PT, PT/OT Progressing    Description: Goals to be met by: 25     Patient will increase functional independence with mobility by performin. Supine to sit with Modified [place]  2. Sit to stand transfer with Stand-by Assistance with AD if needed.   3. Gait  x 100 feet with Stand-by Assistance using AD if needed.   4. Ascend/descend 4 stair with left Handrails Contact Guard Assistance using AD if needed.                          History:     Past Medical History:   Diagnosis Date    Anticoagulant long-term use     Cervical cancer     breast cancer right    Coronary artery disease     Gout     High cholesterol     Hypertension     MI (myocardial infarction)        Past Surgical History:   Procedure Laterality Date    BREAST LUMPECTOMY      right    CARDIAC SURGERY      multiple cardiac stents    CORONARY ANGIOGRAPHY Right 2022    Procedure: ANGIOGRAM, CORONARY ARTERY;  Surgeon: Asim Canela MD;  Location: [facility] CATH LAB;  Service: Cardiology;  Laterality: Right;    CORONARY ANGIOGRAPHY Right 5/10/2024    Procedure: ANGIOGRAM, CORONARY ARTERY POSSIBLE LV COR;  Surgeon: Asim Canela MD;  Location: [facility] CATH LAB;  Service: Cardiology;  Laterality: Right;    CORONARY STENT PLACEMENT      HIP REPLACEMENT ARTHROPLASTY Right 2018    JOINT REPLACEMENT      right       Time Tracking:     PT Received On: 25  PT Start Time: 858     PT Stop Time: 915  PT Total Time (min): 17 min     Billable Minutes: Evaluation 8 min  and Gait Training 9 min      2025

## 2025-01-14 NOTE — ASSESSMENT & PLAN NOTE
pAfib    - not on AC  - on DAPT per her cardiologist   - Replete lytes with a goal of K>4, Mg >2  - telemonitoring

## 2025-01-14 NOTE — H&P
"[facility] - Emergency Dept  [facility] Medicine  History & Physical    Patient Name: Anahy Evans  MRN: [id number]  Patient Class: OP- Observation  Admission Date: 1/13/2025  Attending Physician: Maria E Bernstein MD  Primary Care Provider: Elena Cabrera MD         Patient information was obtained from patient, past medical records, and ER records.     Subjective:     Principal Problem:Shortness of breath    Chief Complaint:   Chief Complaint   Patient presents with    Shortness of Breath     Constinued shob and "nervousness in abdomen" pt stating started on 12/26 and she was admitted for this but it has gotten no better        HPI: 89 year old female with PMH of CKD4, HTN, AAA, hyperuricemia, Vitamin D def, Afib, CAD s/p 2 stents, HLD, presents to ED for shortness of breath at rest since 12/26/24. She was recently admitted 1/6-1/7 for shortness of breath and weakness - was diagnosed with viral illness and discharged home. She reports feeling chill last week but not anymore. She feels a "nervousness" in her abdomen. Denies pain. She reports she feels a stinging sensation when she urinates occasionally. Denies chest pain, fevers, nausea, vomiting, cough, sputum production. She denies constipation, diarrhea. She lives with her daughter. At baseline she is active, ambulates without assistance. Trying to use walker now.     ED course:  Sating well on RA. Hemodynamically stable.   Labs: Trop 53 --> 38, , D-dimer 33+, UA positive for UTI  VQ scan: no PE   Rx: IV ceftriaxone     Past Medical History:   Diagnosis Date    Anticoagulant long-term use     Cervical cancer 1968    breast cancer right    Coronary artery disease     Gout     High cholesterol     Hypertension     MI (myocardial infarction) 1999       Past Surgical History:   Procedure Laterality Date    BREAST LUMPECTOMY      right    CARDIAC SURGERY      multiple cardiac stents    CORONARY ANGIOGRAPHY Right 1/21/2022    Procedure: ANGIOGRAM, CORONARY " ARTERY;  Surgeon: Asim Canela MD;  Location: [facility] CATH LAB;  Service: Cardiology;  Laterality: Right;    CORONARY ANGIOGRAPHY Right 5/10/2024    Procedure: ANGIOGRAM, CORONARY ARTERY POSSIBLE LV COR;  Surgeon: Asim Canela MD;  Location: [facility] CATH LAB;  Service: Cardiology;  Laterality: Right;    CORONARY STENT PLACEMENT      HIP REPLACEMENT ARTHROPLASTY Right 2018    JOINT REPLACEMENT      right       Review of patient's allergies indicates:   Allergen Reactions    Clindamycin Anaphylaxis    Penicillins Rash       No current facility-administered medications on file prior to encounter.     Current Outpatient Medications on File Prior to Encounter   Medication Sig    albuterol (PROVENTIL/VENTOLIN HFA) 90 mcg/actuation inhaler 1 puff every 4 (four) hours as needed for Shortness of Breath or Wheezing.    allopurinoL (ZYLOPRIM) 100 MG tablet Take 150 mg by mouth once daily.    amLODIPine (NORVASC) 5 MG tablet Take 5 mg by mouth once daily.    aspirin (ECOTRIN) 81 MG EC tablet Take 81 mg by mouth once daily.    atorvastatin (LIPITOR) 40 MG tablet Take 40 mg by mouth once daily.    azelastine (ASTELIN) 137 mcg (0.1 %) nasal spray 1 spray by Nasal route 2 (two) times daily.    calcium carbonate-vitamin D3 600 mg calcium- 200 unit Cap Take by mouth.    cholecalciferol, vitamin D3, (VITAMIN D3) 50 mcg (2,000 unit) Cap Take 1 capsule by mouth once daily.    clonazePAM (KLONOPIN) 0.5 MG tablet Take 1 tablet (0.5 mg total) by mouth every evening.    clopidogreL (PLAVIX) 75 mg tablet Take 75 mg by mouth once daily.    cyanocobalamin 500 MCG tablet Take 500 mcg by mouth once daily.    docusate sodium (COLACE) 100 MG capsule Take 1 capsule (100 mg total) by mouth every 12 (twelve) hours.    FARXIGA 10 mg tablet Take 1 tablet by mouth once daily.    folic acid (FOLVITE) 1 MG tablet Take 1 mg by mouth once daily.    KERENDIA 10 mg Tab Take by mouth.    losartan (COZAAR) 25 MG tablet Take 50 mg by mouth once daily.     nitroGLYCERIN (NITROSTAT) 0.4 MG SL tablet Place 0.4 mg under the tongue every 5 (five) minutes as needed for Chest pain.    pantoprazole (PROTONIX) 20 MG tablet Take 20 mg by mouth every morning.    sodium bicarbonate 650 MG tablet Take 650 mg by mouth 2 (two) times daily.     Family History       Problem Relation (Age of Onset)    Cancer Mother, Father          Tobacco Use    Smoking status: Former    Smokeless tobacco: Never   Substance and Sexual Activity    Alcohol use: Yes     Comment: rare    Drug use: No    Sexual activity: Not Currently     Review of Systems   Constitutional:  Positive for chills and fatigue. Negative for appetite change and fever.   Respiratory:  Positive for shortness of breath. Negative for cough, chest tightness and wheezing.    Cardiovascular:  Negative for chest pain and leg swelling.   Gastrointestinal:  Negative for abdominal pain, constipation, diarrhea, nausea and vomiting.   Genitourinary:  Positive for dysuria.   Musculoskeletal: Negative.    Psychiatric/Behavioral:  Negative for confusion.      Objective:     Vital Signs (Most Recent):  Temp: 98 °F (36.7 °C) (01/13/25 1043)  Pulse: 80 (01/13/25 1043)  Resp: 18 (01/13/25 1043)  BP: 119/76 (01/13/25 1043)  SpO2: 97 % (01/13/25 1043) Vital Signs (24h Range):  Temp:  [97.6 °F (36.4 °C)-98.2 °F (36.8 °C)] 98 °F (36.7 °C)  Pulse:  [80-82] 80  Resp:  [15-18] 18  SpO2:  [97 %-99 %] 97 %  BP: (119-151)/(68-76) 119/76     Weight: 72.6 kg (160 lb)  Body mass index is 25.06 kg/m².     Physical Exam  Constitutional:       General: She is not in acute distress.     Appearance: Normal appearance. She is not ill-appearing.   Cardiovascular:      Rate and Rhythm: Normal rate.      Heart sounds: Normal heart sounds.   Pulmonary:      Effort: Pulmonary effort is normal. No respiratory distress.      Breath sounds: Normal breath sounds.   Abdominal:      General: Abdomen is flat.      Palpations: Abdomen is soft.      Tenderness: There is no  abdominal tenderness.   Musculoskeletal:      Right lower leg: No edema.      Left lower leg: No edema.   Neurological:      General: No focal deficit present.      Mental Status: She is alert and oriented to person, place, and time.      Motor: No weakness.   Psychiatric:         Mood and Affect: Mood normal.                Significant Labs: All pertinent labs within the past 24 hours have been reviewed.  BMP:   Recent Labs   Lab 01/13/25  0731   GLU 94      K 4.5      CO2 23   BUN 33*   CREATININE 2.2*   CALCIUM 10.4     CBC:   Recent Labs   Lab 01/13/25  0731   WBC 10.61   HGB 11.4*   HCT 35.3*          Significant Imaging: I have reviewed all pertinent imaging results/findings within the past 24 hours.  Assessment/Plan:     * Shortness of breath  Unclear etiology given patients symptoms. Maybe post viral illness.   - V/Q scan negative   - echo ordered   - PT/OT     UTI (urinary tract infection)  - continue ceftriaxone  - follow urine cultures  - hold Dapagliflozin         CAD (coronary artery disease)  Follows with cardiology, Dr. Canela  Continue home ASA, plavix, statin    CKD (chronic kidney disease), stage IV  Creatine stable for now. BMP reviewed- noted Estimated Creatinine Clearance: 16.2 mL/min (A) (based on SCr of 2.2 mg/dL (H)). according to latest data. Based on current GFR, CKD stage is stage 4 - GFR 15-29.  Monitor UOP and serial BMP and adjust therapy as needed. Renally dose meds. Avoid nephrotoxic medications and procedures.    - at baseline    Essential hypertension  Patient's blood pressure range in the last 24 hours was: BP  Min: 119/76  Max: 151/68.The patient's inpatient anti-hypertensive regimen is listed below:  Current Antihypertensives  amLODIPine tablet 5 mg, Daily, Oral  nitroGLYCERIN SL tablet 0.4 mg, Every 5 min PRN, Sublingual    Plan  - BP is controlled, no changes needed to their regimen  - resume Losartan     PAF (paroxysmal atrial fibrillation)  pAfib    - not  on AC  - on DAPT per her cardiologist   - Replete lytes with a goal of K>4, Mg >2  - telemonitoring       VTE Risk Mitigation (From admission, onward)           Ordered     heparin (porcine) injection 5,000 Units  Every 8 hours         01/13/25 1127     IP VTE HIGH RISK PATIENT  Once         01/13/25 1127     Place sequential compression device  Until discontinued         01/13/25 1127                       On 01/13/2025, patient should be placed in hospital observation services under my care.      Maria E Bernstein MD  Department of Hospital Medicine  [facility] - Emergency Dept

## 2025-01-14 NOTE — PT/OT/SLP EVAL
Occupational Therapy   Co-Evaluation    Name: Anahy Evans  MRN: [id number]  Admitting Diagnosis: Shortness of breath  Recent Surgery: * No surgery found *      Recommendations:     Discharge Recommendations: Low Intensity Therapy  Discharge Equipment Recommendations:  walker, rolling  Barriers to discharge:  None    Assessment:     Anahy Evans is a 89 y.o. female with a medical diagnosis of Shortness of breath.  She presents with decreased independence with ADL's. Performance deficits affecting function: weakness, impaired endurance, impaired self care skills, impaired functional mobility, impaired balance, decreased lower extremity function, decreased upper extremity function, impaired cardiopulmonary response to activity.  Co-evaluation/treatment performed due to patient's multiple deficits potentially requiring two skilled therapists to safely assess patient's ability to complete ADLs and functional mobility in addition to accommodating for patient's activity tolerance while in acute care setting.      Rehab Prognosis: Good; patient would benefit from acute skilled OT services to address these deficits and reach maximum level of function.       Plan:     Patient to be seen 3 x/week to address the above listed problems via self-care/home management, therapeutic activities, therapeutic exercises  Plan of Care Expires: 02/13/25  Plan of Care Reviewed with: patient    Subjective     Chief Complaint: SOB after prolonged standing activity  Patient/Family Comments/goals: Pt reported that she has been getting SOB at home.    Occupational Profile:  Living Environment: Pt resides with daughter in 2 story house with bed & bath on 1st floor & 4 steps left sided rail to enter house.  Pt's bed & bath are on the 1st floor.  Pt was modified independent with ADL's & used SPC for ambulation.  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  Pt has a bathtub for bathing.  Equipment Used at Home:  bath bench, cane, straight (comfort height toilet)  Assistance upon Discharge: daughter however daughter has her own physical needs that affect her ability to assist pt fully if needed    Pain/Comfort:  Pain Rating 1: 0/10  Pain Rating Post-Intervention 1: 0/10    Patients cultural, spiritual, Jain conflicts given the current situation: no    Objective:     Communicated with: RN prior to session.  Patient found supine with telemetry, PureWick (no family present) upon OT entry to room.    General Precautions: Standard, fall  Orthopedic Precautions: N/A  Braces: N/A  Respiratory Status: Room air    Occupational Performance:    Bed Mobility:    Patient completed Supine to Sit with supervision    Functional Mobility/Transfers:  Patient completed Sit <> Stand Transfer with contact guard assistance  with  no assistive device   Functional Mobility: Pt required CGA with functional mobility to the bathroom for ADL's.    Activities of Daily Living:  Grooming: stand by assistance with brushing teeth & washing face while standing at bathroom sink  Upper Body Dressing: stand by assistance donning gown around back while seated EOB  Lower Body Dressing: stand by assistance donning socks seated EOB    Cognitive/Visual Perceptual:  Cognitive/Psychosocial Skills:     -       Oriented to: Person, Place, Time, and Situation   -       Follows Commands/attention:Follows multistep  commands  -       Safety awareness/insight to disability: intact     Physical Exam:  Sensation:    -       Intact  Dominant hand:    -       right  Upper Extremity Range of Motion:  BUE WFL  Upper Extremity Strength: BUE WFL   Strength: BUE WFL    AMPAC 6 Click ADL:  AMPAC Total Score: 18    Treatment & Education:  Provided education on safety & need for (A) with OOB activities.  Provided education regarding role of OT, POC, & discharge recommendations with pt verbalizing understanding.  Pt had no further questions & when asked whether there were any  concerns pt reported none.      Patient left up in chair with all lines intact, call button in reach, and RN notified    GOALS:   Multidisciplinary Problems       Occupational Therapy Goals          Problem: Occupational Therapy    Goal Priority Disciplines Outcome Interventions   Occupational Therapy Goal     OT, PT/OT Progressing    Description: Goals to be met by: 2/13/25     Patient will increase functional independence with ADLs by performing:    UE Dressing with Modified [place].  LE Dressing with Modified [place].  Grooming while standing with Modified [place].  Toileting from toilet with Modified [place] for hygiene and clothing management.   Supine to sit with Modified [place].  Step transfer with Modified [place]  Toilet transfer to toilet with Modified [place].                         History:     Past Medical History:   Diagnosis Date    Anticoagulant long-term use     Cervical cancer 1968    breast cancer right    Coronary artery disease     Gout     High cholesterol     Hypertension     MI (myocardial infarction) 1999         Past Surgical History:   Procedure Laterality Date    BREAST LUMPECTOMY      right    CARDIAC SURGERY      multiple cardiac stents    CORONARY ANGIOGRAPHY Right 1/21/2022    Procedure: ANGIOGRAM, CORONARY ARTERY;  Surgeon: Asim Canela MD;  Location: [facility] CATH LAB;  Service: Cardiology;  Laterality: Right;    CORONARY ANGIOGRAPHY Right 5/10/2024    Procedure: ANGIOGRAM, CORONARY ARTERY POSSIBLE LV COR;  Surgeon: Asim Canela MD;  Location: [facility] CATH LAB;  Service: Cardiology;  Laterality: Right;    CORONARY STENT PLACEMENT      HIP REPLACEMENT ARTHROPLASTY Right 2018    JOINT REPLACEMENT      right       Time Tracking:     OT Date of Treatment: 01/14/25  OT Start Time: 0858  OT Stop Time: 0915  OT Total Time (min): 17 min    Billable Minutes:Evaluation 9  Self Care/Home Management 8    1/14/2025

## 2025-01-14 NOTE — CARE UPDATE
Her TAYLOR is acute and associated with angina. More concerning for CAD than other etiologies at this time. Her elevated PA systolic pressure on echo is likely related to underlying diastolic dysfunction in setting of age and prior MIs. Also, she has elevated BNP on admit with very minimal, but present, JVD on exam. There is no role for RHC in setting of group 2 PH.     Plan for PET stress on thursday given caffeine intact today. She needs to be free of caffeine for 24 hours at least. NPO on Wednesday night

## 2025-01-14 NOTE — PHARMACY MED REC
"  Admission Medication History     The home medication history was taken by Jaquelin King.    You may go to "Admission" then "Reconcile Home Medications" tabs to review and/or act upon these items.     The home medication list has been updated by the Pharmacy department.   Please read ALL comments highlighted in yellow.   Please address this information as you see fit.    Feel free to contact us if you have any questions or require assistance.      The medications listed below were removed from the home medication list. Please reorder if appropriate:  Patient reports no longer taking the following medication(s):  Albuterol 90 mcg/actuation inhaler  Azelastine 137 mcg nasal spray  Calcium carbonate-vitamin D3 600 mg-200 unit  Cyanocobalamin 500 mcg  Docusate sodium 100 mg    Medications listed below were obtained from: Patient/family and Analytic software- [employer] Medications   Medication Sig    allopurinoL (ZYLOPRIM) 100 MG tablet Take 0.5 tablets by mouth once daily.    amLODIPine (NORVASC) 5 MG tablet Take 5 mg by mouth once daily.    aspirin (ECOTRIN) 81 MG EC tablet Take 81 mg by mouth once daily.    atorvastatin (LIPITOR) 40 MG tablet Take 40 mg by mouth once daily.    benzonatate (TESSALON) 200 MG capsule Take 200 mg by mouth 2 (two) times daily as needed.    cholecalciferol, vitamin D3, (VITAMIN D3) 50 mcg (2,000 unit) Cap Take 1 capsule by mouth once daily.    clonazePAM (KLONOPIN) 0.5 MG tablet Take 0.5 mg by mouth as needed. Rarely taken    clopidogreL (PLAVIX) 75 mg tablet Take 75 mg by mouth once daily.    FARXIGA 10 mg tablet Take 1 tablet by mouth once daily.    folic acid (FOLVITE) 1 MG tablet Take 1 mg by mouth once daily.    KERENDIA 10 mg Tab Take 1 tablet by mouth Daily.    losartan (COZAAR) 25 MG tablet Take 50 mg by mouth once daily.    pantoprazole (PROTONIX) 20 MG tablet Take 20 mg by mouth every morning.    sodium bicarbonate 650 MG tablet Take 650 mg by mouth 2 (two) times daily.    " nitroGLYCERIN (NITROSTAT) 0.4 MG SL tablet Place 0.4 mg under the tongue every 5 (five) minutes as needed for Chest pain.       Potential issues to be addressed PRIOR TO DISCHARGE  Patient requested refills for the following medications: (CLONAZEPAM 0.5 MG)    Jaquelin King  EXT [phone]                .

## 2025-01-14 NOTE — ASSESSMENT & PLAN NOTE
Patient's blood pressure range in the last 24 hours was: BP  Min: 119/76  Max: 151/68.The patient's inpatient anti-hypertensive regimen is listed below:  Current Antihypertensives  amLODIPine tablet 5 mg, Daily, Oral  nitroGLYCERIN SL tablet 0.4 mg, Every 5 min PRN, Sublingual    Plan  - BP is controlled, no changes needed to their regimen  - resume Losartan

## 2025-01-15 LAB
ALBUMIN SERPL BCP-MCNC: 2.7 G/DL (ref 3.5–5.2)
ALP SERPL-CCNC: 97 U/L (ref 40–150)
ALT SERPL W/O P-5'-P-CCNC: 35 U/L (ref 10–44)
ANION GAP SERPL CALC-SCNC: 12 MMOL/L (ref 8–16)
AST SERPL-CCNC: 21 U/L (ref 10–40)
BACTERIA UR CULT: ABNORMAL
BASOPHILS # BLD AUTO: 0.08 K/UL (ref 0–0.2)
BASOPHILS NFR BLD: 1 % (ref 0–1.9)
BILIRUB SERPL-MCNC: 0.5 MG/DL (ref 0.1–1)
BUN SERPL-MCNC: 35 MG/DL (ref 10–30)
CALCIUM SERPL-MCNC: 9.7 MG/DL (ref 8.7–10.5)
CHLORIDE SERPL-SCNC: 107 MMOL/L (ref 95–110)
CO2 SERPL-SCNC: 21 MMOL/L (ref 23–29)
CREAT SERPL-MCNC: 2.4 MG/DL (ref 0.5–1.4)
DIFFERENTIAL METHOD BLD: ABNORMAL
EOSINOPHIL # BLD AUTO: 0.2 K/UL (ref 0–0.5)
EOSINOPHIL NFR BLD: 1.8 % (ref 0–8)
ERYTHROCYTE [DISTWIDTH] IN BLOOD BY AUTOMATED COUNT: 14.5 % (ref 11.5–14.5)
EST. GFR  (NO RACE VARIABLE): 18.6 ML/MIN/1.73 M^2
GLUCOSE SERPL-MCNC: 88 MG/DL (ref 70–110)
HCT VFR BLD AUTO: 33 % (ref 37–48.5)
HGB BLD-MCNC: 10.4 G/DL (ref 12–16)
IMM GRANULOCYTES # BLD AUTO: 0.04 K/UL (ref 0–0.04)
IMM GRANULOCYTES NFR BLD AUTO: 0.5 % (ref 0–0.5)
LYMPHOCYTES # BLD AUTO: 2.3 K/UL (ref 1–4.8)
LYMPHOCYTES NFR BLD: 28.1 % (ref 18–48)
MAGNESIUM SERPL-MCNC: 2 MG/DL (ref 1.6–2.6)
MCH RBC QN AUTO: 28.6 PG (ref 27–31)
MCHC RBC AUTO-ENTMCNC: 31.5 G/DL (ref 32–36)
MCV RBC AUTO: 91 FL (ref 82–98)
MONOCYTES # BLD AUTO: 0.9 K/UL (ref 0.3–1)
MONOCYTES NFR BLD: 10.9 % (ref 4–15)
NEUTROPHILS # BLD AUTO: 4.8 K/UL (ref 1.8–7.7)
NEUTROPHILS NFR BLD: 57.7 % (ref 38–73)
NRBC BLD-RTO: 0 /100 WBC
PHOSPHATE SERPL-MCNC: 4.5 MG/DL (ref 2.7–4.5)
PLATELET # BLD AUTO: 356 K/UL (ref 150–450)
PMV BLD AUTO: 10.8 FL (ref 9.2–12.9)
POTASSIUM SERPL-SCNC: 4.3 MMOL/L (ref 3.5–5.1)
PROT SERPL-MCNC: 7.7 G/DL (ref 6–8.4)
RBC # BLD AUTO: 3.64 M/UL (ref 4–5.4)
SODIUM SERPL-SCNC: 140 MMOL/L (ref 136–145)
WBC # BLD AUTO: 8.25 K/UL (ref 3.9–12.7)

## 2025-01-15 PROCEDURE — 63600175 PHARM REV CODE 636 W HCPCS: Performed by: STUDENT IN AN ORGANIZED HEALTH CARE EDUCATION/TRAINING PROGRAM

## 2025-01-15 PROCEDURE — 99232 SBSQ HOSP IP/OBS MODERATE 35: CPT | Mod: ,,, | Performed by: INTERNAL MEDICINE

## 2025-01-15 PROCEDURE — 36415 COLL VENOUS BLD VENIPUNCTURE: CPT | Performed by: STUDENT IN AN ORGANIZED HEALTH CARE EDUCATION/TRAINING PROGRAM

## 2025-01-15 PROCEDURE — 85025 COMPLETE CBC W/AUTO DIFF WBC: CPT | Performed by: STUDENT IN AN ORGANIZED HEALTH CARE EDUCATION/TRAINING PROGRAM

## 2025-01-15 PROCEDURE — 96376 TX/PRO/DX INJ SAME DRUG ADON: CPT

## 2025-01-15 PROCEDURE — 84100 ASSAY OF PHOSPHORUS: CPT | Performed by: STUDENT IN AN ORGANIZED HEALTH CARE EDUCATION/TRAINING PROGRAM

## 2025-01-15 PROCEDURE — 25000003 PHARM REV CODE 250: Performed by: STUDENT IN AN ORGANIZED HEALTH CARE EDUCATION/TRAINING PROGRAM

## 2025-01-15 PROCEDURE — 80053 COMPREHEN METABOLIC PANEL: CPT | Performed by: STUDENT IN AN ORGANIZED HEALTH CARE EDUCATION/TRAINING PROGRAM

## 2025-01-15 PROCEDURE — 83735 ASSAY OF MAGNESIUM: CPT | Performed by: STUDENT IN AN ORGANIZED HEALTH CARE EDUCATION/TRAINING PROGRAM

## 2025-01-15 PROCEDURE — 21400001 HC TELEMETRY ROOM

## 2025-01-15 RX ADMIN — CLOPIDOGREL BISULFATE 75 MG: 75 TABLET ORAL at 10:01

## 2025-01-15 RX ADMIN — POLYETHYLENE GLYCOL 3350 17 G: 17 POWDER, FOR SOLUTION ORAL at 09:01

## 2025-01-15 RX ADMIN — CYANOCOBALAMIN TAB 250 MCG 500 MCG: 250 TAB at 10:01

## 2025-01-15 RX ADMIN — SODIUM BICARBONATE 650 MG TABLET 650 MG: at 09:01

## 2025-01-15 RX ADMIN — CEFTRIAXONE 1 G: 1 INJECTION, POWDER, FOR SOLUTION INTRAMUSCULAR; INTRAVENOUS at 10:01

## 2025-01-15 RX ADMIN — ALLOPURINOL 150 MG: 100 TABLET ORAL at 10:01

## 2025-01-15 RX ADMIN — PANTOPRAZOLE SODIUM 20 MG: 20 TABLET, DELAYED RELEASE ORAL at 06:01

## 2025-01-15 RX ADMIN — FOLIC ACID 1 MG: 1 TABLET ORAL at 10:01

## 2025-01-15 RX ADMIN — CHOLECALCIFEROL TAB 25 MCG (1000 UNIT) 2000 UNITS: 25 TAB at 10:01

## 2025-01-15 RX ADMIN — ASPIRIN 81 MG: 81 TABLET, COATED ORAL at 10:01

## 2025-01-15 RX ADMIN — ATORVASTATIN CALCIUM 40 MG: 40 TABLET, FILM COATED ORAL at 09:01

## 2025-01-15 RX ADMIN — LOSARTAN POTASSIUM 25 MG: 25 TABLET, FILM COATED ORAL at 09:01

## 2025-01-15 RX ADMIN — AMLODIPINE BESYLATE 5 MG: 5 TABLET ORAL at 10:01

## 2025-01-15 NOTE — SUBJECTIVE & OBJECTIVE
Interval History:     Patient complains of shortness of breath while walking to the bathroom..   Cardiology consulted - plan for Stress test    Review of Systems  Objective:     Vital Signs (Most Recent):  Temp: 98.1 °F (36.7 °C) (01/14/25 1655)  Pulse: 75 (01/14/25 1655)  Resp: 17 (01/14/25 1655)  BP: 133/68 (01/14/25 1655)  SpO2: 98 % (01/14/25 1655) Vital Signs (24h Range):  Temp:  [97.8 °F (36.6 °C)-98.2 °F (36.8 °C)] 98.1 °F (36.7 °C)  Pulse:  [40-75] 75  Resp:  [16-18] 17  SpO2:  [95 %-98 %] 98 %  BP: (133-172)/(66-82) 133/68     Weight: 71 kg (156 lb 8.4 oz)  Body mass index is 24.52 kg/m².    Intake/Output Summary (Last 24 hours) at 1/14/2025 1943  Last data filed at 1/14/2025 1800  Gross per 24 hour   Intake 600 ml   Output 920 ml   Net -320 ml         Physical Exam  Constitutional:       General: She is not in acute distress.     Appearance: She is not ill-appearing.   Cardiovascular:      Rate and Rhythm: Normal rate.      Heart sounds: Normal heart sounds.   Pulmonary:      Effort: Pulmonary effort is normal. No respiratory distress.      Breath sounds: No rales.   Abdominal:      General: Abdomen is flat.      Palpations: Abdomen is soft.      Tenderness: There is no abdominal tenderness.   Musculoskeletal:      Right lower leg: No edema.      Left lower leg: No edema.   Neurological:      Mental Status: She is alert and oriented to person, place, and time. Mental status is at baseline.      Cranial Nerves: No cranial nerve deficit.      Motor: No weakness.   Psychiatric:         Mood and Affect: Mood normal.             Significant Labs: All pertinent labs within the past 24 hours have been reviewed.  BMP:   Recent Labs   Lab 01/14/25 0229   GLU 88      K 4.4      CO2 23   BUN 38*   CREATININE 2.2*   CALCIUM 9.4   MG 2.1     CBC:   Recent Labs   Lab 01/13/25  0731 01/14/25 0229   WBC 10.61 8.05   HGB 11.4* 10.3*   HCT 35.3* 32.1*    440       Significant Imaging: I have reviewed  all pertinent imaging results/findings within the past 24 hours.

## 2025-01-15 NOTE — ASSESSMENT & PLAN NOTE
- continue ceftriaxone, switch to orals for discharge   - urine cultures positive for E. Coli pansensitive   - hold Dapagliflozin

## 2025-01-15 NOTE — ASSESSMENT & PLAN NOTE
Unclear etiology given patients symptoms. Maybe cardiac etiology for patients dyspnea on exertion.   - V/Q scan negative   - echo completed   - cardiology consulted - recommend PET Stress scan Thursday.  - PT/OT consulted

## 2025-01-15 NOTE — PROGRESS NOTES
"[facility] [address] [place] [address] Medicine  Progress Note    Patient Name: Anahy Evans  MRN: [id number]  Patient Class: IP- Inpatient   Admission Date: 1/13/2025  Length of Stay: 0 days  Attending Physician: Maria E Bernstein MD  Primary Care Provider: Elena Cabrera MD          Principal Problem:Shortness of breath        HPI:  89 year old female with PMH of CKD4, HTN, AAA, hyperuricemia, Vitamin D def, Afib, CAD, HLD, presents to ED for shortness of breath at rest since 12/26/24. She describes it as shortness of breath on exertion and rest that is progressively worsening. She was recently admitted 1/6-1/7 for shortness of breath and weakness - was diagnosed with viral illness and discharged home. She reports feeling chill last week but not anymore. She feels a "nervousness" in her abdomen. Denies pain. She reports she feels a stinging sensation when she urinates occasionally. Denies chest pain, fevers, nausea, vomiting, cough, sputum production. She denies constipation, diarrhea. She lives with her daughter. At baseline she is active, ambulates without assistance. Trying to use walker now.     ED course:  Sating well on RA. Hemodynamically stable.   Labs: Trop 53 --> 38, , D-dimer 33+, UA positive for UTI  VQ scan: no PE   Rx: IV ceftriaxone     Overview/Hospital Course:  Patient admitted for dyspnea on exertion. V/Q scan was completed - no pulmonary embolism. Echo ordered - regional wall motion abnormalities present. Low normal systolic ejection fraction 50-55%. Cardiology was consulted - feel origin of TAYLOR may be cardiac in nature. Plan for PET Stress test on Thursday.     Interval History:     Patient reports no new symptoms. Continues to have shortness of breath.   Cardiology planning PET stress Thursday. NPO past midnight.     Review of Systems  Objective:     Vital Signs (Most Recent):  Temp: 98.2 °F (36.8 °C) (01/15/25 1111)  Pulse: 76 (01/15/25 1111)  Resp: 18 (01/15/25 1111)  BP: 131/72 " (01/15/25 1111)  SpO2: (!) 93 % (01/15/25 1111) Vital Signs (24h Range):  Temp:  [97.5 °F (36.4 °C)-98.5 °F (36.9 °C)] 98.2 °F (36.8 °C)  Pulse:  [55-84] 76  Resp:  [16-18] 18  SpO2:  [93 %-98 %] 93 %  BP: (131-148)/(63-82) 131/72     Weight: 71 kg (156 lb 8.4 oz)  Body mass index is 24.52 kg/m².    Intake/Output Summary (Last 24 hours) at 1/15/2025 1202  Last data filed at 1/15/2025 0559  Gross per 24 hour   Intake 360 ml   Output 1850 ml   Net -1490 ml         Physical Exam  Constitutional:       General: She is not in acute distress.     Appearance: She is not ill-appearing.   Cardiovascular:      Rate and Rhythm: Normal rate.      Heart sounds: Normal heart sounds.   Pulmonary:      Effort: Pulmonary effort is normal. No respiratory distress.      Breath sounds: No rales.   Abdominal:      General: Abdomen is flat.      Palpations: Abdomen is soft.      Tenderness: There is no abdominal tenderness.   Musculoskeletal:      Right lower leg: No edema.      Left lower leg: No edema.   Neurological:      Mental Status: She is alert and oriented to person, place, and time. Mental status is at baseline.      Cranial Nerves: No cranial nerve deficit.      Motor: No weakness.   Psychiatric:         Mood and Affect: Mood normal.             Significant Labs: All pertinent labs within the past 24 hours have been reviewed.  BMP:   Recent Labs   Lab 01/15/25  0220   GLU 88      K 4.3      CO2 21*   BUN 35*   CREATININE 2.4*   CALCIUM 9.7   MG 2.0     CBC:   Recent Labs   Lab 01/14/25  0229 01/15/25  0220   WBC 8.05 8.25   HGB 10.3* 10.4*   HCT 32.1* 33.0*    356       Significant Imaging: I have reviewed all pertinent imaging results/findings within the past 24 hours.    Assessment and Plan     * Shortness of breath  Maybe cardiac etiology for patients dyspnea on exertion.   - V/Q scan negative   - echo completed   - cardiology consulted - recommend PET Stress scan Thursday.  - PT/OT consulted - low  intensity therapy.     UTI (urinary tract infection)  - continue ceftriaxone, switch to orals for discharge   - urine cultures positive for E. Coli pansensitive   - hold Dapagliflozin         CAD (coronary artery disease)  Follows with cardiology, Dr. Canela  Continue home ASA, plavix, statin    CKD (chronic kidney disease), stage IV  Creatine stable for now. BMP reviewed- noted Estimated Creatinine Clearance: 16.2 mL/min (A) (based on SCr of 2.2 mg/dL (H)). according to latest data. Based on current GFR, CKD stage is stage 4 - GFR 15-29.  Monitor UOP and serial BMP and adjust therapy as needed. Renally dose meds. Avoid nephrotoxic medications and procedures.    - at baseline    Essential hypertension  Patient's blood pressure range in the last 24 hours was: BP  Min: 119/76  Max: 151/68.The patient's inpatient anti-hypertensive regimen is listed below:  Current Antihypertensives  amLODIPine tablet 5 mg, Daily, Oral  nitroGLYCERIN SL tablet 0.4 mg, Every 5 min PRN, Sublingual    Plan  - BP is controlled, no changes needed to their regimen  - resume Losartan     PAF (paroxysmal atrial fibrillation)  pAfib    - not on AC  - on DAPT per her cardiologist   - Replete lytes with a goal of K>4, Mg >2  - telemonitoring       VTE Risk Mitigation (From admission, onward)           Ordered     heparin (porcine) injection 5,000 Units  Every 8 hours         01/13/25 1127     IP VTE HIGH RISK PATIENT  Once         01/13/25 1127     Place sequential compression device  Until discontinued         01/13/25 1127                    Discharge Planning   ROSEMARIE: 1/16/2025     Code Status: Full Code   Medical Readiness for Discharge Date:   Discharge Plan A: Home with family                Maria E Bernstein MD  Department of Hospital Medicine   [facility]

## 2025-01-15 NOTE — ASSESSMENT & PLAN NOTE
Due anginal episode with sudden onset with activity relived by nitroglycerine and previous history of CAD requiring stent placement. Feel the origin of her TAYLOR is likely cardiac in nature; CAD. Discussed possible diagnostic with patient. She is agreeable to a PET stress test. She is agreeable to more invasive intervention if the stress test show the intervention to be warranted.     Goal for PET Sress tomorrow (Thursday)    Plan:  PET Stress test; aiming for Thursday   No caffeine till after PET stress is completed  NPO tonight at midnight.

## 2025-01-15 NOTE — PROGRESS NOTES
[facility]  Cardiology  Progress Note    Patient Name: Anahy Evans  MRN: [id number]  Admission Date: 1/13/2025  Hospital Length of Stay: 0 days  Code Status: Full Code   Attending Physician: Maria E Bernstein MD   Primary Care Physician: Elena Cabrera MD  Expected Discharge Date: 1/16/2025  Principal Problem:Shortness of breath    Subjective:     Hospital Course:   No notes on file    Interval History: NAEO. No caffeine today. Plant for PET stress test tomorrow. Patient must be NPO at midnight.     Review of Systems   Cardiovascular:  Positive for chest pain (resolved currently) and dyspnea on exertion. Negative for leg swelling and orthopnea.   Respiratory:  Positive for cough and shortness of breath.    Gastrointestinal:  Positive for nausea and vomiting.     Objective:     Vital Signs (Most Recent):  Temp: 98.2 °F (36.8 °C) (01/15/25 1111)  Pulse: 76 (01/15/25 1111)  Resp: 18 (01/15/25 1111)  BP: 131/72 (01/15/25 1111)  SpO2: (!) 93 % (01/15/25 1111) Vital Signs (24h Range):  Temp:  [97.5 °F (36.4 °C)-98.5 °F (36.9 °C)] 98.2 °F (36.8 °C)  Pulse:  [55-84] 76  Resp:  [16-18] 18  SpO2:  [93 %-98 %] 93 %  BP: (131-148)/(63-79) 131/72     Weight: 71 kg (156 lb 8.4 oz)  Body mass index is 24.52 kg/m².     SpO2: (!) 93 %         Intake/Output Summary (Last 24 hours) at 1/15/2025 1307  Last data filed at 1/15/2025 0559  Gross per 24 hour   Intake 360 ml   Output 1850 ml   Net -1490 ml       Lines/Drains/Airways       Drain  Duration             Female External Urinary Catheter w/ Suction 01/13/25 1700 1 day              Peripheral Intravenous Line  Duration                  Peripheral IV - Single Lumen 01/13/25 0731 20 G Right Antecubital 2 days                       Physical Exam  Cardiovascular:      Rate and Rhythm: Normal rate and regular rhythm.      Heart sounds: No murmur heard.  Pulmonary:      Effort: Pulmonary effort is normal.      Breath sounds: Normal breath sounds.   Abdominal:       General: Abdomen is flat. There is no distension.      Palpations: Abdomen is soft.      Tenderness: There is no abdominal tenderness.   Musculoskeletal:      Right lower leg: No edema.      Left lower leg: No edema.   Neurological:      Mental Status: She is oriented to person, place, and time.            Significant Labs: All pertinent lab results from the last 24 hours have been reviewed.    Significant Imaging:  Reviewed   Assessment and Plan:     Brief HPI: Ms. Anahy Evans is a pleasant 89 F with a past medical history that includes AAA, HTN, A. Fib, CAD s/p 2 stents, HLD, CKD4, hyperuricemia, and low vitamin D. She initially came in for SOB. Cardiology was consulted for a possible right heart cath. TRAVIS Higginbotham reports that around December 22nd she had an episode of angina with SOB. She took a nitroglycerine which resolved the symptoms. She reports having to take nitroglycerine for chest pain 2-3 times a year. She has not experienced angina since that time. Starting December 26th she has experienced TAYLOR that has not gone away. Prior to this she reports being able to do all of her ADLs (cooking, cleaning, laundry), but is now unable to do so; her daughter is helping her. She additionally admits to TAYLOR, cough, sporadic abdominal pain, and a few episodes of nausea last week (emesis x 2). Regarding the nausea, she is not sure what caused it and states she had it for a few days and vomited twice. Since then the nausea has gone away tough she does report occasional episodes of lower abdominal pain. Last BM 3 days ago. She was told sometime last year that she had a thickening in her colon. She was seen by GI and was informed she was too ajit risk for a colonoscopy. She reports episodes of stools about the diameter of her pink and two episodes of black stool over the last year; one was after taking Pepto bismol. She is unsure if the other one was relate to Pepto bismol. She denies orthopnea, abdominal tenderness,  decreased urination, any further nausea. Last ECHO was completed today and shows a regional wall motion abnormality, EF 50-55%, and mild to mod pulmonary HTN. EKG done on 1/5/2025 sows PACs and a possible old anterior infarct.     Dyspnea on exertion  Due anginal episode with sudden onset with activity relived by nitroglycerine and previous history of CAD requiring stent placement. Feel the origin of her TAYLOR is likely cardiac in nature; CAD. Discussed possible diagnostic with patient. She is agreeable to a PET stress test. She is agreeable to more invasive intervention if the stress test show the intervention to be warranted.     Goal for PET Sress tomorrow (Thursday)    Plan:  PET Stress test; aiming for Thursday   No caffeine till after PET stress is completed  NPO tonight at midnight.         VTE Risk Mitigation (From admission, onward)           Ordered     heparin (porcine) injection 5,000 Units  Every 8 hours         01/13/25 1127     IP VTE HIGH RISK PATIENT  Once         01/13/25 1127     Place sequential compression device  Until discontinued         01/13/25 1127                    Dot Goel,   Cardiology  [facility]

## 2025-01-15 NOTE — SUBJECTIVE & OBJECTIVE
Interval History: NAEO. No caffeine today. Plant for PET stress test tomorrow. Patient must be NPO at midnight.     Review of Systems   Cardiovascular:  Positive for chest pain (resolved currently) and dyspnea on exertion. Negative for leg swelling and orthopnea.   Respiratory:  Positive for cough and shortness of breath.    Gastrointestinal:  Positive for nausea and vomiting.     Objective:     Vital Signs (Most Recent):  Temp: 98.2 °F (36.8 °C) (01/15/25 1111)  Pulse: 76 (01/15/25 1111)  Resp: 18 (01/15/25 1111)  BP: 131/72 (01/15/25 1111)  SpO2: (!) 93 % (01/15/25 1111) Vital Signs (24h Range):  Temp:  [97.5 °F (36.4 °C)-98.5 °F (36.9 °C)] 98.2 °F (36.8 °C)  Pulse:  [55-84] 76  Resp:  [16-18] 18  SpO2:  [93 %-98 %] 93 %  BP: (131-148)/(63-79) 131/72     Weight: 71 kg (156 lb 8.4 oz)  Body mass index is 24.52 kg/m².     SpO2: (!) 93 %         Intake/Output Summary (Last 24 hours) at 1/15/2025 1307  Last data filed at 1/15/2025 0559  Gross per 24 hour   Intake 360 ml   Output 1850 ml   Net -1490 ml       Lines/Drains/Airways       Drain  Duration             Female External Urinary Catheter w/ Suction 01/13/25 1700 1 day              Peripheral Intravenous Line  Duration                  Peripheral IV - Single Lumen 01/13/25 0731 20 G Right Antecubital 2 days                       Physical Exam  Cardiovascular:      Rate and Rhythm: Normal rate and regular rhythm.      Heart sounds: No murmur heard.  Pulmonary:      Effort: Pulmonary effort is normal.      Breath sounds: Normal breath sounds.   Abdominal:      General: Abdomen is flat. There is no distension.      Palpations: Abdomen is soft.      Tenderness: There is no abdominal tenderness.   Musculoskeletal:      Right lower leg: No edema.      Left lower leg: No edema.   Neurological:      Mental Status: She is oriented to person, place, and time.            Significant Labs: All pertinent lab results from the last 24 hours have been reviewed.    Significant  Imaging:  Reviewed

## 2025-01-15 NOTE — PROGRESS NOTES
"[facility] [address] [place] [address] Medicine  Progress Note    Patient Name: Anahy Evans  MRN: [id number]  Patient Class: OP- Observation   Admission Date: 1/13/2025  Length of Stay: 0 days  Attending Physician: Maria E Bernstein MD  Primary Care Provider: Elena Cabrera MD          Principal Problem:Shortness of breath        HPI:  89 year old female with PMH of CKD4, HTN, AAA, hyperuricemia, Vitamin D def, Afib, CAD, HLD, presents to ED for shortness of breath at rest since 12/26/24. She was recently admitted 1/6-1/7 for shortness of breath and weakness - was diagnosed with viral illness and discharged home. She reports feeling chill last week but not anymore. She feels a "nervousness" in her abdomen. Denies pain. She reports she feels a stinging sensation when she urinates occasionally. Denies chest pain, fevers, nausea, vomiting, cough, sputum production. She denies constipation, diarrhea. She lives with her daughter. At baseline she is active, ambulates without assistance. Trying to use walker now.     ED course:  Sating well on RA. Hemodynamically stable.   Labs: Trop 53 --> 38, , D-dimer 33+, UA positive for UTI  VQ scan: no PE   Rx: IV ceftriaxone     Overview/Hospital Course:  No notes on file    Interval History:     Patient complains of shortness of breath while walking to the bathroom..   Cardiology consulted - plan for Stress test    Review of Systems  Objective:     Vital Signs (Most Recent):  Temp: 98.1 °F (36.7 °C) (01/14/25 1655)  Pulse: 75 (01/14/25 1655)  Resp: 17 (01/14/25 1655)  BP: 133/68 (01/14/25 1655)  SpO2: 98 % (01/14/25 1655) Vital Signs (24h Range):  Temp:  [97.8 °F (36.6 °C)-98.2 °F (36.8 °C)] 98.1 °F (36.7 °C)  Pulse:  [40-75] 75  Resp:  [16-18] 17  SpO2:  [95 %-98 %] 98 %  BP: (133-172)/(66-82) 133/68     Weight: 71 kg (156 lb 8.4 oz)  Body mass index is 24.52 kg/m².    Intake/Output Summary (Last 24 hours) at 1/14/2025 1943  Last data filed at 1/14/2025 1800  Gross per " 24 hour   Intake 600 ml   Output 920 ml   Net -320 ml         Physical Exam  Constitutional:       General: She is not in acute distress.     Appearance: She is not ill-appearing.   Cardiovascular:      Rate and Rhythm: Normal rate.      Heart sounds: Normal heart sounds.   Pulmonary:      Effort: Pulmonary effort is normal. No respiratory distress.      Breath sounds: No rales.   Abdominal:      General: Abdomen is flat.      Palpations: Abdomen is soft.      Tenderness: There is no abdominal tenderness.   Musculoskeletal:      Right lower leg: No edema.      Left lower leg: No edema.   Neurological:      Mental Status: She is alert and oriented to person, place, and time. Mental status is at baseline.      Cranial Nerves: No cranial nerve deficit.      Motor: No weakness.   Psychiatric:         Mood and Affect: Mood normal.             Significant Labs: All pertinent labs within the past 24 hours have been reviewed.  BMP:   Recent Labs   Lab 01/14/25 0229   GLU 88      K 4.4      CO2 23   BUN 38*   CREATININE 2.2*   CALCIUM 9.4   MG 2.1     CBC:   Recent Labs   Lab 01/13/25  0731 01/14/25 0229   WBC 10.61 8.05   HGB 11.4* 10.3*   HCT 35.3* 32.1*    362       Significant Imaging: I have reviewed all pertinent imaging results/findings within the past 24 hours.    Assessment and Plan     * Shortness of breath  Unclear etiology given patients symptoms. Maybe cardiac etiology for patients dyspnea on exertion.   - V/Q scan negative   - echo completed   - cardiology consulted - recommend PET Stress scan Thursday.  - PT/OT consulted     UTI (urinary tract infection)  - continue ceftriaxone  - follow urine cultures  - hold Dapagliflozin         CAD (coronary artery disease)  Follows with cardiology, Dr. Canela  Continue home ASA, plavix, statin    CKD (chronic kidney disease), stage IV  Creatine stable for now. BMP reviewed- noted Estimated Creatinine Clearance: 16.2 mL/min (A) (based on SCr of 2.2  mg/dL (H)). according to latest data. Based on current GFR, CKD stage is stage 4 - GFR 15-29.  Monitor UOP and serial BMP and adjust therapy as needed. Renally dose meds. Avoid nephrotoxic medications and procedures.    - at baseline    Essential hypertension  Patient's blood pressure range in the last 24 hours was: BP  Min: 119/76  Max: 151/68.The patient's inpatient anti-hypertensive regimen is listed below:  Current Antihypertensives  amLODIPine tablet 5 mg, Daily, Oral  nitroGLYCERIN SL tablet 0.4 mg, Every 5 min PRN, Sublingual    Plan  - BP is controlled, no changes needed to their regimen  - resume Losartan     PAF (paroxysmal atrial fibrillation)  pAfib    - not on AC  - on DAPT per her cardiologist   - Replete lytes with a goal of K>4, Mg >2  - telemonitoring       VTE Risk Mitigation (From admission, onward)           Ordered     heparin (porcine) injection 5,000 Units  Every 8 hours         01/13/25 1127     IP VTE HIGH RISK PATIENT  Once         01/13/25 1127     Place sequential compression device  Until discontinued         01/13/25 1127                    Discharge Planning   ROSEMARIE: 1/14/2025     Code Status: Full Code   Medical Readiness for Discharge Date:   Discharge Plan A: Home with family                  Maria E Bernstein MD  Department of Hospital Medicine   [facility]

## 2025-01-15 NOTE — HOSPITAL COURSE
Patient admitted for dyspnea on exertion. V/Q scan was completed - no pulmonary embolism. Echo ordered - regional wall motion abnormalities present. Low normal systolic ejection fraction 50-55%. Cardiology was consulted - feel origin of TAYLOR may be cardiac in nature. PET stress done 1/16. Two perfusion abnormalities seen. Awaiting further cardiology recommendations.

## 2025-01-15 NOTE — SUBJECTIVE & OBJECTIVE
Interval History:     Patient reports no new symptoms. Continues to have shortness of breath.   Cardiology planning PET stress Thursday. NPO past midnight.     Review of Systems  Objective:     Vital Signs (Most Recent):  Temp: 98.2 °F (36.8 °C) (01/15/25 1111)  Pulse: 76 (01/15/25 1111)  Resp: 18 (01/15/25 1111)  BP: 131/72 (01/15/25 1111)  SpO2: (!) 93 % (01/15/25 1111) Vital Signs (24h Range):  Temp:  [97.5 °F (36.4 °C)-98.5 °F (36.9 °C)] 98.2 °F (36.8 °C)  Pulse:  [55-84] 76  Resp:  [16-18] 18  SpO2:  [93 %-98 %] 93 %  BP: (131-148)/(63-82) 131/72     Weight: 71 kg (156 lb 8.4 oz)  Body mass index is 24.52 kg/m².    Intake/Output Summary (Last 24 hours) at 1/15/2025 1202  Last data filed at 1/15/2025 0559  Gross per 24 hour   Intake 360 ml   Output 1850 ml   Net -1490 ml         Physical Exam  Constitutional:       General: She is not in acute distress.     Appearance: She is not ill-appearing.   Cardiovascular:      Rate and Rhythm: Normal rate.      Heart sounds: Normal heart sounds.   Pulmonary:      Effort: Pulmonary effort is normal. No respiratory distress.      Breath sounds: No rales.   Abdominal:      General: Abdomen is flat.      Palpations: Abdomen is soft.      Tenderness: There is no abdominal tenderness.   Musculoskeletal:      Right lower leg: No edema.      Left lower leg: No edema.   Neurological:      Mental Status: She is alert and oriented to person, place, and time. Mental status is at baseline.      Cranial Nerves: No cranial nerve deficit.      Motor: No weakness.   Psychiatric:         Mood and Affect: Mood normal.             Significant Labs: All pertinent labs within the past 24 hours have been reviewed.  BMP:   Recent Labs   Lab 01/15/25  0220   GLU 88      K 4.3      CO2 21*   BUN 35*   CREATININE 2.4*   CALCIUM 9.7   MG 2.0     CBC:   Recent Labs   Lab 01/14/25  0229 01/15/25  0220   WBC 8.05 8.25   HGB 10.3* 10.4*   HCT 32.1* 33.0*    356       Significant  Imaging: I have reviewed all pertinent imaging results/findings within the past 24 hours.

## 2025-01-15 NOTE — ASSESSMENT & PLAN NOTE
Maybe cardiac etiology for patients dyspnea on exertion.   - V/Q scan negative   - echo completed   - cardiology consulted - recommend PET Stress scan Thursday.  - PT/OT consulted - low intensity therapy.

## 2025-01-16 PROBLEM — D64.9 ANEMIA: Status: ACTIVE | Noted: 2025-01-16

## 2025-01-16 PROBLEM — Z95.5 HISTORY OF CORONARY ANGIOPLASTY WITH INSERTION OF STENT: Status: ACTIVE | Noted: 2025-01-16

## 2025-01-16 LAB
ALBUMIN SERPL BCP-MCNC: 2.6 G/DL (ref 3.5–5.2)
ALP SERPL-CCNC: 90 U/L (ref 40–150)
ALT SERPL W/O P-5'-P-CCNC: 30 U/L (ref 10–44)
ANION GAP SERPL CALC-SCNC: 12 MMOL/L (ref 8–16)
AST SERPL-CCNC: 20 U/L (ref 10–40)
BASOPHILS # BLD AUTO: 0.07 K/UL (ref 0–0.2)
BASOPHILS NFR BLD: 1 % (ref 0–1.9)
BILIRUB SERPL-MCNC: 0.4 MG/DL (ref 0.1–1)
BUN SERPL-MCNC: 41 MG/DL (ref 10–30)
CALCIUM SERPL-MCNC: 9.7 MG/DL (ref 8.7–10.5)
CFR FLOW - ANTERIOR: 2.36
CFR FLOW - INFERIOR: 1.74
CFR FLOW - LATERAL: 1.7
CFR FLOW - MAX: 3.13
CFR FLOW - MIN: 0.84
CFR FLOW - SEPTAL: 2.53
CFR FLOW - WHOLE HEART: 2.08
CFR FLOW- DEFECT 1: 1.19
CFR FLOW- DEFECT 2: 2.4
CHLORIDE SERPL-SCNC: 106 MMOL/L (ref 95–110)
CO2 SERPL-SCNC: 20 MMOL/L (ref 23–29)
CREAT SERPL-MCNC: 2.2 MG/DL (ref 0.5–1.4)
CV PHARM DOSE: 0.4 MG
CV STRESS BASE HR: 65 BPM
DIASTOLIC BLOOD PRESSURE: 63 MMHG
DIFFERENTIAL METHOD BLD: ABNORMAL
EJECTION FRACTION- HIGH: 59 %
END DIASTOLIC INDEX-HIGH: 155 ML/M2
END DIASTOLIC INDEX-LOW: 91 ML/M2
END SYSTOLIC INDEX-HIGH: 78 ML/M2
END SYSTOLIC INDEX-LOW: 40 ML/M2
EOSINOPHIL # BLD AUTO: 0.2 K/UL (ref 0–0.5)
EOSINOPHIL NFR BLD: 2.3 % (ref 0–8)
ERYTHROCYTE [DISTWIDTH] IN BLOOD BY AUTOMATED COUNT: 14.4 % (ref 11.5–14.5)
EST. GFR  (NO RACE VARIABLE): 20.6 ML/MIN/1.73 M^2
GLUCOSE SERPL-MCNC: 89 MG/DL (ref 70–110)
HCT VFR BLD AUTO: 33.3 % (ref 37–48.5)
HGB BLD-MCNC: 10.3 G/DL (ref 12–16)
IMM GRANULOCYTES # BLD AUTO: 0.04 K/UL (ref 0–0.04)
IMM GRANULOCYTES NFR BLD AUTO: 0.6 % (ref 0–0.5)
LYMPHOCYTES # BLD AUTO: 1.9 K/UL (ref 1–4.8)
LYMPHOCYTES NFR BLD: 26.5 % (ref 18–48)
MAGNESIUM SERPL-MCNC: 2 MG/DL (ref 1.6–2.6)
MCH RBC QN AUTO: 28.1 PG (ref 27–31)
MCHC RBC AUTO-ENTMCNC: 30.9 G/DL (ref 32–36)
MCV RBC AUTO: 91 FL (ref 82–98)
MONOCYTES # BLD AUTO: 1 K/UL (ref 0.3–1)
MONOCYTES NFR BLD: 13.4 % (ref 4–15)
NEUTROPHILS # BLD AUTO: 4 K/UL (ref 1.8–7.7)
NEUTROPHILS NFR BLD: 56.2 % (ref 38–73)
NRBC BLD-RTO: 0 /100 WBC
NUC REST DIASTOLIC VOLUME INDEX: 95
NUC REST EJECTION FRACTION: 51
NUC REST SYSTOLIC VOLUME INDEX: 46
NUC STRESS DIASTOLIC VOLUME INDEX: 102
NUC STRESS EJECTION FRACTION: 56 %
NUC STRESS SYSTOLIC VOLUME INDEX: 45
OHS CV CPX 85 PERCENT MAX PREDICTED HEART RATE MALE: 110
OHS CV CPX MAX PREDICTED HEART RATE: 129
OHS CV CPX PATIENT IS FEMALE: 1
OHS CV CPX PATIENT IS MALE: 0
OHS CV CPX PEAK DIASTOLIC BLOOD PRESSURE: 72 MMHG
OHS CV CPX PEAK HEAR RATE: 76 BPM
OHS CV CPX PEAK RATE PRESSURE PRODUCT: NORMAL
OHS CV CPX PEAK SYSTOLIC BLOOD PRESSURE: 138 MMHG
OHS CV CPX PERCENT MAX PREDICTED HEART RATE ACHIEVED: 60
OHS CV CPX RATE PRESSURE PRODUCT PRESENTING: NORMAL
OHS CV INITIAL DOSE: 21 MCG/KG/MIN
OHS CV MODERATELY REDUCED FLOW CAPACITY: 4 %
OHS CV MYOCARDIAL STEAL: 2 %
OHS CV NO ISCHEMIA MILDLY REDUCED FLOW CAPACTY: 32 %
OHS CV NO ISCHEMIA MINIMALLY REDUCED FLOW CAPACITY: 42 %
OHS CV NORMAL FLOW CAPACITY COMPARABLE TO HEALTHY YOUNG VOLUNTEERS: 19 %
OHS CV PEAK DOSE: 20.6 MCG/KG/MIN
OHS CV PET ID: 8017
OHS CV SEVERELY REDUCED FLOW CAPACITY: 2 %
OHS CV TOTAL EXAM DLP: 296.51 MGY-CM
PERFUSION DEFECT 1 SIZE IN %: 11 %
PERFUSION DEFECT 2 SIZE IN %: 5 %
PERFUSION DEFECT WORSENS SIZE IN % 2: 2 %
PHOSPHATE SERPL-MCNC: 4.7 MG/DL (ref 2.7–4.5)
PLATELET # BLD AUTO: 336 K/UL (ref 150–450)
PMV BLD AUTO: 11 FL (ref 9.2–12.9)
POTASSIUM SERPL-SCNC: 4.2 MMOL/L (ref 3.5–5.1)
PROT SERPL-MCNC: 7.5 G/DL (ref 6–8.4)
RBC # BLD AUTO: 3.67 M/UL (ref 4–5.4)
REST FLOW - ANTERIOR: 0.79 CC/MIN/G
REST FLOW - INFERIOR: 0.83 CC/MIN/G
REST FLOW - LATERAL: 0.97 CC/MIN/G
REST FLOW - MAX: 1.22 CC/MIN/G
REST FLOW - MIN: 0.43 CC/MIN/G
REST FLOW - SEPTAL: 0.77 CC/MIN/G
REST FLOW - WHOLE HEART: 0.84 CC/MIN/G
REST FLOW- DEFECT 1: 0.83 CC/MIN/G
REST FLOW- DEFECT 2: 0.47 CC/MIN/G
RETIRED EF AND QEF - SEE NOTES: 47 %
SODIUM SERPL-SCNC: 138 MMOL/L (ref 136–145)
STRESS FLOW - ANTERIOR: 1.83 CC/MIN/G
STRESS FLOW - INFERIOR: 1.44 CC/MIN/G
STRESS FLOW - LATERAL: 1.66 CC/MIN/G
STRESS FLOW - MAX: 2.74 CC/MIN/G
STRESS FLOW - MIN: 0.72 CC/MIN/G
STRESS FLOW - SEPTAL: 1.93 CC/MIN/G
STRESS FLOW - WHOLE HEART: 1.72 CC/MIN/G
STRESS FLOW- DEFECT 1: 0.97 CC/MIN/G
STRESS FLOW- DEFECT 2: 1.14 CC/MIN/G
SYSTOLIC BLOOD PRESSURE: 159 MMHG
WBC # BLD AUTO: 7.09 K/UL (ref 3.9–12.7)

## 2025-01-16 PROCEDURE — 97116 GAIT TRAINING THERAPY: CPT | Mod: CQ

## 2025-01-16 PROCEDURE — 99233 SBSQ HOSP IP/OBS HIGH 50: CPT | Mod: ,,, | Performed by: INTERNAL MEDICINE

## 2025-01-16 PROCEDURE — 63600175 PHARM REV CODE 636 W HCPCS: Performed by: STUDENT IN AN ORGANIZED HEALTH CARE EDUCATION/TRAINING PROGRAM

## 2025-01-16 PROCEDURE — 84100 ASSAY OF PHOSPHORUS: CPT | Performed by: STUDENT IN AN ORGANIZED HEALTH CARE EDUCATION/TRAINING PROGRAM

## 2025-01-16 PROCEDURE — 85025 COMPLETE CBC W/AUTO DIFF WBC: CPT | Performed by: STUDENT IN AN ORGANIZED HEALTH CARE EDUCATION/TRAINING PROGRAM

## 2025-01-16 PROCEDURE — 25000003 PHARM REV CODE 250: Performed by: STUDENT IN AN ORGANIZED HEALTH CARE EDUCATION/TRAINING PROGRAM

## 2025-01-16 PROCEDURE — 80053 COMPREHEN METABOLIC PANEL: CPT | Performed by: STUDENT IN AN ORGANIZED HEALTH CARE EDUCATION/TRAINING PROGRAM

## 2025-01-16 PROCEDURE — 21400001 HC TELEMETRY ROOM

## 2025-01-16 PROCEDURE — 97110 THERAPEUTIC EXERCISES: CPT

## 2025-01-16 PROCEDURE — 83735 ASSAY OF MAGNESIUM: CPT | Performed by: STUDENT IN AN ORGANIZED HEALTH CARE EDUCATION/TRAINING PROGRAM

## 2025-01-16 PROCEDURE — 36415 COLL VENOUS BLD VENIPUNCTURE: CPT | Performed by: STUDENT IN AN ORGANIZED HEALTH CARE EDUCATION/TRAINING PROGRAM

## 2025-01-16 PROCEDURE — A9555 RB82 RUBIDIUM: HCPCS | Performed by: STUDENT IN AN ORGANIZED HEALTH CARE EDUCATION/TRAINING PROGRAM

## 2025-01-16 PROCEDURE — 97535 SELF CARE MNGMENT TRAINING: CPT

## 2025-01-16 RX ORDER — AMINOPHYLLINE 25 MG/ML
75 INJECTION, SOLUTION INTRAVENOUS ONCE
Status: COMPLETED | OUTPATIENT
Start: 2025-01-16 | End: 2025-01-16

## 2025-01-16 RX ORDER — REGADENOSON 0.08 MG/ML
0.4 INJECTION, SOLUTION INTRAVENOUS
Status: COMPLETED | OUTPATIENT
Start: 2025-01-16 | End: 2025-01-16

## 2025-01-16 RX ADMIN — RUBIDIUM CHLORIDE RB-82 20.6 MILLICURIE: 150 INJECTION, SOLUTION INTRAVENOUS at 09:01

## 2025-01-16 RX ADMIN — REGADENOSON 0.4 MG: 0.08 INJECTION, SOLUTION INTRAVENOUS at 09:01

## 2025-01-16 RX ADMIN — LOSARTAN POTASSIUM 25 MG: 25 TABLET, FILM COATED ORAL at 09:01

## 2025-01-16 RX ADMIN — POLYETHYLENE GLYCOL 3350 17 G: 17 POWDER, FOR SOLUTION ORAL at 08:01

## 2025-01-16 RX ADMIN — POLYETHYLENE GLYCOL 3350 17 G: 17 POWDER, FOR SOLUTION ORAL at 09:01

## 2025-01-16 RX ADMIN — CYANOCOBALAMIN TAB 250 MCG 500 MCG: 250 TAB at 09:01

## 2025-01-16 RX ADMIN — ATORVASTATIN CALCIUM 40 MG: 40 TABLET, FILM COATED ORAL at 09:01

## 2025-01-16 RX ADMIN — SODIUM BICARBONATE 650 MG TABLET 650 MG: at 08:01

## 2025-01-16 RX ADMIN — CHOLECALCIFEROL TAB 25 MCG (1000 UNIT) 2000 UNITS: 25 TAB at 09:01

## 2025-01-16 RX ADMIN — AMLODIPINE BESYLATE 5 MG: 5 TABLET ORAL at 09:01

## 2025-01-16 RX ADMIN — FOLIC ACID 1 MG: 1 TABLET ORAL at 09:01

## 2025-01-16 RX ADMIN — SODIUM BICARBONATE 650 MG TABLET 650 MG: at 09:01

## 2025-01-16 RX ADMIN — CEFTRIAXONE 1 G: 1 INJECTION, POWDER, FOR SOLUTION INTRAMUSCULAR; INTRAVENOUS at 09:01

## 2025-01-16 RX ADMIN — ALLOPURINOL 150 MG: 100 TABLET ORAL at 09:01

## 2025-01-16 RX ADMIN — RUBIDIUM CHLORIDE RB-82 21 MILLICURIE: 150 INJECTION, SOLUTION INTRAVENOUS at 09:01

## 2025-01-16 RX ADMIN — AMINOPHYLLINE 75 MG: 25 INJECTION, SOLUTION INTRAVENOUS at 09:01

## 2025-01-16 RX ADMIN — CLOPIDOGREL BISULFATE 75 MG: 75 TABLET ORAL at 09:01

## 2025-01-16 RX ADMIN — ASPIRIN 81 MG: 81 TABLET, COATED ORAL at 09:01

## 2025-01-16 NOTE — ASSESSMENT & PLAN NOTE
Patient's blood pressure range in the last 24 hours was: BP  Min: 120/64  Max: 162/81.The patient's inpatient anti-hypertensive regimen is listed below:  Current Antihypertensives  amLODIPine tablet 5 mg, Daily, Oral  nitroGLYCERIN SL tablet 0.4 mg, Every 5 min PRN, Sublingual  losartan tablet 25 mg, Daily, Oral    Plan  - BP is controlled, no changes needed to their regimen  - resume Losartan

## 2025-01-16 NOTE — ASSESSMENT & PLAN NOTE
Maybe cardiac etiology for patients dyspnea on exertion.   - V/Q scan negative   - echo completed   - cardiology consulted - PET stress done  - PT/OT consulted - low intensity therapy.

## 2025-01-16 NOTE — PT/OT/SLP PROGRESS
Physical Therapy Treatment    Patient Name:  Anahy Evans   MRN:  [id number]    Recommendations:     Discharge Recommendations: Low Intensity Therapy  Discharge Equipment Recommendations: walker, rolling  Barriers to discharge: None    Assessment:     Anahy Evans is a 89 y.o. female admitted with a medical diagnosis of Shortness of breath.  She presents with the following impairments/functional limitations: weakness, impaired endurance, gait instability, impaired balance, impaired cardiopulmonary response to activity . Patient showed improved endurance, as evidence of walking range. Patient reported min shortness of breath during gait training, but O2 sat was 97% and heart rate 72 bpm.    Rehab Prognosis: Good; patient would benefit from acute skilled PT services to address these deficits and reach maximum level of function.    Recent Surgery: * No surgery found *      Plan:     During this hospitalization, patient to be seen 3 x/week to address the identified rehab impairments via gait training, therapeutic activities and progress toward the following goals:    Plan of Care Expires:  02/12/25    Subjective     Chief Complaint: fatigue  Patient/Family Comments/goals: to go home soon.  Pain/Comfort:  Pain Rating 1: 0/10  Pain Rating Post-Intervention 1: 0/10      Objective:     Communicated with NSG prior to session.  Patient found HOB elevated with telemetry, [place] upon PT entry to room.     General Precautions: Standard, fall, NPO  Orthopedic Precautions: N/A  Braces: N/A  Respiratory Status: Room air     Functional Mobility:  Bed Mobility:     Rolling Left:  stand by assistance  Scooting: stand by assistance  Supine to Sit: stand by assistance  Transfers:     Sit to Stand:  contact guard assistance with rolling walker  Bed to Chair: contact guard assistance with  rolling walker  using  Stand Pivot  Gait: 120 ft x 2 trials with RW and CGA, with chair follow and a rest break sitting, with cues to correct  downward gaze.      AM-PAC 6 CLICK MOBILITY  Turning over in bed (including adjusting bedclothes, sheets and blankets)?: 4  Sitting down on and standing up from a chair with arms (e.g., wheelchair, bedside commode, etc.): 3  Moving from lying on back to sitting on the side of the bed?: 4  Moving to and from a bed to a chair (including a wheelchair)?: 3  Need to walk in hospital room?: 3  Climbing 3-5 steps with a railing?: 2  Basic Mobility Total Score: 19       Treatment & Education:  Donned a second gown. Patient assisted to bedside chair with RW and CGA. Educated Patient and family to request assistance daily from the nurse to transfer out of bed.    Patient left up in chair with all lines intact, call button in reach, and family present.    GOALS:   Multidisciplinary Problems       Physical Therapy Goals          Problem: Physical Therapy    Goal Priority Disciplines Outcome Interventions   Physical Therapy Goal     PT, PT/OT Progressing    Description: Goals to be met by: 25     Patient will increase functional independence with mobility by performin. Supine to sit with Modified [place]  2. Sit to stand transfer with Stand-by Assistance with AD if needed.   3. Gait  x 100 feet with Stand-by Assistance using AD if needed.   4. Ascend/descend 4 stair with left Handrails Contact Guard Assistance using AD if needed.                          DME Justifications:   Anahy's mobility limitation cannot be sufficiently resolved by the use of a cane. Her functional mobility deficit can be sufficiently resolved with the use of a Rolling Walker. Patient's mobility limitation significantly impairs their ability to participate in one of more activities of daily living.  The use of a RW will significantly improve the patient's ability to participate in MRADLS and the patient will use it on regular basis in the home.    Time Tracking:     PT Received On: 25  PT Start Time: 1551     PT Stop Time: 1604  PT  Total Time (min): 13 min     Billable Minutes: Gait Training 13    Treatment Type: Treatment  PT/PTA: PTA     Number of PTA visits since last PT visit: 1 01/16/2025

## 2025-01-16 NOTE — SUBJECTIVE & OBJECTIVE
Interval History: No acute events overnight. Patient continues to have SOB.     Review of Systems  Objective:     Vital Signs (Most Recent):  Temp: 98.2 °F (36.8 °C) (01/16/25 1106)  Pulse: 98 (01/16/25 1106)  Resp: 17 (01/16/25 1106)  BP: 120/64 (01/16/25 1106)  SpO2: 96 % (01/16/25 1106) Vital Signs (24h Range):  Temp:  [97.9 °F (36.6 °C)-98.3 °F (36.8 °C)] 98.2 °F (36.8 °C)  Pulse:  [43-98] 98  Resp:  [17-20] 17  SpO2:  [94 %-96 %] 96 %  BP: (120-162)/(63-81) 120/64     Weight: 70.8 kg (156 lb)  Body mass index is 24.43 kg/m².    Intake/Output Summary (Last 24 hours) at 1/16/2025 1452  Last data filed at 1/15/2025 1638  Gross per 24 hour   Intake --   Output 400 ml   Net -400 ml         Physical Exam  Constitutional:       General: She is not in acute distress.     Appearance: She is not ill-appearing.   Eyes:      General: No scleral icterus.  Cardiovascular:      Rate and Rhythm: Normal rate.      Heart sounds: Normal heart sounds.   Pulmonary:      Effort: Pulmonary effort is normal. No respiratory distress.      Breath sounds: Normal breath sounds. No rales.   Abdominal:      General: Abdomen is flat. There is no distension.      Palpations: Abdomen is soft.      Tenderness: There is no abdominal tenderness.   Musculoskeletal:      Right lower leg: No edema.      Left lower leg: No edema.   Skin:     General: Skin is warm and dry.   Neurological:      Mental Status: She is alert and oriented to person, place, and time. Mental status is at baseline.      Cranial Nerves: No cranial nerve deficit.      Motor: No weakness.   Psychiatric:         Mood and Affect: Mood normal.         Behavior: Behavior normal.             Significant Labs: All pertinent labs within the past 24 hours have been reviewed.    Significant Imaging: I have reviewed all pertinent imaging results/findings within the past 24 hours.

## 2025-01-16 NOTE — PLAN OF CARE
Problem: Occupational Therapy  Goal: Occupational Therapy Goal  Description: Goals to be met by: 2/13/25     Patient will increase functional independence with ADLs by performing:    UE Dressing with Modified [place].  LE Dressing with Modified [place].  Grooming while standing with Modified [place].  Toileting from toilet with Modified [place] for hygiene and clothing management.   Supine to sit with Modified [place].  Step transfer with Modified [place]  Toilet transfer to toilet with Modified [place].    Outcome: Progressing    Goals remain appropriate

## 2025-01-16 NOTE — ASSESSMENT & PLAN NOTE
Follows with cardiology, Dr. Canela  Continue home ASA, plavix, statin  Positive PET stress, awaiting further cardiology recs

## 2025-01-16 NOTE — CARE UPDATE
Here with acute onset TAYLOR and angina. PET with reversible defect in Lcx territory with associated severely reduced CFR. Npo tonight for [facility] tomorrow. IC consult placed. Load plavix in am

## 2025-01-16 NOTE — SUBJECTIVE & OBJECTIVE
Interval History: NAEO. PET stress test completed today. Stress test revealed a  small sized  perfusion abnormality involving LAD territory with findings consistent with a transmural infract and a reversible perfusion abnormality in the territory of the LCX. LHC is recommended for possible stent placement.  Patient and family agreeable to LHC and stent placement.     Review of Systems   Cardiovascular:  Positive for chest pain (resolved currently) and dyspnea on exertion. Negative for leg swelling and orthopnea.   Respiratory:  Positive for cough and shortness of breath.    Gastrointestinal:  Positive for nausea and vomiting.     Objective:     Vital Signs (Most Recent):  Temp: 98.2 °F (36.8 °C) (01/16/25 1525)  Pulse: 98 (01/16/25 1525)  Resp: 17 (01/16/25 1525)  BP: (!) 168/72 (01/16/25 1525)  SpO2: 98 % (01/16/25 1525) Vital Signs (24h Range):  Temp:  [97.9 °F (36.6 °C)-98.3 °F (36.8 °C)] 98.2 °F (36.8 °C)  Pulse:  [43-98] 98  Resp:  [17-18] 17  SpO2:  [94 %-98 %] 98 %  BP: (120-168)/(63-76) 168/72     Weight: 70.8 kg (156 lb)  Body mass index is 24.43 kg/m².     SpO2: 98 %       No intake or output data in the 24 hours ending 01/16/25 1757    Lines/Drains/Airways       Drain  Duration             Female External Urinary Catheter w/ Suction 01/13/25 1700 3 days              Peripheral Intravenous Line  Duration                  Peripheral IV - Single Lumen 01/13/25 0731 20 G Right Antecubital 3 days                       Physical Exam  Cardiovascular:      Rate and Rhythm: Normal rate and regular rhythm.      Heart sounds: No murmur heard.  Pulmonary:      Effort: Pulmonary effort is normal.      Breath sounds: Normal breath sounds.   Abdominal:      General: Abdomen is flat. There is no distension.      Palpations: Abdomen is soft.      Tenderness: There is no abdominal tenderness.   Musculoskeletal:      Right lower leg: No edema.      Left lower leg: No edema.   Neurological:      Mental Status: She is  oriented to person, place, and time.            Significant Labs: All pertinent lab results from the last 24 hours have been reviewed.    Significant Imaging:  Reviewed

## 2025-01-16 NOTE — PROGRESS NOTES
"[facility] [address] [place] [address] Medicine  Progress Note    Patient Name: Anahy Evans  MRN: [id number]  Patient Class: IP- Inpatient   Admission Date: 1/13/2025  Length of Stay: 1 days  Attending Physician: Marely Perez MD  Primary Care Provider: Elena Cabrera MD        Subjective     Principal Problem:Shortness of breath        HPI:  89 year old female with PMH of CKD4, HTN, AAA, hyperuricemia, Vitamin D def, Afib, CAD, HLD, presents to ED for shortness of breath at rest since 12/26/24. She describes it as shortness of breath on exertion and rest that is progressively worsening. She was recently admitted 1/6-1/7 for shortness of breath and weakness - was diagnosed with viral illness and discharged home. She reports feeling chill last week but not anymore. She feels a "nervousness" in her abdomen. Denies pain. She reports she feels a stinging sensation when she urinates occasionally. Denies chest pain, fevers, nausea, vomiting, cough, sputum production. She denies constipation, diarrhea. She lives with her daughter. At baseline she is active, ambulates without assistance. Trying to use walker now.     ED course:  Sating well on RA. Hemodynamically stable.   Labs: Trop 53 --> 38, , D-dimer 33+, UA positive for UTI  VQ scan: no PE   Rx: IV ceftriaxone     Overview/Hospital Course:  Patient admitted for dyspnea on exertion. V/Q scan was completed - no pulmonary embolism. Echo ordered - regional wall motion abnormalities present. Low normal systolic ejection fraction 50-55%. Cardiology was consulted - feel origin of TAYLOR may be cardiac in nature. PET stress done 1/16. Two perfusion abnormalities seen. Awaiting further cardiology recommendations.     Interval History: No acute events overnight. Patient continues to have SOB.     Review of Systems  Objective:     Vital Signs (Most Recent):  Temp: 98.2 °F (36.8 °C) (01/16/25 1106)  Pulse: 98 (01/16/25 1106)  Resp: 17 (01/16/25 1106)  BP: 120/64 " (01/16/25 1106)  SpO2: 96 % (01/16/25 1106) Vital Signs (24h Range):  Temp:  [97.9 °F (36.6 °C)-98.3 °F (36.8 °C)] 98.2 °F (36.8 °C)  Pulse:  [43-98] 98  Resp:  [17-20] 17  SpO2:  [94 %-96 %] 96 %  BP: (120-162)/(63-81) 120/64     Weight: 70.8 kg (156 lb)  Body mass index is 24.43 kg/m².    Intake/Output Summary (Last 24 hours) at 1/16/2025 1452  Last data filed at 1/15/2025 1638  Gross per 24 hour   Intake --   Output 400 ml   Net -400 ml         Physical Exam  Constitutional:       General: She is not in acute distress.     Appearance: She is not ill-appearing.   Eyes:      General: No scleral icterus.  Cardiovascular:      Rate and Rhythm: Normal rate.      Heart sounds: Normal heart sounds.   Pulmonary:      Effort: Pulmonary effort is normal. No respiratory distress.      Breath sounds: Normal breath sounds. No rales.   Abdominal:      General: Abdomen is flat. There is no distension.      Palpations: Abdomen is soft.      Tenderness: There is no abdominal tenderness.   Musculoskeletal:      Right lower leg: No edema.      Left lower leg: No edema.   Skin:     General: Skin is warm and dry.   Neurological:      Mental Status: She is alert and oriented to person, place, and time. Mental status is at baseline.      Cranial Nerves: No cranial nerve deficit.      Motor: No weakness.   Psychiatric:         Mood and Affect: Mood normal.         Behavior: Behavior normal.             Significant Labs: All pertinent labs within the past 24 hours have been reviewed.    Significant Imaging: I have reviewed all pertinent imaging results/findings within the past 24 hours.    Assessment and Plan     * Shortness of breath  Maybe cardiac etiology for patients dyspnea on exertion.   - V/Q scan negative   - echo completed   - cardiology consulted - PET stress done  - PT/OT consulted - low intensity therapy.     Dyspnea on exertion        UTI (urinary tract infection)  - continue ceftriaxone, switch to orals for discharge   -  urine cultures positive for E. Coli pansensitive   - hold Dapagliflozin         CAD (coronary artery disease)  Follows with cardiology, Dr. Canela  Continue home ASA, plavix, statin  Positive PET stress, awaiting further cardiology recs    CKD (chronic kidney disease), stage IV  Creatine stable for now. BMP reviewed- noted Estimated Creatinine Clearance: 16.2 mL/min (A) (based on SCr of 2.2 mg/dL (H)). according to latest data. Based on current GFR, CKD stage is stage 4 - GFR 15-29.  Monitor UOP and serial BMP and adjust therapy as needed. Renally dose meds. Avoid nephrotoxic medications and procedures.    - at baseline    Essential hypertension  Patient's blood pressure range in the last 24 hours was: BP  Min: 120/64  Max: 162/81.The patient's inpatient anti-hypertensive regimen is listed below:  Current Antihypertensives  amLODIPine tablet 5 mg, Daily, Oral  nitroGLYCERIN SL tablet 0.4 mg, Every 5 min PRN, Sublingual  losartan tablet 25 mg, Daily, Oral    Plan  - BP is controlled, no changes needed to their regimen  - resume Losartan     PAF (paroxysmal atrial fibrillation)  pAfib - not on AC  - on DAPT per her cardiologist   - Replete lytes with a goal of K>4, Mg >2  - telemonitoring       VTE Risk Mitigation (From admission, onward)           Ordered     heparin (porcine) injection 5,000 Units  Every 8 hours         01/13/25 1127     IP VTE HIGH RISK PATIENT  Once         01/13/25 1127     Place sequential compression device  Until discontinued         01/13/25 1127                    Discharge Planning   ROSEMARIE: 1/16/2025     Code Status: Full Code   Medical Readiness for Discharge Date:   Discharge Plan A: Home with family                Please place Justification for DME        Marely Perez MD  Department of Hospital Medicine   [facility]

## 2025-01-16 NOTE — PT/OT/SLP PROGRESS
Occupational Therapy   Treatment    Name: Anahy Evans  MRN: [id number]  Admitting Diagnosis:  Shortness of breath       Recommendations:     Discharge Recommendations: Low Intensity Therapy  Discharge Equipment Recommendations:  walker, rolling  Barriers to discharge:  None    Assessment:     Anahy Evans is a 89 y.o. female with a medical diagnosis of Shortness of breath.  She presents with good participation and motivation. Performance deficits affecting function are weakness, impaired endurance, impaired self care skills, impaired functional mobility, impaired balance, decreased lower extremity function, decreased upper extremity function, impaired cardiopulmonary response to activity.     Rehab Prognosis:  Good; patient would benefit from acute skilled OT services to address these deficits and reach maximum level of function.       Plan:     Patient to be seen 3 x/week to address the above listed problems via self-care/home management, therapeutic activities, therapeutic exercises  Plan of Care Expires: 02/13/25  Plan of Care Reviewed with: patient    Subjective     Chief Complaint: none stated  Patient/Family Comments/goals: Pt was agreeable to therapy session.  Pain/Comfort:  Pain Rating 1: 0/10  Pain Rating Post-Intervention 1: 0/10    Objective:     Communicated with: Rn prior to session.  Patient found supine with telemetry, [place] upon OT entry to room.    General Precautions: Standard, fall, NPO    Orthopedic Precautions:N/A  Braces: N/A  Respiratory Status: Room air     Occupational Performance:     Bed Mobility:    Patient completed Supine to Sit with modified independence     Functional Mobility/Transfers:  Patient completed Sit <> Stand Transfer with stand by assistance  with  no assistive device   Patient completed Bed <> Chair Transfer using Step Transfer technique with stand by assistance with no assistive device  Functional Mobility: SBA-CGA with functional mobility to bathroom & back to  chair & then from chair to transport w/c for test    Activities of Daily Living:  Grooming: stand by assistance with washing face & brushing teeth while standing at bathroom sink  Upper Body Dressing: stand by assistance donning gown around back while seated EOB  Lower Body Dressing: stand by assistance donning socks seated EOB      AMPAC 6 Click ADL: 18    Treatment & Education:  Pt performed AROM exercises with BUE in 3 planes x 10 reps each while seated in chair.  Provided education on safety & need for (A) with OOB activities.  Pt had no further questions & when asked whether there were any concerns pt reported none.      Patient left in transport w/c with all lines intact and RN & transport personnel present    GOALS:   Multidisciplinary Problems       Occupational Therapy Goals          Problem: Occupational Therapy    Goal Priority Disciplines Outcome Interventions   Occupational Therapy Goal     OT, PT/OT Progressing    Description: Goals to be met by: 2/13/25     Patient will increase functional independence with ADLs by performing:    UE Dressing with Modified [place].  LE Dressing with Modified [place].  Grooming while standing with Modified [place].  Toileting from toilet with Modified [place] for hygiene and clothing management.   Supine to sit with Modified [place].  Step transfer with Modified [place]  Toilet transfer to toilet with Modified [place].                         DME Justifications:   Anahy's mobility limitation cannot be sufficiently resolved by the use of a cane. Her functional mobility deficit can be sufficiently resolved with the use of a Rolling Walker. Patient's mobility limitation significantly impairs their ability to participate in one of more activities of daily living.  The use of a RW will significantly improve the patient's ability to participate in MRADLS and the patient will use it on regular basis in the home.    Time Tracking:     OT  Date of Treatment: 01/16/25  OT Start Time: 0848  OT Stop Time: 0911  OT Total Time (min): 23 min    Billable Minutes:Self Care/Home Management 13  Therapeutic Exercise 10    OT/AMAURY: OT          1/16/2025

## 2025-01-17 VITALS
HEIGHT: 67 IN | RESPIRATION RATE: 16 BRPM | WEIGHT: 156 LBS | OXYGEN SATURATION: 96 % | SYSTOLIC BLOOD PRESSURE: 115 MMHG | TEMPERATURE: 98 F | BODY MASS INDEX: 24.48 KG/M2 | HEART RATE: 82 BPM | DIASTOLIC BLOOD PRESSURE: 62 MMHG

## 2025-01-17 LAB
ALBUMIN SERPL BCP-MCNC: 2.7 G/DL (ref 3.5–5.2)
ALP SERPL-CCNC: 90 U/L (ref 40–150)
ALT SERPL W/O P-5'-P-CCNC: 29 U/L (ref 10–44)
ANION GAP SERPL CALC-SCNC: 9 MMOL/L (ref 8–16)
AST SERPL-CCNC: 20 U/L (ref 10–40)
BASOPHILS # BLD AUTO: 0.08 K/UL (ref 0–0.2)
BASOPHILS NFR BLD: 1.4 % (ref 0–1.9)
BILIRUB SERPL-MCNC: 0.5 MG/DL (ref 0.1–1)
BUN SERPL-MCNC: 38 MG/DL (ref 10–30)
CALCIUM SERPL-MCNC: 9.9 MG/DL (ref 8.7–10.5)
CHLORIDE SERPL-SCNC: 109 MMOL/L (ref 95–110)
CO2 SERPL-SCNC: 23 MMOL/L (ref 23–29)
CREAT SERPL-MCNC: 1.9 MG/DL (ref 0.5–1.4)
DIFFERENTIAL METHOD BLD: ABNORMAL
EOSINOPHIL # BLD AUTO: 0.2 K/UL (ref 0–0.5)
EOSINOPHIL NFR BLD: 2.8 % (ref 0–8)
ERYTHROCYTE [DISTWIDTH] IN BLOOD BY AUTOMATED COUNT: 14.5 % (ref 11.5–14.5)
EST. GFR  (NO RACE VARIABLE): 24.6 ML/MIN/1.73 M^2
GLUCOSE SERPL-MCNC: 98 MG/DL (ref 70–110)
HCT VFR BLD AUTO: 32.1 % (ref 37–48.5)
HGB BLD-MCNC: 10.2 G/DL (ref 12–16)
IMM GRANULOCYTES # BLD AUTO: 0.02 K/UL (ref 0–0.04)
IMM GRANULOCYTES NFR BLD AUTO: 0.3 % (ref 0–0.5)
LYMPHOCYTES # BLD AUTO: 1.9 K/UL (ref 1–4.8)
LYMPHOCYTES NFR BLD: 32.4 % (ref 18–48)
MAGNESIUM SERPL-MCNC: 2 MG/DL (ref 1.6–2.6)
MCH RBC QN AUTO: 28.8 PG (ref 27–31)
MCHC RBC AUTO-ENTMCNC: 31.8 G/DL (ref 32–36)
MCV RBC AUTO: 91 FL (ref 82–98)
MONOCYTES # BLD AUTO: 0.8 K/UL (ref 0.3–1)
MONOCYTES NFR BLD: 13.5 % (ref 4–15)
NEUTROPHILS # BLD AUTO: 2.9 K/UL (ref 1.8–7.7)
NEUTROPHILS NFR BLD: 49.6 % (ref 38–73)
NRBC BLD-RTO: 0 /100 WBC
PHOSPHATE SERPL-MCNC: 4.9 MG/DL (ref 2.7–4.5)
PLATELET # BLD AUTO: 296 K/UL (ref 150–450)
PMV BLD AUTO: 10.9 FL (ref 9.2–12.9)
POTASSIUM SERPL-SCNC: 3.9 MMOL/L (ref 3.5–5.1)
PROT SERPL-MCNC: 7.5 G/DL (ref 6–8.4)
RBC # BLD AUTO: 3.54 M/UL (ref 4–5.4)
SODIUM SERPL-SCNC: 141 MMOL/L (ref 136–145)
WBC # BLD AUTO: 5.77 K/UL (ref 3.9–12.7)

## 2025-01-17 PROCEDURE — 80053 COMPREHEN METABOLIC PANEL: CPT | Performed by: STUDENT IN AN ORGANIZED HEALTH CARE EDUCATION/TRAINING PROGRAM

## 2025-01-17 PROCEDURE — 83735 ASSAY OF MAGNESIUM: CPT | Performed by: STUDENT IN AN ORGANIZED HEALTH CARE EDUCATION/TRAINING PROGRAM

## 2025-01-17 PROCEDURE — 36415 COLL VENOUS BLD VENIPUNCTURE: CPT | Performed by: STUDENT IN AN ORGANIZED HEALTH CARE EDUCATION/TRAINING PROGRAM

## 2025-01-17 PROCEDURE — 63600175 PHARM REV CODE 636 W HCPCS: Performed by: STUDENT IN AN ORGANIZED HEALTH CARE EDUCATION/TRAINING PROGRAM

## 2025-01-17 PROCEDURE — 97110 THERAPEUTIC EXERCISES: CPT

## 2025-01-17 PROCEDURE — 84100 ASSAY OF PHOSPHORUS: CPT | Performed by: STUDENT IN AN ORGANIZED HEALTH CARE EDUCATION/TRAINING PROGRAM

## 2025-01-17 PROCEDURE — 25000003 PHARM REV CODE 250: Performed by: STUDENT IN AN ORGANIZED HEALTH CARE EDUCATION/TRAINING PROGRAM

## 2025-01-17 PROCEDURE — 99232 SBSQ HOSP IP/OBS MODERATE 35: CPT | Mod: GC,,, | Performed by: INTERNAL MEDICINE

## 2025-01-17 PROCEDURE — 97530 THERAPEUTIC ACTIVITIES: CPT

## 2025-01-17 PROCEDURE — 85025 COMPLETE CBC W/AUTO DIFF WBC: CPT | Performed by: STUDENT IN AN ORGANIZED HEALTH CARE EDUCATION/TRAINING PROGRAM

## 2025-01-17 RX ORDER — LOSARTAN POTASSIUM 25 MG/1
25 TABLET ORAL DAILY
Qty: 90 TABLET | Refills: 3 | Status: ON HOLD | OUTPATIENT
Start: 2025-01-18 | End: 2026-01-18

## 2025-01-17 RX ORDER — ALBUTEROL SULFATE 90 UG/1
1 INHALANT RESPIRATORY (INHALATION) EVERY 4 HOURS PRN
Qty: 6.7 G | Refills: 0 | Status: ON HOLD | OUTPATIENT
Start: 2025-01-17 | End: 2025-02-16

## 2025-01-17 RX ORDER — AMLODIPINE BESYLATE 10 MG/1
10 TABLET ORAL DAILY
Status: DISCONTINUED | OUTPATIENT
Start: 2025-01-18 | End: 2025-01-17 | Stop reason: HOSPADM

## 2025-01-17 RX ORDER — AMLODIPINE BESYLATE 10 MG/1
10 TABLET ORAL DAILY
Qty: 90 TABLET | Refills: 3 | Status: ON HOLD | OUTPATIENT
Start: 2025-01-18 | End: 2025-01-29

## 2025-01-17 RX ORDER — ISOSORBIDE MONONITRATE 30 MG/1
30 TABLET, EXTENDED RELEASE ORAL DAILY
Qty: 30 TABLET | Refills: 11 | Status: ON HOLD | OUTPATIENT
Start: 2025-01-17 | End: 2025-01-29

## 2025-01-17 RX ORDER — DIPHENHYDRAMINE HCL 50 MG
50 CAPSULE ORAL ONCE
Status: CANCELLED | OUTPATIENT
Start: 2025-01-17 | End: 2025-01-17

## 2025-01-17 RX ORDER — ISOSORBIDE MONONITRATE 30 MG/1
30 TABLET, EXTENDED RELEASE ORAL DAILY
Status: DISCONTINUED | OUTPATIENT
Start: 2025-01-17 | End: 2025-01-17 | Stop reason: HOSPADM

## 2025-01-17 RX ORDER — SODIUM CHLORIDE 9 MG/ML
INJECTION, SOLUTION INTRAVENOUS CONTINUOUS
Status: CANCELLED | OUTPATIENT
Start: 2025-01-17 | End: 2025-01-17

## 2025-01-17 RX ADMIN — ATORVASTATIN CALCIUM 40 MG: 40 TABLET, FILM COATED ORAL at 08:01

## 2025-01-17 RX ADMIN — CYANOCOBALAMIN TAB 250 MCG 500 MCG: 250 TAB at 08:01

## 2025-01-17 RX ADMIN — SODIUM BICARBONATE 650 MG TABLET 650 MG: at 08:01

## 2025-01-17 RX ADMIN — CEFTRIAXONE 1 G: 1 INJECTION, POWDER, FOR SOLUTION INTRAMUSCULAR; INTRAVENOUS at 09:01

## 2025-01-17 RX ADMIN — ALLOPURINOL 150 MG: 100 TABLET ORAL at 08:01

## 2025-01-17 RX ADMIN — CLOPIDOGREL BISULFATE 75 MG: 75 TABLET ORAL at 08:01

## 2025-01-17 RX ADMIN — POLYETHYLENE GLYCOL 3350 17 G: 17 POWDER, FOR SOLUTION ORAL at 08:01

## 2025-01-17 RX ADMIN — AMLODIPINE BESYLATE 5 MG: 5 TABLET ORAL at 08:01

## 2025-01-17 RX ADMIN — CHOLECALCIFEROL TAB 25 MCG (1000 UNIT) 2000 UNITS: 25 TAB at 08:01

## 2025-01-17 RX ADMIN — LOSARTAN POTASSIUM 25 MG: 25 TABLET, FILM COATED ORAL at 08:01

## 2025-01-17 RX ADMIN — ASPIRIN 81 MG: 81 TABLET, COATED ORAL at 08:01

## 2025-01-17 RX ADMIN — ISOSORBIDE MONONITRATE 30 MG: 30 TABLET, EXTENDED RELEASE ORAL at 02:01

## 2025-01-17 RX ADMIN — FOLIC ACID 1 MG: 1 TABLET ORAL at 08:01

## 2025-01-17 NOTE — PLAN OF CARE
[facility]  Discharge Final Note    Primary Care Provider: Elena Cabrera MD    Expected Discharge Date: 1/17/2025    Patient cleared for discharge from case management standpoint.    Final Discharge Note (most recent)       Final Note - 01/17/25 1600          Final Note    Assessment Type Final Discharge Note     Anticipated Discharge Disposition Home-Health Care [facility]     What phone number can be called within the next 1-3 days to see how you are doing after discharge? [phone]     Hospital Resources/Appts/Education Provided Provided patient/caregiver with written discharge plan information;Appointments scheduled and added to AVS        Post-Acute Status    Post-Acute Authorization Home Health     Home Health Status Set-up Complete/Auth obtained     Discharge Delays None known at this time                   Contact Info       Elena Cabrera MD   Specialty: Internal Medicine   Relationship: PCP - General    [address] [place], [place] [id number]   Phone: [phone]       Next Steps: Follow up on 1/16/2025    Instructions: Hospital Follow Up at 10:00AM with ORIANA Segundo; Bring Discharge Summary, ID, Insurance Card, and Discharge Summary    [facility]   Specialty: Home Health Services    [address] [facility] [address]   Phone: [phone]       Next Steps: Follow up    Instructions: Patient accepted for resumption of services.            Elena Zavala RN  Weekend  - [facility] [place]  [phone]

## 2025-01-17 NOTE — PT/OT/SLP PROGRESS
Occupational Therapy   Treatment    Name: Anahy Evans  MRN: [id number]  Admitting Diagnosis:  Shortness of breath       Recommendations:     Discharge Recommendations: Low Intensity Therapy  Discharge Equipment Recommendations:  walker, rolling  Barriers to discharge:  None    Assessment:     Anahy Evans is a 89 y.o. female with a medical diagnosis of Shortness of breath.  She presents with good participation and motivation. Performance deficits affecting function are weakness, impaired endurance, impaired self care skills, impaired functional mobility, impaired balance, decreased lower extremity function, decreased upper extremity function, impaired cardiopulmonary response to activity.     Rehab Prognosis:  Good; patient would benefit from acute skilled OT services to address these deficits and reach maximum level of function.       Plan:     Patient to be seen 3 x/week to address the above listed problems via self-care/home management, therapeutic activities, therapeutic exercises  Plan of Care Expires: 02/13/25  Plan of Care Reviewed with: patient    Subjective     Chief Complaint: fatigue from sleepless night.  Patient/Family Comments/goals: Pt voiced agreement with staying up in chair post therapy session.  Pain/Comfort:  Pain Rating 1: 0/10  Pain Rating Post-Intervention 1: 0/10    Objective:     Communicated with: RN prior to session.  Patient found supine with telemetry, [place] (no family present) upon OT entry to room.    General Precautions: Standard, fall, NPO    Orthopedic Precautions:N/A  Braces: N/A     Occupational Performance:     Bed Mobility:    Patient completed Supine to Sit with supervision     Functional Mobility/Transfers:  Patient completed Sit <> Stand Transfer with stand by assistance  with  no assistive device   Patient completed Bed <> Chair Transfer using Step Transfer technique with contact guard assistance with no assistive device    [facility] 6 Click ADL: 18    Treatment &  Education:  Pt performed functional mobility in room x 2 trials with CGA-SBA for endurance & balance training. Pt performed AROM exercises with BUE in 3 planes x 2 sets x 10 reps each set while seated in chair.  Provided education on need for (A) from staff for transfers/OOB activity.  Pt had no further questions & when asked whether there were any concerns pt reported none.    Patient left up in chair with all lines intact, call button in reach, and RN notified    GOALS:   Multidisciplinary Problems       Occupational Therapy Goals          Problem: Occupational Therapy    Goal Priority Disciplines Outcome Interventions   Occupational Therapy Goal     OT, PT/OT Progressing    Description: Goals to be met by: 2/13/25     Patient will increase functional independence with ADLs by performing:    UE Dressing with Modified [place].  LE Dressing with Modified [place].  Grooming while standing with Modified [place].  Toileting from toilet with Modified [place] for hygiene and clothing management.   Supine to sit with Modified [place].  Step transfer with Modified [place]  Toilet transfer to toilet with Modified [place].                         DME Justifications:   Anahy's mobility limitation cannot be sufficiently resolved by the use of a cane. Her functional mobility deficit can be sufficiently resolved with the use of a Rolling Walker. Patient's mobility limitation significantly impairs their ability to participate in one of more activities of daily living.  The use of a RW will significantly improve the patient's ability to participate in MRADLS and the patient will use it on regular basis in the home.    Time Tracking:     OT Date of Treatment: 01/17/25  OT Start Time: 1024  OT Stop Time: 1050  OT Total Time (min): 26 min    Billable Minutes:Therapeutic Activity 12  Therapeutic Exercise 14    OT/AMAURY: OT          1/17/2025

## 2025-01-17 NOTE — CARE UPDATE
Interventional Cardiology Care update Note   Ms. Anahy Evans is a pleasant 89 F with a past medical history that includes AAA, HTN, A. Fib, CAD s/p 2 stents, HLD, CKD4, hyperuricemia, and low vitamin. Given patients age and CKD recommend medical management and no further invasive interventions. Discussed with Dr. Fairchild.     Shena CAMP MD  Cardiology Fellow  [facility]

## 2025-01-17 NOTE — ASSESSMENT & PLAN NOTE
Due anginal episode with sudden onset with activity relived by nitroglycerine and previous history of CAD requiring stent placement. Feel the origin of her TAYLOR is likely cardiac in nature; CAD. Discussed possible diagnostic with patient. She is agreeable to a PET stress test. She is agreeable to more invasive intervention if the stress test show the intervention to be warranted.     PET Stress completed 1/16/2024. Stress test revealed a  small sized  perfusion abnormality involving LAD territory with findings consistent with a transmural infract and a reversible perfusion abnormality in the territory of the LCX.     Interventional cardiology saw and reviewed patient. Due to advanced age and CKD; [facility] deemed to have more risks than benefit. Will recommend medical management at this time.     Recommendations:  -DAPT  -Increase amlodipine dose due to HTN  -Start Imdur  -History of A. Fib; reason for anticoagulation not clear. Possibly due to fall risk which could lead to increased bleeding risk. Will defer starting a medication such as Eliquis to outpatient cardiologist.   -Will need cardiology follow up on discharge.  -Will sign off at this time. Please contact with any further questions

## 2025-01-17 NOTE — PLAN OF CARE
Problem: Occupational Therapy  Goal: Occupational Therapy Goal  Description: Goals to be met by: 2/13/25     Patient will increase functional independence with ADLs by performing:    UE Dressing with Modified [place].  LE Dressing with Modified [place].  Grooming while standing with Modified [place].  Toileting from toilet with Modified [place] for hygiene and clothing management.   Supine to sit with Modified [place].  Step transfer with Modified [place]  Toilet transfer to toilet with Modified [place].    Outcome: Progressing     Goals remain appropriate

## 2025-01-17 NOTE — ASSESSMENT & PLAN NOTE
Patient's blood pressure range in the last 24 hours was: BP  Min: 123/66  Max: 168/72.The patient's inpatient anti-hypertensive regimen is listed below:  Current Antihypertensives  nitroGLYCERIN SL tablet 0.4 mg, Every 5 min PRN, Sublingual  losartan tablet 25 mg, Daily, Oral  losartan (COZAAR) tablet, Daily, Oral  amLODIPine tablet 10 mg, Daily, Oral  isosorbide mononitrate 24 hr tablet 30 mg, Daily, Oral  amlodipine (NORVASC) tablet, Daily, Oral  isosorbide mononitrate (IMDUR) 24 hr tablet, Daily, Oral    Plan  - BP is controlled, no changes needed to their regimen  - resume Losartan

## 2025-01-17 NOTE — PLAN OF CARE
Update @ 1:30 PM  Patient re-accepted by [facility]. Updated team.    CM reviewed past medical records. Patient possibly current with [facility] . Epic referral and documents sent to agency for review. CM will follow-up.      10:00 AM  CM attended multidisciplinary huddle for clinical update and review discharge plan. Cardiology team to meet with patient/family later today. Patient will discharge home with home health.      Elena Zavala RN  [facility]  - [facility], [facility]  [phone]

## 2025-01-17 NOTE — PLAN OF CARE
[facility]     Department of Hospital Medicine     [address] [place] [address]     [phone] after hours  [phone] fax       HOME  HEALTH ORDERS    01/17/2025    Admit to Home Health    Diagnoses:  Active Hospital Problems    Diagnosis  POA    *Shortness of breath [R06.02]  Yes    Anemia [D64.9]  Yes    Dyspnea on exertion [R06.09]  Yes    UTI (urinary tract infection) [N39.0]  Yes    CAD (coronary artery disease) [I25.10]  Yes    CKD (chronic kidney disease), stage IV [N18.4]  Yes     Chronic    Essential hypertension [I10]  Yes    PAF (paroxysmal atrial fibrillation) [I48.0]  Yes      Resolved Hospital Problems   No resolved problems to display.       Patient is homebound due to:  Shortness of breath    Allergies:  Review of patient's allergies indicates:   Allergen Reactions    Clindamycin Anaphylaxis    Penicillins Rash       Diet: cardiac diet and renal diet    Acitivities: activity as tolerated    Nursing:   SN to complete comprehensive assessment including routine vital signs. Instruct on disease process and s/s of complications to report to MD. Review/verify medication list sent home with the patient at time of discharge  and instruct patient/caregiver as needed. Frequency may be adjusted depending on start of care date.    Notify MD if SBP > 160 or < 90; DBP > 90 or < 50; HR > 120 or < 50; Temp > 101      CONSULTS:   Physical Therapy to evaluate and treat. Evaluate for home safety and equipment needs; Establish/upgrade home exercise program. Perform / instruct on therapeutic exercises, gait training, transfer training, and Range of Motion.  Occupational Therapy to evaluate and treat. Evaluate home environment for safety and equipment needs. Perform/Instruct on transfers, ADL training, ROM, and therapeutic exercises.          Medications: Review discharge medications with patient and family and provide education.         Medication List         START taking these medications      isosorbide mononitrate 30 MG 24 hr tablet  Commonly known as: IMDUR  Take 1 tablet (30 mg total) by mouth once daily.            CHANGE how you take these medications      albuterol 90 mcg/actuation inhaler  Commonly known as: PROVENTIL/VENTOLIN HFA  Inhale 1 puff into the lungs every 4 (four) hours as needed for Wheezing.  What changed:   how to take this  reasons to take this     amLODIPine 10 MG tablet  Commonly known as: NORVASC  Take 1 tablet (10 mg total) by mouth once daily.  Start taking on: January 18, 2025  What changed:   medication strength  how much to take     losartan 25 MG tablet  Commonly known as: COZAAR  Take 1 tablet (25 mg total) by mouth once daily.  Start taking on: January 18, 2025  What changed: how much to take            CONTINUE taking these medications      allopurinoL 100 MG tablet  Commonly known as: ZYLOPRIM     aspirin 81 MG EC tablet  Commonly known as: ECOTRIN     atorvastatin 40 MG tablet  Commonly known as: LIPITOR     cholecalciferol (vitamin D3) 50 mcg (2,000 unit) Cap capsule  Commonly known as: VITAMIN D3     clopidogreL 75 mg tablet  Commonly known as: PLAVIX     FARXIGA 10 mg tablet  Generic drug: dapagliflozin propanediol     folic acid 1 MG tablet  Commonly known as: FOLVITE     KERENDIA 10 mg Tab  Generic drug: finerenone     nitroGLYCERIN 0.4 MG SL tablet  Commonly known as: NITROSTAT     pantoprazole 20 MG tablet  Commonly known as: PROTONIX     sodium bicarbonate 650 MG tablet            STOP taking these medications      benzonatate 200 MG capsule  Commonly known as: TESSALON     clonazePAM 0.5 MG tablet  Commonly known as: KlonoPIN               Where to Get Your Medications        These medications were sent to [facility] [address], [place] [address]      Hours: Always Open Phone: [phone]   albuterol 90 mcg/actuation inhaler  amLODIPine 10 MG tablet  isosorbide mononitrate 30 MG 24 hr  tablet  losartan 25 MG tablet             For all post-discharge communication and subsequent orders please contact patient's primary care physician. If unable to reach primary care physician or do not receive response within 30 minutes, please contact [facility] on call for clinical staff order clarification.  _________________________________  Marely Perez MD  01/17/2025

## 2025-01-17 NOTE — ASSESSMENT & PLAN NOTE
Due anginal episode with sudden onset with activity relived by nitroglycerine and previous history of CAD requiring stent placement. Feel the origin of her TAYLOR is likely cardiac in nature; CAD. Discussed possible diagnostic with patient. She is agreeable to a PET stress test. She is agreeable to more invasive intervention if the stress test show the intervention to be warranted.     PET Stress completed 1/16/2024. Stress test revealed a  small sized  perfusion abnormality involving LAD territory with findings consistent with a transmural infract and a reversible perfusion abnormality in the territory of the LCX. LHC is recommended for possible stent placement.  Patient and family agreeable to LHC and stent placement.     Plan:  LHC Friday with possible stent placement  NPO tonight at midnight.   Will need cardiology follow up on discharge.

## 2025-01-17 NOTE — PROGRESS NOTES
[facility]  Cardiology  Progress Note    Patient Name: Anahy Evans  MRN: [id number]  Admission Date: 1/13/2025  Hospital Length of Stay: 1 days  Code Status: Full Code   Attending Physician: Marely Perez MD   Primary Care Physician: Elena Cabrera MD  Expected Discharge Date: 1/17/2025  Principal Problem:Shortness of breath    Subjective:     Hospital Course:   No notes on file    Interval History: NAEO. PET stress test completed today. Stress test revealed a  small sized  perfusion abnormality involving LAD territory with findings consistent with a transmural infract and a reversible perfusion abnormality in the territory of the LCX. LHC is recommended for possible stent placement.  Patient and family agreeable to LHC and stent placement.     Review of Systems   Cardiovascular:  Positive for chest pain (resolved currently) and dyspnea on exertion. Negative for leg swelling and orthopnea.   Respiratory:  Positive for cough and shortness of breath.    Gastrointestinal:  Positive for nausea and vomiting.     Objective:     Vital Signs (Most Recent):  Temp: 98.2 °F (36.8 °C) (01/16/25 1525)  Pulse: 98 (01/16/25 1525)  Resp: 17 (01/16/25 1525)  BP: (!) 168/72 (01/16/25 1525)  SpO2: 98 % (01/16/25 1525) Vital Signs (24h Range):  Temp:  [97.9 °F (36.6 °C)-98.3 °F (36.8 °C)] 98.2 °F (36.8 °C)  Pulse:  [43-98] 98  Resp:  [17-18] 17  SpO2:  [94 %-98 %] 98 %  BP: (120-168)/(63-76) 168/72     Weight: 70.8 kg (156 lb)  Body mass index is 24.43 kg/m².     SpO2: 98 %       No intake or output data in the 24 hours ending 01/16/25 1757    Lines/Drains/Airways       Drain  Duration             Female External Urinary Catheter w/ Suction 01/13/25 1700 3 days              Peripheral Intravenous Line  Duration                  Peripheral IV - Single Lumen 01/13/25 0731 20 G Right Antecubital 3 days                       Physical Exam  Cardiovascular:      Rate and Rhythm: Normal rate and regular rhythm.       Heart sounds: No murmur heard.  Pulmonary:      Effort: Pulmonary effort is normal.      Breath sounds: Normal breath sounds.   Abdominal:      General: Abdomen is flat. There is no distension.      Palpations: Abdomen is soft.      Tenderness: There is no abdominal tenderness.   Musculoskeletal:      Right lower leg: No edema.      Left lower leg: No edema.   Neurological:      Mental Status: She is oriented to person, place, and time.            Significant Labs: All pertinent lab results from the last 24 hours have been reviewed.    Significant Imaging:  Reviewed   Assessment and Plan:     Brief HPI: Ms. Anahy Evans is a pleasant 89 F with a past medical history that includes AAA, HTN, A. Fib, CAD s/p 2 stents, HLD, CKD4, hyperuricemia, and low vitamin D. She initially came in for SOB. Cardiology was consulted for a possible right heart cath. TRAVIS Higginbotham reports that around December 22nd she had an episode of angina with SOB. She took a nitroglycerine which resolved the symptoms. She reports having to take nitroglycerine for chest pain 2-3 times a year. She has not experienced angina since that time. Starting December 26th she has experienced TAYLOR that has not gone away. Prior to this she reports being able to do all of her ADLs (cooking, cleaning, laundry), but is now unable to do so; her daughter is helping her. She additionally admits to TAYLOR, cough, sporadic abdominal pain, and a few episodes of nausea last week (emesis x 2). Regarding the nausea, she is not sure what caused it and states she had it for a few days and vomited twice. Since then the nausea has gone away tough she does report occasional episodes of lower abdominal pain. Last BM 3 days ago. She was told sometime last year that she had a thickening in her colon. She was seen by GI and was informed she was too ajit risk for a colonoscopy. She reports episodes of stools about the diameter of her pink and two episodes of black stool over the last  year; one was after taking Pepto bismol. She is unsure if the other one was relate to Pepto bismol. She denies orthopnea, abdominal tenderness, decreased urination, any further nausea. Last ECHO was completed today and shows a regional wall motion abnormality, EF 50-55%, and mild to mod pulmonary HTN. EKG done on 1/5/2025 sows PACs and a possible old anterior infarct.     Dyspnea on exertion  Due anginal episode with sudden onset with activity relived by nitroglycerine and previous history of CAD requiring stent placement. Feel the origin of her TAYLOR is likely cardiac in nature; CAD. Discussed possible diagnostic with patient. She is agreeable to a PET stress test. She is agreeable to more invasive intervention if the stress test show the intervention to be warranted.     PET Stress completed 1/16/2024. Stress test revealed a  small sized  perfusion abnormality involving LAD territory with findings consistent with a transmural infract and a reversible perfusion abnormality in the territory of the LCX. LHC is recommended for possible stent placement.  Patient and family agreeable to LHC and stent placement.     Plan:  LHC Friday with possible stent placement  NPO tonight at midnight.   Will need cardiology follow up on discharge.        VTE Risk Mitigation (From admission, onward)           Ordered     heparin (porcine) injection 5,000 Units  Every 8 hours         01/13/25 1127     IP VTE HIGH RISK PATIENT  Once         01/13/25 1127     Place sequential compression device  Until discontinued         01/13/25 1127                    Dot Goel DO  Cardiology  [facility]

## 2025-01-17 NOTE — ASSESSMENT & PLAN NOTE
Creatine stable for now. BMP reviewed- noted Estimated Creatinine Clearance: 18.8 mL/min (A) (based on SCr of 1.9 mg/dL (H)). according to latest data. Based on current GFR, CKD stage is stage 4 - GFR 15-29.  Monitor UOP and serial BMP and adjust therapy as needed. Renally dose meds. Avoid nephrotoxic medications and procedures.    - at baseline

## 2025-01-17 NOTE — DISCHARGE SUMMARY
"[facility] [address] [place] [address] Medicine  Discharge Summary      Patient Name: Anahy Evans  MRN: [id number]  OLAYINKA: [id number]  Patient Class: IP- Inpatient  Admission Date: 1/13/2025  Hospital Length of Stay: 2 days  Discharge Date and Time: 1/17/2025  6:01 PM  Attending Physician: Marely Perez MD   Discharging Provider: Marely Perez MD  Primary Care Provider: Elena Cabrera MD  Hospital Medicine Team: [facility] HOSP MED  Marely Perez MD  Primary Care Team: [facility]    HPI:   89 year old female with PMH of CKD4, HTN, AAA, hyperuricemia, Vitamin D def, Afib, CAD, HLD, presents to ED for shortness of breath at rest since 12/26/24. She describes it as shortness of breath on exertion and rest that is progressively worsening. She was recently admitted 1/6-1/7 for shortness of breath and weakness - was diagnosed with viral illness and discharged home. She reports feeling chill last week but not anymore. She feels a "nervousness" in her abdomen. Denies pain. She reports she feels a stinging sensation when she urinates occasionally. Denies chest pain, fevers, nausea, vomiting, cough, sputum production. She denies constipation, diarrhea. She lives with her daughter. At baseline she is active, ambulates without assistance. Trying to use walker now.     ED course:  Sating well on RA. Hemodynamically stable.   Labs: Trop 53 --> 38, , D-dimer 33+, UA positive for UTI  VQ scan: no PE   Rx: IV ceftriaxone     * No surgery found *      Hospital Course:   Patient admitted for dyspnea on exertion. V/Q scan was completed - no pulmonary embolism. Echo ordered - regional wall motion abnormalities present. Low normal systolic ejection fraction 50-55%. Cardiology was consulted - feel origin of TAYLOR may be cardiac in nature. PET stress done 1/16. Two perfusion abnormalities seen. Interventional cardiology recommending medical management and close follow-up. Amlodipine increased, losartan decreased, and " started on imdur.     Patient also treated for UTI with 5 days of IV ceftriaxone.     Patient discharged with HH.     Goals of Care Treatment Preferences:  Code Status: Full Code    Living Will: Yes              SDOH Screening:  The patient was screened for utility difficulties, food insecurity, transport difficulties, housing insecurity, and interpersonal safety and there were no concerns identified this admission.     Consults:   Consults (From admission, onward)          Status Ordering Provider     Inpatient consult to Cardiology  Once        Provider:  (Not yet assigned)    Completed JV GUADARRAMA          Physical Exam  Constitutional:       General: She is not in acute distress.     Appearance: She is not ill-appearing.   Eyes:      General: No scleral icterus.  Cardiovascular:      Rate and Rhythm: Normal rate.      Heart sounds: Normal heart sounds.   Pulmonary:      Effort: Pulmonary effort is normal. No respiratory distress.      Breath sounds: Normal breath sounds. No rales.   Abdominal:      General: Abdomen is flat. There is no distension.      Palpations: Abdomen is soft.      Tenderness: There is no abdominal tenderness.   Musculoskeletal:      Right lower leg: No edema.      Left lower leg: No edema.   Skin:     General: Skin is warm and dry.   Neurological:      Mental Status: She is alert and oriented to person, place, and time. Mental status is at baseline.      Cranial Nerves: No cranial nerve deficit.      Motor: No weakness.   Psychiatric:         Mood and Affect: Mood normal.         Behavior: Behavior normal.    * Shortness of breath  Maybe cardiac etiology for patients dyspnea on exertion.   - V/Q scan negative   - echo completed   - cardiology consulted - PET stress done  - PT/OT consulted - low intensity therapy.     Dyspnea on exertion        UTI (urinary tract infection)  - continue ceftriaxone, switch to orals for discharge   - urine cultures positive for E. Coli pansensitive   - hold  Dapagliflozin         CAD (coronary artery disease)  Follows with cardiology, Dr. Canela  Continue home ASA, plavix, statin  Positive PET stress, awaiting further cardiology recs    CKD (chronic kidney disease), stage IV  Creatine stable for now. BMP reviewed- noted Estimated Creatinine Clearance: 18.8 mL/min (A) (based on SCr of 1.9 mg/dL (H)). according to latest data. Based on current GFR, CKD stage is stage 4 - GFR 15-29.  Monitor UOP and serial BMP and adjust therapy as needed. Renally dose meds. Avoid nephrotoxic medications and procedures.    - at baseline    Essential hypertension  Patient's blood pressure range in the last 24 hours was: BP  Min: 123/66  Max: 168/72.The patient's inpatient anti-hypertensive regimen is listed below:  Current Antihypertensives  nitroGLYCERIN SL tablet 0.4 mg, Every 5 min PRN, Sublingual  losartan tablet 25 mg, Daily, Oral  losartan (COZAAR) tablet, Daily, Oral  amLODIPine tablet 10 mg, Daily, Oral  isosorbide mononitrate 24 hr tablet 30 mg, Daily, Oral  amlodipine (NORVASC) tablet, Daily, Oral  isosorbide mononitrate (IMDUR) 24 hr tablet, Daily, Oral    Plan  - BP is controlled, no changes needed to their regimen  - resume Losartan     PAF (paroxysmal atrial fibrillation)  pAfib - not on AC  - on DAPT per her cardiologist   - Replete lytes with a goal of K>4, Mg >2  - telemonitoring       Final Active Diagnoses:    Diagnosis Date Noted POA    PRINCIPAL PROBLEM:  Shortness of breath [R06.02] 01/13/2025 Yes    Anemia [D64.9] 01/16/2025 Yes    Dyspnea on exertion [R06.09] 01/14/2025 Yes    UTI (urinary tract infection) [N39.0] 01/13/2025 Yes    CAD (coronary artery disease) [I25.10] 05/09/2019 Yes    CKD (chronic kidney disease), stage IV [N18.4] 06/29/2017 Yes     Chronic    Essential hypertension [I10] 11/10/2016 Yes    PAF (paroxysmal atrial fibrillation) [I48.0] 07/05/2013 Yes      Problems Resolved During this Admission:       Discharged Condition: stable    Disposition:  "    Follow Up:   Follow-up Information       Elena Cabrera MD Follow up on 1/16/2025.    Specialty: Internal Medicine  Why: Hospital Follow Up at 10:00AM with ORIANA Segundo; Bring Discharge Summary, ID, Insurance Card, and Discharge Summary  Contact information:  [address] [place] [id number]  [phone]                           Patient Instructions:      WALKER FOR HOME USE     Order Specific Question Answer Comments   Type of Walker: Adult (5'4"-6'6")    With wheels? Yes    Height: 5' 7" (1.702 m)    Weight: 70.8 kg (156 lb)    Length of need (1-99 months): 99    Does patient have medical equipment at home? bath bench    Does patient have medical equipment at home? cane, straight    Please check all that apply: Patient is unable to safely ambulate without equipment.      Ambulatory referral/consult to Cardiology   Standing Status: Future   Referral Priority: Routine Referral Type: Consultation   Referral Reason: Specialty Services Required   Requested Specialty: Cardiology   Number of Visits Requested: 1     Ambulatory referral/consult to Internal Medicine   Standing Status: Future   Referral Priority: Routine Referral Type: Consultation   Referral Reason: Specialty Services Required   Requested Specialty: Internal Medicine   Number of Visits Requested: 1       Significant Diagnostic Studies: N/A    Pending Diagnostic Studies:       None           Medications:  Reconciled Home Medications:      Medication List        START taking these medications      isosorbide mononitrate 30 MG 24 hr tablet  Commonly known as: IMDUR  Take 1 tablet (30 mg total) by mouth once daily.            CHANGE how you take these medications      albuterol 90 mcg/actuation inhaler  Commonly known as: PROVENTIL/VENTOLIN HFA  Inhale 1 puff into the lungs every 4 (four) hours as needed for Wheezing.  What changed:   how to take this  reasons to take this     amLODIPine 10 MG tablet  Commonly known as: NORVASC  Take 1 " tablet (10 mg total) by mouth once daily.  Start taking on: January 18, 2025  What changed:   medication strength  how much to take     losartan 25 MG tablet  Commonly known as: COZAAR  Take 1 tablet (25 mg total) by mouth once daily.  Start taking on: January 18, 2025  What changed: how much to take            CONTINUE taking these medications      allopurinoL 100 MG tablet  Commonly known as: ZYLOPRIM  Take 0.5 tablets by mouth once daily.     aspirin 81 MG EC tablet  Commonly known as: ECOTRIN  Take 81 mg by mouth once daily.     atorvastatin 40 MG tablet  Commonly known as: LIPITOR  Take 40 mg by mouth once daily.     cholecalciferol (vitamin D3) 50 mcg (2,000 unit) Cap capsule  Commonly known as: VITAMIN D3  Take 1 capsule by mouth once daily.     clopidogreL 75 mg tablet  Commonly known as: PLAVIX  Take 75 mg by mouth once daily.     FARXIGA 10 mg tablet  Generic drug: dapagliflozin propanediol  Take 1 tablet by mouth once daily.     folic acid 1 MG tablet  Commonly known as: FOLVITE  Take 1 mg by mouth once daily.     KERENDIA 10 mg Tab  Generic drug: finerenone  Take 1 tablet by mouth Daily.     nitroGLYCERIN 0.4 MG SL tablet  Commonly known as: NITROSTAT  Place 0.4 mg under the tongue every 5 (five) minutes as needed for Chest pain.     pantoprazole 20 MG tablet  Commonly known as: PROTONIX  Take 20 mg by mouth every morning.     sodium bicarbonate 650 MG tablet  Take 650 mg by mouth 2 (two) times daily.            STOP taking these medications      benzonatate 200 MG capsule  Commonly known as: TESSALON     clonazePAM 0.5 MG tablet  Commonly known as: KlonoPIN              Indwelling Lines/Drains at time of discharge:   Lines/Drains/Airways       Drain  Duration             Female External Urinary Catheter w/ Suction 01/13/25 1700 3 days                    Time spent on the discharge of patient: 35 minutes         Marely Perez MD  Department of Hospital Medicine  [facility]

## 2025-01-17 NOTE — PROGRESS NOTES
[facility]  Cardiology  Progress Note    Patient Name: Anahy Evans  MRN: [id number]  Admission Date: 1/13/2025  Hospital Length of Stay: 2 days  Code Status: Full Code   Attending Physician: Marely Preez MD   Primary Care Physician: Elena Cabrera MD  Expected Discharge Date: 1/17/2025  Principal Problem:Shortness of breath    Subjective:     Hospital Course:   No notes on file    Interval History: NAEO. Patient reviewed by interventional cardiology. [facility] would pose more risk than benefit duet to age and reduced kidney function. Will recommend medical management at this time.     Review of Systems   Cardiovascular:  Positive for dyspnea on exertion. Negative for chest pain, leg swelling and orthopnea.   Respiratory:  Positive for cough and shortness of breath.    Gastrointestinal:  Positive for nausea and vomiting.     Objective:     Vital Signs (Most Recent):  Temp: 97.5 °F (36.4 °C) (01/17/25 1148)  Pulse: 65 (01/17/25 1148)  Resp: 16 (01/17/25 1148)  BP: 123/66 (01/17/25 1148)  SpO2: 97 % (01/17/25 1148) Vital Signs (24h Range):  Temp:  [97.5 °F (36.4 °C)-98.2 °F (36.8 °C)] 97.5 °F (36.4 °C)  Pulse:  [47-98] 65  Resp:  [16-18] 16  SpO2:  [93 %-98 %] 97 %  BP: (123-168)/(66-80) 123/66     Weight: 70.8 kg (156 lb)  Body mass index is 24.43 kg/m².     SpO2: 97 %         Intake/Output Summary (Last 24 hours) at 1/17/2025 1313  Last data filed at 1/17/2025 0557  Gross per 24 hour   Intake --   Output 500 ml   Net -500 ml       Lines/Drains/Airways       Drain  Duration             Female External Urinary Catheter w/ Suction 01/13/25 1700 3 days              Peripheral Intravenous Line  Duration                  Peripheral IV - Single Lumen 01/13/25 0731 20 G Right Antecubital 4 days                       Physical Exam  Cardiovascular:      Rate and Rhythm: Normal rate and regular rhythm.      Heart sounds: No murmur heard.  Pulmonary:      Effort: Pulmonary effort is normal.       Breath sounds: Normal breath sounds.   Abdominal:      General: Abdomen is flat. There is no distension.      Palpations: Abdomen is soft.      Tenderness: There is no abdominal tenderness.   Musculoskeletal:      Right lower leg: No edema.      Left lower leg: No edema.   Neurological:      Mental Status: She is oriented to person, place, and time.            Significant Labs: All pertinent lab results from the last 24 hours have been reviewed.    Significant Imaging:  Reviewed  Assessment and Plan:     Brief HPI: Ms. Anahy Evans is a pleasant 89 F with a past medical history that includes AAA, HTN, A. Fib, CAD s/p 2 stents, HLD, CKD4, hyperuricemia, and low vitamin D. She initially came in for SOB. Cardiology was consulted for a possible right heart cath. TRAVIS Higginbotham reports that around December 22nd she had an episode of angina with SOB. She took a nitroglycerine which resolved the symptoms. She reports having to take nitroglycerine for chest pain 2-3 times a year. She has not experienced angina since that time. Starting December 26th she has experienced TAYLOR that has not gone away. Prior to this she reports being able to do all of her ADLs (cooking, cleaning, laundry), but is now unable to do so; her daughter is helping her. She additionally admits to TAYLOR, cough, sporadic abdominal pain, and a few episodes of nausea last week (emesis x 2). Regarding the nausea, she is not sure what caused it and states she had it for a few days and vomited twice. Since then the nausea has gone away tough she does report occasional episodes of lower abdominal pain. Last BM 3 days ago. She was told sometime last year that she had a thickening in her colon. She was seen by GI and was informed she was too ajit risk for a colonoscopy. She reports episodes of stools about the diameter of her pink and two episodes of black stool over the last year; one was after taking Pepto bismol. She is unsure if the other one was relate to Pepto  bismol. She denies orthopnea, abdominal tenderness, decreased urination, any further nausea. Last ECHO was completed today and shows a regional wall motion abnormality, EF 50-55%, and mild to mod pulmonary HTN. EKG done on 1/5/2025 sows PACs and a possible old anterior infarct.     Dyspnea on exertion  Due anginal episode with sudden onset with activity relived by nitroglycerine and previous history of CAD requiring stent placement. Feel the origin of her TAYLOR is likely cardiac in nature; CAD. Discussed possible diagnostic with patient. She is agreeable to a PET stress test. She is agreeable to more invasive intervention if the stress test show the intervention to be warranted.     PET Stress completed 1/16/2024. Stress test revealed a  small sized  perfusion abnormality involving LAD territory with findings consistent with a transmural infract and a reversible perfusion abnormality in the territory of the LCX.     Interventional cardiology saw and reviewed patient. Due to advanced age and CKD; [facility] deemed to have more risks than benefit. Will recommend medical management at this time.     Recommendations:  -DAPT  -Increase amlodipine dose due to HTN  -Start Imdur  -History of A. Fib; reason for anticoagulation not clear. Possibly due to fall risk which could lead to increased bleeding risk. Will defer starting a medication such as Eliquis to outpatient cardiologist.   -Will need cardiology follow up on discharge.  -Will sign off at this time. Please contact with any further questions         VTE Risk Mitigation (From admission, onward)           Ordered     heparin (porcine) injection 5,000 Units  Every 8 hours         01/13/25 1127     IP VTE HIGH RISK PATIENT  Once         01/13/25 1127     Place sequential compression device  Until discontinued         01/13/25 1127                    Dot Goel, DO  Cardiology  [facility]

## 2025-01-17 NOTE — SUBJECTIVE & OBJECTIVE
Interval History: NAEO. Patient reviewed by interventional cardiology. [place] [facility] would pose more risk than benefit duet to age and reduced kidney function. Will recommend medical management at this time.     Review of Systems   Cardiovascular:  Positive for dyspnea on exertion. Negative for chest pain, leg swelling and orthopnea.   Respiratory:  Positive for cough and shortness of breath.    Gastrointestinal:  Positive for nausea and vomiting.     Objective:     Vital Signs (Most Recent):  Temp: 97.5 °F (36.4 °C) (01/17/25 1148)  Pulse: 65 (01/17/25 1148)  Resp: 16 (01/17/25 1148)  BP: 123/66 (01/17/25 1148)  SpO2: 97 % (01/17/25 1148) Vital Signs (24h Range):  Temp:  [97.5 °F (36.4 °C)-98.2 °F (36.8 °C)] 97.5 °F (36.4 °C)  Pulse:  [47-98] 65  Resp:  [16-18] 16  SpO2:  [93 %-98 %] 97 %  BP: (123-168)/(66-80) 123/66     Weight: 70.8 kg (156 lb)  Body mass index is 24.43 kg/m².     SpO2: 97 %         Intake/Output Summary (Last 24 hours) at 1/17/2025 1313  Last data filed at 1/17/2025 0557  Gross per 24 hour   Intake --   Output 500 ml   Net -500 ml       Lines/Drains/Airways       Drain  Duration             Female External Urinary Catheter w/ Suction 01/13/25 1700 3 days              Peripheral Intravenous Line  Duration                  Peripheral IV - Single Lumen 01/13/25 0731 20 G Right Antecubital 4 days                       Physical Exam  Cardiovascular:      Rate and Rhythm: Normal rate and regular rhythm.      Heart sounds: No murmur heard.  Pulmonary:      Effort: Pulmonary effort is normal.      Breath sounds: Normal breath sounds.   Abdominal:      General: Abdomen is flat. There is no distension.      Palpations: Abdomen is soft.      Tenderness: There is no abdominal tenderness.   Musculoskeletal:      Right lower leg: No edema.      Left lower leg: No edema.   Neurological:      Mental Status: She is oriented to person, place, and time.            Significant Labs: All pertinent lab results from the  last 24 hours have been reviewed.    Significant Imaging:  Reviewed

## 2025-01-19 ENCOUNTER — HOSPITAL ENCOUNTER (INPATIENT)
Facility: HOSPITAL | Age: OVER 89
LOS: 21 days | Discharge: NURSING, REHAB OR LONG-TERM CARE | DRG: 303 | End: 2025-02-11
Attending: EMERGENCY MEDICINE | Admitting: STUDENT IN AN ORGANIZED HEALTH CARE EDUCATION/TRAINING PROGRAM
Payer: MEDICARE

## 2025-01-19 DIAGNOSIS — R07.9 CHEST PAIN: ICD-10-CM

## 2025-01-19 DIAGNOSIS — N18.32 CHRONIC KIDNEY DISEASE (CKD) STAGE G3B/A1, MODERATELY DECREASED GLOMERULAR FILTRATION RATE (GFR) BETWEEN 30-44 ML/MIN/1.73 SQUARE METER AND ALBUMINURIA CREATININE RATIO LESS THAN 30 MG/G: ICD-10-CM

## 2025-01-19 DIAGNOSIS — R41.82 ALTERED MENTAL STATUS, UNSPECIFIED ALTERED MENTAL STATUS TYPE: ICD-10-CM

## 2025-01-19 DIAGNOSIS — I20.89 ANGINAL EQUIVALENT: ICD-10-CM

## 2025-01-19 DIAGNOSIS — R00.1 BRADYCARDIA: Primary | ICD-10-CM

## 2025-01-19 PROBLEM — N30.00 ACUTE CYSTITIS WITHOUT HEMATURIA: Status: ACTIVE | Noted: 2025-01-13

## 2025-01-19 PROBLEM — M85.80 OSTEOPENIA: Chronic | Status: ACTIVE | Noted: 2019-02-11

## 2025-01-19 PROBLEM — L73.2 RIGHT AXILLARY HIDRADENITIS: Status: RESOLVED | Noted: 2022-07-11 | Resolved: 2025-01-19

## 2025-01-19 PROBLEM — D62 ACUTE BLOOD LOSS ANEMIA: Status: RESOLVED | Noted: 2021-10-26 | Resolved: 2025-01-19

## 2025-01-19 PROBLEM — K21.00 GASTRO-ESOPHAGEAL REFLUX DISEASE WITH ESOPHAGITIS: Status: ACTIVE | Noted: 2019-05-16

## 2025-01-19 PROBLEM — N64.89 BREAST DEFORMITY: Status: RESOLVED | Noted: 2022-09-07 | Resolved: 2025-01-19

## 2025-01-19 PROBLEM — G93.40 ACUTE ENCEPHALOPATHY: Status: ACTIVE | Noted: 2025-01-19

## 2025-01-19 PROBLEM — I44.1 2ND DEGREE AV BLOCK: Status: ACTIVE | Noted: 2025-01-19

## 2025-01-19 LAB
ALBUMIN SERPL BCP-MCNC: 3 G/DL (ref 3.5–5.2)
ALLENS TEST: NORMAL
ALP SERPL-CCNC: 91 U/L (ref 40–150)
ALT SERPL W/O P-5'-P-CCNC: 25 U/L (ref 10–44)
ANION GAP SERPL CALC-SCNC: 15 MMOL/L (ref 8–16)
AST SERPL-CCNC: 22 U/L (ref 10–40)
BACTERIA #/AREA URNS AUTO: NORMAL /HPF
BASOPHILS # BLD AUTO: 0.07 K/UL (ref 0–0.2)
BASOPHILS NFR BLD: 1.1 % (ref 0–1.9)
BILIRUB SERPL-MCNC: 0.6 MG/DL (ref 0.1–1)
BILIRUB UR QL STRIP: NEGATIVE
BNP SERPL-MCNC: 126 PG/ML (ref 0–99)
BUN SERPL-MCNC: 43 MG/DL (ref 10–30)
CALCIUM SERPL-MCNC: 10.2 MG/DL (ref 8.7–10.5)
CHLORIDE SERPL-SCNC: 103 MMOL/L (ref 95–110)
CLARITY UR REFRACT.AUTO: CLEAR
CO2 SERPL-SCNC: 20 MMOL/L (ref 23–29)
COLOR UR AUTO: YELLOW
CREAT SERPL-MCNC: 2.3 MG/DL (ref 0.5–1.4)
DIFFERENTIAL METHOD BLD: ABNORMAL
EOSINOPHIL # BLD AUTO: 0.1 K/UL (ref 0–0.5)
EOSINOPHIL NFR BLD: 1.9 % (ref 0–8)
ERYTHROCYTE [DISTWIDTH] IN BLOOD BY AUTOMATED COUNT: 14.2 % (ref 11.5–14.5)
EST. GFR  (NO RACE VARIABLE): 19.6 ML/MIN/1.73 M^2
GLUCOSE SERPL-MCNC: 98 MG/DL (ref 70–110)
GLUCOSE UR QL STRIP: ABNORMAL
HCT VFR BLD AUTO: 32.6 % (ref 37–48.5)
HGB BLD-MCNC: 10.3 G/DL (ref 12–16)
HGB UR QL STRIP: NEGATIVE
HYALINE CASTS UR QL AUTO: 0 /LPF
IMM GRANULOCYTES # BLD AUTO: 0.01 K/UL (ref 0–0.04)
IMM GRANULOCYTES NFR BLD AUTO: 0.2 % (ref 0–0.5)
KETONES UR QL STRIP: NEGATIVE
LDH SERPL L TO P-CCNC: 0.57 MMOL/L (ref 0.5–2.2)
LEUKOCYTE ESTERASE UR QL STRIP: NEGATIVE
LYMPHOCYTES # BLD AUTO: 2.3 K/UL (ref 1–4.8)
LYMPHOCYTES NFR BLD: 35.9 % (ref 18–48)
MCH RBC QN AUTO: 28.6 PG (ref 27–31)
MCHC RBC AUTO-ENTMCNC: 31.6 G/DL (ref 32–36)
MCV RBC AUTO: 91 FL (ref 82–98)
MICROSCOPIC COMMENT: NORMAL
MONOCYTES # BLD AUTO: 0.7 K/UL (ref 0.3–1)
MONOCYTES NFR BLD: 11 % (ref 4–15)
NEUTROPHILS # BLD AUTO: 3.1 K/UL (ref 1.8–7.7)
NEUTROPHILS NFR BLD: 49.9 % (ref 38–73)
NITRITE UR QL STRIP: NEGATIVE
NRBC BLD-RTO: 0 /100 WBC
OHS QRS DURATION: 76 MS
OHS QTC CALCULATION: 397 MS
PH UR STRIP: 7 [PH] (ref 5–8)
PLATELET # BLD AUTO: 250 K/UL (ref 150–450)
PMV BLD AUTO: 11.1 FL (ref 9.2–12.9)
POTASSIUM SERPL-SCNC: 5.1 MMOL/L (ref 3.5–5.1)
PROT SERPL-MCNC: 8.4 G/DL (ref 6–8.4)
PROT UR QL STRIP: ABNORMAL
RBC # BLD AUTO: 3.6 M/UL (ref 4–5.4)
RBC #/AREA URNS AUTO: 1 /HPF (ref 0–4)
SAMPLE: NORMAL
SITE: NORMAL
SODIUM SERPL-SCNC: 138 MMOL/L (ref 136–145)
SP GR UR STRIP: 1.01 (ref 1–1.03)
SQUAMOUS #/AREA URNS AUTO: 4 /HPF
TROPONIN I SERPL DL<=0.01 NG/ML-MCNC: 90 NG/L (ref 0–14)
URN SPEC COLLECT METH UR: ABNORMAL
WBC # BLD AUTO: 6.29 K/UL (ref 3.9–12.7)
WBC #/AREA URNS AUTO: 1 /HPF (ref 0–5)
YEAST UR QL AUTO: NORMAL

## 2025-01-19 PROCEDURE — G0378 HOSPITAL OBSERVATION PER HR: HCPCS

## 2025-01-19 PROCEDURE — 99900035 HC TECH TIME PER 15 MIN (STAT)

## 2025-01-19 PROCEDURE — 80053 COMPREHEN METABOLIC PANEL: CPT | Performed by: PHYSICIAN ASSISTANT

## 2025-01-19 PROCEDURE — 83605 ASSAY OF LACTIC ACID: CPT

## 2025-01-19 PROCEDURE — 82570 ASSAY OF URINE CREATININE: CPT | Performed by: HOSPITALIST

## 2025-01-19 PROCEDURE — 84484 ASSAY OF TROPONIN QUANT: CPT | Performed by: PHYSICIAN ASSISTANT

## 2025-01-19 PROCEDURE — 99285 EMERGENCY DEPT VISIT HI MDM: CPT | Mod: 25

## 2025-01-19 PROCEDURE — 93005 ELECTROCARDIOGRAM TRACING: CPT

## 2025-01-19 PROCEDURE — 81001 URINALYSIS AUTO W/SCOPE: CPT | Performed by: HOSPITALIST

## 2025-01-19 PROCEDURE — 85025 COMPLETE CBC W/AUTO DIFF WBC: CPT | Performed by: PHYSICIAN ASSISTANT

## 2025-01-19 PROCEDURE — 83880 ASSAY OF NATRIURETIC PEPTIDE: CPT | Performed by: PHYSICIAN ASSISTANT

## 2025-01-19 PROCEDURE — 93010 ELECTROCARDIOGRAM REPORT: CPT | Mod: ,,, | Performed by: INTERNAL MEDICINE

## 2025-01-19 PROCEDURE — 25000003 PHARM REV CODE 250: Performed by: HOSPITALIST

## 2025-01-19 PROCEDURE — 99214 OFFICE O/P EST MOD 30 MIN: CPT | Mod: ,,, | Performed by: INTERNAL MEDICINE

## 2025-01-19 RX ORDER — POTASSIUM CHLORIDE 20 MEQ/1
60 TABLET, EXTENDED RELEASE ORAL ONCE
Status: DISCONTINUED | OUTPATIENT
Start: 2025-01-19 | End: 2025-01-19

## 2025-01-19 RX ORDER — ISOSORBIDE MONONITRATE 30 MG/1
30 TABLET, EXTENDED RELEASE ORAL ONCE
Status: COMPLETED | OUTPATIENT
Start: 2025-01-19 | End: 2025-01-19

## 2025-01-19 RX ORDER — POTASSIUM CHLORIDE 20 MEQ/1
20 TABLET, EXTENDED RELEASE ORAL ONCE
Status: DISCONTINUED | OUTPATIENT
Start: 2025-01-19 | End: 2025-01-19

## 2025-01-19 RX ADMIN — ISOSORBIDE MONONITRATE 30 MG: 30 TABLET, EXTENDED RELEASE ORAL at 11:01

## 2025-01-20 PROBLEM — Z71.89 COUNSELING REGARDING GOALS OF CARE: Status: ACTIVE | Noted: 2025-01-20

## 2025-01-20 PROBLEM — R41.0 CONFUSION: Status: ACTIVE | Noted: 2025-01-19

## 2025-01-20 LAB
ANION GAP SERPL CALC-SCNC: 13 MMOL/L (ref 8–16)
BUN SERPL-MCNC: 41 MG/DL (ref 10–30)
CALCIUM SERPL-MCNC: 9.9 MG/DL (ref 8.7–10.5)
CHLORIDE SERPL-SCNC: 107 MMOL/L (ref 95–110)
CO2 SERPL-SCNC: 21 MMOL/L (ref 23–29)
CREAT SERPL-MCNC: 2 MG/DL (ref 0.5–1.4)
CREAT UR-MCNC: 92 MG/DL (ref 15–325)
EST. GFR  (NO RACE VARIABLE): 23.2 ML/MIN/1.73 M^2
GLUCOSE SERPL-MCNC: 95 MG/DL (ref 70–110)
MAGNESIUM SERPL-MCNC: 2.1 MG/DL (ref 1.6–2.6)
OHS QRS DURATION: 68 MS
OHS QTC CALCULATION: 445 MS
PHOSPHATE SERPL-MCNC: 4.5 MG/DL (ref 2.7–4.5)
POTASSIUM SERPL-SCNC: 4.1 MMOL/L (ref 3.5–5.1)
PROT UR-MCNC: 196 MG/DL (ref 0–15)
PROT/CREAT UR: 2.13 MG/G{CREAT} (ref 0–0.2)
SODIUM SERPL-SCNC: 141 MMOL/L (ref 136–145)
TROPONIN I SERPL DL<=0.01 NG/ML-MCNC: 70 NG/L (ref 0–14)

## 2025-01-20 PROCEDURE — 83735 ASSAY OF MAGNESIUM: CPT | Performed by: HOSPITALIST

## 2025-01-20 PROCEDURE — G0378 HOSPITAL OBSERVATION PER HR: HCPCS

## 2025-01-20 PROCEDURE — 84484 ASSAY OF TROPONIN QUANT: CPT | Performed by: STUDENT IN AN ORGANIZED HEALTH CARE EDUCATION/TRAINING PROGRAM

## 2025-01-20 PROCEDURE — 84100 ASSAY OF PHOSPHORUS: CPT | Performed by: HOSPITALIST

## 2025-01-20 PROCEDURE — 63600175 PHARM REV CODE 636 W HCPCS: Performed by: HOSPITALIST

## 2025-01-20 PROCEDURE — 36415 COLL VENOUS BLD VENIPUNCTURE: CPT | Performed by: HOSPITALIST

## 2025-01-20 PROCEDURE — 25000003 PHARM REV CODE 250: Performed by: HOSPITALIST

## 2025-01-20 PROCEDURE — 93005 ELECTROCARDIOGRAM TRACING: CPT

## 2025-01-20 PROCEDURE — 96372 THER/PROPH/DIAG INJ SC/IM: CPT | Performed by: HOSPITALIST

## 2025-01-20 PROCEDURE — 36415 COLL VENOUS BLD VENIPUNCTURE: CPT | Performed by: STUDENT IN AN ORGANIZED HEALTH CARE EDUCATION/TRAINING PROGRAM

## 2025-01-20 PROCEDURE — 80048 BASIC METABOLIC PNL TOTAL CA: CPT | Performed by: HOSPITALIST

## 2025-01-20 PROCEDURE — 93010 ELECTROCARDIOGRAM REPORT: CPT | Mod: ,,, | Performed by: INTERNAL MEDICINE

## 2025-01-20 PROCEDURE — 99214 OFFICE O/P EST MOD 30 MIN: CPT | Mod: GC,,, | Performed by: INTERNAL MEDICINE

## 2025-01-20 RX ORDER — FOLIC ACID 1 MG/1
1 TABLET ORAL DAILY
Status: DISCONTINUED | OUTPATIENT
Start: 2025-01-20 | End: 2025-02-11 | Stop reason: HOSPADM

## 2025-01-20 RX ORDER — SODIUM CHLORIDE 0.9 % (FLUSH) 0.9 %
10 SYRINGE (ML) INJECTION EVERY 6 HOURS PRN
Status: DISCONTINUED | OUTPATIENT
Start: 2025-01-20 | End: 2025-02-11 | Stop reason: HOSPADM

## 2025-01-20 RX ORDER — ONDANSETRON HYDROCHLORIDE 2 MG/ML
4 INJECTION, SOLUTION INTRAVENOUS EVERY 8 HOURS PRN
Status: DISCONTINUED | OUTPATIENT
Start: 2025-01-20 | End: 2025-02-11 | Stop reason: HOSPADM

## 2025-01-20 RX ORDER — CHOLECALCIFEROL (VITAMIN D3) 25 MCG
2000 TABLET ORAL DAILY
Status: DISCONTINUED | OUTPATIENT
Start: 2025-01-20 | End: 2025-02-11 | Stop reason: HOSPADM

## 2025-01-20 RX ORDER — HEPARIN SODIUM 5000 [USP'U]/ML
5000 INJECTION, SOLUTION INTRAVENOUS; SUBCUTANEOUS EVERY 8 HOURS
Status: DISCONTINUED | OUTPATIENT
Start: 2025-01-20 | End: 2025-02-11 | Stop reason: HOSPADM

## 2025-01-20 RX ORDER — LOSARTAN POTASSIUM 25 MG/1
25 TABLET ORAL DAILY
Status: DISCONTINUED | OUTPATIENT
Start: 2025-01-20 | End: 2025-01-26

## 2025-01-20 RX ORDER — SODIUM BICARBONATE 650 MG/1
650 TABLET ORAL 2 TIMES DAILY
Status: DISCONTINUED | OUTPATIENT
Start: 2025-01-20 | End: 2025-02-11 | Stop reason: HOSPADM

## 2025-01-20 RX ORDER — AMOXICILLIN 250 MG
1 CAPSULE ORAL 2 TIMES DAILY PRN
Status: DISCONTINUED | OUTPATIENT
Start: 2025-01-20 | End: 2025-02-11 | Stop reason: HOSPADM

## 2025-01-20 RX ORDER — CLOPIDOGREL BISULFATE 75 MG/1
75 TABLET ORAL DAILY
Status: DISCONTINUED | OUTPATIENT
Start: 2025-01-20 | End: 2025-02-11 | Stop reason: HOSPADM

## 2025-01-20 RX ORDER — PROCHLORPERAZINE EDISYLATE 5 MG/ML
5 INJECTION INTRAMUSCULAR; INTRAVENOUS EVERY 6 HOURS PRN
Status: DISCONTINUED | OUTPATIENT
Start: 2025-01-20 | End: 2025-02-11 | Stop reason: HOSPADM

## 2025-01-20 RX ORDER — ASPIRIN 81 MG/1
81 TABLET ORAL DAILY
Status: DISCONTINUED | OUTPATIENT
Start: 2025-01-20 | End: 2025-02-11 | Stop reason: HOSPADM

## 2025-01-20 RX ORDER — PANTOPRAZOLE SODIUM 20 MG/1
20 TABLET, DELAYED RELEASE ORAL EVERY MORNING
Status: DISCONTINUED | OUTPATIENT
Start: 2025-01-20 | End: 2025-02-11 | Stop reason: HOSPADM

## 2025-01-20 RX ORDER — NITROGLYCERIN 0.4 MG/1
0.4 TABLET SUBLINGUAL EVERY 5 MIN PRN
Status: DISCONTINUED | OUTPATIENT
Start: 2025-01-19 | End: 2025-02-11 | Stop reason: HOSPADM

## 2025-01-20 RX ORDER — TALC
6 POWDER (GRAM) TOPICAL NIGHTLY
Status: DISCONTINUED | OUTPATIENT
Start: 2025-01-20 | End: 2025-02-11 | Stop reason: HOSPADM

## 2025-01-20 RX ORDER — DAPAGLIFLOZIN 10 MG/1
10 TABLET, FILM COATED ORAL DAILY
Status: DISCONTINUED | OUTPATIENT
Start: 2025-01-20 | End: 2025-02-11 | Stop reason: HOSPADM

## 2025-01-20 RX ORDER — ATORVASTATIN CALCIUM 40 MG/1
40 TABLET, FILM COATED ORAL DAILY
Status: DISCONTINUED | OUTPATIENT
Start: 2025-01-20 | End: 2025-01-31

## 2025-01-20 RX ORDER — ALUMINUM HYDROXIDE, MAGNESIUM HYDROXIDE, AND SIMETHICONE 1200; 120; 1200 MG/30ML; MG/30ML; MG/30ML
30 SUSPENSION ORAL 4 TIMES DAILY PRN
Status: DISCONTINUED | OUTPATIENT
Start: 2025-01-20 | End: 2025-02-11 | Stop reason: HOSPADM

## 2025-01-20 RX ORDER — ALBUTEROL SULFATE 90 UG/1
1 INHALANT RESPIRATORY (INHALATION) EVERY 4 HOURS PRN
Status: DISCONTINUED | OUTPATIENT
Start: 2025-01-20 | End: 2025-02-02

## 2025-01-20 RX ORDER — POLYETHYLENE GLYCOL 3350 17 G/17G
17 POWDER, FOR SOLUTION ORAL DAILY PRN
Status: DISCONTINUED | OUTPATIENT
Start: 2025-01-20 | End: 2025-02-11 | Stop reason: HOSPADM

## 2025-01-20 RX ORDER — NALOXONE HCL 0.4 MG/ML
0.02 VIAL (ML) INJECTION
Status: DISCONTINUED | OUTPATIENT
Start: 2025-01-20 | End: 2025-02-11 | Stop reason: HOSPADM

## 2025-01-20 RX ORDER — AMLODIPINE BESYLATE 10 MG/1
10 TABLET ORAL DAILY
Status: DISCONTINUED | OUTPATIENT
Start: 2025-01-20 | End: 2025-01-26

## 2025-01-20 RX ORDER — ACETAMINOPHEN 325 MG/1
650 TABLET ORAL EVERY 4 HOURS PRN
Status: DISCONTINUED | OUTPATIENT
Start: 2025-01-20 | End: 2025-02-11 | Stop reason: HOSPADM

## 2025-01-20 RX ORDER — ISOSORBIDE MONONITRATE 60 MG/1
60 TABLET, EXTENDED RELEASE ORAL DAILY
Status: DISCONTINUED | OUTPATIENT
Start: 2025-01-20 | End: 2025-01-24

## 2025-01-20 RX ADMIN — Medication 6 MG: at 12:01

## 2025-01-20 RX ADMIN — CLOPIDOGREL BISULFATE 75 MG: 75 TABLET ORAL at 08:01

## 2025-01-20 RX ADMIN — DAPAGLIFLOZIN 10 MG: 10 TABLET, FILM COATED ORAL at 11:01

## 2025-01-20 RX ADMIN — CHOLECALCIFEROL TAB 25 MCG (1000 UNIT) 2000 UNITS: 25 TAB at 08:01

## 2025-01-20 RX ADMIN — ATORVASTATIN CALCIUM 40 MG: 40 TABLET, FILM COATED ORAL at 08:01

## 2025-01-20 RX ADMIN — SODIUM BICARBONATE 650 MG TABLET 650 MG: at 08:01

## 2025-01-20 RX ADMIN — Medication 6 MG: at 10:01

## 2025-01-20 RX ADMIN — PANTOPRAZOLE SODIUM 20 MG: 20 TABLET, DELAYED RELEASE ORAL at 06:01

## 2025-01-20 RX ADMIN — FOLIC ACID 1 MG: 1 TABLET ORAL at 08:01

## 2025-01-20 RX ADMIN — AMLODIPINE BESYLATE 10 MG: 10 TABLET ORAL at 08:01

## 2025-01-20 RX ADMIN — ISOSORBIDE MONONITRATE 60 MG: 60 TABLET, EXTENDED RELEASE ORAL at 08:01

## 2025-01-20 RX ADMIN — ALLOPURINOL 50 MG: 300 TABLET ORAL at 08:01

## 2025-01-20 RX ADMIN — LOSARTAN POTASSIUM 25 MG: 25 TABLET, FILM COATED ORAL at 08:01

## 2025-01-20 RX ADMIN — HEPARIN SODIUM 5000 UNITS: 5000 INJECTION INTRAVENOUS; SUBCUTANEOUS at 02:01

## 2025-01-20 RX ADMIN — SODIUM BICARBONATE 650 MG TABLET 650 MG: at 10:01

## 2025-01-20 RX ADMIN — HEPARIN SODIUM 5000 UNITS: 5000 INJECTION INTRAVENOUS; SUBCUTANEOUS at 10:01

## 2025-01-20 RX ADMIN — HEPARIN SODIUM 5000 UNITS: 5000 INJECTION INTRAVENOUS; SUBCUTANEOUS at 06:01

## 2025-01-20 RX ADMIN — ASPIRIN 81 MG: 81 TABLET, COATED ORAL at 08:01

## 2025-01-20 NOTE — ASSESSMENT & PLAN NOTE
Repeat UA appears normal and no persistent symptoms or concerns for ongoing Acute cystitis adequately treated.

## 2025-01-20 NOTE — HPI
"89 y/o AAF w/ CAD hx PCI, CKD 4, HTN, Afib, AAA, 2nd degree AV Block presents to the ED with complaints of dyspnea.     She was recently admitted to [facility] from 1/13-1/17 per DC summary "Patient admitted for dyspnea on exertion. V/Q scan was completed - no pulmonary embolism. Echo ordered - regional wall motion abnormalities present. Low normal systolic ejection fraction 50-55%. Cardiology was consulted - feel origin of TAYLOR may be cardiac in nature. PET stress done 1/16. Two perfusion abnormalities seen. Interventional cardiology recommending medical management and close follow-up. Amlodipine increased, losartan decreased, and started on imdur."    She returns to ED with concerns of dyspnea on exertion.  She discharged to home after last admission, lives with her daughter.  Her grand-daughter accompanies her today and helps provide history.  Since discharge patient has had limited ADL capability, pt requires assistance w/ ambulation and use of walker, patient has home purewick and bedside commode.  She requires assistance with toileting, bathing, clothing.  She is adherent to medications. Yesterday patient appeared weak and when sitting upright had poor trunk stability would slump over and had poor appetite did not eat much and was noted with intermittent confusion.  Her confusion is described as poor short term memory, will intermittently forget recent events, granddaughter notes that patient usually answers orientation questions appropriately when evaluated by medical teams, but might forget later what was discussed or why she is in the hospital.    She was treated for acute cystitis during last admit, pan-sensitive E.coli on urine culture received ceftriaxone inpatient and not discharged on any oral antibiotics.     She does report poor sleep, has had difficulty sleeping at night and will nap multiple times per day. No reported non-redirectable agitation.   Prior to this month, grand-daughter notes patient was " performing more ADL on her own.  Patient had declined referral to SNF with recent admits, last PT/OT recs for low-intensity therapy, pt is more open to post-acute care if recommended.     In ED tonight pt with bradycardia noted, cardiology consulted, no acute medical management of her bradycardia recommended, she has 2:1 AV block on review of EKG/telemetry tonight, hx of [place] 1 2nd Deg AV block in past.

## 2025-01-20 NOTE — ASSESSMENT & PLAN NOTE
Creatine stable for now. BMP reviewed- noted Estimated Creatinine Clearance: 15.5 mL/min (A) (based on SCr of 2.3 mg/dL (H)). according to latest data. Based on current GFR, CKD stage is stage 4 - GFR 15-29.  Monitor UOP and serial BMP and adjust therapy as needed. Renally dose meds. Avoid nephrotoxic medications and procedures.    Continue sodium bicarbonate and farxiga

## 2025-01-20 NOTE — ED PROVIDER NOTES
Encounter Date: 1/19/2025       History     Chief Complaint   Patient presents with    Altered Mental Status     Pt diagnosed with UTI this past week. Pt's family states that for the past few days she has been more confused.      89-year-old female with past medical history of CAD, hypertension, breast CA, cervical cancer, AAA, CKD who presents ED for AMS and chest pain.  Recently admitted for chest pain initially planned for stent but was decided you be medically managed.  She has been on Imdur for the past 2 days and today had an episode of chest pain at around 1:00 p.m. which resolved with sublingual nitro.  She states she is still has shortness breath with exertion.  Daughter notes that she is confused and has been asking repetitive questions and forgot some family members name which is not typical for her.  She was also recently treated for UTI.  Reports occasional dysuria.  Denies any fevers/chills, abdominal pain or back pain.        Review of patient's allergies indicates:   Allergen Reactions    Clindamycin Anaphylaxis    Penicillins Rash     Past Medical History:   Diagnosis Date    Anticoagulant long-term use     Cervical cancer 1968    breast cancer right    Coronary artery disease     Gout     High cholesterol     Hypertension     MI (myocardial infarction) 1999     Past Surgical History:   Procedure Laterality Date    BREAST LUMPECTOMY      right    CARDIAC SURGERY      multiple cardiac stents    CORONARY ANGIOGRAPHY Right 1/21/2022    Procedure: ANGIOGRAM, CORONARY ARTERY;  Surgeon: Asim Canela MD;  Location: [facility] CATH LAB;  Service: Cardiology;  Laterality: Right;    CORONARY ANGIOGRAPHY Right 5/10/2024    Procedure: ANGIOGRAM, CORONARY ARTERY POSSIBLE LV COR;  Surgeon: Asim Canela MD;  Location: [facility] CATH LAB;  Service: Cardiology;  Laterality: Right;    CORONARY STENT PLACEMENT      HIP REPLACEMENT ARTHROPLASTY Right 2018    JOINT REPLACEMENT      right     Family History   Problem  Relation Name Age of Onset    Cancer Mother      Cancer Father       Social History     Tobacco Use    Smoking status: Former    Smokeless tobacco: Never   Substance Use Topics    Alcohol use: Yes     Comment: rare    Drug use: No     Review of Systems    Physical Exam     Initial Vitals [01/19/25 1807]   BP Pulse Resp Temp SpO2   (!) 148/76 93 16 98.1 °F (36.7 °C) 99 %      MAP       --         Physical Exam    Constitutional: She appears well-developed and well-nourished.   HENT:   Head: Normocephalic and atraumatic.   Eyes: Conjunctivae are normal. Pupils are equal, round, and reactive to light.   Neck: Neck supple.   Normal range of motion.  Cardiovascular:  Normal heart sounds and intact distal pulses.           Bradycardic heart rate ranges from 31-56   Pulmonary/Chest: Breath sounds normal.   Abdominal: Abdomen is soft. There is no abdominal tenderness.   Musculoskeletal:         General: Normal range of motion.      Cervical back: Normal range of motion and neck supple.     Neurological: She is alert and oriented to person, place, and time. GCS score is 15. GCS eye subscore is 4. GCS verbal subscore is 5. GCS motor subscore is 6.   A&O x4.  Does have some periods of confusion difficulty remembering events which is not typical per her daughter who was at bedside   Skin: Skin is warm and dry. Capillary refill takes less than 2 seconds.         ED Course   Critical Care    Date/Time: 1/19/2025 8:52 PM    Performed by: Rubens Abreu MD  Authorized by: Rubens Abreu MD  Direct patient critical care time: 10 minutes  Additional history critical care time: 5 minutes  Ordering / reviewing critical care time: 10 minutes  Documentation critical care time: 5 minutes  Consulting other physicians critical care time: 10 minutes  Total critical care time (exclusive of procedural time) : 40 minutes  Critical care was necessary to treat or prevent imminent or life-threatening deterioration of the following  conditions: cardiac failure.  Critical care was time spent personally by me on the following activities: development of treatment plan with patient or surrogate, discussions with consultants, interpretation of cardiac output measurements, evaluation of patient's response to treatment, examination of patient, obtaining history from patient or surrogate, ordering and review of radiographic studies, pulse oximetry, re-evaluation of patient's condition, ordering and performing treatments and interventions, ordering and review of laboratory studies and review of old charts.        Labs Reviewed   B-TYPE NATRIURETIC PEPTIDE - Abnormal       Result Value     (*)    CBC W/ AUTO DIFFERENTIAL - Abnormal    WBC 6.29      RBC 3.60 (*)     Hemoglobin 10.3 (*)     Hematocrit 32.6 (*)     MCV 91      MCH 28.6      MCHC 31.6 (*)     RDW 14.2      Platelets 250      MPV 11.1      Immature Granulocytes 0.2      Gran # (ANC) 3.1      Immature Grans (Abs) 0.01      Lymph # 2.3      Mono # 0.7      Eos # 0.1      Baso # 0.07      nRBC 0      Gran % 49.9      Lymph % 35.9      Mono % 11.0      Eosinophil % 1.9      Basophil % 1.1      Differential Method Automated     COMPREHENSIVE METABOLIC PANEL - Abnormal    Sodium 138      Potassium 5.1      Chloride 103      CO2 20 (*)     Glucose 98      BUN 43 (*)     Creatinine 2.3 (*)     Calcium 10.2      Total Protein 8.4      Albumin 3.0 (*)     Total Bilirubin 0.6      Alkaline Phosphatase 91      AST 22      ALT 25      eGFR 19.6 (*)     Anion Gap 15     TROPONIN I HIGH SENSITIVITY - Abnormal    Troponin I High Sensitivity 90 (*)    URINALYSIS, REFLEX TO URINE CULTURE   ISTAT LACTATE    POC Lactate 0.57      Sample VENOUS      Site Other      Allens Test N/A       EKG Readings: (Independently Interpreted)   Initial Reading: No STEMI. Heart Rate: 46. Ectopy: No Ectopy. ST Segments: Normal ST Segments. Axis: Left Axis Deviation.    Sinus bradycardia with a Mobitz type 2 av block      ECG Results              EKG 12-lead (Final result)        Collection Time Result Time QRS Duration OHS QTC Calculation    01/19/25 18:59:48 01/19/25 20:27:55 76 397                     Final result by Interface, Lab In [facility] (01/19/25 20:28:01)                   Narrative:    Test Reason : R07.9,    Vent. Rate :  46 BPM     Atrial Rate :  78 BPM     P-R Int : 236 ms          QRS Dur :  76 ms      QT Int : 454 ms       P-R-T Axes :  79 -23  82 degrees    QTcB Int : 397 ms    Sinus rhythm with AV block, 2Â° - Mobitz type I (Wenckebach)  Leftward axis  Low septal forces  Nonspecific ST and/or T wave abnormalities  Abnormal ECG  When compared with ECG of 16-Jan-2025 09:32,  AV block, 2Â° - Mobitz type I (Wenckebach) Now present  Confirmed by Francisco Giraldo ([id number]) on 1/19/2025 8:27:53 PM    Referred By: AAAREFERRAL SELF           Confirmed By: Francisco Giraldo                                  Imaging Results              X-Ray Chest AP Portable (Final result)  Result time 01/19/25 20:40:14      Final result by Tee Calle MD (01/19/25 20:40:14)                   Impression:      1. No acute cardiopulmonary process.      Electronically signed by: Tee Calle MD  Date:    01/19/2025  Time:    20:40               Narrative:    EXAMINATION:  XR CHEST AP PORTABLE    CLINICAL HISTORY:  chest pain;    TECHNIQUE:  Single frontal view of the chest was performed.    COMPARISON:  01/13/2025    FINDINGS:  The cardiomediastinal silhouette is not enlarged noting calcification of the aorta..  There is no pleural effusion.  The trachea is midline.  The lungs are symmetrically expanded bilaterally with mildly coarse interstitial attenuation, accentuated by habitus..  No large focal consolidation seen.  There is no pneumothorax.  The osseous structures are remarkable for degenerative change..                                       Medications - No data to display  Medical Decision Making  89-year-old female presents ED  with AMS, chest pain.    Differential includes but not limited to encephalopathy, UTI, symptomatic bradycardia, electrolyte derangement, ACS, pneumonia     History of unstable angina recently which she is being treated medically.  Started Imdur and has been compliant the past 2 days since being discharge.  Today had an episode of chest pain that required sublingual nitro.  Her chest pain resolved with the sublingual nitro prior to arrival.  She does continue to endorse shortness of breath with exertion.  EKG on arrival shows a sinus bradycardia with a rate of 46 with a type 2 second-degree AV block with two-to-one conduction.  BP is stable.  Cardiology was promptly consulted and will come evaluate patient.  Code cart was placed by bedside as a precaution.  She is A&O x4 but forgetful and occasionally has repeat questions which is not her baseline.  Recently treated for UTI will obtain an infectious workup.  Patient care handoff given to ED team pending Cardiology recommendations, infectious workup anticipate admission    Amount and/or Complexity of Data Reviewed  Labs: ordered.  Radiology: ordered.  ECG/medicine tests: ordered and independent interpretation performed. Decision-making details documented in ED Course.              Attending Attestation:     Physician Attestation Statement for NP/PA:   I personally made/approved the management plan and take responsibility for the patient management.    Other NP/PA Attestation Additions:    History of Present Illness:   89-year-old woman presents to the ED for evaluation of altered mental status.     Medical Decision Making: EKG reveals new Mobitz 2 AV block, pacer pads placed on patient with emergent Cardiology consultation. BP maintained             ED Course as of 01/19/25 2106   Sun Jan 19, 2025 1943 On my individual interpretation EKG shows sinus bradycardia rate of 46 with Mobitz type 2.  Does not meet STEMI criteria [HJ]   1954 Patient stable does have waxing  waning bradycardia and code cart was placed at bedside.  Case was discussed with cardiology fellow who will come evaluate. [HJ]      ED Course User Index  [HJ] Elinor Willis PA-C                           Clinical Impression:  Final diagnoses:  [R07.9] Chest pain  [R00.1] Bradycardia (Primary)  [R41.82] Altered mental status, unspecified altered mental status type                 Elinor Willis PA-C  01/19/25 0595

## 2025-01-20 NOTE — PROVIDER PROGRESS NOTES - EMERGENCY DEPT.
Encounter Date: 1/19/2025    ED Physician Progress Notes        I assumed care of patient at sign out pending: disposition.    Summary of Results:  Cardiology saw the patient and she has had similar blocks intermittently in the past.  She is currently stable and does not need any emergent pacing or medications.  A recommend admission to hospital medicine, increasing Imdur, no heparin for elevated troponin, continued monitoring of her heart rate and they will continue to follow-up.  Discussed admission with Hospital Medicine.    Disposition:  observation.

## 2025-01-20 NOTE — ASSESSMENT & PLAN NOTE
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  During cardiology evaluation pt with variable 2nd degree AV block. She is not on any AV node blocking agents.    -continue telemetry monitoring.     IF patient develops unresolved symptoms while bradycardic or if worsening bradycardia occurs - reconsult cardiology to evaluate.     Tonight discussed code status with patient - she has been Full Code and DNR during last 2 admits, initially indicated DNR but also states she would want resuscitative measures if she might not incur injury, reviewed probabilities with pt given advanced age and chronic medical conditiions indicated to her that under true cardio-respiratory arrest she would be left with subsequent injury and overall low likelihood of survival to discharge 5-10%.  Give this bradyarrhythmia I did discuss if patients HR was lower if she would want invasive measures such as transvenous pacing or transcutaneous pacing performed she responded in affirmative,  will place FULL CODE for now.

## 2025-01-20 NOTE — H&P
"  [address] - Observation [address] [place] [address] Medicine  History & Physical    Patient Name: Anahy Evans  MRN: [id number]  Patient Class: OP- Observation  Admission Date: 1/19/2025  Attending Physician: Marely Perez MD [facility] HOSP MED G  Admitting Physician: Isai Cummings MD  Primary Care Provider: Elena Cabrera MD      Patient information was obtained from patient, relative(s), past medical records, and ER records.     Subjective:     Principal Problem:Anginal equivalent    Chief Complaint:   Chief Complaint   Patient presents with    Altered Mental Status     Pt diagnosed with UTI this past week. Pt's family states that for the past few days she has been more confused.         HPI: 89 y/o AAF w/ CAD hx PCI, CKD 4, HTN, Afib, AAA, 2nd degree AV Block presents to the ED with complaints of dyspnea.     She was recently admitted to [facility] from 1/13-1/17 per DC summary "Patient admitted for dyspnea on exertion. V/Q scan was completed - no pulmonary embolism. Echo ordered - regional wall motion abnormalities present. Low normal systolic ejection fraction 50-55%. Cardiology was consulted - feel origin of TAYLOR may be cardiac in nature. PET stress done 1/16. Two perfusion abnormalities seen. Interventional cardiology recommending medical management and close follow-up. Amlodipine increased, losartan decreased, and started on imdur."    She returns to ED with concerns of dyspnea on exertion.  She discharged to home after last admission, lives with her daughter.  Her grand-daughter accompanies her today and helps provide history.  Since discharge patient has had limited ADL capability, pt requires assistance w/ ambulation and use of walker, patient has home purewick and bedside commode.  She requires assistance with toileting, bathing, clothing.  She is adherent to medications. Yesterday patient appeared weak and when sitting upright had poor trunk stability would slump over and had poor appetite did not eat much and was " noted with intermittent confusion.  Her confusion is described as poor short term memory, will intermittently forget recent events, granddaughter notes that patient usually answers orientation questions appropriately when evaluated by medical teams, but might forget later what was discussed or why she is in the hospital.    She was treated for acute cystitis during last admit, pan-sensitive E.coli on urine culture received ceftriaxone inpatient and not discharged on any oral antibiotics.     She does report poor sleep, has had difficulty sleeping at night and will nap multiple times per day. No reported non-redirectable agitation.   Prior to this month, grand-daughter notes patient was performing more ADL on her own.  Patient had declined referral to SNF with recent admits, last PT/OT recs for low-intensity therapy, pt is more open to post-acute care if recommended.     In ED tonight pt with bradycardia noted, cardiology consulted, no acute medical management of her bradycardia recommended, she has 2:1 AV block on review of EKG/telemetry tonight, hx of Mobitz 1 2nd Deg AV block in past.       Past Medical History:   Diagnosis Date    Acute blood loss anemia 10/26/2021    Anticoagulant long-term use     Breast deformity 09/07/2022    Cervical cancer 1968    breast cancer right    Coronary artery disease     Gout     High cholesterol     History of cervical dysplasia 10/28/2014    Hypertension     MI (myocardial infarction) 1999    Right axillary hidradenitis 07/11/2022       Past Surgical History:   Procedure Laterality Date    BREAST LUMPECTOMY      right    CARDIAC SURGERY      multiple cardiac stents    CORONARY ANGIOGRAPHY Right 1/21/2022    Procedure: ANGIOGRAM, CORONARY ARTERY;  Surgeon: Asim Canela MD;  Location: [facility] CATH LAB;  Service: Cardiology;  Laterality: Right;    CORONARY ANGIOGRAPHY Right 5/10/2024    Procedure: ANGIOGRAM, CORONARY ARTERY POSSIBLE LV COR;  Surgeon: Asim Canela MD;   Location: [facility] CATH LAB;  Service: Cardiology;  Laterality: Right;    CORONARY STENT PLACEMENT      HIP REPLACEMENT ARTHROPLASTY Right 2018    JOINT REPLACEMENT      right       Review of patient's allergies indicates:   Allergen Reactions    Clindamycin Anaphylaxis    Penicillins Rash       No current facility-administered medications on file prior to encounter.     Current Outpatient Medications on File Prior to Encounter   Medication Sig    allopurinoL (ZYLOPRIM) 100 MG tablet Take 0.5 tablets by mouth once daily.    amLODIPine (NORVASC) 10 MG tablet Take 1 tablet (10 mg total) by mouth once daily.    aspirin (ECOTRIN) 81 MG EC tablet Take 81 mg by mouth once daily.    atorvastatin (LIPITOR) 40 MG tablet Take 40 mg by mouth once daily.    cholecalciferol, vitamin D3, (VITAMIN D3) 50 mcg (2,000 unit) Cap Take 1 capsule by mouth once daily.    clopidogreL (PLAVIX) 75 mg tablet Take 75 mg by mouth once daily.    FARXIGA 10 mg tablet Take 1 tablet by mouth once daily.    folic acid (FOLVITE) 1 MG tablet Take 1 mg by mouth once daily.    isosorbide mononitrate (IMDUR) 30 MG 24 hr tablet Take 1 tablet (30 mg total) by mouth once daily.    KERENDIA 10 mg Tab Take 1 tablet by mouth Daily.    losartan (COZAAR) 25 MG tablet Take 1 tablet (25 mg total) by mouth once daily.    nitroGLYCERIN (NITROSTAT) 0.4 MG SL tablet Place 0.4 mg under the tongue every 5 (five) minutes as needed for Chest pain.    pantoprazole (PROTONIX) 20 MG tablet Take 20 mg by mouth every morning.    sodium bicarbonate 650 MG tablet Take 650 mg by mouth 2 (two) times daily.    albuterol (PROVENTIL/VENTOLIN HFA) 90 mcg/actuation inhaler Inhale 1 puff into the lungs every 4 (four) hours as needed for Wheezing.     Family History       Problem Relation (Age of Onset)    Cancer Mother, Father          Tobacco Use    Smoking status: Former    Smokeless tobacco: Never   Substance and Sexual Activity    Alcohol use: Yes     Comment: rare    Drug use: No     Sexual activity: Not Currently     Review of Systems   Constitutional:  Negative for chills and fever.   HENT: Negative.     Cardiovascular:  Negative for leg swelling.   Gastrointestinal: Negative.    Genitourinary:  Negative for difficulty urinating and dysuria.   Neurological:  Negative for dizziness, syncope and light-headedness.   Psychiatric/Behavioral:  Negative for agitation and hallucinations.      Objective:     Vital Signs (Most Recent):  Temp: 98.2 °F (36.8 °C) (01/20/25 0159)  Pulse: (!) 50 (01/20/25 0159)  Resp: 18 (01/20/25 0159)  BP: (!) 130/56 (01/20/25 0159)  SpO2: 97 % (01/20/25 0159) Vital Signs (24h Range):  Temp:  [98.1 °F (36.7 °C)-98.2 °F (36.8 °C)] 98.2 °F (36.8 °C)  Pulse:  [42-93] 50  Resp:  [16-27] 18  SpO2:  [96 %-99 %] 97 %  BP: (130-165)/(56-88) 130/56     Weight: 71.2 kg (157 lb)  Body mass index is 24.59 kg/m².     Physical Exam  Vitals and nursing note reviewed.   Constitutional:       General: She is not in acute distress.  HENT:      Head: Normocephalic and atraumatic.   Eyes:      General: No scleral icterus.     Pupils: Pupils are equal, round, and reactive to light.   Cardiovascular:      Rate and Rhythm: Bradycardia present. Rhythm irregular.      Heart sounds: Murmur heard.   Pulmonary:      Effort: Pulmonary effort is normal. No respiratory distress.      Breath sounds: Normal breath sounds. No wheezing or rales.   Abdominal:      General: Bowel sounds are normal. There is no distension.      Palpations: Abdomen is soft.      Tenderness: There is no abdominal tenderness. There is no guarding.   Musculoskeletal:      Right lower leg: No edema.      Left lower leg: No edema.   Skin:     General: Skin is warm and dry.   Neurological:      Mental Status: She is alert.   Psychiatric:      Comments: On Cam-ICU testing pt with disorientation to day of week, oriented to month/year/location/city/state. Names Trump as president,  There is no disorganized thinking or error on attention  "testing.               CRANIAL NERVES     CN III, IV, VI   Pupils are equal, round, and reactive to light.       Significant Labs: All pertinent labs within the past 24 hours have been reviewed.  CBC:   Recent Labs   Lab 01/19/25 1927   WBC 6.29   HGB 10.3*   HCT 32.6*        CMP:   Recent Labs   Lab 01/19/25 1927      K 5.1      CO2 20*   GLU 98   BUN 43*   CREATININE 2.3*   CALCIUM 10.2   PROT 8.4   ALBUMIN 3.0*   BILITOT 0.6   ALKPHOS 91   AST 22   ALT 25   ANIONGAP 15     Cardiac Markers:   Recent Labs   Lab 01/19/25 1927   *     Coagulation: No results for input(s): "PT", "INR", "APTT" in the last 48 hours.  Lactic Acid: No results for input(s): "LACTATE" in the last 48 hours.  Magnesium: No results for input(s): "MG" in the last 48 hours.  Troponin:   Recent Labs   Lab 01/19/25 1927   TROPONINIHS 90*     Urine Studies:   Recent Labs   Lab 01/19/25  2303   COLORU Yellow   APPEARANCEUA Clear   PHUR 7.0   SPECGRAV 1.015   PROTEINUA 2+*   GLUCUA 3+*   KETONESU Negative   BILIRUBINUA Negative   OCCULTUA Negative   NITRITE Negative   LEUKOCYTESUR Negative   RBCUA 1   WBCUA 1   BACTERIA None   SQUAMEPITHEL 4   HYALINECASTS 0       Significant Imaging: I have reviewed all pertinent imaging results/findings within the past 24 hours.    XR CHEST AP PORTABLE     CLINICAL HISTORY:  chest pain;     TECHNIQUE:  Single frontal view of the chest was performed.     COMPARISON:  01/13/2025     FINDINGS:  The cardiomediastinal silhouette is not enlarged noting calcification of the aorta..  There is no pleural effusion.  The trachea is midline.  The lungs are symmetrically expanded bilaterally with mildly coarse interstitial attenuation, accentuated by habitus..  No large focal consolidation seen.  There is no pneumothorax.  The osseous structures are remarkable for degenerative change..     Impression:     1. No acute cardiopulmonary process.        Electronically signed by: Tee Calle, " MD  Date:                                            01/19/2025  Time:                                           20:40    CT HEAD WITHOUT CONTRAST     CLINICAL HISTORY:  Mental status change, unknown cause;     TECHNIQUE:  Low dose axial CT images obtained throughout the head without the use of intravenous contrast.  Axial, sagittal and coronal reconstructions were performed.     COMPARISON:  CTA head and neck 02/05/2020, CT head 01/14/2019     FINDINGS:  Mild generalized cerebral volume loss.  Redemonstrated remote appearing infarct in the left posterior subinsular cortex, right basal ganglia and bilateral thalami.  Confluent and patchy hypoattenuation in the supratentorial white matter, nonspecific but may reflect advanced chronic microvascular ischemic changes. No intraparenchymal hemorrhage or mass effect.  No evidence of acute major vascular distribution infarct.     Ventricles are stable size and configuration.  No hydrocephalus or midline shift.     No extra-axial blood or fluid collection.     No displaced calvarial fracture.  Hyperostosis frontalis interna.     Mastoid air cells and paranasal sinuses are essentially clear.     Bilateral pseudophakia.     Vascular calcifications at the skull base.     Impression:     No acute intracranial hemorrhage or evidence of acute infarction.     Mild generalized cerebral volume loss.  Remote infarcts and advanced chronic microvascular ischemic changes.     Electronically signed by resident: Marisol Casey  Date:                                            01/19/2025  Time:                                           23:21     Electronically signed by: Wei Weinstein MD  Date:                                            01/19/2025  Assessment/Plan:     * Anginal equivalent  -seen by cardiology - her intermittent dyspnea symptoms are considered an anginal equivalent.  Recommendation to titrate Imdur dosage to 60mg.   Patient took AM imdur 30mg, is hypertensive tonight - give  30mg imdur tonight and start 60mg in AM   -f/u if her intermittent dyspnea improves    -reconsult PT/OT      Confusion  ED indicated pt referred for admit for confusion.  By CAM-ICU testing pt does not have delirium, some disorientation. She may have some underlying cognitive deficits based on grand-daughters description.     Appears pt with altered sleep wake cycles more recently - schedule melatonin tonight to help normalize sleep-wake cycle, delirium precautions.     Repeat UA appears normal and no persistent symptoms or concerns for ongoing Acute cystitis adequately treated.       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  During cardiology evaluation pt with variable 2nd degree AV block. She is not on any AV node blocking agents.    -continue telemetry monitoring.     IF patient develops unresolved symptoms while bradycardic or if worsening bradycardia occurs - reconsult cardiology to evaluate.     Tonight discussed code status with patient - she has been Full Code and DNR during last 2 admits, initially indicated DNR but also states she would want resuscitative measures if she might not incur injury, reviewed probabilities with pt given advanced age and chronic medical conditiions indicated to her that under true cardio-respiratory arrest she would be left with subsequent injury and overall low likelihood of survival to discharge 5-10%.  Give this bradyarrhythmia I did discuss if patients HR was lower if she would want invasive measures such as transvenous pacing or transcutaneous pacing performed she responded in affirmative,  will place FULL CODE for now.       Acute cystitis without hematuria    Repeat UA appears normal and no persistent symptoms or concerns for ongoing Acute cystitis adequately treated.       PAF (paroxysmal atrial fibrillation)  Patient has  paroxysmal (<7 days) atrial fibrillation. Patient is currently in sinus rhythm. [id number] Score: 4. The patients heart rate in the last 24 hours is as follows:  Pulse  Min: 42  Max: 93     Antiarrhythmics       Anticoagulants  heparin (porcine) injection 5,000 Units, Every 8 hours, Subcutaneous    Plan  - Replete lytes with a goal of K>4, Mg >2  - Patient is not anticoagulated due to unclear etiology  - Patient's afib is currently controlled        CKD (chronic kidney disease), stage IV  Creatine stable for now. BMP reviewed- noted Estimated Creatinine Clearance: 15.5 mL/min (A) (based on SCr of 2.3 mg/dL (H)). according to latest data. Based on current GFR, CKD stage is stage 4 - GFR 15-29.  Monitor UOP and serial BMP and adjust therapy as needed. Renally dose meds. Avoid nephrotoxic medications and procedures.    Continue sodium bicarbonate and farxiga    Essential hypertension  Patient's blood pressure range in the last 24 hours was: BP  Min: 130/56  Max: 165/74.The patient's inpatient anti-hypertensive regimen is listed below:  Current Antihypertensives  nitroGLYCERIN SL tablet 0.4 mg, Every 5 min PRN, Sublingual  amLODIPine tablet 10 mg, Daily, Oral  isosorbide mononitrate 24 hr tablet 60 mg, Daily, Oral  losartan tablet 25 mg, Daily, Oral    Plan  - BP is uncontrolled, will adjust as follows: titrate IMDUR dosage to 60mg.       Counseling regarding goals of care  Code status discussion as per AV block problem.     Patient has living will and HCPOA documents uploaded, she does not have an LAPOST completed. Would recommend completion of LAPOST prior to hospital discharge.         VTE Risk Mitigation (From admission, onward)           Ordered     heparin (porcine) injection 5,000 Units  Every 8 hours         01/20/25 0003     IP VTE HIGH RISK PATIENT  Once         01/20/25 0003     Place sequential compression device  Until discontinued         01/20/25 0003                       On 01/19/2025, patient should be  placed in hospital observation services under my care.             Isai Cummings MD  Department of Hospital Medicine  [facility] - Observation 11H

## 2025-01-20 NOTE — ASSESSMENT & PLAN NOTE
Patient has paroxysmal (<7 days) atrial fibrillation. Patient is currently in sinus rhythm. [id number] Score: 4. The patients heart rate in the last 24 hours is as follows:  Pulse  Min: 42  Max: 93     Antiarrhythmics       Anticoagulants  heparin (porcine) injection 5,000 Units, Every 8 hours, Subcutaneous    Plan  - Replete lytes with a goal of K>4, Mg >2  - Patient is not anticoagulated due to unclear etiology  - Patient's afib is currently controlled

## 2025-01-20 NOTE — HPI
89yoF Patient Summary:  The patient has a history of prior LAD and left circumflex stenting, a known chronic total occlusion () of the RCA, and a 50% ostial stenosis of the left circumflex (noted in May 2024). 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 She experiences recurrent episodes of anginal equivalents, primarily shortness of breath, which resolve with nitroglycerin. These episodes occur approximately 2-3 times per year, and this episode also resolved with NTG. A PET stress test showing a reversible perfusion defect in the left circumflex territory. She was managed medically with the addition of Imdur.     The patient is persistently bradycardic (HR 40s-50s) with sinus rhythm and intermittent Mobitz I AVB vs. 2:1 AV block. She remains asymptomatic from a rhythm standpoint, has a narrow QRS without significant bundle branch disease, and has a known history of Mobitz I AV block.     She was recently discharged after a similar episode and was evaluated by Interventional Cardiology (IC), who did not recommend intervention at that time due to age and CKD. She had been functional prior to this episode, completing her ADLs, and is expected to transition to rehab to regain function.     Reason for Reconsultation:  The patient developed new-onset chest pain last night, described as radiating to the left arm. Initial trops were71, and flat at 71 - around where she ahs been during this admission. . She received two high-sensitivity troponins (both 71). The chest pain has since resolved .Based on IC previous evaluation her last admission, given her age and CKD, intervention is not advisable at this time.

## 2025-01-20 NOTE — PROGRESS NOTES
"[facility] [address] [place] [address] Medicine  Progress Note    Patient Name: Anahy Evans  MRN: [id number]  Patient Class: OP- Observation   Admission Date: 1/19/2025  Length of Stay: 0 days  Attending Physician: Marely Perez MD  Primary Care Provider: Elena Cabrera MD        Subjective     Principal Problem:Anginal equivalent        HPI:  89 y/o AAF w/ CAD hx PCI, CKD 4, HTN, Afib, AAA, 2nd degree AV Block presents to the ED with complaints of dyspnea.     She was recently admitted to [facility] from 1/13-1/17 per DC summary "Patient admitted for dyspnea on exertion. V/Q scan was completed - no pulmonary embolism. Echo ordered - regional wall motion abnormalities present. Low normal systolic ejection fraction 50-55%. Cardiology was consulted - feel origin of TAYLOR may be cardiac in nature. PET stress done 1/16. Two perfusion abnormalities seen. Interventional cardiology recommending medical management and close follow-up. Amlodipine increased, losartan decreased, and started on imdur."    She returns to ED with concerns of dyspnea on exertion.  She discharged to home after last admission, lives with her daughter.  Her grand-daughter accompanies her today and helps provide history.  Since discharge patient has had limited ADL capability, pt requires assistance w/ ambulation and use of walker, patient has home purewick and bedside commode.  She requires assistance with toileting, bathing, clothing.  She is adherent to medications. Yesterday patient appeared weak and when sitting upright had poor trunk stability would slump over and had poor appetite did not eat much and was noted with intermittent confusion.  Her confusion is described as poor short term memory, will intermittently forget recent events, granddaughter notes that patient usually answers orientation questions appropriately when evaluated by medical teams, but might forget later what was discussed or why she is in the hospital.    She was treated " for acute cystitis during last admit, pan-sensitive E.coli on urine culture received ceftriaxone inpatient and not discharged on any oral antibiotics.     She does report poor sleep, has had difficulty sleeping at night and will nap multiple times per day. No reported non-redirectable agitation.   Prior to this month, grand-daughter notes patient was performing more ADL on her own.  Patient had declined referral to SNF with recent admits, last PT/OT recs for low-intensity therapy, pt is more open to post-acute care if recommended.     In ED tonight pt with bradycardia noted, cardiology consulted, no acute medical management of her bradycardia recommended, she has 2:1 AV block on review of EKG/telemetry tonight, hx of Mobitz 1 2nd Deg AV block in past.       Overview/Hospital Course:  No notes on file    Interval History: Bradycardic to the 30s overnight per night report. HR otherwise 40-50s. This morning patient reports severe SOB just with eating breakfast. Awaiting further cardiology recommendations.     Review of Systems  Objective:     Vital Signs (Most Recent):  Temp: 98.1 °F (36.7 °C) (01/20/25 0800)  Pulse: 64 (01/20/25 1045)  Resp: 16 (01/20/25 0800)  BP: 111/63 (01/20/25 0800)  SpO2: 97 % (01/20/25 0800) Vital Signs (24h Range):  Temp:  [98.1 °F (36.7 °C)-98.2 °F (36.8 °C)] 98.1 °F (36.7 °C)  Pulse:  [42-93] 64  Resp:  [16-27] 16  SpO2:  [96 %-99 %] 97 %  BP: (111-165)/(56-88) 111/63     Weight: 72 kg (158 lb 11.7 oz)  Body mass index is 24.86 kg/m².  No intake or output data in the 24 hours ending 01/20/25 1046      Physical Exam  Vitals and nursing note reviewed.   Constitutional:       General: She is not in acute distress.  HENT:      Head: Normocephalic and atraumatic.   Eyes:      General: No scleral icterus.     Pupils: Pupils are equal, round, and reactive to light.   Cardiovascular:      Rate and Rhythm: Bradycardia present. Rhythm irregular.      Heart sounds: Murmur heard.   Pulmonary:       Effort: Pulmonary effort is normal. No respiratory distress.      Breath sounds: Normal breath sounds. No wheezing or rales.   Abdominal:      General: Bowel sounds are normal. There is no distension.      Palpations: Abdomen is soft.      Tenderness: There is no abdominal tenderness. There is no guarding.   Musculoskeletal:      Right lower leg: No edema.      Left lower leg: No edema.   Skin:     General: Skin is warm and dry.   Neurological:      General: No focal deficit present.      Mental Status: She is alert and oriented to person, place, and time.   Psychiatric:         Mood and Affect: Mood normal.         Behavior: Behavior normal.             Significant Labs: All pertinent labs within the past 24 hours have been reviewed.  CBC:   Recent Labs   Lab 01/19/25 1927   WBC 6.29   HGB 10.3*   HCT 32.6*        CMP:   Recent Labs   Lab 01/19/25 1927 01/20/25  0348    141   K 5.1 4.1    107   CO2 20* 21*   GLU 98 95   BUN 43* 41*   CREATININE 2.3* 2.0*   CALCIUM 10.2 9.9   PROT 8.4  --    ALBUMIN 3.0*  --    BILITOT 0.6  --    ALKPHOS 91  --    AST 22  --    ALT 25  --    ANIONGAP 15 13     Cardiac Markers:   Recent Labs   Lab 01/19/25 1927   *       Significant Imaging: I have reviewed all pertinent imaging results/findings within the past 24 hours.    Assessment and Plan     * Anginal equivalent  Persistent   Seen by cardiology - her intermittent dyspnea symptoms are considered an anginal equivalent.  Recommendation to titrate Imdur dosage to 60mg.   Patient took AM imdur 30mg, was hypertensive on presentation - gave 30mg imdur tonight and started 60mg in AM.  - cardiology  - PT/OT       Second degree heart block by electrocardiogram (ECG)  Chronic Type 1 2nd degree AV block. Pt having 2:1 AV block atrial rates in 80s and HR in low 40s.  During cardiology evaluation pt with variable 2nd degree AV block. She is not on any AV node blocking agents. Discussed with cards/EP  - continue  "telemetry monitoring.     Per Dr. Cummings on admit: "Tonight discussed code status with patient - she has been Full Code and DNR during last 2 admits, initially indicated DNR but also states she would want resuscitative measures if she might not incur injury, reviewed probabilities with pt given advanced age and chronic medical conditiions indicated to her that under true cardio-respiratory arrest she would be left with subsequent injury and overall low likelihood of survival to discharge 5-10%.  Give this bradyarrhythmia I did discuss if patients HR was lower if she would want invasive measures such as transvenous pacing or transcutaneous pacing performed she responded in affirmative,  will place FULL CODE for now."       Counseling regarding goals of care  Code status discussion as per AV block problem.     Patient has living will and HCPOA documents uploaded, she does not have an LAPOST completed. Would recommend completion of LAPOST prior to hospital discharge.       Confusion  ED indicated pt referred for admit for confusion.  By CAM-ICU testing pt does not have delirium, some disorientation. She may have some underlying cognitive deficits based on grand-daughters description. Appears pt with altered sleep wake cycles more recently - schedule melatonin tonight to help normalize sleep-wake cycle, delirium precautions.   Repeat UA appears normal and no persistent symptoms or concerns for ongoing Acute cystitis adequately treated.   - delirium precautions      Acute cystitis without hematuria  Repeat UA appears normal and no persistent symptoms or concerns for ongoing Acute cystitis adequately treated.       CKD (chronic kidney disease), stage IV  Creatine stable for now. BMP reviewed- noted Estimated Creatinine Clearance: 15.5 mL/min (A) (based on SCr of 2.3 mg/dL (H)). according to latest data. Based on current GFR, CKD stage is stage 4 - GFR 15-29.  Monitor UOP and serial BMP and adjust therapy as needed. " Renally dose meds. Avoid nephrotoxic medications and procedures.    Continue sodium bicarbonate and farxiga    Essential hypertension  Patient's blood pressure range in the last 24 hours was: BP  Min: 111/63  Max: 165/74.The patient's inpatient anti-hypertensive regimen is listed below:  Current Antihypertensives  nitroGLYCERIN SL tablet 0.4 mg, Every 5 min PRN, Sublingual  amLODIPine tablet 10 mg, Daily, Oral  isosorbide mononitrate 24 hr tablet 60 mg, Daily, Oral  losartan tablet 25 mg, Daily, Oral    Plan  - BP is uncontrolled, will adjust as follows: titrate IMDUR dosage to 60mg.       PAF (paroxysmal atrial fibrillation)  Patient has paroxysmal (<7 days) atrial fibrillation. Patient is currently in sinus rhythm. [id number] Score: 4. The patients heart rate in the last 24 hours is as follows:  Pulse  Min: 42  Max: 93     Antiarrhythmics       Anticoagulants  heparin (porcine) injection 5,000 Units, Every 8 hours, Subcutaneous    Plan  - Replete lytes with a goal of K>4, Mg >2  - Patient is not anticoagulated due to unclear etiology  - Patient's afib is currently controlled          VTE Risk Mitigation (From admission, onward)           Ordered     heparin (porcine) injection 5,000 Units  Every 8 hours         01/20/25 0003     IP VTE HIGH RISK PATIENT  Once         01/20/25 0003     Place sequential compression device  Until discontinued         01/20/25 0003                    Discharge Planning   ROSEMARIE: 1/21/2025     Code Status: Full Code   Medical Readiness for Discharge Date:                            Marely Perez MD  Department of Hospital Medicine   [facility] - Observation 11H

## 2025-01-20 NOTE — ASSESSMENT & PLAN NOTE
89yoF with prior stenting of the LAD, left circumflex (patent on C in 5/2024), known  RCA, and known 50% ostial stenosis of the left circumflex in 5/2024, as well as HTN, HLD, CKD4, bradycardia with known Mobitz I AVB (seen as far back as last spring on EKG) here with recurrence of her anginal equivalent (SOB which is responsive to NTG). She has these episodes 2-3 times per year which she takes NTG for and it typically resolves, and it completely resolved this time with NTG as well. She had recent admission with hsTN 53 -> 38, PET stress with reversible perfusion defect in the left circ territory, and she was treated medically with the addition of Imdur.    On this admission she is still not optimally controlled from an anti-HTN standpoint. Her minimally elevated troponin is unlikely an acute coronary syndrome or related to her bradycardia given the stability of her anginal symptoms.  She is also bradycardic in the 40s-50s with sinus rhythm and intermittent Mobitz I AVB vs 2:1 AVB. She is asymptomatic of this rhythm, has a narrow QRS without evidence of significant bundle branch disease, and has known hx of Mobitz I AV block. Given her recent DNR status and medical management of reversible ischemia by the interventional team, deferred atropine challenge at this time. Discussed with CCU staff and eunice for floor admission to hospital medicine with cardiology consult service to follow.     She was recently functional, completing her ADLs and likely will go to rehab from here in attempt to regain some function.    Recommendations:  - Medical management  - Increase Imdur to 60mg qd  - Continue DAPT  - No ACS protocol  - Monitor on telemetry and can consider EP consult in the morning if widening QRS, evidence of Mobitz II or other unstable rhythms.

## 2025-01-20 NOTE — PLAN OF CARE
Problem: Pain Acute  Goal: Optimal Pain Control and Function  Outcome: Progressing     Problem: Fall Injury Risk  Goal: Absence of Fall and Fall-Related Injury  Outcome: Progressing     Problem: Adult Inpatient Plan of Care  Goal: Plan of Care Review  Outcome: Progressing  Goal: Patient-Specific Goal (Individualized)  Outcome: Progressing  Goal: Absence of Hospital-Acquired Illness or Injury  Outcome: Progressing  Goal: Optimal Comfort and Wellbeing  Outcome: Progressing  Goal: Readiness for Transition of Care  Outcome: Progressing

## 2025-01-20 NOTE — SUBJECTIVE & OBJECTIVE
Interval History: NAEO. Patient continuing to experience TAYLOR. Imdur dosage increased to 60 mg today. Can consider ranolazine. Will continue to monitor. Does complain of lower abdominal discomfort.    Review of Systems   Constitutional: Negative for diaphoresis and fever.   Cardiovascular:  Positive for dyspnea on exertion. Negative for chest pain, leg swelling, near-syncope, orthopnea, palpitations, paroxysmal nocturnal dyspnea and syncope.   Respiratory:  Positive for shortness of breath. Negative for cough.    Gastrointestinal:  Positive for abdominal pain. Negative for diarrhea, nausea and vomiting.   Neurological:  Negative for light-headedness.   Psychiatric/Behavioral:  Negative for altered mental status and substance abuse.      Objective:     Vital Signs (Most Recent):  Temp: 97.7 °F (36.5 °C) (01/20/25 1115)  Pulse: 75 (01/20/25 1115)  Resp: 18 (01/20/25 1115)  BP: 129/60 (01/20/25 1115)  SpO2: (!) 94 % (01/20/25 1115) Vital Signs (24h Range):  Temp:  [97.7 °F (36.5 °C)-98.2 °F (36.8 °C)] 97.7 °F (36.5 °C)  Pulse:  [42-93] 75  Resp:  [16-27] 18  SpO2:  [94 %-99 %] 94 %  BP: (111-165)/(56-88) 129/60     Weight: 72 kg (158 lb 11.7 oz)  Body mass index is 24.86 kg/m².     SpO2: (!) 94 %       No intake or output data in the 24 hours ending 01/20/25 1359    Lines/Drains/Airways       Drain  Duration             Female External Urinary Catheter w/ Suction 01/19/25 2338 <1 day              Peripheral Intravenous Line  Duration                  Peripheral IV - Single Lumen 01/19/25 1926 20 G Left Antecubital <1 day                       Physical Exam  Constitutional:       Appearance: Normal appearance.   Cardiovascular:      Rate and Rhythm: Normal rate and regular rhythm.      Pulses: Normal pulses.      Heart sounds: Normal heart sounds.   Pulmonary:      Effort: Pulmonary effort is normal.      Breath sounds: Normal breath sounds. No wheezing or rales.   Abdominal:      General: Abdomen is flat. There is no  distension.      Palpations: Abdomen is soft.      Tenderness: There is abdominal tenderness (LLQ and RLQ).   Musculoskeletal:      Right lower leg: No edema.      Left lower leg: No edema.   Skin:     General: Skin is warm and dry.   Neurological:      Mental Status: She is alert.      Comments: Mildly confused, but able to converse            Significant Labs: All pertinent lab results from the last 24 hours have been reviewed.    Significant Imaging:  Reviewed

## 2025-01-20 NOTE — ASSESSMENT & PLAN NOTE
Code status discussion as per AV block problem.     Patient has living will and HCPOA documents uploaded, she does not have an LAPOST completed. Would recommend completion of LAPOST prior to hospital discharge.

## 2025-01-20 NOTE — ASSESSMENT & PLAN NOTE
Patient has paroxysmal (<7 days) atrial fibrillation. Patient is currently in sinus rhythm. [id number] Score: 4. The patients heart rate in the last 24 hours is as follows:  Pulse  Min: 42  Max: 93     Antiarrhythmics       Anticoagulants  heparin (porcine) injection 5,000 Units, Every 8 hours, Subcutaneous    Plan  - Replete lytes with a goal of K>4, Mg >2  - Patient is not anticoagulated due to unclear etiology  - Patient's afib is currently controlled

## 2025-01-20 NOTE — SUBJECTIVE & OBJECTIVE
Past Medical History:   Diagnosis Date    Acute blood loss anemia 10/26/2021    Anticoagulant long-term use     Breast deformity 09/07/2022    Cervical cancer 1968    breast cancer right    Coronary artery disease     Gout     High cholesterol     History of cervical dysplasia 10/28/2014    Hypertension     MI (myocardial infarction) 1999    Right axillary hidradenitis 07/11/2022       Past Surgical History:   Procedure Laterality Date    BREAST LUMPECTOMY      right    CARDIAC SURGERY      multiple cardiac stents    CORONARY ANGIOGRAPHY Right 1/21/2022    Procedure: ANGIOGRAM, CORONARY ARTERY;  Surgeon: Asim Canela MD;  Location: [facility] CATH LAB;  Service: Cardiology;  Laterality: Right;    CORONARY ANGIOGRAPHY Right 5/10/2024    Procedure: ANGIOGRAM, CORONARY ARTERY POSSIBLE LV COR;  Surgeon: Asim Canela MD;  Location: [facility] CATH LAB;  Service: Cardiology;  Laterality: Right;    CORONARY STENT PLACEMENT      HIP REPLACEMENT ARTHROPLASTY Right 2018    JOINT REPLACEMENT      right       Review of patient's allergies indicates:   Allergen Reactions    Clindamycin Anaphylaxis    Penicillins Rash       No current facility-administered medications on file prior to encounter.     Current Outpatient Medications on File Prior to Encounter   Medication Sig    albuterol (PROVENTIL/VENTOLIN HFA) 90 mcg/actuation inhaler Inhale 1 puff into the lungs every 4 (four) hours as needed for Wheezing.    allopurinoL (ZYLOPRIM) 100 MG tablet Take 0.5 tablets by mouth once daily.    amLODIPine (NORVASC) 10 MG tablet Take 1 tablet (10 mg total) by mouth once daily.    aspirin (ECOTRIN) 81 MG EC tablet Take 81 mg by mouth once daily.    atorvastatin (LIPITOR) 40 MG tablet Take 40 mg by mouth once daily.    cholecalciferol, vitamin D3, (VITAMIN D3) 50 mcg (2,000 unit) Cap Take 1 capsule by mouth once daily.    clopidogreL (PLAVIX) 75 mg tablet Take 75 mg by mouth once daily.    FARXIGA 10 mg tablet Take 1 tablet by mouth once  daily.    folic acid (FOLVITE) 1 MG tablet Take 1 mg by mouth once daily.    isosorbide mononitrate (IMDUR) 30 MG 24 hr tablet Take 1 tablet (30 mg total) by mouth once daily.    KERENDIA 10 mg Tab Take 1 tablet by mouth Daily.    losartan (COZAAR) 25 MG tablet Take 1 tablet (25 mg total) by mouth once daily.    nitroGLYCERIN (NITROSTAT) 0.4 MG SL tablet Place 0.4 mg under the tongue every 5 (five) minutes as needed for Chest pain.    pantoprazole (PROTONIX) 20 MG tablet Take 20 mg by mouth every morning.    sodium bicarbonate 650 MG tablet Take 650 mg by mouth 2 (two) times daily.     Family History       Problem Relation (Age of Onset)    Cancer Mother, Father          Tobacco Use    Smoking status: Former    Smokeless tobacco: Never   Substance and Sexual Activity    Alcohol use: Yes     Comment: rare    Drug use: No    Sexual activity: Not Currently     Review of Systems   Constitutional: Negative for diaphoresis and fever.   Cardiovascular:  Negative for chest pain, dyspnea on exertion, leg swelling, near-syncope, orthopnea, palpitations, paroxysmal nocturnal dyspnea and syncope.   Respiratory:  Negative for cough and shortness of breath.    Gastrointestinal:  Negative for abdominal pain, diarrhea, nausea and vomiting.   Neurological:  Negative for light-headedness.   Psychiatric/Behavioral:  Negative for altered mental status and substance abuse.      Objective:     Vital Signs (Most Recent):  Temp: 98.1 °F (36.7 °C) (01/19/25 1807)  Pulse: 63 (01/19/25 2051)  Resp: (!) 27 (01/19/25 2051)  BP: (!) 165/74 (01/19/25 2051)  SpO2: 97 % (01/19/25 2051) Vital Signs (24h Range):  Temp:  [98.1 °F (36.7 °C)] 98.1 °F (36.7 °C)  Pulse:  [42-93] 63  Resp:  [16-27] 27  SpO2:  [96 %-99 %] 97 %  BP: (148-165)/(74-88) 165/74     Weight: 71.2 kg (157 lb)  Body mass index is 24.59 kg/m².    SpO2: 97 %       No intake or output data in the 24 hours ending 01/19/25 2150    Lines/Drains/Airways       Peripheral Intravenous Line   Duration                  Peripheral IV - Single Lumen 01/19/25 1926 20 G Left Antecubital <1 day                     Physical Exam  Constitutional:       Appearance: Normal appearance.   Cardiovascular:      Rate and Rhythm: Normal rate and regular rhythm.      Pulses: Normal pulses.      Heart sounds: Normal heart sounds.   Pulmonary:      Effort: Pulmonary effort is normal.      Breath sounds: Normal breath sounds. No wheezing or rales.   Abdominal:      General: Abdomen is flat. There is no distension.      Palpations: Abdomen is soft.      Tenderness: There is no abdominal tenderness.   Musculoskeletal:      Right lower leg: No edema.      Left lower leg: No edema.   Skin:     General: Skin is warm and dry.   Neurological:      Mental Status: She is alert.      Comments: Mildly confused, but able to converse          Significant Labs: All pertinent lab results from the last 24 hours have been reviewed.    Significant Imaging:  Reviewed

## 2025-01-20 NOTE — ED NOTES
Telemetry Verification   Patient placed on Telemetry Box  Verified with War Room  Box # [id number]   Monitor Tech    Rate 52   Rhythm Sinus Bradycardia

## 2025-01-20 NOTE — ASSESSMENT & PLAN NOTE
"Chronic Type 1 2nd degree AV block. Pt having 2:1 AV block atrial rates in 80s and HR in low 40s.  During cardiology evaluation pt with variable 2nd degree AV block. She is not on any AV node blocking agents. Discussed with cards/EP  - continue telemetry monitoring.     Per Dr. Cummings on admit: "Tonight discussed code status with patient - she has been Full Code and DNR during last 2 admits, initially indicated DNR but also states she would want resuscitative measures if she might not incur injury, reviewed probabilities with pt given advanced age and chronic medical conditiions indicated to her that under true cardio-respiratory arrest she would be left with subsequent injury and overall low likelihood of survival to discharge 5-10%.  Give this bradyarrhythmia I did discuss if patients HR was lower if she would want invasive measures such as transvenous pacing or transcutaneous pacing performed she responded in affirmative,  will place FULL CODE for now."     "

## 2025-01-20 NOTE — ASSESSMENT & PLAN NOTE
Persistent   Seen by cardiology - her intermittent dyspnea symptoms are considered an anginal equivalent.  Recommendation to titrate Imdur dosage to 60mg.   Patient took AM imdur 30mg, was hypertensive on presentation - gave 30mg imdur tonight and started 60mg in AM.  - cardiology  - PT/OT

## 2025-01-20 NOTE — SUBJECTIVE & OBJECTIVE
Past Medical History:   Diagnosis Date    Acute blood loss anemia 10/26/2021    Anticoagulant long-term use     Breast deformity 09/07/2022    Cervical cancer 1968    breast cancer right    Coronary artery disease     Gout     High cholesterol     History of cervical dysplasia 10/28/2014    Hypertension     MI (myocardial infarction) 1999    Right axillary hidradenitis 07/11/2022       Past Surgical History:   Procedure Laterality Date    BREAST LUMPECTOMY      right    CARDIAC SURGERY      multiple cardiac stents    CORONARY ANGIOGRAPHY Right 1/21/2022    Procedure: ANGIOGRAM, CORONARY ARTERY;  Surgeon: Asim Canela MD;  Location: [facility], [place], operated by [facility] CATH LAB;  Service: Cardiology;  Laterality: Right;    CORONARY ANGIOGRAPHY Right 5/10/2024    Procedure: ANGIOGRAM, CORONARY ARTERY POSSIBLE LV COR;  Surgeon: Asim Canela MD;  Location: [facility], [place], operated by [facility] CATH LAB;  Service: Cardiology;  Laterality: Right;    CORONARY STENT PLACEMENT      HIP REPLACEMENT ARTHROPLASTY Right 2018    JOINT REPLACEMENT      right       Review of patient's allergies indicates:   Allergen Reactions    Clindamycin Anaphylaxis    Penicillins Rash       No current facility-administered medications on file prior to encounter.     Current Outpatient Medications on File Prior to Encounter   Medication Sig    allopurinoL (ZYLOPRIM) 100 MG tablet Take 0.5 tablets by mouth once daily.    amLODIPine (NORVASC) 10 MG tablet Take 1 tablet (10 mg total) by mouth once daily.    aspirin (ECOTRIN) 81 MG EC tablet Take 81 mg by mouth once daily.    atorvastatin (LIPITOR) 40 MG tablet Take 40 mg by mouth once daily.    cholecalciferol, vitamin D3, (VITAMIN D3) 50 mcg (2,000 unit) Cap Take 1 capsule by mouth once daily.    clopidogreL (PLAVIX) 75 mg tablet Take 75 mg by mouth once daily.    FARXIGA 10 mg tablet Take 1 tablet by mouth once daily.    folic acid (FOLVITE) 1 MG tablet Take 1 mg by mouth once daily.    isosorbide mononitrate (IMDUR) 30 MG 24 hr tablet Take 1  tablet (30 mg total) by mouth once daily.    KERENDIA 10 mg Tab Take 1 tablet by mouth Daily.    losartan (COZAAR) 25 MG tablet Take 1 tablet (25 mg total) by mouth once daily.    nitroGLYCERIN (NITROSTAT) 0.4 MG SL tablet Place 0.4 mg under the tongue every 5 (five) minutes as needed for Chest pain.    pantoprazole (PROTONIX) 20 MG tablet Take 20 mg by mouth every morning.    sodium bicarbonate 650 MG tablet Take 650 mg by mouth 2 (two) times daily.    albuterol (PROVENTIL/VENTOLIN HFA) 90 mcg/actuation inhaler Inhale 1 puff into the lungs every 4 (four) hours as needed for Wheezing.     Family History       Problem Relation (Age of Onset)    Cancer Mother, Father          Tobacco Use    Smoking status: Former    Smokeless tobacco: Never   Substance and Sexual Activity    Alcohol use: Yes     Comment: rare    Drug use: No    Sexual activity: Not Currently     Review of Systems   Constitutional:  Negative for chills and fever.   HENT: Negative.     Cardiovascular:  Negative for leg swelling.   Gastrointestinal: Negative.    Genitourinary:  Negative for difficulty urinating and dysuria.   Neurological:  Negative for dizziness, syncope and light-headedness.   Psychiatric/Behavioral:  Negative for agitation and hallucinations.      Objective:     Vital Signs (Most Recent):  Temp: 98.2 °F (36.8 °C) (01/20/25 0159)  Pulse: (!) 50 (01/20/25 0159)  Resp: 18 (01/20/25 0159)  BP: (!) 130/56 (01/20/25 0159)  SpO2: 97 % (01/20/25 0159) Vital Signs (24h Range):  Temp:  [98.1 °F (36.7 °C)-98.2 °F (36.8 °C)] 98.2 °F (36.8 °C)  Pulse:  [42-93] 50  Resp:  [16-27] 18  SpO2:  [96 %-99 %] 97 %  BP: (130-165)/(56-88) 130/56     Weight: 71.2 kg (157 lb)  Body mass index is 24.59 kg/m².     Physical Exam  Vitals and nursing note reviewed.   Constitutional:       General: She is not in acute distress.  HENT:      Head: Normocephalic and atraumatic.   Eyes:      General: No scleral icterus.     Pupils: Pupils are equal, round, and reactive  "to light.   Cardiovascular:      Rate and Rhythm: Bradycardia present. Rhythm irregular.      Heart sounds: Murmur heard.   Pulmonary:      Effort: Pulmonary effort is normal. No respiratory distress.      Breath sounds: Normal breath sounds. No wheezing or rales.   Abdominal:      General: Bowel sounds are normal. There is no distension.      Palpations: Abdomen is soft.      Tenderness: There is no abdominal tenderness. There is no guarding.   Musculoskeletal:      Right lower leg: No edema.      Left lower leg: No edema.   Skin:     General: Skin is warm and dry.   Neurological:      Mental Status: She is alert.   Psychiatric:      Comments: On [facility] testing pt with disorientation to day of week, oriented to month/year/location/city/state. Names Trnancy as president,  There is no disorganized thinking or error on attention testing.               CRANIAL NERVES     CN III, IV, VI   Pupils are equal, round, and reactive to light.       Significant Labs: All pertinent labs within the past 24 hours have been reviewed.  CBC:   Recent Labs   Lab 01/19/25 1927   WBC 6.29   HGB 10.3*   HCT 32.6*        CMP:   Recent Labs   Lab 01/19/25 1927      K 5.1      CO2 20*   GLU 98   BUN 43*   CREATININE 2.3*   CALCIUM 10.2   PROT 8.4   ALBUMIN 3.0*   BILITOT 0.6   ALKPHOS 91   AST 22   ALT 25   ANIONGAP 15     Cardiac Markers:   Recent Labs   Lab 01/19/25 1927   *     Coagulation: No results for input(s): "PT", "INR", "APTT" in the last 48 hours.  Lactic Acid: No results for input(s): "LACTATE" in the last 48 hours.  Magnesium: No results for input(s): "MG" in the last 48 hours.  Troponin:   Recent Labs   Lab 01/19/25 1927   TROPONINIHS 90*     Urine Studies:   Recent Labs   Lab 01/19/25  2303   COLORU Yellow   APPEARANCEUA Clear   PHUR 7.0   SPECGRAV 1.015   PROTEINUA 2+*   GLUCUA 3+*   KETONESU Negative   BILIRUBINUA Negative   OCCULTUA Negative   NITRITE Negative   LEUKOCYTESUR Negative   RBCUA 1 "   WBCUA 1   BACTERIA None   SQUAMEPITHEL 4   HYALINECASTS 0       Significant Imaging: I have reviewed all pertinent imaging results/findings within the past 24 hours.    XR CHEST AP PORTABLE     CLINICAL HISTORY:  chest pain;     TECHNIQUE:  Single frontal view of the chest was performed.     COMPARISON:  01/13/2025     FINDINGS:  The cardiomediastinal silhouette is not enlarged noting calcification of the aorta..  There is no pleural effusion.  The trachea is midline.  The lungs are symmetrically expanded bilaterally with mildly coarse interstitial attenuation, accentuated by habitus..  No large focal consolidation seen.  There is no pneumothorax.  The osseous structures are remarkable for degenerative change..     Impression:     1. No acute cardiopulmonary process.        Electronically signed by: Tee Calle MD  Date:                                            01/19/2025  Time:                                           20:40    CT HEAD WITHOUT CONTRAST     CLINICAL HISTORY:  Mental status change, unknown cause;     TECHNIQUE:  Low dose axial CT images obtained throughout the head without the use of intravenous contrast.  Axial, sagittal and coronal reconstructions were performed.     COMPARISON:  CTA head and neck 02/05/2020, CT head 01/14/2019     FINDINGS:  Mild generalized cerebral volume loss.  Redemonstrated remote appearing infarct in the left posterior subinsular cortex, right basal ganglia and bilateral thalami.  Confluent and patchy hypoattenuation in the supratentorial white matter, nonspecific but may reflect advanced chronic microvascular ischemic changes. No intraparenchymal hemorrhage or mass effect.  No evidence of acute major vascular distribution infarct.     Ventricles are stable size and configuration.  No hydrocephalus or midline shift.     No extra-axial blood or fluid collection.     No displaced calvarial fracture.  Hyperostosis frontalis interna.     Mastoid air cells and paranasal  sinuses are essentially clear.     Bilateral pseudophakia.     Vascular calcifications at the skull base.     Impression:     No acute intracranial hemorrhage or evidence of acute infarction.     Mild generalized cerebral volume loss.  Remote infarcts and advanced chronic microvascular ischemic changes.     Electronically signed by resident: Marisol Casey  Date:                                            01/19/2025  Time:                                           23:21     Electronically signed by: Wei Weinstein MD  Date:                                            01/19/2025

## 2025-01-20 NOTE — ED TRIAGE NOTES
PT arrives to ED with complaints of shortness of breath.  She states she has been feeling short of breath since December 26th and today she had some chest pain. She took one nitroglycerin and has been relieved of the pain since. Family states that she has been slightly more confused than normal and is in and out with her confusion. PT has a hx of 4 stent and kidney disease. She  was going to have another stent placed but has since opted to be treated medically. Pt denying shortness of breath and chest pain at this time.

## 2025-01-20 NOTE — ASSESSMENT & PLAN NOTE
Patient's blood pressure range in the last 24 hours was: BP  Min: 111/63  Max: 165/74.The patient's inpatient anti-hypertensive regimen is listed below:  Current Antihypertensives  nitroGLYCERIN SL tablet 0.4 mg, Every 5 min PRN, Sublingual  amLODIPine tablet 10 mg, Daily, Oral  isosorbide mononitrate 24 hr tablet 60 mg, Daily, Oral  losartan tablet 25 mg, Daily, Oral    Plan  - BP is uncontrolled, will adjust as follows: titrate IMDUR dosage to 60mg.

## 2025-01-20 NOTE — SUBJECTIVE & OBJECTIVE
Interval History: Bradycardic to the 30s overnight per night report. HR otherwise 40-50s. This morning patient reports severe SOB just with eating breakfast. Awaiting further cardiology recommendations.     Review of Systems  Objective:     Vital Signs (Most Recent):  Temp: 98.1 °F (36.7 °C) (01/20/25 0800)  Pulse: 64 (01/20/25 1045)  Resp: 16 (01/20/25 0800)  BP: 111/63 (01/20/25 0800)  SpO2: 97 % (01/20/25 0800) Vital Signs (24h Range):  Temp:  [98.1 °F (36.7 °C)-98.2 °F (36.8 °C)] 98.1 °F (36.7 °C)  Pulse:  [42-93] 64  Resp:  [16-27] 16  SpO2:  [96 %-99 %] 97 %  BP: (111-165)/(56-88) 111/63     Weight: 72 kg (158 lb 11.7 oz)  Body mass index is 24.86 kg/m².  No intake or output data in the 24 hours ending 01/20/25 1046      Physical Exam  Vitals and nursing note reviewed.   Constitutional:       General: She is not in acute distress.  HENT:      Head: Normocephalic and atraumatic.   Eyes:      General: No scleral icterus.     Pupils: Pupils are equal, round, and reactive to light.   Cardiovascular:      Rate and Rhythm: Bradycardia present. Rhythm irregular.      Heart sounds: Murmur heard.   Pulmonary:      Effort: Pulmonary effort is normal. No respiratory distress.      Breath sounds: Normal breath sounds. No wheezing or rales.   Abdominal:      General: Bowel sounds are normal. There is no distension.      Palpations: Abdomen is soft.      Tenderness: There is no abdominal tenderness. There is no guarding.   Musculoskeletal:      Right lower leg: No edema.      Left lower leg: No edema.   Skin:     General: Skin is warm and dry.   Neurological:      General: No focal deficit present.      Mental Status: She is alert and oriented to person, place, and time.   Psychiatric:         Mood and Affect: Mood normal.         Behavior: Behavior normal.             Significant Labs: All pertinent labs within the past 24 hours have been reviewed.  CBC:   Recent Labs   Lab 01/19/25 1927   WBC 6.29   HGB 10.3*   HCT 32.6*         CMP:   Recent Labs   Lab 01/19/25 1927 01/20/25  0348    141   K 5.1 4.1    107   CO2 20* 21*   GLU 98 95   BUN 43* 41*   CREATININE 2.3* 2.0*   CALCIUM 10.2 9.9   PROT 8.4  --    ALBUMIN 3.0*  --    BILITOT 0.6  --    ALKPHOS 91  --    AST 22  --    ALT 25  --    ANIONGAP 15 13     Cardiac Markers:   Recent Labs   Lab 01/19/25 1927   *       Significant Imaging: I have reviewed all pertinent imaging results/findings within the past 24 hours.

## 2025-01-20 NOTE — ASSESSMENT & PLAN NOTE
-seen by cardiology - her intermittent dyspnea symptoms are considered an anginal equivalent.  Recommendation to titrate Imdur dosage to 60mg.   Patient took AM imdur 30mg, is hypertensive tonight - give 30mg imdur tonight and start 60mg in AM   -f/u if her intermittent dyspnea improves    -reconsult PT/OT

## 2025-01-20 NOTE — ASSESSMENT & PLAN NOTE
ED indicated pt referred for admit for confusion.  By CAM-ICU testing pt does not have delirium, some disorientation. She may have some underlying cognitive deficits based on grand-daughters description. Appears pt with altered sleep wake cycles more recently - schedule melatonin tonight to help normalize sleep-wake cycle, delirium precautions.   Repeat UA appears normal and no persistent symptoms or concerns for ongoing Acute cystitis adequately treated.   - delirium precautions

## 2025-01-20 NOTE — ASSESSMENT & PLAN NOTE
Patient's blood pressure range in the last 24 hours was: BP  Min: 130/56  Max: 165/74.The patient's inpatient anti-hypertensive regimen is listed below:  Current Antihypertensives  nitroGLYCERIN SL tablet 0.4 mg, Every 5 min PRN, Sublingual  amLODIPine tablet 10 mg, Daily, Oral  isosorbide mononitrate 24 hr tablet 60 mg, Daily, Oral  losartan tablet 25 mg, Daily, Oral    Plan  - BP is uncontrolled, will adjust as follows: titrate IMDUR dosage to 60mg.

## 2025-01-20 NOTE — ASSESSMENT & PLAN NOTE
Creatine stable for now. BMP reviewed- noted Estimated Creatinine Clearance: 15.5 mL/min (A) (based on SCr of 2.3 mg/dL (H)). according to latest data. Based on current GFR, CKD stage is stage 4 - GFR 15-29.  Monitor UOP and serial BMP and adjust therapy as needed. Renally dose meds. Avoid nephrotoxic medications and procedures.

## 2025-01-20 NOTE — PROGRESS NOTES
[facility] 11[facility]  Cardiology  Progress Note    Patient Name: Anahy Evans  MRN: [id number]  Admission Date: 1/19/2025  Hospital Length of Stay: 0 days  Code Status: Full Code   Attending Physician: Marely Perez MD   Primary Care Physician: Elena Cabrera MD  Expected Discharge Date: 1/21/2025  Principal Problem:Anginal equivalent    Subjective:     Hospital Course:   No notes on file    Interval History: NAEO. Patient continuing to experience TAYLOR. Imdur dosage increased to 60 mg today. Can consider ranolazine. Will continue to monitor. Does complain of lower abdominal discomfort.    Review of Systems   Constitutional: Negative for diaphoresis and fever.   Cardiovascular:  Positive for dyspnea on exertion. Negative for chest pain, leg swelling, near-syncope, orthopnea, palpitations, paroxysmal nocturnal dyspnea and syncope.   Respiratory:  Positive for shortness of breath. Negative for cough.    Gastrointestinal:  Positive for abdominal pain. Negative for diarrhea, nausea and vomiting.   Neurological:  Negative for light-headedness.   Psychiatric/Behavioral:  Negative for altered mental status and substance abuse.      Objective:     Vital Signs (Most Recent):  Temp: 97.7 °F (36.5 °C) (01/20/25 1115)  Pulse: 75 (01/20/25 1115)  Resp: 18 (01/20/25 1115)  BP: 129/60 (01/20/25 1115)  SpO2: (!) 94 % (01/20/25 1115) Vital Signs (24h Range):  Temp:  [97.7 °F (36.5 °C)-98.2 °F (36.8 °C)] 97.7 °F (36.5 °C)  Pulse:  [42-93] 75  Resp:  [16-27] 18  SpO2:  [94 %-99 %] 94 %  BP: (111-165)/(56-88) 129/60     Weight: 72 kg (158 lb 11.7 oz)  Body mass index is 24.86 kg/m².     SpO2: (!) 94 %       No intake or output data in the 24 hours ending 01/20/25 1359    Lines/Drains/Airways       Drain  Duration             Female External Urinary Catheter w/ Suction 01/19/25 2338 <1 day              Peripheral Intravenous Line  Duration                  Peripheral IV - Single Lumen 01/19/25 1926 20 G Left Antecubital  "<1 day                       Physical Exam  Constitutional:       Appearance: Normal appearance.   Cardiovascular:      Rate and Rhythm: Normal rate and regular rhythm.      Pulses: Normal pulses.      Heart sounds: Normal heart sounds.   Pulmonary:      Effort: Pulmonary effort is normal.      Breath sounds: Normal breath sounds. No wheezing or rales.   Abdominal:      General: Abdomen is flat. There is no distension.      Palpations: Abdomen is soft.      Tenderness: There is abdominal tenderness (LLQ and RLQ).   Musculoskeletal:      Right lower leg: No edema.      Left lower leg: No edema.   Skin:     General: Skin is warm and dry.   Neurological:      Mental Status: She is alert.      Comments: Mildly confused, but able to converse            Significant Labs: All pertinent lab results from the last 24 hours have been reviewed.    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 She states this feels like her "angina" episodes she has maybe 2-3 times per year, although she has some confusion this evening. Her daughter is with her, who lives out of state. She was recently admitted with this same dyspnea on exertion and states it worsened at the end of December and has happened a few times since then. During her admission, her hsTn was 53 -> 38. She had a recent LHC in May 2024 which showed patent LAD and Lcx stents, as well as 50% ostial Lcx stenosis and a  RCA with L-R and R-R collaterals. During this recent admission they performed a PET stress test which showed an 11% reversible perfusion defect in the circumflex territory and a non-transmural scar in the LAD territory. She was treated for a UTI with encephalopathy and " was discharged home with medical management for her coronary disease (increased Amlodipine, started Imdur 30mg qd). Her daughter states since return home she was only close to her baseline mentation yesterday, but has been confused mostly otherwise. She is able to hold a conversation now, but is not fully oriented. She states this episode happened once and resolved with NTG, similar to her other episodes that happened over the past few weeks. It has not recurred since her NTG earlier in the evening. She feels at baseline currently while resting in the ED. Cardiology was consulted for this recurrence of chest pain and the presence of bradycardia.    Of significance on this presentation, her hsTn is 90, , Cr 2.3, and she is hypertensive with bradycardia, HR in the 40s-50s. Her rhythm on EKG and telemetry shows SR with 1:1 AV conduction when faster, periods of Mobitz I AV block, and periods of 2:1 AV block. I was at bedside while she was 2:1 AVB and she felt completely asymptomatic. Lactate normal.     * Anginal equivalent  89yoF with prior stenting of the LAD, left circumflex (patent on C in 5/2024), known  RCA, and known 50% ostial stenosis of the left circumflex in 5/2024, as well as HTN, HLD, CKD4, bradycardia with known Mobitz I AVB (seen as far back as last spring on EKG) here with recurrence of her anginal equivalent (SOB which is responsive to NTG). She has these episodes 2-3 times per year which she takes NTG for and it typically resolves, and it completely resolved this time with NTG as well. She had recent admission with hsTN 53 -> 38, PET stress with reversible perfusion defect in the left circ territory, and she was treated medically with the addition of Imdur.    On this admission she is still not optimally controlled from an anti-HTN standpoint. Her minimally elevated troponin is unlikely an acute coronary syndrome or related to her bradycardia given the stability of her anginal symptoms.   She is also bradycardic in the 40s-50s with sinus rhythm and intermittent Mobitz I AVB vs 2:1 AVB. She is asymptomatic of this rhythm, has a narrow QRS without evidence of significant bundle branch disease, and has known hx of Mobitz I AV block. Given her recent DNR status and medical management of reversible ischemia by the interventional team, deferred atropine challenge at this time. Discussed with CCU staff and okay for floor admission to hospital medicine with cardiology consult service to follow.     She was recently functional, completing her ADLs and likely will go to rehab from here in attempt to regain some function.    Patient experiencing TAYLOR since Dec 26th previously thought to be anginal equivalent and decision was made to medically manage. Presents again with TAYLOR. Saturating well on room air. Imdur dosage increased this AM.      Recommendations:  - Continue Imdur to 60mg qd  - Continue DAPT  - No ACS protocol indicated   - Monitor on telemetry while inpatient         VTE Risk Mitigation (From admission, onward)           Ordered     heparin (porcine) injection 5,000 Units  Every 8 hours         01/20/25 0003     IP VTE HIGH RISK PATIENT  Once         01/20/25 0003     Place sequential compression device  Until discontinued         01/20/25 0003                    Dot Goel, DO  Cardiology  [facility]

## 2025-01-20 NOTE — CONSULTS
"[facility] - Emergency Dept  Cardiology  Consult Note    Patient Name: Anahy Evans  MRN: [id number]  Admission Date: 1/19/2025  Hospital Length of Stay: 0 days  Code Status: Prior   Attending Provider: Marely Perez MD   Consulting Provider: Connor M Gillies, DO  Primary Care Physician: Elena Cabrera MD  Principal Problem:Anginal equivalent    Patient information was obtained from patient, past medical records, and ER records.     Inpatient consult to Cardiology  Consult performed by: Gillies, Connor M, DO  Consult ordered by: Elinor Willis PA-C        Subjective:     Chief Complaint:  Shortness of breath     HPI:   Anahy Evans is a 89yoF with a hx of CKD4 (baseline Cr around 2 to low 2's), HTN, AAA, Afib, CAD with prior PCI to LAD/Lcx, HLD, known sinus bradycardia with Mobitz I AV block, and hyperuricemia who presents to the ED this evening with complaints of intermittent dyspnea. She states this feels like her "angina" episodes she has maybe 2-3 times per year, although she has some confusion this evening. Her daughter is with her, who lives out of state. She was recently admitted with this same dyspnea on exertion and states it worsened at the end of December and has happened a few times since then. During her admission, her hsTn was 53 -> 38. She had a recent LHC in May 2024 which showed patent LAD and Lcx stents, as well as 50% ostial Lcx stenosis and a  RCA with L-R and R-R collaterals. During this recent admission they performed a PET stress test which showed an 11% reversible perfusion defect in the circumflex territory and a non-transmural scar in the LAD territory. She was treated for a UTI with encephalopathy and was discharged home with medical management for her coronary disease (increased Amlodipine, started Imdur 30mg qd). Her daughter states since return home she was only close to her baseline mentation yesterday, but has been confused mostly otherwise. She is able to hold a " conversation now, but is not fully oriented. She states this episode happened once and resolved with NTG, similar to her other episodes that happened over the past few weeks. It has not recurred since her NTG earlier in the evening. She feels at baseline currently while resting in the ED. Cardiology was consulted for this recurrence of chest pain and the presence of bradycardia.    Of significance on this presentation, her hsTn is 90, , Cr 2.3, and she is hypertensive with bradycardia, HR in the 40s-50s. Her rhythm on EKG and telemetry shows SR with 1:1 AV conduction when faster, periods of Mobitz I AV block, and periods of 2:1 AV block. I was at bedside while she was 2:1 AVB and she felt completely asymptomatic. Lactate normal.     Past Medical History:   Diagnosis Date    Acute blood loss anemia 10/26/2021    Anticoagulant long-term use     Breast deformity 09/07/2022    Cervical cancer 1968    breast cancer right    Coronary artery disease     Gout     High cholesterol     History of cervical dysplasia 10/28/2014    Hypertension     MI (myocardial infarction) 1999    Right axillary hidradenitis 07/11/2022       Past Surgical History:   Procedure Laterality Date    BREAST LUMPECTOMY      right    CARDIAC SURGERY      multiple cardiac stents    CORONARY ANGIOGRAPHY Right 1/21/2022    Procedure: ANGIOGRAM, CORONARY ARTERY;  Surgeon: Asim Canela MD;  Location: [facility] CATH LAB;  Service: Cardiology;  Laterality: Right;    CORONARY ANGIOGRAPHY Right 5/10/2024    Procedure: ANGIOGRAM, CORONARY ARTERY POSSIBLE LV COR;  Surgeon: Asim Canela MD;  Location: [facility] CATH LAB;  Service: Cardiology;  Laterality: Right;    CORONARY STENT PLACEMENT      HIP REPLACEMENT ARTHROPLASTY Right 2018    JOINT REPLACEMENT      right       Review of patient's allergies indicates:   Allergen Reactions    Clindamycin Anaphylaxis    Penicillins Rash       No current facility-administered medications on file prior to  encounter.     Current Outpatient Medications on File Prior to Encounter   Medication Sig    albuterol (PROVENTIL/VENTOLIN HFA) 90 mcg/actuation inhaler Inhale 1 puff into the lungs every 4 (four) hours as needed for Wheezing.    allopurinoL (ZYLOPRIM) 100 MG tablet Take 0.5 tablets by mouth once daily.    amLODIPine (NORVASC) 10 MG tablet Take 1 tablet (10 mg total) by mouth once daily.    aspirin (ECOTRIN) 81 MG EC tablet Take 81 mg by mouth once daily.    atorvastatin (LIPITOR) 40 MG tablet Take 40 mg by mouth once daily.    cholecalciferol, vitamin D3, (VITAMIN D3) 50 mcg (2,000 unit) Cap Take 1 capsule by mouth once daily.    clopidogreL (PLAVIX) 75 mg tablet Take 75 mg by mouth once daily.    FARXIGA 10 mg tablet Take 1 tablet by mouth once daily.    folic acid (FOLVITE) 1 MG tablet Take 1 mg by mouth once daily.    isosorbide mononitrate (IMDUR) 30 MG 24 hr tablet Take 1 tablet (30 mg total) by mouth once daily.    KERENDIA 10 mg Tab Take 1 tablet by mouth Daily.    losartan (COZAAR) 25 MG tablet Take 1 tablet (25 mg total) by mouth once daily.    nitroGLYCERIN (NITROSTAT) 0.4 MG SL tablet Place 0.4 mg under the tongue every 5 (five) minutes as needed for Chest pain.    pantoprazole (PROTONIX) 20 MG tablet Take 20 mg by mouth every morning.    sodium bicarbonate 650 MG tablet Take 650 mg by mouth 2 (two) times daily.     Family History       Problem Relation (Age of Onset)    Cancer Mother, Father          Tobacco Use    Smoking status: Former    Smokeless tobacco: Never   Substance and Sexual Activity    Alcohol use: Yes     Comment: rare    Drug use: No    Sexual activity: Not Currently     Review of Systems   Constitutional: Negative for diaphoresis and fever.   Cardiovascular:  Negative for chest pain, dyspnea on exertion, leg swelling, near-syncope, orthopnea, palpitations, paroxysmal nocturnal dyspnea and syncope.   Respiratory:  Negative for cough and shortness of breath.    Gastrointestinal:  Negative  for abdominal pain, diarrhea, nausea and vomiting.   Neurological:  Negative for light-headedness.   Psychiatric/Behavioral:  Negative for altered mental status and substance abuse.      Objective:     Vital Signs (Most Recent):  Temp: 98.1 °F (36.7 °C) (01/19/25 1807)  Pulse: 63 (01/19/25 2051)  Resp: (!) 27 (01/19/25 2051)  BP: (!) 165/74 (01/19/25 2051)  SpO2: 97 % (01/19/25 2051) Vital Signs (24h Range):  Temp:  [98.1 °F (36.7 °C)] 98.1 °F (36.7 °C)  Pulse:  [42-93] 63  Resp:  [16-27] 27  SpO2:  [96 %-99 %] 97 %  BP: (148-165)/(74-88) 165/74     Weight: 71.2 kg (157 lb)  Body mass index is 24.59 kg/m².    SpO2: 97 %       No intake or output data in the 24 hours ending 01/19/25 2150    Lines/Drains/Airways       Peripheral Intravenous Line  Duration                  Peripheral IV - Single Lumen 01/19/25 1926 20 G Left Antecubital <1 day                     Physical Exam  Constitutional:       Appearance: Normal appearance.   Cardiovascular:      Rate and Rhythm: Normal rate and regular rhythm.      Pulses: Normal pulses.      Heart sounds: Normal heart sounds.   Pulmonary:      Effort: Pulmonary effort is normal.      Breath sounds: Normal breath sounds. No wheezing or rales.   Abdominal:      General: Abdomen is flat. There is no distension.      Palpations: Abdomen is soft.      Tenderness: There is no abdominal tenderness.   Musculoskeletal:      Right lower leg: No edema.      Left lower leg: No edema.   Skin:     General: Skin is warm and dry.   Neurological:      Mental Status: She is alert.      Comments: Mildly confused, but able to converse          Significant Labs: All pertinent lab results from the last 24 hours have been reviewed.    Significant Imaging:  Reviewed  Assessment and Plan:     * Anginal equivalent  89yoF with prior stenting of the LAD, left circumflex (patent on LHC in 5/2024), known  RCA, and known 50% ostial stenosis of the left circumflex in 5/2024, as well as HTN, HLD, CKD4,  bradycardia with known Mobitz I AVB (seen as far back as last spring on EKG) here with recurrence of her anginal equivalent (SOB which is responsive to NTG). She has these episodes 2-3 times per year which she takes NTG for and it typically resolves, and it completely resolved this time with NTG as well. She had recent admission with hsTN 53 -> 38, PET stress with reversible perfusion defect in the left circ territory, and she was treated medically with the addition of Imdur.    On this admission she is still not optimally controlled from an anti-HTN standpoint. Her minimally elevated troponin is unlikely an acute coronary syndrome or related to her bradycardia given the stability of her anginal symptoms.  She is also bradycardic in the 40s-50s with sinus rhythm and intermittent Mobitz I AVB vs 2:1 AVB. She is asymptomatic of this rhythm, has a narrow QRS without evidence of significant bundle branch disease, and has known hx of Mobitz I AV block. Given her recent DNR status and medical management of reversible ischemia by the interventional team, deferred atropine challenge at this time. Discussed with CCU staff and eunice for floor admission to hospital medicine with cardiology consult service to follow.     She was recently functional, completing her ADLs and likely will go to rehab from here in attempt to regain some function.    Recommendations:  - Medical management  - Increase Imdur to 60mg qd  - Continue DAPT  - No ACS protocol  - Monitor on telemetry and can consider EP consult in the morning if widening QRS, evidence of Mobitz II or other unstable rhythms.         VTE Risk Mitigation (From admission, onward)      None            Thank you for your consult. I will follow-up with patient. Please contact us if you have any additional questions.    Connor M Gillies, DO  Cardiology   [facility] - Emergency Dept

## 2025-01-20 NOTE — ASSESSMENT & PLAN NOTE
ED indicated pt referred for admit for confusion.  By CAM-ICU testing pt does not have delirium, some disorientation. She may have some underlying cognitive deficits based on grand-daughters description.     Appears pt with altered sleep wake cycles more recently - schedule melatonin tonight to help normalize sleep-wake cycle, delirium precautions.     Repeat UA appears normal and no persistent symptoms or concerns for ongoing Acute cystitis adequately treated.

## 2025-01-20 NOTE — PLAN OF CARE
Problem: Adult Inpatient Plan of Care  Goal: Plan of Care Review  Outcome: Progressing  Goal: Patient-Specific Goal (Individualized)  Outcome: Progressing  Goal: Absence of Hospital-Acquired Illness or Injury  Outcome: Progressing  Goal: Optimal Comfort and Wellbeing  Outcome: Progressing  Goal: Readiness for Transition of Care  Outcome: Progressing     Problem: Infection  Goal: Absence of Infection Signs and Symptoms  Outcome: Progressing     Problem: Acute Kidney Injury/Impairment  Goal: Fluid and Electrolyte Balance  Outcome: Progressing  Goal: Improved Oral Intake  Outcome: Progressing  Goal: Effective Renal Function  Outcome: Progressing     Problem: Fall Injury Risk  Goal: Absence of Fall and Fall-Related Injury  Outcome: Progressing     Problem: Pain Acute  Goal: Optimal Pain Control and Function  Outcome: Progressing     Pt progressing towards goals. No distress notice. No falls or injuries during shift. Bed in lowest position, call light within reach, belonging at bedside. Safety precaution maintain.Plan of Care reviewed. Pt verbalized understanding.

## 2025-01-20 NOTE — ASSESSMENT & PLAN NOTE
89yoF with prior stenting of the LAD, left circumflex (patent on LHC in 5/2024), known  RCA, and known 50% ostial stenosis of the left circumflex in 5/2024, as well as HTN, HLD, CKD4, bradycardia with known Mobitz I AVB (seen as far back as last spring on EKG) here with recurrence of her anginal equivalent (SOB which is responsive to NTG). She has these episodes 2-3 times per year which she takes NTG for and it typically resolves, and it completely resolved this time with NTG as well. She had recent admission with hsTN 53 -> 38, PET stress with reversible perfusion defect in the left circ territory, and she was treated medically with the addition of Imdur.    On this admission she is still not optimally controlled from an anti-HTN standpoint. Her minimally elevated troponin is unlikely an acute coronary syndrome or related to her bradycardia given the stability of her anginal symptoms.  She is also bradycardic in the 40s-50s with sinus rhythm and intermittent Mobitz I AVB vs 2:1 AVB. She is asymptomatic of this rhythm, has a narrow QRS without evidence of significant bundle branch disease, and has known hx of Mobitz I AV block. Given her recent DNR status and medical management of reversible ischemia by the interventional team, deferred atropine challenge at this time. Discussed with CCU staff and eunice for floor admission to hospital medicine with cardiology consult service to follow.     She was recently functional, completing her ADLs and likely will go to rehab from here in attempt to regain some function.    Patient experiencing TAYLOR. Imdur dosage increased this AM. Will continue to monitor.     Recommendations:  - Medical management  - Continue Imdur to 60mg qd  - Can consider ranolazine for angina   - Continue DAPT  - No ACS protocol  - Monitor on telemetry and can consider EP consult in the morning if widening QRS, evidence of Mobitz II or other unstable rhythms.

## 2025-01-20 NOTE — NURSING
Pt is AAOx4.  Pt stable no complaints, EKG just was done. No distress noted.   Call light in reach. Bed in low locked position.   Side rails x2. Belongings at bedside.   Pt free of fall and injuries Questions and concerns voiced and answered.    Plan of care continues.

## 2025-01-21 LAB
ANION GAP SERPL CALC-SCNC: 8 MMOL/L (ref 8–16)
BUN SERPL-MCNC: 41 MG/DL (ref 10–30)
CALCIUM SERPL-MCNC: 9.5 MG/DL (ref 8.7–10.5)
CHLORIDE SERPL-SCNC: 104 MMOL/L (ref 95–110)
CO2 SERPL-SCNC: 24 MMOL/L (ref 23–29)
CREAT SERPL-MCNC: 2.3 MG/DL (ref 0.5–1.4)
EST. GFR  (NO RACE VARIABLE): 19.6 ML/MIN/1.73 M^2
GLUCOSE SERPL-MCNC: 91 MG/DL (ref 70–110)
MAGNESIUM SERPL-MCNC: 2.2 MG/DL (ref 1.6–2.6)
PHOSPHATE SERPL-MCNC: 4.7 MG/DL (ref 2.7–4.5)
POTASSIUM SERPL-SCNC: 3.9 MMOL/L (ref 3.5–5.1)
SODIUM SERPL-SCNC: 136 MMOL/L (ref 136–145)

## 2025-01-21 PROCEDURE — 63600175 PHARM REV CODE 636 W HCPCS: Performed by: HOSPITALIST

## 2025-01-21 PROCEDURE — 25000003 PHARM REV CODE 250: Performed by: HOSPITALIST

## 2025-01-21 PROCEDURE — 36415 COLL VENOUS BLD VENIPUNCTURE: CPT | Performed by: HOSPITALIST

## 2025-01-21 PROCEDURE — 84100 ASSAY OF PHOSPHORUS: CPT | Performed by: HOSPITALIST

## 2025-01-21 PROCEDURE — 96372 THER/PROPH/DIAG INJ SC/IM: CPT | Performed by: HOSPITALIST

## 2025-01-21 PROCEDURE — 21400001 HC TELEMETRY ROOM

## 2025-01-21 PROCEDURE — 80048 BASIC METABOLIC PNL TOTAL CA: CPT | Performed by: HOSPITALIST

## 2025-01-21 PROCEDURE — 99232 SBSQ HOSP IP/OBS MODERATE 35: CPT | Mod: GC,,, | Performed by: INTERNAL MEDICINE

## 2025-01-21 PROCEDURE — 83735 ASSAY OF MAGNESIUM: CPT | Performed by: HOSPITALIST

## 2025-01-21 RX ADMIN — SODIUM BICARBONATE 650 MG TABLET 650 MG: at 09:01

## 2025-01-21 RX ADMIN — HEPARIN SODIUM 5000 UNITS: 5000 INJECTION INTRAVENOUS; SUBCUTANEOUS at 09:01

## 2025-01-21 RX ADMIN — CLOPIDOGREL BISULFATE 75 MG: 75 TABLET ORAL at 09:01

## 2025-01-21 RX ADMIN — ATORVASTATIN CALCIUM 40 MG: 40 TABLET, FILM COATED ORAL at 09:01

## 2025-01-21 RX ADMIN — ALLOPURINOL 50 MG: 300 TABLET ORAL at 09:01

## 2025-01-21 RX ADMIN — LOSARTAN POTASSIUM 25 MG: 25 TABLET, FILM COATED ORAL at 09:01

## 2025-01-21 RX ADMIN — HEPARIN SODIUM 5000 UNITS: 5000 INJECTION INTRAVENOUS; SUBCUTANEOUS at 03:01

## 2025-01-21 RX ADMIN — DAPAGLIFLOZIN 10 MG: 10 TABLET, FILM COATED ORAL at 09:01

## 2025-01-21 RX ADMIN — ASPIRIN 81 MG: 81 TABLET, COATED ORAL at 09:01

## 2025-01-21 RX ADMIN — PANTOPRAZOLE SODIUM 20 MG: 20 TABLET, DELAYED RELEASE ORAL at 06:01

## 2025-01-21 RX ADMIN — CHOLECALCIFEROL TAB 25 MCG (1000 UNIT) 2000 UNITS: 25 TAB at 09:01

## 2025-01-21 RX ADMIN — HEPARIN SODIUM 5000 UNITS: 5000 INJECTION INTRAVENOUS; SUBCUTANEOUS at 06:01

## 2025-01-21 RX ADMIN — ISOSORBIDE MONONITRATE 60 MG: 60 TABLET, EXTENDED RELEASE ORAL at 09:01

## 2025-01-21 RX ADMIN — FOLIC ACID 1 MG: 1 TABLET ORAL at 09:01

## 2025-01-21 RX ADMIN — AMLODIPINE BESYLATE 10 MG: 10 TABLET ORAL at 09:01

## 2025-01-21 RX ADMIN — Medication 6 MG: at 09:01

## 2025-01-21 NOTE — ASSESSMENT & PLAN NOTE
Patient with known CAD , which is uncontrolled Will continue ASA, Plavix, and Statin and monitor for S/Sx of angina/ACS. Continue to monitor on telemetry.

## 2025-01-21 NOTE — PLAN OF CARE
Inpatient Upgrade Note    Anahy Evans has warranted treatment spanning two or more midnights of hospital level care for the management of   Anginal equivalent and Acute cystitis . She continues to require daily labs, further testing/imaging, monitoring of vital signs, and further evaluation by consultants. Her condition is also complicated by the following comorbidities:   89 y/o AAF w/ CAD hx PCI, CKD 4, HTN, Afib, AAA, 2nd degree AV Block presents to the ED with complaints of dyspnea.  .

## 2025-01-21 NOTE — PLAN OF CARE
[facility] - [facility]  Discharge Assessment    Primary Care Provider: Elena Cabrera MD     Discharge Assessment (most recent)       BRIEF DISCHARGE ASSESSMENT - 01/21/25 1212          Discharge Planning    Assessment Type Discharge Planning Brief Assessment     Resource/Environmental Concerns none     Support Systems Children     Equipment Currently Used at Home cane, straight;grab bar;shower chair     Current Living Arrangements home     Patient/Family Anticipates Transition to home     Patient/Family Anticipated Services at Transition none     DME Needed Upon Discharge  none     Discharge Plan A Home     Discharge Plan B Home Health                   Pt is a 89 y.o. female admitted with anginal equivalent and has a PMH of CAD w/ PCI, HTN and AFib on Plavix. She lives with her daughter in a single story house with no steps to enter . She states she is independent with her ADls. She is active with [facility]. She will have transportation home. .[facility] Discharge Packet given to patient and/or family with understanding verbalized.   name and number and estimated discharge date written on white board in patient's room with request to call for any questions or concerns.  Will continue to follow for needs.  Discharge Plan A and Plan B have been determined by review of patient's clinical status, future medical and therapeutic needs, and coverage/benefits for post-acute care in coordination with multidisciplinary team members.  Ryan Quick RN,BSN

## 2025-01-21 NOTE — ASSESSMENT & PLAN NOTE
Persistent   Seen by cardiology - her intermittent dyspnea symptoms are considered an anginal equivalent.  Recommendation to titrate Imdur dosage to 60mg.   Patient took AM imdur 30mg, was hypertensive on presentation - gave 30mg imdur 1/19 and started 60mg in AM.  - cardiology recommending continued monitoring   - PT/OT

## 2025-01-21 NOTE — ASSESSMENT & PLAN NOTE
Patient has paroxysmal (<7 days) atrial fibrillation. Patient is currently in sinus rhythm. [id number] Score: 4. The patients heart rate in the last 24 hours is as follows:  Pulse  Min: 41  Max: 75     Antiarrhythmics       Anticoagulants  heparin (porcine) injection 5,000 Units, Every 8 hours, Subcutaneous    Plan  - Replete lytes with a goal of K>4, Mg >2  - Patient is not anticoagulated due to unclear etiology  - Patient's afib is currently controlled

## 2025-01-21 NOTE — PROGRESS NOTES
[facility] - [facility]  Cardiology  Progress Note    Patient Name: Anahy Evans  MRN: [id number]  Admission Date: 1/19/2025  Hospital Length of Stay: 0 days  Code Status: Full Code   Attending Physician: Marely Perez MD   Primary Care Physician: Elena Cabrera MD  Expected Discharge Date: 1/23/2025  Principal Problem:Anginal equivalent    Subjective:       Interval History: NAEO. Denies chest pain or chest discomfort. Denies any sob. Currently on 0.5L NC?  Has not been walking around in the room.    Review of Systems   Constitutional: Negative for diaphoresis and fever.   Cardiovascular:  Positive for dyspnea on exertion. Negative for chest pain, leg swelling, near-syncope, orthopnea, palpitations, paroxysmal nocturnal dyspnea and syncope.   Respiratory:  Negative for cough and shortness of breath.    Gastrointestinal:  Positive for abdominal pain. Negative for diarrhea, nausea and vomiting.   Neurological:  Negative for light-headedness.   Psychiatric/Behavioral:  Negative for altered mental status and substance abuse.      Objective:     Vital Signs (Most Recent):  Temp: 98.4 °F (36.9 °C) (01/21/25 1135)  Pulse: (!) 55 (01/21/25 1135)  Resp: 16 (01/21/25 1135)  BP: (!) 144/72 (01/21/25 1135)  SpO2: (!) 94 % (01/21/25 1250) Vital Signs (24h Range):  Temp:  [97.6 °F (36.4 °C)-98.4 °F (36.9 °C)] 98.4 °F (36.9 °C)  Pulse:  [41-60] 55  Resp:  [16-20] 16  SpO2:  [93 %-95 %] 94 %  BP: (120-150)/(61-72) 144/72     Weight: 72 kg (158 lb 11.7 oz)  Body mass index is 24.86 kg/m².     SpO2: (!) 94 %       No intake or output data in the 24 hours ending 01/21/25 1430    Lines/Drains/Airways       Drain  Duration             Female External Urinary Catheter w/ Suction 01/19/25 2338 1 day              Peripheral Intravenous Line  Duration                  Peripheral IV - Single Lumen 01/19/25 1926 20 G Left Antecubital 1 day                       Physical Exam  Constitutional:       Appearance: Normal appearance.    Cardiovascular:      Rate and Rhythm: Normal rate and regular rhythm.      Pulses: Normal pulses.      Heart sounds: Normal heart sounds.   Pulmonary:      Effort: Pulmonary effort is normal.      Breath sounds: Normal breath sounds. No wheezing or rales.   Abdominal:      General: Abdomen is flat. There is no distension.      Palpations: Abdomen is soft.      Tenderness: There is abdominal tenderness (LLQ and RLQ).   Musculoskeletal:      Right lower leg: No edema.      Left lower leg: No edema.   Skin:     General: Skin is warm and dry.   Neurological:      Mental Status: She is alert.            Significant Labs: All pertinent lab results from the last 24 hours have been reviewed.    Significant Imaging:  Reviewed   Assessment and Plan:         * Anginal equivalent  89yoF with prior stenting of the LAD, left circumflex (patent on C in 5/2024), known  RCA, and known 50% ostial stenosis of the left circumflex in 5/2024, as well as HTN, HLD, CKD4, bradycardia with known Mobitz I AVB (seen as far back as last spring on EKG) here with recurrence of her anginal equivalent (SOB which is responsive to NTG). She has these episodes 2-3 times per year which she takes NTG for and it typically resolves, and it completely resolved this time with NTG as well. She had recent admission with hsTN 53 -> 38, PET stress with reversible perfusion defect in the left circ territory, and she was treated medically with the addition of Imdur.    On this admission she is still not optimally controlled from an anti-HTN standpoint. Her minimally elevated troponin is unlikely an acute coronary syndrome or related to her bradycardia given the stability of her anginal symptoms.  She is also bradycardic in the 40s-50s with sinus rhythm and intermittent Mobitz I AVB vs 2:1 AVB. She is asymptomatic of this rhythm, has a narrow QRS without evidence of significant bundle branch disease, and has known hx of Mobitz I AV block. Given her recent  DNR status and medical management of reversible ischemia by the interventional team, deferred atropine challenge at this time. Discussed with CCU staff and okay for floor admission to hospital medicine with cardiology consult service to follow.     She was recently functional, completing her ADLs and likely will go to rehab from here in attempt to regain some function.    Patient experiencing TAYLOR. Imdur dosage increased this AM. Will continue to monitor.     Recommendations:  - Medical management  - Continue Imdur at 60mg qd  - Can consider ranolazine for angina however she is currently chest pain free  - Continue DAPT  - outpatient general cardiology and EP follow up    Cardiology will sign off        VTE Risk Mitigation (From admission, onward)           Ordered     heparin (porcine) injection 5,000 Units  Every 8 hours         01/20/25 0003     IP VTE HIGH RISK PATIENT  Once         01/20/25 0003     Place sequential compression device  Until discontinued         01/20/25 0003                    J Luis Gómez MD  Cardiology  [facility] - [facility]

## 2025-01-21 NOTE — SUBJECTIVE & OBJECTIVE
Interval History: NAEO. Denies chest pain or chest discomfort. Denies any sob. Currently on 0.5L NC?  Has not been walking around in the room.    Review of Systems   Constitutional: Negative for diaphoresis and fever.   Cardiovascular:  Positive for dyspnea on exertion. Negative for chest pain, leg swelling, near-syncope, orthopnea, palpitations, paroxysmal nocturnal dyspnea and syncope.   Respiratory:  Negative for cough and shortness of breath.    Gastrointestinal:  Positive for abdominal pain. Negative for diarrhea, nausea and vomiting.   Neurological:  Negative for light-headedness.   Psychiatric/Behavioral:  Negative for altered mental status and substance abuse.      Objective:     Vital Signs (Most Recent):  Temp: 98.4 °F (36.9 °C) (01/21/25 1135)  Pulse: (!) 55 (01/21/25 1135)  Resp: 16 (01/21/25 1135)  BP: (!) 144/72 (01/21/25 1135)  SpO2: (!) 94 % (01/21/25 1250) Vital Signs (24h Range):  Temp:  [97.6 °F (36.4 °C)-98.4 °F (36.9 °C)] 98.4 °F (36.9 °C)  Pulse:  [41-60] 55  Resp:  [16-20] 16  SpO2:  [93 %-95 %] 94 %  BP: (120-150)/(61-72) 144/72     Weight: 72 kg (158 lb 11.7 oz)  Body mass index is 24.86 kg/m².     SpO2: (!) 94 %       No intake or output data in the 24 hours ending 01/21/25 1430    Lines/Drains/Airways       Drain  Duration             Female External Urinary Catheter w/ Suction 01/19/25 2338 1 day              Peripheral Intravenous Line  Duration                  Peripheral IV - Single Lumen 01/19/25 1926 20 G Left Antecubital 1 day                       Physical Exam  Constitutional:       Appearance: Normal appearance.   Cardiovascular:      Rate and Rhythm: Normal rate and regular rhythm.      Pulses: Normal pulses.      Heart sounds: Normal heart sounds.   Pulmonary:      Effort: Pulmonary effort is normal.      Breath sounds: Normal breath sounds. No wheezing or rales.   Abdominal:      General: Abdomen is flat. There is no distension.      Palpations: Abdomen is soft.       Tenderness: There is abdominal tenderness (LLQ and RLQ).   Musculoskeletal:      Right lower leg: No edema.      Left lower leg: No edema.   Skin:     General: Skin is warm and dry.   Neurological:      Mental Status: She is alert.            Significant Labs: All pertinent lab results from the last 24 hours have been reviewed.    Significant Imaging:  Reviewed

## 2025-01-21 NOTE — HOSPITAL COURSE
Evaluated by cardiology and imdur increased. Cardiology recommends medical management, continue Imdur, DAPT, and consider Ranolazine for angina. Imdur increased to 60mg.  Patient started ranolazine as per cardiology recs, then later discontinued due to renal function. Patient worked with PT/OT again. Patient is interested in placement option for therapy.  Patient is stable and pending SNF placement.  P2P denial upheld 1/29. Family submitted for fast appeal > successful. Patient has placement at SNF now pending quarantine period for flu.   Recurrent chest pain on 1/31 with slight elevation in trop. Cardiology consulted. Imdur and atorvastatin increased.   Fevering 2/1-2/3. CXR clear. UA negative. +flu. Started on tamiflu 2/3. Intermittently on 1L NC since flu diagnosis. Patient symptomatically improved and weaned off oxygen.   Patient is hemodynamically stable and feeling back to baseline. She is ready for discharge to SNF for rehab.   Outpatient cardiology and nephrology referrals placed.

## 2025-01-21 NOTE — ASSESSMENT & PLAN NOTE
Patient's blood pressure range in the last 24 hours was: BP  Min: 120/62  Max: 150/72.The patient's inpatient anti-hypertensive regimen is listed below:  Current Antihypertensives  nitroGLYCERIN SL tablet 0.4 mg, Every 5 min PRN, Sublingual  amLODIPine tablet 10 mg, Daily, Oral  isosorbide mononitrate 24 hr tablet 60 mg, Daily, Oral  losartan tablet 25 mg, Daily, Oral    Plan  - BP is controlled, no changes needed to their regimen

## 2025-01-21 NOTE — SUBJECTIVE & OBJECTIVE
Interval History: No acute events overnight. Patient denies SOB at rest this morning but reports she hasn't gotten out of bed yet.     Review of Systems  Objective:     Vital Signs (Most Recent):  Temp: 98.1 °F (36.7 °C) (01/21/25 0756)  Pulse: (!) 57 (01/21/25 0756)  Resp: 16 (01/21/25 0756)  BP: (!) 150/72 (01/21/25 0756)  SpO2: 95 % (01/21/25 0756) Vital Signs (24h Range):  Temp:  [97.6 °F (36.4 °C)-98.1 °F (36.7 °C)] 98.1 °F (36.7 °C)  Pulse:  [41-75] 57  Resp:  [16-20] 16  SpO2:  [93 %-95 %] 95 %  BP: (120-150)/(60-72) 150/72     Weight: 72 kg (158 lb 11.7 oz)  Body mass index is 24.86 kg/m².  No intake or output data in the 24 hours ending 01/21/25 1020      Physical Exam  Vitals and nursing note reviewed.   Constitutional:       General: She is not in acute distress.  HENT:      Head: Normocephalic and atraumatic.   Eyes:      General: No scleral icterus.     Pupils: Pupils are equal, round, and reactive to light.   Cardiovascular:      Rate and Rhythm: Bradycardia present. Rhythm irregular.      Heart sounds: Murmur heard.   Pulmonary:      Effort: Pulmonary effort is normal. No respiratory distress.      Breath sounds: Normal breath sounds. No wheezing or rales.   Abdominal:      General: Bowel sounds are normal. There is no distension.      Palpations: Abdomen is soft.      Tenderness: There is no abdominal tenderness. There is no guarding.   Musculoskeletal:      Right lower leg: No edema.      Left lower leg: No edema.   Skin:     General: Skin is warm and dry.   Neurological:      General: No focal deficit present.      Mental Status: She is alert and oriented to person, place, and time.   Psychiatric:         Mood and Affect: Mood normal.         Behavior: Behavior normal.             Significant Labs: All pertinent labs within the past 24 hours have been reviewed.  CBC:   Recent Labs   Lab 01/19/25  1927   WBC 6.29   HGB 10.3*   HCT 32.6*        CMP:   Recent Labs   Lab 01/19/25 1921  01/20/25  0348 01/21/25  0317    141 136   K 5.1 4.1 3.9    107 104   CO2 20* 21* 24   GLU 98 95 91   BUN 43* 41* 41*   CREATININE 2.3* 2.0* 2.3*   CALCIUM 10.2 9.9 9.5   PROT 8.4  --   --    ALBUMIN 3.0*  --   --    BILITOT 0.6  --   --    ALKPHOS 91  --   --    AST 22  --   --    ALT 25  --   --    ANIONGAP 15 13 8     Cardiac Markers:   Recent Labs   Lab 01/19/25  1927   *       Significant Imaging: I have reviewed all pertinent imaging results/findings within the past 24 hours.

## 2025-01-21 NOTE — PROGRESS NOTES
"[facility] [address] [place] [address] Medicine  Progress Note    Patient Name: Anahy Evans  MRN: [id number]  Patient Class: OP- Observation   Admission Date: 1/19/2025  Length of Stay: 0 days  Attending Physician: Marely Perez MD  Primary Care Provider: Elena Cabrera MD        Subjective     Principal Problem:Anginal equivalent        HPI:  89 y/o AAF w/ CAD hx PCI, CKD 4, HTN, Afib, AAA, 2nd degree AV Block presents to the ED with complaints of dyspnea.     She was recently admitted to [facility] from 1/13-1/17 per DC summary "Patient admitted for dyspnea on exertion. V/Q scan was completed - no pulmonary embolism. Echo ordered - regional wall motion abnormalities present. Low normal systolic ejection fraction 50-55%. Cardiology was consulted - feel origin of TAYLOR may be cardiac in nature. PET stress done 1/16. Two perfusion abnormalities seen. Interventional cardiology recommending medical management and close follow-up. Amlodipine increased, losartan decreased, and started on imdur."    She returns to ED with concerns of dyspnea on exertion.  She discharged to home after last admission, lives with her daughter.  Her grand-daughter accompanies her today and helps provide history.  Since discharge patient has had limited ADL capability, pt requires assistance w/ ambulation and use of walker, patient has home purewick and bedside commode.  She requires assistance with toileting, bathing, clothing.  She is adherent to medications. Yesterday patient appeared weak and when sitting upright had poor trunk stability would slump over and had poor appetite did not eat much and was noted with intermittent confusion.  Her confusion is described as poor short term memory, will intermittently forget recent events, granddaughter notes that patient usually answers orientation questions appropriately when evaluated by medical teams, but might forget later what was discussed or why she is in the hospital.    She was treated " for acute cystitis during last admit, pan-sensitive E.coli on urine culture received ceftriaxone inpatient and not discharged on any oral antibiotics.     She does report poor sleep, has had difficulty sleeping at night and will nap multiple times per day. No reported non-redirectable agitation.   Prior to this month, grand-daughter notes patient was performing more ADL on her own.  Patient had declined referral to SNF with recent admits, last PT/OT recs for low-intensity therapy, pt is more open to post-acute care if recommended.     In ED tonight pt with bradycardia noted, cardiology consulted, no acute medical management of her bradycardia recommended, she has 2:1 AV block on review of EKG/telemetry tonight, hx of Mobitz 1 2nd Deg AV block in past.       Overview/Hospital Course:  Evaluated by cardiology and imdur increased. Had issues with SOB during breakfast on 1/20 but denies this problem on 1/21. Will need to work with PT/OT again.     Interval History: No acute events overnight. Patient denies SOB at rest this morning but reports she hasn't gotten out of bed yet.     Review of Systems  Objective:     Vital Signs (Most Recent):  Temp: 98.1 °F (36.7 °C) (01/21/25 0756)  Pulse: (!) 57 (01/21/25 0756)  Resp: 16 (01/21/25 0756)  BP: (!) 150/72 (01/21/25 0756)  SpO2: 95 % (01/21/25 0756) Vital Signs (24h Range):  Temp:  [97.6 °F (36.4 °C)-98.1 °F (36.7 °C)] 98.1 °F (36.7 °C)  Pulse:  [41-75] 57  Resp:  [16-20] 16  SpO2:  [93 %-95 %] 95 %  BP: (120-150)/(60-72) 150/72     Weight: 72 kg (158 lb 11.7 oz)  Body mass index is 24.86 kg/m².  No intake or output data in the 24 hours ending 01/21/25 1020      Physical Exam  Vitals and nursing note reviewed.   Constitutional:       General: She is not in acute distress.  HENT:      Head: Normocephalic and atraumatic.   Eyes:      General: No scleral icterus.     Pupils: Pupils are equal, round, and reactive to light.   Cardiovascular:      Rate and Rhythm: Bradycardia  present. Rhythm irregular.      Heart sounds: Murmur heard.   Pulmonary:      Effort: Pulmonary effort is normal. No respiratory distress.      Breath sounds: Normal breath sounds. No wheezing or rales.   Abdominal:      General: Bowel sounds are normal. There is no distension.      Palpations: Abdomen is soft.      Tenderness: There is no abdominal tenderness. There is no guarding.   Musculoskeletal:      Right lower leg: No edema.      Left lower leg: No edema.   Skin:     General: Skin is warm and dry.   Neurological:      General: No focal deficit present.      Mental Status: She is alert and oriented to person, place, and time.   Psychiatric:         Mood and Affect: Mood normal.         Behavior: Behavior normal.             Significant Labs: All pertinent labs within the past 24 hours have been reviewed.  CBC:   Recent Labs   Lab 01/19/25 1927   WBC 6.29   HGB 10.3*   HCT 32.6*        CMP:   Recent Labs   Lab 01/19/25 1927 01/20/25  0348 01/21/25  0317    141 136   K 5.1 4.1 3.9    107 104   CO2 20* 21* 24   GLU 98 95 91   BUN 43* 41* 41*   CREATININE 2.3* 2.0* 2.3*   CALCIUM 10.2 9.9 9.5   PROT 8.4  --   --    ALBUMIN 3.0*  --   --    BILITOT 0.6  --   --    ALKPHOS 91  --   --    AST 22  --   --    ALT 25  --   --    ANIONGAP 15 13 8     Cardiac Markers:   Recent Labs   Lab 01/19/25 1927   *       Significant Imaging: I have reviewed all pertinent imaging results/findings within the past 24 hours.    Assessment and Plan     * Anginal equivalent  Persistent   Seen by cardiology - her intermittent dyspnea symptoms are considered an anginal equivalent.  Recommendation to titrate Imdur dosage to 60mg.   Patient took AM imdur 30mg, was hypertensive on presentation - gave 30mg imdur 1/19 and started 60mg in AM.  - cardiology recommending continued monitoring   - PT/OT       Coronary artery disease involving native coronary artery of native heart without angina pectoris  Patient with  "known CAD , which is uncontrolled Will continue ASA, Plavix, and Statin and monitor for S/Sx of angina/ACS. Continue to monitor on telemetry.     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 Pt having 2:1 AV block atrial rates in 80s and HR in low 40s.  During cardiology evaluation pt with variable 2nd degree AV block. She is not on any AV node blocking agents. Discussed with cards/EP  - continue telemetry monitoring.     Per Dr. Cummings on admit: "Tonight discussed code status with patient - she has been Full Code and DNR during last 2 admits, initially indicated DNR but also states she would want resuscitative measures if she might not incur injury, reviewed probabilities with pt given advanced age and chronic medical conditiions indicated to her that under true cardio-respiratory arrest she would be left with subsequent injury and overall low likelihood of survival to discharge 5-10%.  Give this bradyarrhythmia I did discuss if patients HR was lower if she would want invasive measures such as transvenous pacing or transcutaneous pacing performed she responded in affirmative,  will place FULL CODE for now."       Counseling regarding goals of care  Code status discussion as per AV block problem.     Patient has living will and HCPOA documents uploaded, she does not have an LAPOST completed. Would recommend completion of LAPOST prior to hospital discharge.       Confusion  ED indicated pt referred for admit for confusion.  By CAM-ICU testing pt does not have delirium, some disorientation. She may have some underlying cognitive deficits based on grand-daughters description. Appears pt with altered sleep wake cycles more recently - schedule melatonin tonight to help normalize sleep-wake cycle, delirium precautions.   Repeat UA appears normal and no persistent symptoms or concerns for ongoing Acute cystitis adequately treated.   - delirium precautions      Acute cystitis " without hematuria  Repeat UA appears normal and no persistent symptoms or concerns for ongoing Acute cystitis adequately treated.       CKD (chronic kidney disease), stage IV  Creatine stable for now. BMP reviewed- noted Estimated Creatinine Clearance: 15.5 mL/min (A) (based on SCr of 2.3 mg/dL (H)). according to latest data. Based on current GFR, CKD stage is stage 4 - GFR 15-29.  Monitor UOP and serial BMP and adjust therapy as needed. Renally dose meds. Avoid nephrotoxic medications and procedures.    Continue sodium bicarbonate and farxiga    Essential hypertension  Patient's blood pressure range in the last 24 hours was: BP  Min: 120/62  Max: 150/72.The patient's inpatient anti-hypertensive regimen is listed below:  Current Antihypertensives  nitroGLYCERIN SL tablet 0.4 mg, Every 5 min PRN, Sublingual  amLODIPine tablet 10 mg, Daily, Oral  isosorbide mononitrate 24 hr tablet 60 mg, Daily, Oral  losartan tablet 25 mg, Daily, Oral    Plan  - BP is controlled, no changes needed to their regimen      PAF (paroxysmal atrial fibrillation)  Patient has paroxysmal (<7 days) atrial fibrillation. Patient is currently in sinus rhythm. [id number] Score: 4. The patients heart rate in the last 24 hours is as follows:  Pulse  Min: 41  Max: 75     Antiarrhythmics       Anticoagulants  heparin (porcine) injection 5,000 Units, Every 8 hours, Subcutaneous    Plan  - Replete lytes with a goal of K>4, Mg >2  - Patient is not anticoagulated due to unclear etiology  - Patient's afib is currently controlled          VTE Risk Mitigation (From admission, onward)           Ordered     heparin (porcine) injection 5,000 Units  Every 8 hours         01/20/25 0003     IP VTE HIGH RISK PATIENT  Once         01/20/25 0003     Place sequential compression device  Until discontinued         01/20/25 0003                    Discharge Planning   ROSEMARIE: 1/23/2025     Code Status: Full Code   Medical Readiness for Discharge Date:                             Marely Perez MD  Department of Hospital Medicine   [facility] - Observation 11H

## 2025-01-22 LAB
ANION GAP SERPL CALC-SCNC: 9 MMOL/L (ref 8–16)
BUN SERPL-MCNC: 46 MG/DL (ref 10–30)
CALCIUM SERPL-MCNC: 9.4 MG/DL (ref 8.7–10.5)
CHLORIDE SERPL-SCNC: 105 MMOL/L (ref 95–110)
CO2 SERPL-SCNC: 23 MMOL/L (ref 23–29)
CREAT SERPL-MCNC: 2 MG/DL (ref 0.5–1.4)
EST. GFR  (NO RACE VARIABLE): 23.2 ML/MIN/1.73 M^2
GLUCOSE SERPL-MCNC: 85 MG/DL (ref 70–110)
MAGNESIUM SERPL-MCNC: 2.1 MG/DL (ref 1.6–2.6)
OHS QRS DURATION: 84 MS
OHS QTC CALCULATION: 383 MS
PHOSPHATE SERPL-MCNC: 4.2 MG/DL (ref 2.7–4.5)
POTASSIUM SERPL-SCNC: 4.1 MMOL/L (ref 3.5–5.1)
SODIUM SERPL-SCNC: 137 MMOL/L (ref 136–145)

## 2025-01-22 PROCEDURE — 83735 ASSAY OF MAGNESIUM: CPT | Performed by: HOSPITALIST

## 2025-01-22 PROCEDURE — 63600175 PHARM REV CODE 636 W HCPCS: Performed by: HOSPITALIST

## 2025-01-22 PROCEDURE — 80048 BASIC METABOLIC PNL TOTAL CA: CPT | Performed by: HOSPITALIST

## 2025-01-22 PROCEDURE — 93010 ELECTROCARDIOGRAM REPORT: CPT | Mod: ,,, | Performed by: INTERNAL MEDICINE

## 2025-01-22 PROCEDURE — 36415 COLL VENOUS BLD VENIPUNCTURE: CPT | Performed by: HOSPITALIST

## 2025-01-22 PROCEDURE — 84100 ASSAY OF PHOSPHORUS: CPT | Performed by: HOSPITALIST

## 2025-01-22 PROCEDURE — 25000003 PHARM REV CODE 250: Performed by: HOSPITALIST

## 2025-01-22 PROCEDURE — 93005 ELECTROCARDIOGRAM TRACING: CPT

## 2025-01-22 PROCEDURE — 21400001 HC TELEMETRY ROOM

## 2025-01-22 RX ADMIN — CLOPIDOGREL BISULFATE 75 MG: 75 TABLET ORAL at 09:01

## 2025-01-22 RX ADMIN — ALLOPURINOL 50 MG: 300 TABLET ORAL at 09:01

## 2025-01-22 RX ADMIN — HEPARIN SODIUM 5000 UNITS: 5000 INJECTION INTRAVENOUS; SUBCUTANEOUS at 02:01

## 2025-01-22 RX ADMIN — HEPARIN SODIUM 5000 UNITS: 5000 INJECTION INTRAVENOUS; SUBCUTANEOUS at 09:01

## 2025-01-22 RX ADMIN — ATORVASTATIN CALCIUM 40 MG: 40 TABLET, FILM COATED ORAL at 09:01

## 2025-01-22 RX ADMIN — FOLIC ACID 1 MG: 1 TABLET ORAL at 09:01

## 2025-01-22 RX ADMIN — AMLODIPINE BESYLATE 10 MG: 10 TABLET ORAL at 09:01

## 2025-01-22 RX ADMIN — DAPAGLIFLOZIN 10 MG: 10 TABLET, FILM COATED ORAL at 09:01

## 2025-01-22 RX ADMIN — Medication 6 MG: at 09:01

## 2025-01-22 RX ADMIN — SODIUM BICARBONATE 650 MG TABLET 650 MG: at 09:01

## 2025-01-22 RX ADMIN — CHOLECALCIFEROL TAB 25 MCG (1000 UNIT) 2000 UNITS: 25 TAB at 09:01

## 2025-01-22 RX ADMIN — PANTOPRAZOLE SODIUM 20 MG: 20 TABLET, DELAYED RELEASE ORAL at 07:01

## 2025-01-22 RX ADMIN — LOSARTAN POTASSIUM 25 MG: 25 TABLET, FILM COATED ORAL at 09:01

## 2025-01-22 RX ADMIN — HEPARIN SODIUM 5000 UNITS: 5000 INJECTION INTRAVENOUS; SUBCUTANEOUS at 06:01

## 2025-01-22 RX ADMIN — ASPIRIN 81 MG: 81 TABLET, COATED ORAL at 09:01

## 2025-01-22 RX ADMIN — ISOSORBIDE MONONITRATE 60 MG: 60 TABLET, EXTENDED RELEASE ORAL at 09:01

## 2025-01-22 NOTE — ASSESSMENT & PLAN NOTE
Persistent   Seen by cardiology - her intermittent dyspnea symptoms are considered an anginal equivalent.  Recommendation to titrate Imdur dosage to 60mg. BP now borderline hypotensive with this increase  Patient took AM imdur 30mg, was hypertensive on presentation - gave 30mg imdur 1/19 and started 60mg in AM.  - cardiology recommending continued monitoring   - PT/OT

## 2025-01-22 NOTE — ASSESSMENT & PLAN NOTE
Patient has paroxysmal (<7 days) atrial fibrillation. Patient is currently in sinus rhythm. [id number] Score: 4. The patients heart rate in the last 24 hours is as follows:  Pulse  Min: 35  Max: 86     Antiarrhythmics       Anticoagulants  heparin (porcine) injection 5,000 Units, Every 8 hours, Subcutaneous    Plan  - Replete lytes with a goal of K>4, Mg >2  - Patient is not anticoagulated due to unclear etiology  - Patient's afib is currently controlled

## 2025-01-22 NOTE — PROGRESS NOTES
"[facility] [address] [place] [address] Medicine  Progress Note    Patient Name: Anahy Evans  MRN: [id number]  Patient Class: IP- Inpatient   Admission Date: 1/19/2025  Length of Stay: 1 days  Attending Physician: Marely Perez MD  Primary Care Provider: Elena Cabrera MD        Subjective     Principal Problem:Anginal equivalent        HPI:  89 y/o AAF w/ CAD hx PCI, CKD 4, HTN, Afib, AAA, 2nd degree AV Block presents to the ED with complaints of dyspnea.     She [place] [facility] [place] per DC summary "Patient admitted for dyspnea on exertion. V/Q scan was completed - no pulmonary embolism. Echo ordered - regional wall motion abnormalities present. Low normal systolic ejection fraction 50-55%. Cardiology was consulted - feel origin of TAYLOR may be cardiac in nature. PET stress done 1/16. Two perfusion abnormalities seen. Interventional cardiology recommending medical management and close follow-up. Amlodipine increased, losartan decreased, and started on imdur."    She returns to ED with concerns of dyspnea on exertion.  She discharged to home after last admission, lives with her daughter.  Her grand-daughter accompanies her today and helps provide history.  Since discharge patient has had limited ADL capability, pt requires assistance w/ ambulation and use of walker, patient has home purewick and bedside commode.  She requires assistance with toileting, bathing, clothing.  She is adherent to medications. Yesterday patient appeared weak and when sitting upright had poor trunk stability would slump over and had poor appetite did not eat much and was noted with intermittent confusion.  Her confusion is described as poor short term memory, will intermittently forget recent events, granddaughter notes that patient usually answers orientation questions appropriately when evaluated by medical teams, but might forget later what was discussed or why she is in the hospital.    She was treated for " acute cystitis during last admit, pan-sensitive E.coli on urine culture received ceftriaxone inpatient and not discharged on any oral antibiotics.     She does report poor sleep, has had difficulty sleeping at night and will nap multiple times per day. No reported non-redirectable agitation.   Prior to this month, grand-daughter notes patient was performing more ADL on her own.  Patient had declined referral to SNF with recent admits, last PT/OT recs for low-intensity therapy, pt is more open to post-acute care if recommended.     In ED tonight pt with bradycardia noted, cardiology consulted, no acute medical management of her bradycardia recommended, she has 2:1 AV block on review of EKG/telemetry tonight, hx of Mobitz 1 2nd Deg AV block in past.       Overview/Hospital Course:  Evaluated by cardiology and imdur increased. Had issues with SOB during breakfast on 1/20 but denies this problem on 1/21. Will need to work with PT/OT again.     Interval History: Patient bradycardic to the 30s overnight and this morning. Some portion of this was asleep, some awake. Per nursing and patient, she has been out of bed yesterday and today. Encouraging ambulation. This morning patient reported SOB with eating breakfast and minimal exertion again.     Review of Systems  Objective:     Vital Signs (Most Recent):  Temp: 97.5 °F (36.4 °C) (01/22/25 1123)  Pulse: 72 (01/22/25 1123)  Resp: 16 (01/22/25 1123)  BP: (!) 96/55 (01/22/25 1123)  SpO2: 97 % (01/22/25 1123) Vital Signs (24h Range):  Temp:  [97.2 °F (36.2 °C)-98.4 °F (36.9 °C)] 97.5 °F (36.4 °C)  Pulse:  [35-86] 72  Resp:  [16-20] 16  SpO2:  [93 %-98 %] 97 %  BP: ()/(55-70) 96/55     Weight: 72 kg (158 lb 11.7 oz)  Body mass index is 24.86 kg/m².  No intake or output data in the 24 hours ending 01/22/25 1244      Physical Exam  Vitals and nursing note reviewed.   Constitutional:       General: She is not in acute distress.  HENT:      Head: Normocephalic and atraumatic.    Eyes:      General: No scleral icterus.     Pupils: Pupils are equal, round, and reactive to light.   Cardiovascular:      Rate and Rhythm: Bradycardia present. Rhythm irregular.      Heart sounds: Murmur heard.   Pulmonary:      Effort: Pulmonary effort is normal. No respiratory distress.      Breath sounds: Normal breath sounds. No wheezing or rales.   Abdominal:      General: Bowel sounds are normal. There is no distension.      Palpations: Abdomen is soft.      Tenderness: There is no abdominal tenderness. There is no guarding.   Musculoskeletal:      Right lower leg: No edema.      Left lower leg: No edema.   Skin:     General: Skin is warm and dry.   Neurological:      General: No focal deficit present.      Mental Status: She is alert and oriented to person, place, and time.   Psychiatric:         Mood and Affect: Mood normal.         Behavior: Behavior normal.             Significant Labs: All pertinent labs within the past 24 hours have been reviewed.    CMP:   Recent Labs   Lab 01/21/25  0317 01/22/25  0217    137   K 3.9 4.1    105   CO2 24 23   GLU 91 85   BUN 41* 46*   CREATININE 2.3* 2.0*   CALCIUM 9.5 9.4   ANIONGAP 8 9       Significant Imaging: I have reviewed all pertinent imaging results/findings within the past 24 hours.    Assessment and Plan     * Anginal equivalent  Persistent   Seen by cardiology - her intermittent dyspnea symptoms are considered an anginal equivalent.  Recommendation to titrate Imdur dosage to 60mg. BP now borderline hypotensive with this increase  Patient took AM imdur 30mg, was hypertensive on presentation - gave 30mg imdur 1/19 and started 60mg in AM.  - cardiology recommending continued monitoring   - PT/OT       Coronary artery disease involving native coronary artery of native heart without angina pectoris  Patient with known CAD , which is uncontrolled Will continue ASA, Plavix, and Statin and monitor for S/Sx of angina/ACS. Continue to monitor on  "telemetry.     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 Pt having 2:1 AV block atrial rates in 80s and HR in low 40s.  During cardiology evaluation pt with variable 2nd degree AV block. She is not on any AV node blocking agents. Discussed with cards/EP  - continue telemetry monitoring.     Per Dr. Cummings on admit: "Tonight discussed code status with patient - she has been Full Code and DNR during last 2 admits, initially indicated DNR but also states she would want resuscitative measures if she might not incur injury, reviewed probabilities with pt given advanced age and chronic medical conditiions indicated to her that under true cardio-respiratory arrest she would be left with subsequent injury and overall low likelihood of survival to discharge 5-10%.  Give this bradyarrhythmia I did discuss if patients HR was lower if she would want invasive measures such as transvenous pacing or transcutaneous pacing performed she responded in affirmative,  will place FULL CODE for now."       Counseling regarding goals of care  Code status discussion as per AV block problem.     Patient has living will and HCPOA documents uploaded, she does not have an LAPOST completed. Would recommend completion of LAPOST prior to hospital discharge.       Confusion  ED indicated pt referred for admit for confusion.  By CAM-ICU testing pt does not have delirium, some disorientation. She may have some underlying cognitive deficits based on grand-daughters description. Appears pt with altered sleep wake cycles more recently - schedule melatonin tonight to help normalize sleep-wake cycle, delirium precautions.   Repeat UA appears normal and no persistent symptoms or concerns for ongoing Acute cystitis adequately treated.   - delirium precautions      Acute cystitis without hematuria  Repeat UA appears normal and no persistent symptoms or concerns for ongoing Acute cystitis adequately treated. "       CKD (chronic kidney disease), stage IV  Creatine stable for now. BMP reviewed- noted Estimated Creatinine Clearance: 17.8 mL/min (A) (based on SCr of 2 mg/dL (H)). according to latest data. Based on current GFR, CKD stage is stage 4 - GFR 15-29.  Monitor UOP and serial BMP and adjust therapy as needed. Renally dose meds. Avoid nephrotoxic medications and procedures.    Continue sodium bicarbonate and farxiga    Essential hypertension  Patient's blood pressure range in the last 24 hours was: BP  Min: 96/55  Max: 158/68.The patient's inpatient anti-hypertensive regimen is listed below:  Current Antihypertensives  nitroGLYCERIN SL tablet 0.4 mg, Every 5 min PRN, Sublingual  amLODIPine tablet 10 mg, Daily, Oral  isosorbide mononitrate 24 hr tablet 60 mg, Daily, Oral  losartan tablet 25 mg, Daily, Oral    Plan  - BP is controlled, no changes needed to their regimen      PAF (paroxysmal atrial fibrillation)  Patient has paroxysmal (<7 days) atrial fibrillation. Patient is currently in sinus rhythm. [id number] Score: 4. The patients heart rate in the last 24 hours is as follows:  Pulse  Min: 35  Max: 86     Antiarrhythmics       Anticoagulants  heparin (porcine) injection 5,000 Units, Every 8 hours, Subcutaneous    Plan  - Replete lytes with a goal of K>4, Mg >2  - Patient is not anticoagulated due to unclear etiology  - Patient's afib is currently controlled          VTE Risk Mitigation (From admission, onward)           Ordered     heparin (porcine) injection 5,000 Units  Every 8 hours         01/20/25 0003     IP VTE HIGH RISK PATIENT  Once         01/20/25 0003     Place sequential compression device  Until discontinued         01/20/25 0003                    Discharge Planning   ROSEMARIE: 1/23/2025     Code Status: Full Code   Medical Readiness for Discharge Date:   Discharge Plan A: Home                        Marely Perez MD  Department of Hospital Medicine   [facility] 11[facility]

## 2025-01-22 NOTE — SUBJECTIVE & OBJECTIVE
Interval History: Patient bradycardic to the 30s overnight and this morning. Some portion of this was asleep, some awake. Per nursing and patient, she has been out of bed yesterday and today. Encouraging ambulation. This morning patient reported SOB with eating breakfast and minimal exertion again.     Review of Systems  Objective:     Vital Signs (Most Recent):  Temp: 97.5 °F (36.4 °C) (01/22/25 1123)  Pulse: 72 (01/22/25 1123)  Resp: 16 (01/22/25 1123)  BP: (!) 96/55 (01/22/25 1123)  SpO2: 97 % (01/22/25 1123) Vital Signs (24h Range):  Temp:  [97.2 °F (36.2 °C)-98.4 °F (36.9 °C)] 97.5 °F (36.4 °C)  Pulse:  [35-86] 72  Resp:  [16-20] 16  SpO2:  [93 %-98 %] 97 %  BP: ()/(55-70) 96/55     Weight: 72 kg (158 lb 11.7 oz)  Body mass index is 24.86 kg/m².  No intake or output data in the 24 hours ending 01/22/25 1244      Physical Exam  Vitals and nursing note reviewed.   Constitutional:       General: She is not in acute distress.  HENT:      Head: Normocephalic and atraumatic.   Eyes:      General: No scleral icterus.     Pupils: Pupils are equal, round, and reactive to light.   Cardiovascular:      Rate and Rhythm: Bradycardia present. Rhythm irregular.      Heart sounds: Murmur heard.   Pulmonary:      Effort: Pulmonary effort is normal. No respiratory distress.      Breath sounds: Normal breath sounds. No wheezing or rales.   Abdominal:      General: Bowel sounds are normal. There is no distension.      Palpations: Abdomen is soft.      Tenderness: There is no abdominal tenderness. There is no guarding.   Musculoskeletal:      Right lower leg: No edema.      Left lower leg: No edema.   Skin:     General: Skin is warm and dry.   Neurological:      General: No focal deficit present.      Mental Status: She is alert and oriented to person, place, and time.   Psychiatric:         Mood and Affect: Mood normal.         Behavior: Behavior normal.             Significant Labs: All pertinent labs within the past 24  hours have been reviewed.    CMP:   Recent Labs   Lab 01/21/25  0317 01/22/25  0217    137   K 3.9 4.1    105   CO2 24 23   GLU 91 85   BUN 41* 46*   CREATININE 2.3* 2.0*   CALCIUM 9.5 9.4   ANIONGAP 8 9       Significant Imaging: I have reviewed all pertinent imaging results/findings within the past 24 hours.

## 2025-01-22 NOTE — ASSESSMENT & PLAN NOTE
Creatine stable for now. BMP reviewed- noted Estimated Creatinine Clearance: 17.8 mL/min (A) (based on SCr of 2 mg/dL (H)). according to latest data. Based on current GFR, CKD stage is stage 4 - GFR 15-29.  Monitor UOP and serial BMP and adjust therapy as needed. Renally dose meds. Avoid nephrotoxic medications and procedures.    Continue sodium bicarbonate and farxiga

## 2025-01-22 NOTE — PLAN OF CARE
Problem: Adult Inpatient Plan of Care  Goal: Plan of Care Review  Outcome: Progressing  Goal: Patient-Specific Goal (Individualized)  Outcome: Progressing  Goal: Absence of Hospital-Acquired Illness or Injury  Outcome: Progressing  Goal: Optimal Comfort and Wellbeing  Outcome: Progressing  Goal: Readiness for Transition of Care  Outcome: Progressing     Problem: Infection  Goal: Absence of Infection Signs and Symptoms  Outcome: Progressing     Problem: Acute Kidney Injury/Impairment  Goal: Fluid and Electrolyte Balance  Outcome: Progressing  Goal: Improved Oral Intake  Outcome: Progressing  Goal: Effective Renal Function  Outcome: Progressing     Problem: Fall Injury Risk  Goal: Absence of Fall and Fall-Related Injury  Outcome: Progressing     Problem: Pain Acute  Goal: Optimal Pain Control and Function  Outcome: Progressing   Pt progressing toward goals. No distress noted. No falls or injuries during shift. Pt bed in lowest position. Side rails x2. Bed alarm activated. Call bell and personal belongs within reach. Telemetry maintained. Safety precautions maintained.

## 2025-01-22 NOTE — ASSESSMENT & PLAN NOTE
Patient's blood pressure range in the last 24 hours was: BP  Min: 96/55  Max: 158/68.The patient's inpatient anti-hypertensive regimen is listed below:  Current Antihypertensives  nitroGLYCERIN SL tablet 0.4 mg, Every 5 min PRN, Sublingual  amLODIPine tablet 10 mg, Daily, Oral  isosorbide mononitrate 24 hr tablet 60 mg, Daily, Oral  losartan tablet 25 mg, Daily, Oral    Plan  - BP is controlled, no changes needed to their regimen

## 2025-01-23 PROBLEM — R53.81 DEBILITY: Status: ACTIVE | Noted: 2025-01-23

## 2025-01-23 LAB
BASOPHILS # BLD AUTO: 0.04 K/UL (ref 0–0.2)
BASOPHILS NFR BLD: 0.8 % (ref 0–1.9)
DIFFERENTIAL METHOD BLD: ABNORMAL
EOSINOPHIL # BLD AUTO: 0.2 K/UL (ref 0–0.5)
EOSINOPHIL NFR BLD: 3.9 % (ref 0–8)
ERYTHROCYTE [DISTWIDTH] IN BLOOD BY AUTOMATED COUNT: 14.1 % (ref 11.5–14.5)
HCT VFR BLD AUTO: 28.5 % (ref 37–48.5)
HGB BLD-MCNC: 9.2 G/DL (ref 12–16)
IMM GRANULOCYTES # BLD AUTO: 0.02 K/UL (ref 0–0.04)
IMM GRANULOCYTES NFR BLD AUTO: 0.4 % (ref 0–0.5)
LYMPHOCYTES # BLD AUTO: 2.1 K/UL (ref 1–4.8)
LYMPHOCYTES NFR BLD: 40.2 % (ref 18–48)
MCH RBC QN AUTO: 29 PG (ref 27–31)
MCHC RBC AUTO-ENTMCNC: 32.3 G/DL (ref 32–36)
MCV RBC AUTO: 90 FL (ref 82–98)
MONOCYTES # BLD AUTO: 0.7 K/UL (ref 0.3–1)
MONOCYTES NFR BLD: 14.3 % (ref 4–15)
NEUTROPHILS # BLD AUTO: 2.1 K/UL (ref 1.8–7.7)
NEUTROPHILS NFR BLD: 40.4 % (ref 38–73)
NRBC BLD-RTO: 0 /100 WBC
PLATELET # BLD AUTO: 169 K/UL (ref 150–450)
PMV BLD AUTO: 11.9 FL (ref 9.2–12.9)
RBC # BLD AUTO: 3.17 M/UL (ref 4–5.4)
WBC # BLD AUTO: 5.12 K/UL (ref 3.9–12.7)

## 2025-01-23 PROCEDURE — 25000003 PHARM REV CODE 250: Performed by: HOSPITALIST

## 2025-01-23 PROCEDURE — 85025 COMPLETE CBC W/AUTO DIFF WBC: CPT | Performed by: STUDENT IN AN ORGANIZED HEALTH CARE EDUCATION/TRAINING PROGRAM

## 2025-01-23 PROCEDURE — 21400001 HC TELEMETRY ROOM

## 2025-01-23 PROCEDURE — 36415 COLL VENOUS BLD VENIPUNCTURE: CPT | Performed by: STUDENT IN AN ORGANIZED HEALTH CARE EDUCATION/TRAINING PROGRAM

## 2025-01-23 PROCEDURE — 25000003 PHARM REV CODE 250: Performed by: STUDENT IN AN ORGANIZED HEALTH CARE EDUCATION/TRAINING PROGRAM

## 2025-01-23 PROCEDURE — 63600175 PHARM REV CODE 636 W HCPCS: Performed by: HOSPITALIST

## 2025-01-23 RX ORDER — RANOLAZINE 500 MG/1
500 TABLET, EXTENDED RELEASE ORAL 2 TIMES DAILY
Status: DISCONTINUED | OUTPATIENT
Start: 2025-01-23 | End: 2025-01-24

## 2025-01-23 RX ADMIN — ASPIRIN 81 MG: 81 TABLET, COATED ORAL at 08:01

## 2025-01-23 RX ADMIN — ALLOPURINOL 50 MG: 300 TABLET ORAL at 08:01

## 2025-01-23 RX ADMIN — DAPAGLIFLOZIN 10 MG: 10 TABLET, FILM COATED ORAL at 08:01

## 2025-01-23 RX ADMIN — HEPARIN SODIUM 5000 UNITS: 5000 INJECTION INTRAVENOUS; SUBCUTANEOUS at 02:01

## 2025-01-23 RX ADMIN — RANOLAZINE 500 MG: 500 TABLET, EXTENDED RELEASE ORAL at 08:01

## 2025-01-23 RX ADMIN — LOSARTAN POTASSIUM 25 MG: 25 TABLET, FILM COATED ORAL at 08:01

## 2025-01-23 RX ADMIN — HEPARIN SODIUM 5000 UNITS: 5000 INJECTION INTRAVENOUS; SUBCUTANEOUS at 06:01

## 2025-01-23 RX ADMIN — HEPARIN SODIUM 5000 UNITS: 5000 INJECTION INTRAVENOUS; SUBCUTANEOUS at 08:01

## 2025-01-23 RX ADMIN — Medication 6 MG: at 08:01

## 2025-01-23 RX ADMIN — AMLODIPINE BESYLATE 10 MG: 10 TABLET ORAL at 08:01

## 2025-01-23 RX ADMIN — CHOLECALCIFEROL TAB 25 MCG (1000 UNIT) 2000 UNITS: 25 TAB at 08:01

## 2025-01-23 RX ADMIN — SODIUM BICARBONATE 650 MG TABLET 650 MG: at 08:01

## 2025-01-23 RX ADMIN — ATORVASTATIN CALCIUM 40 MG: 40 TABLET, FILM COATED ORAL at 08:01

## 2025-01-23 RX ADMIN — FOLIC ACID 1 MG: 1 TABLET ORAL at 08:01

## 2025-01-23 RX ADMIN — PANTOPRAZOLE SODIUM 20 MG: 20 TABLET, DELAYED RELEASE ORAL at 06:01

## 2025-01-23 RX ADMIN — CLOPIDOGREL BISULFATE 75 MG: 75 TABLET ORAL at 08:01

## 2025-01-23 RX ADMIN — ISOSORBIDE MONONITRATE 60 MG: 60 TABLET, EXTENDED RELEASE ORAL at 08:01

## 2025-01-23 RX ADMIN — RANOLAZINE 500 MG: 500 TABLET, EXTENDED RELEASE ORAL at 12:01

## 2025-01-23 NOTE — ASSESSMENT & PLAN NOTE
Patient with Acute debility due to age-related physical debility and cardiac disease . The patient's latest AMPAC (Activity Measure for Post Acute Care) Score is listed below.    AM-PAC Score - How much help does the patient need for each activity listed  Basic Mobility Total Score: 17  Turning over in bed (including adjusting bedclothes, sheets and blankets)?: A little  Sitting down on and standing up from a chair with arms (e.g., wheelchair, bedside commode, etc.): A little  Moving from lying on back to sitting on the side of the bed?: A little  Moving to and from a bed to a chair (including a wheelchair)?: A little  Need to walk in hospital room?: A little  Climbing 3-5 steps with a railing?: A lot    Plan  - Progressive mobility protocol initated  - PT/OT consulted  - Fall precautions in place

## 2025-01-23 NOTE — PROGRESS NOTES
"[facility] [address] [place] [address] Medicine  Progress Note    Patient Name: Anahy Evans  MRN: [id number]  Patient Class: IP- Inpatient   Admission Date: 1/19/2025  Length of Stay: 2 days  Attending Physician: Marely Perez MD  Primary Care Provider: Elena Cabrera MD        Subjective     Principal Problem:Anginal equivalent        HPI:  89 y/o AAF w/ CAD hx PCI, CKD 4, HTN, Afib, AAA, 2nd degree AV Block presents to the ED with complaints of dyspnea.     She was recently admitted to [facility] from 1/13-1/17 per DC summary "Patient admitted for dyspnea on exertion. V/Q scan was completed - no pulmonary embolism. Echo ordered - regional wall motion abnormalities present. Low normal systolic ejection fraction 50-55%. Cardiology was consulted - feel origin of TAYLOR may be cardiac in nature. PET stress done 1/16. Two perfusion abnormalities seen. Interventional cardiology recommending medical management and close follow-up. Amlodipine increased, losartan decreased, and started on imdur."    She returns to ED with concerns of dyspnea on exertion.  She discharged to home after last admission, lives with her daughter.  Her grand-daughter accompanies her today and helps provide history.  Since discharge patient has had limited ADL capability, pt requires assistance w/ ambulation and use of walker, patient has home purewick and bedside commode.  She requires assistance with toileting, bathing, clothing.  She is adherent to medications. Yesterday patient appeared weak and when sitting upright had poor trunk stability would slump over and had poor appetite did not eat much and was noted with intermittent confusion.  Her confusion is described as poor short term memory, will intermittently forget recent events, granddaughter notes that patient usually answers orientation questions appropriately when evaluated by medical teams, but might forget later what was discussed or why she is in the hospital.    She was treated for " acute cystitis during last admit, pan-sensitive E.coli on urine culture received ceftriaxone inpatient and not discharged on any oral antibiotics.     She does report poor sleep, has had difficulty sleeping at night and will nap multiple times per day. No reported non-redirectable agitation.   Prior to this month, grand-daughter notes patient was performing more ADL on her own.  Patient had declined referral to SNF with recent admits, last PT/OT recs for low-intensity therapy, pt is more open to post-acute care if recommended.     In ED tonight pt with bradycardia noted, cardiology consulted, no acute medical management of her bradycardia recommended, she has 2:1 AV block on review of EKG/telemetry tonight, hx of Mobitz 1 2nd Deg AV block in past.       Overview/Hospital Course:  Evaluated by cardiology and imdur increased. Had issues with SOB during breakfast on 1/20 but denies this problem on 1/21. Will need to work with PT/OT again. Patient is interested in placement option for therapy. Imdur up to 60mg. Started ranolazine as per cardiology recs. Will monitor neurologic status closely as patient's renal function is poor.     Interval History: No acute events overnight. Patient continues to bradycardic at night and intermittently during the day. She continues to have SOB and TAYLOR with minimal movement. She is interested in placement. She denies chest pain.     Review of Systems  Objective:     Vital Signs (Most Recent):  Temp: 98.1 °F (36.7 °C) (01/23/25 0712)  Pulse: (!) 40 (01/23/25 0712)  Resp: 16 (01/23/25 0712)  BP: (!) 168/79 (01/23/25 0712)  SpO2: 97 % (01/23/25 0712) Vital Signs (24h Range):  Temp:  [97.2 °F (36.2 °C)-98.1 °F (36.7 °C)] 98.1 °F (36.7 °C)  Pulse:  [34-72] 40  Resp:  [16-20] 16  SpO2:  [96 %-98 %] 97 %  BP: ()/(55-79) 168/79     Weight: 72 kg (158 lb 11.7 oz)  Body mass index is 24.86 kg/m².    Intake/Output Summary (Last 24 hours) at 1/23/2025 1022  Last data filed at 1/22/2025  1451  Gross per 24 hour   Intake --   Output 550 ml   Net -550 ml         Physical Exam  Vitals and nursing note reviewed.   Constitutional:       General: She is not in acute distress.  HENT:      Head: Normocephalic and atraumatic.   Eyes:      General: No scleral icterus.     Pupils: Pupils are equal, round, and reactive to light.   Cardiovascular:      Rate and Rhythm: Bradycardia present. Rhythm irregular.      Heart sounds: Murmur heard.   Pulmonary:      Effort: Pulmonary effort is normal. No respiratory distress.      Breath sounds: Normal breath sounds. No wheezing or rales.   Abdominal:      General: Bowel sounds are normal. There is no distension.      Palpations: Abdomen is soft.      Tenderness: There is no abdominal tenderness. There is no guarding.   Musculoskeletal:      Right lower leg: No edema.      Left lower leg: No edema.   Skin:     General: Skin is warm and dry.   Neurological:      General: No focal deficit present.      Mental Status: She is alert and oriented to person, place, and time.   Psychiatric:         Mood and Affect: Mood normal.         Behavior: Behavior normal.             Significant Labs: All pertinent labs within the past 24 hours have been reviewed.    Significant Imaging: I have reviewed all pertinent imaging results/findings within the past 24 hours.    Assessment and Plan     * Anginal equivalent  Persistent   Seen by cardiology - her intermittent dyspnea symptoms are considered an anginal equivalent.  Recommendation to titrate Imdur dosage to 60mg. BP now borderline hypotensive with this increased. Patient took AM imdur 30mg, was hypertensive on presentation - gave 30mg imdur 1/19 and started 60mg in AM.  - cardiology signed off  - PT/OT consulted  - continue imdur 60  - start ranolazine with caution       Coronary artery disease involving native coronary artery of native heart without angina pectoris  Patient with known CAD , which is uncontrolled Will continue ASA,  "Plavix, and Statin and monitor for S/Sx of angina/ACS. Continue to monitor on telemetry.     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 Pt having 2:1 AV block atrial rates in 80s and HR in low 40s.  During cardiology evaluation pt with variable 2nd degree AV block. She is not on any AV node blocking agents. Discussed with cards/EP  - continue telemetry monitoring.     Per Dr. Cummings on admit: "Tonight discussed code status with patient - she has been Full Code and DNR during last 2 admits, initially indicated DNR but also states she would want resuscitative measures if she might not incur injury, reviewed probabilities with pt given advanced age and chronic medical conditiions indicated to her that under true cardio-respiratory arrest she would be left with subsequent injury and overall low likelihood of survival to discharge 5-10%.  Give this bradyarrhythmia I did discuss if patients HR was lower if she would want invasive measures such as transvenous pacing or transcutaneous pacing performed she responded in affirmative,  will place FULL CODE for now."       Debility  Patient with Acute debility due to age-related physical debility and cardiac disease . The patient's latest AMPAC (Activity Measure for Post Acute Care) Score is listed below.    AM-PAC Score - How much help does the patient need for each activity listed  Basic Mobility Total Score: 17  Turning over in bed (including adjusting bedclothes, sheets and blankets)?: A little  Sitting down on and standing up from a chair with arms (e.g., wheelchair, bedside commode, etc.): A little  Moving from lying on back to sitting on the side of the bed?: A little  Moving to and from a bed to a chair (including a wheelchair)?: A little  Need to walk in hospital room?: A little  Climbing 3-5 steps with a railing?: A lot    Plan  - Progressive mobility protocol initated  - PT/OT consulted  - Fall precautions in " place          Counseling regarding goals of care  Code status discussion as per AV block problem.     Patient has living will and HCPOA documents uploaded, she does not have an LAPOST completed. Would recommend completion of LAPOST prior to hospital discharge.       Confusion  ED indicated pt referred for admit for confusion.  By CAM-ICU testing pt does not have delirium, some disorientation. She may have some underlying cognitive deficits based on grand-daughters description. Appears pt with altered sleep wake cycles more recently - schedule melatonin tonight to help normalize sleep-wake cycle, delirium precautions.   Repeat UA appears normal and no persistent symptoms or concerns for ongoing Acute cystitis adequately treated.   - delirium precautions      Acute cystitis without hematuria  Repeat UA appears normal and no persistent symptoms or concerns for ongoing Acute cystitis adequately treated.       CKD (chronic kidney disease), stage IV  Creatine stable for now. BMP reviewed- noted Estimated Creatinine Clearance: 17.8 mL/min (A) (based on SCr of 2 mg/dL (H)). according to latest data. Based on current GFR, CKD stage is stage 4 - GFR 15-29.  Monitor UOP and serial BMP and adjust therapy as needed. Renally dose meds. Avoid nephrotoxic medications and procedures.    Continue sodium bicarbonate and farxiga    Essential hypertension  Patient's blood pressure range in the last 24 hours was: BP  Min: 96/55  Max: 168/79.The patient's inpatient anti-hypertensive regimen is listed below:  Current Antihypertensives  nitroGLYCERIN SL tablet 0.4 mg, Every 5 min PRN, Sublingual  amLODIPine tablet 10 mg, Daily, Oral  isosorbide mononitrate 24 hr tablet 60 mg, Daily, Oral  losartan tablet 25 mg, Daily, Oral  ranolazine 12 hr tablet 500 mg, 2 times daily, Oral    Plan  - BP is controlled, no changes needed to their regimen      PAF (paroxysmal atrial fibrillation)  Patient has paroxysmal (<7 days) atrial fibrillation.  Patient is currently in sinus rhythm. [id number] Score: 4. The patients heart rate in the last 24 hours is as follows:  Pulse  Min: 34  Max: 72     Antiarrhythmics       Anticoagulants  heparin (porcine) injection 5,000 Units, Every 8 hours, Subcutaneous    Plan  - Replete lytes with a goal of K>4, Mg >2  - Patient is not anticoagulated due to unclear etiology  - Patient's afib is currently controlled          VTE Risk Mitigation (From admission, onward)           Ordered     heparin (porcine) injection 5,000 Units  Every 8 hours         01/20/25 0003     IP VTE HIGH RISK PATIENT  Once         01/20/25 0003     Place sequential compression device  Until discontinued         01/20/25 0003                    Discharge Planning   ROSEMARIE: 1/23/2025     Code Status: Full Code   Medical Readiness for Discharge Date:   Discharge Plan A: Home                Please place Justification for GAIL Perez MD  Department of Hospital Medicine   [facility]

## 2025-01-23 NOTE — ASSESSMENT & PLAN NOTE
Patient's blood pressure range in the last 24 hours was: BP  Min: 96/55  Max: 168/79.The patient's inpatient anti-hypertensive regimen is listed below:  Current Antihypertensives  nitroGLYCERIN SL tablet 0.4 mg, Every 5 min PRN, Sublingual  amLODIPine tablet 10 mg, Daily, Oral  isosorbide mononitrate 24 hr tablet 60 mg, Daily, Oral  losartan tablet 25 mg, Daily, Oral  ranolazine 12 hr tablet 500 mg, 2 times daily, Oral    Plan  - BP is controlled, no changes needed to their regimen

## 2025-01-23 NOTE — SUBJECTIVE & OBJECTIVE
Interval History: No acute events overnight. Patient continues to bradycardic at night and intermittently during the day. She continues to have SOB and TAYLOR with minimal movement. She is interested in placement. She denies chest pain.     Review of Systems  Objective:     Vital Signs (Most Recent):  Temp: 98.1 °F (36.7 °C) (01/23/25 0712)  Pulse: (!) 40 (01/23/25 0712)  Resp: 16 (01/23/25 0712)  BP: (!) 168/79 (01/23/25 0712)  SpO2: 97 % (01/23/25 0712) Vital Signs (24h Range):  Temp:  [97.2 °F (36.2 °C)-98.1 °F (36.7 °C)] 98.1 °F (36.7 °C)  Pulse:  [34-72] 40  Resp:  [16-20] 16  SpO2:  [96 %-98 %] 97 %  BP: ()/(55-79) 168/79     Weight: 72 kg (158 lb 11.7 oz)  Body mass index is 24.86 kg/m².    Intake/Output Summary (Last 24 hours) at 1/23/2025 1022  Last data filed at 1/22/2025 1451  Gross per 24 hour   Intake --   Output 550 ml   Net -550 ml         Physical Exam  Vitals and nursing note reviewed.   Constitutional:       General: She is not in acute distress.  HENT:      Head: Normocephalic and atraumatic.   Eyes:      General: No scleral icterus.     Pupils: Pupils are equal, round, and reactive to light.   Cardiovascular:      Rate and Rhythm: Bradycardia present. Rhythm irregular.      Heart sounds: Murmur heard.   Pulmonary:      Effort: Pulmonary effort is normal. No respiratory distress.      Breath sounds: Normal breath sounds. No wheezing or rales.   Abdominal:      General: Bowel sounds are normal. There is no distension.      Palpations: Abdomen is soft.      Tenderness: There is no abdominal tenderness. There is no guarding.   Musculoskeletal:      Right lower leg: No edema.      Left lower leg: No edema.   Skin:     General: Skin is warm and dry.   Neurological:      General: No focal deficit present.      Mental Status: She is alert and oriented to person, place, and time.   Psychiatric:         Mood and Affect: Mood normal.         Behavior: Behavior normal.             Significant Labs: All  pertinent labs within the past 24 hours have been reviewed.    Significant Imaging: I have reviewed all pertinent imaging results/findings within the past 24 hours.

## 2025-01-23 NOTE — ASSESSMENT & PLAN NOTE
Patient has paroxysmal (<7 days) atrial fibrillation. Patient is currently in sinus rhythm. [id number] Score: 4. The patients heart rate in the last 24 hours is as follows:  Pulse  Min: 34  Max: 72     Antiarrhythmics       Anticoagulants  heparin (porcine) injection 5,000 Units, Every 8 hours, Subcutaneous    Plan  - Replete lytes with a goal of K>4, Mg >2  - Patient is not anticoagulated due to unclear etiology  - Patient's afib is currently controlled

## 2025-01-23 NOTE — ASSESSMENT & PLAN NOTE
Persistent   Seen by cardiology - her intermittent dyspnea symptoms are considered an anginal equivalent.  Recommendation to titrate Imdur dosage to 60mg. BP now borderline hypotensive with this increased. Patient took AM imdur 30mg, was hypertensive on presentation - gave 30mg imdur 1/19 and started 60mg in AM.  - cardiology signed off  - PT/OT consulted  - continue imdur 60  - start ranolazine with caution

## 2025-01-24 LAB
ANION GAP SERPL CALC-SCNC: 11 MMOL/L (ref 8–16)
BUN SERPL-MCNC: 47 MG/DL (ref 10–30)
CALCIUM SERPL-MCNC: 10.1 MG/DL (ref 8.7–10.5)
CHLORIDE SERPL-SCNC: 102 MMOL/L (ref 95–110)
CO2 SERPL-SCNC: 22 MMOL/L (ref 23–29)
CREAT SERPL-MCNC: 2.3 MG/DL (ref 0.5–1.4)
EST. GFR  (NO RACE VARIABLE): 19.6 ML/MIN/1.73 M^2
GLUCOSE SERPL-MCNC: 95 MG/DL (ref 70–110)
MAGNESIUM SERPL-MCNC: 1.9 MG/DL (ref 1.6–2.6)
POTASSIUM SERPL-SCNC: 4.1 MMOL/L (ref 3.5–5.1)
SODIUM SERPL-SCNC: 135 MMOL/L (ref 136–145)

## 2025-01-24 PROCEDURE — 97116 GAIT TRAINING THERAPY: CPT

## 2025-01-24 PROCEDURE — 97165 OT EVAL LOW COMPLEX 30 MIN: CPT

## 2025-01-24 PROCEDURE — 21400001 HC TELEMETRY ROOM

## 2025-01-24 PROCEDURE — 25000003 PHARM REV CODE 250: Performed by: HOSPITALIST

## 2025-01-24 PROCEDURE — 83735 ASSAY OF MAGNESIUM: CPT | Performed by: STUDENT IN AN ORGANIZED HEALTH CARE EDUCATION/TRAINING PROGRAM

## 2025-01-24 PROCEDURE — 97530 THERAPEUTIC ACTIVITIES: CPT

## 2025-01-24 PROCEDURE — 25000003 PHARM REV CODE 250: Performed by: STUDENT IN AN ORGANIZED HEALTH CARE EDUCATION/TRAINING PROGRAM

## 2025-01-24 PROCEDURE — 97161 PT EVAL LOW COMPLEX 20 MIN: CPT

## 2025-01-24 PROCEDURE — 63600175 PHARM REV CODE 636 W HCPCS: Performed by: HOSPITALIST

## 2025-01-24 PROCEDURE — 80048 BASIC METABOLIC PNL TOTAL CA: CPT | Performed by: STUDENT IN AN ORGANIZED HEALTH CARE EDUCATION/TRAINING PROGRAM

## 2025-01-24 RX ORDER — ISOSORBIDE MONONITRATE 30 MG/1
30 TABLET, EXTENDED RELEASE ORAL DAILY
Status: DISCONTINUED | OUTPATIENT
Start: 2025-01-25 | End: 2025-01-26

## 2025-01-24 RX ADMIN — ISOSORBIDE MONONITRATE 60 MG: 60 TABLET, EXTENDED RELEASE ORAL at 08:01

## 2025-01-24 RX ADMIN — Medication 6 MG: at 08:01

## 2025-01-24 RX ADMIN — LOSARTAN POTASSIUM 25 MG: 25 TABLET, FILM COATED ORAL at 08:01

## 2025-01-24 RX ADMIN — HEPARIN SODIUM 5000 UNITS: 5000 INJECTION INTRAVENOUS; SUBCUTANEOUS at 09:01

## 2025-01-24 RX ADMIN — CHOLECALCIFEROL TAB 25 MCG (1000 UNIT) 2000 UNITS: 25 TAB at 08:01

## 2025-01-24 RX ADMIN — SENNOSIDES AND DOCUSATE SODIUM 1 TABLET: 50; 8.6 TABLET ORAL at 08:01

## 2025-01-24 RX ADMIN — SODIUM BICARBONATE 650 MG TABLET 650 MG: at 08:01

## 2025-01-24 RX ADMIN — SODIUM BICARBONATE 650 MG TABLET 650 MG: at 09:01

## 2025-01-24 RX ADMIN — DAPAGLIFLOZIN 10 MG: 10 TABLET, FILM COATED ORAL at 08:01

## 2025-01-24 RX ADMIN — FOLIC ACID 1 MG: 1 TABLET ORAL at 08:01

## 2025-01-24 RX ADMIN — ATORVASTATIN CALCIUM 40 MG: 40 TABLET, FILM COATED ORAL at 08:01

## 2025-01-24 RX ADMIN — CLOPIDOGREL BISULFATE 75 MG: 75 TABLET ORAL at 08:01

## 2025-01-24 RX ADMIN — RANOLAZINE 500 MG: 500 TABLET, EXTENDED RELEASE ORAL at 08:01

## 2025-01-24 RX ADMIN — HEPARIN SODIUM 5000 UNITS: 5000 INJECTION INTRAVENOUS; SUBCUTANEOUS at 02:01

## 2025-01-24 RX ADMIN — HEPARIN SODIUM 5000 UNITS: 5000 INJECTION INTRAVENOUS; SUBCUTANEOUS at 05:01

## 2025-01-24 RX ADMIN — PANTOPRAZOLE SODIUM 20 MG: 20 TABLET, DELAYED RELEASE ORAL at 05:01

## 2025-01-24 RX ADMIN — AMLODIPINE BESYLATE 10 MG: 10 TABLET ORAL at 08:01

## 2025-01-24 RX ADMIN — ASPIRIN 81 MG: 81 TABLET, COATED ORAL at 08:01

## 2025-01-24 RX ADMIN — ALLOPURINOL 50 MG: 300 TABLET ORAL at 08:01

## 2025-01-24 NOTE — PLAN OF CARE
Problem: Occupational Therapy  Goal: Occupational Therapy Goal  Description: Goals to be met by: 2/23/25     Patient will increase functional independence with ADLs by performing:    UE Dressing with Modified [place].  LE Dressing with Modified [place].  Grooming while standing with Modified [place].  Toileting from toilet with Modified [place] for hygiene and clothing management.   Supine to sit with Modified [place].  Step transfer with Modified [place]  Toilet transfer to toilet with Modified [place].    Outcome: Progressing     OT eval completed.

## 2025-01-24 NOTE — PT/OT/SLP EVAL
Physical Therapy Evaluation  Co-evaluation with OT due to acuity of condition, level of skilled assist needed for assessment of safety with mobility.   Patient Name:  Anahy Evans   MRN:  [id number]    Recommendations:     Discharge Recommendations: Moderate Intensity Therapy   Discharge Equipment Recommendations: none   Barriers to discharge: Inaccessible home, Decreased caregiver support, and patient below functional baseline    Assessment:     Anahy Evans is a 89 y.o. female admitted with a medical diagnosis of Anginal equivalent.  She presents with the following impairments/functional limitations: weakness, impaired balance, impaired endurance, impaired self care skills, impaired functional mobility, gait instability, decreased lower extremity function, decreased upper extremity function, decreased safety awareness, decreased coordination, impaired cardiopulmonary response to activity. The patient demonstrates generalized weakness and decreased activity tolerance. She did not require physical assist to ambulate 14' with RW to bathroom sink. After standing performing ADLs ~2 min, patient fatigued suddenly, needing assist to sit suddenly in bedside chair for safety. Prior to admission, she was modified independent with use of RW recently due to increased weakness. Patient currently demonstrates a need for moderate intensity therapy on a daily basis post acute secondary to a decline in functional status due to illness     Rehab Prognosis: Good; patient would benefit from acute skilled PT services to address these deficits and reach maximum level of function.    Recent Surgery: * No surgery found *      Plan:     During this hospitalization, patient to be seen 4 x/week to address the identified rehab impairments via gait training, therapeutic activities, therapeutic exercises, neuromuscular re-education and progress toward the following goals:    Plan of Care Expires:  02/23/25    Subjective     Chief Complaint:  ""I was in the bed for two weeks before I got to the hospital, I was weak"  Patient/Family Comments/goals: return to PLOF  Pain/Comfort:  Pain Rating 1: 0/10    Patients cultural, spiritual, Jewish conflicts given the current situation: no    Living Environment:  The patient lives with her daughter (daughter is unable to physically assist patient) in 2SH (bed and bath are downstairs), 4 BAIRON L HR.   Prior to admission, patients level of function was modified independent with use of RW to ambulate, recently too weak to tolerate gait.  Equipment used at home: cane, straight, walker, rolling, shower chair.  DME owned (not currently used): none.  Upon discharge, patient will have assistance from daughter- unable to physically assist patient.    Objective:     Communicated with RN prior to session.  Patient found HOB elevated with bed alarm, telemetry, PureWick  upon PT entry to room.     General Precautions: Standard, fall  Orthopedic Precautions:N/A   Braces: N/A  Respiratory Status: Room air    Exams:    Cognitive Exam  Patient is A&O x4 and follows 100% of one -step commands    Fine Motor Coordination   -       WFL     Postural Exam Patient presented with the following abnormalities:    -       Rounded shoulders  -       Forward head  -       Kyphosis  -       Posterior pelvic tilt  -     Sensation    -       Light touch intact OSBALDO LE   Skin Integrity/Edema     -       Skin integrity: visibly intact   -       Edema: mild swelling lower legs   R LE ROM WFL   R LE Strength 4-/5 hip flexion, knee ext/flex, and ankle DF/PF   L LE ROM WFL   L LE Strength  4-/5 hip flexion, knee ext/flex, and ankle DF/PF       Balance   Static Sitting supervision assistance    Dynamic Sitting supervision assistance    Static Standing stand by assistance no AD at sink    Dynamic Standing       contact guard assist no AD at sink for ADLS        Functional Mobility:    Bed Mobility  Supine to Sit on the L side:  stand by assistance  "   Transfers Sit to Stand:  minimum assistance with no AD from edge of bed     Stand step transfer with no AD: minimum assistance, unsteady with 1 LOB requiring assist to recover   Gait  Gait Distance: 14 ft with RW  Assistance Level: contact guard assist   Description: forward flexed posture, ambulating outside RW CONNIE, decreased step length, short shuffling steps, slow deliberate gait speed           AM-PAC 6 CLICK MOBILITY  Total Score:17       Treatment & Education:  Patient educated on:  -role of therapy  -goals of session  -PT POC  -benefits of out of bed mobility and consequences of immobility  -calling for staff assist to mobilize safely  Patient agreeable to mobilize with therapy.      Gait training: cued for upright posture, reciprocal strides, cued to ambulate inside RW CONNIE, pacing for energy conservation     Patient performed standing ADLs  2 minutes with no AD and no UE support. Performed standing weight shift within CONNIE. No loss of balance.  Suddenly fatigued, leaning on sink for support. Assisted to bedside chair for safety.     Patient encouraged to ambulate, sit up in chair 3x/day to prevent deconditioning during hospitalization. Patient verbalized understanding and agreement to mobilize only with RN assist for safety.     Patient left up in chair with all lines intact and call button in reach.    GOALS:   Multidisciplinary Problems       Physical Therapy Goals          Problem: Physical Therapy    Goal Priority Disciplines Outcome Interventions   Physical Therapy Goal     PT, PT/OT Progressing    Description: Goals to be met by: 2/10     Patient will increase functional independence with mobility by performin. Supine to sit with Modified [place]  2. Sit to supine with Modified [place]  3. Sit to stand transfer with Modified [place]  4. Gait  x 200 feet with Supervision using LRAD.   5. Ascend/descend 4 stair with left Handrails Contact Guard Assistance using LRAD.                           DME Justifications:  No DME recommended requiring DME justifications    History:     Past Medical History:   Diagnosis Date    Acute blood loss anemia 10/26/2021    Anticoagulant long-term use     Breast deformity 09/07/2022    Cervical cancer 1968    breast cancer right    Coronary artery disease     Gout     High cholesterol     History of cervical dysplasia 10/28/2014    Hypertension     MI (myocardial infarction) 1999    Right axillary hidradenitis 07/11/2022       Past Surgical History:   Procedure Laterality Date    BREAST LUMPECTOMY      right    CARDIAC SURGERY      multiple cardiac stents    CORONARY ANGIOGRAPHY Right 1/21/2022    Procedure: ANGIOGRAM, CORONARY ARTERY;  Surgeon: Asim Canela MD;  Location: [facility] CATH LAB;  Service: Cardiology;  Laterality: Right;    CORONARY ANGIOGRAPHY Right 5/10/2024    Procedure: ANGIOGRAM, CORONARY ARTERY POSSIBLE LV COR;  Surgeon: Asim Canela MD;  Location: [facility] CATH LAB;  Service: Cardiology;  Laterality: Right;    CORONARY STENT PLACEMENT      HIP REPLACEMENT ARTHROPLASTY Right 2018    JOINT REPLACEMENT      right       Time Tracking:     PT Received On: 01/24/25  PT Start Time: 0915     PT Stop Time: 0939  PT Total Time (min): 24 min     Billable Minutes: Evaluation 12 and Gait Training 12      01/24/2025

## 2025-01-24 NOTE — PLAN OF CARE
Problem: Adult Inpatient Plan of Care  Goal: Plan of Care Review  Outcome: Progressing  Goal: Patient-Specific Goal (Individualized)  Outcome: Progressing  Goal: Absence of Hospital-Acquired Illness or Injury  Outcome: Progressing  Goal: Optimal Comfort and Wellbeing  Outcome: Progressing  Goal: Readiness for Transition of Care  Outcome: Progressing     Problem: Infection  Goal: Absence of Infection Signs and Symptoms  Outcome: Progressing     Problem: Acute Kidney Injury/Impairment  Goal: Fluid and Electrolyte Balance  Outcome: Progressing  Goal: Improved Oral Intake  Outcome: Progressing  Goal: Effective Renal Function  Outcome: Progressing     Problem: Fall Injury Risk  Goal: Absence of Fall and Fall-Related Injury  Outcome: Progressing     Problem: Pain Acute  Goal: Optimal Pain Control and Function  Outcome: Progressing

## 2025-01-24 NOTE — PLAN OF CARE
Problem: Physical Therapy  Goal: Physical Therapy Goal  Description: Goals to be met by: 2/10     Patient will increase functional independence with mobility by performin. Supine to sit with Modified [place]  2. Sit to supine with Modified [place]  3. Sit to stand transfer with Modified [place]  4. Gait  x 200 feet with Supervision using LRAD.   5. Ascend/descend 4 stair with left Handrails Contact Guard Assistance using LRAD.     Outcome: Progressing   Evaluation completed, initiated plan of care.   Carmen Dominique, PT  2025

## 2025-01-24 NOTE — PROGRESS NOTES
"[facility] [address] [place] [address] Medicine  Progress Note    Patient Name: Anahy Evans  MRN: [id number]  Patient Class: IP- Inpatient   Admission Date: 1/19/2025  Length of Stay: 3 days  Attending Physician: Maria E Bernstein MD  Primary Care Provider: Elena Cabrera MD          Principal Problem:Anginal equivalent        HPI:  89 y/o AAF w/ CAD hx PCI, CKD 4, HTN, Afib, AAA, 2nd degree AV Block presents to the ED with complaints of dyspnea.     [place] [facility] [place] summary "Patient admitted for dyspnea on exertion. V/Q scan was completed - no pulmonary embolism. Echo ordered - regional wall motion abnormalities present. Low normal systolic ejection fraction 50-55%. Cardiology was consulted - feel origin of TAYLOR may be cardiac in nature. PET stress done 1/16. Two perfusion abnormalities seen. Interventional cardiology recommending medical management and close follow-up. Amlodipine increased, losartan decreased, and started on imdur."    She returns to ED with concerns of dyspnea on exertion.  She discharged to home after last admission, lives with her daughter.  Her grand-daughter accompanies her today and helps provide history.  Since discharge patient has had limited ADL capability, pt requires assistance w/ ambulation and use of walker, patient has home purewick and bedside commode.  She requires assistance with toileting, bathing, clothing.  She is adherent to medications. Yesterday patient appeared weak and when sitting upright had poor trunk stability would slump over and had poor appetite did not eat much and was noted with intermittent confusion.  Her confusion is described as poor short term memory, will intermittently forget recent events, granddaughter notes that patient usually answers orientation questions appropriately when evaluated by medical teams, but might forget later what was discussed or why she is in the hospital.    She was treated for acute cystitis " during last admit, pan-sensitive E.coli on urine culture received ceftriaxone inpatient and not discharged on any oral antibiotics.     She does report poor sleep, has had difficulty sleeping at night and will nap multiple times per day. No reported non-redirectable agitation.   Prior to this month, grand-daughter notes patient was performing more ADL on her own.  Patient had declined referral to SNF with recent admits, last PT/OT recs for low-intensity therapy, pt is more open to post-acute care if recommended.     In ED tonight pt with bradycardia noted, cardiology consulted, no acute medical management of her bradycardia recommended, she has 2:1 AV block on review of EKG/telemetry tonight, hx of Mobitz 1 2nd Deg AV block in past.       Overview/Hospital Course:  Evaluated by cardiology and imdur increased. Had issues with SOB during breakfast on 1/20 but denies this problem on 1/21. Will need to work with PT/OT again. Patient is interested in placement option for therapy. Imdur up to 60mg. Started ranolazine as per cardiology recs. Will monitor neurologic status closely as patient's renal function is poor.     Interval History:     Patient seen at bedside. Reports no new symptoms. Continues to have SOB on exertion.   PT/OT consulted - plan for placement. Patient is pending placement.     Review of Systems  Objective:     Vital Signs (Most Recent):  Temp: 98.1 °F (36.7 °C) (01/24/25 1551)  Pulse: 69 (01/24/25 1551)  Resp: 20 (01/24/25 1551)  BP: (!) 103/56 (01/24/25 1551)  SpO2: 97 % (01/24/25 1551) Vital Signs (24h Range):  Temp:  [97.9 °F (36.6 °C)-98.1 °F (36.7 °C)] 98.1 °F (36.7 °C)  Pulse:  [32-69] 69  Resp:  [20] 20  SpO2:  [96 %-98 %] 97 %  BP: (103-170)/(56-78) 103/56     Weight: 72 kg (158 lb 11.7 oz)  Body mass index is 24.86 kg/m².    Intake/Output Summary (Last 24 hours) at 1/24/2025 5783  Last data filed at 1/24/2025 0708  Gross per 24 hour   Intake --   Output 1100 ml   Net -1100 ml         Physical  Exam  Constitutional:       General: She is not in acute distress.     Appearance: She is not ill-appearing.   Cardiovascular:      Rate and Rhythm: Normal rate.      Heart sounds: Normal heart sounds.   Pulmonary:      Effort: Pulmonary effort is normal. No respiratory distress.      Breath sounds: No wheezing.   Abdominal:      General: Abdomen is flat.      Palpations: Abdomen is soft.      Tenderness: There is no abdominal tenderness.   Musculoskeletal:      Right lower leg: No edema.      Left lower leg: No edema.   Neurological:      General: No focal deficit present.      Mental Status: She is alert and oriented to person, place, and time. Mental status is at baseline.   Psychiatric:         Mood and Affect: Mood normal.             Significant Labs: All pertinent labs within the past 24 hours have been reviewed.  BMP:   Recent Labs   Lab 01/24/25  0938   GLU 95   *   K 4.1      CO2 22*   BUN 47*   CREATININE 2.3*   CALCIUM 10.1   MG 1.9     CBC:   Recent Labs   Lab 01/23/25  0416   WBC 5.12   HGB 9.2*   HCT 28.5*          Significant Imaging: I have reviewed all pertinent imaging results/findings within the past 24 hours.    Assessment and Plan     * Anginal equivalent  Persistent   Seen by cardiology - her intermittent dyspnea symptoms are considered an anginal equivalent.  Recommendation to titrate Imdur dosage to 60mg. BP now borderline hypotensive with this increased. Patient took AM imdur 30mg, was hypertensive on presentation - gave 30mg imdur 1/19 and started 60mg in AM.  - cardiology signed off  - PT/OT consulted  - continue imdur 60  - start ranolazine with caution       Debility  Patient with Acute debility due to age-related physical debility and cardiac disease . The patient's latest AMPAC (Activity Measure for Post Acute Care) Score is listed below.    AM-PAC Score - How much help does the patient need for each activity listed  Basic Mobility Total Score: 17  Turning over in  "bed (including adjusting bedclothes, sheets and blankets)?: A little  Sitting down on and standing up from a chair with arms (e.g., wheelchair, bedside commode, etc.): A little  Moving from lying on back to sitting on the side of the bed?: A little  Moving to and from a bed to a chair (including a wheelchair)?: A little  Need to walk in hospital room?: A little  Climbing 3-5 steps with a railing?: A lot    Plan  - Progressive mobility protocol initated  - PT/OT consulted  - Fall precautions in place          Counseling regarding goals of care  Code status discussion as per AV block problem.     Patient has living will and HCPOA documents uploaded, she does not have an LAPOST completed. Would recommend completion of LAPOST prior to hospital discharge.       Confusion  ED indicated pt referred for admit for confusion.  By CAM-ICU testing pt does not have delirium, some disorientation. She may have some underlying cognitive deficits based on grand-daughters description. Appears pt with altered sleep wake cycles more recently - schedule melatonin tonight to help normalize sleep-wake cycle, delirium precautions.   Repeat UA appears normal and no persistent symptoms or concerns for ongoing Acute cystitis adequately treated.   - delirium precautions      Acute cystitis without hematuria  Repeat UA appears normal and no persistent symptoms or concerns for ongoing Acute cystitis adequately treated.       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 Pt having 2:1 AV block atrial rates in 80s and HR in low 40s.  During cardiology evaluation pt with variable 2nd degree AV block. She is not on any AV node blocking agents. Discussed with cards/EP  - continue telemetry monitoring.     Per Dr. Cummings on admit: "Tonight discussed code status with patient - she has been Full Code and DNR during last 2 admits, initially indicated DNR but also states she would want resuscitative measures " "if she might not incur injury, reviewed probabilities with pt given advanced age and chronic medical conditiions indicated to her that under true cardio-respiratory arrest she would be left with subsequent injury and overall low likelihood of survival to discharge 5-10%.  Give this bradyarrhythmia I did discuss if patients HR was lower if she would want invasive measures such as transvenous pacing or transcutaneous pacing performed she responded in affirmative,  will place FULL CODE for now."       Coronary artery disease involving native coronary artery of native heart without angina pectoris  Patient with known CAD , which is uncontrolled Will continue ASA, Plavix, and Statin and monitor for S/Sx of angina/ACS. Continue to monitor on telemetry.     CKD (chronic kidney disease), stage IV  Creatine stable for now. BMP reviewed- noted Estimated Creatinine Clearance: 17.8 mL/min (A) (based on SCr of 2 mg/dL (H)). according to latest data. Based on current GFR, CKD stage is stage 4 - GFR 15-29.  Monitor UOP and serial BMP and adjust therapy as needed. Renally dose meds. Avoid nephrotoxic medications and procedures.    Continue sodium bicarbonate and farxiga    Essential hypertension  Patient's blood pressure range in the last 24 hours was: BP  Min: 96/55  Max: 168/79.The patient's inpatient anti-hypertensive regimen is listed below:  Current Antihypertensives  nitroGLYCERIN SL tablet 0.4 mg, Every 5 min PRN, Sublingual  amLODIPine tablet 10 mg, Daily, Oral  isosorbide mononitrate 24 hr tablet 60 mg, Daily, Oral  losartan tablet 25 mg, Daily, Oral  ranolazine 12 hr tablet 500 mg, 2 times daily, Oral    Plan  - BP is controlled, no changes needed to their regimen      PAF (paroxysmal atrial fibrillation)  Patient has paroxysmal (<7 days) atrial fibrillation. Patient is currently in sinus rhythm. [id number] Score: 4. The patients heart rate in the last 24 hours is as follows:  Pulse  Min: 34  Max: 72     Antiarrhythmics   "     Anticoagulants  heparin (porcine) injection 5,000 Units, Every 8 hours, Subcutaneous    Plan  - Replete lytes with a goal of K>4, Mg >2  - Patient is not anticoagulated due to unclear etiology  - Patient's afib is currently controlled          VTE Risk Mitigation (From admission, onward)           Ordered     heparin (porcine) injection 5,000 Units  Every 8 hours         01/20/25 0003     IP VTE HIGH RISK PATIENT  Once         01/20/25 0003     Place sequential compression device  Until discontinued         01/20/25 0003                    Discharge Planning   ROSEMARIE: 1/27/2025     Code Status: Full Code   Medical Readiness for Discharge Date:   Discharge Plan A: Skilled Nursing Facility                Maria E Bernstein MD  Department of Hospital Medicine   [facility] - Observation 11H

## 2025-01-24 NOTE — PLAN OF CARE
Met with Patient to review discharge recommendation of SNF and is agreeable to plan    Patient/family provided list of facilities in-network with patient's payor plan. Providers that are owned, operated, or affiliated with [facility] are included on the list.     Notified that referral sent to below listed facilities from in-network list based on proximity to home/family support:   [facility]  2.  [facility]      Patient/family instructed to identify preference.    Preferred Facility: (if more than 1, listed in order of descending preference)  [facility]    If an additional preferred facility not listed above is identified, additional referral to be sent. If above facilities unable to accept, will send additional referrals to in-network providers.      01/24/25 1346   Post-Acute Status   Post-Acute Authorization Placement   Post-Acute Placement Status Referrals Sent   Discharge Plan   Discharge Plan A Skilled Nursing Facility   Discharge Plan B Skilled Nursing Facility     Discharge Plan A and Plan B have been determined by review of patient's clinical status, future medical and therapeutic needs, and coverage/benefits for post-acute care in coordination with multidisciplinary team members.  Ryan Quick, RN,BSN

## 2025-01-24 NOTE — PLAN OF CARE
Problem: Adult Inpatient Plan of Care  Goal: Plan of Care Review  Outcome: Progressing  Goal: Patient-Specific Goal (Individualized)  Outcome: Progressing  Goal: Absence of Hospital-Acquired Illness or Injury  Outcome: Progressing  Goal: Optimal Comfort and Wellbeing  Outcome: Progressing     Problem: Infection  Goal: Absence of Infection Signs and Symptoms  Outcome: Progressing     Problem: Acute Kidney Injury/Impairment  Goal: Fluid and Electrolyte Balance  Outcome: Progressing     Problem: Fall Injury Risk  Goal: Absence of Fall and Fall-Related Injury  Outcome: Progressing     Problem: Pain Acute  Goal: Optimal Pain Control and Function  Outcome: Progressing

## 2025-01-24 NOTE — PT/OT/SLP EVAL
Occupational Therapy   Co-Evaluation    Name: Anahy Evans  MRN: [id number]  Admitting Diagnosis: Anginal equivalent  Recent Surgery: * No surgery found *      Recommendations:     Discharge Recommendations: Moderate Intensity Therapy  Discharge Equipment Recommendations:  none  Barriers to discharge:  None    Assessment:     Anahy Evans is a 89 y.o. female with a medical diagnosis of Anginal equivalent.  She presents with decreased independence with ADL's. Performance deficits affecting function: weakness, impaired endurance, impaired self care skills, impaired functional mobility, impaired balance, decreased lower extremity function, decreased upper extremity function, impaired cardiopulmonary response to activity.  Co-evaluation/treatment performed due to patient's multiple deficits potentially requiring two skilled therapists to safely assess patient's ability to complete ADLs and functional mobility in addition to accommodating for patient's activity tolerance while in acute care setting.      Rehab Prognosis: Good; patient would benefit from acute skilled OT services to address these deficits and reach maximum level of function.       Plan:     Patient to be seen 4 x/week to address the above listed problems via self-care/home management, therapeutic activities, therapeutic exercises  Plan of Care Expires: 02/23/25  Plan of Care Reviewed with: patient    Subjective     Chief Complaint: weakness while standing  Patient/Family Comments/goals: Pt reported weakness while standing at bathroom sink    Occupational Profile:  Living Environment: Pt resides with daughter in 2 story house with 4 steps left sided rail to enter. Pt was modified independent with ADL's & ambulation using RW.  Pt has a bathtub for bathing. 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   quipment Used at Home: bedside commode, bath bench, cane, straight, walker, rolling (comfort height toilet, purewick  system)  Assistance upon Discharge: unknown    Pain/Comfort:  Pain Rating 1: 0/10  Pain Rating Post-Intervention 1: 0/10    Patients cultural, spiritual, Church conflicts given the current situation: no    Objective:     Communicated with: RN prior to session.  Patient found supine with PureWick, bed alarm, telemetry (no family present) upon OT entry to room.    General Precautions: Standard, fall  Orthopedic Precautions: N/A  Braces: N/A  Respiratory Status: Room air    Occupational Performance:    Bed Mobility:    Patient completed Supine to Sit with stand by assistance    Functional Mobility/Transfers:  Patient completed Sit <> Stand Transfer with stand by assistance  with  no assistive device from EOB  Functional Mobility: SBA using RW with functional mobility to bathroom    Activities of Daily Living:  Grooming: stand by assistance initially with grooming at sink however due to weakness in legs required sitting in chair to finish task  Upper Body Dressing: stand by assistance donning gown around back while seated EOB  Lower Body Dressing: stand by assistance donning socks seated EOB    Cognitive/Visual Perceptual:  Cognitive/Psychosocial Skills:     -       Oriented to: Person, Place, Time, and Situation   -       Follows Commands/attention:Follows multistep  commands  -       Safety awareness/insight to disability: intact     Physical Exam:  Sensation:    -       Intact  Dominant hand:    -       right  Upper Extremity Range of Motion:  BUE WFL  Upper Extremity Strength: BUE WFL   Strength: BUE WFL    AMPAC 6 Click ADL:  AMPAC Total Score: 18    Treatment & Education:  Provided education on safety & need for (A) with OOB activities.  Provided education regarding role of OT, POC, & discharge recommendations with pt verbalizing understanding.  Pt had no further questions & when asked whether there were any concerns pt reported none.      Patient left up in chair with all lines intact, call button in  reach, RN notified, and RN present    GOALS:   Multidisciplinary Problems       Occupational Therapy Goals          Problem: Occupational Therapy    Goal Priority Disciplines Outcome Interventions   Occupational Therapy Goal     OT, PT/OT Progressing    Description: Goals to be met by: 2/23/25     Patient will increase functional independence with ADLs by performing:    UE Dressing with Modified [place].  LE Dressing with Modified [place].  Grooming while standing with Modified [place].  Toileting from toilet with Modified [place] for hygiene and clothing management.   Supine to sit with Modified [place].  Step transfer with Modified [place]  Toilet transfer to toilet with Modified [place].                         DME Justifications:  No DME recommended requiring DME justifications    History:     Past Medical History:   Diagnosis Date    Acute blood loss anemia 10/26/2021    Anticoagulant long-term use     Breast deformity 09/07/2022    Cervical cancer 1968    breast cancer right    Coronary artery disease     Gout     High cholesterol     History of cervical dysplasia 10/28/2014    Hypertension     MI (myocardial infarction) 1999    Right axillary hidradenitis 07/11/2022         Past Surgical History:   Procedure Laterality Date    BREAST LUMPECTOMY      right    CARDIAC SURGERY      multiple cardiac stents    CORONARY ANGIOGRAPHY Right 1/21/2022    Procedure: ANGIOGRAM, CORONARY ARTERY;  Surgeon: Asim Canela MD;  Location: [facility] CATH LAB;  Service: Cardiology;  Laterality: Right;    CORONARY ANGIOGRAPHY Right 5/10/2024    Procedure: ANGIOGRAM, CORONARY ARTERY POSSIBLE LV COR;  Surgeon: Asim Canela MD;  Location: [facility] CATH LAB;  Service: Cardiology;  Laterality: Right;    CORONARY STENT PLACEMENT      HIP REPLACEMENT ARTHROPLASTY Right 2018    JOINT REPLACEMENT      right       Time Tracking:     OT Date of Treatment: 01/24/25  OT Start Time: 0915  OT Stop Time:  0929  OT Total Time (min): 14 min    Billable Minutes:Evaluation 5  Therapeutic Activity 9    1/24/2025

## 2025-01-25 LAB
ANION GAP SERPL CALC-SCNC: 11 MMOL/L (ref 8–16)
BUN SERPL-MCNC: 48 MG/DL (ref 10–30)
CALCIUM SERPL-MCNC: 9.7 MG/DL (ref 8.7–10.5)
CHLORIDE SERPL-SCNC: 102 MMOL/L (ref 95–110)
CO2 SERPL-SCNC: 21 MMOL/L (ref 23–29)
CREAT SERPL-MCNC: 2.3 MG/DL (ref 0.5–1.4)
EST. GFR  (NO RACE VARIABLE): 19.6 ML/MIN/1.73 M^2
GLUCOSE SERPL-MCNC: 94 MG/DL (ref 70–110)
OHS QRS DURATION: 94 MS
OHS QTC CALCULATION: 451 MS
POTASSIUM SERPL-SCNC: 4 MMOL/L (ref 3.5–5.1)
SODIUM SERPL-SCNC: 134 MMOL/L (ref 136–145)
TROPONIN I SERPL DL<=0.01 NG/ML-MCNC: 54 NG/L (ref 0–14)

## 2025-01-25 PROCEDURE — 94761 N-INVAS EAR/PLS OXIMETRY MLT: CPT

## 2025-01-25 PROCEDURE — 25000003 PHARM REV CODE 250: Performed by: STUDENT IN AN ORGANIZED HEALTH CARE EDUCATION/TRAINING PROGRAM

## 2025-01-25 PROCEDURE — 93010 ELECTROCARDIOGRAM REPORT: CPT | Mod: ,,, | Performed by: INTERNAL MEDICINE

## 2025-01-25 PROCEDURE — 63600175 PHARM REV CODE 636 W HCPCS: Performed by: HOSPITALIST

## 2025-01-25 PROCEDURE — 84484 ASSAY OF TROPONIN QUANT: CPT

## 2025-01-25 PROCEDURE — 36415 COLL VENOUS BLD VENIPUNCTURE: CPT

## 2025-01-25 PROCEDURE — 25000003 PHARM REV CODE 250: Performed by: HOSPITALIST

## 2025-01-25 PROCEDURE — 93005 ELECTROCARDIOGRAM TRACING: CPT

## 2025-01-25 PROCEDURE — 80048 BASIC METABOLIC PNL TOTAL CA: CPT

## 2025-01-25 PROCEDURE — 25000242 PHARM REV CODE 250 ALT 637 W/ HCPCS: Performed by: HOSPITALIST

## 2025-01-25 PROCEDURE — 21400001 HC TELEMETRY ROOM

## 2025-01-25 RX ADMIN — CHOLECALCIFEROL TAB 25 MCG (1000 UNIT) 2000 UNITS: 25 TAB at 09:01

## 2025-01-25 RX ADMIN — Medication 6 MG: at 09:01

## 2025-01-25 RX ADMIN — LOSARTAN POTASSIUM 25 MG: 25 TABLET, FILM COATED ORAL at 09:01

## 2025-01-25 RX ADMIN — DAPAGLIFLOZIN 10 MG: 10 TABLET, FILM COATED ORAL at 09:01

## 2025-01-25 RX ADMIN — HEPARIN SODIUM 5000 UNITS: 5000 INJECTION INTRAVENOUS; SUBCUTANEOUS at 06:01

## 2025-01-25 RX ADMIN — SODIUM BICARBONATE 650 MG TABLET 650 MG: at 09:01

## 2025-01-25 RX ADMIN — HEPARIN SODIUM 5000 UNITS: 5000 INJECTION INTRAVENOUS; SUBCUTANEOUS at 09:01

## 2025-01-25 RX ADMIN — NITROGLYCERIN 0.4 MG: 0.4 TABLET, ORALLY DISINTEGRATING SUBLINGUAL at 06:01

## 2025-01-25 RX ADMIN — ASPIRIN 81 MG: 81 TABLET, COATED ORAL at 09:01

## 2025-01-25 RX ADMIN — ALLOPURINOL 50 MG: 300 TABLET ORAL at 09:01

## 2025-01-25 RX ADMIN — ATORVASTATIN CALCIUM 40 MG: 40 TABLET, FILM COATED ORAL at 09:01

## 2025-01-25 RX ADMIN — AMLODIPINE BESYLATE 10 MG: 10 TABLET ORAL at 09:01

## 2025-01-25 RX ADMIN — HEPARIN SODIUM 5000 UNITS: 5000 INJECTION INTRAVENOUS; SUBCUTANEOUS at 01:01

## 2025-01-25 RX ADMIN — ISOSORBIDE MONONITRATE 30 MG: 30 TABLET, EXTENDED RELEASE ORAL at 09:01

## 2025-01-25 RX ADMIN — FOLIC ACID 1 MG: 1 TABLET ORAL at 09:01

## 2025-01-25 RX ADMIN — CLOPIDOGREL BISULFATE 75 MG: 75 TABLET ORAL at 09:01

## 2025-01-25 RX ADMIN — PANTOPRAZOLE SODIUM 20 MG: 20 TABLET, DELAYED RELEASE ORAL at 06:01

## 2025-01-25 NOTE — PLAN OF CARE
Problem: Adult Inpatient Plan of Care  Goal: Plan of Care Review  Outcome: Progressing  Goal: Patient-Specific Goal (Individualized)  Outcome: Progressing  Goal: Absence of Hospital-Acquired Illness or Injury  Outcome: Progressing  Goal: Optimal Comfort and Wellbeing  Outcome: Progressing     Problem: Infection  Goal: Absence of Infection Signs and Symptoms  Outcome: Progressing     Problem: Fall Injury Risk  Goal: Absence of Fall and Fall-Related Injury  Outcome: Progressing     Problem: Pain Acute  Goal: Optimal Pain Control and Function  Outcome: Progressing     Problem: Skin Injury Risk Increased  Goal: Skin Health and Integrity  Outcome: Progressing

## 2025-01-25 NOTE — PLAN OF CARE
Problem: Adult Inpatient Plan of Care  Goal: Plan of Care Review  1/24/2025 1841 by Estefanía Alvarado, RN  Outcome: Progressing  1/24/2025 1456 by Estefanía Alvarado, RN  Outcome: Progressing  Goal: Patient-Specific Goal (Individualized)  1/24/2025 1841 by Estefanía Alvarado RN  Outcome: Progressing  1/24/2025 1456 by Estefanía Alvarado RN  Outcome: Progressing  Goal: Absence of Hospital-Acquired Illness or Injury  1/24/2025 1841 by Estefanía Alvarado, RN  Outcome: Progressing  1/24/2025 1456 by Estefanía Alvarado, RN  Outcome: Progressing  Goal: Optimal Comfort and Wellbeing  1/24/2025 1841 by Estefanía Alvarado RN  Outcome: Progressing  1/24/2025 1456 by Estefanía Alvarado, RN  Outcome: Progressing     Problem: Infection  Goal: Absence of Infection Signs and Symptoms  Outcome: Progressing     Problem: Acute Kidney Injury/Impairment  Goal: Fluid and Electrolyte Balance  1/24/2025 1841 by Estefanía Alvarado RN  Outcome: Progressing  1/24/2025 1456 by Estefanía Alvarado, RN  Outcome: Progressing     Problem: Fall Injury Risk  Goal: Absence of Fall and Fall-Related Injury  1/24/2025 1841 by Estefanía Alvarado, RN  Outcome: Progressing  1/24/2025 1456 by Estefanía Alvarado, RN  Outcome: Progressing     Problem: Pain Acute  Goal: Optimal Pain Control and Function  1/24/2025 1841 by Estefanía Alvarado, RN  Outcome: Progressing  1/24/2025 1456 by Estefanía Alvarado, RN  Outcome: Progressing

## 2025-01-25 NOTE — SUBJECTIVE & OBJECTIVE
Interval History:     Patient reported chest pain last night.. this morning she is asymptomatic. Denies any chest pain.  Trops/EKG negative.   Ranolazine held last night due to renal function    Review of Systems  Objective:     Vital Signs (Most Recent):  Temp: 98.2 °F (36.8 °C) (01/25/25 1118)  Pulse: (!) 55 (01/25/25 1118)  Resp: 17 (01/25/25 1118)  BP: 129/65 (01/25/25 1118)  SpO2: 97 % (01/25/25 1118) Vital Signs (24h Range):  Temp:  [97.4 °F (36.3 °C)-98.3 °F (36.8 °C)] 98.2 °F (36.8 °C)  Pulse:  [44-69] 55  Resp:  [17-20] 17  SpO2:  [96 %-98 %] 97 %  BP: (103-135)/(56-65) 129/65     Weight: 72 kg (158 lb 11.7 oz)  Body mass index is 24.86 kg/m².    Intake/Output Summary (Last 24 hours) at 1/25/2025 1326  Last data filed at 1/25/2025 0926  Gross per 24 hour   Intake --   Output 2100 ml   Net -2100 ml         Physical Exam  Constitutional:       General: She is not in acute distress.     Appearance: She is not ill-appearing.   Cardiovascular:      Rate and Rhythm: Normal rate.      Heart sounds: Normal heart sounds.   Pulmonary:      Effort: Pulmonary effort is normal. No respiratory distress.      Breath sounds: Normal breath sounds.   Abdominal:      General: Abdomen is flat.      Palpations: Abdomen is soft.      Tenderness: There is no abdominal tenderness.   Musculoskeletal:      Right lower leg: No edema.      Left lower leg: No edema.   Neurological:      General: No focal deficit present.      Mental Status: She is alert and oriented to person, place, and time.      Cranial Nerves: No cranial nerve deficit.      Motor: No weakness.   Psychiatric:         Mood and Affect: Mood normal.             Significant Labs: All pertinent labs within the past 24 hours have been reviewed.  BMP:   Recent Labs   Lab 01/24/25  0938 01/25/25  0755   GLU 95 94   * 134*   K 4.1 4.0    102   CO2 22* 21*   BUN 47* 48*   CREATININE 2.3* 2.3*   CALCIUM 10.1 9.7   MG 1.9  --      CBC: No results for input(s):  ""WBC", "HGB", "HCT", "PLT" in the last 48 hours.    Significant Imaging: I have reviewed all pertinent imaging results/findings within the past 24 hours.  "

## 2025-01-25 NOTE — PLAN OF CARE
Problem: Adult Inpatient Plan of Care  Goal: Plan of Care Review  Outcome: Progressing  Goal: Patient-Specific Goal (Individualized)  Outcome: Progressing  Goal: Absence of Hospital-Acquired Illness or Injury  Outcome: Progressing  Goal: Optimal Comfort and Wellbeing  Outcome: Progressing  Goal: Readiness for Transition of Care  Outcome: Progressing     Problem: Infection  Goal: Absence of Infection Signs and Symptoms  Outcome: Progressing     Problem: Acute Kidney Injury/Impairment  Goal: Fluid and Electrolyte Balance  Outcome: Progressing  Goal: Improved Oral Intake  Outcome: Progressing  Goal: Effective Renal Function  Outcome: Progressing     Problem: Fall Injury Risk  Goal: Absence of Fall and Fall-Related Injury  Outcome: Progressing     Problem: Pain Acute  Goal: Optimal Pain Control and Function  Outcome: Progressing     Problem: Skin Injury Risk Increased  Goal: Skin Health and Integrity  Outcome: Progressing

## 2025-01-25 NOTE — ASSESSMENT & PLAN NOTE
Persistent   Seen by cardiology - her intermittent dyspnea symptoms are considered an anginal equivalent.  Recommendation to titrate Imdur dosage to 60mg. BP now borderline hypotensive with this increased. Patient took AM imdur 30mg, was hypertensive on presentation - gave 30mg imdur 1/19 and started 60mg in AM.  - cardiology signed off  - PT/OT consulted  - decrease Imdur to 30mg due to hypotension   - ranolazine discontinued due to renal function

## 2025-01-25 NOTE — PROGRESS NOTES
"[facility] [address] [place] [address] Medicine  Progress Note    Patient Name: Anahy Evans  MRN: [id number]  Patient Class: IP- Inpatient   Admission Date: 1/19/2025  Length of Stay: 4 days  Attending Physician: Maria E Bernstein MD  Primary Care Provider: Elena Cabrera MD        Principal Problem:Anginal equivalent        HPI:  89 y/o AAF w/ CAD hx PCI, CKD 4, HTN, Afib, AAA, 2nd degree AV Block presents to the ED with complaints of dyspnea.     She was recently admitted to [facility] from 1/13-1/17 per DC summary "Patient admitted for dyspnea on exertion. V/Q scan was completed - no pulmonary embolism. Echo ordered - regional wall motion abnormalities present. Low normal systolic ejection fraction 50-55%. Cardiology was consulted - feel origin of TAYLOR may be cardiac in nature. PET stress done 1/16. Two perfusion abnormalities seen. Interventional cardiology recommending medical management and close follow-up. Amlodipine increased, losartan decreased, and started on imdur."    She returns to ED with concerns of dyspnea on exertion.  She discharged to home after last admission, lives with her daughter.  Her grand-daughter accompanies her today and helps provide history.  Since discharge patient has had limited ADL capability, pt requires assistance w/ ambulation and use of walker, patient has home purewick and bedside commode.  She requires assistance with toileting, bathing, clothing.  She is adherent to medications. Yesterday patient appeared weak and when sitting upright had poor trunk stability would slump over and had poor appetite did not eat much and was noted with intermittent confusion.  Her confusion is described as poor short term memory, will intermittently forget recent events, granddaughter notes that patient usually answers orientation questions appropriately when evaluated by medical teams, but might forget later what was discussed or why she is in the hospital.    She was treated for acute cystitis " during last admit, pan-sensitive E.coli on urine culture received ceftriaxone inpatient and not discharged on any oral antibiotics.     She does report poor sleep, has had difficulty sleeping at night and will nap multiple times per day. No reported non-redirectable agitation.   Prior to this month, grand-daughter notes patient was performing more ADL on her own.  Patient had declined referral to SNF with recent admits, last PT/OT recs for low-intensity therapy, pt is more open to post-acute care if recommended.     In ED tonight pt with bradycardia noted, cardiology consulted, no acute medical management of her bradycardia recommended, she has 2:1 AV block on review of EKG/telemetry tonight, hx of Mobitz 1 2nd Deg AV block in past.       Overview/Hospital Course:  Evaluated by cardiology and imdur increased. Had issues with SOB during breakfast on 1/20 but denies this problem on 1/21. Will need to work with PT/OT again. Patient is interested in placement option for therapy. Imdur up to 60mg. Started ranolazine as per cardiology recs. Will monitor neurologic status closely as patient's renal function is poor. Ranolazine was started and then later discontinued due to renal function.     Interval History:     Patient reported chest pain last night.. this morning she is asymptomatic. Denies any chest pain.  Trops/EKG negative.   Ranolazine held last night due to renal function    Review of Systems  Objective:     Vital Signs (Most Recent):  Temp: 98.2 °F (36.8 °C) (01/25/25 1118)  Pulse: (!) 55 (01/25/25 1118)  Resp: 17 (01/25/25 1118)  BP: 129/65 (01/25/25 1118)  SpO2: 97 % (01/25/25 1118) Vital Signs (24h Range):  Temp:  [97.4 °F (36.3 °C)-98.3 °F (36.8 °C)] 98.2 °F (36.8 °C)  Pulse:  [44-69] 55  Resp:  [17-20] 17  SpO2:  [96 %-98 %] 97 %  BP: (103-135)/(56-65) 129/65     Weight: 72 kg (158 lb 11.7 oz)  Body mass index is 24.86 kg/m².    Intake/Output Summary (Last 24 hours) at 1/25/2025 6471  Last data filed at  "1/25/2025 0926  Gross per 24 hour   Intake --   Output 2100 ml   Net -2100 ml         Physical Exam  Constitutional:       General: She is not in acute distress.     Appearance: She is not ill-appearing.   Cardiovascular:      Rate and Rhythm: Normal rate.      Heart sounds: Normal heart sounds.   Pulmonary:      Effort: Pulmonary effort is normal. No respiratory distress.      Breath sounds: Normal breath sounds.   Abdominal:      General: Abdomen is flat.      Palpations: Abdomen is soft.      Tenderness: There is no abdominal tenderness.   Musculoskeletal:      Right lower leg: No edema.      Left lower leg: No edema.   Neurological:      General: No focal deficit present.      Mental Status: She is alert and oriented to person, place, and time.      Cranial Nerves: No cranial nerve deficit.      Motor: No weakness.   Psychiatric:         Mood and Affect: Mood normal.             Significant Labs: All pertinent labs within the past 24 hours have been reviewed.  BMP:   Recent Labs   Lab 01/24/25  0938 01/25/25  0755   GLU 95 94   * 134*   K 4.1 4.0    102   CO2 22* 21*   BUN 47* 48*   CREATININE 2.3* 2.3*   CALCIUM 10.1 9.7   MG 1.9  --      CBC: No results for input(s): "WBC", "HGB", "HCT", "PLT" in the last 48 hours.    Significant Imaging: I have reviewed all pertinent imaging results/findings within the past 24 hours.    Assessment and Plan     * Anginal equivalent  Persistent   Seen by cardiology - her intermittent dyspnea symptoms are considered an anginal equivalent.  Recommendation to titrate Imdur dosage to 60mg. BP now borderline hypotensive with this increased. Patient took AM imdur 30mg, was hypertensive on presentation - gave 30mg imdur 1/19 and started 60mg in AM.  - cardiology signed off  - PT/OT consulted  - decrease Imdur to 30mg due to hypotension   - ranolazine discontinued due to renal function      Debility  Patient with Acute debility due to age-related physical debility and " cardiac disease . The patient's latest AMPAC (Activity Measure for Post Acute Care) Score is listed below.    AM-PAC Score - How much help does the patient need for each activity listed  Basic Mobility Total Score: 17  Turning over in bed (including adjusting bedclothes, sheets and blankets)?: A little  Sitting down on and standing up from a chair with arms (e.g., wheelchair, bedside commode, etc.): A little  Moving from lying on back to sitting on the side of the bed?: A little  Moving to and from a bed to a chair (including a wheelchair)?: A little  Need to walk in hospital room?: A little  Climbing 3-5 steps with a railing?: A lot    Plan  - Progressive mobility protocol initated  - PT/OT consulted  - Fall precautions in place          Counseling regarding goals of care  Code status discussion as per AV block problem.     Patient has living will and HCPOA documents uploaded, she does not have an LAPOST completed. Would recommend completion of LAPOST prior to hospital discharge.       Confusion  ED indicated pt referred for admit for confusion.  By CAM-ICU testing pt does not have delirium, some disorientation. She may have some underlying cognitive deficits based on grand-daughters description. Appears pt with altered sleep wake cycles more recently - schedule melatonin tonight to help normalize sleep-wake cycle, delirium precautions.   Repeat UA appears normal and no persistent symptoms or concerns for ongoing Acute cystitis adequately treated.   - delirium precautions      Acute cystitis without hematuria  Repeat UA appears normal and no persistent symptoms or concerns for ongoing Acute cystitis adequately treated.       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 Pt having 2:1 AV block atrial rates in 80s and HR in low 40s.  During cardiology evaluation pt with variable 2nd degree AV block. She is not on any AV node blocking agents. Discussed with cards/EP  -  "continue telemetry monitoring.     Per Dr. Cummings on admit: "Tonight discussed code status with patient - she has been Full Code and DNR during last 2 admits, initially indicated DNR but also states she would want resuscitative measures if she might not incur injury, reviewed probabilities with pt given advanced age and chronic medical conditiions indicated to her that under true cardio-respiratory arrest she would be left with subsequent injury and overall low likelihood of survival to discharge 5-10%.  Give this bradyarrhythmia I did discuss if patients HR was lower if she would want invasive measures such as transvenous pacing or transcutaneous pacing performed she responded in affirmative,  will place FULL CODE for now."       Coronary artery disease involving native coronary artery of native heart without angina pectoris  Patient with known CAD , which is uncontrolled Will continue ASA, Plavix, and Statin and monitor for S/Sx of angina/ACS. Continue to monitor on telemetry.     CKD (chronic kidney disease), stage IV  Creatine stable for now. BMP reviewed- noted Estimated Creatinine Clearance: 17.8 mL/min (A) (based on SCr of 2 mg/dL (H)). according to latest data. Based on current GFR, CKD stage is stage 4 - GFR 15-29.  Monitor UOP and serial BMP and adjust therapy as needed. Renally dose meds. Avoid nephrotoxic medications and procedures.    Continue sodium bicarbonate and farxiga    Essential hypertension  Patient's blood pressure range in the last 24 hours was: BP  Min: 96/55  Max: 168/79.The patient's inpatient anti-hypertensive regimen is listed below:  Current Antihypertensives  nitroGLYCERIN SL tablet 0.4 mg, Every 5 min PRN, Sublingual  amLODIPine tablet 10 mg, Daily, Oral  isosorbide mononitrate 24 hr tablet 60 mg, Daily, Oral  losartan tablet 25 mg, Daily, Oral  ranolazine 12 hr tablet 500 mg, 2 times daily, Oral    Plan  - BP is controlled, no changes needed to their regimen      PAF (paroxysmal " atrial fibrillation)  Patient has paroxysmal (<7 days) atrial fibrillation. Patient is currently in sinus rhythm. [id number] Score: 4. The patients heart rate in the last 24 hours is as follows:  Pulse  Min: 34  Max: 72     Antiarrhythmics       Anticoagulants  heparin (porcine) injection 5,000 Units, Every 8 hours, Subcutaneous    Plan  - Replete lytes with a goal of K>4, Mg >2  - Patient is not anticoagulated due to unclear etiology  - Patient's afib is currently controlled          VTE Risk Mitigation (From admission, onward)           Ordered     heparin (porcine) injection 5,000 Units  Every 8 hours         01/20/25 0003     IP VTE HIGH RISK PATIENT  Once         01/20/25 0003     Place sequential compression device  Until discontinued         01/20/25 0003                    Discharge Planning   ROSEMARIE: 1/27/2025     Code Status: Full Code   Medical Readiness for Discharge Date:   Discharge Plan A: Skilled Nursing Facility                  Maria E Bernstein MD  Department of Hospital Medicine   [facility]

## 2025-01-26 LAB
ANION GAP SERPL CALC-SCNC: 9 MMOL/L (ref 8–16)
BUN SERPL-MCNC: 43 MG/DL (ref 10–30)
CALCIUM SERPL-MCNC: 9.9 MG/DL (ref 8.7–10.5)
CHLORIDE SERPL-SCNC: 106 MMOL/L (ref 95–110)
CO2 SERPL-SCNC: 22 MMOL/L (ref 23–29)
CREAT SERPL-MCNC: 2.1 MG/DL (ref 0.5–1.4)
EST. GFR  (NO RACE VARIABLE): 21.8 ML/MIN/1.73 M^2
GLUCOSE SERPL-MCNC: 98 MG/DL (ref 70–110)
POTASSIUM SERPL-SCNC: 3.9 MMOL/L (ref 3.5–5.1)
SODIUM SERPL-SCNC: 137 MMOL/L (ref 136–145)

## 2025-01-26 PROCEDURE — 25000003 PHARM REV CODE 250: Performed by: STUDENT IN AN ORGANIZED HEALTH CARE EDUCATION/TRAINING PROGRAM

## 2025-01-26 PROCEDURE — 21400001 HC TELEMETRY ROOM

## 2025-01-26 PROCEDURE — 25000003 PHARM REV CODE 250: Performed by: HOSPITALIST

## 2025-01-26 PROCEDURE — 80048 BASIC METABOLIC PNL TOTAL CA: CPT | Performed by: STUDENT IN AN ORGANIZED HEALTH CARE EDUCATION/TRAINING PROGRAM

## 2025-01-26 PROCEDURE — 63600175 PHARM REV CODE 636 W HCPCS: Performed by: HOSPITALIST

## 2025-01-26 PROCEDURE — 36415 COLL VENOUS BLD VENIPUNCTURE: CPT | Performed by: STUDENT IN AN ORGANIZED HEALTH CARE EDUCATION/TRAINING PROGRAM

## 2025-01-26 RX ORDER — LOSARTAN POTASSIUM 25 MG/1
25 TABLET ORAL DAILY
Status: DISCONTINUED | OUTPATIENT
Start: 2025-01-26 | End: 2025-01-26

## 2025-01-26 RX ORDER — LOSARTAN POTASSIUM 25 MG/1
25 TABLET ORAL DAILY
Status: DISCONTINUED | OUTPATIENT
Start: 2025-01-27 | End: 2025-02-11 | Stop reason: HOSPADM

## 2025-01-26 RX ORDER — AMLODIPINE BESYLATE 5 MG/1
5 TABLET ORAL DAILY
Status: DISCONTINUED | OUTPATIENT
Start: 2025-01-26 | End: 2025-02-11 | Stop reason: HOSPADM

## 2025-01-26 RX ORDER — ISOSORBIDE MONONITRATE 60 MG/1
60 TABLET, EXTENDED RELEASE ORAL DAILY
Status: DISCONTINUED | OUTPATIENT
Start: 2025-01-26 | End: 2025-01-31

## 2025-01-26 RX ADMIN — Medication 6 MG: at 08:01

## 2025-01-26 RX ADMIN — AMLODIPINE BESYLATE 5 MG: 5 TABLET ORAL at 09:01

## 2025-01-26 RX ADMIN — SODIUM BICARBONATE 650 MG TABLET 650 MG: at 09:01

## 2025-01-26 RX ADMIN — CHOLECALCIFEROL TAB 25 MCG (1000 UNIT) 2000 UNITS: 25 TAB at 09:01

## 2025-01-26 RX ADMIN — ISOSORBIDE MONONITRATE 60 MG: 60 TABLET, EXTENDED RELEASE ORAL at 09:01

## 2025-01-26 RX ADMIN — CLOPIDOGREL BISULFATE 75 MG: 75 TABLET ORAL at 09:01

## 2025-01-26 RX ADMIN — ATORVASTATIN CALCIUM 40 MG: 40 TABLET, FILM COATED ORAL at 09:01

## 2025-01-26 RX ADMIN — SODIUM BICARBONATE 650 MG TABLET 650 MG: at 08:01

## 2025-01-26 RX ADMIN — ALLOPURINOL 50 MG: 300 TABLET ORAL at 09:01

## 2025-01-26 RX ADMIN — DAPAGLIFLOZIN 10 MG: 10 TABLET, FILM COATED ORAL at 09:01

## 2025-01-26 RX ADMIN — HEPARIN SODIUM 5000 UNITS: 5000 INJECTION INTRAVENOUS; SUBCUTANEOUS at 10:01

## 2025-01-26 RX ADMIN — PANTOPRAZOLE SODIUM 20 MG: 20 TABLET, DELAYED RELEASE ORAL at 05:01

## 2025-01-26 RX ADMIN — HEPARIN SODIUM 5000 UNITS: 5000 INJECTION INTRAVENOUS; SUBCUTANEOUS at 01:01

## 2025-01-26 RX ADMIN — HEPARIN SODIUM 5000 UNITS: 5000 INJECTION INTRAVENOUS; SUBCUTANEOUS at 05:01

## 2025-01-26 RX ADMIN — FOLIC ACID 1 MG: 1 TABLET ORAL at 09:01

## 2025-01-26 RX ADMIN — ASPIRIN 81 MG: 81 TABLET, COATED ORAL at 09:01

## 2025-01-26 NOTE — PLAN OF CARE
CM followed up on SNF referrals. No response from facilities. CM/SW will follow-up Monday.      Elena Zavala RNCM  [id number]

## 2025-01-26 NOTE — ASSESSMENT & PLAN NOTE
Patient's blood pressure range in the last 24 hours was: BP  Min: 115/63  Max: 158/68.The patient's inpatient anti-hypertensive regimen is listed below:  Current Antihypertensives  nitroGLYCERIN SL tablet 0.4 mg, Every 5 min PRN, Sublingual  amLODIPine tablet 5 mg, Daily, Oral  isosorbide mononitrate 24 hr tablet 60 mg, Daily, Oral  losartan tablet 25 mg, Daily, Oral    Plan  - BP is controlled, no changes needed to their regimen  - adjustments to BP meds

## 2025-01-26 NOTE — SUBJECTIVE & OBJECTIVE
"Interval History:     Patient seen at bedside. Reports no new symptoms  BP meds adjusted.     Pending placement    Review of Systems  Objective:     Vital Signs (Most Recent):  Temp: 98.5 °F (36.9 °C) (01/26/25 1204)  Pulse: 74 (01/26/25 1204)  Resp: 18 (01/26/25 1204)  BP: 124/60 (01/26/25 1204)  SpO2: 96 % (01/26/25 1204) Vital Signs (24h Range):  Temp:  [97.6 °F (36.4 °C)-98.5 °F (36.9 °C)] 98.5 °F (36.9 °C)  Pulse:  [39-74] 74  Resp:  [18-20] 18  SpO2:  [95 %-98 %] 96 %  BP: (115-158)/(60-68) 124/60     Weight: 72 kg (158 lb 11.7 oz)  Body mass index is 24.86 kg/m².  No intake or output data in the 24 hours ending 01/26/25 1407      Physical Exam  Constitutional:       General: She is not in acute distress.     Appearance: She is not ill-appearing.   Cardiovascular:      Rate and Rhythm: Normal rate.      Heart sounds: Normal heart sounds.   Pulmonary:      Effort: Pulmonary effort is normal. No respiratory distress.   Abdominal:      General: Abdomen is flat.      Palpations: Abdomen is soft.      Tenderness: There is no abdominal tenderness.   Musculoskeletal:      Right lower leg: No edema.      Left lower leg: No edema.   Neurological:      Mental Status: She is alert and oriented to person, place, and time. Mental status is at baseline.   Psychiatric:         Mood and Affect: Mood normal.             Significant Labs: All pertinent labs within the past 24 hours have been reviewed.  BMP:   Recent Labs   Lab 01/26/25  1057   GLU 98      K 3.9      CO2 22*   BUN 43*   CREATININE 2.1*   CALCIUM 9.9     CBC: No results for input(s): "WBC", "HGB", "HCT", "PLT" in the last 48 hours.    Significant Imaging: I have reviewed all pertinent imaging results/findings within the past 24 hours.  "

## 2025-01-26 NOTE — PROGRESS NOTES
"[facility] [address] [place] [address] Medicine  Progress Note    Patient Name: Anahy Evans  MRN: [id number]  Patient Class: IP- Inpatient   Admission Date: 1/19/2025  Length of Stay: 5 days  Attending Physician: Maria E Bernstein MD  Primary Care Provider: Elena Cabrera MD        Principal Problem:Anginal equivalent        HPI:  89 y/o AAF w/ CAD hx PCI, CKD 4, HTN, Afib, AAA, 2nd degree AV Block presents to the ED with complaints of dyspnea.     She was recently admitted to [facility] from 1/13-1/17 per DC summary "Patient admitted for dyspnea on exertion. V/Q scan was completed - no pulmonary embolism. Echo ordered - regional wall motion abnormalities present. Low normal systolic ejection fraction 50-55%. Cardiology was consulted - feel origin of TAYLOR may be cardiac in nature. PET stress done 1/16. Two perfusion abnormalities seen. Interventional cardiology recommending medical management and close follow-up. Amlodipine increased, losartan decreased, and started on imdur."    She returns to ED with concerns of dyspnea on exertion.  She discharged to home after last admission, lives with her daughter.  Her grand-daughter accompanies her today and helps provide history.  Since discharge patient has had limited ADL capability, pt requires assistance w/ ambulation and use of walker, patient has home purewick and bedside commode.  She requires assistance with toileting, bathing, clothing.  She is adherent to medications. Yesterday patient appeared weak and when sitting upright had poor trunk stability would slump over and had poor appetite did not eat much and was noted with intermittent confusion.  Her confusion is described as poor short term memory, will intermittently forget recent events, granddaughter notes that patient usually answers orientation questions appropriately when evaluated by medical teams, but might forget later what was discussed or why she is in the hospital.    She was treated for acute cystitis " during last admit, pan-sensitive E.coli on urine culture received ceftriaxone inpatient and not discharged on any oral antibiotics.     She does report poor sleep, has had difficulty sleeping at night and will nap multiple times per day. No reported non-redirectable agitation.   Prior to this month, grand-daughter notes patient was performing more ADL on her own.  Patient had declined referral to SNF with recent admits, last PT/OT recs for low-intensity therapy, pt is more open to post-acute care if recommended.     In ED tonight pt with bradycardia noted, cardiology consulted, no acute medical management of her bradycardia recommended, she has 2:1 AV block on review of EKG/telemetry tonight, hx of Mobitz 1 2nd Deg AV block in past.       Overview/Hospital Course:  Evaluated by cardiology and imdur increased. Had issues with SOB during breakfast on 1/20 but denies this problem on 1/21. Will need to work with PT/OT again. Patient is interested in placement option for therapy. Imdur up to 60mg. Started ranolazine as per cardiology recs. Will monitor neurologic status closely as patient's renal function is poor. Ranolazine was started and then later discontinued due to renal function.     Interval History:     Patient seen at bedside. Reports no new symptoms  BP meds adjusted.     Pending placement    Review of Systems  Objective:     Vital Signs (Most Recent):  Temp: 98.5 °F (36.9 °C) (01/26/25 1204)  Pulse: 74 (01/26/25 1204)  Resp: 18 (01/26/25 1204)  BP: 124/60 (01/26/25 1204)  SpO2: 96 % (01/26/25 1204) Vital Signs (24h Range):  Temp:  [97.6 °F (36.4 °C)-98.5 °F (36.9 °C)] 98.5 °F (36.9 °C)  Pulse:  [39-74] 74  Resp:  [18-20] 18  SpO2:  [95 %-98 %] 96 %  BP: (115-158)/(60-68) 124/60     Weight: 72 kg (158 lb 11.7 oz)  Body mass index is 24.86 kg/m².  No intake or output data in the 24 hours ending 01/26/25 1407      Physical Exam  Constitutional:       General: She is not in acute distress.     Appearance: She is  "not ill-appearing.   Cardiovascular:      Rate and Rhythm: Normal rate.      Heart sounds: Normal heart sounds.   Pulmonary:      Effort: Pulmonary effort is normal. No respiratory distress.   Abdominal:      General: Abdomen is flat.      Palpations: Abdomen is soft.      Tenderness: There is no abdominal tenderness.   Musculoskeletal:      Right lower leg: No edema.      Left lower leg: No edema.   Neurological:      Mental Status: She is alert and oriented to person, place, and time. Mental status is at baseline.   Psychiatric:         Mood and Affect: Mood normal.             Significant Labs: All pertinent labs within the past 24 hours have been reviewed.  BMP:   Recent Labs   Lab 01/26/25  1057   GLU 98      K 3.9      CO2 22*   BUN 43*   CREATININE 2.1*   CALCIUM 9.9     CBC: No results for input(s): "WBC", "HGB", "HCT", "PLT" in the last 48 hours.    Significant Imaging: I have reviewed all pertinent imaging results/findings within the past 24 hours.    Assessment and Plan     * Anginal equivalent  Persistent   Seen by cardiology - her intermittent dyspnea symptoms are considered an anginal equivalent.  Recommendation to titrate Imdur dosage to 60mg. BP now borderline hypotensive with this increased. Patient took AM imdur 30mg, was hypertensive on presentation - gave 30mg imdur 1/19 and started 60mg in AM.  - cardiology signed off  - PT/OT consulted  - increase Imdur to 60mg and decrease amlodipine dose.   - ranolazine discontinued due to renal function      Debility  Patient with Acute debility due to age-related physical debility and cardiac disease . The patient's latest AMPAC (Activity Measure for Post Acute Care) Score is listed below.    AM-PAC Score - How much help does the patient need for each activity listed  Basic Mobility Total Score: 17  Turning over in bed (including adjusting bedclothes, sheets and blankets)?: A little  Sitting down on and standing up from a chair with arms (e.g., " "wheelchair, bedside commode, etc.): A little  Moving from lying on back to sitting on the side of the bed?: A little  Moving to and from a bed to a chair (including a wheelchair)?: A little  Need to walk in hospital room?: A little  Climbing 3-5 steps with a railing?: A lot    Plan  - Progressive mobility protocol initated  - PT/OT consulted  - Fall precautions in place          Counseling regarding goals of care  Code status discussion as per AV block problem.     Patient has living will and HCPOA documents uploaded, she does not have an LAPOST completed. Would recommend completion of LAPOST prior to hospital discharge.       Confusion  ED indicated pt referred for admit for confusion.  By CAM-ICU testing pt does not have delirium, some disorientation. She may have some underlying cognitive deficits based on grand-daughters description. Appears pt with altered sleep wake cycles more recently - schedule melatonin tonight to help normalize sleep-wake cycle, delirium precautions.   Repeat UA appears normal and no persistent symptoms or concerns for ongoing Acute cystitis adequately treated.   - delirium precautions      Acute cystitis without hematuria  Repeat UA appears normal and no persistent symptoms or concerns for ongoing Acute cystitis adequately treated.       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 Pt having 2:1 AV block atrial rates in 80s and HR in low 40s.  During cardiology evaluation pt with variable 2nd degree AV block. She is not on any AV node blocking agents. Discussed with cards/EP  - continue telemetry monitoring.     Per Dr. Cummings on admit: "Tonight discussed code status with patient - she has been Full Code and DNR during last 2 admits, initially indicated DNR but also states she would want resuscitative measures if she might not incur injury, reviewed probabilities with pt given advanced age and chronic medical conditiions indicated to her " "that under true cardio-respiratory arrest she would be left with subsequent injury and overall low likelihood of survival to discharge 5-10%.  Give this bradyarrhythmia I did discuss if patients HR was lower if she would want invasive measures such as transvenous pacing or transcutaneous pacing performed she responded in affirmative,  will place FULL CODE for now."       Coronary artery disease involving native coronary artery of native heart without angina pectoris  Patient with known CAD , which is uncontrolled Will continue ASA, Plavix, and Statin and monitor for S/Sx of angina/ACS. Continue to monitor on telemetry.     CKD (chronic kidney disease), stage IV  Creatine stable for now. BMP reviewed- noted Estimated Creatinine Clearance: 17.8 mL/min (A) (based on SCr of 2 mg/dL (H)). according to latest data. Based on current GFR, CKD stage is stage 4 - GFR 15-29.  Monitor UOP and serial BMP and adjust therapy as needed. Renally dose meds. Avoid nephrotoxic medications and procedures.    Continue sodium bicarbonate and farxiga    Essential hypertension  Patient's blood pressure range in the last 24 hours was: BP  Min: 115/63  Max: 158/68.The patient's inpatient anti-hypertensive regimen is listed below:  Current Antihypertensives  nitroGLYCERIN SL tablet 0.4 mg, Every 5 min PRN, Sublingual  amLODIPine tablet 5 mg, Daily, Oral  isosorbide mononitrate 24 hr tablet 60 mg, Daily, Oral  losartan tablet 25 mg, Daily, Oral    Plan  - BP is controlled, no changes needed to their regimen  - adjustments to BP meds     PAF (paroxysmal atrial fibrillation)  Patient has paroxysmal (<7 days) atrial fibrillation. Patient is currently in sinus rhythm. [id number] Score: 4. The patients heart rate in the last 24 hours is as follows:  Pulse  Min: 34  Max: 72     Antiarrhythmics       Anticoagulants  heparin (porcine) injection 5,000 Units, Every 8 hours, Subcutaneous    Plan  - Replete lytes with a goal of K>4, Mg >2  - Patient is not " anticoagulated due to unclear etiology  - Patient's afib is currently controlled          VTE Risk Mitigation (From admission, onward)           Ordered     heparin (porcine) injection 5,000 Units  Every 8 hours         01/20/25 0003     IP VTE HIGH RISK PATIENT  Once         01/20/25 0003     Place sequential compression device  Until discontinued         01/20/25 0003                    Discharge Planning   ROSEMARIE: 1/30/2025     Code Status: Full Code   Medical Readiness for Discharge Date:   Discharge Plan A: Skilled Nursing Facility                Maria E Bernstein MD  Department of Hospital Medicine   [facility] - Observation 11H

## 2025-01-26 NOTE — ASSESSMENT & PLAN NOTE
Persistent   Seen by cardiology - her intermittent dyspnea symptoms are considered an anginal equivalent.  Recommendation to titrate Imdur dosage to 60mg. BP now borderline hypotensive with this increased. Patient took AM imdur 30mg, was hypertensive on presentation - gave 30mg imdur 1/19 and started 60mg in AM.  - cardiology signed off  - PT/OT consulted  - increase Imdur to 60mg and decrease amlodipine dose.   - ranolazine discontinued due to renal function

## 2025-01-27 LAB — SARS-COV-2 RNA RESP QL NAA+PROBE: NOT DETECTED

## 2025-01-27 PROCEDURE — 21400001 HC TELEMETRY ROOM

## 2025-01-27 PROCEDURE — 97116 GAIT TRAINING THERAPY: CPT | Mod: CQ

## 2025-01-27 PROCEDURE — 25000003 PHARM REV CODE 250: Performed by: HOSPITALIST

## 2025-01-27 PROCEDURE — 87635 SARS-COV-2 COVID-19 AMP PRB: CPT | Performed by: STUDENT IN AN ORGANIZED HEALTH CARE EDUCATION/TRAINING PROGRAM

## 2025-01-27 PROCEDURE — 25000003 PHARM REV CODE 250: Performed by: STUDENT IN AN ORGANIZED HEALTH CARE EDUCATION/TRAINING PROGRAM

## 2025-01-27 PROCEDURE — 97535 SELF CARE MNGMENT TRAINING: CPT

## 2025-01-27 PROCEDURE — 63600175 PHARM REV CODE 636 W HCPCS: Performed by: HOSPITALIST

## 2025-01-27 RX ADMIN — PANTOPRAZOLE SODIUM 20 MG: 20 TABLET, DELAYED RELEASE ORAL at 06:01

## 2025-01-27 RX ADMIN — ISOSORBIDE MONONITRATE 60 MG: 60 TABLET, EXTENDED RELEASE ORAL at 08:01

## 2025-01-27 RX ADMIN — CLOPIDOGREL BISULFATE 75 MG: 75 TABLET ORAL at 08:01

## 2025-01-27 RX ADMIN — CHOLECALCIFEROL TAB 25 MCG (1000 UNIT) 2000 UNITS: 25 TAB at 08:01

## 2025-01-27 RX ADMIN — AMLODIPINE BESYLATE 5 MG: 5 TABLET ORAL at 08:01

## 2025-01-27 RX ADMIN — HEPARIN SODIUM 5000 UNITS: 5000 INJECTION INTRAVENOUS; SUBCUTANEOUS at 06:01

## 2025-01-27 RX ADMIN — ASPIRIN 81 MG: 81 TABLET, COATED ORAL at 08:01

## 2025-01-27 RX ADMIN — HEPARIN SODIUM 5000 UNITS: 5000 INJECTION INTRAVENOUS; SUBCUTANEOUS at 08:01

## 2025-01-27 RX ADMIN — SODIUM BICARBONATE 650 MG TABLET 650 MG: at 08:01

## 2025-01-27 RX ADMIN — Medication 6 MG: at 08:01

## 2025-01-27 RX ADMIN — ATORVASTATIN CALCIUM 40 MG: 40 TABLET, FILM COATED ORAL at 08:01

## 2025-01-27 RX ADMIN — LOSARTAN POTASSIUM 25 MG: 25 TABLET, FILM COATED ORAL at 08:01

## 2025-01-27 RX ADMIN — FOLIC ACID 1 MG: 1 TABLET ORAL at 08:01

## 2025-01-27 RX ADMIN — HEPARIN SODIUM 5000 UNITS: 5000 INJECTION INTRAVENOUS; SUBCUTANEOUS at 02:01

## 2025-01-27 RX ADMIN — ALLOPURINOL 50 MG: 300 TABLET ORAL at 08:01

## 2025-01-27 RX ADMIN — DAPAGLIFLOZIN 10 MG: 10 TABLET, FILM COATED ORAL at 08:01

## 2025-01-27 NOTE — PLAN OF CARE
Pt accepted to [facility] . Contact information for daughter sent via Epic     01/27/25 0797   Post-Acute Status   Post-Acute Authorization Placement   Post-Acute Placement Status Pending payor review/awaiting authorization (if required)   Discharge Plan   Discharge Plan A Skilled Nursing Facility   Discharge Plan B Skilled Nursing Facility     Ryan Quick RN,BSN

## 2025-01-27 NOTE — PROGRESS NOTES
"[facility] [address] [place] [address] Medicine  Progress Note    Patient Name: Anahy Evans  MRN: [id number]  Patient Class: IP- Inpatient   Admission Date: 1/19/2025  Length of Stay: 6 days  Attending Physician: Maria E Bernstein MD  Primary Care Provider: Elena Cabrera MD        Principal Problem:Anginal equivalent        HPI:  89 y/o AAF w/ CAD hx PCI, CKD 4, HTN, Afib, AAA, 2nd degree AV Block presents to the ED with complaints of dyspnea.     She was recently admitted to [facility] from 1/13-1/17 per DC summary "Patient admitted for dyspnea on exertion. V/Q scan was completed - no pulmonary embolism. Echo ordered - regional wall motion abnormalities present. Low normal systolic ejection fraction 50-55%. Cardiology was consulted - feel origin of TAYLOR may be cardiac in nature. PET stress done 1/16. Two perfusion abnormalities seen. Interventional cardiology recommending medical management and close follow-up. Amlodipine increased, losartan decreased, and started on imdur."    She returns to ED with concerns of dyspnea on exertion.  She discharged to home after last admission, lives with her daughter.  Her grand-daughter accompanies her today and helps provide history.  Since discharge patient has had limited ADL capability, pt requires assistance w/ ambulation and use of walker, patient has home purewick and bedside commode.  She requires assistance with toileting, bathing, clothing.  She is adherent to medications. Yesterday patient appeared weak and when sitting upright had poor trunk stability would slump over and had poor appetite did not eat much and was noted with intermittent confusion.  Her confusion is described as poor short term memory, will intermittently forget recent events, granddaughter notes that patient usually answers orientation questions appropriately when evaluated by medical teams, but might forget later what was discussed or why she is in the hospital.    She was treated for acute cystitis " during last admit, pan-sensitive E.coli on urine culture received ceftriaxone inpatient and not discharged on any oral antibiotics.     She does report poor sleep, has had difficulty sleeping at night and will nap multiple times per day. No reported non-redirectable agitation.   Prior to this month, grand-daughter notes patient was performing more ADL on her own.  Patient had declined referral to SNF with recent admits, last PT/OT recs for low-intensity therapy, pt is more open to post-acute care if recommended.     In ED tonight pt with bradycardia noted, cardiology consulted, no acute medical management of her bradycardia recommended, she has 2:1 AV block on review of EKG/telemetry tonight, hx of Mobitz 1 2nd Deg AV block in past.       Overview/Hospital Course:  Evaluated by cardiology and imdur increased. Had issues with SOB during breakfast on 1/20 but denies this problem on 1/21. Will need to work with PT/OT again. Patient is interested in placement option for therapy. Imdur up to 60mg. Started ranolazine as per cardiology recs. Will monitor neurologic status closely as patient's renal function is poor. Ranolazine was started and then later discontinued due to renal function.     Interval History:     Patient reports no new complaints. Pending placement     Review of Systems  Objective:     Vital Signs (Most Recent):  Temp: 98.1 °F (36.7 °C) (01/27/25 1136)  Pulse: 76 (01/27/25 1136)  Resp: 18 (01/27/25 1136)  BP: 115/68 (01/27/25 1136)  SpO2: 98 % (01/27/25 1136) Vital Signs (24h Range):  Temp:  [97.9 °F (36.6 °C)-98.6 °F (37 °C)] 98.1 °F (36.7 °C)  Pulse:  [37-76] 76  Resp:  [18-20] 18  SpO2:  [96 %-98 %] 98 %  BP: (115-153)/(66-82) 115/68     Weight: 72 kg (158 lb 11.7 oz)  Body mass index is 24.86 kg/m².    Intake/Output Summary (Last 24 hours) at 1/27/2025 1312  Last data filed at 1/27/2025 0632  Gross per 24 hour   Intake 400 ml   Output 650 ml   Net -250 ml         Physical Exam  Constitutional:        General: She is not in acute distress.     Appearance: She is not ill-appearing.   Cardiovascular:      Rate and Rhythm: Normal rate.      Heart sounds: Normal heart sounds.   Pulmonary:      Effort: Pulmonary effort is normal. No respiratory distress.   Abdominal:      General: Abdomen is flat.      Tenderness: There is no abdominal tenderness.   Musculoskeletal:      Right lower leg: No edema.      Left lower leg: No edema.   Neurological:      Mental Status: She is alert and oriented to person, place, and time. Mental status is at baseline.      Motor: No weakness.   Psychiatric:         Mood and Affect: Mood normal.             Significant Labs: All pertinent labs within the past 24 hours have been reviewed.    Significant Imaging: I have reviewed all pertinent imaging results/findings within the past 24 hours.    Assessment and Plan     * Anginal equivalent  Persistent   Seen by cardiology - her intermittent dyspnea symptoms are considered an anginal equivalent.  Recommendation to titrate Imdur dosage to 60mg. BP now borderline hypotensive with this increased. Patient took AM imdur 30mg, was hypertensive on presentation - gave 30mg imdur 1/19 and started 60mg in AM.  - cardiology signed off  - PT/OT consulted  - increase Imdur to 60mg and decrease amlodipine dose.   - ranolazine discontinued due to renal function      Debility  Patient with Acute debility due to age-related physical debility and cardiac disease . The patient's latest AMPAC (Activity Measure for Post Acute Care) Score is listed below.    AM-PAC Score - How much help does the patient need for each activity listed  Basic Mobility Total Score: 17  Turning over in bed (including adjusting bedclothes, sheets and blankets)?: A little  Sitting down on and standing up from a chair with arms (e.g., wheelchair, bedside commode, etc.): A little  Moving from lying on back to sitting on the side of the bed?: A little  Moving to and from a bed to a chair  "(including a wheelchair)?: A little  Need to walk in hospital room?: A little  Climbing 3-5 steps with a railing?: A lot    Plan  - Progressive mobility protocol initated  - PT/OT consulted  - Fall precautions in place          Counseling regarding goals of care  Code status discussion as per AV block problem.     Patient has living will and HCPOA documents uploaded, she does not have an LAPOST completed. Would recommend completion of LAPOST prior to hospital discharge.       Confusion  ED indicated pt referred for admit for confusion.  By CAM-ICU testing pt does not have delirium, some disorientation. She may have some underlying cognitive deficits based on grand-daughters description. Appears pt with altered sleep wake cycles more recently - schedule melatonin tonight to help normalize sleep-wake cycle, delirium precautions.   Repeat UA appears normal and no persistent symptoms or concerns for ongoing Acute cystitis adequately treated.   - delirium precautions      Acute cystitis without hematuria  Repeat UA appears normal and no persistent symptoms or concerns for ongoing Acute cystitis adequately treated.       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 Pt having 2:1 AV block atrial rates in 80s and HR in low 40s.  During cardiology evaluation pt with variable 2nd degree AV block. She is not on any AV node blocking agents. Discussed with cards/EP  - continue telemetry monitoring.     Per Dr. Cummings on admit: "Tonight discussed code status with patient - she has been Full Code and DNR during last 2 admits, initially indicated DNR but also states she would want resuscitative measures if she might not incur injury, reviewed probabilities with pt given advanced age and chronic medical conditiions indicated to her that under true cardio-respiratory arrest she would be left with subsequent injury and overall low likelihood of survival to discharge 5-10%.  Give this " "bradyarrhythmia I did discuss if patients HR was lower if she would want invasive measures such as transvenous pacing or transcutaneous pacing performed she responded in affirmative,  will place FULL CODE for now."       Coronary artery disease involving native coronary artery of native heart without angina pectoris  Patient with known CAD , which is uncontrolled Will continue ASA, Plavix, and Statin and monitor for S/Sx of angina/ACS. Continue to monitor on telemetry.     CKD (chronic kidney disease), stage IV  Creatine stable for now. BMP reviewed- noted Estimated Creatinine Clearance: 17.8 mL/min (A) (based on SCr of 2 mg/dL (H)). according to latest data. Based on current GFR, CKD stage is stage 4 - GFR 15-29.  Monitor UOP and serial BMP and adjust therapy as needed. Renally dose meds. Avoid nephrotoxic medications and procedures.    Continue sodium bicarbonate and farxiga    Essential hypertension  Patient's blood pressure range in the last 24 hours was: BP  Min: 115/63  Max: 158/68.The patient's inpatient anti-hypertensive regimen is listed below:  Current Antihypertensives  nitroGLYCERIN SL tablet 0.4 mg, Every 5 min PRN, Sublingual  amLODIPine tablet 5 mg, Daily, Oral  isosorbide mononitrate 24 hr tablet 60 mg, Daily, Oral  losartan tablet 25 mg, Daily, Oral    Plan  - BP is controlled, no changes needed to their regimen  - adjustments to BP meds     PAF (paroxysmal atrial fibrillation)  Patient has paroxysmal (<7 days) atrial fibrillation. Patient is currently in sinus rhythm. [id number] Score: 4. The patients heart rate in the last 24 hours is as follows:  Pulse  Min: 34  Max: 72     Antiarrhythmics       Anticoagulants  heparin (porcine) injection 5,000 Units, Every 8 hours, Subcutaneous    Plan  - Replete lytes with a goal of K>4, Mg >2  - Patient is not anticoagulated due to unclear etiology  - Patient's afib is currently controlled          VTE Risk Mitigation (From admission, onward)           Ordered "     heparin (porcine) injection 5,000 Units  Every 8 hours         01/20/25 0003     IP VTE HIGH RISK PATIENT  Once         01/20/25 0003     Place sequential compression device  Until discontinued         01/20/25 0003                    Discharge Planning   ROSEMARIE: 1/30/2025     Code Status: Full Code   Medical Readiness for Discharge Date: 1/27/2025  Discharge Plan A: Skilled Nursing Facility                  Maria E Bernstein MD  Department of Hospital Medicine   [facility] - Observation 11H

## 2025-01-27 NOTE — SUBJECTIVE & OBJECTIVE
Interval History:     Patient reports no new complaints. Pending placement     Review of Systems  Objective:     Vital Signs (Most Recent):  Temp: 98.1 °F (36.7 °C) (01/27/25 1136)  Pulse: 76 (01/27/25 1136)  Resp: 18 (01/27/25 1136)  BP: 115/68 (01/27/25 1136)  SpO2: 98 % (01/27/25 1136) Vital Signs (24h Range):  Temp:  [97.9 °F (36.6 °C)-98.6 °F (37 °C)] 98.1 °F (36.7 °C)  Pulse:  [37-76] 76  Resp:  [18-20] 18  SpO2:  [96 %-98 %] 98 %  BP: (115-153)/(66-82) 115/68     Weight: 72 kg (158 lb 11.7 oz)  Body mass index is 24.86 kg/m².    Intake/Output Summary (Last 24 hours) at 1/27/2025 1312  Last data filed at 1/27/2025 0632  Gross per 24 hour   Intake 400 ml   Output 650 ml   Net -250 ml         Physical Exam  Constitutional:       General: She is not in acute distress.     Appearance: She is not ill-appearing.   Cardiovascular:      Rate and Rhythm: Normal rate.      Heart sounds: Normal heart sounds.   Pulmonary:      Effort: Pulmonary effort is normal. No respiratory distress.   Abdominal:      General: Abdomen is flat.      Tenderness: There is no abdominal tenderness.   Musculoskeletal:      Right lower leg: No edema.      Left lower leg: No edema.   Neurological:      Mental Status: She is alert and oriented to person, place, and time. Mental status is at baseline.      Motor: No weakness.   Psychiatric:         Mood and Affect: Mood normal.             Significant Labs: All pertinent labs within the past 24 hours have been reviewed.    Significant Imaging: I have reviewed all pertinent imaging results/findings within the past 24 hours.

## 2025-01-27 NOTE — PT/OT/SLP PROGRESS
Occupational Therapy   Treatment    Name: Anahy Evans  MRN: [id number]  Admitting Diagnosis:  Anginal equivalent       Recommendations:     Discharge Recommendations: Moderate Intensity Therapy  Discharge Equipment Recommendations:  none  Barriers to discharge:  None    Assessment:     Anahy Evans is a 89 y.o. female with a medical diagnosis of Anginal equivalent.  She presents with deficits in endurance noted with mobility as well as standing AL tasks. Pt. Also noted to have increased difficulty standing from lower surfaces. Pt. Participated well in session. Patient would benefit from continued OT services to maximize level of safety and independence with self-care tasks.    Performance deficits affecting function are weakness, impaired endurance, impaired self care skills, impaired functional mobility, gait instability, impaired cardiopulmonary response to activity, decreased lower extremity function, decreased upper extremity function.     Rehab Prognosis:  Good; patient would benefit from acute skilled OT services to address these deficits and reach maximum level of function.       Plan:     Patient to be seen 4 x/week to address the above listed problems via self-care/home management, therapeutic activities, therapeutic exercises, neuromuscular re-education  Plan of Care Expires: 02/23/25  Plan of Care Reviewed with: patient    Subjective     Chief Complaint: Pt. Reported chair was very low and difficult to get out of. Pt. Has a lift chair at home.   Patient/Family Comments/goals: no specific goals stated  Pain/Comfort:  Pain Rating 1: 0/10  Pain Rating Post-Intervention 1: 0/10    Objective:     Communicated with: nurse prior to session.  Patient found up in chair with Zhanna, telemetry upon OT entry to room.    General Precautions: Standard, fall    Orthopedic Precautions:N/A  Braces: N/A  Respiratory Status: Room air     Occupational Performance:     Bed Mobility:    Not tested as pt. Was up inchair      Functional Mobility/Transfers:  Patient completed Sit <> Stand Transfer with contact guard assistance  with  rolling walker   Functional Mobility: pt. Performed functional mobility to and from the bathroom with CGA and use of RW    Activities of Daily Living:  Grooming: stand by assistance in stand at sink to brush teeth and wash face         LBD: able to don socks seated in bedside chair using figure 4 technique          UBD: Min A to guide gown completely around back    AMPAC 6 Click ADL: 20    Treatment & Education:  Pt. Educated on need for staff assist for all mobility  Therapist built up chair surface to assist with transition to standing  BSC placed in pt. Room for use with staff assist  RW left in room for use with staff    Patient left up in chair with all lines intact and call button in reach    GOALS:   Multidisciplinary Problems       Occupational Therapy Goals          Problem: Occupational Therapy    Goal Priority Disciplines Outcome Interventions   Occupational Therapy Goal     OT, PT/OT Progressing    Description: Goals to be met by: 2/23/25     Patient will increase functional independence with ADLs by performing:    UE Dressing with Modified [place].  LE Dressing with Modified [place].  Grooming while standing with Modified [place].  Toileting from toilet with Modified [place] for hygiene and clothing management.   Supine to sit with Modified [place].  Step transfer with Modified [place]  Toilet transfer to toilet with Modified [place].                         DME Justifications:  No DME recommended requiring DME justifications    Time Tracking:     OT Date of Treatment: 01/27/25  OT Start Time: 0913  OT Stop Time: 0927  OT Total Time (min): 14 min    Billable Minutes:Self Care/Home Management 14    OT/AMAURY: OT          1/27/2025

## 2025-01-27 NOTE — PLAN OF CARE
CHARIS[facility] called into [facility]. PASSR faxed to [phone].  Will continue to update plan as needed.  Ryan Quick RN,BSN

## 2025-01-27 NOTE — PLAN OF CARE
Problem: Adult Inpatient Plan of Care  Goal: Plan of Care Review  Outcome: Progressing  Goal: Patient-Specific Goal (Individualized)  Outcome: Progressing  Goal: Absence of Hospital-Acquired Illness or Injury  Outcome: Progressing  Goal: Optimal Comfort and Wellbeing  Outcome: Progressing     Problem: Infection  Goal: Absence of Infection Signs and Symptoms  Outcome: Progressing     Problem: Acute Kidney Injury/Impairment  Goal: Fluid and Electrolyte Balance  Outcome: Progressing     Problem: Fall Injury Risk  Goal: Absence of Fall and Fall-Related Injury  Outcome: Progressing     Problem: Pain Acute  Goal: Optimal Pain Control and Function  Outcome: Progressing     Problem: Skin Injury Risk Increased  Goal: Skin Health and Integrity  Outcome: Progressing

## 2025-01-27 NOTE — PT/OT/SLP PROGRESS
Physical Therapy Treatment    Patient Name:  Anahy Evans   MRN:  [id number]    Recommendations:     Discharge Recommendations: Moderate Intensity Therapy  Discharge Equipment Recommendations: none  Barriers to discharge: Inaccessible home    Assessment:     Anahy Evans is a 89 y.o. female admitted with a medical diagnosis of Anginal equivalent.  She presents with the following impairments/functional limitations: weakness, impaired endurance, impaired functional mobility, gait instability, decreased safety awareness.  Pt was agreeable and tolerated session well. Pt tolerated ambulating in the hallway with CGA and RW. Pt demonstrated better endurance as patient increase gait distance. Pt is progressing well towards goals and  continues to demonstrate the need for moderate intensity therapy on a daily basis post acute exhibited by decreased independence with functional mobility    Rehab Prognosis: Good; patient would benefit from acute skilled PT services to address these deficits and reach maximum level of function.    Recent Surgery: * No surgery found *      Plan:     During this hospitalization, patient to be seen 4 x/week to address the identified rehab impairments via gait training, therapeutic activities, therapeutic exercises, neuromuscular re-education and progress toward the following goals:    Plan of Care Expires:  02/23/25    Subjective     Chief Complaint: fatigues easily   Patient/Family Comments/goals: to get stronger  Pain/Comfort:  Pain Rating 1: 0/10  Pain Rating Post-Intervention 1: 0/10      Objective:     Communicated with nurse prior to session.  Patient found up in chair with telemetryZhanna upon PT entry to room.     General Precautions: Standard, fall  Orthopedic Precautions: N/A  Braces: N/A  Respiratory Status: Room air     Functional Mobility:  Additional staff present: N/A  Bed Mobility:   Not assessed, pt found and returned to bedside chair   Transfers:   Sit <> Stand Transfer:  stand by assistance with rolling walker   Gait:  Pt ambulated ~40+50 ft with contact guard assistance and rolling walker. Bedside chair following   1 seated rest break and no overt LOB  All lines remained intact throughout ambulation trial, gait belt utilized.  Gait Deviation(s): mostly steady, slow gait with decrease katherine, decrease step length, occasional downward gaze, and slight flexed posture  Verbal/tactile cues for pacing as needed, more forward gaze, and upright posture.     AM-PAC 6 CLICK MOBILITY  Turning over in bed (including adjusting bedclothes, sheets and blankets)?: 3  Sitting down on and standing up from a chair with arms (e.g., wheelchair, bedside commode, etc.): 4  Moving from lying on back to sitting on the side of the bed?: 3  Moving to and from a bed to a chair (including a wheelchair)?: 3  Need to walk in hospital room?: 3  Climbing 3-5 steps with a railing?: 2  Basic Mobility Total Score: 18       Treatment & Education:  Seated BLE therex x20 reps: ankle pumps, long arc quads, and marches  Patient educated on role of therapy, goals of session, and benefits of out of bed mobility.   Instructed on use of call button and importance of calling nursing staff for assistance with mobility   Questions/concerns addressed within PTA scope of practice  Pt verbalized understanding.  Whiteboard Updated      Patient left up in chair with all lines intact and call button in reach..    GOALS:   Multidisciplinary Problems       Physical Therapy Goals          Problem: Physical Therapy    Goal Priority Disciplines Outcome Interventions   Physical Therapy Goal     PT, PT/OT Progressing    Description: Goals to be met by: 2/10     Patient will increase functional independence with mobility by performin. Supine to sit with Modified [place]  2. Sit to supine with Modified [place]  3. Sit to stand transfer with Modified [place]  4. Gait  x 200 feet with Supervision using LRAD.   5.  Ascend/descend 4 stair with left Handrails Contact Guard Assistance using LRAD.                          DME Justifications:  No DME recommended requiring DME justifications    Time Tracking:     PT Received On: 01/27/25  PT Start Time: 1330     PT Stop Time: 1349  PT Total Time (min): 19 min     Billable Minutes: Gait Training 15    Treatment Type: Treatment  PT/PTA: PTA     Number of PTA visits since last PT visit: 1 01/27/2025

## 2025-01-28 PROCEDURE — 86580 TB INTRADERMAL TEST: CPT | Performed by: STUDENT IN AN ORGANIZED HEALTH CARE EDUCATION/TRAINING PROGRAM

## 2025-01-28 PROCEDURE — 97530 THERAPEUTIC ACTIVITIES: CPT

## 2025-01-28 PROCEDURE — 97116 GAIT TRAINING THERAPY: CPT

## 2025-01-28 PROCEDURE — 25000003 PHARM REV CODE 250: Performed by: STUDENT IN AN ORGANIZED HEALTH CARE EDUCATION/TRAINING PROGRAM

## 2025-01-28 PROCEDURE — 25000003 PHARM REV CODE 250: Performed by: HOSPITALIST

## 2025-01-28 PROCEDURE — 63600175 PHARM REV CODE 636 W HCPCS: Performed by: HOSPITALIST

## 2025-01-28 PROCEDURE — 30200315 PPD INTRADERMAL TEST REV CODE 302: Performed by: STUDENT IN AN ORGANIZED HEALTH CARE EDUCATION/TRAINING PROGRAM

## 2025-01-28 PROCEDURE — 94761 N-INVAS EAR/PLS OXIMETRY MLT: CPT

## 2025-01-28 PROCEDURE — 21400001 HC TELEMETRY ROOM

## 2025-01-28 RX ADMIN — HEPARIN SODIUM 5000 UNITS: 5000 INJECTION INTRAVENOUS; SUBCUTANEOUS at 09:01

## 2025-01-28 RX ADMIN — HEPARIN SODIUM 5000 UNITS: 5000 INJECTION INTRAVENOUS; SUBCUTANEOUS at 02:01

## 2025-01-28 RX ADMIN — ASPIRIN 81 MG: 81 TABLET, COATED ORAL at 08:01

## 2025-01-28 RX ADMIN — SODIUM BICARBONATE 650 MG TABLET 650 MG: at 09:01

## 2025-01-28 RX ADMIN — ISOSORBIDE MONONITRATE 60 MG: 60 TABLET, EXTENDED RELEASE ORAL at 08:01

## 2025-01-28 RX ADMIN — ALLOPURINOL 50 MG: 300 TABLET ORAL at 08:01

## 2025-01-28 RX ADMIN — AMLODIPINE BESYLATE 5 MG: 5 TABLET ORAL at 08:01

## 2025-01-28 RX ADMIN — DAPAGLIFLOZIN 10 MG: 10 TABLET, FILM COATED ORAL at 08:01

## 2025-01-28 RX ADMIN — LOSARTAN POTASSIUM 25 MG: 25 TABLET, FILM COATED ORAL at 08:01

## 2025-01-28 RX ADMIN — CHOLECALCIFEROL TAB 25 MCG (1000 UNIT) 2000 UNITS: 25 TAB at 08:01

## 2025-01-28 RX ADMIN — TUBERCULIN PURIFIED PROTEIN DERIVATIVE 5 UNITS: 5 INJECTION, SOLUTION INTRADERMAL at 02:01

## 2025-01-28 RX ADMIN — HEPARIN SODIUM 5000 UNITS: 5000 INJECTION INTRAVENOUS; SUBCUTANEOUS at 05:01

## 2025-01-28 RX ADMIN — Medication 6 MG: at 09:01

## 2025-01-28 RX ADMIN — SODIUM BICARBONATE 650 MG TABLET 650 MG: at 08:01

## 2025-01-28 RX ADMIN — FOLIC ACID 1 MG: 1 TABLET ORAL at 08:01

## 2025-01-28 RX ADMIN — PANTOPRAZOLE SODIUM 20 MG: 20 TABLET, DELAYED RELEASE ORAL at 05:01

## 2025-01-28 RX ADMIN — CLOPIDOGREL BISULFATE 75 MG: 75 TABLET ORAL at 08:01

## 2025-01-28 RX ADMIN — ATORVASTATIN CALCIUM 40 MG: 40 TABLET, FILM COATED ORAL at 08:01

## 2025-01-28 NOTE — PT/OT/SLP PROGRESS
Physical Therapy Treatment    Patient Name:  Anahy Evans   MRN:  [id number]  Admitting Diagnosis:  Anginal equivalent   Recent Surgery: * No surgery found *    Admit Date: 1/19/2025  Length of Stay: 7 days    Recommendations:     Discharge Recommendations:  Moderate Intensity Therapy  Discharge Equipment Recommendations: none   Barriers to discharge: Decreased Caregiver support    Assessment:     Anahy Evans is a 89 y.o. female admitted with a medical diagnosis of Anginal equivalent.  She presents with the following impairments/functional limitations:  weakness, impaired endurance, impaired functional mobility, gait instability, impaired balance, decreased lower extremity function, impaired cardiopulmonary response to activity.     Pt with good participation in treatment this afternoon. She performed bed mobility and sit<>stand from bed with SBA, and was able to ambulate 80' and 50' with RW and CGA with chair follow. Additionally pt was able to negotiate 5 stairs with L handrail and CGA. Pt was very fatigued following stair climbing and required seated rest break in chair. Grand daughter was present to assist with bringing chair behind pt for safety. Pt was found to have been soiled at start of session so also assisted with changing soiled purewick pad. Pt could benefit from continued skilled PT services to optimize independence upon discharge.     Rehab Prognosis: Good; patient would benefit from acute skilled PT services to address these deficits and reach maximum level of function.    Recent Surgery: * No surgery found *      Treatment Tolerated: Well    Highest level of mobility achieved this visit: negotiated 5 stairs with L handrail and CGA    Activity with RN/PCT: transfer and ambulate with one person assist    Plan:     During this hospitalization, patient to be seen 4 x/week to address the identified rehab impairments via gait training, therapeutic activities, neuromuscular re-education, therapeutic  "exercises and progress toward the following goals:    Plan of Care Expires:  02/23/25    Subjective     RN Ranjana notified prior to session. Pt's granddaughter present upon PT entrance into room.    Chief Complaint: fatigue  Patient/Family Comments/goals: return to PLOF  Pain/Comfort:  Pain Rating 1: 0/10  Pain Rating Post-Intervention 1: 0/10      Objective:       Patient found HOB elevated with: telemetry, PureWick   Cognition:   Alert and Cooperative  Command following: Follows multistep verbal commands  Fluency: clear/fluent  General Precautions: Standard, fall   Orthopedic Precautions:N/A   Braces: N/A   Body mass index is 24.86 kg/m².  Oxygen Device: Room Air      Vitals: BP (!) 119/59   Pulse (!) 54   Temp 98.2 °F (36.8 °C)   Resp 18   Ht 5' 7" (1.702 m)   Wt 72 kg (158 lb 11.7 oz)   LMP  (LMP Unknown)   SpO2 96%   BMI 24.86 kg/m²     Outcome Measures:  AM-PAC 6 CLICK MOBILITY  Turning over in bed (including adjusting bedclothes, sheets and blankets)?: 3  Sitting down on and standing up from a chair with arms (e.g., wheelchair, bedside commode, etc.): 4  Moving from lying on back to sitting on the side of the bed?: 3  Moving to and from a bed to a chair (including a wheelchair)?: 3  Need to walk in hospital room?: 3  Climbing 3-5 steps with a railing?: 3  Basic Mobility Total Score: 19     Functional Mobility:    Bed Mobility:   Supine to Sit: stand by assistance;   Scooting anteriorly to EOB to have both feet planted on floor: stand by assistance    Sitting Balance at Edge of Bed:  Assistance Level Required: Stand-by Assistance    Transfers:   Sit <> Stand Transfer: stand by assistance contact guard assistance with rolling walker   SBA from bed, CGA from bedside recliner. Several trials.     Standing Balance:  Assistance Level Required: Contact Guard Assistance  Patient used: rolling walker   Gait:  Patient ambulated: 80' and 50'    Patient required: contact guard  Patient used:  rolling walker "   Gait Pattern observed: reciprocal gait  Gait Deviation(s): flexed posture and decreased katherine  Impairments due to: decreased strength and decreased endurance  Chair follow for patient safety  Gait belt utilized  Comments: distance limited by fatigue    Stairs:  Pt ascended/descended 5 stair(s) with No Assistive Device with left handrail with Contact Guard Assistance.  Step to pattern.       Education:  All questions answered within PT scope of practice and to patient's satisfaction  PT role in POC to address current functional deficits  Pt educated on proper body mechanics, safety techniques, and energy conservation with PT facilitation and cueing throughout session  Call nursing/pct to transfer to chair/use bathroom. Pt stated understanding.    Patient left up in chair with call button in reach.    GOALS:   Multidisciplinary Problems       Physical Therapy Goals          Problem: Physical Therapy    Goal Priority Disciplines Outcome Interventions   Physical Therapy Goal     PT, PT/OT Progressing    Description: Goals to be met by: 2/10     Patient will increase functional independence with mobility by performin. Supine to sit with Modified [place]  2. Sit to supine with Modified [place]  3. Sit to stand transfer with Modified [place]  4. Gait  x 200 feet with Supervision using LRAD.   5. Ascend/descend 4 stair with left Handrails Contact Guard Assistance using LRAD.                        Time Tracking:     PT Received On: 25  PT Start Time: 1421     PT Stop Time: 1446  PT Total Time (min): 25 min     Billable Minutes:   Gait Training 13 min and Therapeutic Activity 12 min    Treatment Type: Treatment  PT/PTA: PT       Carmencita Mata PT, DPT  2025

## 2025-01-28 NOTE — SUBJECTIVE & OBJECTIVE
Interval History:     Patient reports no new symptoms.   Pending placement     Review of Systems  Objective:     Vital Signs (Most Recent):  Temp: 98.2 °F (36.8 °C) (01/28/25 1120)  Pulse: 68 (01/28/25 1143)  Resp: 18 (01/28/25 1120)  BP: (!) 119/59 (01/28/25 1120)  SpO2: 96 % (01/28/25 1120) Vital Signs (24h Range):  Temp:  [97.6 °F (36.4 °C)-98.3 °F (36.8 °C)] 98.2 °F (36.8 °C)  Pulse:  [38-97] 68  Resp:  [18] 18  SpO2:  [93 %-98 %] 96 %  BP: (119-175)/(59-77) 119/59     Weight: 72 kg (158 lb 11.7 oz)  Body mass index is 24.86 kg/m².    Intake/Output Summary (Last 24 hours) at 1/28/2025 1416  Last data filed at 1/27/2025 1625  Gross per 24 hour   Intake 200 ml   Output --   Net 200 ml         Physical Exam  Constitutional:       General: She is not in acute distress.     Appearance: She is not ill-appearing.   Cardiovascular:      Rate and Rhythm: Normal rate.      Heart sounds: Normal heart sounds.   Pulmonary:      Effort: Pulmonary effort is normal. No respiratory distress.   Abdominal:      General: Abdomen is flat.      Palpations: Abdomen is soft.      Tenderness: There is no abdominal tenderness.   Musculoskeletal:      Right lower leg: No edema.      Left lower leg: No edema.   Neurological:      Mental Status: She is alert and oriented to person, place, and time. Mental status is at baseline.      Cranial Nerves: No cranial nerve deficit.   Psychiatric:         Mood and Affect: Mood normal.             Significant Labs: All pertinent labs within the past 24 hours have been reviewed.    Significant Imaging: I have reviewed all pertinent imaging results/findings within the past 24 hours.

## 2025-01-28 NOTE — PROGRESS NOTES
"[facility] [address] [place] [address] Medicine  Progress Note    Patient Name: Anahy Evans  MRN: [id number]  Patient Class: IP- Inpatient   Admission Date: 1/19/2025  Length of Stay: 7 days  Attending Physician: Maria E Bernstein MD  Primary Care Provider: Elena Cabrera MD          Principal Problem:Anginal equivalent        HPI:  89 y/o AAF w/ CAD hx PCI, CKD 4, HTN, Afib, AAA, 2nd degree AV Block presents to the ED with complaints of dyspnea.     [place] [facility] [place] summary "Patient admitted for dyspnea on exertion. V/Q scan was completed - no pulmonary embolism. Echo ordered - regional wall motion abnormalities present. Low normal systolic ejection fraction 50-55%. Cardiology was consulted - feel origin of TAYLOR may be cardiac in nature. PET stress done 1/16. Two perfusion abnormalities seen. Interventional cardiology recommending medical management and close follow-up. Amlodipine increased, losartan decreased, and started on imdur."    She returns to ED with concerns of dyspnea on exertion.  She discharged to home after last admission, lives with her daughter.  Her grand-daughter accompanies her today and helps provide history.  Since discharge patient has had limited ADL capability, pt requires assistance w/ ambulation and use of walker, patient has home purewick and bedside commode.  She requires assistance with toileting, bathing, clothing.  She is adherent to medications. Yesterday patient appeared weak and when sitting upright had poor trunk stability would slump over and had poor appetite did not eat much and was noted with intermittent confusion.  Her confusion is described as poor short term memory, will intermittently forget recent events, granddaughter notes that patient usually answers orientation questions appropriately when evaluated by medical teams, but might forget later what was discussed or why she is in the hospital.    She was treated for acute cystitis " during last admit, pan-sensitive E.coli on urine culture received ceftriaxone inpatient and not discharged on any oral antibiotics.     She does report poor sleep, has had difficulty sleeping at night and will nap multiple times per day. No reported non-redirectable agitation.   Prior to this month, grand-daughter notes patient was performing more ADL on her own.  Patient had declined referral to SNF with recent admits, last PT/OT recs for low-intensity therapy, pt is more open to post-acute care if recommended.     In ED tonight pt with bradycardia noted, cardiology consulted, no acute medical management of her bradycardia recommended, she has 2:1 AV block on review of EKG/telemetry tonight, hx of Mobitz 1 2nd Deg AV block in past.       Overview/Hospital Course:  Evaluated by cardiology and imdur increased. Cardiology recommends medical management, continue Imdur, DAPT, and consider Ranolazine for angina. Imdur increased to 60mg.  Patient started ranolazine as per cardiology recs, then later discontinued due to renal function. Patient worked with PT/OT again. Patient is interested in placement option for therapy.  Patient is stable and pending SNF placement.      Interval History:     Patient reports no new symptoms.   Pending placement     Review of Systems  Objective:     Vital Signs (Most Recent):  Temp: 98.2 °F (36.8 °C) (01/28/25 1120)  Pulse: 68 (01/28/25 1143)  Resp: 18 (01/28/25 1120)  BP: (!) 119/59 (01/28/25 1120)  SpO2: 96 % (01/28/25 1120) Vital Signs (24h Range):  Temp:  [97.6 °F (36.4 °C)-98.3 °F (36.8 °C)] 98.2 °F (36.8 °C)  Pulse:  [38-97] 68  Resp:  [18] 18  SpO2:  [93 %-98 %] 96 %  BP: (119-175)/(59-77) 119/59     Weight: 72 kg (158 lb 11.7 oz)  Body mass index is 24.86 kg/m².    Intake/Output Summary (Last 24 hours) at 1/28/2025 1416  Last data filed at 1/27/2025 1625  Gross per 24 hour   Intake 200 ml   Output --   Net 200 ml         Physical Exam  Constitutional:       General: She is not in  acute distress.     Appearance: She is not ill-appearing.   Cardiovascular:      Rate and Rhythm: Normal rate.      Heart sounds: Normal heart sounds.   Pulmonary:      Effort: Pulmonary effort is normal. No respiratory distress.   Abdominal:      General: Abdomen is flat.      Palpations: Abdomen is soft.      Tenderness: There is no abdominal tenderness.   Musculoskeletal:      Right lower leg: No edema.      Left lower leg: No edema.   Neurological:      Mental Status: She is alert and oriented to person, place, and time. Mental status is at baseline.      Cranial Nerves: No cranial nerve deficit.   Psychiatric:         Mood and Affect: Mood normal.             Significant Labs: All pertinent labs within the past 24 hours have been reviewed.    Significant Imaging: I have reviewed all pertinent imaging results/findings within the past 24 hours.    Assessment and Plan     * Anginal equivalent  Persistent   Seen by cardiology - her intermittent dyspnea symptoms are considered an anginal equivalent.  Recommendation to titrate Imdur dosage to 60mg.   - cardiology signed off  - PT/OT consulted  - increase Imdur to 60mg and decrease amlodipine dose.   - ranolazine discontinued due to renal function      Debility  Patient with Acute debility due to age-related physical debility and cardiac disease . The patient's latest AMPAC (Activity Measure for Post Acute Care) Score is listed below.    AM-PAC Score - How much help does the patient need for each activity listed  Basic Mobility Total Score: 17  Turning over in bed (including adjusting bedclothes, sheets and blankets)?: A little  Sitting down on and standing up from a chair with arms (e.g., wheelchair, bedside commode, etc.): A little  Moving from lying on back to sitting on the side of the bed?: A little  Moving to and from a bed to a chair (including a wheelchair)?: A little  Need to walk in hospital room?: A little  Climbing 3-5 steps with a railing?: A  "lot    Plan  - Progressive mobility protocol initated  - PT/OT consulted  - Fall precautions in place          Counseling regarding goals of care  Code status discussion as per AV block problem.     Patient has living will and HCPOA documents uploaded, she does not have an LAPOST completed. Would recommend completion of LAPOST prior to hospital discharge.       Confusion  ED indicated pt referred for admit for confusion.  By CAM-ICU testing pt does not have delirium, some disorientation. She may have some underlying cognitive deficits based on grand-daughters description. Appears pt with altered sleep wake cycles more recently - schedule melatonin tonight to help normalize sleep-wake cycle, delirium precautions.   Repeat UA appears normal and no persistent symptoms or concerns for ongoing Acute cystitis adequately treated.   - delirium precautions      Acute cystitis without hematuria  Repeat UA appears normal and no persistent symptoms or concerns for ongoing Acute cystitis adequately treated.       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 Pt having 2:1 AV block atrial rates in 80s and HR in low 40s.  During cardiology evaluation pt with variable 2nd degree AV block. She is not on any AV node blocking agents. Discussed with cards/EP  - continue telemetry monitoring.     Per Dr. Cummings on admit: "Tonight discussed code status with patient - she has been Full Code and DNR during last 2 admits, initially indicated DNR but also states she would want resuscitative measures if she might not incur injury, reviewed probabilities with pt given advanced age and chronic medical conditiions indicated to her that under true cardio-respiratory arrest she would be left with subsequent injury and overall low likelihood of survival to discharge 5-10%.  Give this bradyarrhythmia I did discuss if patients HR was lower if she would want invasive measures such as transvenous pacing " "or transcutaneous pacing performed she responded in affirmative,  will place FULL CODE for now."       Coronary artery disease involving native coronary artery of native heart without angina pectoris  Patient with known CAD , which is uncontrolled Will continue ASA, Plavix, and Statin and monitor for S/Sx of angina/ACS. Continue to monitor on telemetry.     CKD (chronic kidney disease), stage IV  Creatine stable for now. BMP reviewed- noted Estimated Creatinine Clearance: 17.8 mL/min (A) (based on SCr of 2 mg/dL (H)). according to latest data. Based on current GFR, CKD stage is stage 4 - GFR 15-29.  Monitor UOP and serial BMP and adjust therapy as needed. Renally dose meds. Avoid nephrotoxic medications and procedures.    Continue sodium bicarbonate and farxiga    Essential hypertension  Patient's blood pressure range in the last 24 hours was: BP  Min: 115/63  Max: 158/68.The patient's inpatient anti-hypertensive regimen is listed below:  Current Antihypertensives  nitroGLYCERIN SL tablet 0.4 mg, Every 5 min PRN, Sublingual  amLODIPine tablet 5 mg, Daily, Oral  isosorbide mononitrate 24 hr tablet 60 mg, Daily, Oral  losartan tablet 25 mg, Daily, Oral    Plan  - BP is controlled, no changes needed to their regimen  - adjustments to BP meds     PAF (paroxysmal atrial fibrillation)  Patient has paroxysmal (<7 days) atrial fibrillation. Patient is currently in sinus rhythm. [id number] Score: 4. The patients heart rate in the last 24 hours is as follows:  Pulse  Min: 34  Max: 72     Antiarrhythmics       Anticoagulants  heparin (porcine) injection 5,000 Units, Every 8 hours, Subcutaneous    Plan  - Replete lytes with a goal of K>4, Mg >2  - Patient is not anticoagulated due to unclear etiology  - Patient's afib is currently controlled          VTE Risk Mitigation (From admission, onward)           Ordered     heparin (porcine) injection 5,000 Units  Every 8 hours         01/20/25 0003     IP VTE HIGH RISK PATIENT  Once   "       01/20/25 0003     Place sequential compression device  Until discontinued         01/20/25 0003                    Discharge Planning   ROSEMARIE: 1/30/2025     Code Status: Full Code   Medical Readiness for Discharge Date: 1/27/2025  Discharge Plan A: Skilled Nursing Facility                Maria E Bernstein MD  Department of Hospital Medicine   [facility] - Observation 11H

## 2025-01-29 PROCEDURE — 21400001 HC TELEMETRY ROOM

## 2025-01-29 PROCEDURE — 94761 N-INVAS EAR/PLS OXIMETRY MLT: CPT

## 2025-01-29 PROCEDURE — 97535 SELF CARE MNGMENT TRAINING: CPT

## 2025-01-29 PROCEDURE — 63600175 PHARM REV CODE 636 W HCPCS: Performed by: HOSPITALIST

## 2025-01-29 PROCEDURE — 25000003 PHARM REV CODE 250: Performed by: STUDENT IN AN ORGANIZED HEALTH CARE EDUCATION/TRAINING PROGRAM

## 2025-01-29 PROCEDURE — 25000003 PHARM REV CODE 250: Performed by: HOSPITALIST

## 2025-01-29 RX ORDER — POLYETHYLENE GLYCOL 3350 17 G/17G
17 POWDER, FOR SOLUTION ORAL DAILY PRN
Qty: 238 G | Refills: 0 | Status: SHIPPED | OUTPATIENT
Start: 2025-01-29 | End: 2025-02-05

## 2025-01-29 RX ORDER — ISOSORBIDE MONONITRATE 30 MG/1
60 TABLET, EXTENDED RELEASE ORAL DAILY
Qty: 60 TABLET | Refills: 11 | Status: SHIPPED | OUTPATIENT
Start: 2025-01-29 | End: 2025-02-03 | Stop reason: HOSPADM

## 2025-01-29 RX ORDER — AMLODIPINE BESYLATE 10 MG/1
5 TABLET ORAL DAILY
Qty: 45 TABLET | Refills: 3 | Status: SHIPPED | OUTPATIENT
Start: 2025-01-29 | End: 2026-01-29

## 2025-01-29 RX ADMIN — ATORVASTATIN CALCIUM 40 MG: 40 TABLET, FILM COATED ORAL at 09:01

## 2025-01-29 RX ADMIN — FOLIC ACID 1 MG: 1 TABLET ORAL at 09:01

## 2025-01-29 RX ADMIN — PANTOPRAZOLE SODIUM 20 MG: 20 TABLET, DELAYED RELEASE ORAL at 05:01

## 2025-01-29 RX ADMIN — SODIUM BICARBONATE 650 MG TABLET 650 MG: at 09:01

## 2025-01-29 RX ADMIN — LOSARTAN POTASSIUM 25 MG: 25 TABLET, FILM COATED ORAL at 09:01

## 2025-01-29 RX ADMIN — DAPAGLIFLOZIN 10 MG: 10 TABLET, FILM COATED ORAL at 09:01

## 2025-01-29 RX ADMIN — ASPIRIN 81 MG: 81 TABLET, COATED ORAL at 09:01

## 2025-01-29 RX ADMIN — ALLOPURINOL 50 MG: 300 TABLET ORAL at 09:01

## 2025-01-29 RX ADMIN — Medication 6 MG: at 08:01

## 2025-01-29 RX ADMIN — CHOLECALCIFEROL TAB 25 MCG (1000 UNIT) 2000 UNITS: 25 TAB at 09:01

## 2025-01-29 RX ADMIN — HEPARIN SODIUM 5000 UNITS: 5000 INJECTION INTRAVENOUS; SUBCUTANEOUS at 05:01

## 2025-01-29 RX ADMIN — CLOPIDOGREL BISULFATE 75 MG: 75 TABLET ORAL at 09:01

## 2025-01-29 RX ADMIN — SODIUM BICARBONATE 650 MG TABLET 650 MG: at 08:01

## 2025-01-29 RX ADMIN — POLYETHYLENE GLYCOL 3350 17 G: 17 POWDER, FOR SOLUTION ORAL at 09:01

## 2025-01-29 RX ADMIN — HEPARIN SODIUM 5000 UNITS: 5000 INJECTION INTRAVENOUS; SUBCUTANEOUS at 02:01

## 2025-01-29 RX ADMIN — AMLODIPINE BESYLATE 5 MG: 5 TABLET ORAL at 09:01

## 2025-01-29 RX ADMIN — HEPARIN SODIUM 5000 UNITS: 5000 INJECTION INTRAVENOUS; SUBCUTANEOUS at 08:01

## 2025-01-29 RX ADMIN — ISOSORBIDE MONONITRATE 60 MG: 60 TABLET, EXTENDED RELEASE ORAL at 09:01

## 2025-01-29 NOTE — PROGRESS NOTES
"[facility] [address] [place] [address] Medicine  Progress Note    Patient Name: Anahy Evans  MRN: [id number]  Patient Class: IP- Inpatient   Admission Date: 1/19/2025  Length of Stay: 8 days  Attending Physician: Maria E Bernstein MD  Primary Care Provider: Elena Cabrera MD        Principal Problem:Anginal equivalent        HPI:  89 y/o AAF w/ CAD hx PCI, CKD 4, HTN, Afib, AAA, 2nd degree AV Block presents to the ED with complaints of dyspnea.     She was recently admitted to [facility] [place] 1/13-1/17 per DC summary "Patient admitted for dyspnea on exertion. V/Q scan was completed - no pulmonary embolism. Echo ordered - regional wall motion abnormalities present. Low normal systolic ejection fraction 50-55%. Cardiology was consulted - feel origin of TAYLOR may be cardiac in nature. PET stress done 1/16. Two perfusion abnormalities seen. Interventional cardiology recommending medical management and close follow-up. Amlodipine increased, losartan decreased, and started on imdur."    She returns to ED with concerns of dyspnea on exertion.  She discharged to home after last admission, lives with her daughter.  Her grand-daughter accompanies her today and helps provide history.  Since discharge patient has had limited ADL capability, pt requires assistance w/ ambulation and use of walker, patient has home purewick and bedside commode.  She requires assistance with toileting, bathing, clothing.  She is adherent to medications. Yesterday patient appeared weak and when sitting upright had poor trunk stability would slump over and had poor appetite did not eat much and was noted with intermittent confusion.  Her confusion is described as poor short term memory, will intermittently forget recent events, granddaughter notes that patient usually answers orientation questions appropriately when evaluated by medical teams, but might forget later what was discussed or why she is in the hospital.    She was treated for acute cystitis " during last admit, pan-sensitive E.coli on urine culture received ceftriaxone inpatient and not discharged on any oral antibiotics.     She does report poor sleep, has had difficulty sleeping at night and will nap multiple times per day. No reported non-redirectable agitation.   Prior to this month, grand-daughter notes patient was performing more ADL on her own.  Patient had declined referral to SNF with recent admits, last PT/OT recs for low-intensity therapy, pt is more open to post-acute care if recommended.     In ED tonight pt with bradycardia noted, cardiology consulted, no acute medical management of her bradycardia recommended, she has 2:1 AV block on review of EKG/telemetry tonight, hx of Mobitz 1 2nd Deg AV block in past.       Overview/Hospital Course:  Cardiology consulted in ED. Cardiology recommends medical management, continue Imdur, DAPT, and consider Ranolazine for angina. Imdur increased to 60mg.  Patient started ranolazine as per cardiology recs, then later discontinued due to renal function. Patient worked with PT/OT again. Patient is interested in placement option for therapy.  Patient is stable and pending SNF placement.      Interval History:     Patient reports no new symptoms. Has mild abdominal discomfort. No BM past few days - stool softeners given.     Pending placement to SNF.   Received update patient needs peer to peer. My number provided.     Addendum:  Peer to peer unsuccessful. Patients daughter will pick her up in the morning.     Review of Systems  Objective:     Vital Signs (Most Recent):  Temp: 98.1 °F (36.7 °C) (01/29/25 1134)  Pulse: 76 (01/29/25 1134)  Resp: 17 (01/29/25 1134)  BP: 121/60 (01/29/25 1134)  SpO2: 98 % (01/29/25 1134) Vital Signs (24h Range):  Temp:  [97.9 °F (36.6 °C)-98.5 °F (36.9 °C)] 98.1 °F (36.7 °C)  Pulse:  [41-82] 76  Resp:  [17-20] 17  SpO2:  [94 %-98 %] 98 %  BP: (111-169)/(56-75) 121/60     Weight: 72 kg (158 lb 11.7 oz)  Body mass index is 24.86  kg/m².    Intake/Output Summary (Last 24 hours) at 1/29/2025 1219  Last data filed at 1/29/2025 0648  Gross per 24 hour   Intake 240 ml   Output 400 ml   Net -160 ml         Physical Exam  Constitutional:       General: She is not in acute distress.     Appearance: She is not ill-appearing.   Cardiovascular:      Rate and Rhythm: Normal rate.      Heart sounds: Normal heart sounds.   Pulmonary:      Effort: Pulmonary effort is normal. No respiratory distress.   Abdominal:      General: Abdomen is flat. There is no distension.      Palpations: Abdomen is soft.      Tenderness: There is no abdominal tenderness. There is no guarding.   Musculoskeletal:      Right lower leg: No edema.      Left lower leg: No edema.   Neurological:      Mental Status: She is alert and oriented to person, place, and time. Mental status is at baseline.      Cranial Nerves: No cranial nerve deficit.   Psychiatric:         Mood and Affect: Mood normal.             Significant Labs: All pertinent labs within the past 24 hours have been reviewed.    Significant Imaging: I have reviewed all pertinent imaging results/findings within the past 24 hours.    Assessment and Plan     * Anginal equivalent  Persistent   Seen by cardiology - her intermittent dyspnea symptoms are considered an anginal equivalent.  Recommendation to titrate Imdur dosage to 60mg. BP now borderline hypotensive with this increased. Patient took AM imdur 30mg, was hypertensive on presentation - gave 30mg imdur 1/19 and started 60mg in AM.  - cardiology signed off  - PT/OT consulted  - increase Imdur to 60mg and decrease amlodipine dose.   - ranolazine discontinued due to renal function      Debility  Patient with Acute debility due to age-related physical debility and cardiac disease . The patient's latest AMPAC (Activity Measure for Post Acute Care) Score is listed below.    AM-PAC Score - How much help does the patient need for each activity listed  Basic Mobility Total  "Score: 17  Turning over in bed (including adjusting bedclothes, sheets and blankets)?: A little  Sitting down on and standing up from a chair with arms (e.g., wheelchair, bedside commode, etc.): A little  Moving from lying on back to sitting on the side of the bed?: A little  Moving to and from a bed to a chair (including a wheelchair)?: A little  Need to walk in hospital room?: A little  Climbing 3-5 steps with a railing?: A lot    Plan  - Progressive mobility protocol initated  - PT/OT consulted  - Fall precautions in place          Counseling regarding goals of care  Code status discussion as per AV block problem.     Patient has living will and HCPOA documents uploaded, she does not have an LAPOST completed. Would recommend completion of LAPOST prior to hospital discharge.       Confusion  ED indicated pt referred for admit for confusion.  By CAM-ICU testing pt does not have delirium, some disorientation. She may have some underlying cognitive deficits based on grand-daughters description. Appears pt with altered sleep wake cycles more recently - schedule melatonin tonight to help normalize sleep-wake cycle, delirium precautions.   Repeat UA appears normal and no persistent symptoms or concerns for ongoing Acute cystitis adequately treated.   - delirium precautions      Acute cystitis without hematuria  Repeat UA appears normal and no persistent symptoms or concerns for ongoing Acute cystitis adequately treated.       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 Pt having 2:1 AV block atrial rates in 80s and HR in low 40s.  During cardiology evaluation pt with variable 2nd degree AV block. She is not on any AV node blocking agents. Discussed with cards/EP  - continue telemetry monitoring.     Per Dr. Cummings on admit: "Tonight discussed code status with patient - she has been Full Code and DNR during last 2 admits, initially indicated DNR but also states she would " "want resuscitative measures if she might not incur injury, reviewed probabilities with pt given advanced age and chronic medical conditiions indicated to her that under true cardio-respiratory arrest she would be left with subsequent injury and overall low likelihood of survival to discharge 5-10%.  Give this bradyarrhythmia I did discuss if patients HR was lower if she would want invasive measures such as transvenous pacing or transcutaneous pacing performed she responded in affirmative,  will place FULL CODE for now."       Coronary artery disease involving native coronary artery of native heart without angina pectoris  Patient with known CAD , which is uncontrolled Will continue ASA, Plavix, and Statin and monitor for S/Sx of angina/ACS. Continue to monitor on telemetry.     CKD (chronic kidney disease), stage IV  Creatine stable for now. BMP reviewed- noted Estimated Creatinine Clearance: 17.8 mL/min (A) (based on SCr of 2 mg/dL (H)). according to latest data. Based on current GFR, CKD stage is stage 4 - GFR 15-29.  Monitor UOP and serial BMP and adjust therapy as needed. Renally dose meds. Avoid nephrotoxic medications and procedures.    Continue sodium bicarbonate and farxiga    Essential hypertension  Patient's blood pressure range in the last 24 hours was: BP  Min: 115/63  Max: 158/68.The patient's inpatient anti-hypertensive regimen is listed below:  Current Antihypertensives  nitroGLYCERIN SL tablet 0.4 mg, Every 5 min PRN, Sublingual  amLODIPine tablet 5 mg, Daily, Oral  isosorbide mononitrate 24 hr tablet 60 mg, Daily, Oral  losartan tablet 25 mg, Daily, Oral    Plan  - BP is controlled, no changes needed to their regimen  - adjustments to BP meds     PAF (paroxysmal atrial fibrillation)  Patient has paroxysmal (<7 days) atrial fibrillation. Patient is currently in sinus rhythm. [id number] Score: 4. The patients heart rate in the last 24 hours is as follows:  Pulse  Min: 34  Max: 72     Antiarrhythmics   "     Anticoagulants  heparin (porcine) injection 5,000 Units, Every 8 hours, Subcutaneous    Plan  - Replete lytes with a goal of K>4, Mg >2  - Patient is not anticoagulated due to unclear etiology  - Patient's afib is currently controlled          VTE Risk Mitigation (From admission, onward)           Ordered     heparin (porcine) injection 5,000 Units  Every 8 hours         01/20/25 0003     IP VTE HIGH RISK PATIENT  Once         01/20/25 0003     Place sequential compression device  Until discontinued         01/20/25 0003                    Discharge Planning   ROSEMARIE: 1/30/2025     Code Status: Full Code   Medical Readiness for Discharge Date: 1/27/2025  Discharge Plan A: Skilled Nursing Facility                    Maria E Bernstein MD  Department of Hospital Medicine   [facility] - Observation 11H

## 2025-01-29 NOTE — SUBJECTIVE & OBJECTIVE
Interval History:     Patient reports no new symptoms. Has mild abdominal discomfort. No BM past few days - stool softeners given.     Pending placement to SNF.   Received update patient needs peer to peer. My number provided.     Review of Systems  Objective:     Vital Signs (Most Recent):  Temp: 98.1 °F (36.7 °C) (01/29/25 1134)  Pulse: 76 (01/29/25 1134)  Resp: 17 (01/29/25 1134)  BP: 121/60 (01/29/25 1134)  SpO2: 98 % (01/29/25 1134) Vital Signs (24h Range):  Temp:  [97.9 °F (36.6 °C)-98.5 °F (36.9 °C)] 98.1 °F (36.7 °C)  Pulse:  [41-82] 76  Resp:  [17-20] 17  SpO2:  [94 %-98 %] 98 %  BP: (111-169)/(56-75) 121/60     Weight: 72 kg (158 lb 11.7 oz)  Body mass index is 24.86 kg/m².    Intake/Output Summary (Last 24 hours) at 1/29/2025 1219  Last data filed at 1/29/2025 0648  Gross per 24 hour   Intake 240 ml   Output 400 ml   Net -160 ml         Physical Exam  Constitutional:       General: She is not in acute distress.     Appearance: She is not ill-appearing.   Cardiovascular:      Rate and Rhythm: Normal rate.      Heart sounds: Normal heart sounds.   Pulmonary:      Effort: Pulmonary effort is normal. No respiratory distress.   Abdominal:      General: Abdomen is flat. There is no distension.      Palpations: Abdomen is soft.      Tenderness: There is no abdominal tenderness. There is no guarding.   Musculoskeletal:      Right lower leg: No edema.      Left lower leg: No edema.   Neurological:      Mental Status: She is alert and oriented to person, place, and time. Mental status is at baseline.      Cranial Nerves: No cranial nerve deficit.   Psychiatric:         Mood and Affect: Mood normal.             Significant Labs: All pertinent labs within the past 24 hours have been reviewed.    Significant Imaging: I have reviewed all pertinent imaging results/findings within the past 24 hours.

## 2025-01-29 NOTE — PLAN OF CARE
Received instructions for Peer to Peer review from Suzi Mcgowan of Ronnie KERR MD to call [phone] OPT#[id number]  before 1630. Information posted on secure chat and acknowledged by MD.   Will continue to update plan as needed.    Ryan Quick RN,BSN

## 2025-01-29 NOTE — PT/OT/SLP PROGRESS
"Occupational Therapy   Treatment    Name: Anahy Evans  MRN: [id number]  Admitting Diagnosis:  Anginal equivalent       Recommendations:     Discharge Recommendations: Moderate Intensity Therapy  Discharge Equipment Recommendations:  none  Barriers to discharge:  None    Assessment:     Anahy Evans is a 89 y.o. female with a medical diagnosis of Anginal equivalent. Performance deficits affecting function are weakness, impaired endurance, impaired functional mobility, gait instability, impaired balance, decreased lower extremity function, impaired cardiopulmonary response to activity. Pt agreeable to therapy and tolerated well. Pt with improvements inactivity tolerance, reporting no SOB during mobility and dynamic standing.  Pt remains limited in ADLs, functional mobility, and functional transfers. Patient continues to demonstrate the need for moderate intensity therapy on a daily basis post acute exhibited by decreased independence with self-care and functional mobility    Rehab Prognosis:  Good; patient would benefit from acute skilled OT services to address these deficits and reach maximum level of function.       Plan:     Patient to be seen 4 x/week to address the above listed problems via self-care/home management, therapeutic activities, therapeutic exercises, neuromuscular re-education  Plan of Care Expires: 02/23/25  Plan of Care Reviewed with: patient    Subjective     Chief Complaint: none  Patient/Family Comments/goals: "My granddaughter got me this robe 20 years ago."   Pain/Comfort:  Pain Rating 1: 0/10  Pain Rating Post-Intervention 1: 0/10    Objective:     Communicated with: Nurse prior to session.  Patient found HOB elevated with telemetry, PureWick upon OT entry to room.    General Precautions: Standard, fall    Orthopedic Precautions:N/A  Braces: N/A  Respiratory Status: Room air     Occupational Performance:     Bed Mobility:    Patient completed Scooting/Bridging with stand by " assistance  Patient completed Supine to Sit with stand by assistance     Functional Mobility/Transfers:  Patient completed Sit <> Stand Transfer with contact guard assistance  with  rolling walker from EOB   Patient completed Bed <> Chair Transfer using Step Transfer technique with contact guard assistance with rolling walker  Functional Mobility: Pt engaged in functional mobility throughout hospital room and bathroom ~ 15 ft x 2 with RW and  CGA to maximize functional endurance and standing balance required for home/community mobility and occupational engagement.     Activities of Daily Living:  Grooming: stand by assistance oral hygiene  performed standing at sink in RW  Upper Body Dressing: supervision pt donned hospital gown over backs sitting EOB   Lower Body Dressing: supervision pt donned  socks sitting EOB in figure 4       [facility] 6 Click ADL: 20    Treatment & Education:  -Education on energy conservation and task modification to maximize safety and (I) during ADLs and mobility  -Education on importance of OOB activity to improve overall activity tolerance and promote recovery  -Pt educated to call for assistance and to transfer with hospital staff only  -Provided education regarding role of OT, POC, & discharge recommendations with pt verbalizing understanding.  Pt had no further questions & when asked whether there were any concerns pt reported none.      Patient left up in chair with all lines intact, call button in reach, and nurse notified    GOALS:   Multidisciplinary Problems       Occupational Therapy Goals          Problem: Occupational Therapy    Goal Priority Disciplines Outcome Interventions   Occupational Therapy Goal     OT, PT/OT Progressing    Description: Goals to be met by: 2/23/25     Patient will increase functional independence with ADLs by performing:    UE Dressing with Modified [place].  LE Dressing with Modified [place].  Grooming while standing with Modified  [place].  Toileting from toilet with Modified [place] for hygiene and clothing management.   Supine to sit with Modified [place].  Step transfer with Modified [place]  Toilet transfer to toilet with Modified [place].                         Time Tracking:     OT Date of Treatment: 01/29/25  OT Start Time: 1036  OT Stop Time: 1051  OT Total Time (min): 15 min    Billable Minutes:Self Care/Home Management 15 min    OT/AMAURY: OT          1/29/2025

## 2025-01-30 LAB
TB INDURATION 48 - 72 HR READ: NORMAL
TB SKIN TEST 48 - 72 HR READ: NORMAL

## 2025-01-30 PROCEDURE — 25000003 PHARM REV CODE 250: Performed by: STUDENT IN AN ORGANIZED HEALTH CARE EDUCATION/TRAINING PROGRAM

## 2025-01-30 PROCEDURE — 63600175 PHARM REV CODE 636 W HCPCS: Performed by: HOSPITALIST

## 2025-01-30 PROCEDURE — 25000003 PHARM REV CODE 250: Performed by: HOSPITALIST

## 2025-01-30 PROCEDURE — 21400001 HC TELEMETRY ROOM

## 2025-01-30 PROCEDURE — 97535 SELF CARE MNGMENT TRAINING: CPT

## 2025-01-30 PROCEDURE — 97116 GAIT TRAINING THERAPY: CPT

## 2025-01-30 PROCEDURE — 94761 N-INVAS EAR/PLS OXIMETRY MLT: CPT

## 2025-01-30 RX ADMIN — DAPAGLIFLOZIN 10 MG: 10 TABLET, FILM COATED ORAL at 09:01

## 2025-01-30 RX ADMIN — ASPIRIN 81 MG: 81 TABLET, COATED ORAL at 09:01

## 2025-01-30 RX ADMIN — SODIUM BICARBONATE 650 MG TABLET 650 MG: at 09:01

## 2025-01-30 RX ADMIN — PANTOPRAZOLE SODIUM 20 MG: 20 TABLET, DELAYED RELEASE ORAL at 05:01

## 2025-01-30 RX ADMIN — SODIUM BICARBONATE 650 MG TABLET 650 MG: at 08:01

## 2025-01-30 RX ADMIN — LOSARTAN POTASSIUM 25 MG: 25 TABLET, FILM COATED ORAL at 09:01

## 2025-01-30 RX ADMIN — HEPARIN SODIUM 5000 UNITS: 5000 INJECTION INTRAVENOUS; SUBCUTANEOUS at 10:01

## 2025-01-30 RX ADMIN — CLOPIDOGREL BISULFATE 75 MG: 75 TABLET ORAL at 09:01

## 2025-01-30 RX ADMIN — CHOLECALCIFEROL TAB 25 MCG (1000 UNIT) 2000 UNITS: 25 TAB at 09:01

## 2025-01-30 RX ADMIN — ALLOPURINOL 50 MG: 300 TABLET ORAL at 09:01

## 2025-01-30 RX ADMIN — Medication 6 MG: at 08:01

## 2025-01-30 RX ADMIN — AMLODIPINE BESYLATE 5 MG: 5 TABLET ORAL at 09:01

## 2025-01-30 RX ADMIN — HEPARIN SODIUM 5000 UNITS: 5000 INJECTION INTRAVENOUS; SUBCUTANEOUS at 02:01

## 2025-01-30 RX ADMIN — FOLIC ACID 1 MG: 1 TABLET ORAL at 09:01

## 2025-01-30 RX ADMIN — HEPARIN SODIUM 5000 UNITS: 5000 INJECTION INTRAVENOUS; SUBCUTANEOUS at 05:01

## 2025-01-30 RX ADMIN — ATORVASTATIN CALCIUM 40 MG: 40 TABLET, FILM COATED ORAL at 09:01

## 2025-01-30 RX ADMIN — ISOSORBIDE MONONITRATE 60 MG: 60 TABLET, EXTENDED RELEASE ORAL at 09:01

## 2025-01-30 NOTE — PLAN OF CARE
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]  Yes     Priority: 1 - High    Coronary artery disease involving native coronary artery of native heart without angina pectoris [I25.10]  Yes     Priority: 2     Second degree heart block by electrocardiogram (ECG) [I44.1]  Yes     Priority: 3     Debility [R53.81]  Yes    Counseling regarding goals of care [Z71.89]  Not Applicable    Confusion [R41.0]  Yes    Acute cystitis without hematuria [N30.00]  Yes    CKD (chronic kidney disease), stage IV [N18.4]  Yes     Chronic    Essential hypertension [I10]  Yes    PAF (paroxysmal atrial fibrillation) [I48.0]  Yes      Resolved Hospital Problems   No resolved problems to display.       Patient is homebound due to:  Anginal equivalent    Allergies:  Review of patient's allergies indicates:   Allergen Reactions    Clindamycin Anaphylaxis    Penicillins Rash       Diet: cardiac diet    Acitivities: activity as tolerated    Nursing:   SN to complete comprehensive assessment including routine vital signs. Instruct on disease process and s/s of complications to report to MD. Review/verify medication list sent home with the patient at time of discharge  and instruct patient/caregiver as needed. Frequency may be adjusted depending on start of care date.    Notify MD if SBP > 160 or < 90; DBP > 90 or < 50; HR > 120 or < 50; Temp > 101      CONSULTS:   Physical Therapy to evaluate and treat. Evaluate for home safety and equipment needs; Establish/upgrade home exercise program. Perform / instruct on therapeutic exercises, gait training, transfer training, and Range of Motion.  Occupational Therapy to evaluate and treat. Evaluate home environment for  safety and equipment needs. Perform/Instruct on transfers, ADL training, ROM, and therapeutic exercises.  Aide to provide assistance with personal care, ADLs, and vital signs.          Medications: Review discharge medications with patient and family and provide education.         Medication List        START taking these medications      polyethylene glycol 17 gram/dose powder  Commonly known as: GLYCOLAX  Use cap to measure 17g, mix with liquid, and drink by mouth daily as needed for Constipation.            CHANGE how you take these medications      amLODIPine 10 MG tablet  Commonly known as: NORVASC  Take 0.5 tablets (5 mg total) by mouth once daily.  What changed: how much to take     isosorbide mononitrate 30 MG 24 hr tablet  Commonly known as: IMDUR  Take 2 tablets (60 mg total) by mouth once daily.  What changed: how much to take            CONTINUE taking these medications      albuterol 90 mcg/actuation inhaler  Commonly known as: PROVENTIL/VENTOLIN HFA  Inhale 1 puff into the lungs every 4 (four) hours as needed for Wheezing.     allopurinoL 100 MG tablet  Commonly known as: ZYLOPRIM     aspirin 81 MG EC tablet  Commonly known as: ECOTRIN     atorvastatin 40 MG tablet  Commonly known as: LIPITOR     cholecalciferol (vitamin D3) 50 mcg (2,000 unit) Cap capsule  Commonly known as: VITAMIN D3     clopidogreL 75 mg tablet  Commonly known as: PLAVIX     FARXIGA 10 mg tablet  Generic drug: dapagliflozin propanediol     folic acid 1 MG tablet  Commonly known as: FOLVITE     KERENDIA 10 mg Tab  Generic drug: finerenone     losartan 25 MG tablet  Commonly known as: COZAAR  Take 1 tablet (25 mg total) by mouth once daily.     nitroGLYCERIN 0.4 MG SL tablet  Commonly known as: NITROSTAT     pantoprazole 20 MG tablet  Commonly known as: PROTONIX     sodium bicarbonate 650 MG tablet               Where to Get Your Medications        These medications were sent to [facility] [address] [facility]  [place] [address]      Hours: Always Open Phone: [phone]   amLODIPine 10 MG tablet  isosorbide mononitrate 30 MG 24 hr tablet  polyethylene glycol 17 gram/dose powder             For all post-discharge communication and subsequent orders please contact patient's primary care physician. If unable to reach primary care physician or do not receive response within 30 minutes, please contact [facility] on call for clinical staff order clarification.  _________________________________  Marely Perez MD  01/30/2025

## 2025-01-30 NOTE — PT/OT/SLP PROGRESS
Occupational Therapy   Treatment    Name: Anahy Evans  MRN: [id number]  Admitting Diagnosis:  Anginal equivalent       Recommendations:     Discharge Recommendations: Moderate Intensity Therapy  Discharge Equipment Recommendations:  none  Barriers to discharge:  None    Assessment:     Anahy Evans is a 89 y.o. female with a medical diagnosis of Anginal equivalent.  She presents with deficits in endurance especially with ADL task performance in stand (grooming). Pt. With good motivation to improve. Pt. Did endorse feeling SOB with standing tasks on this date. Pt. Is NOT SAFE to return to home environment alone. Patient would benefit from continued OT services to maximize level of safety and independence with self-care tasks.   Performance deficits affecting function are weakness, impaired endurance, impaired self care skills, impaired functional mobility, impaired cardiopulmonary response to activity.     Rehab Prognosis:  Good; patient would benefit from acute skilled OT services to address these deficits and reach maximum level of function.       Plan:     Patient to be seen 4 x/week to address the above listed problems via self-care/home management, therapeutic activities, therapeutic exercises, neuromuscular re-education  Plan of Care Expires: 02/23/25  Plan of Care Reviewed with: patient, grandchild(star)    Subjective     Chief Complaint: Pt. Reported SOB with standing tasks as well as fatigue  Patient/Family Comments/goals: to get stronger  Pain/Comfort:  Pain Rating 1: 0/10  Pain Rating Post-Intervention 1: 0/10    Objective:     Communicated with: nurse prior to session.  Patient found supine with telemetry, PureWick upon OT entry to room. Granddaughter in room    General Precautions: Standard, fall    Orthopedic Precautions:N/A  Braces: N/A  Respiratory Status: Room air     Occupational Performance:     Bed Mobility:    Patient completed Supine to Sit with stand by assistance     Functional  Mobility/Transfers:  Patient completed Sit <> Stand Transfer with contact guard assistance  with  rolling walker from EOB with use of RW and cues for proper technique  Patient completed Toilet Transfer Step Transfer technique with contact guard assistance with  rolling walker  Functional Mobility: Pt. Performed functional mobility to bathroom with RW and CGA    Activities of Daily Living:  Grooming: contact guard assistance in stand initially but then became very fatigued and required seated rest break on bedside commode to complete task  UBD: min A to guide robe around back      [facility] 6 Click ADL: 19    Treatment & Education:  Pt. Educated on safety with transfers and proper hand placement/technique  Pt. Educated on importance of sitting up in chair during the day    Patient left up in chair with all lines intact, call button in reach, and family present    GOALS:   Multidisciplinary Problems       Occupational Therapy Goals          Problem: Occupational Therapy    Goal Priority Disciplines Outcome Interventions   Occupational Therapy Goal     OT, PT/OT Progressing    Description: Goals to be met by: 2/23/25     Patient will increase functional independence with ADLs by performing:    UE Dressing with Modified [place].  LE Dressing with Modified [place].  Grooming while standing with Modified [place].  Toileting from toilet with Modified [place] for hygiene and clothing management.   Supine to sit with Modified [place].  Step transfer with Modified [place]  Toilet transfer to toilet with Modified [place].                         DME Justifications:  No DME recommended requiring DME justifications    Time Tracking:     OT Date of Treatment: 01/30/25  OT Start Time: 1307  OT Stop Time: 1320  OT Total Time (min): 13 min    Billable Minutes:Self Care/Home Management 13    OT/AMAURY: OT          1/30/2025

## 2025-01-30 NOTE — PLAN OF CARE
[facility] - [facility] 11       Encounter Date: 2025  6:35 PM    IP Admit Date: 25   Discharge Date: No discharge date for patient encounter.   Care Everywhere:  [id number]    MRN: [id number]   Guarantor: VALENTINA CERDA   Contact Serial #: [id number]   Hospital Account: [id number]       ENCOUNTER          Patient Class: Inpatient   Unit: [facility] OBSERVATION   Hospital Service: Hospital Medicine   Bed: 746 A   Admitting Provider: Marely Perez Md   Referring Physician: Self, Aaareferral   Attending Provider: Marely Perez Md   Adm Diagnosis: Bradycardia [R00.1]   PATIENT                 Name: VALENTINA CERDA : 10/22/1933 (89 yrs)   Address: [address] [place] [address] Sex: Female   City: [place] [address] SSN:    Primary Care Provider: Elena Cabrera MD         Primary Phone: [phone]   EMERGENCY CONTACT   Contact Name Legal Guardian? Relationship to Patient Home Phone Work Phone Mobile Phone   1. Malina Anaya  2. *No Contact Specified* No    Daughter    [phone]   GUARANTOR                  Guarantor: VALENTINA CERDA       : 10/22/1933   Address: [address] [place] [address]    [place]: [place] [address] Guarantor  Type: P/F   Relation to Patient: Self         Home Phone: [phone]   Guarantor ID: [id number]         Work Phone:     GUARANTOR EMPLOYER     Employer:             Status: RETIRED   COVERAGE        PRIMARY INSURANCE   Payor: [employer] * Plan: [employer] *   Group Number: [id number] Insurance Type: INDEMNITY   Subscriber Name: VALENTINA CERDA Subscriber : 10/22/1933   Subscriber ID: [id number] Insurance Address: [address] [address]  [place] [address]   Pat. Rel. to Subscriber: Self       SECONDARY INSURANCE   Payor:   Plan:     Group Number:   Insurance Type:     Subscriber Name:   Subscriber :     Subscriber ID:   Insurance Address:     Pat. Rel. to Subscriber:        "     H & P  Notes from 12/31/24 through 01/30/25  H&P by Isai Cummings MD at 1/19/2025 11:01 PM    Author: Isai Cummings MD Service: Hospital Medicine Author Type: Physician   Filed: 1/20/2025  2:46 AM Creation Time: 1/20/2025  2:42 AM Status: Addendum   : Isai Cummings MD (Physician)   Related Notes: Original Note by Isai Cummings MD (Physician) filed at 1/20/2025  2:45 AM      [address] - Observation [address] [place] [address] Medicine  History & Physical     Patient Name: Anahy Evans  MRN: [id number]  Patient Class: OP- Observation  Admission Date: 1/19/2025  Attending Physician: Marely Perez MD [facility]  Admitting Physician: Isai Cummings MD  Primary Care Provider: Elena Cabrera MD        Patient information was obtained from patient, relative(s), past medical records, and ER records.      Subjective:      Principal Problem:Anginal equivalent     Chief Complaint:        Chief Complaint   Patient presents with    Altered Mental Status       Pt diagnosed with UTI this past week. Pt's family states that for the past few days she has been more confused.          HPI: 89 y/o AAF w/ CAD hx PCI, CKD 4, HTN, Afib, AAA, 2nd degree AV Block presents to the ED with complaints of dyspnea.      She was recently admitted to [facility] from 1/13-1/17 per DC summary "Patient admitted for dyspnea on exertion. V/Q scan was completed - no pulmonary embolism. Echo ordered - regional wall motion abnormalities present. Low normal systolic ejection fraction 50-55%. Cardiology was consulted - feel origin of TAYLOR may be cardiac in nature. PET stress done 1/16. Two perfusion abnormalities seen. Interventional cardiology recommending medical management and close follow-up. Amlodipine increased, losartan decreased, and started on imdur."     She returns to ED with concerns of dyspnea on exertion.  She discharged to home after last admission, lives with her daughter.  Her grand-daughter accompanies her today and " helps provide history.  Since discharge patient has had limited ADL capability, pt requires assistance w/ ambulation and use of walker, patient has home purewick and bedside commode.  She requires assistance with toileting, bathing, clothing.  She is adherent to medications. Yesterday patient appeared weak and when sitting upright had poor trunk stability would slump over and had poor appetite did not eat much and was noted with intermittent confusion.  Her confusion is described as poor short term memory, will intermittently forget recent events, granddaughter notes that patient usually answers orientation questions appropriately when evaluated by medical teams, but might forget later what was discussed or why she is in the hospital.    She was treated for acute cystitis during last admit, pan-sensitive E.coli on urine culture received ceftriaxone inpatient and not discharged on any oral antibiotics.      She does report poor sleep, has had difficulty sleeping at night and will nap multiple times per day. No reported non-redirectable agitation.   Prior to this month, grand-daughter notes patient was performing more ADL on her own.  Patient had declined referral to SNF with recent admits, last PT/OT recs for low-intensity therapy, pt is more open to post-acute care if recommended.      In ED tonight pt with bradycardia noted, cardiology consulted, no acute medical management of her bradycardia recommended, she has 2:1 AV block on review of EKG/telemetry tonight, hx of Mobitz 1 2nd Deg AV block in past.              Past Medical History:   Diagnosis Date    Acute blood loss anemia 10/26/2021    Anticoagulant long-term use      Breast deformity 09/07/2022    Cervical cancer 1968     breast cancer right    Coronary artery disease      Gout      High cholesterol      History of cervical dysplasia 10/28/2014    Hypertension      MI (myocardial infarction) 1999    Right axillary hidradenitis 07/11/2022               Past  Surgical History:   Procedure Laterality Date    BREAST LUMPECTOMY         right    CARDIAC SURGERY         multiple cardiac stents    CORONARY ANGIOGRAPHY Right 1/21/2022     Procedure: ANGIOGRAM, CORONARY ARTERY;  Surgeon: Asim Canela MD;  Location: [facility] CATH LAB;  Service: Cardiology;  Laterality: Right;    CORONARY ANGIOGRAPHY Right 5/10/2024     Procedure: ANGIOGRAM, CORONARY ARTERY POSSIBLE LV COR;  Surgeon: Asim Canela MD;  Location: [facility] CATH LAB;  Service: Cardiology;  Laterality: Right;    CORONARY STENT PLACEMENT        HIP REPLACEMENT ARTHROPLASTY Right 2018    JOINT REPLACEMENT         right              Review of patient's allergies indicates:   Allergen Reactions    Clindamycin Anaphylaxis    Penicillins Rash         No current facility-administered medications on file prior to encounter.           Current Outpatient Medications on File Prior to Encounter   Medication Sig    allopurinoL (ZYLOPRIM) 100 MG tablet Take 0.5 tablets by mouth once daily.    amLODIPine (NORVASC) 10 MG tablet Take 1 tablet (10 mg total) by mouth once daily.    aspirin (ECOTRIN) 81 MG EC tablet Take 81 mg by mouth once daily.    atorvastatin (LIPITOR) 40 MG tablet Take 40 mg by mouth once daily.    cholecalciferol, vitamin D3, (VITAMIN D3) 50 mcg (2,000 unit) Cap Take 1 capsule by mouth once daily.    clopidogreL (PLAVIX) 75 mg tablet Take 75 mg by mouth once daily.    FARXIGA 10 mg tablet Take 1 tablet by mouth once daily.    folic acid (FOLVITE) 1 MG tablet Take 1 mg by mouth once daily.    isosorbide mononitrate (IMDUR) 30 MG 24 hr tablet Take 1 tablet (30 mg total) by mouth once daily.    KERENDIA 10 mg Tab Take 1 tablet by mouth Daily.    losartan (COZAAR) 25 MG tablet Take 1 tablet (25 mg total) by mouth once daily.    nitroGLYCERIN (NITROSTAT) 0.4 MG SL tablet Place 0.4 mg under the tongue every 5 (five) minutes as needed for Chest pain.    pantoprazole (PROTONIX) 20 MG tablet Take 20 mg by mouth every  morning.    sodium bicarbonate 650 MG tablet Take 650 mg by mouth 2 (two) times daily.    albuterol (PROVENTIL/VENTOLIN HFA) 90 mcg/actuation inhaler Inhale 1 puff into the lungs every 4 (four) hours as needed for Wheezing.      Family History         Problem Relation (Age of Onset)     Cancer Mother, Father                   Tobacco Use    Smoking status: Former    Smokeless tobacco: Never   Substance and Sexual Activity    Alcohol use: Yes       Comment: rare    Drug use: No    Sexual activity: Not Currently      Review of Systems   Constitutional:  Negative for chills and fever.   HENT: Negative.     Cardiovascular:  Negative for leg swelling.   Gastrointestinal: Negative.    Genitourinary:  Negative for difficulty urinating and dysuria.   Neurological:  Negative for dizziness, syncope and light-headedness.   Psychiatric/Behavioral:  Negative for agitation and hallucinations.       Objective:      Vital Signs (Most Recent):  Temp: 98.2 °F (36.8 °C) (01/20/25 0159)  Pulse: (!) 50 (01/20/25 0159)  Resp: 18 (01/20/25 0159)  BP: (!) 130/56 (01/20/25 0159)  SpO2: 97 % (01/20/25 0159) Vital Signs (24h Range):  Temp:  [98.1 °F (36.7 °C)-98.2 °F (36.8 °C)] 98.2 °F (36.8 °C)  Pulse:  [42-93] 50  Resp:  [16-27] 18  SpO2:  [96 %-99 %] 97 %  BP: (130-165)/(56-88) 130/56      Weight: 71.2 kg (157 lb)  Body mass index is 24.59 kg/m².     Physical Exam  Vitals and nursing note reviewed.   Constitutional:       General: She is not in acute distress.  HENT:      Head: Normocephalic and atraumatic.   Eyes:      General: No scleral icterus.     Pupils: Pupils are equal, round, and reactive to light.   Cardiovascular:      Rate and Rhythm: Bradycardia present. Rhythm irregular.      Heart sounds: Murmur heard.   Pulmonary:      Effort: Pulmonary effort is normal. No respiratory distress.      Breath sounds: Normal breath sounds. No wheezing or rales.   Abdominal:      General: Bowel sounds are normal. There is no distension.       "Palpations: Abdomen is soft.      Tenderness: There is no abdominal tenderness. There is no guarding.   Musculoskeletal:      Right lower leg: No edema.      Left lower leg: No edema.   Skin:     General: Skin is warm and dry.   Neurological:      Mental Status: She is alert.   Psychiatric:      Comments: On Cam-ICU testing pt with disorientation to day of week, oriented to month/year/location/city/state. Names Trump as president,  There is no disorganized thinking or error on attention testing.                   CRANIAL NERVES      CN III, IV, VI   Pupils are equal, round, and reactive to light.        Significant Labs: All pertinent labs within the past 24 hours have been reviewed.  CBC:       Recent Labs   Lab 01/19/25 1927   WBC 6.29   HGB 10.3*   HCT 32.6*         CMP:       Recent Labs   Lab 01/19/25 1927      K 5.1      CO2 20*   GLU 98   BUN 43*   CREATININE 2.3*   CALCIUM 10.2   PROT 8.4   ALBUMIN 3.0*   BILITOT 0.6   ALKPHOS 91   AST 22   ALT 25   ANIONGAP 15      Cardiac Markers:       Recent Labs   Lab 01/19/25 1927   *      Coagulation: No results for input(s): "PT", "INR", "APTT" in the last 48 hours.  Lactic Acid: No results for input(s): "LACTATE" in the last 48 hours.  Magnesium: No results for input(s): "MG" in the last 48 hours.  Troponin:       Recent Labs   Lab 01/19/25 1927   TROPONINIHS 90*      Urine Studies:       Recent Labs   Lab 01/19/25  2303   COLORU Yellow   APPEARANCEUA Clear   PHUR 7.0   SPECGRAV 1.015   PROTEINUA 2+*   GLUCUA 3+*   KETONESU Negative   BILIRUBINUA Negative   OCCULTUA Negative   NITRITE Negative   LEUKOCYTESUR Negative   RBCUA 1   WBCUA 1   BACTERIA None   SQUAMEPITHEL 4   HYALINECASTS 0         Significant Imaging: I have reviewed all pertinent imaging results/findings within the past 24 hours.     XR CHEST AP PORTABLE     CLINICAL HISTORY:  chest pain;     TECHNIQUE:  Single frontal view of the chest was performed.   "   COMPARISON:  01/13/2025     FINDINGS:  The cardiomediastinal silhouette is not enlarged noting calcification of the aorta..  There is no pleural effusion.  The trachea is midline.  The lungs are symmetrically expanded bilaterally with mildly coarse interstitial attenuation, accentuated by habitus..  No large focal consolidation seen.  There is no pneumothorax.  The osseous structures are remarkable for degenerative change..     Impression:     1. No acute cardiopulmonary process.        Electronically signed by: Tee Calle MD  Date:                                            01/19/2025  Time:                                           20:40     CT HEAD WITHOUT CONTRAST     CLINICAL HISTORY:  Mental status change, unknown cause;     TECHNIQUE:  Low dose axial CT images obtained throughout the head without the use of intravenous contrast.  Axial, sagittal and coronal reconstructions were performed.     COMPARISON:  CTA head and neck 02/05/2020, CT head 01/14/2019     FINDINGS:  Mild generalized cerebral volume loss.  Redemonstrated remote appearing infarct in the left posterior subinsular cortex, right basal ganglia and bilateral thalami.  Confluent and patchy hypoattenuation in the supratentorial white matter, nonspecific but may reflect advanced chronic microvascular ischemic changes. No intraparenchymal hemorrhage or mass effect.  No evidence of acute major vascular distribution infarct.     Ventricles are stable size and configuration.  No hydrocephalus or midline shift.     No extra-axial blood or fluid collection.     No displaced calvarial fracture.  Hyperostosis frontalis interna.     Mastoid air cells and paranasal sinuses are essentially clear.     Bilateral pseudophakia.     Vascular calcifications at the skull base.     Impression:     No acute intracranial hemorrhage or evidence of acute infarction.     Mild generalized cerebral volume loss.  Remote infarcts and advanced chronic microvascular  ischemic changes.     Electronically signed by resident: Marisol Casey  Date:                                            01/19/2025  Time:                                           23:21     Electronically signed by: Wei Weinstein MD  Date:                                            01/19/2025  Assessment/Plan:      * Anginal equivalent  -seen by cardiology - her intermittent dyspnea symptoms are considered an anginal equivalent.  Recommendation to titrate Imdur dosage to 60mg.   Patient took AM imdur 30mg, is hypertensive tonight - give 30mg imdur tonight and start 60mg in AM   -f/u if her intermittent dyspnea improves     -reconsult PT/OT        Confusion  ED indicated pt referred for admit for confusion.  By CAM-ICU testing pt does not have delirium, some disorientation. She may have some underlying cognitive deficits based on grand-daughters description.      Appears pt with altered sleep wake cycles more recently - schedule melatonin tonight to help normalize sleep-wake cycle, delirium precautions.      Repeat UA appears normal and no persistent symptoms or concerns for ongoing Acute cystitis adequately treated.         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  During cardiology evaluation pt with variable 2nd degree AV block. She is not on any AV node blocking agents.    -continue telemetry monitoring.      IF patient develops unresolved symptoms while bradycardic or if worsening bradycardia occurs - reconsult cardiology to evaluate.      Tonight discussed code status with patient - she has been Full Code and DNR during last 2 admits, initially indicated DNR but also states she would want resuscitative measures if she might not incur injury, reviewed probabilities with pt given advanced age and chronic medical conditiions indicated to her that under true  cardio-respiratory arrest she would be left with subsequent injury and overall low likelihood of survival to discharge 5-10%.  Give this bradyarrhythmia I did discuss if patients HR was lower if she would want invasive measures such as transvenous pacing or transcutaneous pacing performed she responded in affirmative,  will place FULL CODE for now.         Acute cystitis without hematuria     Repeat UA appears normal and no persistent symptoms or concerns for ongoing Acute cystitis adequately treated.         PAF (paroxysmal atrial fibrillation)  Patient has paroxysmal (<7 days) atrial fibrillation. Patient is currently in sinus rhythm. [id number] Score: 4. The patients heart rate in the last 24 hours is as follows:  Pulse  Min: 42  Max: 93      Antiarrhythmics        Anticoagulants  heparin (porcine) injection 5,000 Units, Every 8 hours, Subcutaneous     Plan  - Replete lytes with a goal of K>4, Mg >2  - Patient is not anticoagulated due to unclear etiology  - Patient's afib is currently controlled           CKD (chronic kidney disease), stage IV  Creatine stable for now. BMP reviewed- noted Estimated Creatinine Clearance: 15.5 mL/min (A) (based on SCr of 2.3 mg/dL (H)). according to latest data. Based on current GFR, CKD stage is stage 4 - GFR 15-29.  Monitor UOP and serial BMP and adjust therapy as needed. Renally dose meds. Avoid nephrotoxic medications and procedures.     Continue sodium bicarbonate and farxiga     Essential hypertension  Patient's blood pressure range in the last 24 hours was: BP  Min: 130/56  Max: 165/74.The patient's inpatient anti-hypertensive regimen is listed below:  Current Antihypertensives  nitroGLYCERIN SL tablet 0.4 mg, Every 5 min PRN, Sublingual  amLODIPine tablet 10 mg, Daily, Oral  isosorbide mononitrate 24 hr tablet 60 mg, Daily, Oral  losartan tablet 25 mg, Daily, Oral     Plan  - BP is uncontrolled, will adjust as follows: titrate IMDUR dosage to 60mg.         Counseling  regarding goals of care  Code status discussion as per AV block problem.      Patient has living will and HCPOA documents uploaded, she does not have an LAPOST completed. Would recommend completion of LAPOST prior to hospital discharge.            VTE Risk Mitigation (From admission, onward)              Ordered       heparin (porcine) injection 5,000 Units  Every 8 hours         01/20/25 0003       IP VTE HIGH RISK PATIENT  Once         01/20/25 0003       Place sequential compression device  Until discontinued         01/20/25 0003                             On 01/19/2025, patient should be placed in hospital observation services under my care.              Isai Cummings MD  Department of Hospital Medicine  [facility] - Observation 11H                    Electronically signed by Isai Cummings MD on 1/20/2025  2:46 AM     D/C Summary    No notes found.     Medications    Scheduled    Medication Ordered Dose/Rate, Route, Frequency Last Action   allopurinol split tablet 50 mg    50 mg, Oral, Daily Given, 50 mg at 01/30 0901   amLODIPine tablet 5 mg    5 mg, Oral, Daily Given, 5 mg at 01/30 0901   aspirin EC tablet 81 mg    81 mg, Oral, Daily Given, 81 mg at 01/30 0901   atorvastatin tablet 40 mg    40 mg, Oral, Daily Given, 40 mg at 01/30 0901   clopidogreL tablet 75 mg    75 mg, Oral, Daily Given, 75 mg at 01/30 0901   dapagliflozin propanediol (Farxiga) tablet 10 mg    10 mg, Oral, Daily Given, 10 mg at 01/30 0901   folic acid tablet 1 mg    1 mg, Oral, Daily Given, 1 mg at 01/30 0901   heparin (porcine) injection 5,000 Units    5,000 Units, SubQ, Q8H Given, 5,000 Units at 01/30 0533   isosorbide mononitrate 24 hr tablet 60 mg    60 mg, Oral, Daily Given, 60 mg at 01/30 0901   losartan tablet 25 mg    25 mg, Oral, Daily Given, 25 mg at 01/30 0901   melatonin tablet 6 mg    6 mg, Oral, Nightly Given, 6 mg at 01/29 2051   pantoprazole EC tablet 20 mg    20 mg, Oral, QAM Given, 20 mg at 01/30 0533   sodium  bicarbonate tablet 650 mg    650 mg, Oral, BID Given, 650 mg at 01/30 0901   vitamin D 1000 units tablet 2,000 Units    2,000 Units, Oral, Daily Given, 2,000 Units at 01/30 0901   PRN    Medication Ordered Dose/Rate, Route, Frequency Last Action   acetaminophen tablet 650 mg    650 mg, Oral, Q4H PRN Ordered   albuterol inhaler 1 puff    1 puff, Inhl, Q4H PRN Ordered   aluminum-magnesium hydroxide-simethicone 200-200-20 mg/5 mL suspension 30 mL    30 mL, Oral, QID PRN Ordered   naloxone 0.4 mg/mL injection 0.02 mg    0.02 mg, IV, PRN Ordered   nitroGLYCERIN SL tablet 0.4 mg    0.4 mg, SL, Q5 Min PRN Given, 0.4 mg at 01/25 0633   ondansetron injection 4 mg    4 mg, IV, Q8H PRN Ordered   polyethylene glycol packet 17 g    17 g, Oral, Daily PRN Given, 17 g at 01/29 0915   prochlorperazine injection Soln 5 mg    5 mg, IV, Q6H PRN Ordered   senna-docusate 8.6-50 mg per tablet 1 tablet    1 tablet, Oral, BID PRN Given, 1 tablet at 01/24 0832   sodium chloride 0.9% flush 10 mL    10 mL, IV, Q6H PRN Ordered   Overdue / Upcoming   (Next 2 Hours)  Medication Ordered Dose/Rate, Route, Frequency Due   heparin (porcine) injection 5,000 Units    5,000 Units, SubQ, Q8H 01/30 1400        Radiology  (Last 24 hours)  None     Nursing Activity Orders  Expand  Hide  (From admission, onward)  Start   Ordered   Unscheduled  Straight Cath  As needed     Comments:  Every 4 hours PRN if bladder s...    01/20/25 0003        Skin Assessment (last 2 days)    None     Patient Observations  (Last 24 hours)  BP    01/30 1140  132/62   01/30 0718   169/72   01/30 0426   167/75   01/29 2339   152/70   01/29 1928   159/72   01/29 1630  129/75   Pulse    01/30 1140  79   01/30 1054  76   01/30 0718  68   01/30 0426  90   01/30 0356  63   01/30 0001  70   01/29 2339  78   01/29 1928  91   01/29 1630  71   01/29 1501  66   Resp    01/30 1140  20   01/30 0718  18   01/30 0426  18   01/29 2339  18   01/29 1928  18   01/29 1630  18   SpO2    01/30 1140   96 %   01/30 0718   93 %   01/30 0426  96 %   01/29 2339   92 %   01/29 1928   93 %   01/29 1630  98 %   Temp src    01/30 1140  Oral   01/30 0718  Oral   01/30 0426  Oral   01/29 2339  Oral   01/29 1928  Oral   Temp    01/30 1140  98.5 °F (36.9 °C)   01/30 0718  98.4 °F (36.9 °C)   01/30 0426  97.6 °F (36.4 °C)   01/29 2339  97.6 °F (36.4 °C)   01/29 1928  99.1 °F (37.3 °C)   01/29 1630  98 °F (36.7 °C)   Notes    01/30 1337 Progress Notes by Marely Perez MD Note     Therapy Progress Notes  Notes from 01/28/25 through 01/30/25  PT/OT/SLP Progress by Carmencita Mata PT at 1/28/2025  2:21 PM    Author: Carmencita Mata PT Service: Physical Therapy Author Type: Physical Therapist   Filed: 1/28/2025  3:53 PM Creation Time: 1/28/2025  3:46 PM Status: Signed   : Carmencita Mata PT (Physical Therapist)   Physical Therapy Treatment     Patient Name:  Anahy Evans   MRN:  [id number]  Admitting Diagnosis:  Anginal equivalent   Recent Surgery: * No surgery found *    Admit Date: 1/19/2025  Length of Stay: 7 days     Recommendations:      Discharge Recommendations:  Moderate Intensity Therapy  Discharge Equipment Recommendations: none   Barriers to discharge: Decreased Caregiver support     Assessment:      Anahy Evans is a 89 y.o. female admitted with a medical diagnosis of Anginal equivalent.  She presents with the following impairments/functional limitations:  weakness, impaired endurance, impaired functional mobility, gait instability, impaired balance, decreased lower extremity function, impaired cardiopulmonary response to activity.      Pt with good participation in treatment this afternoon. She performed bed mobility and sit<>stand from bed with SBA, and was able to ambulate 80' and 50' with RW and CGA with chair follow. Additionally pt was able to negotiate 5 stairs with L handrail and CGA. Pt was very fatigued following stair climbing and required seated rest break in chair. Grand daughter was  "present to assist with bringing chair behind pt for safety. Pt was found to have been soiled at start of session so also assisted with changing soiled purewick pad. Pt could benefit from continued skilled PT services to optimize independence upon discharge.      Rehab Prognosis: Good; patient would benefit from acute skilled PT services to address these deficits and reach maximum level of function.    Recent Surgery: * No surgery found *       Treatment Tolerated: Well     Highest level of mobility achieved this visit: negotiated 5 stairs with L handrail and CGA     Activity with RN/PCT: transfer and ambulate with one person assist     Plan:      During this hospitalization, patient to be seen 4 x/week to address the identified rehab impairments via gait training, therapeutic activities, neuromuscular re-education, therapeutic exercises and progress toward the following goals:     Plan of Care Expires:  02/23/25     Subjective      RN Ranjana notified prior to session. Pt's granddaughter present upon PT entrance into room.     Chief Complaint: fatigue  Patient/Family Comments/goals: return to PLOF  Pain/Comfort:  Pain Rating 1: 0/10  Pain Rating Post-Intervention 1: 0/10        Objective:         Patient found HOB elevated with: telemetry, PureWick   Cognition:   Alert and Cooperative  Command following: Follows multistep verbal commands  Fluency: clear/fluent  General Precautions: Standard, fall   Orthopedic Precautions:N/A   Braces: N/A   Body mass index is 24.86 kg/m².  Oxygen Device: Room Air        Vitals: BP (!) 119/59   Pulse (!) 54   Temp 98.2 °F (36.8 °C)   Resp 18   Ht 5' 7" (1.702 m)   Wt 72 kg (158 lb 11.7 oz)   LMP  (LMP Unknown)   SpO2 96%   BMI 24.86 kg/m²      Outcome Measures:  AM-PAC 6 CLICK MOBILITY  Turning over in bed (including adjusting bedclothes, sheets and blankets)?: 3  Sitting down on and standing up from a chair with arms (e.g., wheelchair, bedside commode, etc.): 4  Moving from " lying on back to sitting on the side of the bed?: 3  Moving to and from a bed to a chair (including a wheelchair)?: 3  Need to walk in hospital room?: 3  Climbing 3-5 steps with a railing?: 3  Basic Mobility Total Score: 19                 Functional Mobility:     Bed Mobility:   Supine to Sit: stand by assistance;   Scooting anteriorly to EOB to have both feet planted on floor: stand by assistance     Sitting Balance at Edge of Bed:  Assistance Level Required: Stand-by Assistance     Transfers:   Sit <> Stand Transfer: stand by assistance contact guard assistance with rolling walker   SBA from bed, CGA from bedside recliner. Several trials.      Standing Balance:  Assistance Level Required: Contact Guard Assistance  Patient used: rolling walker   Gait:  Patient ambulated: 80' and 50'    Patient required: contact guard  Patient used:  rolling walker   Gait Pattern observed: reciprocal gait  Gait Deviation(s): flexed posture and decreased katherine  Impairments due to: decreased strength and decreased endurance  Chair follow for patient safety  Gait belt utilized  Comments: distance limited by fatigue     Stairs:  Pt ascended/descended 5 stair(s) with No Assistive Device with left handrail with Contact Guard Assistance.  Step to pattern.         Education:  All questions answered within PT scope of practice and to patient's satisfaction  PT role in POC to address current functional deficits  Pt educated on proper body mechanics, safety techniques, and energy conservation with PT facilitation and cueing throughout session  Call nursing/pct to transfer to chair/use bathroom. Pt stated understanding.     Patient left up in chair with call button in reach.     GOALS:   Multidisciplinary Problems         Physical Therapy Goals                  Problem: Physical Therapy     Goal Priority Disciplines Outcome Interventions   Physical Therapy Goal      PT, PT/OT Progressing     Description: Goals to be met by: 2/10      Patient  will increase functional independence with mobility by performin. Supine to sit with Modified [place]  2. Sit to supine with Modified [place]  3. Sit to stand transfer with Modified [place]  4. Gait  x 200 feet with Supervision using LRAD.   5. Ascend/descend 4 stair with left Handrails Contact Guard Assistance using LRAD.                              Time Tracking:      PT Received On: 25  PT Start Time: 1421     PT Stop Time: 1446  PT Total Time (min): 25 min      Billable Minutes:   Gait Training 13 min and Therapeutic Activity 12 min     Treatment Type: Treatment  PT/PTA: PT        Carmencita Mata PT, DPT  2025           Electronically signed by Carmencita Mata PT on 2025  3:53 PM   PT/OT/SLP Progress by Priscila Haskins OT at 2025 11:26 AM    Author: Priscila Haskins OT Service: Occupational Therapy Author Type: Occupational Therapist   Filed: 2025  3:19 PM Creation Time: 2025 11:26 AM Status: Signed   : Priscila Haskins OT (Occupational Therapist)   Occupational Therapy   Treatment     Name: Anahy Evans  MRN: [id number]  Admitting Diagnosis:  Anginal equivalent        Recommendations:      Discharge Recommendations: Moderate Intensity Therapy  Discharge Equipment Recommendations:  none  Barriers to discharge:  None     Assessment:      Anahy Evans is a 89 y.o. female with a medical diagnosis of Anginal equivalent. Performance deficits affecting function are weakness, impaired endurance, impaired functional mobility, gait instability, impaired balance, decreased lower extremity function, impaired cardiopulmonary response to activity. Pt agreeable to therapy and tolerated well. Pt with improvements inactivity tolerance, reporting no SOB during mobility and dynamic standing.  Pt remains limited in ADLs, functional mobility, and functional transfers. Patient continues to demonstrate the need for moderate intensity therapy on a daily basis post acute  "exhibited by decreased independence with self-care and functional mobility     Rehab Prognosis:  Good; patient would benefit from acute skilled OT services to address these deficits and reach maximum level of function.        Plan:      Patient to be seen 4 x/week to address the above listed problems via self-care/home management, therapeutic activities, therapeutic exercises, neuromuscular re-education  Plan of Care Expires: 02/23/25  Plan of Care Reviewed with: patient     Subjective      Chief Complaint: none  Patient/Family Comments/goals: "My granddaughter got me this robe 20 years ago."   Pain/Comfort:  Pain Rating 1: 0/10  Pain Rating Post-Intervention 1: 0/10     Objective:      Communicated with: Nurse prior to session.  Patient found HOB elevated with telemetry, PureWick upon OT entry to room.     General Precautions: Standard, fall    Orthopedic Precautions:N/A  Braces: N/A  Respiratory Status: Room air     Occupational Performance:      Bed Mobility:    Patient completed Scooting/Bridging with stand by assistance  Patient completed Supine to Sit with stand by assistance      Functional Mobility/Transfers:  Patient completed Sit <> Stand Transfer with contact guard assistance  with  rolling walker from EOB   Patient completed Bed <> Chair Transfer using Step Transfer technique with contact guard assistance with rolling walker  Functional Mobility: Pt engaged in functional mobility throughout hospital room and bathroom ~ 15 ft x 2 with RW and  CGA to maximize functional endurance and standing balance required for home/community mobility and occupational engagement.      Activities of Daily Living:  Grooming: stand by assistance oral hygiene  performed standing at sink in RW  Upper Body Dressing: supervision pt donned hospital gown over backs sitting EOB   Lower Body Dressing: supervision pt donned  socks sitting EOB in figure 4         [facility] 6 Click ADL: 20     Treatment & Education:  -Education on " energy conservation and task modification to maximize safety and (I) during ADLs and mobility  -Education on importance of OOB activity to improve overall activity tolerance and promote recovery  -Pt educated to call for assistance and to transfer with hospital staff only  -Provided education regarding role of OT, POC, & discharge recommendations with pt verbalizing understanding.  Pt had no further questions & when asked whether there were any concerns pt reported none.        Patient left up in chair with all lines intact, call button in reach, and nurse notified     GOALS:   Multidisciplinary Problems         Occupational Therapy Goals                  Problem: Occupational Therapy     Goal Priority Disciplines Outcome Interventions   Occupational Therapy Goal      OT, PT/OT Progressing     Description: Goals to be met by: 2/23/25      Patient will increase functional independence with ADLs by performing:     UE Dressing with Modified [place].  LE Dressing with Modified [place].  Grooming while standing with Modified [place].  Toileting from toilet with Modified [place] for hygiene and clothing management.   Supine to sit with Modified [place].  Step transfer with Modified [place]  Toilet transfer to toilet with Modified [place].                                Time Tracking:      OT Date of Treatment: 01/29/25  OT Start Time: 1036  OT Stop Time: 1051  OT Total Time (min): 15 min     Billable Minutes:Self Care/Home Management 15 min     OT/AMAURY: OT        1/29/2025        Electronically signed by Priscila Haskins OT on 1/29/2025  3:19 PM   PT/OT/SLP Progress by Sameera Elam LOTR at 1/30/2025  1:26 PM    Author: Sameera Elam LOTR Service: Occupational Therapy Author Type: Occupational Therapist   Filed: 1/30/2025  1:32 PM Creation Time: 1/30/2025  1:26 PM Status: Signed   : Sameera Elam LOTR (Occupational Therapist)   Occupational Therapy    Treatment     Name: Anahy Evans  MRN: [id number]  Admitting Diagnosis:  Anginal equivalent        Recommendations:      Discharge Recommendations: Moderate Intensity Therapy  Discharge Equipment Recommendations:  none  Barriers to discharge:  None     Assessment:      Anahy Evans is a 89 y.o. female with a medical diagnosis of Anginal equivalent.  She presents with deficits in endurance especially with ADL task performance in stand (grooming). Pt. With good motivation to improve. Pt. Did endorse feeling SOB with standing tasks on this date. Pt. Is NOT SAFE to return to home environment alone. Patient would benefit from continued OT services to maximize level of safety and independence with self-care tasks.   Performance deficits affecting function are weakness, impaired endurance, impaired self care skills, impaired functional mobility, impaired cardiopulmonary response to activity.      Rehab Prognosis:  Good; patient would benefit from acute skilled OT services to address these deficits and reach maximum level of function.        Plan:      Patient to be seen 4 x/week to address the above listed problems via self-care/home management, therapeutic activities, therapeutic exercises, neuromuscular re-education  Plan of Care Expires: 02/23/25  Plan of Care Reviewed with: patient, grandchild(star)     Subjective      Chief Complaint: Pt. Reported SOB with standing tasks as well as fatigue  Patient/Family Comments/goals: to get stronger  Pain/Comfort:  Pain Rating 1: 0/10  Pain Rating Post-Intervention 1: 0/10     Objective:      Communicated with: nurse prior to session.  Patient found supine with telemetry, PureWick upon OT entry to room. Granddaughter in room     General Precautions: Standard, fall    Orthopedic Precautions:N/A  Braces: N/A  Respiratory Status: Room air     Occupational Performance:      Bed Mobility:    Patient completed Supine to Sit with stand by assistance      Functional  Mobility/Transfers:  Patient completed Sit <> Stand Transfer with contact guard assistance  with  rolling walker from EOB with use of RW and cues for proper technique  Patient completed Toilet Transfer Step Transfer technique with contact guard assistance with  rolling walker  Functional Mobility: Pt. Performed functional mobility to bathroom with RW and CGA     Activities of Daily Living:  Grooming: contact guard assistance in stand initially but then became very fatigued and required seated rest break on bedside commode to complete task  UBD: min A to guide robe around back        [facility] 6 Click ADL: 19     Treatment & Education:  Pt. Educated on safety with transfers and proper hand placement/technique  Pt. Educated on importance of sitting up in chair during the day     Patient left up in chair with all lines intact, call button in reach, and family present     GOALS:   Multidisciplinary Problems         Occupational Therapy Goals                  Problem: Occupational Therapy     Goal Priority Disciplines Outcome Interventions   Occupational Therapy Goal      OT, PT/OT Progressing     Description: Goals to be met by: 2/23/25      Patient will increase functional independence with ADLs by performing:     UE Dressing with Modified [place].  LE Dressing with Modified [place].  Grooming while standing with Modified [place].  Toileting from toilet with Modified [place] for hygiene and clothing management.   Supine to sit with Modified [place].  Step transfer with Modified [place]  Toilet transfer to toilet with Modified [place].                                DME Justifications:  No DME recommended requiring DME justifications     Time Tracking:      OT Date of Treatment: 01/30/25  OT Start Time: 1307  OT Stop Time: 1320  OT Total Time (min): 13 min     Billable Minutes:Self Care/Home Management 13     OT/AMAURY: OT        1/30/2025        Electronically signed by Sameera Elam  DINA KAUR on 1/30/2025  1:32 PM   PT/OT/SLP Progress by Linda Patel PT at 1/30/2025  1:56 PM    Author: Linda Patel PT Service: Physical Therapy Author Type: Physical Therapist   Filed: 1/30/2025  2:00 PM Creation Time: 1/30/2025  1:56 PM Status: Signed   : Linda Patel PT (Physical Therapist)   Physical Therapy Treatment     Patient Name:  Anahy Evans   MRN:  [id number]     Recommendations:      Discharge Recommendations: Moderate Intensity Therapy  Discharge Equipment Recommendations: none  Barriers to discharge: Decreased caregiver support     Assessment:      Anahy Evans is a 89 y.o. female admitted with a medical diagnosis of Anginal equivalent.  She presents with the following impairments/functional limitations: weakness, impaired endurance, impaired functional mobility, gait instability, impaired balance, decreased safety awareness, decreased lower extremity function pt tolerated treatment well but c/o feeling weak and tired decreased functional mobility. Pt will benefit from cont skilled PT 4x/wk to progress physically.  Pt is significantly below previous functional level, increased risk of falls and increased burden of care currently. Patient continues to demonstrate the need for moderate intensity therapy on a daily basis post acute exhibited by decreased independence with functional mobility     Rehab Prognosis: Good; patient would benefit from acute skilled PT services to address these deficits and reach maximum level of function.    Recent Surgery: * No surgery found *       Plan:      During this hospitalization, patient to be seen 4 x/week to address the identified rehab impairments via gait training, therapeutic activities, therapeutic exercises and progress toward the following goals:     Plan of Care Expires:  02/23/25     Subjective      Chief Complaint: pt c/o feeling weak and tired during treatment.   Patient/Family Comments/goals:  to get better and go home.    Pain/Comfort:  Pain Rating 1: 0/10  Pain Rating Post-Intervention 1: 0/10        Objective:      Communicated with shamarse  prior to session.  Patient found up in chair with telemetry, PureWick (hep lock IV) upon PT entry to room.      General Precautions: Standard, fall  Orthopedic Precautions: N/A  Braces: N/A  Respiratory Status: Room air     Functional Mobility:  Transfers:     Sit to Stand:  contact guard assistance with rolling walker. Pt stood x 2 reps.     Gait: pt received gait training ~ 34 ft x 2 reps with RW and CGA. Pt had sitting rest period between distances gait trained. Pt was followed by chair for safety.      Pt white board updated with current therapists name and level of mobility assistance needed.         AM-PAC 6 CLICK MOBILITY  Turning over in bed (including adjusting bedclothes, sheets and blankets)?: 3  Sitting down on and standing up from a chair with arms (e.g., wheelchair, bedside commode, etc.): 3  Moving from lying on back to sitting on the side of the bed?: 3  Moving to and from a bed to a chair (including a wheelchair)?: 3  Need to walk in hospital room?: 3  Climbing 3-5 steps with a railing?: 1  Basic Mobility Total Score: 16                    Treatment & Education:  Pt  received verbal instructions in PT POC and verbally expressed understanding of such.      Patient left up in chair with all lines intact and call button in reach..     GOALS:   Multidisciplinary Problems         Physical Therapy Goals                  Problem: Physical Therapy     Goal Priority Disciplines Outcome Interventions   Physical Therapy Goal      PT, PT/OT Progressing     Description: Goals to be met by: 2/10      Patient will increase functional independence with mobility by performin. Supine to sit with Modified [place]  2. Sit to supine with Modified [place]  3. Sit to stand transfer with Modified [place]  4. Gait  x 200 feet with Supervision using LRAD.   5. Ascend/descend 4  stair with left Handrails Contact Guard Assistance using LRAD.                                 DME Justifications:  No DME recommended requiring DME justifications     Time Tracking:      PT Received On: 01/30/25  PT Start Time: 1326     PT Stop Time: 1336  PT Total Time (min): 10 min      Billable Minutes: Gait Training 10 min      Treatment Type: Treatment  PT/PTA: PT     Number of PTA visits since last PT visit: 0      01/30/2025        Electronically signed by Linda aPtel PT on 1/30/2025  2:00 PM

## 2025-01-30 NOTE — PLAN OF CARE
Problem: Physical Therapy  Goal: Physical Therapy Goal  Description: Goals to be met by: 2/10     Patient will increase functional independence with mobility by performin. Supine to sit with Modified [place]  2. Sit to supine with Modified [place]  3. Sit to stand transfer with Modified [place]  4. Gait  x 200 feet with Supervision using LRAD.   5. Ascend/descend 4 stair with left Handrails Contact Guard Assistance using LRAD.     Outcome: Progressing   Goals remain appropriate. 2025

## 2025-01-30 NOTE — SUBJECTIVE & OBJECTIVE
Interval History: No acute events overnight. Patient resting this morning. P2P denial upheld yesterday. Family would like to appeal. CM aware.     Review of Systems  Objective:     Vital Signs (Most Recent):  Temp: 98.5 °F (36.9 °C) (01/30/25 1140)  Pulse: 79 (01/30/25 1140)  Resp: 20 (01/30/25 1140)  BP: 132/62 (01/30/25 1140)  SpO2: 96 % (01/30/25 1140) Vital Signs (24h Range):  Temp:  [97.6 °F (36.4 °C)-99.1 °F (37.3 °C)] 98.5 °F (36.9 °C)  Pulse:  [63-91] 79  Resp:  [18-20] 20  SpO2:  [92 %-98 %] 96 %  BP: (129-169)/(62-75) 132/62     Weight: 72 kg (158 lb 11.7 oz)  Body mass index is 24.86 kg/m².    Intake/Output Summary (Last 24 hours) at 1/30/2025 1335  Last data filed at 1/30/2025 0755  Gross per 24 hour   Intake --   Output 600 ml   Net -600 ml         Physical Exam  Vitals and nursing note reviewed.   Constitutional:       General: She is not in acute distress.     Appearance: She is not ill-appearing.   Eyes:      General: No scleral icterus.  Cardiovascular:      Rate and Rhythm: Normal rate.      Heart sounds: Murmur heard.   Pulmonary:      Effort: Pulmonary effort is normal. No respiratory distress.      Breath sounds: Normal breath sounds.   Abdominal:      General: Abdomen is flat. There is no distension.      Palpations: Abdomen is soft.      Tenderness: There is no abdominal tenderness. There is no guarding.   Musculoskeletal:      Right lower leg: No edema.      Left lower leg: No edema.   Neurological:      Mental Status: She is alert and oriented to person, place, and time. Mental status is at baseline.      Cranial Nerves: No cranial nerve deficit.   Psychiatric:         Mood and Affect: Mood normal.         Behavior: Behavior normal.             Significant Labs: All pertinent labs within the past 24 hours have been reviewed.    Significant Imaging: I have reviewed all pertinent imaging results/findings within the past 24 hours.

## 2025-01-30 NOTE — PROGRESS NOTES
"[facility] [address] [place] [address] Medicine  Progress Note    Patient Name: Anahy Evans  MRN: [id number]  Patient Class: IP- Inpatient   Admission Date: 1/19/2025  Length of Stay: 9 days  Attending Physician: Marely Perez MD  Primary Care Provider: Elena Cabrera MD        Subjective     Principal Problem:Anginal equivalent        HPI:  89 y/o AAF w/ CAD hx PCI, CKD 4, HTN, Afib, AAA, 2nd degree AV Block presents to the ED with complaints of dyspnea.     She was recently admitted to [facility] from 1/13-1/17 per DC summary "Patient admitted for dyspnea on exertion. V/Q scan was completed - no pulmonary embolism. Echo ordered - regional wall motion abnormalities present. Low normal systolic ejection fraction 50-55%. Cardiology was consulted - feel origin of TAYLOR may be cardiac in nature. PET stress done 1/16. Two perfusion abnormalities seen. Interventional cardiology recommending medical management and close follow-up. Amlodipine increased, losartan decreased, and started on imdur."    She returns to ED with concerns of dyspnea on exertion.  She discharged to home after last admission, lives with her daughter.  Her grand-daughter accompanies her today and helps provide history.  Since discharge patient has had limited ADL capability, pt requires assistance w/ ambulation and use of walker, patient has home purewick and bedside commode.  She requires assistance with toileting, bathing, clothing.  She is adherent to medications. Yesterday patient appeared weak and when sitting upright had poor trunk stability would slump over and had poor appetite did not eat much and was noted with intermittent confusion.  Her confusion is described as poor short term memory, will intermittently forget recent events, granddaughter notes that patient usually answers orientation questions appropriately when evaluated by medical teams, but might forget later what was discussed or why she is in the hospital.    She was treated for " acute cystitis during last admit, pan-sensitive E.coli on urine culture received ceftriaxone inpatient and not discharged on any oral antibiotics.     She does report poor sleep, has had difficulty sleeping at night and will nap multiple times per day. No reported non-redirectable agitation.   Prior to this month, grand-daughter notes patient was performing more ADL on her own.  Patient had declined referral to SNF with recent admits, last PT/OT recs for low-intensity therapy, pt is more open to post-acute care if recommended.     In ED tonight pt with bradycardia noted, cardiology consulted, no acute medical management of her bradycardia recommended, she has 2:1 AV block on review of EKG/telemetry tonight, hx of Mobitz 1 2nd Deg AV block in past.       Overview/Hospital Course:  Evaluated by cardiology and imdur increased. Cardiology recommends medical management, continue Imdur, DAPT, and consider Ranolazine for angina. Imdur increased to 60mg.  Patient started ranolazine as per cardiology recs, then later discontinued due to renal function. Patient worked with PT/OT again. Patient is interested in placement option for therapy.  Patient is stable and pending SNF placement.      Interval History: No acute events overnight. Patient resting this morning. P2P denial upheld yesterday. Family would like to appeal. CM aware.     Review of Systems  Objective:     Vital Signs (Most Recent):  Temp: 98.5 °F (36.9 °C) (01/30/25 1140)  Pulse: 79 (01/30/25 1140)  Resp: 20 (01/30/25 1140)  BP: 132/62 (01/30/25 1140)  SpO2: 96 % (01/30/25 1140) Vital Signs (24h Range):  Temp:  [97.6 °F (36.4 °C)-99.1 °F (37.3 °C)] 98.5 °F (36.9 °C)  Pulse:  [63-91] 79  Resp:  [18-20] 20  SpO2:  [92 %-98 %] 96 %  BP: (129-169)/(62-75) 132/62     Weight: 72 kg (158 lb 11.7 oz)  Body mass index is 24.86 kg/m².    Intake/Output Summary (Last 24 hours) at 1/30/2025 1335  Last data filed at 1/30/2025 0755  Gross per 24 hour   Intake --   Output 600 ml    Net -600 ml         Physical Exam  Vitals and nursing note reviewed.   Constitutional:       General: She is not in acute distress.     Appearance: She is not ill-appearing.   Eyes:      General: No scleral icterus.  Cardiovascular:      Rate and Rhythm: Normal rate.      Heart sounds: Murmur heard.   Pulmonary:      Effort: Pulmonary effort is normal. No respiratory distress.      Breath sounds: Normal breath sounds.   Abdominal:      General: Abdomen is flat. There is no distension.      Palpations: Abdomen is soft.      Tenderness: There is no abdominal tenderness. There is no guarding.   Musculoskeletal:      Right lower leg: No edema.      Left lower leg: No edema.   Neurological:      Mental Status: She is alert and oriented to person, place, and time. Mental status is at baseline.      Cranial Nerves: No cranial nerve deficit.   Psychiatric:         Mood and Affect: Mood normal.         Behavior: Behavior normal.             Significant Labs: All pertinent labs within the past 24 hours have been reviewed.    Significant Imaging: I have reviewed all pertinent imaging results/findings within the past 24 hours.    Assessment and Plan     * Anginal equivalent  Persistent   Seen by cardiology - her intermittent dyspnea symptoms are considered an anginal equivalent.  Recommendation to titrate Imdur dosage to 60mg. BP now borderline hypotensive with this increased. Patient took AM imdur 30mg, was hypertensive on presentation - gave 30mg imdur 1/19 and started 60mg in AM.  - cardiology signed off  - PT/OT consulted  - increase Imdur to 60mg and decrease amlodipine dose.   - ranolazine discontinued due to renal function      Coronary artery disease involving native coronary artery of native heart without angina pectoris  Patient with known CAD , which is uncontrolled Will continue ASA, Plavix, and Statin and monitor for S/Sx of angina/ACS. Continue to monitor on telemetry.     Second degree heart block by  "electrocardiogram (ECG)  Chronic Type 1 2nd degree AV block. Pt having 2:1 AV block atrial rates in 80s and HR in low 40s.  During cardiology evaluation pt with variable 2nd degree AV block. She is not on any AV node blocking agents. Discussed with cards/EP  - continue telemetry monitoring.     Per Dr. Cummings on admit: "Tonight discussed code status with patient - she has been Full Code and DNR during last 2 admits, initially indicated DNR but also states she would want resuscitative measures if she might not incur injury, reviewed probabilities with pt given advanced age and chronic medical conditiions indicated to her that under true cardio-respiratory arrest she would be left with subsequent injury and overall low likelihood of survival to discharge 5-10%.  Give this bradyarrhythmia I did discuss if patients HR was lower if she would want invasive measures such as transvenous pacing or transcutaneous pacing performed she responded in affirmative,  will place FULL CODE for now."       Debility  Patient with Acute debility due to age-related physical debility and cardiac disease . The patient's latest AMPAC (Activity Measure for Post Acute Care) Score is listed below.    AM-PAC Score - How much help does the patient need for each activity listed  Basic Mobility Total Score: 17  Turning over in bed (including adjusting bedclothes, sheets and blankets)?: A little  Sitting down on and standing up from a chair with arms (e.g., wheelchair, bedside commode, etc.): A little  Moving from lying on back to sitting on the side of the bed?: A little  Moving to and from a bed to a chair (including a wheelchair)?: A little  Need to walk in hospital room?: A little  Climbing 3-5 steps with a railing?: A lot    Plan  - Progressive mobility protocol initated  - PT/OT consulted  - Fall precautions in place          Counseling regarding goals of care  Code status discussion as per AV block problem.     [facility], she does not have an LAPOST completed. Would recommend completion of LAPOST prior to hospital discharge.       Confusion  ED indicated pt referred for admit for confusion.  By CAM-ICU testing pt does not have delirium, some disorientation. She may have some underlying cognitive deficits based on grand-daughters description. Appears pt with altered sleep wake cycles more recently - schedule melatonin tonight to help normalize sleep-wake cycle, delirium precautions.   Repeat UA appears normal and no persistent symptoms or concerns for ongoing Acute cystitis adequately treated.   - delirium precautions      Acute cystitis without hematuria  Repeat UA appears normal and no persistent symptoms or concerns for ongoing Acute cystitis adequately treated.       CKD (chronic kidney disease), stage IV  Creatine stable for now. BMP reviewed- noted Estimated Creatinine Clearance: 17.8 mL/min (A) (based on SCr of 2 mg/dL (H)). according to latest data. Based on current GFR, CKD stage is stage 4 - GFR 15-29.  Monitor UOP and serial BMP and adjust therapy as needed. Renally dose meds. Avoid nephrotoxic medications and procedures.    Continue sodium bicarbonate and farxiga    Essential hypertension  Patient's blood pressure range in the last 24 hours was: BP  Min: 129/75  Max: 169/72.The patient's inpatient anti-hypertensive regimen is listed below:  Current Antihypertensives  nitroGLYCERIN SL tablet 0.4 mg, Every 5 min PRN, Sublingual  amLODIPine tablet 5 mg, Daily, Oral  isosorbide mononitrate 24 hr tablet 60 mg, Daily, Oral  losartan tablet 25 mg, Daily, Oral  amlodipine (NORVASC) tablet, Daily, Oral  isosorbide mononitrate (IMDUR) 24 hr tablet, Daily, Oral    Plan  - BP is controlled, no changes needed to their regimen  - adjustments to BP meds     PAF (paroxysmal atrial fibrillation)  Patient has paroxysmal (<7 days) atrial fibrillation. Patient is currently in sinus rhythm. [id number] Score: 4. The  patients heart rate in the last 24 hours is as follows:  Pulse  Min: 63  Max: 91     Antiarrhythmics       Anticoagulants  heparin (porcine) injection 5,000 Units, Every 8 hours, Subcutaneous    Plan  - Replete lytes with a goal of K>4, Mg >2  - Patient is not anticoagulated due to unclear etiology  - Patient's afib is currently controlled          VTE Risk Mitigation (From admission, onward)           Ordered     heparin (porcine) injection 5,000 Units  Every 8 hours         01/20/25 0003     IP VTE HIGH RISK PATIENT  Once         01/20/25 0003     Place sequential compression device  Until discontinued         01/20/25 0003                    Discharge Planning   ROSEMARIE: 1/30/2025     Code Status: Full Code   Medical Readiness for Discharge Date: 1/27/2025  Discharge Plan A: Skilled Nursing Facility                Please place Justification for DME        Marely Perez MD  Department of Hospital Medicine   [facility]

## 2025-01-30 NOTE — ASSESSMENT & PLAN NOTE
Patient's blood pressure range in the last 24 hours was: BP  Min: 129/75  Max: 169/72.The patient's inpatient anti-hypertensive regimen is listed below:  Current Antihypertensives  nitroGLYCERIN SL tablet 0.4 mg, Every 5 min PRN, Sublingual  amLODIPine tablet 5 mg, Daily, Oral  isosorbide mononitrate 24 hr tablet 60 mg, Daily, Oral  losartan tablet 25 mg, Daily, Oral  amlodipine (NORVASC) tablet, Daily, Oral  isosorbide mononitrate (IMDUR) 24 hr tablet, Daily, Oral    Plan  - BP is controlled, no changes needed to their regimen  - adjustments to BP meds

## 2025-01-30 NOTE — PLAN OF CARE
Patient is current with [facility] services. AR sent patients referral via [employer] this morning.     AR called [facility] this afternoon to follow up on referral. The person who normally handles referrals is out for the day and the current person handling referrals does not have [facility] access. AR hard faxed home health referral to [facility] [phone]    3:00 PM  SW was informed that patients grand daughter is at bed and wanting to appeal patients discharge and appeal the peer to peer denial. AR uploaded patients appeal paperwork to [facility] Case ID#[id number].    Confirmation #  [id number]    3:15 PM  AR COMPLETED [facility] PEER TO PEER DENIAL APPEAL WITH COMPLETED AOR FORM BY GRAND DAUGHTER. HOSPITAL RECORD FAXED TO [phone]

## 2025-01-30 NOTE — PT/OT/SLP PROGRESS
Physical Therapy Treatment    Patient Name:  Anahy Evans   MRN:  [id number]    Recommendations:     Discharge Recommendations: Moderate Intensity Therapy  Discharge Equipment Recommendations: none  Barriers to discharge: Decreased caregiver support    Assessment:     Anahy Evans is a 89 y.o. female admitted with a medical diagnosis of Anginal equivalent.  She presents with the following impairments/functional limitations: weakness, impaired endurance, impaired functional mobility, gait instability, impaired balance, decreased safety awareness, decreased lower extremity function pt tolerated treatment well but c/o feeling weak and tired decreased functional mobility. Pt will benefit from cont skilled PT 4x/wk to progress physically.  Pt is significantly below previous functional level, increased risk of falls and increased burden of care currently. Patient continues to demonstrate the need for moderate intensity therapy on a daily basis post acute exhibited by decreased independence with functional mobility    Rehab Prognosis: Good; patient would benefit from acute skilled PT services to address these deficits and reach maximum level of function.    Recent Surgery: * No surgery found *      Plan:     During this hospitalization, patient to be seen 4 x/week to address the identified rehab impairments via gait training, therapeutic activities, therapeutic exercises and progress toward the following goals:    Plan of Care Expires:  02/23/25    Subjective     Chief Complaint: pt c/o feeling weak and tired during treatment.   Patient/Family Comments/goals:  to get better and go home.   Pain/Comfort:  Pain Rating 1: 0/10  Pain Rating Post-Intervention 1: 0/10      Objective:     Communicated with nurse  prior to session.  Patient found up in chair with telemetry, PureWick (hep lock IV) upon PT entry to room.     General Precautions: Standard, fall  Orthopedic Precautions: N/A  Braces: N/A  Respiratory Status: Room  air     Functional Mobility:  Transfers:     Sit to Stand:  contact guard assistance with rolling walker. Pt stood x 2 reps.    Gait: pt received gait training ~ 34 ft x 2 reps with RW and CGA. Pt had sitting rest period between distances gait trained. Pt was followed by chair for safety.     Pt white board updated with current therapists name and level of mobility assistance needed.       AM-PAC 6 CLICK MOBILITY  Turning over in bed (including adjusting bedclothes, sheets and blankets)?: 3  Sitting down on and standing up from a chair with arms (e.g., wheelchair, bedside commode, etc.): 3  Moving from lying on back to sitting on the side of the bed?: 3  Moving to and from a bed to a chair (including a wheelchair)?: 3  Need to walk in hospital room?: 3  Climbing 3-5 steps with a railing?: 1  Basic Mobility Total Score: 16       Treatment & Education:  Pt  received verbal instructions in PT POC and verbally expressed understanding of such.     Patient left up in chair with all lines intact and call button in reach..    GOALS:   Multidisciplinary Problems       Physical Therapy Goals          Problem: Physical Therapy    Goal Priority Disciplines Outcome Interventions   Physical Therapy Goal     PT, PT/OT Progressing    Description: Goals to be met by: 2/10     Patient will increase functional independence with mobility by performin. Supine to sit with Modified [place]  2. Sit to supine with Modified [place]  3. Sit to stand transfer with Modified [place]  4. Gait  x 200 feet with Supervision using LRAD.   5. Ascend/descend 4 stair with left Handrails Contact Guard Assistance using LRAD.                          DME Justifications:  No DME recommended requiring DME justifications    Time Tracking:     PT Received On: 25  PT Start Time: 1326     PT Stop Time: 1336  PT Total Time (min): 10 min     Billable Minutes: Gait Training 10 min     Treatment Type: Treatment  PT/PTA: PT     Number of  PTA visits since last PT visit: 0     01/30/2025

## 2025-01-30 NOTE — ASSESSMENT & PLAN NOTE
Patient has paroxysmal (<7 days) atrial fibrillation. Patient is currently in sinus rhythm. [id number] Score: 4. The patients heart rate in the last 24 hours is as follows:  Pulse  Min: 63  Max: 91     Antiarrhythmics       Anticoagulants  heparin (porcine) injection 5,000 Units, Every 8 hours, Subcutaneous    Plan  - Replete lytes with a goal of K>4, Mg >2  - Patient is not anticoagulated due to unclear etiology  - Patient's afib is currently controlled

## 2025-01-31 PROBLEM — E44.0 MODERATE PROTEIN-CALORIE MALNUTRITION: Status: ACTIVE | Noted: 2025-01-31

## 2025-01-31 LAB
ANION GAP SERPL CALC-SCNC: 9 MMOL/L (ref 8–16)
BASOPHILS # BLD AUTO: 0.02 K/UL (ref 0–0.2)
BASOPHILS NFR BLD: 0.3 % (ref 0–1.9)
BUN SERPL-MCNC: 32 MG/DL (ref 10–30)
CALCIUM SERPL-MCNC: 10 MG/DL (ref 8.7–10.5)
CHLORIDE SERPL-SCNC: 106 MMOL/L (ref 95–110)
CO2 SERPL-SCNC: 23 MMOL/L (ref 23–29)
CREAT SERPL-MCNC: 1.9 MG/DL (ref 0.5–1.4)
DIFFERENTIAL METHOD BLD: ABNORMAL
EOSINOPHIL # BLD AUTO: 0.1 K/UL (ref 0–0.5)
EOSINOPHIL NFR BLD: 1.9 % (ref 0–8)
ERYTHROCYTE [DISTWIDTH] IN BLOOD BY AUTOMATED COUNT: 15.4 % (ref 11.5–14.5)
EST. GFR  (NO RACE VARIABLE): 24.6 ML/MIN/1.73 M^2
GLUCOSE SERPL-MCNC: 106 MG/DL (ref 70–110)
HCT VFR BLD AUTO: 31.7 % (ref 37–48.5)
HGB BLD-MCNC: 10.1 G/DL (ref 12–16)
IMM GRANULOCYTES # BLD AUTO: 0.02 K/UL (ref 0–0.04)
IMM GRANULOCYTES NFR BLD AUTO: 0.3 % (ref 0–0.5)
LYMPHOCYTES # BLD AUTO: 1.3 K/UL (ref 1–4.8)
LYMPHOCYTES NFR BLD: 21.8 % (ref 18–48)
MAGNESIUM SERPL-MCNC: 1.8 MG/DL (ref 1.6–2.6)
MCH RBC QN AUTO: 29.4 PG (ref 27–31)
MCHC RBC AUTO-ENTMCNC: 31.9 G/DL (ref 32–36)
MCV RBC AUTO: 92 FL (ref 82–98)
MONOCYTES # BLD AUTO: 0.8 K/UL (ref 0.3–1)
MONOCYTES NFR BLD: 14 % (ref 4–15)
NEUTROPHILS # BLD AUTO: 3.7 K/UL (ref 1.8–7.7)
NEUTROPHILS NFR BLD: 61.7 % (ref 38–73)
NRBC BLD-RTO: 0 /100 WBC
OHS QRS DURATION: 80 MS
OHS QTC CALCULATION: 425 MS
PLATELET # BLD AUTO: 167 K/UL (ref 150–450)
PMV BLD AUTO: 12.1 FL (ref 9.2–12.9)
POTASSIUM SERPL-SCNC: 4 MMOL/L (ref 3.5–5.1)
RBC # BLD AUTO: 3.43 M/UL (ref 4–5.4)
SODIUM SERPL-SCNC: 138 MMOL/L (ref 136–145)
TROPONIN I SERPL DL<=0.01 NG/ML-MCNC: 71 NG/L (ref 0–14)
TROPONIN I SERPL DL<=0.01 NG/ML-MCNC: 71 NG/L (ref 0–14)
WBC # BLD AUTO: 5.93 K/UL (ref 3.9–12.7)

## 2025-01-31 PROCEDURE — 94761 N-INVAS EAR/PLS OXIMETRY MLT: CPT

## 2025-01-31 PROCEDURE — 99222 1ST HOSP IP/OBS MODERATE 55: CPT | Mod: 25,GC,, | Performed by: STUDENT IN AN ORGANIZED HEALTH CARE EDUCATION/TRAINING PROGRAM

## 2025-01-31 PROCEDURE — 83735 ASSAY OF MAGNESIUM: CPT | Performed by: STUDENT IN AN ORGANIZED HEALTH CARE EDUCATION/TRAINING PROGRAM

## 2025-01-31 PROCEDURE — 63600175 PHARM REV CODE 636 W HCPCS: Performed by: HOSPITALIST

## 2025-01-31 PROCEDURE — 25000003 PHARM REV CODE 250: Performed by: STUDENT IN AN ORGANIZED HEALTH CARE EDUCATION/TRAINING PROGRAM

## 2025-01-31 PROCEDURE — 93010 ELECTROCARDIOGRAM REPORT: CPT | Mod: ,,, | Performed by: STUDENT IN AN ORGANIZED HEALTH CARE EDUCATION/TRAINING PROGRAM

## 2025-01-31 PROCEDURE — 80048 BASIC METABOLIC PNL TOTAL CA: CPT | Performed by: STUDENT IN AN ORGANIZED HEALTH CARE EDUCATION/TRAINING PROGRAM

## 2025-01-31 PROCEDURE — 85025 COMPLETE CBC W/AUTO DIFF WBC: CPT | Performed by: STUDENT IN AN ORGANIZED HEALTH CARE EDUCATION/TRAINING PROGRAM

## 2025-01-31 PROCEDURE — 36415 COLL VENOUS BLD VENIPUNCTURE: CPT | Performed by: STUDENT IN AN ORGANIZED HEALTH CARE EDUCATION/TRAINING PROGRAM

## 2025-01-31 PROCEDURE — 84484 ASSAY OF TROPONIN QUANT: CPT | Mod: 91 | Performed by: STUDENT IN AN ORGANIZED HEALTH CARE EDUCATION/TRAINING PROGRAM

## 2025-01-31 PROCEDURE — 93005 ELECTROCARDIOGRAM TRACING: CPT

## 2025-01-31 PROCEDURE — 21400001 HC TELEMETRY ROOM

## 2025-01-31 PROCEDURE — 25000003 PHARM REV CODE 250: Performed by: HOSPITALIST

## 2025-01-31 PROCEDURE — 97110 THERAPEUTIC EXERCISES: CPT

## 2025-01-31 PROCEDURE — 97535 SELF CARE MNGMENT TRAINING: CPT

## 2025-01-31 RX ORDER — ALUMINUM HYDROXIDE, MAGNESIUM HYDROXIDE, AND SIMETHICONE 1200; 120; 1200 MG/30ML; MG/30ML; MG/30ML
30 SUSPENSION ORAL ONCE
Status: COMPLETED | OUTPATIENT
Start: 2025-01-31 | End: 2025-01-31

## 2025-01-31 RX ORDER — ATORVASTATIN CALCIUM 40 MG/1
80 TABLET, FILM COATED ORAL DAILY
Status: DISCONTINUED | OUTPATIENT
Start: 2025-02-01 | End: 2025-02-11 | Stop reason: HOSPADM

## 2025-01-31 RX ORDER — LIDOCAINE HYDROCHLORIDE 20 MG/ML
15 SOLUTION OROPHARYNGEAL ONCE
Status: COMPLETED | OUTPATIENT
Start: 2025-01-31 | End: 2025-01-31

## 2025-01-31 RX ADMIN — ALUMINUM HYDROXIDE, MAGNESIUM HYDROXIDE, AND SIMETHICONE 30 ML: 200; 200; 20 SUSPENSION ORAL at 09:01

## 2025-01-31 RX ADMIN — CLOPIDOGREL BISULFATE 75 MG: 75 TABLET ORAL at 08:01

## 2025-01-31 RX ADMIN — SODIUM BICARBONATE 650 MG TABLET 650 MG: at 08:01

## 2025-01-31 RX ADMIN — LIDOCAINE HYDROCHLORIDE 15 ML: 20 SOLUTION ORAL at 09:01

## 2025-01-31 RX ADMIN — ALLOPURINOL 50 MG: 300 TABLET ORAL at 08:01

## 2025-01-31 RX ADMIN — HEPARIN SODIUM 5000 UNITS: 5000 INJECTION INTRAVENOUS; SUBCUTANEOUS at 05:01

## 2025-01-31 RX ADMIN — LOSARTAN POTASSIUM 25 MG: 25 TABLET, FILM COATED ORAL at 08:01

## 2025-01-31 RX ADMIN — ISOSORBIDE MONONITRATE 60 MG: 60 TABLET, EXTENDED RELEASE ORAL at 08:01

## 2025-01-31 RX ADMIN — FOLIC ACID 1 MG: 1 TABLET ORAL at 08:01

## 2025-01-31 RX ADMIN — DAPAGLIFLOZIN 10 MG: 10 TABLET, FILM COATED ORAL at 08:01

## 2025-01-31 RX ADMIN — PANTOPRAZOLE SODIUM 20 MG: 20 TABLET, DELAYED RELEASE ORAL at 05:01

## 2025-01-31 RX ADMIN — AMLODIPINE BESYLATE 5 MG: 5 TABLET ORAL at 08:01

## 2025-01-31 RX ADMIN — ASPIRIN 81 MG: 81 TABLET, COATED ORAL at 08:01

## 2025-01-31 RX ADMIN — CHOLECALCIFEROL TAB 25 MCG (1000 UNIT) 2000 UNITS: 25 TAB at 08:01

## 2025-01-31 RX ADMIN — Medication 6 MG: at 08:01

## 2025-01-31 RX ADMIN — ATORVASTATIN CALCIUM 40 MG: 40 TABLET, FILM COATED ORAL at 08:01

## 2025-01-31 NOTE — ASSESSMENT & PLAN NOTE
Patient has paroxysmal (<7 days) atrial fibrillation. Patient is currently in sinus rhythm. [id number] Score: 4. The patients heart rate in the last 24 hours is as follows:  Pulse  Min: 45  Max: 88     Antiarrhythmics       Anticoagulants  heparin (porcine) injection 5,000 Units, Every 8 hours, Subcutaneous    Plan  - Replete lytes with a goal of K>4, Mg >2  - Patient is not anticoagulated due to unclear etiology  - Patient's afib is currently controlled

## 2025-01-31 NOTE — PLAN OF CARE
"CM filled out Appointment of representation form requested by [facility] for pt's fast appeal. Pt was in agreement and signed. Form faxed to [phone].  Will continue to update plan as needed.  Ryan Quick RN,BSN    1120:   Your fax has been successfully sent to [phone] at [phone].  ------------------------------------------------------------  From: [id number]  ------------------------------------------------------------  1/31/2025 11:14:43 AM Transmission Record          Sent to [phone] with remote ID "[id number]"          Result: (0/339;0/0) Success          Page record: 1 - 5          Elapsed time: 02:37 on channel 8   Ryan Quick RN,BSN      "

## 2025-01-31 NOTE — PLAN OF CARE
TC to pt's granddaughter Ziggy Mina ([phone]) to discuss options if pending appeal is not won. She reiterated that pt's house will be undergoing renovation for 3 months starting 2/3/25. She stated she was hoping for SNF placement to allow time for a longer term solution. CM suggested short term respite as an option, granddaughter was in agreement. CM emailed information on  [facility] to [email or web address] as well and phone numbers for LTC facilities that have respite.  Will continue to update plan as needed.  Ryan Quick RN,BSN

## 2025-01-31 NOTE — SUBJECTIVE & OBJECTIVE
Past Medical History:   Diagnosis Date    Acute blood loss anemia 10/26/2021    Anticoagulant long-term use     Breast deformity 09/07/2022    Cervical cancer 1968    breast cancer right    Coronary artery disease     Gout     High cholesterol     History of cervical dysplasia 10/28/2014    Hypertension     MI (myocardial infarction) 1999    Right axillary hidradenitis 07/11/2022       Past Surgical History:   Procedure Laterality Date    BREAST LUMPECTOMY      right    CARDIAC SURGERY      multiple cardiac stents    CORONARY ANGIOGRAPHY Right 1/21/2022    Procedure: ANGIOGRAM, CORONARY ARTERY;  Surgeon: Asim Canela MD;  Location: [facility] CATH LAB;  Service: Cardiology;  Laterality: Right;    CORONARY ANGIOGRAPHY Right 5/10/2024    Procedure: ANGIOGRAM, CORONARY ARTERY POSSIBLE LV COR;  Surgeon: Asim Canela MD;  Location: [facility] CATH LAB;  Service: Cardiology;  Laterality: Right;    CORONARY STENT PLACEMENT      HIP REPLACEMENT ARTHROPLASTY Right 2018    JOINT REPLACEMENT      right       Review of patient's allergies indicates:   Allergen Reactions    Clindamycin Anaphylaxis    Penicillins Rash       No current facility-administered medications on file prior to encounter.     Current Outpatient Medications on File Prior to Encounter   Medication Sig    allopurinoL (ZYLOPRIM) 100 MG tablet Take 0.5 tablets by mouth once daily.    aspirin (ECOTRIN) 81 MG EC tablet Take 81 mg by mouth once daily.    atorvastatin (LIPITOR) 40 MG tablet Take 40 mg by mouth once daily.    cholecalciferol, vitamin D3, (VITAMIN D3) 50 mcg (2,000 unit) Cap Take 1 capsule by mouth once daily.    clopidogreL (PLAVIX) 75 mg tablet Take 75 mg by mouth once daily.    FARXIGA 10 mg tablet Take 1 tablet by mouth once daily.    folic acid (FOLVITE) 1 MG tablet Take 1 mg by mouth once daily.    KERENDIA 10 mg Tab Take 1 tablet by mouth Daily.    losartan (COZAAR) 25 MG tablet Take 1 tablet (25 mg total) by mouth once daily.     nitroGLYCERIN (NITROSTAT) 0.4 MG SL tablet Place 0.4 mg under the tongue every 5 (five) minutes as needed for Chest pain.    pantoprazole (PROTONIX) 20 MG tablet Take 20 mg by mouth every morning.    sodium bicarbonate 650 MG tablet Take 650 mg by mouth 2 (two) times daily.    albuterol (PROVENTIL/VENTOLIN HFA) 90 mcg/actuation inhaler Inhale 1 puff into the lungs every 4 (four) hours as needed for Wheezing.     Family History       Problem Relation (Age of Onset)    Cancer Mother, Father          Tobacco Use    Smoking status: Former    Smokeless tobacco: Never   Substance and Sexual Activity    Alcohol use: Yes     Comment: rare    Drug use: No    Sexual activity: Not Currently     Review of Systems   Constitutional: Negative for diaphoresis and fever.   Cardiovascular:  Positive for chest pain, leg swelling, near-syncope, orthopnea, palpitations, paroxysmal nocturnal dyspnea and syncope.   Respiratory:  Negative for cough and shortness of breath.    Neurological:  Negative for light-headedness.   Psychiatric/Behavioral:  Negative for altered mental status and substance abuse.       Objective:     Vital Signs (Most Recent):  Temp: 98.9 °F (37.2 °C) (01/31/25 1659)  Pulse: 68 (01/31/25 1659)  Resp: 20 (01/31/25 1659)  BP: (!) 142/67 (01/31/25 1659)  SpO2: 97 % (01/31/25 1659) Vital Signs (24h Range):  Temp:  [97.6 °F (36.4 °C)-98.9 °F (37.2 °C)] 98.9 °F (37.2 °C)  Pulse:  [45-88] 68  Resp:  [18-20] 20  SpO2:  [93 %-98 %] 97 %  BP: (118-163)/(58-74) 142/67     Weight: 72.3 kg (159 lb 6.3 oz)  Body mass index is 24.96 kg/m².    SpO2: 97 %         Intake/Output Summary (Last 24 hours) at 1/31/2025 1721  Last data filed at 1/31/2025 1035  Gross per 24 hour   Intake --   Output 700 ml   Net -700 ml       Lines/Drains/Airways       Drain  Duration             Female External Urinary Catheter w/ Suction 01/19/25 2338 11 days              Peripheral Intravenous Line  Duration                  Peripheral IV - Single Lumen  01/28/25 1839 20 G Anterior;Left Forearm 2 days                     Physical Exam  Constitutional:       Appearance: Normal appearance.   Cardiovascular:      Rate and Rhythm: Normal rate and regular rhythm.      Pulses: Normal pulses.      Heart sounds: Normal heart sounds.   Pulmonary:      Effort: Pulmonary effort is normal.      Breath sounds: Normal breath sounds. No wheezing or rales.   Abdominal:      General: Abdomen is flat. There is no distension.      Palpations: Abdomen is soft.   Musculoskeletal:      Right lower leg: No edema.      Left lower leg: No edema.   Skin:     General: Skin is warm and dry.   Neurological:      Mental Status: She is alert.            Significant Labs:   Recent Lab Results         01/31/25  1325   01/31/25  0958   01/31/25  0935        Anion Gap   9         Baso #   0.02         Basophil %   0.3         BUN   32         Calcium   10.0         Chloride   106         CO2   23         Creatinine   1.9         Differential Method   Automated         eGFR   24.6         Eos #   0.1         Eos %   1.9         Glucose   106         Gran # (ANC)   3.7         Gran %   61.7         Hematocrit   31.7         Hemoglobin   10.1         Immature Grans (Abs)   0.02  Comment: Mild elevation in immature granulocytes is non specific and   can be seen in a variety of conditions including stress response,   acute inflammation, trauma and pregnancy. Correlation with other   laboratory and clinical findings is essential.           Immature Granulocytes   0.3         Lymph #   1.3         Lymph %   21.8         Magnesium    1.8         MCH   29.4         MCHC   31.9         MCV   92         Mono #   0.8         Mono %   14.0         MPV   12.1         nRBC   0         QRS Duration     80       OHS QTC Calculation     425       Platelet Count   167         Potassium   4.0         RBC   3.43         RDW   15.4         Sodium   138         Troponin I High Sensitivity 71   71         WBC   5.93                  Significant Imaging: Echocardiogram: 2D echo with color flow doppler:   Results for orders placed or performed during the hospital encounter of 06/30/17   2D echo with color flow doppler   Result Value Ref Range    EF + QEF 55 55 - 65    Diastolic Dysfunction No     Est. PA Systolic Pressure 22.09     Narrative    Date of Procedure: 07/01/2017        TEST DESCRIPTION       Aorta: The aortic root is normal in size, measuring 2.5 cm at sinotubular junction and 3.0 cm at Sinuses of Valsalva. The proximal ascending aorta is normal in size, measuring 3.2 cm across.     Left Atrium: The left atrial volume index is normal, measuring 25.37 cc/m2.     Left Ventricle: The left ventricle is normal in size, with an end-diastolic diameter of 4.4 cm, and an end-systolic diameter of 3.3 cm. Wall thickness is increased, with the septum measuring 1.2 cm and the posterior wall measuring 1.3 cm across. Relative   wall thickness was increased at 0.59, and the LV mass index was increased at 122.2 g/m2 consistent with concentric left ventricular hypertrophy. The anterior septum is mildly hypokinetic. Left ventricular systolic function appears normal. Visually   estimated ejection fraction is 55-60%. The LV Doppler derived stroke volume equals 66.0 ccs.     Diastolic indices: E wave velocity 0.4 m/s, E/A ratio 0.6,  msec., E/e' ratio(avg) 7. Diastolic function is normal.     Right Atrium: The right atrium is normal in size, measuring 5.0 cm in length and 3.1 cm in width in the apical view.     Right Ventricle: The right ventricle is normal in size measuring 3.1 cm at the base in the apical right ventricle-focused view. Global right ventricular systolic function appears normal. Tricuspid annular plane systolic excursion (TAPSE) is 1.7 cm. The   estimated PA systolic pressure is greater than 22 mmHg.     Aortic Valve:  The aortic valve is mildly sclerotic.     Intracavitary: There is no evidence of pericardial effusion,  intracavity mass, thrombi, or vegetation.             This document has been electronically    SIGNED BY: Asim Canela MD On: 07/01/2017 13:37    and Transthoracic echo (TTE) complete (Cupid Only):   Results for orders placed or performed during the hospital encounter of 01/13/25   Echo   Result Value Ref Range    LV Diastolic Volume 101.20 mL    Echo EF Estimated 62 %    LV Systolic Volume 38.90 mL    IVS 1.0 0.6 - 1.1 cm    LVIDd 4.7 3.5 - 6.0 cm    LVIDs 3.1 2.1 - 4.0 cm    LVOT diameter 2.3 cm    PW 0.9 0.6 - 1.1 cm    AV LVOT peak gradient 5 mmHg    LVOT mn grad 2 mmHg    LVOT peak juan jose 1.1 m/s    LVOT peak VTI 20.0 cm    RV- sharpe basal diam 4.1 cm    LA size 3.82 cm    Left Atrium Major Axis 4.71 cm    Left Atrium Minor Axis 5.86 cm    LA Vol (MOD) 75.67 mL    RA Major [place] 5.20 cm    AV valve area 1.8 cm²    AV area by cont VTI 2.5 cm2    AV peak gradient 16.0 mmHg    AV mean gradient 7.7 mmHg    Ao peak juan jose 2.0 m/s    Ao VTI 45.0 cm    MV Peak A Juan Jose 0.99 m/s    E wave deceleration time 186.77 ms    MV Peak E Juan Jose 0.64 m/s    E/A ratio 0.65     LV LATERAL E/E' RATIO 6.40     LV SEPTAL E/E' RATIO 9.14     TDI LATERAL 0.10 m/s    TDI SEPTAL 0.07 m/s    TV peak gradient 46 mmHg    TR Max Juan Jose 3.38 m/s    Ascending aorta 3.10 cm    STJ 2.36 cm    Sinus 3.12 cm    LA WIDTH 4.62 cm    RA Width 3.96 cm    TAPSE 1.43 cm    BSA 1.83 m2    LVOT stroke volume 83.1 cm3    LV Systolic Volume Index 21.4 mL/m2    LV Diastolic Volume Index 55.60 mL/m2    LVOT area 4.2 cm2    FS 34.0 28 - 44 %    Left Ventricle Relative Wall Thickness 0.38 cm    LV mass 153.4 g    LV Mass Index 84.3 g/m2    E/E' ratio 7.53 m/s    NATHANAEL 43.0 mL/m2    LA Vol 78.34 cm3    RV/LV Ratio 0.87 cm    NATHANAEL (MOD) 41.6 mL/m2    AV Velocity Ratio 0.55     AV index (prosthetic) 0.44     CAMPBELL by Velocity Ratio 2.3 cm²    Triscuspid Valve Regurgitation Peak Gradient 46 mmHg    Mean e' 0.09 m/s    ZLVIDS -0.03     ZLVIDD -0.70     TV resting pulmonary  artery pressure 49 mmHg    RV TB RVSP 6 mmHg    Est. RA pres 3 mmHg    Narrative      Left Ventricle: The left ventricle is normal in size. Normal wall   thickness. Regional wall motion abnormalities present. See diagram for   wall motion findings. There is low normal systolic function with a   visually estimated ejection fraction of 50 - 55%.    Right Ventricle: Normal right ventricular cavity size. Wall thickness   is normal. Systolic function is normal.    Left Atrium: Left atrium is moderately dilated.    Right Atrium: Right atrium is dilated.    Mitral Valve: There is mild bileaflet sclerosis. There is moderate   mitral annular calcification present.    Pulmonary Artery: There is mild to moderate pulmonary hypertension. The   estimated pulmonary artery systolic pressure is 49 mmHg.    IVC/SVC: Normal venous pressure at 3 mmHg.

## 2025-01-31 NOTE — PLAN OF CARE
Recommendations     1.) Recommend continuing with Renal diet as tolerated.                  - RN staff: please  document PO intake in the flowsheets      2.) RD to add Novasource Renal daily to provide an additional 475 kcal and 22 g PRO.      3.) RD to monitor wt, PO intake, skin, labs.       Goals: To meet % of EEN/EPN by next RD f/u   Nutrition Goal Status: new  Communication of RD Recs:  (POC)

## 2025-01-31 NOTE — ASSESSMENT & PLAN NOTE
Patient's blood pressure range in the last 24 hours was: BP  Min: 117/62  Max: 163/74.The patient's inpatient anti-hypertensive regimen is listed below:  Current Antihypertensives  nitroGLYCERIN SL tablet 0.4 mg, Every 5 min PRN, Sublingual  amLODIPine tablet 5 mg, Daily, Oral  isosorbide mononitrate 24 hr tablet 60 mg, Daily, Oral  losartan tablet 25 mg, Daily, Oral  amlodipine (NORVASC) tablet, Daily, Oral  isosorbide mononitrate (IMDUR) 24 hr tablet, Daily, Oral    Plan  - BP is controlled, no changes needed to their regimen  - adjustments to BP meds

## 2025-01-31 NOTE — PROGRESS NOTES
"[facility] [address] [place] [address] Medicine  Progress Note    Patient Name: Anahy Evans  MRN: [id number]  Patient Class: IP- Inpatient   Admission Date: 1/19/2025  Length of Stay: 10 days  Attending Physician: Marely Perez MD  Primary Care Provider: Elena Cabrera MD        Subjective     Principal Problem:Anginal equivalent        HPI:  89 y/o AAF w/ CAD hx PCI, CKD 4, HTN, Afib, AAA, 2nd degree AV Block presents to the ED with complaints of dyspnea.     She was recently admitted to [facility] from 1/13-1/17 per DC summary "Patient admitted for dyspnea on exertion. V/Q scan was completed - no pulmonary embolism. Echo ordered - regional wall motion abnormalities present. Low normal systolic ejection fraction 50-55%. Cardiology was consulted - feel origin of TAYLOR may be cardiac in nature. PET stress done 1/16. Two perfusion abnormalities seen. Interventional cardiology recommending medical management and close follow-up. Amlodipine increased, losartan decreased, and started on imdur."    She returns to ED with concerns of dyspnea on exertion.  She discharged to home after last admission, lives with her daughter.  Her grand-daughter accompanies her today and helps provide history.  Since discharge patient has had limited ADL capability, pt requires assistance w/ ambulation and use of walker, patient has home purewick and bedside commode.  She requires assistance with toileting, bathing, clothing.  She is adherent to medications. Yesterday patient appeared weak and when sitting upright had poor trunk stability would slump over and had poor appetite did not eat much and was noted with intermittent confusion.  Her confusion is described as poor short term memory, will intermittently forget recent events, granddaughter notes that patient usually answers orientation questions appropriately when evaluated by medical teams, but might forget later what was discussed or why she is in the hospital.    She was treated for " acute cystitis during last admit, pan-sensitive E.coli on urine culture received ceftriaxone inpatient and not discharged on any oral antibiotics.     She does report poor sleep, has had difficulty sleeping at night and will nap multiple times per day. No reported non-redirectable agitation.   Prior to this month, grand-daughter notes patient was performing more ADL on her own.  Patient had declined referral to SNF with recent admits, last PT/OT recs for low-intensity therapy, pt is more open to post-acute care if recommended.     In ED tonight pt with bradycardia noted, cardiology consulted, no acute medical management of her bradycardia recommended, she has 2:1 AV block on review of EKG/telemetry tonight, hx of Mobitz 1 2nd Deg AV block in past.       Overview/Hospital Course:  Evaluated by cardiology and imdur increased. Cardiology recommends medical management, continue Imdur, DAPT, and consider Ranolazine for angina. Imdur increased to 60mg.  Patient started ranolazine as per cardiology recs, then later discontinued due to renal function. Patient worked with PT/OT again. Patient is interested in placement option for therapy.  Patient is stable and pending SNF placement.  P2P denial upheld 1/29. Family submitted for fast appeal.     Interval History: Chest pain with breakfast this morning. Some improvement with GI cocktail. Trop increased, EKG abnormal. Cardiology contacted. NPO for now pending repeat HS trop at noon.     Review of Systems  Objective:     Vital Signs (Most Recent):  Temp: 98.4 °F (36.9 °C) (01/31/25 1046)  Pulse: 75 (01/31/25 1046)  Resp: 20 (01/31/25 1046)  BP: 118/64 (01/31/25 1046)  SpO2: 97 % (01/31/25 1046) Vital Signs (24h Range):  Temp:  [97.6 °F (36.4 °C)-98.9 °F (37.2 °C)] 98.4 °F (36.9 °C)  Pulse:  [45-88] 75  Resp:  [18-20] 20  SpO2:  [93 %-98 %] 97 %  BP: (117-163)/(58-74) 118/64     Weight: 72.3 kg (159 lb 6.3 oz)  Body mass index is 24.96 kg/m².    Intake/Output Summary (Last 24  hours) at 1/31/2025 1100  Last data filed at 1/31/2025 1035  Gross per 24 hour   Intake --   Output 700 ml   Net -700 ml         Physical Exam  Vitals and nursing note reviewed.   Constitutional:       General: She is not in acute distress.     Appearance: She is not ill-appearing.   Eyes:      General: No scleral icterus.  Cardiovascular:      Rate and Rhythm: Normal rate.      Heart sounds: Murmur heard.   Pulmonary:      Effort: Pulmonary effort is normal. No respiratory distress.      Breath sounds: Normal breath sounds.   Abdominal:      General: Abdomen is flat. There is no distension.      Palpations: Abdomen is soft.      Tenderness: There is no abdominal tenderness. There is no guarding.   Musculoskeletal:      Right lower leg: No edema.      Left lower leg: No edema.   Neurological:      Mental Status: She is alert and oriented to person, place, and time. Mental status is at baseline.      Cranial Nerves: No cranial nerve deficit.   Psychiatric:         Mood and Affect: Mood normal.         Behavior: Behavior normal.             Significant Labs: All pertinent labs within the past 24 hours have been reviewed.  CBC:   Recent Labs   Lab 01/31/25  0958   WBC 5.93   HGB 10.1*   HCT 31.7*        CMP:   Recent Labs   Lab 01/31/25  0958      K 4.0      CO2 23      BUN 32*   CREATININE 1.9*   CALCIUM 10.0   ANIONGAP 9     Troponin:   Recent Labs   Lab 01/31/25  0958   TROPONINIHS 71*       Significant Imaging: I have reviewed all pertinent imaging results/findings within the past 24 hours.    Assessment and Plan     * Anginal equivalent  Persistent   Seen by cardiology - her intermittent dyspnea symptoms are considered an anginal equivalent.  Recommendation to titrate Imdur dosage to 60mg. BP now borderline hypotensive with this increased. Patient took AM imdur 30mg, was hypertensive on presentation - gave 30mg imdur 1/19 and started 60mg in AM.  - cardiology signed off  - PT/OT  "consulted  - increase Imdur to 60mg and decrease amlodipine dose.   - ranolazine discontinued due to renal function    Recurrent chest pain on 1/31. Trop 71  - cards consult  - repeat trop ordered      Coronary artery disease involving native coronary artery of native heart without angina pectoris  Patient with known CAD , which is uncontrolled Will continue ASA, Plavix, and Statin and monitor for S/Sx of angina/ACS. Continue to monitor on telemetry.     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 Pt having 2:1 AV block atrial rates in 80s and HR in low 40s.  During cardiology evaluation pt with variable 2nd degree AV block. She is not on any AV node blocking agents. Discussed with cards/EP  - continue telemetry monitoring.     Per Dr. Cummings on admit: "Tonight discussed code status with patient - she has been Full Code and DNR during last 2 admits, initially indicated DNR but also states she would want resuscitative measures if she might not incur injury, reviewed probabilities with pt given advanced age and chronic medical conditiions indicated to her that under true cardio-respiratory arrest she would be left with subsequent injury and overall low likelihood of survival to discharge 5-10%.  Give this bradyarrhythmia I did discuss if patients HR was lower if she would want invasive measures such as transvenous pacing or transcutaneous pacing performed she responded in affirmative,  will place FULL CODE for now."       Debility  Patient with Acute debility due to age-related physical debility and cardiac disease . The patient's latest AMPAC (Activity Measure for Post Acute Care) Score is listed below.    AM-PAC Score - How much help does the patient need for each activity listed  Basic Mobility Total Score: 16  Turning over in bed (including adjusting bedclothes, sheets and blankets)?: A little  Sitting down on and standing up from a chair with arms (e.g., " wheelchair, bedside commode, etc.): A little  Moving from lying on back to sitting on the side of the bed?: A little  Moving to and from a bed to a chair (including a wheelchair)?: A little  Need to walk in hospital room?: A little  Climbing 3-5 steps with a railing?: Unable    Plan  - Progressive mobility protocol initated  - PT/OT consulted  - Fall precautions in place          Counseling regarding goals of care  Code status discussion as per AV block problem.     Patient has living will and HCPOA documents uploaded, she does not have an LAPOST completed. Would recommend completion of LAPOST prior to hospital discharge.       Confusion  ED indicated pt referred for admit for confusion.  By CAM-ICU testing pt does not have delirium, some disorientation. She may have some underlying cognitive deficits based on grand-daughters description. Appears pt with altered sleep wake cycles more recently - schedule melatonin tonight to help normalize sleep-wake cycle, delirium precautions.   Repeat UA appears normal and no persistent symptoms or concerns for ongoing Acute cystitis adequately treated.   - delirium precautions      Acute cystitis without hematuria  Repeat UA appears normal and no persistent symptoms or concerns for ongoing Acute cystitis adequately treated.       CKD (chronic kidney disease), stage IV  Creatine stable for now. BMP reviewed- noted Estimated Creatinine Clearance: 18.8 mL/min (A) (based on SCr of 1.9 mg/dL (H)). according to latest data. Based on current GFR, CKD stage is stage 4 - GFR 15-29.  Monitor UOP and serial BMP and adjust therapy as needed. Renally dose meds. Avoid nephrotoxic medications and procedures.    Continue sodium bicarbonate and farxiga    Essential hypertension  Patient's blood pressure range in the last 24 hours was: BP  Min: 117/62  Max: 163/74.The patient's inpatient anti-hypertensive regimen is listed below:  Current Antihypertensives  nitroGLYCERIN SL tablet 0.4 mg, Every 5  min PRN, Sublingual  amLODIPine tablet 5 mg, Daily, Oral  isosorbide mononitrate 24 hr tablet 60 mg, Daily, Oral  losartan tablet 25 mg, Daily, Oral  amlodipine (NORVASC) tablet, Daily, Oral  isosorbide mononitrate (IMDUR) 24 hr tablet, Daily, Oral    Plan  - BP is controlled, no changes needed to their regimen  - adjustments to BP meds     PAF (paroxysmal atrial fibrillation)  Patient has paroxysmal (<7 days) atrial fibrillation. Patient is currently in sinus rhythm. [id number] Score: 4. The patients heart rate in the last 24 hours is as follows:  Pulse  Min: 45  Max: 88     Antiarrhythmics       Anticoagulants  heparin (porcine) injection 5,000 Units, Every 8 hours, Subcutaneous    Plan  - Replete lytes with a goal of K>4, Mg >2  - Patient is not anticoagulated due to unclear etiology  - Patient's afib is currently controlled          VTE Risk Mitigation (From admission, onward)           Ordered     heparin (porcine) injection 5,000 Units  Every 8 hours         01/20/25 0003     IP VTE HIGH RISK PATIENT  Once         01/20/25 0003     Place sequential compression device  Until discontinued         01/20/25 0003                    Discharge Planning   ROSEMARIE: 2/1/2025     Code Status: Full Code   Medical Readiness for Discharge Date: 1/27/2025  Discharge Plan A: Skilled Nursing Facility                Please place Justification for DME        Marely Perez MD  Department of Hospital Medicine   [facility]

## 2025-01-31 NOTE — PROGRESS NOTES
[facility]  Adult Nutrition  Progress Note    SUMMARY       Recommendations    1.) Recommend continuing with Renal diet as tolerated.      - RN staff: please  document PO intake in the flowsheets     2.) RD to add Novasource Renal daily to provide an additional 475 kcal and 22 g PRO.     3.) RD to monitor wt, PO intake, skin, labs.      Goals: To meet % of EEN/EPN by next RD f/u   Nutrition Goal Status: new  Communication of RD Recs:  (POC)    Assessment and Plan    Endocrine  Moderate protein-calorie malnutrition  Malnutrition Type:  Context: acute illness or injury  Level: moderate    Related to (etiology):   Inadequate energy- protein intake    Signs and Symptoms (as evidenced by):   -4% wt loss x 2 weeks and mild muscle/fat wasting     Malnutrition Characteristic Summary:  Weight Loss (Malnutrition): 1-2% in 1 week (-4% x 2 weeks)  Subcutaneous Fat (Malnutrition): mild depletion  Muscle Mass (Malnutrition): mild depletion    Interventions/Recommendations (treatment strategy):  Collaboration of nutritional care with other providers.      Nutrition Diagnosis Status:   New       Malnutrition Assessment  Malnutrition Context: acute illness or injury  Malnutrition Level: moderate  Skin (Micronutrient): none  Nails (Micronutrient): none  Hair/Scalp (Micronutrient): none  Extraoral (Micronutrient): none  Gums (Micronutrient): none  Lips/Mucous Membranes (Micronutrient): none  Teeth (Micronutrient): none, other (see comments) (missing teeth)       Weight Loss (Malnutrition): 1-2% in 1 week (-4% x 2 weeks)  Subcutaneous Fat (Malnutrition): mild depletion  Muscle Mass (Malnutrition): mild depletion   Orbital Region (Subcutaneous Fat Loss): mild depletion  Upper Arm Region (Subcutaneous Fat Loss): mild depletion  Thoracic and Lumbar Region: mild depletion   Mandan Region (Muscle Loss): mild depletion  Clavicle Bone Region (Muscle Loss): mild depletion  Clavicle and Acromion Bone Region (Muscle Loss):  "mild depletion  Scapular Bone Region (Muscle Loss): mild depletion     Reason for Assessment    Reason For Assessment: length of stay  Diagnosis: other (see comments) (Anginal equivalent)    General Information Comments: Pt was seen for LOS. PMHx: CAD w/ PCI, CKD4, HTN, Afib, AAA, 2nd degree AV Block. Pt denies n/v/d/c. Pt endorses low appetite, but stated that they do not facility food (" if I dont like it, I wont eat it"). Pt stated UBW was 175#, #. Per chart review, RD noted -4% x 2 weeks. RD performed NFPE on pt: RD feels pt meets the criteria for moderate malnutrition in the context of acute illness. RD team to continue to monitor and f/u.     Nutrition Discharge Planning: Renal diet and Novasource Renal daily    Nutrition Related Social Determinants of Health: SDOH: Adequate food in home environment     Food Insecurity: No Food Insecurity (1/20/2025)    Hunger Vital Sign     Worried About Running Out of Food in the Last Year: Never true     Ran Out of Food in the Last Year: Never true      Nutrition/Diet History    Spiritual, Cultural Beliefs, Lutheran Practices, Values that Affect Care: no  Food Allergies: NKFA    Anthropometrics    Height: 5' 7" (170.2 cm)  Height (inches): 67 in  Height Method: Stated  Weight: 72.3 kg (159 lb 6.3 oz)  Weight (lb): 159.39 lb  Weight Method: Bed Scale  Ideal Body Weight (IBW), Female: 135 lb  % Ideal Body Weight, Female (lb): 117.58 %  BMI (Calculated): 25    Lab/Procedures/Meds    Pertinent Labs Reviewed: reviewed  Pertinent Labs Comments: 1/26: BUN: 43, GFR: 21.8  Pertinent Medications Reviewed: reviewed  Pertinent Medications Comments: Allopurinol, statin, folic acid, pantoprazole, heparin, Na Bicarb, VitD    Estimated/Assessed Needs    Weight Used For Calorie Calculations: 72.3 kg (159 lb 6.3 oz)  Energy Calorie Requirements (kcal): 1522 kcal  Energy Need Method: [place]-St Jeor (MSJ x 1.3)    Protein Requirements: 58- 72g (0.8-1.0g/kg)  Weight Used For Protein " Calculations: 72.3 kg (159 lb 6.3 oz)    Fluid Requirements (mL): as per MD or RDA  Estimated Fluid Requirement Method: RDA Method  RDA Method (mL): 1522    Nutrition Prescription Ordered    Current Diet Order: Renal Diet    Evaluation of Received Nutrient/Fluid Intake    I/O: -4.8L since admit  Fluid Required:  (as per MD)  Comments: LBM 1/30  Tolerance: tolerating  % Intake of Estimated Energy Needs: 25 - 50 %  % Meal Intake: 25 - 50 %    Nutrition Risk    Level of Risk/Frequency of Follow-up: low     Monitor and Evaluation    Food and Nutrient Intake: food and beverage intake  Food and Nutrient Adminstration: diet order  Physical Activity and Function: nutrition-related ADLs and IADLs  Anthropometric Measurements: weight, weight change, body mass index  Biochemical Data, Medical Tests and Procedures: electrolyte and renal panel, gastrointestinal profile, glucose/endocrine profile, inflammatory profile, lipid profile  Nutrition-Focused Physical Findings: overall appearance, skin     Nutrition Follow-Up    RD Follow-up?: Yes

## 2025-01-31 NOTE — PT/OT/SLP PROGRESS
Occupational Therapy   Treatment    Name: Anahy Evans  MRN: [id number]  Admitting Diagnosis:  Anginal equivalent       Recommendations:     Discharge Recommendations: Moderate Intensity Therapy  Discharge Equipment Recommendations:  none  Barriers to discharge:  None    Assessment:     Anahy Evans is a 89 y.o. female with a medical diagnosis of Anginal equivalent.  She presents with deficits in endurance as well as higher level ADL tasks that involver standing. Pt. tolerated session well with good participation noted. Patient would benefit from continued OT services to maximize level of safety and independence with self-care tasks.    . Performance deficits affecting function are weakness, impaired endurance, impaired self care skills, impaired functional mobility, gait instability, decreased lower extremity function, impaired cardiopulmonary response to activity.     Rehab Prognosis:  Good; patient would benefit from acute skilled OT services to address these deficits and reach maximum level of function.       Plan:     Patient to be seen 4 x/week to address the above listed problems via self-care/home management, therapeutic activities, therapeutic exercises, neuromuscular re-education  Plan of Care Expires: 02/23/25  Plan of Care Reviewed with: patient    Subjective     Chief Complaint: Pt. Agreeable to therapy session  Patient/Family Comments/goals: to get stronger  Pain/Comfort:  Pain Rating 1: 0/10  Pain Rating Post-Intervention 1: 0/10    Objective:     Communicated with: nurse prior to session.  Patient found supine with telemetry, PureWick upon OT entry to room.    General Precautions: Standard, fall    Orthopedic Precautions:N/A  Braces: N/A  Respiratory Status: Room air     Occupational Performance:     Bed Mobility:    Patient completed Supine to Sit with supervision     Functional Mobility/Transfers:  Patient completed Sit <> Stand Transfer with contact guard assistance  with  rolling walker    Patient completed Bed <> Chair Transfer using Step Transfer technique with contact guard assistance with rolling walker  Functional Mobility: not performed on this date    Activities of Daily Living:  Grooming: supervision seated to brush teeth and wash face  Bathing: contact guard  assistance when standing with RW to clean [place]  Lower Body Dressing: contact guard assistance seated EOB to don brief when standing to manage over hips      AMPA 6 Click ADL: 20    Treatment & Education:  Pt. Educated on need for staff assist for all mobility  Pt. Performed 1 set 10 reps of BUE/BLE AROM there ex seated in bedside chair for all major planes of motion    Patient left up in chair with all lines intact and call button in reach    GOALS:   Multidisciplinary Problems       Occupational Therapy Goals          Problem: Occupational Therapy    Goal Priority Disciplines Outcome Interventions   Occupational Therapy Goal     OT, PT/OT Progressing    Description: Goals to be met by: 2/23/25     Patient will increase functional independence with ADLs by performing:    UE Dressing with Modified Foley.  LE Dressing with Modified Foley.  Grooming while standing with Modified Foley.  Toileting from toilet with Modified Foley for hygiene and clothing management.   Supine to sit with Modified Foley.  Step transfer with Modified Foley  Toilet transfer to toilet with Modified Foley.                         DME Justifications:  No DME recommended requiring DME justifications    Time Tracking:     OT Date of Treatment: 01/31/25  OT Start Time: 0908  OT Stop Time: 0932  OT Total Time (min): 24 min    Billable Minutes:Self Care/Home Management 16  Therapeutic Exercise 8    OT/AMAURY: OT          1/31/2025

## 2025-01-31 NOTE — SUBJECTIVE & OBJECTIVE
Interval History: Chest pain with breakfast this morning. Some improvement with GI cocktail. Trop increased, EKG abnormal. Cardiology contacted. NPO for now pending repeat HS trop at noon.     Review of Systems  Objective:     Vital Signs (Most Recent):  Temp: 98.4 °F (36.9 °C) (01/31/25 1046)  Pulse: 75 (01/31/25 1046)  Resp: 20 (01/31/25 1046)  BP: 118/64 (01/31/25 1046)  SpO2: 97 % (01/31/25 1046) Vital Signs (24h Range):  Temp:  [97.6 °F (36.4 °C)-98.9 °F (37.2 °C)] 98.4 °F (36.9 °C)  Pulse:  [45-88] 75  Resp:  [18-20] 20  SpO2:  [93 %-98 %] 97 %  BP: (117-163)/(58-74) 118/64     Weight: 72.3 kg (159 lb 6.3 oz)  Body mass index is 24.96 kg/m².    Intake/Output Summary (Last 24 hours) at 1/31/2025 1100  Last data filed at 1/31/2025 1035  Gross per 24 hour   Intake --   Output 700 ml   Net -700 ml         Physical Exam  Vitals and nursing note reviewed.   Constitutional:       General: She is not in acute distress.     Appearance: She is not ill-appearing.   Eyes:      General: No scleral icterus.  Cardiovascular:      Rate and Rhythm: Normal rate.      Heart sounds: Murmur heard.   Pulmonary:      Effort: Pulmonary effort is normal. No respiratory distress.      Breath sounds: Normal breath sounds.   Abdominal:      General: Abdomen is flat. There is no distension.      Palpations: Abdomen is soft.      Tenderness: There is no abdominal tenderness. There is no guarding.   Musculoskeletal:      Right lower leg: No edema.      Left lower leg: No edema.   Neurological:      Mental Status: She is alert and oriented to person, place, and time. Mental status is at baseline.      Cranial Nerves: No cranial nerve deficit.   Psychiatric:         Mood and Affect: Mood normal.         Behavior: Behavior normal.             Significant Labs: All pertinent labs within the past 24 hours have been reviewed.  CBC:   Recent Labs   Lab 01/31/25  0958   WBC 5.93   HGB 10.1*   HCT 31.7*        CMP:   Recent Labs   Lab  01/31/25  0958      K 4.0      CO2 23      BUN 32*   CREATININE 1.9*   CALCIUM 10.0   ANIONGAP 9     Troponin:   Recent Labs   Lab 01/31/25  0958   TROPONINIHS 71*       Significant Imaging: I have reviewed all pertinent imaging results/findings within the past 24 hours.

## 2025-01-31 NOTE — ASSESSMENT & PLAN NOTE
Assessment:  Patient with a history of prior stenting of the LAD and LCx (patent on LHC), known  RCA, and 50% ostial stenosis of the LCx, along with HTN, HLD, CKD4, and bradycardia with Mobitz I AVB. She presents with recurrent anginal equivalent symptoms (SOB responsive to NTG) occurring 2-3 times per year. Her PET stress test revealed two perfusion abnormalities, and during last night' she experienced 2 AM chest pain radiating to the arm with troponin leak 71, and repeat 71  (values in the 50s to 70s). Given her advanced age, CKD, and prior decision by IC to avoid intervention, we are proceeding with medical management and need to uptitrate her current anti-anginal and antihypertensive regimen (currently on imdur 60 and amlod 5) due to persistent hypertension and recurrent symptoms. No stemi on the EKG    Plan:   -- Increase to Imdur to 90 mg daily    -- No BB due to hx of heart block    -- Up to 80 mg Atorvastatin

## 2025-01-31 NOTE — CONSULTS
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 She experiences recurrent episodes of anginal equivalents, primarily shortness of breath, which resolve with nitroglycerin. These episodes occur approximately 2-3 times per year, and this episode also resolved with NTG. A PET stress test showing a reversible perfusion defect in the left circumflex territory. She was managed medically with the addition of Imdur.     The patient is persistently bradycardic (HR 40s-50s) with sinus rhythm and intermittent Mobitz I AVB vs. 2:1 AV block. She remains asymptomatic from a rhythm standpoint, has a narrow QRS without significant bundle branch disease, and has a known history of Mobitz I AV block.     She was recently discharged after a similar episode and was evaluated by Interventional Cardiology (IC), who did not recommend intervention at that time due to age and CKD. She had been functional prior to this episode, completing her ADLs, and is expected to transition to rehab to regain function.     Reason for Reconsultation:  The patient developed new-onset chest pain last night, described as radiating to the left arm. Initial trops were71, and flat at 71 - around where she ahs been during this admission. . She received two high-sensitivity troponins (both  71). The chest pain has since resolved .Based on IC previous evaluation her last admission, given her age and CKD, intervention is not advisable at this time.    Past Medical History:   Diagnosis Date    Acute blood loss anemia 10/26/2021    Anticoagulant long-term use     Breast deformity 09/07/2022    Cervical cancer 1968    breast cancer right    Coronary artery disease     Gout     High cholesterol     History of cervical dysplasia 10/28/2014    Hypertension     MI (myocardial infarction) 1999    Right axillary hidradenitis 07/11/2022       Past Surgical History:   Procedure Laterality Date    BREAST LUMPECTOMY      right    CARDIAC SURGERY      multiple cardiac stents    CORONARY ANGIOGRAPHY Right 1/21/2022    Procedure: ANGIOGRAM, CORONARY ARTERY;  Surgeon: Asim Canela MD;  Location: [facility] CATH LAB;  Service: Cardiology;  Laterality: Right;    CORONARY ANGIOGRAPHY Right 5/10/2024    Procedure: ANGIOGRAM, CORONARY ARTERY POSSIBLE LV COR;  Surgeon: Asim Canela MD;  Location: [facility] CATH LAB;  Service: Cardiology;  Laterality: Right;    CORONARY STENT PLACEMENT      HIP REPLACEMENT ARTHROPLASTY Right 2018    JOINT REPLACEMENT      right       Review of patient's allergies indicates:   Allergen Reactions    Clindamycin Anaphylaxis    Penicillins Rash       No current facility-administered medications on file prior to encounter.     Current Outpatient Medications on File Prior to Encounter   Medication Sig    allopurinoL (ZYLOPRIM) 100 MG tablet Take 0.5 tablets by mouth once daily.    aspirin (ECOTRIN) 81 MG EC tablet Take 81 mg by mouth once daily.    atorvastatin (LIPITOR) 40 MG tablet Take 40 mg by mouth once daily.    cholecalciferol, vitamin D3, (VITAMIN D3) 50 mcg (2,000 unit) Cap Take 1 capsule by mouth once daily.    clopidogreL (PLAVIX) 75 mg tablet Take 75 mg by mouth once daily.    FARXIGA 10 mg tablet Take 1 tablet by mouth once daily.    folic acid (FOLVITE) 1 MG tablet Take 1 mg by  mouth once daily.    KERENDIA 10 mg Tab Take 1 tablet by mouth Daily.    losartan (COZAAR) 25 MG tablet Take 1 tablet (25 mg total) by mouth once daily.    nitroGLYCERIN (NITROSTAT) 0.4 MG SL tablet Place 0.4 mg under the tongue every 5 (five) minutes as needed for Chest pain.    pantoprazole (PROTONIX) 20 MG tablet Take 20 mg by mouth every morning.    sodium bicarbonate 650 MG tablet Take 650 mg by mouth 2 (two) times daily.    albuterol (PROVENTIL/VENTOLIN HFA) 90 mcg/actuation inhaler Inhale 1 puff into the lungs every 4 (four) hours as needed for Wheezing.     Family History       Problem Relation (Age of Onset)    Cancer Mother, Father          Tobacco Use    Smoking status: Former    Smokeless tobacco: Never   Substance and Sexual Activity    Alcohol use: Yes     Comment: rare    Drug use: No    Sexual activity: Not Currently     Review of Systems   Constitutional: Negative for diaphoresis and fever.   Cardiovascular:  Positive for chest pain, leg swelling, near-syncope, orthopnea, palpitations, paroxysmal nocturnal dyspnea and syncope.   Respiratory:  Negative for cough and shortness of breath.    Neurological:  Negative for light-headedness.   Psychiatric/Behavioral:  Negative for altered mental status and substance abuse.       Objective:     Vital Signs (Most Recent):  Temp: 98.9 °F (37.2 °C) (01/31/25 1659)  Pulse: 68 (01/31/25 1659)  Resp: 20 (01/31/25 1659)  BP: (!) 142/67 (01/31/25 1659)  SpO2: 97 % (01/31/25 1659) Vital Signs (24h Range):  Temp:  [97.6 °F (36.4 °C)-98.9 °F (37.2 °C)] 98.9 °F (37.2 °C)  Pulse:  [45-88] 68  Resp:  [18-20] 20  SpO2:  [93 %-98 %] 97 %  BP: (118-163)/(58-74) 142/67     Weight: 72.3 kg (159 lb 6.3 oz)  Body mass index is 24.96 kg/m².    SpO2: 97 %         Intake/Output Summary (Last 24 hours) at 1/31/2025 1721  Last data filed at 1/31/2025 1035  Gross per 24 hour   Intake --   Output 700 ml   Net -700 ml       Lines/Drains/Airways       Drain  Duration             Female  External Urinary Catheter w/ Suction 01/19/25 2338 11 days              Peripheral Intravenous Line  Duration                  Peripheral IV - Single Lumen 01/28/25 1839 20 G Anterior;Left Forearm 2 days                     Physical Exam  Constitutional:       Appearance: Normal appearance.   Cardiovascular:      Rate and Rhythm: Normal rate and regular rhythm.      Pulses: Normal pulses.      Heart sounds: Normal heart sounds.   Pulmonary:      Effort: Pulmonary effort is normal.      Breath sounds: Normal breath sounds. No wheezing or rales.   Abdominal:      General: Abdomen is flat. There is no distension.      Palpations: Abdomen is soft.   Musculoskeletal:      Right lower leg: No edema.      Left lower leg: No edema.   Skin:     General: Skin is warm and dry.   Neurological:      Mental Status: She is alert.            Significant Labs:   Recent Lab Results         01/31/25  1325   01/31/25  0958   01/31/25  0935        Anion Gap   9         Baso #   0.02         Basophil %   0.3         BUN   32         Calcium   10.0         Chloride   106         CO2   23         Creatinine   1.9         Differential Method   Automated         eGFR   24.6         Eos #   0.1         Eos %   1.9         Glucose   106         Gran # (ANC)   3.7         Gran %   61.7         Hematocrit   31.7         Hemoglobin   10.1         Immature Grans (Abs)   0.02  Comment: Mild elevation in immature granulocytes is non specific and   can be seen in a variety of conditions including stress response,   acute inflammation, trauma and pregnancy. Correlation with other   laboratory and clinical findings is essential.           Immature Granulocytes   0.3         Lymph #   1.3         Lymph %   21.8         Magnesium    1.8         MCH   29.4         MCHC   31.9         MCV   92         Mono #   0.8         Mono %   14.0         MPV   12.1         nRBC   0         QRS Duration     80       OHS QTC Calculation     425       Platelet Count    167         Potassium   4.0         RBC   3.43         RDW   15.4         Sodium   138         Troponin I High Sensitivity 71   71         WBC   5.93                 Significant Imaging: Echocardiogram: 2D echo with color flow doppler:   Results for orders placed or performed during the hospital encounter of 06/30/17   2D echo with color flow doppler   Result Value Ref Range    EF + QEF 55 55 - 65    Diastolic Dysfunction No     Est. PA Systolic Pressure 22.09     Narrative    Date of Procedure: 07/01/2017        TEST DESCRIPTION       Aorta: The aortic root is normal in size, measuring 2.5 cm at sinotubular junction and 3.0 cm at Sinuses of Valsalva. The proximal ascending aorta is normal in size, measuring 3.2 cm across.     Left Atrium: The left atrial volume index is normal, measuring 25.37 cc/m2.     Left Ventricle: The left ventricle is normal in size, with an end-diastolic diameter of 4.4 cm, and an end-systolic diameter of 3.3 cm. Wall thickness is increased, with the septum measuring 1.2 cm and the posterior wall measuring 1.3 cm across. Relative   wall thickness was increased at 0.59, and the LV mass index was increased at 122.2 g/m2 consistent with concentric left ventricular hypertrophy. The anterior septum is mildly hypokinetic. Left ventricular systolic function appears normal. Visually   estimated ejection fraction is 55-60%. The LV Doppler derived stroke volume equals 66.0 ccs.     Diastolic indices: E wave velocity 0.4 m/s, E/A ratio 0.6,  msec., E/e' ratio(avg) 7. Diastolic function is normal.     Right Atrium: The right atrium is normal in size, measuring 5.0 cm in length and 3.1 cm in width in the apical view.     Right Ventricle: The right ventricle is normal in size measuring 3.1 cm at the base in the apical right ventricle-focused view. Global right ventricular systolic function appears normal. Tricuspid annular plane systolic excursion (TAPSE) is 1.7 cm. The   estimated PA systolic  pressure is greater than 22 mmHg.     Aortic Valve:  The aortic valve is mildly sclerotic.     Intracavitary: There is no evidence of pericardial effusion, intracavity mass, thrombi, or vegetation.             This document has been electronically    SIGNED BY: Asim Canela MD On: 07/01/2017 13:37    and Transthoracic echo (TTE) complete (Cupid Only):   Results for orders placed or performed during the hospital encounter of 01/13/25   Echo   Result Value Ref Range    LV Diastolic Volume 101.20 mL    Echo EF Estimated 62 %    LV Systolic Volume 38.90 mL    IVS 1.0 0.6 - 1.1 cm    LVIDd 4.7 3.5 - 6.0 cm    LVIDs 3.1 2.1 - 4.0 cm    LVOT diameter 2.3 cm    PW 0.9 0.6 - 1.1 cm    AV LVOT peak gradient 5 mmHg    LVOT mn grad 2 mmHg    LVOT peak juan jose 1.1 m/s    LVOT peak VTI 20.0 cm    RV- sharpe basal diam 4.1 cm    LA size 3.82 cm    Left Atrium Major Axis 4.71 cm    Left Atrium Minor Axis 5.86 cm    LA Vol (MOD) 75.67 mL    RA Major Austin 5.20 cm    AV valve area 1.8 cm²    AV area by cont VTI 2.5 cm2    AV peak gradient 16.0 mmHg    AV mean gradient 7.7 mmHg    Ao peak juan jose 2.0 m/s    Ao VTI 45.0 cm    MV Peak A Juan Jose 0.99 m/s    E wave deceleration time 186.77 ms    MV Peak E Juan Jose 0.64 m/s    E/A ratio 0.65     LV LATERAL E/E' RATIO 6.40     LV SEPTAL E/E' RATIO 9.14     TDI LATERAL 0.10 m/s    TDI SEPTAL 0.07 m/s    TV peak gradient 46 mmHg    TR Max Juan Jose 3.38 m/s    Ascending aorta 3.10 cm    STJ 2.36 cm    Sinus 3.12 cm    LA WIDTH 4.62 cm    RA Width 3.96 cm    TAPSE 1.43 cm    BSA 1.83 m2    LVOT stroke volume 83.1 cm3    LV Systolic Volume Index 21.4 mL/m2    LV Diastolic Volume Index 55.60 mL/m2    LVOT area 4.2 cm2    FS 34.0 28 - 44 %    Left Ventricle Relative Wall Thickness 0.38 cm    LV mass 153.4 g    LV Mass Index 84.3 g/m2    E/E' ratio 7.53 m/s    NATHANAEL 43.0 mL/m2    LA Vol 78.34 cm3    RV/LV Ratio 0.87 cm    NATHANAEL (MOD) 41.6 mL/m2    AV Velocity Ratio 0.55     AV index (prosthetic) 0.44     CAMPBELL by  Velocity Ratio 2.3 cm²    Triscuspid Valve Regurgitation Peak Gradient 46 mmHg    Mean e' 0.09 m/s    ZLVIDS -0.03     ZLVIDD -0.70     TV resting pulmonary artery pressure 49 mmHg    RV TB RVSP 6 mmHg    Est. RA pres 3 mmHg    Narrative      Left Ventricle: The left ventricle is normal in size. Normal wall   thickness. Regional wall motion abnormalities present. See diagram for   wall motion findings. There is low normal systolic function with a   visually estimated ejection fraction of 50 - 55%.    Right Ventricle: Normal right ventricular cavity size. Wall thickness   is normal. Systolic function is normal.    Left Atrium: Left atrium is moderately dilated.    Right Atrium: Right atrium is dilated.    Mitral Valve: There is mild bileaflet sclerosis. There is moderate   mitral annular calcification present.    Pulmonary Artery: There is mild to moderate pulmonary hypertension. The   estimated pulmonary artery systolic pressure is 49 mmHg.    IVC/SVC: Normal venous pressure at 3 mmHg.        Assessment and Plan:         * Anginal equivalent    Patient with a history of prior stenting of the LAD and LCx (patent on LHC), known  RCA, and 50% ostial stenosis of the LCx, along with HTN, HLD, CKD4, and bradycardia with Mobitz I AVB. She presents with recurrent anginal equivalent symptoms (SOB responsive to NTG) occurring 2-3 times per year. Her PET stress test revealed two perfusion abnormalities, and during last night' she experienced 2 AM chest pain radiating to the arm with troponin leak 71, and repeat 71. Given her advanced age, CKD, and prior decision by IC to avoid intervention we recommend proceeding with medical management and uptitrating her current antihypertensive regimen due to persistent hypertension and recurrent symptoms. No stemi on the EKG.     Plan:   -- Increase to Imdur to 90 mg daily    -- No BB due to hx of heart block    -- Up to 80 mg Atorvastatin     We will sign off. Thank you for the  consult.         VTE Risk Mitigation (From admission, onward)           Ordered     heparin (porcine) injection 5,000 Units  Every 8 hours         01/20/25 0003     IP VTE HIGH RISK PATIENT  Once         01/20/25 0003     Place sequential compression device  Until discontinued         01/20/25 0003                    Ihsan Olivas MD  Cardiology  [facility]

## 2025-01-31 NOTE — ASSESSMENT & PLAN NOTE
Malnutrition Type:  Context: acute illness or injury  Level: moderate    Related to (etiology):   Inadequate energy- protein intake    Signs and Symptoms (as evidenced by):   -4% wt loss x 2 weeks and mild muscle/fat wasting     Malnutrition Characteristic Summary:  Weight Loss (Malnutrition): 1-2% in 1 week (-4% x 2 weeks)  Subcutaneous Fat (Malnutrition): mild depletion  Muscle Mass (Malnutrition): mild depletion    Interventions/Recommendations (treatment strategy):  Collaboration of nutritional care with other providers.      Nutrition Diagnosis Status:   New

## 2025-01-31 NOTE — ASSESSMENT & PLAN NOTE
Persistent   Seen by cardiology - her intermittent dyspnea symptoms are considered an anginal equivalent.  Recommendation to titrate Imdur dosage to 60mg. BP now borderline hypotensive with this increased. Patient took AM imdur 30mg, was hypertensive on presentation - gave 30mg imdur 1/19 and started 60mg in AM.  - cardiology signed off  - PT/OT consulted  - increase Imdur to 60mg and decrease amlodipine dose.   - ranolazine discontinued due to renal function    Recurrent chest pain on 1/31. Trop 71  - cards consult  - repeat trop ordered

## 2025-01-31 NOTE — ASSESSMENT & PLAN NOTE
Patient with Acute debility due to age-related physical debility and cardiac disease . The patient's latest AMPAC (Activity Measure for Post Acute Care) Score is listed below.    AM-PAC Score - How much help does the patient need for each activity listed  Basic Mobility Total Score: 16  Turning over in bed (including adjusting bedclothes, sheets and blankets)?: A little  Sitting down on and standing up from a chair with arms (e.g., wheelchair, bedside commode, etc.): A little  Moving from lying on back to sitting on the side of the bed?: A little  Moving to and from a bed to a chair (including a wheelchair)?: A little  Need to walk in hospital room?: A little  Climbing 3-5 steps with a railing?: Unable    Plan  - Progressive mobility protocol initated  - PT/OT consulted  - Fall precautions in place

## 2025-01-31 NOTE — ASSESSMENT & PLAN NOTE
Creatine stable for now. BMP reviewed- noted Estimated Creatinine Clearance: 18.8 mL/min (A) (based on SCr of 1.9 mg/dL (H)). according to latest data. Based on current GFR, CKD stage is stage 4 - GFR 15-29.  Monitor UOP and serial BMP and adjust therapy as needed. Renally dose meds. Avoid nephrotoxic medications and procedures.    Continue sodium bicarbonate and farxiga

## 2025-02-01 PROCEDURE — 63600175 PHARM REV CODE 636 W HCPCS: Performed by: HOSPITALIST

## 2025-02-01 PROCEDURE — 25000003 PHARM REV CODE 250: Performed by: STUDENT IN AN ORGANIZED HEALTH CARE EDUCATION/TRAINING PROGRAM

## 2025-02-01 PROCEDURE — 25000003 PHARM REV CODE 250: Performed by: HOSPITALIST

## 2025-02-01 PROCEDURE — 21400001 HC TELEMETRY ROOM

## 2025-02-01 RX ADMIN — ALUMINUM HYDROXIDE, MAGNESIUM HYDROXIDE, AND SIMETHICONE 30 ML: 200; 200; 20 SUSPENSION ORAL at 03:02

## 2025-02-01 RX ADMIN — HEPARIN SODIUM 5000 UNITS: 5000 INJECTION INTRAVENOUS; SUBCUTANEOUS at 06:02

## 2025-02-01 RX ADMIN — Medication 6 MG: at 10:02

## 2025-02-01 RX ADMIN — PANTOPRAZOLE SODIUM 20 MG: 20 TABLET, DELAYED RELEASE ORAL at 06:02

## 2025-02-01 RX ADMIN — CLOPIDOGREL BISULFATE 75 MG: 75 TABLET ORAL at 08:02

## 2025-02-01 RX ADMIN — HEPARIN SODIUM 5000 UNITS: 5000 INJECTION INTRAVENOUS; SUBCUTANEOUS at 10:02

## 2025-02-01 RX ADMIN — ASPIRIN 81 MG: 81 TABLET, COATED ORAL at 08:02

## 2025-02-01 RX ADMIN — ISOSORBIDE MONONITRATE 90 MG: 60 TABLET, EXTENDED RELEASE ORAL at 08:02

## 2025-02-01 RX ADMIN — AMLODIPINE BESYLATE 5 MG: 5 TABLET ORAL at 08:02

## 2025-02-01 RX ADMIN — DAPAGLIFLOZIN 10 MG: 10 TABLET, FILM COATED ORAL at 08:02

## 2025-02-01 RX ADMIN — SODIUM BICARBONATE 650 MG TABLET 650 MG: at 10:02

## 2025-02-01 RX ADMIN — ACETAMINOPHEN 650 MG: 325 TABLET ORAL at 10:02

## 2025-02-01 RX ADMIN — POLYETHYLENE GLYCOL 3350 17 G: 17 POWDER, FOR SOLUTION ORAL at 05:02

## 2025-02-01 RX ADMIN — ATORVASTATIN CALCIUM 80 MG: 40 TABLET, FILM COATED ORAL at 08:02

## 2025-02-01 RX ADMIN — ALLOPURINOL 50 MG: 300 TABLET ORAL at 08:02

## 2025-02-01 RX ADMIN — ALUMINUM HYDROXIDE, MAGNESIUM HYDROXIDE, AND SIMETHICONE 30 ML: 200; 200; 20 SUSPENSION ORAL at 10:02

## 2025-02-01 RX ADMIN — SENNOSIDES AND DOCUSATE SODIUM 1 TABLET: 50; 8.6 TABLET ORAL at 10:02

## 2025-02-01 RX ADMIN — LOSARTAN POTASSIUM 25 MG: 25 TABLET, FILM COATED ORAL at 08:02

## 2025-02-01 RX ADMIN — FOLIC ACID 1 MG: 1 TABLET ORAL at 08:02

## 2025-02-01 RX ADMIN — CHOLECALCIFEROL TAB 25 MCG (1000 UNIT) 2000 UNITS: 25 TAB at 08:02

## 2025-02-01 RX ADMIN — HEPARIN SODIUM 5000 UNITS: 5000 INJECTION INTRAVENOUS; SUBCUTANEOUS at 05:02

## 2025-02-01 RX ADMIN — SODIUM BICARBONATE 650 MG TABLET 650 MG: at 08:02

## 2025-02-01 NOTE — ASSESSMENT & PLAN NOTE
Patient's blood pressure range in the last 24 hours was: BP  Min: 118/64  Max: 167/77.The patient's inpatient anti-hypertensive regimen is listed below:  Current Antihypertensives  nitroGLYCERIN SL tablet 0.4 mg, Every 5 min PRN, Sublingual  amLODIPine tablet 5 mg, Daily, Oral  losartan tablet 25 mg, Daily, Oral  amlodipine (NORVASC) tablet, Daily, Oral  isosorbide mononitrate (IMDUR) 24 hr tablet, Daily, Oral  isosorbide mononitrate 24 hr tablet 90 mg, Daily, Oral    Plan  - BP is controlled, no changes needed to their regimen  - adjustments to BP meds

## 2025-02-01 NOTE — NURSING
Pt with c/o chest pain in epigastric area, prn maalox given with good relieve.  No other needs currently.  Call bell within reach. Monitoring on going.

## 2025-02-01 NOTE — ASSESSMENT & PLAN NOTE
Seen by cardiology - her intermittent dyspnea symptoms are considered an anginal equivalent.  Recommendation to titrate Imdur dosage to 60mg. BP now borderline hypotensive with this increased. Patient took AM imdur 30mg, was hypertensive on presentation - gave 30mg imdur 1/19 and started 60mg in AM.  Recurrent chest pain on 1/31 with trop of 71. Discussed with cardiology. Continuing medical management.   - cardiology signed off  - PT/OT consulted  - increase imdur to 90mg on 2/1  - increase atorvastatin to 80mg   - ranolazine discontinued due to renal function

## 2025-02-01 NOTE — PLAN OF CARE
Problem: Adult Inpatient Plan of Care  Goal: Plan of Care Review  Outcome: Progressing     Problem: Infection  Goal: Absence of Infection Signs and Symptoms  Outcome: Progressing     Problem: Acute Kidney Injury/Impairment  Goal: Effective Renal Function  Outcome: Progressing     Problem: Pain Acute  Goal: Optimal Pain Control and Function  Outcome: Progressing     Problem: Skin Injury Risk Increased  Goal: Skin Health and Integrity  Outcome: Progressing

## 2025-02-01 NOTE — SUBJECTIVE & OBJECTIVE
Interval History: Patient reports one episode of chest pain last night that improved with maalox. No pain this mroning.     Review of Systems  Objective:     Vital Signs (Most Recent):  Temp: 98.9 °F (37.2 °C) (02/01/25 0816)  Pulse: 72 (02/01/25 0816)  Resp: 18 (02/01/25 0816)  BP: (!) 167/77 (02/01/25 0816)  SpO2: 98 % (02/01/25 0816) Vital Signs (24h Range):  Temp:  [98 °F (36.7 °C)-98.9 °F (37.2 °C)] 98.9 °F (37.2 °C)  Pulse:  [52-90] 72  Resp:  [18-20] 18  SpO2:  [96 %-98 %] 98 %  BP: (118-167)/(64-77) 167/77     Weight: 72.3 kg (159 lb 6.3 oz)  Body mass index is 24.96 kg/m².    Intake/Output Summary (Last 24 hours) at 2/1/2025 0918  Last data filed at 2/1/2025 0523  Gross per 24 hour   Intake --   Output 1650 ml   Net -1650 ml         Physical Exam  Vitals and nursing note reviewed.   Constitutional:       General: She is not in acute distress.     Appearance: She is not ill-appearing.   Eyes:      General: No scleral icterus.  Cardiovascular:      Rate and Rhythm: Normal rate.      Heart sounds: Murmur heard.   Pulmonary:      Effort: Pulmonary effort is normal. No respiratory distress.      Breath sounds: Normal breath sounds.   Abdominal:      General: Abdomen is flat. There is no distension.      Palpations: Abdomen is soft.      Tenderness: There is no abdominal tenderness. There is no guarding.   Musculoskeletal:      Right lower leg: No edema.      Left lower leg: No edema.   Neurological:      Mental Status: She is alert and oriented to person, place, and time. Mental status is at baseline.      Cranial Nerves: No cranial nerve deficit.   Psychiatric:         Mood and Affect: Mood normal.         Behavior: Behavior normal.             Significant Labs: All pertinent labs within the past 24 hours have been reviewed.      Significant Imaging: I have reviewed all pertinent imaging results/findings within the past 24 hours.

## 2025-02-01 NOTE — ASSESSMENT & PLAN NOTE
Patient has paroxysmal (<7 days) atrial fibrillation. Patient is currently in sinus rhythm. [id number] Score: 4. The patients heart rate in the last 24 hours is as follows:  Pulse  Min: 52  Max: 90     Antiarrhythmics       Anticoagulants  heparin (porcine) injection 5,000 Units, Every 8 hours, Subcutaneous    Plan  - Replete lytes with a goal of K>4, Mg >2  - Patient is not anticoagulated due to unclear etiology  - Patient's afib is currently controlled

## 2025-02-01 NOTE — PROGRESS NOTES
"[facility] [address] [place] [address] Medicine  Progress Note    Patient Name: Anahy Evans  MRN: [id number]  Patient Class: IP- Inpatient   Admission Date: 1/19/2025  Length of Stay: 11 days  Attending Physician: Marely Perez MD  Primary Care Provider: Elena Cabrera MD        Subjective     Principal Problem:Anginal equivalent        HPI:  89 y/o AAF w/ CAD hx PCI, CKD 4, HTN, Afib, AAA, 2nd degree AV Block presents to the ED with complaints of dyspnea.     She was recently admitted to [facility] from 1/13-1/17 per DC summary "Patient admitted for dyspnea on exertion. V/Q scan was completed - no pulmonary embolism. Echo ordered - regional wall motion abnormalities present. Low normal systolic ejection fraction 50-55%. Cardiology was consulted - feel origin of TAYLOR may be cardiac in nature. PET stress done 1/16. Two perfusion abnormalities seen. Interventional cardiology recommending medical management and close follow-up. Amlodipine increased, losartan decreased, and started on imdur."    She returns to ED with concerns of dyspnea on exertion.  She discharged to home after last admission, lives with her daughter.  Her grand-daughter accompanies her today and helps provide history.  Since discharge patient has had limited ADL capability, pt requires assistance w/ ambulation and use of walker, patient has home purewick and bedside commode.  She requires assistance with toileting, bathing, clothing.  She is adherent to medications. Yesterday patient appeared weak and when sitting upright had poor trunk stability would slump over and had poor appetite did not eat much and was noted with intermittent confusion.  Her confusion is described as poor short term memory, will intermittently forget recent events, granddaughter notes that patient usually answers orientation questions appropriately when evaluated by medical teams, but might forget later what was discussed or why she is in the hospital.    She was treated for " acute cystitis during last admit, pan-sensitive E.coli on urine culture received ceftriaxone inpatient and not discharged on any oral antibiotics.     She does report poor sleep, has had difficulty sleeping at night and will nap multiple times per day. No reported non-redirectable agitation.   Prior to this month, grand-daughter notes patient was performing more ADL on her own.  Patient had declined referral to SNF with recent admits, last PT/OT recs for low-intensity therapy, pt is more open to post-acute care if recommended.     In ED tonight pt with bradycardia noted, cardiology consulted, no acute medical management of her bradycardia recommended, she has 2:1 AV block on review of EKG/telemetry tonight, hx of Mobitz 1 2nd Deg AV block in past.       Overview/Hospital Course:  Evaluated by cardiology and imdur increased. Cardiology recommends medical management, continue Imdur, DAPT, and consider Ranolazine for angina. Imdur increased to 60mg.  Patient started ranolazine as per cardiology recs, then later discontinued due to renal function. Patient worked with PT/OT again. Patient is interested in placement option for therapy.  Patient is stable and pending SNF placement.  P2P denial upheld 1/29. Family submitted for fast appeal.     Recurrent chest pain on 1/31 with slight elevation in trop. Cardiology consulted. Imdur and atorvastatin increased.     Interval History: Patient reports one episode of chest pain last night that improved with maalox. No pain this mroning.     Review of Systems  Objective:     Vital Signs (Most Recent):  Temp: 98.9 °F (37.2 °C) (02/01/25 0816)  Pulse: 72 (02/01/25 0816)  Resp: 18 (02/01/25 0816)  BP: (!) 167/77 (02/01/25 0816)  SpO2: 98 % (02/01/25 0816) Vital Signs (24h Range):  Temp:  [98 °F (36.7 °C)-98.9 °F (37.2 °C)] 98.9 °F (37.2 °C)  Pulse:  [52-90] 72  Resp:  [18-20] 18  SpO2:  [96 %-98 %] 98 %  BP: (118-167)/(64-77) 167/77     Weight: 72.3 kg (159 lb 6.3 oz)  Body mass index  is 24.96 kg/m².    Intake/Output Summary (Last 24 hours) at 2/1/2025 0918  Last data filed at 2/1/2025 0523  Gross per 24 hour   Intake --   Output 1650 ml   Net -1650 ml         Physical Exam  Vitals and nursing note reviewed.   Constitutional:       General: She is not in acute distress.     Appearance: She is not ill-appearing.   Eyes:      General: No scleral icterus.  Cardiovascular:      Rate and Rhythm: Normal rate.      Heart sounds: Murmur heard.   Pulmonary:      Effort: Pulmonary effort is normal. No respiratory distress.      Breath sounds: Normal breath sounds.   Abdominal:      General: Abdomen is flat. There is no distension.      Palpations: Abdomen is soft.      Tenderness: There is no abdominal tenderness. There is no guarding.   Musculoskeletal:      Right lower leg: No edema.      Left lower leg: No edema.   Neurological:      Mental Status: She is alert and oriented to person, place, and time. Mental status is at baseline.      Cranial Nerves: No cranial nerve deficit.   Psychiatric:         Mood and Affect: Mood normal.         Behavior: Behavior normal.             Significant Labs: All pertinent labs within the past 24 hours have been reviewed.      Significant Imaging: I have reviewed all pertinent imaging results/findings within the past 24 hours.    Assessment and Plan     * Anginal equivalent  Seen by cardiology - her intermittent dyspnea symptoms are considered an anginal equivalent.  Recommendation to titrate Imdur dosage to 60mg. BP now borderline hypotensive with this increased. Patient took AM imdur 30mg, was hypertensive on presentation - gave 30mg imdur 1/19 and started 60mg in AM.  Recurrent chest pain on 1/31 with trop of 71. Discussed with cardiology. Continuing medical management.   - cardiology signed off  - PT/OT consulted  - increase imdur to 90mg on 2/1  - increase atorvastatin to 80mg   - ranolazine discontinued due to renal function    Coronary artery disease involving  "native coronary artery of native heart without angina pectoris  Patient with known CAD , which is uncontrolled Will continue ASA, Plavix, and Statin and monitor for S/Sx of angina/ACS. Continue to monitor on telemetry.     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 Pt having 2:1 AV block atrial rates in 80s and HR in low 40s.  During cardiology evaluation pt with variable 2nd degree AV block. She is not on any AV node blocking agents. Discussed with cards/EP  - continue telemetry monitoring.     Per Dr. Cummings on admit: "Tonight discussed code status with patient - she has been Full Code and DNR during last 2 admits, initially indicated DNR but also states she would want resuscitative measures if she might not incur injury, reviewed probabilities with pt given advanced age and chronic medical conditiions indicated to her that under true cardio-respiratory arrest she would be left with subsequent injury and overall low likelihood of survival to discharge 5-10%.  Give this bradyarrhythmia I did discuss if patients HR was lower if she would want invasive measures such as transvenous pacing or transcutaneous pacing performed she responded in affirmative,  will place FULL CODE for now."       Debility  Patient with Acute debility due to age-related physical debility and cardiac disease . The patient's latest AMPAC (Activity Measure for Post Acute Care) Score is listed below.    AM-PAC Score - How much help does the patient need for each activity listed  Basic Mobility Total Score: 16  Turning over in bed (including adjusting bedclothes, sheets and blankets)?: A little  Sitting down on and standing up from a chair with arms (e.g., wheelchair, bedside commode, etc.): A little  Moving from lying on back to sitting on the side of the bed?: A little  Moving to and from a bed to a chair (including a wheelchair)?: A little  Need to walk in hospital room?: A little  Climbing 3-5 " steps with a railing?: Unable    Plan  - Progressive mobility protocol initated  - PT/OT consulted  - Fall precautions in place          Counseling regarding goals of care  Code status discussion as per AV block problem.     Patient has living will and HCPOA documents uploaded, she does not have an LAPOST completed. Would recommend completion of LAPOST prior to hospital discharge.       Confusion  ED indicated pt referred for admit for confusion.  By CAM-ICU testing pt does not have delirium, some disorientation. She may have some underlying cognitive deficits based on grand-daughters description. Appears pt with altered sleep wake cycles more recently - schedule melatonin tonight to help normalize sleep-wake cycle, delirium precautions.   Repeat UA appears normal and no persistent symptoms or concerns for ongoing Acute cystitis adequately treated.   - delirium precautions      Acute cystitis without hematuria  Repeat UA appears normal and no persistent symptoms or concerns for ongoing Acute cystitis adequately treated.       CKD (chronic kidney disease), stage IV  Creatine stable for now. BMP reviewed- noted Estimated Creatinine Clearance: 18.8 mL/min (A) (based on SCr of 1.9 mg/dL (H)). according to latest data. Based on current GFR, CKD stage is stage 4 - GFR 15-29.  Monitor UOP and serial BMP and adjust therapy as needed. Renally dose meds. Avoid nephrotoxic medications and procedures.    Continue sodium bicarbonate and farxiga    Essential hypertension  Patient's blood pressure range in the last 24 hours was: BP  Min: 118/64  Max: 167/77.The patient's inpatient anti-hypertensive regimen is listed below:  Current Antihypertensives  nitroGLYCERIN SL tablet 0.4 mg, Every 5 min PRN, Sublingual  amLODIPine tablet 5 mg, Daily, Oral  losartan tablet 25 mg, Daily, Oral  amlodipine (NORVASC) tablet, Daily, Oral  isosorbide mononitrate (IMDUR) 24 hr tablet, Daily, Oral  isosorbide mononitrate 24 hr tablet 90 mg, Daily,  Oral    Plan  - BP is controlled, no changes needed to their regimen  - adjustments to BP meds     PAF (paroxysmal atrial fibrillation)  Patient has paroxysmal (<7 days) atrial fibrillation. Patient is currently in sinus rhythm. [id number] Score: 4. The patients heart rate in the last 24 hours is as follows:  Pulse  Min: 52  Max: 90     Antiarrhythmics       Anticoagulants  heparin (porcine) injection 5,000 Units, Every 8 hours, Subcutaneous    Plan  - Replete lytes with a goal of K>4, Mg >2  - Patient is not anticoagulated due to unclear etiology  - Patient's afib is currently controlled          VTE Risk Mitigation (From admission, onward)           Ordered     heparin (porcine) injection 5,000 Units  Every 8 hours         01/20/25 0003     IP VTE HIGH RISK PATIENT  Once         01/20/25 0003     Place sequential compression device  Until discontinued         01/20/25 0003                    Discharge Planning   ROSEMARIE: 2/3/2025     Code Status: Full Code   Medical Readiness for Discharge Date:   Discharge Plan A: Skilled Nursing Facility                Please place Justification for DME        Marely Perez MD  Department of Hospital Medicine   [facility]

## 2025-02-02 PROBLEM — R50.9 FEBRILE: Status: ACTIVE | Noted: 2025-02-02

## 2025-02-02 LAB
ANION GAP SERPL CALC-SCNC: 8 MMOL/L (ref 8–16)
BASOPHILS # BLD AUTO: 0.02 K/UL (ref 0–0.2)
BASOPHILS NFR BLD: 0.4 % (ref 0–1.9)
BUN SERPL-MCNC: 32 MG/DL (ref 10–30)
CALCIUM SERPL-MCNC: 9.9 MG/DL (ref 8.7–10.5)
CHLORIDE SERPL-SCNC: 103 MMOL/L (ref 95–110)
CO2 SERPL-SCNC: 26 MMOL/L (ref 23–29)
CREAT SERPL-MCNC: 1.8 MG/DL (ref 0.5–1.4)
DIFFERENTIAL METHOD BLD: ABNORMAL
EOSINOPHIL # BLD AUTO: 0.1 K/UL (ref 0–0.5)
EOSINOPHIL NFR BLD: 2.1 % (ref 0–8)
ERYTHROCYTE [DISTWIDTH] IN BLOOD BY AUTOMATED COUNT: 15.1 % (ref 11.5–14.5)
EST. GFR  (NO RACE VARIABLE): 26.3 ML/MIN/1.73 M^2
GLUCOSE SERPL-MCNC: 102 MG/DL (ref 70–110)
HCT VFR BLD AUTO: 30.3 % (ref 37–48.5)
HGB BLD-MCNC: 9.8 G/DL (ref 12–16)
IMM GRANULOCYTES # BLD AUTO: 0.02 K/UL (ref 0–0.04)
IMM GRANULOCYTES NFR BLD AUTO: 0.4 % (ref 0–0.5)
INFLUENZA A, MOLECULAR: DETECTED
INFLUENZA B, MOLECULAR: NOT DETECTED
LYMPHOCYTES # BLD AUTO: 1.1 K/UL (ref 1–4.8)
LYMPHOCYTES NFR BLD: 19.9 % (ref 18–48)
MCH RBC QN AUTO: 28.8 PG (ref 27–31)
MCHC RBC AUTO-ENTMCNC: 32.3 G/DL (ref 32–36)
MCV RBC AUTO: 89 FL (ref 82–98)
MONOCYTES # BLD AUTO: 1.7 K/UL (ref 0.3–1)
MONOCYTES NFR BLD: 30.5 % (ref 4–15)
NEUTROPHILS # BLD AUTO: 2.7 K/UL (ref 1.8–7.7)
NEUTROPHILS NFR BLD: 46.7 % (ref 38–73)
NRBC BLD-RTO: 0 /100 WBC
PLATELET # BLD AUTO: 148 K/UL (ref 150–450)
PMV BLD AUTO: 12 FL (ref 9.2–12.9)
POTASSIUM SERPL-SCNC: 4.5 MMOL/L (ref 3.5–5.1)
RBC # BLD AUTO: 3.4 M/UL (ref 4–5.4)
RSV AG BY MOLECULAR METHOD: NOT DETECTED
SARS-COV-2 RNA RESP QL NAA+PROBE: NOT DETECTED
SODIUM SERPL-SCNC: 137 MMOL/L (ref 136–145)
WBC # BLD AUTO: 5.68 K/UL (ref 3.9–12.7)

## 2025-02-02 PROCEDURE — 25000003 PHARM REV CODE 250: Performed by: HOSPITALIST

## 2025-02-02 PROCEDURE — 0241U SARS-COV2 (COVID) WITH FLU/RSV BY PCR: CPT | Performed by: NURSE PRACTITIONER

## 2025-02-02 PROCEDURE — 21400001 HC TELEMETRY ROOM

## 2025-02-02 PROCEDURE — 36415 COLL VENOUS BLD VENIPUNCTURE: CPT | Performed by: STUDENT IN AN ORGANIZED HEALTH CARE EDUCATION/TRAINING PROGRAM

## 2025-02-02 PROCEDURE — 94640 AIRWAY INHALATION TREATMENT: CPT

## 2025-02-02 PROCEDURE — 87040 BLOOD CULTURE FOR BACTERIA: CPT | Performed by: STUDENT IN AN ORGANIZED HEALTH CARE EDUCATION/TRAINING PROGRAM

## 2025-02-02 PROCEDURE — 80048 BASIC METABOLIC PNL TOTAL CA: CPT | Performed by: STUDENT IN AN ORGANIZED HEALTH CARE EDUCATION/TRAINING PROGRAM

## 2025-02-02 PROCEDURE — 94761 N-INVAS EAR/PLS OXIMETRY MLT: CPT

## 2025-02-02 PROCEDURE — 25000003 PHARM REV CODE 250: Performed by: STUDENT IN AN ORGANIZED HEALTH CARE EDUCATION/TRAINING PROGRAM

## 2025-02-02 PROCEDURE — 63600175 PHARM REV CODE 636 W HCPCS: Performed by: HOSPITALIST

## 2025-02-02 PROCEDURE — 85025 COMPLETE CBC W/AUTO DIFF WBC: CPT | Performed by: STUDENT IN AN ORGANIZED HEALTH CARE EDUCATION/TRAINING PROGRAM

## 2025-02-02 PROCEDURE — 25000242 PHARM REV CODE 250 ALT 637 W/ HCPCS: Performed by: STUDENT IN AN ORGANIZED HEALTH CARE EDUCATION/TRAINING PROGRAM

## 2025-02-02 RX ORDER — FLUTICASONE PROPIONATE 50 MCG
2 SPRAY, SUSPENSION (ML) NASAL DAILY
Status: DISCONTINUED | OUTPATIENT
Start: 2025-02-02 | End: 2025-02-11 | Stop reason: HOSPADM

## 2025-02-02 RX ORDER — IPRATROPIUM BROMIDE AND ALBUTEROL SULFATE 2.5; .5 MG/3ML; MG/3ML
3 SOLUTION RESPIRATORY (INHALATION) EVERY 4 HOURS PRN
Status: DISCONTINUED | OUTPATIENT
Start: 2025-02-02 | End: 2025-02-11 | Stop reason: HOSPADM

## 2025-02-02 RX ORDER — BENZONATATE 100 MG/1
200 CAPSULE ORAL 3 TIMES DAILY PRN
Status: DISCONTINUED | OUTPATIENT
Start: 2025-02-02 | End: 2025-02-11 | Stop reason: HOSPADM

## 2025-02-02 RX ADMIN — HEPARIN SODIUM 5000 UNITS: 5000 INJECTION INTRAVENOUS; SUBCUTANEOUS at 02:02

## 2025-02-02 RX ADMIN — ACETAMINOPHEN 650 MG: 325 TABLET ORAL at 06:02

## 2025-02-02 RX ADMIN — FLUTICASONE PROPIONATE 100 MCG: 50 SPRAY, METERED NASAL at 02:02

## 2025-02-02 RX ADMIN — LOSARTAN POTASSIUM 25 MG: 25 TABLET, FILM COATED ORAL at 09:02

## 2025-02-02 RX ADMIN — HEPARIN SODIUM 5000 UNITS: 5000 INJECTION INTRAVENOUS; SUBCUTANEOUS at 09:02

## 2025-02-02 RX ADMIN — CHOLECALCIFEROL TAB 25 MCG (1000 UNIT) 2000 UNITS: 25 TAB at 09:02

## 2025-02-02 RX ADMIN — SODIUM BICARBONATE 650 MG TABLET 650 MG: at 09:02

## 2025-02-02 RX ADMIN — HEPARIN SODIUM 5000 UNITS: 5000 INJECTION INTRAVENOUS; SUBCUTANEOUS at 06:02

## 2025-02-02 RX ADMIN — PANTOPRAZOLE SODIUM 20 MG: 20 TABLET, DELAYED RELEASE ORAL at 06:02

## 2025-02-02 RX ADMIN — BENZONATATE 200 MG: 100 CAPSULE ORAL at 02:02

## 2025-02-02 RX ADMIN — CLOPIDOGREL BISULFATE 75 MG: 75 TABLET ORAL at 09:02

## 2025-02-02 RX ADMIN — ALUMINUM HYDROXIDE, MAGNESIUM HYDROXIDE, AND SIMETHICONE 30 ML: 200; 200; 20 SUSPENSION ORAL at 09:02

## 2025-02-02 RX ADMIN — BENZONATATE 200 MG: 100 CAPSULE ORAL at 09:02

## 2025-02-02 RX ADMIN — ISOSORBIDE MONONITRATE 90 MG: 60 TABLET, EXTENDED RELEASE ORAL at 09:02

## 2025-02-02 RX ADMIN — Medication 6 MG: at 09:02

## 2025-02-02 RX ADMIN — FOLIC ACID 1 MG: 1 TABLET ORAL at 09:02

## 2025-02-02 RX ADMIN — IPRATROPIUM BROMIDE AND ALBUTEROL SULFATE 3 ML: 2.5; .5 SOLUTION RESPIRATORY (INHALATION) at 10:02

## 2025-02-02 RX ADMIN — AMLODIPINE BESYLATE 5 MG: 5 TABLET ORAL at 09:02

## 2025-02-02 RX ADMIN — ALLOPURINOL 50 MG: 300 TABLET ORAL at 09:02

## 2025-02-02 RX ADMIN — ASPIRIN 81 MG: 81 TABLET, COATED ORAL at 09:02

## 2025-02-02 RX ADMIN — DAPAGLIFLOZIN 10 MG: 10 TABLET, FILM COATED ORAL at 10:02

## 2025-02-02 RX ADMIN — ATORVASTATIN CALCIUM 80 MG: 40 TABLET, FILM COATED ORAL at 09:02

## 2025-02-02 NOTE — ASSESSMENT & PLAN NOTE
Patient has paroxysmal (<7 days) atrial fibrillation. Patient is currently in sinus rhythm. [id number] Score: 4. The patients heart rate in the last 24 hours is as follows:  Pulse  Min: 62  Max: 93     Antiarrhythmics       Anticoagulants  heparin (porcine) injection 5,000 Units, Every 8 hours, Subcutaneous    Plan  - Replete lytes with a goal of K>4, Mg >2  - Patient is not anticoagulated due to unclear etiology  - Patient's afib is currently controlled

## 2025-02-02 NOTE — ASSESSMENT & PLAN NOTE
Patient's blood pressure range in the last 24 hours was: BP  Min: 116/57  Max: 142/74.The patient's inpatient anti-hypertensive regimen is listed below:  Current Antihypertensives  nitroGLYCERIN SL tablet 0.4 mg, Every 5 min PRN, Sublingual  amLODIPine tablet 5 mg, Daily, Oral  losartan tablet 25 mg, Daily, Oral  amlodipine (NORVASC) tablet, Daily, Oral  isosorbide mononitrate (IMDUR) 24 hr tablet, Daily, Oral  isosorbide mononitrate 24 hr tablet 90 mg, Daily, Oral    Plan  - BP is controlled, no changes needed to their regimen  - adjustments to BP meds

## 2025-02-02 NOTE — ASSESSMENT & PLAN NOTE
Creatine stable for now. BMP reviewed- noted Estimated Creatinine Clearance: 19.8 mL/min (A) (based on SCr of 1.8 mg/dL (H)). according to latest data. Based on current GFR, CKD stage is stage 4 - GFR 15-29.  Monitor UOP and serial BMP and adjust therapy as needed. Renally dose meds. Avoid nephrotoxic medications and procedures.    Continue sodium bicarbonate and farxiga

## 2025-02-02 NOTE — PROGRESS NOTES
"[facility] [address] [place] [address] Medicine  Progress Note    Patient Name: Anahy Evans  MRN: [id number]  Patient Class: IP- Inpatient   Admission Date: 1/19/2025  Length of Stay: 12 days  Attending Physician: Marely Perez MD  Primary Care Provider: Elena Cabrera MD        Subjective     Principal Problem:Anginal equivalent        HPI:  89 y/o AAF w/ CAD hx PCI, CKD 4, HTN, Afib, AAA, 2nd degree AV Block presents to the ED with complaints of dyspnea.     She was recently admitted to [facility] [place] [facility] from 1/13-1/17 per DC summary "Patient admitted for dyspnea on exertion. V/Q scan was completed - no pulmonary embolism. Echo ordered - regional wall motion abnormalities present. Low normal systolic ejection fraction 50-55%. Cardiology was consulted - feel origin of TAYLOR may be cardiac in nature. PET stress done 1/16. Two perfusion abnormalities seen. Interventional cardiology recommending medical management and close follow-up. Amlodipine increased, losartan decreased, and started on imdur."    She returns to ED with concerns of dyspnea on exertion.  She discharged to home after last admission, lives with her daughter.  Her grand-daughter accompanies her today and helps provide history.  Since discharge patient has had limited ADL capability, pt requires assistance w/ ambulation and use of walker, patient has home purewick and bedside commode.  She requires assistance with toileting, bathing, clothing.  She is adherent to medications. Yesterday patient appeared weak and when sitting upright had poor trunk stability would slump over and had poor appetite did not eat much and was noted with intermittent confusion.  Her confusion is described as poor short term memory, will intermittently forget recent events, granddaughter notes that patient usually answers orientation questions appropriately when evaluated by medical teams, but might forget later what was discussed or why she is in the hospital.    She was treated for " acute cystitis during last admit, pan-sensitive E.coli on urine culture received ceftriaxone inpatient and not discharged on any oral antibiotics.     She does report poor sleep, has had difficulty sleeping at night and will nap multiple times per day. No reported non-redirectable agitation.   Prior to this month, grand-daughter notes patient was performing more ADL on her own.  Patient had declined referral to SNF with recent admits, last PT/OT recs for low-intensity therapy, pt is more open to post-acute care if recommended.     In ED tonight pt with bradycardia noted, cardiology consulted, no acute medical management of her bradycardia recommended, she has 2:1 AV block on review of EKG/telemetry tonight, hx of Mobitz 1 2nd Deg AV block in past.       Overview/Hospital Course:  Evaluated by cardiology and imdur increased. Cardiology recommends medical management, continue Imdur, DAPT, and consider Ranolazine for angina. Imdur increased to 60mg.  Patient started ranolazine as per cardiology recs, then later discontinued due to renal function. Patient worked with PT/OT again. Patient is interested in placement option for therapy.  Patient is stable and pending SNF placement.  P2P denial upheld 1/29. Family submitted for fast appeal.     Recurrent chest pain on 1/31 with slight elevation in trop. Cardiology consulted. Imdur and atorvastatin increased.     Interval History: Febrile to 101 overnight. No infectious work-up started at that time. CXR ordered this morning. No leukocytosis. Patient does endorse cough, has wheezing on exam. Has had two more episodes of chest pain that improved with maalox.     Review of Systems  Objective:     Vital Signs (Most Recent):  Temp: 99.1 °F (37.3 °C) (02/02/25 0731)  Pulse: 75 (02/02/25 0731)  Resp: 18 (02/02/25 0731)  BP: (!) 142/74 (02/02/25 0731)  SpO2: 97 % (02/02/25 0731) Vital Signs (24h Range):  Temp:  [99.1 °F (37.3 °C)-101.7 °F (38.7 °C)] 99.1 °F (37.3 °C)  Pulse:   [62-93] 75  Resp:  [16-18] 18  SpO2:  [94 %-97 %] 97 %  BP: (116-142)/(57-77) 142/74     Weight: 72.3 kg (159 lb 6.3 oz)  Body mass index is 24.96 kg/m².    Intake/Output Summary (Last 24 hours) at 2/2/2025 0934  Last data filed at 2/2/2025 0601  Gross per 24 hour   Intake --   Output 1100 ml   Net -1100 ml         Physical Exam  Vitals and nursing note reviewed.   Constitutional:       General: She is not in acute distress.     Appearance: She is not ill-appearing.   Eyes:      General: No scleral icterus.  Cardiovascular:      Rate and Rhythm: Normal rate.      Heart sounds: Murmur heard.   Pulmonary:      Effort: Pulmonary effort is normal. No respiratory distress.      Breath sounds: Examination of the right-lower field reveals wheezing. Examination of the left-lower field reveals wheezing. Wheezing present.   Abdominal:      General: Abdomen is flat. There is no distension.      Palpations: Abdomen is soft.      Tenderness: There is no abdominal tenderness. There is no guarding.   Musculoskeletal:      Right lower leg: No edema.      Left lower leg: No edema.   Neurological:      Mental Status: She is alert and oriented to person, place, and time. Mental status is at baseline.      Cranial Nerves: No cranial nerve deficit.   Psychiatric:         Mood and Affect: Mood normal.         Behavior: Behavior normal.             Significant Labs: All pertinent labs within the past 24 hours have been reviewed.      Significant Imaging: I have reviewed all pertinent imaging results/findings within the past 24 hours.    Assessment and Plan     * Anginal equivalent  Seen by cardiology - her intermittent dyspnea symptoms are considered an anginal equivalent.  Recommendation to titrate Imdur dosage to 60mg. BP now borderline hypotensive with this increased. Patient took AM imdur 30mg, was hypertensive on presentation - gave 30mg imdur 1/19 and started 60mg in AM.  Recurrent chest pain on 1/31 with trop of 71. Discussed with  "cardiology. Continuing medical management.   - cardiology signed off  - PT/OT consulted  - increase imdur to 90mg on 2/1  - increase atorvastatin to 80mg   - ranolazine discontinued due to renal function    Coronary artery disease involving native coronary artery of native heart without angina pectoris  Patient with known CAD , which is uncontrolled Will continue ASA, Plavix, and Statin and monitor for S/Sx of angina/ACS. Continue to monitor on telemetry.     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 Pt having 2:1 AV block atrial rates in 80s and HR in low 40s.  During cardiology evaluation pt with variable 2nd degree AV block. She is not on any AV node blocking agents. Discussed with cards/EP  - continue telemetry monitoring.     Per Dr. Cummings on admit: "Tonight discussed code status with patient - she has been Full Code and DNR during last 2 admits, initially indicated DNR but also states she would want resuscitative measures if she might not incur injury, reviewed probabilities with pt given advanced age and chronic medical conditiions indicated to her that under true cardio-respiratory arrest she would be left with subsequent injury and overall low likelihood of survival to discharge 5-10%.  Give this bradyarrhythmia I did discuss if patients HR was lower if she would want invasive measures such as transvenous pacing or transcutaneous pacing performed she responded in affirmative,  will place FULL CODE for now."       Febrile  Temp to 101 on 2/1. No leukocytosis or tachycardia. + cough, wheeze  - CXR ordered  - consider covid/flu/rsv  - tessalon perles       Moderate protein-calorie malnutrition  Nutrition consulted. Most recent weight and BMI monitored-     Measurements:  Wt Readings from Last 1 Encounters:   01/31/25 72.3 kg (159 lb 6.3 oz)   Body mass index is 24.96 kg/m².    Patient has been screened and assessed by RD.    Malnutrition Type:  Context: " acute illness or injury  Level: moderate    Malnutrition Characteristic Summary:  Weight Loss (Malnutrition): 1-2% in 1 week (-4% x 2 weeks)  Subcutaneous Fat (Malnutrition): mild depletion  Muscle Mass (Malnutrition): mild depletion    Interventions/Recommendations (treatment strategy):  1.)      Debility  Patient with Acute debility due to age-related physical debility and cardiac disease . The patient's latest AMPAC (Activity Measure for Post Acute Care) Score is listed below.    AM-PAC Score - How much help does the patient need for each activity listed  Basic Mobility Total Score: 16  Turning over in bed (including adjusting bedclothes, sheets and blankets)?: A little  Sitting down on and standing up from a chair with arms (e.g., wheelchair, bedside commode, etc.): A little  Moving from lying on back to sitting on the side of the bed?: A little  Moving to and from a bed to a chair (including a wheelchair)?: A little  Need to walk in hospital room?: A little  Climbing 3-5 steps with a railing?: Unable    Plan  - Progressive mobility protocol initated  - PT/OT consulted  - Fall precautions in place          Counseling regarding goals of care  Code status discussion as per AV block problem.     Patient has living will and HCPOA documents uploaded, she does not have an LAPOST completed. Would recommend completion of LAPOST prior to hospital discharge.       Confusion  ED indicated pt referred for admit for confusion.  By CAM-ICU testing pt does not have delirium, some disorientation. She may have some underlying cognitive deficits based on grand-daughters description. Appears pt with altered sleep wake cycles more recently - schedule melatonin tonight to help normalize sleep-wake cycle, delirium precautions.   Repeat UA appears normal and no persistent symptoms or concerns for ongoing Acute cystitis adequately treated.   - delirium precautions      Acute cystitis without hematuria  Repeat UA appears normal and no  persistent symptoms or concerns for ongoing Acute cystitis adequately treated.       CKD (chronic kidney disease), stage IV  Creatine stable for now. BMP reviewed- noted Estimated Creatinine Clearance: 19.8 mL/min (A) (based on SCr of 1.8 mg/dL (H)). according to latest data. Based on current GFR, CKD stage is stage 4 - GFR 15-29.  Monitor UOP and serial BMP and adjust therapy as needed. Renally dose meds. Avoid nephrotoxic medications and procedures.    Continue sodium bicarbonate and farxiga    Essential hypertension  Patient's blood pressure range in the last 24 hours was: BP  Min: 116/57  Max: 142/74.The patient's inpatient anti-hypertensive regimen is listed below:  Current Antihypertensives  nitroGLYCERIN SL tablet 0.4 mg, Every 5 min PRN, Sublingual  amLODIPine tablet 5 mg, Daily, Oral  losartan tablet 25 mg, Daily, Oral  amlodipine (NORVASC) tablet, Daily, Oral  isosorbide mononitrate (IMDUR) 24 hr tablet, Daily, Oral  isosorbide mononitrate 24 hr tablet 90 mg, Daily, Oral    Plan  - BP is controlled, no changes needed to their regimen  - adjustments to BP meds     PAF (paroxysmal atrial fibrillation)  Patient has paroxysmal (<7 days) atrial fibrillation. Patient is currently in sinus rhythm. [id number] Score: 4. The patients heart rate in the last 24 hours is as follows:  Pulse  Min: 62  Max: 93     Antiarrhythmics       Anticoagulants  heparin (porcine) injection 5,000 Units, Every 8 hours, Subcutaneous    Plan  - Replete lytes with a goal of K>4, Mg >2  - Patient is not anticoagulated due to unclear etiology  - Patient's afib is currently controlled          VTE Risk Mitigation (From admission, onward)           Ordered     heparin (porcine) injection 5,000 Units  Every 8 hours         01/20/25 0003     IP VTE HIGH RISK PATIENT  Once         01/20/25 0003     Place sequential compression device  Until discontinued         01/20/25 0003                    Discharge Planning   ROSEMARIE: 2/3/2025     Code Status:  Full Code   Medical Readiness for Discharge Date:   Discharge Plan A: Skilled Nursing Facility                Please place Justification for DME        Marely Perez MD  Department of Hospital Medicine   [facility]

## 2025-02-02 NOTE — PLAN OF CARE
"Your fax has been successfully sent to [phone] at [phone]  ------------------------------------------------------------  2/1/2025 6:48:29 PM Transmission Record          Sent to [phone] with remote ID "[id number]"          Result: (0/339;0/0) Success          Page record: 1 - 5          Elapsed time: 02:38 on channel 27        CM received voice message from Bertrand at [employer]. Requested correction of patient medicare ID ([id number]) in section 1 and refaxed to [phone].      Elena Zavala [facility]  [id number]  "

## 2025-02-02 NOTE — ASSESSMENT & PLAN NOTE
Nutrition consulted. Most recent weight and BMI monitored-     Measurements:  Wt Readings from Last 1 Encounters:   01/31/25 72.3 kg (159 lb 6.3 oz)   Body mass index is 24.96 kg/m².    Patient has been screened and assessed by RD.    Malnutrition Type:  Context: acute illness or injury  Level: moderate    Malnutrition Characteristic Summary:  Weight Loss (Malnutrition): 1-2% in 1 week (-4% x 2 weeks)  Subcutaneous Fat (Malnutrition): mild depletion  Muscle Mass (Malnutrition): mild depletion    Interventions/Recommendations (treatment strategy):  1.)

## 2025-02-02 NOTE — SUBJECTIVE & OBJECTIVE
Interval History: Febrile to 101 overnight. No infectious work-up started at that time. CXR ordered this morning. No leukocytosis. Patient does endorse cough, has wheezing on exam. Has had two more episodes of chest pain that improved with maalox.     Review of Systems  Objective:     Vital Signs (Most Recent):  Temp: 99.1 °F (37.3 °C) (02/02/25 0731)  Pulse: 75 (02/02/25 0731)  Resp: 18 (02/02/25 0731)  BP: (!) 142/74 (02/02/25 0731)  SpO2: 97 % (02/02/25 0731) Vital Signs (24h Range):  Temp:  [99.1 °F (37.3 °C)-101.7 °F (38.7 °C)] 99.1 °F (37.3 °C)  Pulse:  [62-93] 75  Resp:  [16-18] 18  SpO2:  [94 %-97 %] 97 %  BP: (116-142)/(57-77) 142/74     Weight: 72.3 kg (159 lb 6.3 oz)  Body mass index is 24.96 kg/m².    Intake/Output Summary (Last 24 hours) at 2/2/2025 0934  Last data filed at 2/2/2025 0601  Gross per 24 hour   Intake --   Output 1100 ml   Net -1100 ml         Physical Exam  Vitals and nursing note reviewed.   Constitutional:       General: She is not in acute distress.     Appearance: She is not ill-appearing.   Eyes:      General: No scleral icterus.  Cardiovascular:      Rate and Rhythm: Normal rate.      Heart sounds: Murmur heard.   Pulmonary:      Effort: Pulmonary effort is normal. No respiratory distress.      Breath sounds: Examination of the right-lower field reveals wheezing. Examination of the left-lower field reveals wheezing. Wheezing present.   Abdominal:      General: Abdomen is flat. There is no distension.      Palpations: Abdomen is soft.      Tenderness: There is no abdominal tenderness. There is no guarding.   Musculoskeletal:      Right lower leg: No edema.      Left lower leg: No edema.   Neurological:      Mental Status: She is alert and oriented to person, place, and time. Mental status is at baseline.      Cranial Nerves: No cranial nerve deficit.   Psychiatric:         Mood and Affect: Mood normal.         Behavior: Behavior normal.             Significant Labs: All pertinent  labs within the past 24 hours have been reviewed.      Significant Imaging: I have reviewed all pertinent imaging results/findings within the past 24 hours.

## 2025-02-02 NOTE — ASSESSMENT & PLAN NOTE
Temp to 101 on 2/1. No leukocytosis or tachycardia. + cough, wheeze  - CXR ordered  - consider covid/flu/rsv  - tessalon perles

## 2025-02-03 PROBLEM — J10.1 INFLUENZA A: Status: ACTIVE | Noted: 2025-02-03

## 2025-02-03 PROCEDURE — 27000207 HC ISOLATION

## 2025-02-03 PROCEDURE — 63600175 PHARM REV CODE 636 W HCPCS: Performed by: HOSPITALIST

## 2025-02-03 PROCEDURE — 21400001 HC TELEMETRY ROOM

## 2025-02-03 PROCEDURE — 25000003 PHARM REV CODE 250: Performed by: STUDENT IN AN ORGANIZED HEALTH CARE EDUCATION/TRAINING PROGRAM

## 2025-02-03 PROCEDURE — 25000003 PHARM REV CODE 250: Performed by: HOSPITALIST

## 2025-02-03 PROCEDURE — 94761 N-INVAS EAR/PLS OXIMETRY MLT: CPT

## 2025-02-03 PROCEDURE — 97110 THERAPEUTIC EXERCISES: CPT | Mod: CQ

## 2025-02-03 RX ORDER — BENZONATATE 200 MG/1
200 CAPSULE ORAL 3 TIMES DAILY PRN
Start: 2025-02-03 | End: 2025-02-13

## 2025-02-03 RX ORDER — ISOSORBIDE MONONITRATE 30 MG/1
90 TABLET, EXTENDED RELEASE ORAL DAILY
Start: 2025-02-04 | End: 2026-02-04

## 2025-02-03 RX ORDER — OSELTAMIVIR PHOSPHATE 30 MG/1
30 CAPSULE ORAL DAILY
Start: 2025-02-04 | End: 2025-02-11 | Stop reason: HOSPADM

## 2025-02-03 RX ORDER — OSELTAMIVIR PHOSPHATE 30 MG/1
30 CAPSULE ORAL DAILY
Status: COMPLETED | OUTPATIENT
Start: 2025-02-03 | End: 2025-02-07

## 2025-02-03 RX ORDER — FLUTICASONE PROPIONATE 50 MCG
2 SPRAY, SUSPENSION (ML) NASAL DAILY
Start: 2025-02-04

## 2025-02-03 RX ORDER — ATORVASTATIN CALCIUM 80 MG/1
80 TABLET, FILM COATED ORAL DAILY
Start: 2025-02-04 | End: 2026-02-04

## 2025-02-03 RX ADMIN — ISOSORBIDE MONONITRATE 90 MG: 60 TABLET, EXTENDED RELEASE ORAL at 08:02

## 2025-02-03 RX ADMIN — BENZONATATE 200 MG: 100 CAPSULE ORAL at 05:02

## 2025-02-03 RX ADMIN — ATORVASTATIN CALCIUM 80 MG: 40 TABLET, FILM COATED ORAL at 08:02

## 2025-02-03 RX ADMIN — ASPIRIN 81 MG: 81 TABLET, COATED ORAL at 08:02

## 2025-02-03 RX ADMIN — ACETAMINOPHEN 650 MG: 325 TABLET ORAL at 05:02

## 2025-02-03 RX ADMIN — Medication 6 MG: at 10:02

## 2025-02-03 RX ADMIN — HEPARIN SODIUM 5000 UNITS: 5000 INJECTION INTRAVENOUS; SUBCUTANEOUS at 01:02

## 2025-02-03 RX ADMIN — DAPAGLIFLOZIN 10 MG: 10 TABLET, FILM COATED ORAL at 01:02

## 2025-02-03 RX ADMIN — FOLIC ACID 1 MG: 1 TABLET ORAL at 08:02

## 2025-02-03 RX ADMIN — BENZONATATE 200 MG: 100 CAPSULE ORAL at 10:02

## 2025-02-03 RX ADMIN — CLOPIDOGREL BISULFATE 75 MG: 75 TABLET ORAL at 08:02

## 2025-02-03 RX ADMIN — HEPARIN SODIUM 5000 UNITS: 5000 INJECTION INTRAVENOUS; SUBCUTANEOUS at 10:02

## 2025-02-03 RX ADMIN — CHOLECALCIFEROL TAB 25 MCG (1000 UNIT) 2000 UNITS: 25 TAB at 08:02

## 2025-02-03 RX ADMIN — LOSARTAN POTASSIUM 25 MG: 25 TABLET, FILM COATED ORAL at 08:02

## 2025-02-03 RX ADMIN — ALLOPURINOL 50 MG: 300 TABLET ORAL at 01:02

## 2025-02-03 RX ADMIN — SODIUM BICARBONATE 650 MG TABLET 650 MG: at 10:02

## 2025-02-03 RX ADMIN — AMLODIPINE BESYLATE 5 MG: 5 TABLET ORAL at 08:02

## 2025-02-03 RX ADMIN — PANTOPRAZOLE SODIUM 20 MG: 20 TABLET, DELAYED RELEASE ORAL at 05:02

## 2025-02-03 RX ADMIN — SODIUM BICARBONATE 650 MG TABLET 650 MG: at 08:02

## 2025-02-03 RX ADMIN — OSELTAMIVIR PHOSPHATE 30 MG: 30 CAPSULE ORAL at 08:02

## 2025-02-03 RX ADMIN — HEPARIN SODIUM 5000 UNITS: 5000 INJECTION INTRAVENOUS; SUBCUTANEOUS at 05:02

## 2025-02-03 NOTE — SUBJECTIVE & OBJECTIVE
Interval History: Still with low grade fever overnight. Blood cultures collected. Patient reports she just feels like she has a cold. +coughing and congestion. Still some wheezes on exam.     Review of Systems  Objective:     Vital Signs (Most Recent):  Temp: 99.4 °F (37.4 °C) (02/03/25 1047)  Pulse: 75 (02/03/25 1122)  Resp: 19 (02/03/25 1122)  BP: 115/62 (02/03/25 1047)  SpO2: 95 % (02/03/25 1122) Vital Signs (24h Range):  Temp:  [99 °F (37.2 °C)-102.9 °F (39.4 °C)] 99.4 °F (37.4 °C)  Pulse:  [73-93] 75  Resp:  [17-20] 19  SpO2:  [92 %-97 %] 95 %  BP: (115-152)/(58-72) 115/62     Weight: 73.5 kg (162 lb 0.6 oz)  Body mass index is 25.38 kg/m².    Intake/Output Summary (Last 24 hours) at 2/3/2025 1243  Last data filed at 2/3/2025 0543  Gross per 24 hour   Intake 710 ml   Output 950 ml   Net -240 ml         Physical Exam  Vitals and nursing note reviewed.   Constitutional:       General: She is not in acute distress.     Appearance: She is not ill-appearing.   Eyes:      General: No scleral icterus.  Cardiovascular:      Rate and Rhythm: Normal rate.      Heart sounds: Murmur heard.   Pulmonary:      Effort: Pulmonary effort is normal. No respiratory distress.      Breath sounds: Examination of the right-lower field reveals wheezing. Examination of the left-lower field reveals wheezing. Wheezing present.   Abdominal:      General: Abdomen is flat. There is no distension.      Palpations: Abdomen is soft.      Tenderness: There is no abdominal tenderness. There is no guarding.   Musculoskeletal:      Right lower leg: No edema.      Left lower leg: No edema.   Neurological:      Mental Status: She is alert and oriented to person, place, and time. Mental status is at baseline.      Cranial Nerves: No cranial nerve deficit.   Psychiatric:         Mood and Affect: Mood normal.         Behavior: Behavior normal.             Significant Labs: All pertinent labs within the past 24 hours have been reviewed.    Significant  Imaging: I have reviewed all pertinent imaging results/findings within the past 24 hours.

## 2025-02-03 NOTE — PLAN OF CARE
[facility]     Department of Hospital Medicine     [address] [place] [address]     [phone] after hours  [phone] fax                                        FACILITY TRANSFER ORDERS     02/03/2025    Admit to:  Skilled nursing facility    Diagnoses:  Active Hospital Problems    Diagnosis  POA    *Anginal equivalent [I20.89]  Yes     Priority: 1 - High    Coronary artery disease involving native coronary artery of native heart without angina pectoris [I25.10]  Yes     Priority: 2     Second degree heart block by electrocardiogram (ECG) [I44.1]  Yes     Priority: 3     Influenza A [J10.1]  No    Febrile [R50.9]  No    Moderate protein-calorie malnutrition [E44.0]  No    Debility [R53.81]  Yes    Counseling regarding goals of care [Z71.89]  Not Applicable    Confusion [R41.0]  Yes    Acute cystitis without hematuria [N30.00]  Yes    CKD (chronic kidney disease), stage IV [N18.4]  Yes     Chronic    Essential hypertension [I10]  Yes    PAF (paroxysmal atrial fibrillation) [I48.0]  Yes      Resolved Hospital Problems   No resolved problems to display.       Vital Signs: Routine.    Allergies:  Review of patient's allergies indicates:   Allergen Reactions    Clindamycin Anaphylaxis    Penicillins Rash       Diet: cardiac    Acitivities: as tolerated    Nursing: routine    Labs: none    CONSULTS:       Physical Therapy to evaluate and treat     Occupational Therapy to evaluate and treat        Medications:         Medication List        START taking these medications      benzonatate 200 MG capsule  Commonly known as: TESSALON  Take 1 capsule (200 mg total) by mouth 3 (three) times daily as needed for Cough.     fluticasone propionate 50 mcg/actuation nasal spray  Commonly known as: FLONASE  2 sprays (100 mcg total) by Each Nostril route once daily.  Start taking on: February 4, 2025     oseltamivir 30 MG capsule  Commonly known as: TAMIFLU  Take 1 capsule (30  mg total) by mouth once daily. for 5 days  Start taking on: February 4, 2025     polyethylene glycol 17 gram/dose powder  Commonly known as: GLYCOLAX  Use cap to measure 17g, mix with liquid, and drink by mouth daily as needed for Constipation.            CHANGE how you take these medications      amLODIPine 10 MG tablet  Commonly known as: NORVASC  Take 0.5 tablets (5 mg total) by mouth once daily.  What changed: how much to take     atorvastatin 80 MG tablet  Commonly known as: LIPITOR  Take 1 tablet (80 mg total) by mouth once daily.  Start taking on: February 4, 2025  What changed:   medication strength  how much to take     isosorbide mononitrate 30 MG 24 hr tablet  Commonly known as: IMDUR  Take 3 tablets (90 mg total) by mouth once daily.  Start taking on: February 4, 2025  What changed: how much to take            CONTINUE taking these medications      albuterol 90 mcg/actuation inhaler  Commonly known as: PROVENTIL/VENTOLIN HFA  Inhale 1 puff into the lungs every 4 (four) hours as needed for Wheezing.     allopurinoL 100 MG tablet  Commonly known as: ZYLOPRIM     aspirin 81 MG EC tablet  Commonly known as: ECOTRIN     cholecalciferol (vitamin D3) 50 mcg (2,000 unit) Cap capsule  Commonly known as: VITAMIN D3     clopidogreL 75 mg tablet  Commonly known as: PLAVIX     FARXIGA 10 mg tablet  Generic drug: dapagliflozin propanediol     folic acid 1 MG tablet  Commonly known as: FOLVITE     KERENDIA 10 mg Tab  Generic drug: finerenone     losartan 25 MG tablet  Commonly known as: COZAAR  Take 1 tablet (25 mg total) by mouth once daily.     nitroGLYCERIN 0.4 MG SL tablet  Commonly known as: NITROSTAT     pantoprazole 20 MG tablet  Commonly known as: PROTONIX     sodium bicarbonate 650 MG tablet               Where to Get Your Medications        These medications were sent to [facility]  [address] [place] [id number]      Hours: Always Open Phone: [phone]   amLODIPine 10 MG  tablet  polyethylene glycol 17 gram/dose powder       Information about where to get these medications is not yet available    Ask your nurse or doctor about these medications  atorvastatin 80 MG tablet  benzonatate 200 MG capsule  fluticasone propionate 50 mcg/actuation nasal spray  isosorbide mononitrate 30 MG 24 hr tablet  oseltamivir 30 MG capsule               _________________________________  Marely Perez MD  02/03/2025

## 2025-02-03 NOTE — PROGRESS NOTES
"[facility] [address] [place] [address] Medicine  Progress Note    Patient Name: Anahy Evans  MRN: [id number]  Patient Class: IP- Inpatient   Admission Date: 1/19/2025  Length of Stay: 13 days  Attending Physician: Marely Perez MD  Primary Care Provider: Elena Cabrera MD        Subjective     Principal Problem:Anginal equivalent        HPI:  89 y/o AAF w/ CAD hx PCI, CKD 4, HTN, Afib, AAA, 2nd degree AV Block presents to the ED with complaints of dyspnea.     She was recently admitted to [facility] from 1/13-1/17 per DC summary "Patient admitted for dyspnea on exertion. V/Q scan was completed - no pulmonary embolism. Echo ordered - regional wall motion abnormalities present. Low normal systolic ejection fraction 50-55%. Cardiology was consulted - feel origin of TAYLOR may be cardiac in nature. PET stress done 1/16. Two perfusion abnormalities seen. Interventional cardiology recommending medical management and close follow-up. Amlodipine increased, losartan decreased, and started on imdur."    She returns to ED with concerns of dyspnea on exertion.  She discharged to home after last admission, lives with her daughter.  Her grand-daughter accompanies her today and helps provide history.  Since discharge patient has had limited ADL capability, pt requires assistance w/ ambulation and use of walker, patient has home purewick and bedside commode.  She requires assistance with toileting, bathing, clothing.  She is adherent to medications. Yesterday patient appeared weak and when sitting upright had poor trunk stability would slump over and had poor appetite did not eat much and was noted with intermittent confusion.  Her confusion is described as poor short term memory, will intermittently forget recent events, granddaughter notes that patient usually answers orientation questions appropriately when evaluated by medical teams, but might forget later what was discussed or why she is in the hospital.    She was treated for " acute cystitis during last admit, pan-sensitive E.coli on urine culture received ceftriaxone inpatient and not discharged on any oral antibiotics.     She does report poor sleep, has had difficulty sleeping at night and will nap multiple times per day. No reported non-redirectable agitation.   Prior to this month, grand-daughter notes patient was performing more ADL on her own.  Patient had declined referral to SNF with recent admits, last PT/OT recs for low-intensity therapy, pt is more open to post-acute care if recommended.     In ED tonight pt with bradycardia noted, cardiology consulted, no acute medical management of her bradycardia recommended, she has 2:1 AV block on review of EKG/telemetry tonight, hx of Mobitz 1 2nd Deg AV block in past.       Overview/Hospital Course:  Evaluated by cardiology and imdur increased. Cardiology recommends medical management, continue Imdur, DAPT, and consider Ranolazine for angina. Imdur increased to 60mg.  Patient started ranolazine as per cardiology recs, then later discontinued due to renal function. Patient worked with PT/OT again. Patient is interested in placement option for therapy.  Patient is stable and pending SNF placement.  P2P denial upheld 1/29. Family submitted for fast appeal.     Recurrent chest pain on 1/31 with slight elevation in trop. Cardiology consulted. Imdur and atorvastatin increased.     Fevering since 2/1. CXR clear. UA negative. +flu. Started on tamiflu 2/3. New oxygen requirement secondary to URI.     Interval History: Still with low grade fever overnight. Blood cultures collected. Patient reports she just feels like she has a cold. +coughing and congestion. Still some wheezes on exam.     Review of Systems  Objective:     Vital Signs (Most Recent):  Temp: 99.4 °F (37.4 °C) (02/03/25 1047)  Pulse: 75 (02/03/25 1122)  Resp: 19 (02/03/25 1122)  BP: 115/62 (02/03/25 1047)  SpO2: 95 % (02/03/25 1122) Vital Signs (24h Range):  Temp:  [99 °F (37.2  °C)-102.9 °F (39.4 °C)] 99.4 °F (37.4 °C)  Pulse:  [73-93] 75  Resp:  [17-20] 19  SpO2:  [92 %-97 %] 95 %  BP: (115-152)/(58-72) 115/62     Weight: 73.5 kg (162 lb 0.6 oz)  Body mass index is 25.38 kg/m².    Intake/Output Summary (Last 24 hours) at 2/3/2025 1243  Last data filed at 2/3/2025 0543  Gross per 24 hour   Intake 710 ml   Output 950 ml   Net -240 ml         Physical Exam  Vitals and nursing note reviewed.   Constitutional:       General: She is not in acute distress.     Appearance: She is not ill-appearing.   Eyes:      General: No scleral icterus.  Cardiovascular:      Rate and Rhythm: Normal rate.      Heart sounds: Murmur heard.   Pulmonary:      Effort: Pulmonary effort is normal. No respiratory distress.      Breath sounds: Examination of the right-lower field reveals wheezing. Examination of the left-lower field reveals wheezing. Wheezing present.   Abdominal:      General: Abdomen is flat. There is no distension.      Palpations: Abdomen is soft.      Tenderness: There is no abdominal tenderness. There is no guarding.   Musculoskeletal:      Right lower leg: No edema.      Left lower leg: No edema.   Neurological:      Mental Status: She is alert and oriented to person, place, and time. Mental status is at baseline.      Cranial Nerves: No cranial nerve deficit.   Psychiatric:         Mood and Affect: Mood normal.         Behavior: Behavior normal.             Significant Labs: All pertinent labs within the past 24 hours have been reviewed.    Significant Imaging: I have reviewed all pertinent imaging results/findings within the past 24 hours.    Assessment and Plan     * Anginal equivalent  Seen by cardiology - her intermittent dyspnea symptoms are considered an anginal equivalent.  Recommendation to titrate Imdur dosage to 60mg. BP now borderline hypotensive with this increased. Patient took AM imdur 30mg, was hypertensive on presentation - gave 30mg imdur 1/19 and started 60mg in AM.  Recurrent  "chest pain on 1/31 with trop of 71. Discussed with cardiology. Continuing medical management.   - cardiology signed off  - PT/OT consulted  - increase imdur to 90mg on 2/1  - increase atorvastatin to 80mg   - ranolazine discontinued due to renal function    Coronary artery disease involving native coronary artery of native heart without angina pectoris  Patient with known CAD , which is uncontrolled Will continue ASA, Plavix, and Statin and monitor for S/Sx of angina/ACS. Continue to monitor on telemetry.     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 Pt having 2:1 AV block atrial rates in 80s and HR in low 40s.  During cardiology evaluation pt with variable 2nd degree AV block. She is not on any AV node blocking agents. Discussed with cards/EP  - continue telemetry monitoring.     Per Dr. Cummings on admit: "Tonight discussed code status with patient - she has been Full Code and DNR during last 2 admits, initially indicated DNR but also states she would want resuscitative measures if she might not incur injury, reviewed probabilities with pt given advanced age and chronic medical conditiions indicated to her that under true cardio-respiratory arrest she would be left with subsequent injury and overall low likelihood of survival to discharge 5-10%.  Give this bradyarrhythmia I did discuss if patients HR was lower if she would want invasive measures such as transvenous pacing or transcutaneous pacing performed she responded in affirmative,  will place FULL CODE for now."       Influenza A  Fever, cough, malaise. Flu A positive 2/3. CXR clear  - tamiflu started 2/3  - supportive care      Febrile  Temp to 101 on 2/1. No leukocytosis or tachycardia. + cough, wheeze > see flu       Moderate protein-calorie malnutrition  Nutrition consulted. Most recent weight and BMI monitored-     Measurements:  Wt Readings from Last 1 Encounters:   02/03/25 73.5 kg (162 lb 0.6 oz) "   Body mass index is 25.38 kg/m².    Patient has been screened and assessed by RD.    Malnutrition Type:  Context: acute illness or injury  Level: moderate    Malnutrition Characteristic Summary:  Weight Loss (Malnutrition): 1-2% in 1 week (-4% x 2 weeks)  Subcutaneous Fat (Malnutrition): mild depletion  Muscle Mass (Malnutrition): mild depletion    Interventions/Recommendations (treatment strategy):  1.)      Debility  Patient with Acute debility due to age-related physical debility and cardiac disease . The patient's latest AMPAC (Activity Measure for Post Acute Care) Score is listed below.    AM-PAC Score - How much help does the patient need for each activity listed  Basic Mobility Total Score: 16  Turning over in bed (including adjusting bedclothes, sheets and blankets)?: A little  Sitting down on and standing up from a chair with arms (e.g., wheelchair, bedside commode, etc.): A little  Moving from lying on back to sitting on the side of the bed?: A little  Moving to and from a bed to a chair (including a wheelchair)?: A little  Need to walk in hospital room?: A little  Climbing 3-5 steps with a railing?: Unable    Plan  - Progressive mobility protocol initated  - PT/OT consulted  - Fall precautions in place          Counseling regarding goals of care  Code status discussion as per AV block problem.     Patient has living will and HCPOA documents uploaded, she does not have an LAPOST completed. Would recommend completion of LAPOST prior to hospital discharge.       Confusion  ED indicated pt referred for admit for confusion.  By CAM-ICU testing pt does not have delirium, some disorientation. She may have some underlying cognitive deficits based on grand-daughters description. Appears pt with altered sleep wake cycles more recently - schedule melatonin tonight to help normalize sleep-wake cycle, delirium precautions.   Repeat UA appears normal and no persistent symptoms or concerns for ongoing Acute cystitis  adequately treated.   - delirium precautions      Acute cystitis without hematuria  Repeat UA appears normal and no persistent symptoms or concerns for ongoing Acute cystitis adequately treated.       CKD (chronic kidney disease), stage IV  Creatine stable for now. BMP reviewed- noted Estimated Creatinine Clearance: 19.8 mL/min (A) (based on SCr of 1.8 mg/dL (H)). according to latest data. Based on current GFR, CKD stage is stage 4 - GFR 15-29.  Monitor UOP and serial BMP and adjust therapy as needed. Renally dose meds. Avoid nephrotoxic medications and procedures.    Continue sodium bicarbonate and farxiga    Essential hypertension  Patient's blood pressure range in the last 24 hours was: BP  Min: 115/62  Max: 152/72.The patient's inpatient anti-hypertensive regimen is listed below:  Current Antihypertensives  nitroGLYCERIN SL tablet 0.4 mg, Every 5 min PRN, Sublingual  amLODIPine tablet 5 mg, Daily, Oral  losartan tablet 25 mg, Daily, Oral  amlodipine (NORVASC) tablet, Daily, Oral  isosorbide mononitrate (IMDUR) 24 hr tablet, Daily, Oral  isosorbide mononitrate 24 hr tablet 90 mg, Daily, Oral    Plan  - BP is controlled, no changes needed to their regimen  - adjustments to BP meds     PAF (paroxysmal atrial fibrillation)  Patient has paroxysmal (<7 days) atrial fibrillation. Patient is currently in sinus rhythm. [id number] Score: 4. The patients heart rate in the last 24 hours is as follows:  Pulse  Min: 73  Max: 93     Antiarrhythmics       Anticoagulants  heparin (porcine) injection 5,000 Units, Every 8 hours, Subcutaneous    Plan  - Replete lytes with a goal of K>4, Mg >2  - Patient is not anticoagulated due to unclear etiology  - Patient's afib is currently controlled          VTE Risk Mitigation (From admission, onward)           Ordered     heparin (porcine) injection 5,000 Units  Every 8 hours         01/20/25 0003     IP VTE HIGH RISK PATIENT  Once         01/20/25 0003     Place sequential compression  device  Until discontinued         01/20/25 0003                    Discharge Planning   ROSEMARIE: 2/5/2025     Code Status: Full Code   Medical Readiness for Discharge Date:   Discharge Plan A: Skilled Nursing Facility                Please place Justification for DME        Marely Perez MD  Department of Hospital Medicine   [facility] - [facility]

## 2025-02-03 NOTE — ASSESSMENT & PLAN NOTE
Patient has paroxysmal (<7 days) atrial fibrillation. Patient is currently in sinus rhythm. [id number] Score: 4. The patients heart rate in the last 24 hours is as follows:  Pulse  Min: 73  Max: 93     Antiarrhythmics       Anticoagulants  heparin (porcine) injection 5,000 Units, Every 8 hours, Subcutaneous    Plan  - Replete lytes with a goal of K>4, Mg >2  - Patient is not anticoagulated due to unclear etiology  - Patient's afib is currently controlled

## 2025-02-03 NOTE — NURSING
During am vitals patient O2 dropped to 85%, denies SOB. After several deep breaths patient was still 88%. Placed on 2L NC and improved to 92%. Patient has increased coughing and is able to clear thin clear to cream sputum. Mild temp 99.9. No other needs at this time bed in low locked position call light in reach.

## 2025-02-03 NOTE — PT/OT/SLP PROGRESS
Occupational Therapy      Patient Name:  Anahy Evans   MRN:  [id number]    Patient not seen today secondary to  (pt declined session (9028) due to feeling poorly (from flu) requesting OT come back tomorrow.). Will follow-up as appropriate.    2/3/2025

## 2025-02-03 NOTE — ASSESSMENT & PLAN NOTE
Patient's blood pressure range in the last 24 hours was: BP  Min: 115/62  Max: 152/72.The patient's inpatient anti-hypertensive regimen is listed below:  Current Antihypertensives  nitroGLYCERIN SL tablet 0.4 mg, Every 5 min PRN, Sublingual  amLODIPine tablet 5 mg, Daily, Oral  losartan tablet 25 mg, Daily, Oral  amlodipine (NORVASC) tablet, Daily, Oral  isosorbide mononitrate (IMDUR) 24 hr tablet, Daily, Oral  isosorbide mononitrate 24 hr tablet 90 mg, Daily, Oral    Plan  - BP is controlled, no changes needed to their regimen  - adjustments to BP meds

## 2025-02-03 NOTE — PLAN OF CARE
[facility]  Discharge Reassessment    Primary Care Provider: Elena Cabrera MD    Expected Discharge Date: 2/5/2025    Reassessment (most recent)       Discharge Reassessment - 02/03/25 1313          Discharge Reassessment    Assessment Type Discharge Planning Reassessment     Did the patient's condition or plan change since previous assessment? Yes     Discharge Plan discussed with: Patient;Adult children     Communicated ROSEMARIE with patient/caregiver Date not available/Unable to determine     Discharge Plan A Skilled Nursing Facility     Discharge Plan B Skilled Nursing Facility     DME Needed Upon Discharge  none     Why the patient remains in the hospital Requires continued medical care;Placement issues        Post-Acute Status    Post-Acute Authorization Placement     Post-Acute Placement Status Pending payor review/awaiting authorization (if required)     Discharge Delays Post-Acute Set-up                   Pt is pending fast appeal decision from [facility] for SNF placement. Ronnie URBINA updated on pt status. Pt tested positive for flu 2/2/25. Will continue to update plan as needed.  Ryan Quick, RN,BSN

## 2025-02-03 NOTE — PT/OT/SLP PROGRESS
Physical Therapy Treatment    Patient Name:  Anahy Evans   MRN:  [id number]    Recommendations:     Discharge Recommendations: Moderate Intensity Therapy  Discharge Equipment Recommendations: none  Barriers to discharge: Inaccessible home    Assessment:     Anahy Evans is a 89 y.o. female admitted with a medical diagnosis of Anginal equivalent.  She presents with the following impairments/functional limitations: weakness, impaired endurance, impaired self care skills, impaired functional mobility, impaired cardiopulmonary response to activity, decreased safety awareness, decreased lower extremity function Pt tolerated treatment session fairly well today. Pt requiring little to no assistance for bed mobility, transfers and gait training. During gait trial pt becoming increasingly dizzy, and pt reporting needing to sit. Patient remains appropriate for continued skilled services within the acute environment and goals remain appropriate.   .    Rehab Prognosis: Good; patient would benefit from acute skilled PT services to address these deficits and reach maximum level of function.    Recent Surgery: * No surgery found *      Plan:     During this hospitalization, patient to be seen 4 x/week to address the identified rehab impairments via gait training, therapeutic activities, therapeutic exercises and progress toward the following goals:    Plan of Care Expires:  02/23/25    Subjective     Chief Complaint: Dizziness   Patient/Family Comments/goals: pt agreeable to PT   Pain/Comfort:  Pain Rating 1: 0/10      Objective:     Communicated with Rn prior to session.  Patient found supine with oxygen, telemetry, PureWick upon PT entry to room.     General Precautions: Standard, fall  Orthopedic Precautions: N/A  Braces: N/A  Respiratory Status: Nasal cannula     Functional Mobility:  Bed Mobility:     Supine to Sit: stand by assistance  Eob sitting: SBA     Transfers:     Sit to Stand x 2 (bed and chair) :  contact guard  assistance with rolling walker  Bed to Chair: contact guard assistance with  rolling walker  using  Step Transfer  Gait: 5 ft CGA with RW     Pt performed 10 repetitions of seated B LE exercises consisting of: Marching, LAQ, ABD/ADD, heel raises, and toe raises.   Pt performed 4 repetitions of standing B LE exercises consisting of: Marching  Performed in preparation for stair training        AM-PAC 6 CLICK MOBILITY  Turning over in bed (including adjusting bedclothes, sheets and blankets)?: 3  Sitting down on and standing up from a chair with arms (e.g., wheelchair, bedside commode, etc.): 3  Moving from lying on back to sitting on the side of the bed?: 3  Moving to and from a bed to a chair (including a wheelchair)?: 3  Need to walk in hospital room?: 3  Climbing 3-5 steps with a railing?: 2  Basic Mobility Total Score: 17       Treatment & Education:  Therapist provided instruction and educated for safety during transfers and gait training. As well as proper body mechanics, energy conservation, and fall prevention strategies during tasks listed above, and the effects of prolonged immobility and the importance of performing EOB/OOB activity and exercises to promote healing and reduce recovery time.       Patient left up in chair with all lines intact, call button in reach, and Rn notified..    GOALS:   Multidisciplinary Problems       Physical Therapy Goals          Problem: Physical Therapy    Goal Priority Disciplines Outcome Interventions   Physical Therapy Goal     PT, PT/OT Progressing    Description: Goals to be met by: 2/10     Patient will increase functional independence with mobility by performin. Supine to sit with Modified [place]  2. Sit to supine with Modified [place]  3. Sit to stand transfer with Modified [place]  4. Gait  x 200 feet with Supervision using LRAD.   5. Ascend/descend 4 stair with left Handrails Contact Guard Assistance using LRAD.                          DME  Justifications:  No DME recommended requiring DME justifications    Time Tracking:     PT Received On: 02/03/25  PT Start Time: 1003     PT Stop Time: 1022  PT Total Time (min): 19 min     Billable Minutes: Therapeutic Exercise 19    Treatment Type: Treatment  PT/PTA: PTA     Number of PTA visits since last PT visit: 1 02/03/2025

## 2025-02-03 NOTE — ASSESSMENT & PLAN NOTE
Nutrition consulted. Most recent weight and BMI monitored-     Measurements:  Wt Readings from Last 1 Encounters:   02/03/25 73.5 kg (162 lb 0.6 oz)   Body mass index is 25.38 kg/m².    Patient has been screened and assessed by RD.    Malnutrition Type:  Context: acute illness or injury  Level: moderate    Malnutrition Characteristic Summary:  Weight Loss (Malnutrition): 1-2% in 1 week (-4% x 2 weeks)  Subcutaneous Fat (Malnutrition): mild depletion  Muscle Mass (Malnutrition): mild depletion    Interventions/Recommendations (treatment strategy):  1.)

## 2025-02-03 NOTE — PLAN OF CARE
Received TC from [facility], pt won the fast appeal, ref# [id number]. CM alerted [facility] via Epic and MD via secure chat. TC to pt's granddaughter Ziggy to report.   Will continue to update plan as needed.  Ryan Quick RN,BSN

## 2025-02-04 LAB
BACTERIA #/AREA URNS AUTO: ABNORMAL /HPF
BILIRUB UR QL STRIP: NEGATIVE
CLARITY UR REFRACT.AUTO: CLEAR
COLOR UR AUTO: YELLOW
GLUCOSE UR QL STRIP: ABNORMAL
HGB UR QL STRIP: NEGATIVE
HYALINE CASTS UR QL AUTO: 0 /LPF
KETONES UR QL STRIP: NEGATIVE
LEUKOCYTE ESTERASE UR QL STRIP: NEGATIVE
MICROSCOPIC COMMENT: ABNORMAL
NITRITE UR QL STRIP: NEGATIVE
PH UR STRIP: 7 [PH] (ref 5–8)
PROT UR QL STRIP: ABNORMAL
RBC #/AREA URNS AUTO: 2 /HPF (ref 0–4)
SP GR UR STRIP: 1.01 (ref 1–1.03)
SQUAMOUS #/AREA URNS AUTO: 0 /HPF
URN SPEC COLLECT METH UR: ABNORMAL
WBC #/AREA URNS AUTO: 1 /HPF (ref 0–5)
YEAST UR QL AUTO: ABNORMAL

## 2025-02-04 PROCEDURE — 25000003 PHARM REV CODE 250: Performed by: HOSPITALIST

## 2025-02-04 PROCEDURE — 81001 URINALYSIS AUTO W/SCOPE: CPT | Performed by: STUDENT IN AN ORGANIZED HEALTH CARE EDUCATION/TRAINING PROGRAM

## 2025-02-04 PROCEDURE — 63600175 PHARM REV CODE 636 W HCPCS: Performed by: HOSPITALIST

## 2025-02-04 PROCEDURE — 27000207 HC ISOLATION

## 2025-02-04 PROCEDURE — 97110 THERAPEUTIC EXERCISES: CPT

## 2025-02-04 PROCEDURE — 97530 THERAPEUTIC ACTIVITIES: CPT | Mod: CQ

## 2025-02-04 PROCEDURE — 97110 THERAPEUTIC EXERCISES: CPT | Mod: CQ

## 2025-02-04 PROCEDURE — 21400001 HC TELEMETRY ROOM

## 2025-02-04 PROCEDURE — 25000003 PHARM REV CODE 250: Performed by: STUDENT IN AN ORGANIZED HEALTH CARE EDUCATION/TRAINING PROGRAM

## 2025-02-04 RX ADMIN — FOLIC ACID 1 MG: 1 TABLET ORAL at 08:02

## 2025-02-04 RX ADMIN — HEPARIN SODIUM 5000 UNITS: 5000 INJECTION INTRAVENOUS; SUBCUTANEOUS at 06:02

## 2025-02-04 RX ADMIN — OSELTAMIVIR PHOSPHATE 30 MG: 30 CAPSULE ORAL at 08:02

## 2025-02-04 RX ADMIN — LOSARTAN POTASSIUM 25 MG: 25 TABLET, FILM COATED ORAL at 08:02

## 2025-02-04 RX ADMIN — FLUTICASONE PROPIONATE 100 MCG: 50 SPRAY, METERED NASAL at 08:02

## 2025-02-04 RX ADMIN — CHOLECALCIFEROL TAB 25 MCG (1000 UNIT) 2000 UNITS: 25 TAB at 08:02

## 2025-02-04 RX ADMIN — PANTOPRAZOLE SODIUM 20 MG: 20 TABLET, DELAYED RELEASE ORAL at 06:02

## 2025-02-04 RX ADMIN — ALLOPURINOL 50 MG: 300 TABLET ORAL at 08:02

## 2025-02-04 RX ADMIN — ISOSORBIDE MONONITRATE 90 MG: 60 TABLET, EXTENDED RELEASE ORAL at 08:02

## 2025-02-04 RX ADMIN — ASPIRIN 81 MG: 81 TABLET, COATED ORAL at 08:02

## 2025-02-04 RX ADMIN — Medication 6 MG: at 08:02

## 2025-02-04 RX ADMIN — SODIUM BICARBONATE 650 MG TABLET 650 MG: at 08:02

## 2025-02-04 RX ADMIN — HEPARIN SODIUM 5000 UNITS: 5000 INJECTION INTRAVENOUS; SUBCUTANEOUS at 08:02

## 2025-02-04 RX ADMIN — ATORVASTATIN CALCIUM 80 MG: 40 TABLET, FILM COATED ORAL at 08:02

## 2025-02-04 RX ADMIN — CLOPIDOGREL BISULFATE 75 MG: 75 TABLET ORAL at 08:02

## 2025-02-04 RX ADMIN — DAPAGLIFLOZIN 10 MG: 10 TABLET, FILM COATED ORAL at 08:02

## 2025-02-04 RX ADMIN — HEPARIN SODIUM 5000 UNITS: 5000 INJECTION INTRAVENOUS; SUBCUTANEOUS at 03:02

## 2025-02-04 RX ADMIN — AMLODIPINE BESYLATE 5 MG: 5 TABLET ORAL at 08:02

## 2025-02-04 NOTE — PLAN OF CARE
Problem: Occupational Therapy  Goal: Occupational Therapy Goal  Description: Goals to be met by: 2/23/25     Patient will increase functional independence with ADLs by performing:    UE Dressing with Modified [place].  LE Dressing with Modified [place].  Grooming while standing with Modified [place].  Toileting from toilet with Modified [place] for hygiene and clothing management.   Supine to sit with Modified [place].  Step transfer with Modified [place]  Toilet transfer to toilet with Modified [place].    Outcome: Progressing     Goals remain appropriate

## 2025-02-04 NOTE — PROGRESS NOTES
"[facility] [address] [place] [address] Medicine  Progress Note    Patient Name: Anahy Evans  MRN: [id number]  Patient Class: IP- Inpatient   Admission Date: 1/19/2025  Length of Stay: 14 days  Attending Physician: Marely Perez MD  Primary Care Provider: Elena Cabrera MD        Subjective     Principal Problem:Anginal equivalent        HPI:  89 y/o AAF w/ CAD hx PCI, CKD 4, HTN, Afib, AAA, 2nd degree AV Block presents to the ED with complaints of dyspnea.     She was recently admitted to [facility] from 1/13-1/17 per DC summary "Patient admitted for dyspnea on exertion. V/Q scan was completed - no pulmonary embolism. Echo ordered - regional wall motion abnormalities present. Low normal systolic ejection fraction 50-55%. Cardiology was consulted - feel origin of TAYLOR may be cardiac in nature. PET stress done 1/16. Two perfusion abnormalities seen. Interventional cardiology recommending medical management and close follow-up. Amlodipine increased, losartan decreased, and started on imdur."    She returns to ED with concerns of dyspnea on exertion.  She discharged to home after last admission, lives with her daughter.  Her grand-daughter accompanies her today and helps provide history.  Since discharge patient has had limited ADL capability, pt requires assistance w/ ambulation and use of walker, patient has home purewick and bedside commode.  She requires assistance with toileting, bathing, clothing.  She is adherent to medications. Yesterday patient appeared weak and when sitting upright had poor trunk stability would slump over and had poor appetite did not eat much and was noted with intermittent confusion.  Her confusion is described as poor short term memory, will intermittently forget recent events, granddaughter notes that patient usually answers orientation questions appropriately when evaluated by medical teams, but might forget later what was discussed or why she is in the hospital.    She was treated for " acute cystitis during last admit, pan-sensitive E.coli on urine culture received ceftriaxone inpatient and not discharged on any oral antibiotics.     She does report poor sleep, has had difficulty sleeping at night and will nap multiple times per day. No reported non-redirectable agitation.   Prior to this month, grand-daughter notes patient was performing more ADL on her own.  Patient had declined referral to SNF with recent admits, last PT/OT recs for low-intensity therapy, pt is more open to post-acute care if recommended.     In ED tonight pt with bradycardia noted, cardiology consulted, no acute medical management of her bradycardia recommended, she has 2:1 AV block on review of EKG/telemetry tonight, hx of Mobitz 1 2nd Deg AV block in past.       Overview/Hospital Course:  Evaluated by cardiology and imdur increased. Cardiology recommends medical management, continue Imdur, DAPT, and consider Ranolazine for angina. Imdur increased to 60mg.  Patient started ranolazine as per cardiology recs, then later discontinued due to renal function. Patient worked with PT/OT again. Patient is interested in placement option for therapy.  Patient is stable and pending SNF placement.  P2[facility] denial upheld 1/29. Family submitted for fast appeal > successful. Patient has placement at SNF now pending quarantine period for flu.     Recurrent chest pain on 1/31 with slight elevation in trop. Cardiology consulted. Imdur and atorvastatin increased.     Fevering since 2/1. CXR clear. UA negative. +flu. Started on tamiflu 2/3. New oxygen requirement secondary to URI.         Interval History: No acute events overnight. Patient with muscle aches and malaise. Denies chest pain. Wheezing improved.     Review of Systems  Objective:     Vital Signs (Most Recent):  Temp: 98.8 °F (37.1 °C) (02/04/25 0659)  Pulse: (!) 57 (02/04/25 0659)  Resp: 18 (02/04/25 0659)  BP: 139/65 (02/04/25 0659)  SpO2: 97 % (02/04/25 0659) Vital Signs (24h  Range):  Temp:  [97.6 °F (36.4 °C)-99.4 °F (37.4 °C)] 98.8 °F (37.1 °C)  Pulse:  [57-87] 57  Resp:  [17-20] 18  SpO2:  [95 %-98 %] 97 %  BP: (115-145)/(62-69) 139/65     Weight: 73.5 kg (162 lb 0.6 oz)  Body mass index is 25.38 kg/m².    Intake/Output Summary (Last 24 hours) at 2/4/2025 0932  Last data filed at 2/4/2025 0804  Gross per 24 hour   Intake --   Output 900 ml   Net -900 ml         Physical Exam  Vitals and nursing note reviewed.   Constitutional:       General: She is not in acute distress.     Appearance: She is not ill-appearing.   Eyes:      General: No scleral icterus.  Cardiovascular:      Rate and Rhythm: Normal rate.      Heart sounds: Murmur heard.   Pulmonary:      Effort: Pulmonary effort is normal. No respiratory distress.      Breath sounds: No wheezing.   Abdominal:      General: Abdomen is flat. There is no distension.      Palpations: Abdomen is soft.      Tenderness: There is no abdominal tenderness. There is no guarding.   Musculoskeletal:      Right lower leg: No edema.      Left lower leg: No edema.   Neurological:      Mental Status: She is alert and oriented to person, place, and time. Mental status is at baseline.      Cranial Nerves: No cranial nerve deficit.   Psychiatric:         Mood and Affect: Mood normal.         Behavior: Behavior normal.             Significant Labs: All pertinent labs within the past 24 hours have been reviewed.    Significant Imaging: I have reviewed all pertinent imaging results/findings within the past 24 hours.    Assessment and Plan     * Anginal equivalent  Seen by cardiology - her intermittent dyspnea symptoms are considered an anginal equivalent.  Recommendation to titrate Imdur dosage to 60mg. BP now borderline hypotensive with this increased. Patient took AM imdur 30mg, was hypertensive on presentation - gave 30mg imdur 1/19 and started 60mg in AM.  Recurrent chest pain on 1/31 with trop of 71. Discussed with cardiology. Continuing medical  "management.   - cardiology signed off  - PT/OT consulted  - increase imdur to 90mg on 2/1  - increase atorvastatin to 80mg   - ranolazine discontinued due to renal function    Coronary artery disease involving native coronary artery of native heart without angina pectoris  Patient with known CAD , which is uncontrolled Will continue ASA, Plavix, and Statin and monitor for S/Sx of angina/ACS. Continue to monitor on telemetry.     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 Pt having 2:1 AV block atrial rates in 80s and HR in low 40s.  During cardiology evaluation pt with variable 2nd degree AV block. She is not on any AV node blocking agents. Discussed with cards/EP  - continue telemetry monitoring.     Per Dr. Cummings on admit: "Tonight discussed code status with patient - she has been Full Code and DNR during last 2 admits, initially indicated DNR but also states she would want resuscitative measures if she might not incur injury, reviewed probabilities with pt given advanced age and chronic medical conditiions indicated to her that under true cardio-respiratory arrest she would be left with subsequent injury and overall low likelihood of survival to discharge 5-10%.  Give this bradyarrhythmia I did discuss if patients HR was lower if she would want invasive measures such as transvenous pacing or transcutaneous pacing performed she responded in affirmative,  will place FULL CODE for now."       Influenza A  Fever, cough, malaise. Flu A positive 2/3. CXR clear  - tamiflu started 2/3  - supportive care      Febrile  Temp to 101 on 2/1. No leukocytosis or tachycardia. + cough, wheeze > see flu       Moderate protein-calorie malnutrition  Nutrition consulted. Most recent weight and BMI monitored-     Measurements:  Wt Readings from Last 1 Encounters:   02/03/25 73.5 kg (162 lb 0.6 oz)   Body mass index is 25.38 kg/m².    Patient has been screened and assessed by " RD.    Malnutrition Type:  Context: acute illness or injury  Level: moderate    Malnutrition Characteristic Summary:  Weight Loss (Malnutrition): 1-2% in 1 week (-4% x 2 weeks)  Subcutaneous Fat (Malnutrition): mild depletion  Muscle Mass (Malnutrition): mild depletion    Interventions/Recommendations (treatment strategy):  1.)      Debility  Patient with Acute debility due to age-related physical debility and cardiac disease . The patient's latest AMPAC (Activity Measure for Post Acute Care) Score is listed below.    AM-PAC Score - How much help does the patient need for each activity listed  Basic Mobility Total Score: 17  Turning over in bed (including adjusting bedclothes, sheets and blankets)?: A little  Sitting down on and standing up from a chair with arms (e.g., wheelchair, bedside commode, etc.): A little  Moving from lying on back to sitting on the side of the bed?: A little  Moving to and from a bed to a chair (including a wheelchair)?: A little  Need to walk in hospital room?: A little  Climbing 3-5 steps with a railing?: A lot    Plan  - Progressive mobility protocol initated  - PT/OT consulted  - Fall precautions in place          Counseling regarding goals of care  Code status discussion as per AV block problem.     Patient has living will and HCPOA documents uploaded, she does not have an LAPOST completed. Would recommend completion of LAPOST prior to hospital discharge.       Confusion  ED indicated pt referred for admit for confusion.  By CAM-ICU testing pt does not have delirium, some disorientation. She may have some underlying cognitive deficits based on grand-daughters description. Appears pt with altered sleep wake cycles more recently - schedule melatonin tonight to help normalize sleep-wake cycle, delirium precautions.   Repeat UA appears normal and no persistent symptoms or concerns for ongoing Acute cystitis adequately treated.   - delirium precautions      Acute cystitis without  hematuria  Repeat UA appears normal and no persistent symptoms or concerns for ongoing Acute cystitis adequately treated.       CKD (chronic kidney disease), stage IV  Creatine stable for now. BMP reviewed- noted Estimated Creatinine Clearance: 19.8 mL/min (A) (based on SCr of 1.8 mg/dL (H)). according to latest data. Based on current GFR, CKD stage is stage 4 - GFR 15-29.  Monitor UOP and serial BMP and adjust therapy as needed. Renally dose meds. Avoid nephrotoxic medications and procedures.    Continue sodium bicarbonate and farxiga    Essential hypertension  Patient's blood pressure range in the last 24 hours was: BP  Min: 115/62  Max: 145/69.The patient's inpatient anti-hypertensive regimen is listed below:  Current Antihypertensives  nitroGLYCERIN SL tablet 0.4 mg, Every 5 min PRN, Sublingual  amLODIPine tablet 5 mg, Daily, Oral  losartan tablet 25 mg, Daily, Oral  amlodipine (NORVASC) tablet, Daily, Oral  isosorbide mononitrate 24 hr tablet 90 mg, Daily, Oral  isosorbide mononitrate (IMDUR) 24 hr tablet, Daily, Oral    Plan  - BP is controlled, no changes needed to their regimen  - adjustments to BP meds     PAF (paroxysmal atrial fibrillation)  Patient has paroxysmal (<7 days) atrial fibrillation. Patient is currently in sinus rhythm. [id number] Score: 4. The patients heart rate in the last 24 hours is as follows:  Pulse  Min: 57  Max: 87     Antiarrhythmics       Anticoagulants  heparin (porcine) injection 5,000 Units, Every 8 hours, Subcutaneous    Plan  - Replete lytes with a goal of K>4, Mg >2  - Patient is not anticoagulated due to unclear etiology  - Patient's afib is currently controlled          VTE Risk Mitigation (From admission, onward)           Ordered     heparin (porcine) injection 5,000 Units  Every 8 hours         01/20/25 0003     IP VTE HIGH RISK PATIENT  Once         01/20/25 0003     Place sequential compression device  Until discontinued         01/20/25 0003                     Discharge Planning   ROSEMARIE: 2/5/2025     Code Status: Full Code   Medical Readiness for Discharge Date:   Discharge Plan A: Skilled Nursing Facility   Discharge Delays: (!) Post-Acute Set-up            Please place Justification for DME        Marely Perez MD  Department of Hospital Medicine   [facility]

## 2025-02-04 NOTE — PT/OT/SLP PROGRESS
Physical Therapy Treatment    Patient Name:  Anahy Evans   MRN:  [id number]    Recommendations:     Discharge Recommendations: Moderate Intensity Therapy  Discharge Equipment Recommendations: none  Barriers to discharge: None    Assessment:     Anahy Evans is a 89 y.o. female admitted with a medical diagnosis of Anginal equivalent.  She presents with the following impairments/functional limitations: weakness, impaired endurance, impaired self care skills, pain, decreased safety awareness, decreased lower extremity function, decreased upper extremity function, impaired cardiopulmonary response to activity Pt tolerated treatment session fairly well today. Pt with much more weakness and fatigue during today's session, compared to last session. Pt also reporting increasing dizziness while seated EOB, ultimately pt requesting to return supine. Patient remains appropriate for continued skilled services within the acute environment and goals remain appropriate.   .    Rehab Prognosis: Good; patient would benefit from acute skilled PT services to address these deficits and reach maximum level of function.    Recent Surgery: * No surgery found *      Plan:     During this hospitalization, patient to be seen 4 x/week to address the identified rehab impairments via gait training, therapeutic activities, therapeutic exercises and progress toward the following goals:    Plan of Care Expires:  02/23/25    Subjective     Chief Complaint: dizziness and L thigh pain   Patient/Family Comments/goals: Pt agreeable to PT   Pain/Comfort:  Pain Rating 1:  (not rated)  Location - Side 1: Left  Location - Orientation 1: generalized  Location 1: thigh  Pain Addressed 1: Reposition, Distraction  Pain Rating Post-Intervention 1:  (not rated)      Objective:     Communicated with Rn prior to session.  Patient found supine with telemetry, oxygen, PureWick upon PT entry to room.     General Precautions: Standard, fall  Orthopedic  Precautions: N/A  Braces: N/A  Respiratory Status: Nasal cannula     Functional Mobility:  Bed Mobility:     Scooting L: contact guard assistance  Supine to Sit: minimum assistance  EOB sitting: CGA   Sit to Supine: contact guard assistance    Pt performed 10 repetitions of supine B LE exercises consisting of: Marching, resisted leg kicks, hip ABD/ADD, and AP.         AM-PAC 6 CLICK MOBILITY  Turning over in bed (including adjusting bedclothes, sheets and blankets)?: 3  Sitting down on and standing up from a chair with arms (e.g., wheelchair, bedside commode, etc.): 3  Moving from lying on back to sitting on the side of the bed?: 3  Moving to and from a bed to a chair (including a wheelchair)?: 3  Need to walk in hospital room?: 3  Climbing 3-5 steps with a railing?: 2  Basic Mobility Total Score: 17       Treatment & Education:  Therapist provided instruction and educated for safety during bed mobility. As well as proper body mechanics, energy conservation, and fall prevention strategies during tasks listed above, and the effects of prolonged immobility and the importance of performing EOB/OOB activity and exercises to promote healing and reduce recovery time.       Patient left supine with all lines intact, call button in reach, and Rn notified..    GOALS:   Multidisciplinary Problems       Physical Therapy Goals          Problem: Physical Therapy    Goal Priority Disciplines Outcome Interventions   Physical Therapy Goal     PT, PT/OT Progressing    Description: Goals to be met by: 2/10     Patient will increase functional independence with mobility by performin. Supine to sit with Modified Hosmer  2. Sit to supine with Modified Hosmer  3. Sit to stand transfer with Modified Hosmer  4. Gait  x 200 feet with Supervision using LRAD.   5. Ascend/descend 4 stair with left Handrails Contact Guard Assistance using LRAD.                          DME Justifications:  No DME recommended requiring  DME justifications    Time Tracking:     PT Received On: 02/04/25  PT Start Time: 0913     PT Stop Time: 0937  PT Total Time (min): 24 min     Billable Minutes: Therapeutic Activity 14 and Therapeutic Exercise 10    Treatment Type: Treatment  PT/PTA: PTA     Number of PTA visits since last PT visit: 2     02/04/2025   independent

## 2025-02-04 NOTE — SUBJECTIVE & OBJECTIVE
Interval History: No acute events overnight. Patient with muscle aches and malaise. Denies chest pain. Wheezing improved.     Review of Systems  Objective:     Vital Signs (Most Recent):  Temp: 98.8 °F (37.1 °C) (02/04/25 0659)  Pulse: (!) 57 (02/04/25 0659)  Resp: 18 (02/04/25 0659)  BP: 139/65 (02/04/25 0659)  SpO2: 97 % (02/04/25 0659) Vital Signs (24h Range):  Temp:  [97.6 °F (36.4 °C)-99.4 °F (37.4 °C)] 98.8 °F (37.1 °C)  Pulse:  [57-87] 57  Resp:  [17-20] 18  SpO2:  [95 %-98 %] 97 %  BP: (115-145)/(62-69) 139/65     Weight: 73.5 kg (162 lb 0.6 oz)  Body mass index is 25.38 kg/m².    Intake/Output Summary (Last 24 hours) at 2/4/2025 0932  Last data filed at 2/4/2025 0804  Gross per 24 hour   Intake --   Output 900 ml   Net -900 ml         Physical Exam  Vitals and nursing note reviewed.   Constitutional:       General: She is not in acute distress.     Appearance: She is not ill-appearing.   Eyes:      General: No scleral icterus.  Cardiovascular:      Rate and Rhythm: Normal rate.      Heart sounds: Murmur heard.   Pulmonary:      Effort: Pulmonary effort is normal. No respiratory distress.      Breath sounds: No wheezing.   Abdominal:      General: Abdomen is flat. There is no distension.      Palpations: Abdomen is soft.      Tenderness: There is no abdominal tenderness. There is no guarding.   Musculoskeletal:      Right lower leg: No edema.      Left lower leg: No edema.   Neurological:      Mental Status: She is alert and oriented to person, place, and time. Mental status is at baseline.      Cranial Nerves: No cranial nerve deficit.   Psychiatric:         Mood and Affect: Mood normal.         Behavior: Behavior normal.             Significant Labs: All pertinent labs within the past 24 hours have been reviewed.    Significant Imaging: I have reviewed all pertinent imaging results/findings within the past 24 hours.

## 2025-02-04 NOTE — ASSESSMENT & PLAN NOTE
Patient's blood pressure range in the last 24 hours was: BP  Min: 115/62  Max: 145/69.The patient's inpatient anti-hypertensive regimen is listed below:  Current Antihypertensives  nitroGLYCERIN SL tablet 0.4 mg, Every 5 min PRN, Sublingual  amLODIPine tablet 5 mg, Daily, Oral  losartan tablet 25 mg, Daily, Oral  amlodipine (NORVASC) tablet, Daily, Oral  isosorbide mononitrate 24 hr tablet 90 mg, Daily, Oral  isosorbide mononitrate (IMDUR) 24 hr tablet, Daily, Oral    Plan  - BP is controlled, no changes needed to their regimen  - adjustments to BP meds

## 2025-02-04 NOTE — PT/OT/SLP PROGRESS
Occupational Therapy   Treatment    Name: Anahy Evans  MRN: [id number]  Admitting Diagnosis:  Anginal equivalent       Recommendations:     Discharge Recommendations: Moderate Intensity Therapy  Discharge Equipment Recommendations:  none  Barriers to discharge:   (increased (A) required)    Assessment:     Anahy Evans is a 89 y.o. female with a medical diagnosis of Anginal equivalent.  She presents with fair participation and motivation.  Pt continues to be at risk for falls. Performance deficits affecting function are weakness, impaired endurance, impaired self care skills, impaired functional mobility, impaired balance, decreased safety awareness, impaired cardiopulmonary response to activity, decreased lower extremity function, decreased upper extremity function.     Rehab Prognosis:  Fair; patient would benefit from acute skilled OT services to address these deficits and reach maximum level of function.       Plan:     Patient to be seen 4 x/week to address the above listed problems via self-care/home management, therapeutic exercises, therapeutic activities, neuromuscular re-education  Plan of Care Expires: 02/23/25  Plan of Care Reviewed with: patient    Subjective     Chief Complaint: feeling poorly again today  Patient/Family Comments/goals: Pt reported that she was feeling pretty rough today.  Pain/Comfort:  Pain Rating 1:  (did not report)  Pain Rating Post-Intervention 1:  (did not report)    Objective:     Communicated with: RN prior to session.  Patient found supine with telemetry, oxygen, PureWick (no family present) upon OT entry to room.    General Precautions: Standard, fall, droplet    Orthopedic Precautions:N/A  Braces: N/A     Occupational Performance:       [facility] 6 Click ADL: 19    Treatment & Education:  Pt with difficulties per PTA during PTA session therefore provided in bed session on this date.  Pt agreeable to therex while HOB elevated in bed on this date.  Pt performed AROM exercises  with BUE in 3 planes x 2 sets x 10 reps each set while in bed with HOB elevated. Pt had no further questions & when asked whether there were any concerns pt reported none.      Patient left supine with all lines intact, call button in reach, bed alarm on, and RN notified    GOALS:   Multidisciplinary Problems       Occupational Therapy Goals          Problem: Occupational Therapy    Goal Priority Disciplines Outcome Interventions   Occupational Therapy Goal     OT, PT/OT Progressing    Description: Goals to be met by: 2/23/25     Patient will increase functional independence with ADLs by performing:    UE Dressing with Modified Benson.  LE Dressing with Modified Benson.  Grooming while standing with Modified Benson.  Toileting from toilet with Modified Benson for hygiene and clothing management.   Supine to sit with Modified Benson.  Step transfer with Modified Benson  Toilet transfer to toilet with Modified Benson.                           Time Tracking:     OT Date of Treatment: 02/04/25  OT Start Time: 1102  OT Stop Time: 1120  OT Total Time (min): 18 min    Billable Minutes:Therapeutic Exercise 18    OT/AMAURY: OT          2/4/2025

## 2025-02-04 NOTE — PLAN OF CARE
CM contacted [facility] to verify if pt needed to be quarantined due to positive flu test. Ariel Juarez confirmed that it was 6 days after positive test or 2/8/25 which is a Saturday so pt could be admitted on 1/10/25. Date confirmed pending bed availability. Team notified, pt and granddaughter notified.   Will continue to update plan as needed.  Ryan Quick RN,BSN

## 2025-02-04 NOTE — ASSESSMENT & PLAN NOTE
Patient has paroxysmal (<7 days) atrial fibrillation. Patient is currently in sinus rhythm. [id number] Score: 4. The patients heart rate in the last 24 hours is as follows:  Pulse  Min: 57  Max: 87     Antiarrhythmics       Anticoagulants  heparin (porcine) injection 5,000 Units, Every 8 hours, Subcutaneous    Plan  - Replete lytes with a goal of K>4, Mg >2  - Patient is not anticoagulated due to unclear etiology  - Patient's afib is currently controlled

## 2025-02-05 PROCEDURE — 25000003 PHARM REV CODE 250: Performed by: HOSPITALIST

## 2025-02-05 PROCEDURE — 27000221 HC OXYGEN, UP TO 24 HOURS

## 2025-02-05 PROCEDURE — 94761 N-INVAS EAR/PLS OXIMETRY MLT: CPT

## 2025-02-05 PROCEDURE — 27000207 HC ISOLATION

## 2025-02-05 PROCEDURE — 99900035 HC TECH TIME PER 15 MIN (STAT)

## 2025-02-05 PROCEDURE — 21400001 HC TELEMETRY ROOM

## 2025-02-05 PROCEDURE — 25000003 PHARM REV CODE 250: Performed by: STUDENT IN AN ORGANIZED HEALTH CARE EDUCATION/TRAINING PROGRAM

## 2025-02-05 PROCEDURE — 63600175 PHARM REV CODE 636 W HCPCS: Performed by: HOSPITALIST

## 2025-02-05 PROCEDURE — 97530 THERAPEUTIC ACTIVITIES: CPT

## 2025-02-05 RX ADMIN — FLUTICASONE PROPIONATE 100 MCG: 50 SPRAY, METERED NASAL at 09:02

## 2025-02-05 RX ADMIN — OSELTAMIVIR PHOSPHATE 30 MG: 30 CAPSULE ORAL at 09:02

## 2025-02-05 RX ADMIN — AMLODIPINE BESYLATE 5 MG: 5 TABLET ORAL at 09:02

## 2025-02-05 RX ADMIN — HEPARIN SODIUM 5000 UNITS: 5000 INJECTION INTRAVENOUS; SUBCUTANEOUS at 05:02

## 2025-02-05 RX ADMIN — HEPARIN SODIUM 5000 UNITS: 5000 INJECTION INTRAVENOUS; SUBCUTANEOUS at 09:02

## 2025-02-05 RX ADMIN — SODIUM BICARBONATE 650 MG TABLET 650 MG: at 09:02

## 2025-02-05 RX ADMIN — DAPAGLIFLOZIN 10 MG: 10 TABLET, FILM COATED ORAL at 09:02

## 2025-02-05 RX ADMIN — Medication 6 MG: at 09:02

## 2025-02-05 RX ADMIN — PANTOPRAZOLE SODIUM 20 MG: 20 TABLET, DELAYED RELEASE ORAL at 05:02

## 2025-02-05 RX ADMIN — HEPARIN SODIUM 5000 UNITS: 5000 INJECTION INTRAVENOUS; SUBCUTANEOUS at 03:02

## 2025-02-05 RX ADMIN — LOSARTAN POTASSIUM 25 MG: 25 TABLET, FILM COATED ORAL at 09:02

## 2025-02-05 RX ADMIN — ALLOPURINOL 50 MG: 300 TABLET ORAL at 09:02

## 2025-02-05 RX ADMIN — ISOSORBIDE MONONITRATE 90 MG: 60 TABLET, EXTENDED RELEASE ORAL at 09:02

## 2025-02-05 RX ADMIN — CHOLECALCIFEROL TAB 25 MCG (1000 UNIT) 2000 UNITS: 25 TAB at 09:02

## 2025-02-05 RX ADMIN — ATORVASTATIN CALCIUM 80 MG: 40 TABLET, FILM COATED ORAL at 09:02

## 2025-02-05 RX ADMIN — CLOPIDOGREL BISULFATE 75 MG: 75 TABLET ORAL at 09:02

## 2025-02-05 RX ADMIN — FOLIC ACID 1 MG: 1 TABLET ORAL at 09:02

## 2025-02-05 RX ADMIN — ASPIRIN 81 MG: 81 TABLET, COATED ORAL at 09:02

## 2025-02-05 NOTE — PROGRESS NOTES
"[facility] [address] [place] [address] Medicine  Progress Note    Patient Name: Anahy Evans  MRN: [id number]  Patient Class: IP- Inpatient   Admission Date: 1/19/2025  Length of Stay: 15 days  Attending Physician: Marely Perez MD  Primary Care Provider: Elena Cabrera MD        Subjective     Principal Problem:Anginal equivalent        HPI:  89 y/o AAF w/ CAD hx PCI, CKD 4, HTN, Afib, AAA, 2nd degree AV Block presents to the ED with complaints of dyspnea.     She was recently admitted to [facility] from 1/13-1/17 per DC summary "Patient admitted for dyspnea on exertion. V/Q scan was completed - no pulmonary embolism. Echo ordered - regional wall motion abnormalities present. Low normal systolic ejection fraction 50-55%. Cardiology was consulted - feel origin of TAYLOR may be cardiac in nature. PET stress done 1/16. Two perfusion abnormalities seen. Interventional cardiology recommending medical management and close follow-up. Amlodipine increased, losartan decreased, and started on imdur."    She returns to ED with concerns of dyspnea on exertion.  She discharged to home after last admission, lives with her daughter.  Her grand-daughter accompanies her today and helps provide history.  Since discharge patient has had limited ADL capability, pt requires assistance w/ ambulation and use of walker, patient has home purewick and bedside commode.  She requires assistance with toileting, bathing, clothing.  She is adherent to medications. Yesterday patient appeared weak and when sitting upright had poor trunk stability would slump over and had poor appetite did not eat much and was noted with intermittent confusion.  Her confusion is described as poor short term memory, will intermittently forget recent events, granddaughter notes that patient usually answers orientation questions appropriately when evaluated by medical teams, but might forget later what was discussed or why she is in the hospital.    She was treated for " acute cystitis during last admit, pan-sensitive E.coli on urine culture received ceftriaxone inpatient and not discharged on any oral antibiotics.     She does report poor sleep, has had difficulty sleeping at night and will nap multiple times per day. No reported non-redirectable agitation.   Prior to this month, grand-daughter notes patient was performing more ADL on her own.  Patient had declined referral to SNF with recent admits, last PT/OT recs for low-intensity therapy, pt is more open to post-acute care if recommended.     In ED tonight pt with bradycardia noted, cardiology consulted, no acute medical management of her bradycardia recommended, she has 2:1 AV block on review of EKG/telemetry tonight, hx of Mobitz 1 2nd Deg AV block in past.       Overview/Hospital Course:  Evaluated by cardiology and imdur increased. Cardiology recommends medical management, continue Imdur, DAPT, and consider Ranolazine for angina. Imdur increased to 60mg.  Patient started ranolazine as per cardiology recs, then later discontinued due to renal function. Patient worked with PT/OT again. Patient is interested in placement option for therapy.  Patient is stable and pending SNF placement.  P2P denial upheld 1/29. Family submitted for fast appeal > successful. Patient has placement at SNF now pending quarantine period for flu.     Recurrent chest pain on 1/31 with slight elevation in trop. Cardiology consulted. Imdur and atorvastatin increased.     Fevering 2/1-2/3. CXR clear. UA negative. +flu. Started on tamiflu 2/3. Intermittently on 1L NC since flu diagnosis. No documented desaturations        Interval History: No acute events overnight. Patient is starting to feel better but still has malaise.     Review of Systems  Objective:     Vital Signs (Most Recent):  Temp: 99 °F (37.2 °C) (02/05/25 0739)  Pulse: 69 (02/05/25 1005)  Resp: 17 (02/05/25 1005)  BP: (!) 142/63 (02/05/25 0739)  SpO2: 97 % (02/05/25 1005) Vital Signs (24h  Range):  Temp:  [97.7 °F (36.5 °C)-99.2 °F (37.3 °C)] 99 °F (37.2 °C)  Pulse:  [58-82] 69  Resp:  [16-20] 17  SpO2:  [95 %-99 %] 97 %  BP: (102-154)/(57-75) 142/63     Weight: 73.5 kg (162 lb 0.6 oz)  Body mass index is 25.38 kg/m².    Intake/Output Summary (Last 24 hours) at 2/5/2025 1039  Last data filed at 2/5/2025 0423  Gross per 24 hour   Intake --   Output 900 ml   Net -900 ml         Physical Exam  Vitals and nursing note reviewed.   Constitutional:       General: She is not in acute distress.     Appearance: She is not ill-appearing.   Eyes:      General: No scleral icterus.  Cardiovascular:      Rate and Rhythm: Normal rate.      Heart sounds: Murmur heard.   Pulmonary:      Effort: Pulmonary effort is normal. No respiratory distress.      Breath sounds: No wheezing.   Abdominal:      General: Abdomen is flat. There is no distension.      Palpations: Abdomen is soft.      Tenderness: There is no abdominal tenderness. There is no guarding.   Musculoskeletal:      Right lower leg: No edema.      Left lower leg: No edema.   Neurological:      Mental Status: She is alert and oriented to person, place, and time. Mental status is at baseline.      Cranial Nerves: No cranial nerve deficit.   Psychiatric:         Mood and Affect: Mood normal.         Behavior: Behavior normal.             Significant Labs: All pertinent labs within the past 24 hours have been reviewed.    Significant Imaging: I have reviewed all pertinent imaging results/findings within the past 24 hours.    Assessment and Plan     * Anginal equivalent  Seen by cardiology - her intermittent dyspnea symptoms are considered an anginal equivalent.  Recommendation to titrate Imdur dosage to 60mg. BP now borderline hypotensive with this increased. Patient took AM imdur 30mg, was hypertensive on presentation - gave 30mg imdur 1/19 and started 60mg in AM.  Recurrent chest pain on 1/31 with trop of 71. Discussed with cardiology. Continuing medical  "management.   - cardiology signed off  - PT/OT consulted  - increase imdur to 90mg on 2/1  - increase atorvastatin to 80mg   - ranolazine discontinued due to renal function    Coronary artery disease involving native coronary artery of native heart without angina pectoris  Patient with known CAD , which is uncontrolled Will continue ASA, Plavix, and Statin and monitor for S/Sx of angina/ACS. Continue to monitor on telemetry.     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 Pt having 2:1 AV block atrial rates in 80s and HR in low 40s.  During cardiology evaluation pt with variable 2nd degree AV block. She is not on any AV node blocking agents. Discussed with cards/EP  - continue telemetry monitoring.     Per Dr. Cummings on admit: "Tonight discussed code status with patient - she has been Full Code and DNR during last 2 admits, initially indicated DNR but also states she would want resuscitative measures if she might not incur injury, reviewed probabilities with pt given advanced age and chronic medical conditiions indicated to her that under true cardio-respiratory arrest she would be left with subsequent injury and overall low likelihood of survival to discharge 5-10%.  Give this bradyarrhythmia I did discuss if patients HR was lower if she would want invasive measures such as transvenous pacing or transcutaneous pacing performed she responded in affirmative,  will place FULL CODE for now."       Influenza A  Fever, cough, malaise. Flu A positive 2/3. CXR clear  - tamiflu started 2/3  - supportive care      Febrile  Temp to 101 on 2/1. No leukocytosis or tachycardia. + cough, wheeze > see flu       Moderate protein-calorie malnutrition  Nutrition consulted. Most recent weight and BMI monitored-     Measurements:  Wt Readings from Last 1 Encounters:   02/03/25 73.5 kg (162 lb 0.6 oz)   Body mass index is 25.38 kg/m².    Patient has been screened and assessed by " RD.    Malnutrition Type:  Context: acute illness or injury  Level: moderate    Malnutrition Characteristic Summary:  Weight Loss (Malnutrition): 1-2% in 1 week (-4% x 2 weeks)  Subcutaneous Fat (Malnutrition): mild depletion  Muscle Mass (Malnutrition): mild depletion    Interventions/Recommendations (treatment strategy):  1.)      Debility  Patient with Acute debility due to age-related physical debility and cardiac disease . The patient's latest AMPAC (Activity Measure for Post Acute Care) Score is listed below.    AM-PAC Score - How much help does the patient need for each activity listed  Basic Mobility Total Score: 17  Turning over in bed (including adjusting bedclothes, sheets and blankets)?: A little  Sitting down on and standing up from a chair with arms (e.g., wheelchair, bedside commode, etc.): A little  Moving from lying on back to sitting on the side of the bed?: A little  Moving to and from a bed to a chair (including a wheelchair)?: A little  Need to walk in hospital room?: A little  Climbing 3-5 steps with a railing?: A lot    Plan  - Progressive mobility protocol initated  - PT/OT consulted  - Fall precautions in place          Counseling regarding goals of care  Code status discussion as per AV block problem.     Patient has living will and HCPOA documents uploaded, she does not have an LAPOST completed. Would recommend completion of LAPOST prior to hospital discharge.       Confusion  ED indicated pt referred for admit for confusion.  By CAM-ICU testing pt does not have delirium, some disorientation. She may have some underlying cognitive deficits based on grand-daughters description. Appears pt with altered sleep wake cycles more recently - schedule melatonin tonight to help normalize sleep-wake cycle, delirium precautions.   Repeat UA appears normal and no persistent symptoms or concerns for ongoing Acute cystitis adequately treated.   - delirium precautions      Acute cystitis without  hematuria  Repeat UA appears normal and no persistent symptoms or concerns for ongoing Acute cystitis adequately treated.       CKD (chronic kidney disease), stage IV  Creatine stable for now. BMP reviewed- noted Estimated Creatinine Clearance: 19.8 mL/min (A) (based on SCr of 1.8 mg/dL (H)). according to latest data. Based on current GFR, CKD stage is stage 4 - GFR 15-29.  Monitor UOP and serial BMP and adjust therapy as needed. Renally dose meds. Avoid nephrotoxic medications and procedures.    Continue sodium bicarbonate and farxiga    Essential hypertension  Patient's blood pressure range in the last 24 hours was: BP  Min: 102/57  Max: 154/75.The patient's inpatient anti-hypertensive regimen is listed below:  Current Antihypertensives  nitroGLYCERIN SL tablet 0.4 mg, Every 5 min PRN, Sublingual  amLODIPine tablet 5 mg, Daily, Oral  losartan tablet 25 mg, Daily, Oral  amlodipine (NORVASC) tablet, Daily, Oral  isosorbide mononitrate 24 hr tablet 90 mg, Daily, Oral  isosorbide mononitrate (IMDUR) 24 hr tablet, Daily, Oral    Plan  - BP is controlled, no changes needed to their regimen  - adjustments to BP meds     PAF (paroxysmal atrial fibrillation)  Patient has paroxysmal (<7 days) atrial fibrillation. Patient is currently in sinus rhythm. [id number] Score: 4. The patients heart rate in the last 24 hours is as follows:  Pulse  Min: 58  Max: 82     Antiarrhythmics       Anticoagulants  heparin (porcine) injection 5,000 Units, Every 8 hours, Subcutaneous    Plan  - Replete lytes with a goal of K>4, Mg >2  - Patient is not anticoagulated due to unclear etiology  - Patient's afib is currently controlled          VTE Risk Mitigation (From admission, onward)           Ordered     heparin (porcine) injection 5,000 Units  Every 8 hours         01/20/25 0003     IP VTE HIGH RISK PATIENT  Once         01/20/25 0003     Place sequential compression device  Until discontinued         01/20/25 0003                     Discharge Planning   ROSEMARIE: 2/10/2025     Code Status: Full Code   Medical Readiness for Discharge Date:   Discharge Plan A: Skilled Nursing Facility   Discharge Delays: (!) Post-Acute Set-up            Please place Justification for DME        Marely Perez MD  Department of Hospital Medicine   [facility]

## 2025-02-05 NOTE — ASSESSMENT & PLAN NOTE
Patient has paroxysmal (<7 days) atrial fibrillation. Patient is currently in sinus rhythm. [id number] Score: 4. The patients heart rate in the last 24 hours is as follows:  Pulse  Min: 58  Max: 82     Antiarrhythmics       Anticoagulants  heparin (porcine) injection 5,000 Units, Every 8 hours, Subcutaneous    Plan  - Replete lytes with a goal of K>4, Mg >2  - Patient is not anticoagulated due to unclear etiology  - Patient's afib is currently controlled

## 2025-02-05 NOTE — PT/OT/SLP PROGRESS
Occupational Therapy   Treatment    Name: Anahy Evans  MRN: [id number]  Admitting Diagnosis:  Anginal equivalent       Recommendations:     Discharge Recommendations: Moderate Intensity Therapy  Discharge Equipment Recommendations:  none  Barriers to discharge:   (increased (A) required)    Assessment:     Anahy Evans is a 89 y.o. female with a medical diagnosis of Anginal equivalent.  She presents with limited session due to SOB & lightheaded at EOB. Performance deficits affecting function are weakness, impaired endurance, impaired self care skills, impaired functional mobility, impaired balance, decreased lower extremity function, decreased upper extremity function, impaired cardiopulmonary response to activity.     Rehab Prognosis:  Good; patient would benefit from acute skilled OT services to address these deficits and reach maximum level of function.       Plan:     Patient to be seen 4 x/week to address the above listed problems via self-care/home management, therapeutic activities, therapeutic exercises, neuromuscular re-education  Plan of Care Expires: 02/23/25  Plan of Care Reviewed with: patient    Subjective     Chief Complaint: SOB & lightheadedness at EOB  Patient/Family Comments/goals: Pt reported that she was feeling mildly better than yesterday.  Pain/Comfort:  Pain Rating 1:  (did not report)  Pain Rating Post-Intervention 1:  (did not report)    Objective:     Communicated with: RN prior to session.  Patient found supine with telemetry, oxygen, PureWick (no family present) upon OT entry to room.    General Precautions: Standard, fall, droplet    Orthopedic Precautions:N/A  Braces: N/A    Occupational Performance:     Bed Mobility:    Patient completed Scooting/Bridging with Mod (A) with scooting along EOB to the right and MiN (A) with scooting up HOB while supine  Patient completed Supine to Sit with moderate assistance  Patient completed Sit to Supine with minimum assistance     Functional  Mobility/Transfers:  NT due to pt SOB & lightheadedness at EOB requiring return to supine      [facility] 6 Click ADL: 19    Treatment & Education:  SBA-CGA with postural control while seated EOB.  Provided cues for deep breathing while seated EOB however due to pt continued SOB & lightheadedness returned pt to supine in bed.  Notified RN.  Pt with improvements once supine in bed. Pt had no further questions & when asked whether there were any concerns pt reported none.      Patient left supine with all lines intact, call button in reach, bed alarm on, and RN notified    GOALS:   Multidisciplinary Problems       Occupational Therapy Goals          Problem: Occupational Therapy    Goal Priority Disciplines Outcome Interventions   Occupational Therapy Goal     OT, PT/OT Progressing    Description: Goals to be met by: 2/23/25     Patient will increase functional independence with ADLs by performing:    UE Dressing with Modified [place].  LE Dressing with Modified [place].  Grooming while standing with Modified [place].  Toileting from toilet with Modified [place] for hygiene and clothing management.   Supine to sit with Modified [place].  Step transfer with Modified [place]  Toilet transfer to toilet with Modified [place].                           Time Tracking:     OT Date of Treatment: 02/05/25  OT Start Time: 1349  OT Stop Time: 1405  OT Total Time (min): 16 min    Billable Minutes:Therapeutic Activity 16    OT/AMAURY: OT          2/5/2025

## 2025-02-05 NOTE — PLAN OF CARE
Problem: Occupational Therapy  Goal: Occupational Therapy Goal  Description: Goals to be met by: 2/23/25     Patient will increase functional independence with ADLs by performing:    UE Dressing with Modified [place].  LE Dressing with Modified [place].  Grooming while standing with Modified [place].  Toileting from toilet with Modified [place] for hygiene and clothing management.   Supine to sit with Modified [place].  Step transfer with Modified [place]  Toilet transfer to toilet with Modified [place].    Outcome: Progressing     Goals remain appropriate

## 2025-02-05 NOTE — ASSESSMENT & PLAN NOTE
Patient's blood pressure range in the last 24 hours was: BP  Min: 102/57  Max: 154/75.The patient's inpatient anti-hypertensive regimen is listed below:  Current Antihypertensives  nitroGLYCERIN SL tablet 0.4 mg, Every 5 min PRN, Sublingual  amLODIPine tablet 5 mg, Daily, Oral  losartan tablet 25 mg, Daily, Oral  amlodipine (NORVASC) tablet, Daily, Oral  isosorbide mononitrate 24 hr tablet 90 mg, Daily, Oral  isosorbide mononitrate (IMDUR) 24 hr tablet, Daily, Oral    Plan  - BP is controlled, no changes needed to their regimen  - adjustments to BP meds

## 2025-02-05 NOTE — SUBJECTIVE & OBJECTIVE
Interval History: No acute events overnight. Patient is starting to feel better but still has malaise.     Review of Systems  Objective:     Vital Signs (Most Recent):  Temp: 99 °F (37.2 °C) (02/05/25 0739)  Pulse: 69 (02/05/25 1005)  Resp: 17 (02/05/25 1005)  BP: (!) 142/63 (02/05/25 0739)  SpO2: 97 % (02/05/25 1005) Vital Signs (24h Range):  Temp:  [97.7 °F (36.5 °C)-99.2 °F (37.3 °C)] 99 °F (37.2 °C)  Pulse:  [58-82] 69  Resp:  [16-20] 17  SpO2:  [95 %-99 %] 97 %  BP: (102-154)/(57-75) 142/63     Weight: 73.5 kg (162 lb 0.6 oz)  Body mass index is 25.38 kg/m².    Intake/Output Summary (Last 24 hours) at 2/5/2025 1039  Last data filed at 2/5/2025 0423  Gross per 24 hour   Intake --   Output 900 ml   Net -900 ml         Physical Exam  Vitals and nursing note reviewed.   Constitutional:       General: She is not in acute distress.     Appearance: She is not ill-appearing.   Eyes:      General: No scleral icterus.  Cardiovascular:      Rate and Rhythm: Normal rate.      Heart sounds: Murmur heard.   Pulmonary:      Effort: Pulmonary effort is normal. No respiratory distress.      Breath sounds: No wheezing.   Abdominal:      General: Abdomen is flat. There is no distension.      Palpations: Abdomen is soft.      Tenderness: There is no abdominal tenderness. There is no guarding.   Musculoskeletal:      Right lower leg: No edema.      Left lower leg: No edema.   Neurological:      Mental Status: She is alert and oriented to person, place, and time. Mental status is at baseline.      Cranial Nerves: No cranial nerve deficit.   Psychiatric:         Mood and Affect: Mood normal.         Behavior: Behavior normal.             Significant Labs: All pertinent labs within the past 24 hours have been reviewed.    Significant Imaging: I have reviewed all pertinent imaging results/findings within the past 24 hours.

## 2025-02-06 PROCEDURE — 21400001 HC TELEMETRY ROOM

## 2025-02-06 PROCEDURE — 97116 GAIT TRAINING THERAPY: CPT | Mod: CQ

## 2025-02-06 PROCEDURE — 27000207 HC ISOLATION

## 2025-02-06 PROCEDURE — 63600175 PHARM REV CODE 636 W HCPCS: Performed by: HOSPITALIST

## 2025-02-06 PROCEDURE — 25000003 PHARM REV CODE 250: Performed by: STUDENT IN AN ORGANIZED HEALTH CARE EDUCATION/TRAINING PROGRAM

## 2025-02-06 PROCEDURE — 25000003 PHARM REV CODE 250: Performed by: HOSPITALIST

## 2025-02-06 PROCEDURE — 97110 THERAPEUTIC EXERCISES: CPT | Mod: CQ

## 2025-02-06 RX ADMIN — PANTOPRAZOLE SODIUM 20 MG: 20 TABLET, DELAYED RELEASE ORAL at 05:02

## 2025-02-06 RX ADMIN — SENNOSIDES AND DOCUSATE SODIUM 1 TABLET: 50; 8.6 TABLET ORAL at 09:02

## 2025-02-06 RX ADMIN — CLOPIDOGREL BISULFATE 75 MG: 75 TABLET ORAL at 08:02

## 2025-02-06 RX ADMIN — HEPARIN SODIUM 5000 UNITS: 5000 INJECTION INTRAVENOUS; SUBCUTANEOUS at 02:02

## 2025-02-06 RX ADMIN — ALUMINUM HYDROXIDE, MAGNESIUM HYDROXIDE, AND SIMETHICONE 30 ML: 200; 200; 20 SUSPENSION ORAL at 08:02

## 2025-02-06 RX ADMIN — SODIUM BICARBONATE 650 MG TABLET 650 MG: at 08:02

## 2025-02-06 RX ADMIN — ASPIRIN 81 MG: 81 TABLET, COATED ORAL at 08:02

## 2025-02-06 RX ADMIN — BENZONATATE 200 MG: 100 CAPSULE ORAL at 08:02

## 2025-02-06 RX ADMIN — SODIUM BICARBONATE 650 MG TABLET 650 MG: at 09:02

## 2025-02-06 RX ADMIN — Medication 6 MG: at 09:02

## 2025-02-06 RX ADMIN — HEPARIN SODIUM 5000 UNITS: 5000 INJECTION INTRAVENOUS; SUBCUTANEOUS at 05:02

## 2025-02-06 RX ADMIN — ATORVASTATIN CALCIUM 80 MG: 40 TABLET, FILM COATED ORAL at 08:02

## 2025-02-06 RX ADMIN — CHOLECALCIFEROL TAB 25 MCG (1000 UNIT) 2000 UNITS: 25 TAB at 08:02

## 2025-02-06 RX ADMIN — LOSARTAN POTASSIUM 25 MG: 25 TABLET, FILM COATED ORAL at 08:02

## 2025-02-06 RX ADMIN — AMLODIPINE BESYLATE 5 MG: 5 TABLET ORAL at 08:02

## 2025-02-06 RX ADMIN — FOLIC ACID 1 MG: 1 TABLET ORAL at 08:02

## 2025-02-06 RX ADMIN — FLUTICASONE PROPIONATE 100 MCG: 50 SPRAY, METERED NASAL at 08:02

## 2025-02-06 RX ADMIN — HEPARIN SODIUM 5000 UNITS: 5000 INJECTION INTRAVENOUS; SUBCUTANEOUS at 09:02

## 2025-02-06 RX ADMIN — DAPAGLIFLOZIN 10 MG: 10 TABLET, FILM COATED ORAL at 08:02

## 2025-02-06 RX ADMIN — ALLOPURINOL 50 MG: 300 TABLET ORAL at 08:02

## 2025-02-06 RX ADMIN — OSELTAMIVIR PHOSPHATE 30 MG: 30 CAPSULE ORAL at 08:02

## 2025-02-06 RX ADMIN — POLYETHYLENE GLYCOL 3350 17 G: 17 POWDER, FOR SOLUTION ORAL at 09:02

## 2025-02-06 RX ADMIN — ISOSORBIDE MONONITRATE 90 MG: 60 TABLET, EXTENDED RELEASE ORAL at 08:02

## 2025-02-06 NOTE — PT/OT/SLP PROGRESS
"Physical Therapy Treatment    Patient Name:  Anahy Evans   MRN:  [id number]    Recommendations:     Discharge Recommendations: Moderate Intensity Therapy  Discharge Equipment Recommendations: none  Barriers to discharge: Inaccessible home    Assessment:     Anahy Evans is a 89 y.o. female admitted with a medical diagnosis of Anginal equivalent.  She presents with the following impairments/functional limitations: weakness, impaired endurance, impaired self care skills, decreased safety awareness, impaired cardiopulmonary response to activity Pt tolerated treatment session fairly well today. Pt requiring little to no assistance for bed mobility, transfers and gait training. Pt still expierncing dizziness during session, ultimately limiting functional mobility training. BP recorded at 107/57, while seated in bedside chair. Patient remains appropriate for continued skilled services within the acute environment and goals remain appropriate.   .    Rehab Prognosis: Good; patient would benefit from acute skilled PT services to address these deficits and reach maximum level of function.    Recent Surgery: * No surgery found *      Plan:     During this hospitalization, patient to be seen 4 x/week to address the identified rehab impairments via gait training, therapeutic activities, therapeutic exercises and progress toward the following goals:    Plan of Care Expires:  02/23/25    Subjective     Chief Complaint: Dizziness   Patient/Family Comments/goals: "My pressure isn't normally that low."  Pain/Comfort:  Pain Rating 1: 0/10      Objective:     Communicated with Rn prior to session.  Patient found supine with telemetry, oxygen, PureWick upon PT entry to room.     General Precautions: Standard, fall  Orthopedic Precautions: N/A  Braces: N/A  Respiratory Status: Nasal cannula     Functional Mobility:  Bed Mobility:     Supine to Sit: contact guard assistance    Transfers:     Sit to Stand x 2:  contact guard " assistance (from bed) and Nii (from chair) with rolling walker  Bed to Chair: contact guard assistance with  rolling walker  using  Step Transfer  Gait: 3 ft to bedside chair CGA with RW     Pt performed 10 repetitions of seated B LE exercises consisting of: Marching, LAQ, ABD/ADD, heel raises, and toe raises.         AM-PAC 6 CLICK MOBILITY  Turning over in bed (including adjusting bedclothes, sheets and blankets)?: 3  Sitting down on and standing up from a chair with arms (e.g., wheelchair, bedside commode, etc.): 3  Moving from lying on back to sitting on the side of the bed?: 3  Moving to and from a bed to a chair (including a wheelchair)?: 3  Need to walk in hospital room?: 3  Climbing 3-5 steps with a railing?: 2  Basic Mobility Total Score: 17       Treatment & Education:  Therapist provided instruction and educated for safety during transfers and gait training. As well as proper body mechanics, energy conservation, and fall prevention strategies during tasks listed above, and the effects of prolonged immobility and the importance of performing EOB/OOB activity and exercises to promote healing and reduce recovery time.       Patient left up in chair with all lines intact, call button in reach, and Rn notified..    GOALS:   Multidisciplinary Problems       Physical Therapy Goals          Problem: Physical Therapy    Goal Priority Disciplines Outcome Interventions   Physical Therapy Goal     PT, PT/OT Progressing    Description: Goals to be met by: 2/10     Patient will increase functional independence with mobility by performin. Supine to sit with Modified [place]  2. Sit to supine with Modified [place]  3. Sit to stand transfer with Modified [place]  4. Gait  x 200 feet with Supervision using LRAD.   5. Ascend/descend 4 stair with left Handrails Contact Guard Assistance using LRAD.                          DME Justifications:  No DME recommended requiring DME justifications    Time  Tracking:     PT Received On: 02/06/25  PT Start Time: 0953     PT Stop Time: 1016  PT Total Time (min): 23 min     Billable Minutes: Gait Training 8 and Therapeutic Exercise 15    Treatment Type: Treatment  PT/PTA: PTA     Number of PTA visits since last PT visit: 3     02/06/2025

## 2025-02-06 NOTE — SUBJECTIVE & OBJECTIVE
Interval History:     Patient seen at bedside. Reports fatigue. No other symptoms.   Pending quarantine for Flu before rehab.     Review of Systems  Objective:     Vital Signs (Most Recent):  Temp: 99.6 °F (37.6 °C) (02/06/25 1144)  Pulse: (!) 59 (02/06/25 1144)  Resp: 18 (02/06/25 1144)  BP: (!) 115/56 (02/06/25 1144)  SpO2: 99 % (02/06/25 1144) Vital Signs (24h Range):  Temp:  [97.5 °F (36.4 °C)-99.6 °F (37.6 °C)] 99.6 °F (37.6 °C)  Pulse:  [47-74] 59  Resp:  [16-20] 18  SpO2:  [96 %-99 %] 99 %  BP: (111-149)/(55-71) 115/56     Weight: 73.5 kg (162 lb 0.6 oz)  Body mass index is 25.38 kg/m².    Intake/Output Summary (Last 24 hours) at 2/6/2025 1149  Last data filed at 2/6/2025 1003  Gross per 24 hour   Intake 120 ml   Output 600 ml   Net -480 ml         Physical Exam  Constitutional:       General: She is not in acute distress.     Appearance: She is not ill-appearing.   Cardiovascular:      Rate and Rhythm: Normal rate.      Heart sounds: Normal heart sounds.   Pulmonary:      Effort: Pulmonary effort is normal. No respiratory distress.   Abdominal:      General: Abdomen is flat.      Palpations: Abdomen is soft.      Tenderness: There is no abdominal tenderness.   Musculoskeletal:      Right lower leg: No edema.      Left lower leg: No edema.   Neurological:      Mental Status: She is alert and oriented to person, place, and time. Mental status is at baseline.   Psychiatric:         Mood and Affect: Mood normal.             Significant Labs: All pertinent labs within the past 24 hours have been reviewed.    Significant Imaging: I have reviewed all pertinent imaging results/findings within the past 24 hours.

## 2025-02-06 NOTE — PROGRESS NOTES
"[facility] [address] [place] [address] Medicine  Progress Note    Patient Name: Anahy Evans  MRN: [id number]  Patient Class: IP- Inpatient   Admission Date: 1/19/2025  Length of Stay: 16 days  Attending Physician: Maria E Bernstein MD  Primary Care Provider: Elena Cabrera MD        Principal Problem:Anginal equivalent        HPI:  89 y/o AAF w/ CAD hx PCI, CKD 4, HTN, Afib, AAA, 2nd degree AV Block presents to the ED with complaints of dyspnea.     She was recently admitted to [facility] from 1/13-1/17 per DC summary "Patient admitted for dyspnea on exertion. V/Q scan was completed - no pulmonary embolism. Echo ordered - regional wall motion abnormalities present. Low normal systolic ejection fraction 50-55%. Cardiology was consulted - feel origin of TAYLOR may be cardiac in nature. PET stress done 1/16. Two perfusion abnormalities seen. Interventional cardiology recommending medical management and close follow-up. Amlodipine increased, losartan decreased, and started on imdur."    She returns to ED with concerns of dyspnea on exertion.  She discharged to home after last admission, lives with her daughter.  Her grand-daughter accompanies her today and helps provide history.  Since discharge patient has had limited ADL capability, pt requires assistance w/ ambulation and use of walker, patient has home purewick and bedside commode.  She requires assistance with toileting, bathing, clothing.  She is adherent to medications. Yesterday patient appeared weak and when sitting upright had poor trunk stability would slump over and had poor appetite did not eat much and was noted with intermittent confusion.  Her confusion is described as poor short term memory, will intermittently forget recent events, granddaughter notes that patient usually answers orientation questions appropriately when evaluated by medical teams, but might forget later what was discussed or why she is in the hospital.    She was treated for acute cystitis " during last admit, pan-sensitive E.coli on urine culture received ceftriaxone inpatient and not discharged on any oral antibiotics.     She does report poor sleep, has had difficulty sleeping at night and will nap multiple times per day. No reported non-redirectable agitation.   Prior to this month, grand-daughter notes patient was performing more ADL on her own.  Patient had declined referral to SNF with recent admits, last PT/OT recs for low-intensity therapy, pt is more open to post-acute care if recommended.     In ED tonight pt with bradycardia noted, cardiology consulted, no acute medical management of her bradycardia recommended, she has 2:1 AV block on review of EKG/telemetry tonight, hx of Mobitz 1 2nd Deg AV block in past.       Overview/Hospital Course:  Evaluated by cardiology and imdur increased. Cardiology recommends medical management, continue Imdur, DAPT, and consider Ranolazine for angina. Imdur increased to 60mg.  Patient started ranolazine as per cardiology recs, then later discontinued due to renal function. Patient worked with PT/OT again. Patient is interested in placement option for therapy.  Patient is stable and pending SNF placement.  P2P denial upheld 1/29. Family submitted for fast appeal > successful. Patient has placement at SNF now pending quarantine period for flu.     Recurrent chest pain on 1/31 with slight elevation in trop. Cardiology consulted. Imdur and atorvastatin increased.     Fevering 2/1-2/3. CXR clear. UA negative. +flu. Started on tamiflu 2/3. Intermittently on 1L NC since flu diagnosis. No documented desaturations        Interval History:     Patient seen at bedside. Reports fatigue. No other symptoms.   Pending quarantine for Flu before rehab.     Review of Systems  Objective:     Vital Signs (Most Recent):  Temp: 99.6 °F (37.6 °C) (02/06/25 1144)  Pulse: (!) 59 (02/06/25 1144)  Resp: 18 (02/06/25 1144)  BP: (!) 115/56 (02/06/25 1144)  SpO2: 99 % (02/06/25 1144) Vital  Signs (24h Range):  Temp:  [97.5 °F (36.4 °C)-99.6 °F (37.6 °C)] 99.6 °F (37.6 °C)  Pulse:  [47-74] 59  Resp:  [16-20] 18  SpO2:  [96 %-99 %] 99 %  BP: (111-149)/(55-71) 115/56     Weight: 73.5 kg (162 lb 0.6 oz)  Body mass index is 25.38 kg/m².    Intake/Output Summary (Last 24 hours) at 2/6/2025 1149  Last data filed at 2/6/2025 1003  Gross per 24 hour   Intake 120 ml   Output 600 ml   Net -480 ml         Physical Exam  Constitutional:       General: She is not in acute distress.     Appearance: She is not ill-appearing.   Cardiovascular:      Rate and Rhythm: Normal rate.      Heart sounds: Normal heart sounds.   Pulmonary:      Effort: Pulmonary effort is normal. No respiratory distress.   Abdominal:      General: Abdomen is flat.      Palpations: Abdomen is soft.      Tenderness: There is no abdominal tenderness.   Musculoskeletal:      Right lower leg: No edema.      Left lower leg: No edema.   Neurological:      Mental Status: She is alert and oriented to person, place, and time. Mental status is at baseline.   Psychiatric:         Mood and Affect: Mood normal.             Significant Labs: All pertinent labs within the past 24 hours have been reviewed.    Significant Imaging: I have reviewed all pertinent imaging results/findings within the past 24 hours.    Assessment and Plan     * Anginal equivalent  Seen by cardiology - her intermittent dyspnea symptoms are considered an anginal equivalent.  Recommendation to titrate Imdur dosage to 60mg. BP now borderline hypotensive with this increased. Patient took AM imdur 30mg, was hypertensive on presentation - gave 30mg imdur 1/19 and started 60mg in AM.  Recurrent chest pain on 1/31 with trop of 71. Discussed with cardiology. Continuing medical management.   - cardiology signed off  - PT/OT consulted  - increase imdur to 90mg on 2/1  - increase atorvastatin to 80mg   - ranolazine discontinued due to renal function    Influenza A  Fever, cough, malaise. Flu A  positive 2/3. CXR clear  - tamiflu started 2/3  - supportive care      Febrile  Temp to 101 on 2/1. No leukocytosis or tachycardia. + cough, wheeze > see flu       Moderate protein-calorie malnutrition  Nutrition consulted. Most recent weight and BMI monitored-     Measurements:  Wt Readings from Last 1 Encounters:   02/03/25 73.5 kg (162 lb 0.6 oz)   Body mass index is 25.38 kg/m².    Patient has been screened and assessed by RD.    Malnutrition Type:  Context: acute illness or injury  Level: moderate    Malnutrition Characteristic Summary:  Weight Loss (Malnutrition): 1-2% in 1 week (-4% x 2 weeks)  Subcutaneous Fat (Malnutrition): mild depletion  Muscle Mass (Malnutrition): mild depletion    Interventions/Recommendations (treatment strategy):  1.)      Debility  Patient with Acute debility due to age-related physical debility and cardiac disease . The patient's latest AMPAC (Activity Measure for Post Acute Care) Score is listed below.    AM-PAC Score - How much help does the patient need for each activity listed  Basic Mobility Total Score: 17  Turning over in bed (including adjusting bedclothes, sheets and blankets)?: A little  Sitting down on and standing up from a chair with arms (e.g., wheelchair, bedside commode, etc.): A little  Moving from lying on back to sitting on the side of the bed?: A little  Moving to and from a bed to a chair (including a wheelchair)?: A little  Need to walk in hospital room?: A little  Climbing 3-5 steps with a railing?: A lot    Plan  - Progressive mobility protocol initated  - PT/OT consulted  - Fall precautions in place          Counseling regarding goals of care  Code status discussion as per AV block problem.     Patient has living will and HCPOA documents uploaded, she does not have an LAPOST completed. Would recommend completion of LAPOST prior to hospital discharge.       Confusion  ED indicated pt referred for admit for confusion.  By CAM-ICU testing pt does not have  "delirium, some disorientation. She may have some underlying cognitive deficits based on grand-daughters description. Appears pt with altered sleep wake cycles more recently - schedule melatonin tonight to help normalize sleep-wake cycle, delirium precautions.   Repeat UA appears normal and no persistent symptoms or concerns for ongoing Acute cystitis adequately treated.   - delirium precautions      Acute cystitis without hematuria  Repeat UA appears normal and no persistent symptoms or concerns for ongoing Acute cystitis adequately treated.       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 Pt having 2:1 AV block atrial rates in 80s and HR in low 40s.  During cardiology evaluation pt with variable 2nd degree AV block. She is not on any AV node blocking agents. Discussed with cards/EP  - continue telemetry monitoring.     Per Dr. Cummings on admit: "Tonight discussed code status with patient - she has been Full Code and DNR during last 2 admits, initially indicated DNR but also states she would want resuscitative measures if she might not incur injury, reviewed probabilities with pt given advanced age and chronic medical conditiions indicated to her that under true cardio-respiratory arrest she would be left with subsequent injury and overall low likelihood of survival to discharge 5-10%.  Give this bradyarrhythmia I did discuss if patients HR was lower if she would want invasive measures such as transvenous pacing or transcutaneous pacing performed she responded in affirmative,  will place FULL CODE for now."       Coronary artery disease involving native coronary artery of native heart without angina pectoris  Patient with known CAD , which is uncontrolled Will continue ASA, Plavix, and Statin and monitor for S/Sx of angina/ACS. Continue to monitor on telemetry.     CKD (chronic kidney disease), stage IV  Creatine stable for now. BMP reviewed- noted Estimated " Creatinine Clearance: 19.8 mL/min (A) (based on SCr of 1.8 mg/dL (H)). according to latest data. Based on current GFR, CKD stage is stage 4 - GFR 15-29.  Monitor UOP and serial BMP and adjust therapy as needed. Renally dose meds. Avoid nephrotoxic medications and procedures.    Continue sodium bicarbonate and farxiga    Essential hypertension  Patient's blood pressure range in the last 24 hours was: BP  Min: 102/57  Max: 154/75.The patient's inpatient anti-hypertensive regimen is listed below:  Current Antihypertensives  nitroGLYCERIN SL tablet 0.4 mg, Every 5 min PRN, Sublingual  amLODIPine tablet 5 mg, Daily, Oral  losartan tablet 25 mg, Daily, Oral  amlodipine (NORVASC) tablet, Daily, Oral  isosorbide mononitrate 24 hr tablet 90 mg, Daily, Oral  isosorbide mononitrate (IMDUR) 24 hr tablet, Daily, Oral    Plan  - BP is controlled, no changes needed to their regimen  - adjustments to BP meds     PAF (paroxysmal atrial fibrillation)  Patient has paroxysmal (<7 days) atrial fibrillation. Patient is currently in sinus rhythm. [id number] Score: 4. The patients heart rate in the last 24 hours is as follows:  Pulse  Min: 58  Max: 82     Antiarrhythmics       Anticoagulants  heparin (porcine) injection 5,000 Units, Every 8 hours, Subcutaneous    Plan  - Replete lytes with a goal of K>4, Mg >2  - Patient is not anticoagulated due to unclear etiology  - Patient's afib is currently controlled          VTE Risk Mitigation (From admission, onward)           Ordered     heparin (porcine) injection 5,000 Units  Every 8 hours         01/20/25 0003     IP VTE HIGH RISK PATIENT  Once         01/20/25 0003     Place sequential compression device  Until discontinued         01/20/25 0003                    Discharge Planning   ROSEMARIE: 2/10/2025     Code Status: Full Code   Medical Readiness for Discharge Date:   Discharge Plan A: Skilled Nursing Facility   Discharge Delays: (!) Post-Acute Set-up                Maria E Bernstein  MD  Department of Hospital Medicine   [facility]

## 2025-02-07 LAB
BACTERIA BLD CULT: NORMAL
BACTERIA BLD CULT: NORMAL

## 2025-02-07 PROCEDURE — 25000003 PHARM REV CODE 250: Performed by: STUDENT IN AN ORGANIZED HEALTH CARE EDUCATION/TRAINING PROGRAM

## 2025-02-07 PROCEDURE — 97110 THERAPEUTIC EXERCISES: CPT

## 2025-02-07 PROCEDURE — 63600175 PHARM REV CODE 636 W HCPCS: Performed by: HOSPITALIST

## 2025-02-07 PROCEDURE — 21400001 HC TELEMETRY ROOM

## 2025-02-07 PROCEDURE — 25000003 PHARM REV CODE 250: Performed by: HOSPITALIST

## 2025-02-07 PROCEDURE — 27000207 HC ISOLATION

## 2025-02-07 PROCEDURE — 97535 SELF CARE MNGMENT TRAINING: CPT

## 2025-02-07 RX ORDER — HYDROXYZINE HYDROCHLORIDE 25 MG/1
25 TABLET, FILM COATED ORAL 3 TIMES DAILY PRN
Status: DISCONTINUED | OUTPATIENT
Start: 2025-02-07 | End: 2025-02-11 | Stop reason: HOSPADM

## 2025-02-07 RX ORDER — GUAIFENESIN AND DEXTROMETHORPHAN HYDROBROMIDE 10; 100 MG/5ML; MG/5ML
10 SYRUP ORAL EVERY 6 HOURS PRN
Status: DISCONTINUED | OUTPATIENT
Start: 2025-02-07 | End: 2025-02-11 | Stop reason: HOSPADM

## 2025-02-07 RX ADMIN — CHOLECALCIFEROL TAB 25 MCG (1000 UNIT) 2000 UNITS: 25 TAB at 09:02

## 2025-02-07 RX ADMIN — ISOSORBIDE MONONITRATE 90 MG: 60 TABLET, EXTENDED RELEASE ORAL at 09:02

## 2025-02-07 RX ADMIN — FOLIC ACID 1 MG: 1 TABLET ORAL at 09:02

## 2025-02-07 RX ADMIN — SODIUM BICARBONATE 650 MG TABLET 650 MG: at 09:02

## 2025-02-07 RX ADMIN — HEPARIN SODIUM 5000 UNITS: 5000 INJECTION INTRAVENOUS; SUBCUTANEOUS at 04:02

## 2025-02-07 RX ADMIN — FLUTICASONE PROPIONATE 100 MCG: 50 SPRAY, METERED NASAL at 09:02

## 2025-02-07 RX ADMIN — HEPARIN SODIUM 5000 UNITS: 5000 INJECTION INTRAVENOUS; SUBCUTANEOUS at 09:02

## 2025-02-07 RX ADMIN — DAPAGLIFLOZIN 10 MG: 10 TABLET, FILM COATED ORAL at 09:02

## 2025-02-07 RX ADMIN — HEPARIN SODIUM 5000 UNITS: 5000 INJECTION INTRAVENOUS; SUBCUTANEOUS at 05:02

## 2025-02-07 RX ADMIN — OSELTAMIVIR PHOSPHATE 30 MG: 30 CAPSULE ORAL at 09:02

## 2025-02-07 RX ADMIN — LOSARTAN POTASSIUM 25 MG: 25 TABLET, FILM COATED ORAL at 09:02

## 2025-02-07 RX ADMIN — ALLOPURINOL 50 MG: 300 TABLET ORAL at 09:02

## 2025-02-07 RX ADMIN — ASPIRIN 81 MG: 81 TABLET, COATED ORAL at 09:02

## 2025-02-07 RX ADMIN — Medication 6 MG: at 09:02

## 2025-02-07 RX ADMIN — AMLODIPINE BESYLATE 5 MG: 5 TABLET ORAL at 09:02

## 2025-02-07 RX ADMIN — HYDROXYZINE HYDROCHLORIDE 25 MG: 25 TABLET, FILM COATED ORAL at 05:02

## 2025-02-07 RX ADMIN — CLOPIDOGREL BISULFATE 75 MG: 75 TABLET ORAL at 09:02

## 2025-02-07 RX ADMIN — BENZONATATE 200 MG: 100 CAPSULE ORAL at 04:02

## 2025-02-07 RX ADMIN — ATORVASTATIN CALCIUM 80 MG: 40 TABLET, FILM COATED ORAL at 09:02

## 2025-02-07 RX ADMIN — PANTOPRAZOLE SODIUM 20 MG: 20 TABLET, DELAYED RELEASE ORAL at 09:02

## 2025-02-07 NOTE — PLAN OF CARE
Recommendations     1. Continue cardiac diet as tolerated   - RN staff: please document PO intake in the flowsheets   2. Continue novasource renal once daily   3. Encourage good intake   4. RD to monitor weight, labs, intake     Goals:   1. % nutritional needs met with diet   2. Maintain weight during admission  Nutrition Goal Status: progressing towards goal  Communication of RD Recs: other (comment) (POC)

## 2025-02-07 NOTE — PT/OT/SLP PROGRESS
Occupational Therapy   Treatment    Name: Anahy Evans  MRN: [id number]  Admitting Diagnosis:  Anginal equivalent       Recommendations:     Discharge Recommendations: Moderate Intensity Therapy  Discharge Equipment Recommendations:  none  Barriers to discharge:   (increased (A) required)    Assessment:     Anahy Evans is a 89 y.o. female with a medical diagnosis of Anginal equivalent.  She presents with good participation and motivation. Performance deficits affecting function are weakness, impaired endurance, impaired self care skills, impaired functional mobility, impaired balance, decreased lower extremity function, decreased upper extremity function, impaired cardiopulmonary response to activity.     Rehab Prognosis:  Good; patient would benefit from acute skilled OT services to address these deficits and reach maximum level of function.       Plan:     Patient to be seen 4 x/week to address the above listed problems via self-care/home management, therapeutic activities, therapeutic exercises, neuromuscular re-education  Plan of Care Expires: 02/23/25  Plan of Care Reviewed with: patient    Subjective     Chief Complaint: none stated  Patient/Family Comments/goals: Pt reported that she is feeling better today.  Pain/Comfort:  Pain Rating 1: 0/10  Pain Rating Post-Intervention 1: 0/10    Objective:     Communicated with: RN prior to session.  Patient found supine with telemetry, oxygen, PureWick (no family present) upon OT entry to room.    General Precautions: Standard, fall, droplet    Orthopedic Precautions:N/A  Braces: N/A     Occupational Performance:     Bed Mobility:    Patient completed Supine to Sit with stand by assistance     Functional Mobility/Transfers:  Patient completed Sit <> Stand Transfer with stand by assistance  with  rolling walker   Patient completed Bed <> Chair Transfer using Step Transfer technique with contact guard assistance with rolling walker  Functional Mobility: CGA using RW  with taking steps to chair from bed    Activities of Daily Living:  Grooming: stand by assistance brushing teeth & washing face while seated in chair  Upper Body Dressing: stand by assistance donning gown around back while seated on EOB      AMPA 6 Click ADL: 19    Treatment & Education:  Pt performed AROM exercises with BUE in 3 planes x 2 sets x 10 reps each set while seated in chair. Provided education on safety & need for (A) with OOB activities.  Pt had no further questions & when asked whether there were any concerns pt reported none.      Patient left up in chair with all lines intact, call button in reach, and RN notified    GOALS:   Multidisciplinary Problems       Occupational Therapy Goals          Problem: Occupational Therapy    Goal Priority Disciplines Outcome Interventions   Occupational Therapy Goal     OT, PT/OT Progressing    Description: Goals to be met by: 2/23/25     Patient will increase functional independence with ADLs by performing:    UE Dressing with Modified [place].  LE Dressing with Modified [place].  Grooming while standing with Modified [place].  Toileting from toilet with Modified [place] for hygiene and clothing management.   Supine to sit with Modified [place].  Step transfer with Modified [place]  Toilet transfer to toilet with Modified [place].                           Time Tracking:     OT Date of Treatment: 02/07/25  OT Start Time: 0906  OT Stop Time: 0932  OT Total Time (min): 26 min    Billable Minutes:Self Care/Home Management 16  Therapeutic Exercise 10    OT/AMAURY: OT          2/7/2025

## 2025-02-07 NOTE — SUBJECTIVE & OBJECTIVE
Interval History:     Patient seen at bedside. Feeling fatigued. Reports no new symptoms.   Pending placement.     Review of Systems  Objective:     Vital Signs (Most Recent):  Temp: 98.2 °F (36.8 °C) (02/07/25 1105)  Pulse: 73 (02/07/25 1136)  Resp: 20 (02/07/25 1105)  BP: 106/65 (02/07/25 1105)  SpO2: 99 % (02/07/25 1105) Vital Signs (24h Range):  Temp:  [97.6 °F (36.4 °C)-98.2 °F (36.8 °C)] 98.2 °F (36.8 °C)  Pulse:  [38-73] 73  Resp:  [17-20] 20  SpO2:  [96 %-100 %] 99 %  BP: (106-145)/(55-70) 106/65     Weight: 73.5 kg (162 lb 0.6 oz)  Body mass index is 25.38 kg/m².    Intake/Output Summary (Last 24 hours) at 2/7/2025 1421  Last data filed at 2/7/2025 0821  Gross per 24 hour   Intake 222 ml   Output 1300 ml   Net -1078 ml         Physical Exam  Constitutional:       General: She is not in acute distress.     Appearance: She is not ill-appearing.   Cardiovascular:      Rate and Rhythm: Normal rate.      Heart sounds: Normal heart sounds.   Pulmonary:      Effort: Pulmonary effort is normal. No respiratory distress.   Abdominal:      General: Abdomen is flat.      Palpations: Abdomen is soft.      Tenderness: There is no abdominal tenderness.   Musculoskeletal:      Right lower leg: No edema.      Left lower leg: No edema.   Neurological:      Mental Status: She is alert and oriented to person, place, and time.   Psychiatric:         Mood and Affect: Mood normal.             Significant Labs: All pertinent labs within the past 24 hours have been reviewed.    Significant Imaging: I have reviewed all pertinent imaging results/findings within the past 24 hours.

## 2025-02-07 NOTE — PLAN OF CARE
Problem: Occupational Therapy  Goal: Occupational Therapy Goal  Description: Goals to be met by: 2/23/25     Patient will increase functional independence with ADLs by performing:    UE Dressing with Modified [place].  LE Dressing with Modified [place].  Grooming while standing with Modified [place].  Toileting from toilet with Modified [place] for hygiene and clothing management.   Supine to sit with Modified [place].  Step transfer with Modified [place]  Toilet transfer to toilet with Modified [place].    Outcome: Progressing     Goals remain appropriate

## 2025-02-07 NOTE — PROGRESS NOTES
[facility]  Adult Nutrition  Progress Note    SUMMARY       Recommendations    1. Continue cardiac diet as tolerated   - RN staff: please document PO intake in the flowsheets   2. Continue novasource renal once daily   3. Encourage good intake   4. RD to monitor weight, labs, intake    Goals:   1. % nutritional needs met with diet   2. Maintain weight during admission  Nutrition Goal Status: progressing towards goal  Communication of RD Recs: other (comment) (POC)    Assessment and Plan    Endocrine  Moderate protein-calorie malnutrition  Malnutrition Type:  Context: acute illness or injury  Level: moderate    Related to (etiology):   Inadequate energy- protein intake    Signs and Symptoms (as evidenced by):   -4% wt loss x 2 weeks and mild muscle/fat wasting     Malnutrition Characteristic Summary:  Weight Loss (Malnutrition): 1-2% in 1 week (-4% x 2 weeks)  Subcutaneous Fat (Malnutrition): mild depletion  Muscle Mass (Malnutrition): mild depletion    Interventions/Recommendations (treatment strategy):  Collaboration of nutritional care with other providers.      Nutrition Diagnosis Status:   Continues     Malnutrition Assessment    Malnutrition Context: acute illness or injury  Malnutrition Level: moderate  Skin (Micronutrient): none  Nails (Micronutrient): none  Hair/Scalp (Micronutrient): none  Extraoral (Micronutrient): none  Gums (Micronutrient): none  Lips/Mucous Membranes (Micronutrient): none  Teeth (Micronutrient): none, other (see comments) (missing teeth)       Weight Loss (Malnutrition): 1-2% in 1 week (-4% x 2 weeks)  Subcutaneous Fat (Malnutrition): mild depletion  Muscle Mass (Malnutrition): mild depletion   Orbital Region (Subcutaneous Fat Loss): mild depletion  Upper Arm Region (Subcutaneous Fat Loss): mild depletion  Thoracic and Lumbar Region: mild depletion   [place] Region (Muscle Loss): mild depletion  Clavicle Bone Region (Muscle Loss): mild depletion  Clavicle and  "Acromion Bone Region (Muscle Loss): mild depletion  Scapular Bone Region (Muscle Loss): mild depletion     Reason for Assessment    Reason For Assessment: RD follow-up  Diagnosis: other (see comments) (anginal equivalent)  General Information Comments: Pt admitted with anginal equivalent. Pt reported a poor appetite - PO: 25-50%. Pt drinking novasource ONS. Denied chewing and swallowing difficulties - denied nausea, vomiting, diarrhea - confirmed constipation. BM: /3.  Nutrition Discharge Planning: adequate nutrition    Nutrition/Diet History    Nutrition Intake History: 3 meals  Spiritual, Cultural Beliefs, Protestant Practices, Values that Affect Care: no  Food Allergies: NKFA  Factors Affecting Nutritional Intake: decreased appetite    Nutrition Related Social Determinants of Health: SDOH: Adequate food in home environment        Food Insecurity: No Food Insecurity (2025)     Hunger Vital Sign      Worried About Running Out of Food in the Last Year: Never true      Ran Out of Food in the Last Year: Never true     Anthropometrics    Height: 5' 7" (170.2 cm)  Height (inches): 67 in  Height Method: Stated  Weight: 73.5 kg (162 lb 0.6 oz)  Weight (lb): 162.04 lb  Weight Method: Bed Scale  Ideal Body Weight (IBW), Female: 135 lb  % Ideal Body Weight, Female (lb): 117.58 %  BMI (Calculated): 25.4  BMI Grade: 25 - 29.9 - overweight  Usual Body Weight (UBW), k.5 kg  % Usual Body Weight: 92.65  % Weight Change From Usual Weight: -7.55 %    Lab/Procedures/Meds    Pertinent Labs Reviewed: reviewed  Pertinent Labs Comments: RBC 3.40 low, H/H 9.8/30.3 low, BUN 32 high, GFR 26.3 low  Pertinent Medications Reviewed: reviewed  Pertinent Medications Comments: allopurinol, amlodipine, aspirin, atorvastatin, clopidogrel, farxiga, folic acid, heparin, isosorbide, losartan, oseltamivir, pantoprazole, vit D    Estimated/Assessed Needs    Weight Used For Calorie Calculations: 72.3 kg (159 lb 6.3 oz)  Energy Calorie " Requirements (kcal): 1522 kcal  Energy Need Method: [place]id[place] (MSJ x 1.3)  Protein Requirements: 58- 72g (0.8-1.0g/kg)  Weight Used For Protein Calculations: 72.3 kg (159 lb 6.3 oz)  Fluid Requirements (mL): as per MD or RDA  Estimated Fluid Requirement Method: RDA Method  RDA Method (mL): 1522    Nutrition Prescription Ordered    Current Diet Order: cardiac  Oral Nutrition Supplement: novasource renal daily    Evaluation of Received Nutrient/Fluid Intake    Energy Calories Required: not meeting needs  Protein Required: not meeting needs  Tolerance: tolerating  % Intake of Estimated Energy Needs: 25 - 50 %  % Meal Intake: 50 - 75 %    Nutrition Risk    Level of Risk/Frequency of Follow-up: moderate     Monitor and Evaluation    Food and Nutrient Intake: energy intake, food and beverage intake  Food and Nutrient Adminstration: diet order  Knowledge/Beliefs/Attitudes: food and nutrition knowledge/skill, beliefs and attitudes  Physical Activity and Function: nutrition-related ADLs and IADLs, factors affecting access to physical activity  Anthropometric Measurements: body mass index, weight change, weight  Biochemical Data, Medical Tests and Procedures: electrolyte and renal panel, gastrointestinal profile, glucose/endocrine profile, inflammatory profile, lipid profile  Nutrition-Focused Physical Findings: overall appearance     Nutrition Follow-Up    RD Follow-up?: Yes

## 2025-02-07 NOTE — NURSING
Pt stable has productive cough, will inform provider. Prn med was  order and previous med was given. Pt also c/o of itching to vaginal area on top pt says PA informed medication was given for that, They will make rounds on pt  in AM. Pt verbalize understanding

## 2025-02-07 NOTE — NURSING
Pt is AAOx4.  Pt sitting up in chair stable tolerated all  by mouth  medication. No complaint of any,No distress noted. Call light in reach. Bed in low locked position.   Side rails x2. Belongings at bedside.   Pt free of fall and injuries Questions and concerns voiced and answered.    Plan of care continues.

## 2025-02-07 NOTE — PLAN OF CARE
Problem: Adult Inpatient Plan of Care  Goal: Plan of Care Review  Outcome: Progressing  Goal: Patient-Specific Goal (Individualized)  Outcome: Progressing  Goal: Absence of Hospital-Acquired Illness or Injury  Outcome: Progressing  Goal: Optimal Comfort and Wellbeing  Outcome: Progressing  Goal: Readiness for Transition of Care  Outcome: Progressing     Problem: Infection  Goal: Absence of Infection Signs and Symptoms  Outcome: Progressing     Problem: Acute Kidney Injury/Impairment  Goal: Fluid and Electrolyte Balance  Outcome: Progressing  Goal: Improved Oral Intake  Outcome: Progressing  Goal: Effective Renal Function  Outcome: Progressing     Problem: Fall Injury Risk  Goal: Absence of Fall and Fall-Related Injury  Outcome: Progressing     Problem: Pain Acute  Goal: Optimal Pain Control and Function  Outcome: Progressing     Problem: Skin Injury Risk Increased  Goal: Skin Health and Integrity  Outcome: Progressing     Pt progressing towards goals. No distress notice. No falls or injuries during shift. Bed in lowest position, call light within reach, belonging at bedside. Safety precaution maintain.Plan of Care reviewed. Pt verbalized understanding.

## 2025-02-08 PROCEDURE — 25000003 PHARM REV CODE 250: Performed by: STUDENT IN AN ORGANIZED HEALTH CARE EDUCATION/TRAINING PROGRAM

## 2025-02-08 PROCEDURE — 63600175 PHARM REV CODE 636 W HCPCS: Performed by: HOSPITALIST

## 2025-02-08 PROCEDURE — 25000003 PHARM REV CODE 250: Performed by: HOSPITALIST

## 2025-02-08 PROCEDURE — 27000207 HC ISOLATION

## 2025-02-08 PROCEDURE — 21400001 HC TELEMETRY ROOM

## 2025-02-08 RX ADMIN — AMLODIPINE BESYLATE 5 MG: 5 TABLET ORAL at 09:02

## 2025-02-08 RX ADMIN — DAPAGLIFLOZIN 10 MG: 10 TABLET, FILM COATED ORAL at 09:02

## 2025-02-08 RX ADMIN — LOSARTAN POTASSIUM 25 MG: 25 TABLET, FILM COATED ORAL at 09:02

## 2025-02-08 RX ADMIN — HEPARIN SODIUM 5000 UNITS: 5000 INJECTION INTRAVENOUS; SUBCUTANEOUS at 02:02

## 2025-02-08 RX ADMIN — ISOSORBIDE MONONITRATE 90 MG: 60 TABLET, EXTENDED RELEASE ORAL at 09:02

## 2025-02-08 RX ADMIN — HEPARIN SODIUM 5000 UNITS: 5000 INJECTION INTRAVENOUS; SUBCUTANEOUS at 08:02

## 2025-02-08 RX ADMIN — FLUTICASONE PROPIONATE 100 MCG: 50 SPRAY, METERED NASAL at 09:02

## 2025-02-08 RX ADMIN — ACETAMINOPHEN 650 MG: 325 TABLET ORAL at 11:02

## 2025-02-08 RX ADMIN — PANTOPRAZOLE SODIUM 20 MG: 20 TABLET, DELAYED RELEASE ORAL at 05:02

## 2025-02-08 RX ADMIN — HEPARIN SODIUM 5000 UNITS: 5000 INJECTION INTRAVENOUS; SUBCUTANEOUS at 05:02

## 2025-02-08 RX ADMIN — ALLOPURINOL 50 MG: 300 TABLET ORAL at 09:02

## 2025-02-08 RX ADMIN — GUAIFENESIN AND DEXTROMETHORPHAN 10 ML: 100; 10 SYRUP ORAL at 12:02

## 2025-02-08 RX ADMIN — Medication 6 MG: at 08:02

## 2025-02-08 RX ADMIN — CLOPIDOGREL BISULFATE 75 MG: 75 TABLET ORAL at 09:02

## 2025-02-08 RX ADMIN — ATORVASTATIN CALCIUM 80 MG: 40 TABLET, FILM COATED ORAL at 09:02

## 2025-02-08 RX ADMIN — ASPIRIN 81 MG: 81 TABLET, COATED ORAL at 09:02

## 2025-02-08 RX ADMIN — CHOLECALCIFEROL TAB 25 MCG (1000 UNIT) 2000 UNITS: 25 TAB at 09:02

## 2025-02-08 RX ADMIN — FOLIC ACID 1 MG: 1 TABLET ORAL at 09:02

## 2025-02-08 RX ADMIN — SODIUM BICARBONATE 650 MG TABLET 650 MG: at 08:02

## 2025-02-08 RX ADMIN — SODIUM BICARBONATE 650 MG TABLET 650 MG: at 09:02

## 2025-02-08 NOTE — SUBJECTIVE & OBJECTIVE
Interval History:     Patient seen at bedside.. reports no new complaints.   Waiting on placement. Denies itching.     Review of Systems  Objective:     Vital Signs (Most Recent):  Temp: 98.1 °F (36.7 °C) (02/08/25 1137)  Pulse: (!) 57 (02/08/25 1444)  Resp: 18 (02/08/25 1137)  BP: (!) 118/58 (02/08/25 1137)  SpO2: 96 % (02/08/25 1137) Vital Signs (24h Range):  Temp:  [97.6 °F (36.4 °C)-98.6 °F (37 °C)] 98.1 °F (36.7 °C)  Pulse:  [37-66] 57  Resp:  [17-18] 18  SpO2:  [96 %-99 %] 96 %  BP: (107-149)/(56-71) 118/58     Weight: 73.5 kg (162 lb 0.6 oz)  Body mass index is 25.38 kg/m².    Intake/Output Summary (Last 24 hours) at 2/8/2025 1532  Last data filed at 2/7/2025 1826  Gross per 24 hour   Intake --   Output 400 ml   Net -400 ml         Physical Exam  Constitutional:       General: She is not in acute distress.     Appearance: She is not ill-appearing.   Cardiovascular:      Rate and Rhythm: Normal rate.      Heart sounds: Normal heart sounds.   Pulmonary:      Effort: Pulmonary effort is normal.      Breath sounds: Normal breath sounds.   Abdominal:      General: Abdomen is flat.      Palpations: Abdomen is soft.      Tenderness: There is no abdominal tenderness.   Musculoskeletal:      Right lower leg: No edema.      Left lower leg: No edema.   Neurological:      Mental Status: She is alert and oriented to person, place, and time. Mental status is at baseline.   Psychiatric:         Mood and Affect: Mood normal.             Significant Labs: All pertinent labs within the past 24 hours have been reviewed.    Significant Imaging: I have reviewed all pertinent imaging results/findings within the past 24 hours.

## 2025-02-08 NOTE — PROGRESS NOTES
"[facility] [address] [place] [address] Medicine  Progress Note    Patient Name: Anahy Evans  MRN: [id number]  Patient Class: IP- Inpatient   Admission Date: 1/19/2025  Length of Stay: 18 days  Attending Physician: Maria E Bernstein MD  Primary Care Provider: Elena Cabrera MD        Principal Problem:Anginal equivalent        HPI:  89 y/o AAF w/ CAD hx PCI, CKD 4, HTN, Afib, AAA, 2nd degree AV Block presents to the ED with complaints of dyspnea.     She was recently admitted to [facility] [place] from 1/13-1/17 per DC summary "Patient admitted for dyspnea on exertion. V/Q scan was completed - no pulmonary embolism. Echo ordered - regional wall motion abnormalities present. Low normal systolic ejection fraction 50-55%. Cardiology was consulted - feel origin of TAYLOR may be cardiac in nature. PET stress done 1/16. Two perfusion abnormalities seen. Interventional cardiology recommending medical management and close follow-up. Amlodipine increased, losartan decreased, and started on imdur."    She returns to ED with concerns of dyspnea on exertion.  She discharged to home after last admission, lives with her daughter.  Her grand-daughter accompanies her today and helps provide history.  Since discharge patient has had limited ADL capability, pt requires assistance w/ ambulation and use of walker, patient has home purewick and bedside commode.  She requires assistance with toileting, bathing, clothing.  She is adherent to medications. Yesterday patient appeared weak and when sitting upright had poor trunk stability would slump over and had poor appetite did not eat much and was noted with intermittent confusion.  Her confusion is described as poor short term memory, will intermittently forget recent events, granddaughter notes that patient usually answers orientation questions appropriately when evaluated by medical teams, but might forget later what was discussed or why she is in the hospital.    She was treated for acute cystitis " during last admit, pan-sensitive E.coli on urine culture received ceftriaxone inpatient and not discharged on any oral antibiotics.     She does report poor sleep, has had difficulty sleeping at night and will nap multiple times per day. No reported non-redirectable agitation.   Prior to this month, grand-daughter notes patient was performing more ADL on her own.  Patient had declined referral to SNF with recent admits, last PT/OT recs for low-intensity therapy, pt is more open to post-acute care if recommended.     In ED tonight pt with bradycardia noted, cardiology consulted, no acute medical management of her bradycardia recommended, she has 2:1 AV block on review of EKG/telemetry tonight, hx of Mobitz 1 2nd Deg AV block in past.       Overview/Hospital Course:  Evaluated by cardiology and imdur increased. Cardiology recommends medical management, continue Imdur, DAPT, and consider Ranolazine for angina. Imdur increased to 60mg.  Patient started ranolazine as per cardiology recs, then later discontinued due to renal function. Patient worked with PT/OT again. Patient is interested in placement option for therapy.  Patient is stable and pending SNF placement.  P2P denial upheld 1/29. Family submitted for fast appeal > successful. Patient has placement at SNF now pending quarantine period for flu.     Recurrent chest pain on 1/31 with slight elevation in trop. Cardiology consulted. Imdur and atorvastatin increased.     Fevering 2/1-2/3. CXR clear. UA negative. +flu. Started on tamiflu 2/3. Intermittently on 1L NC since flu diagnosis. No documented desaturations        Interval History:     Patient seen at bedside.. reports no new complaints.   Waiting on placement. Denies itching.     Review of Systems  Objective:     Vital Signs (Most Recent):  Temp: 98.1 °F (36.7 °C) (02/08/25 1137)  Pulse: (!) 57 (02/08/25 1444)  Resp: 18 (02/08/25 1137)  BP: (!) 118/58 (02/08/25 1137)  SpO2: 96 % (02/08/25 1137) Vital Signs (24h  Range):  Temp:  [97.6 °F (36.4 °C)-98.6 °F (37 °C)] 98.1 °F (36.7 °C)  Pulse:  [37-66] 57  Resp:  [17-18] 18  SpO2:  [96 %-99 %] 96 %  BP: (107-149)/(56-71) 118/58     Weight: 73.5 kg (162 lb 0.6 oz)  Body mass index is 25.38 kg/m².    Intake/Output Summary (Last 24 hours) at 2/8/2025 1532  Last data filed at 2/7/2025 1826  Gross per 24 hour   Intake --   Output 400 ml   Net -400 ml         Physical Exam  Constitutional:       General: She is not in acute distress.     Appearance: She is not ill-appearing.   Cardiovascular:      Rate and Rhythm: Normal rate.      Heart sounds: Normal heart sounds.   Pulmonary:      Effort: Pulmonary effort is normal.      Breath sounds: Normal breath sounds.   Abdominal:      General: Abdomen is flat.      Palpations: Abdomen is soft.      Tenderness: There is no abdominal tenderness.   Musculoskeletal:      Right lower leg: No edema.      Left lower leg: No edema.   Neurological:      Mental Status: She is alert and oriented to person, place, and time. Mental status is at baseline.   Psychiatric:         Mood and Affect: Mood normal.             Significant Labs: All pertinent labs within the past 24 hours have been reviewed.    Significant Imaging: I have reviewed all pertinent imaging results/findings within the past 24 hours.    Assessment and Plan     * Anginal equivalent  Seen by cardiology - her intermittent dyspnea symptoms are considered an anginal equivalent.  Recommendation to titrate Imdur dosage to 60mg. BP now borderline hypotensive with this increased. Patient took AM imdur 30mg, was hypertensive on presentation - gave 30mg imdur 1/19 and started 60mg in AM.  Recurrent chest pain on 1/31 with trop of 71. Discussed with cardiology. Continuing medical management.   - cardiology signed off  - PT/OT consulted  - increase imdur to 90mg on 2/1  - increase atorvastatin to 80mg   - ranolazine discontinued due to renal function    Influenza A  Fever, cough, malaise. Flu A  positive 2/3. CXR clear  - tamiflu started 2/3  - supportive care      Febrile  Temp to 101 on 2/1. No leukocytosis or tachycardia. + cough, wheeze > see flu       Moderate protein-calorie malnutrition  Nutrition consulted. Most recent weight and BMI monitored-     Measurements:  Wt Readings from Last 1 Encounters:   02/03/25 73.5 kg (162 lb 0.6 oz)   Body mass index is 25.38 kg/m².    Patient has been screened and assessed by RD.    Malnutrition Type:  Context: acute illness or injury  Level: moderate    Malnutrition Characteristic Summary:  Weight Loss (Malnutrition): 1-2% in 1 week (-4% x 2 weeks)  Subcutaneous Fat (Malnutrition): mild depletion  Muscle Mass (Malnutrition): mild depletion    Interventions/Recommendations (treatment strategy):  1.)      Debility  Patient with Acute debility due to age-related physical debility and cardiac disease . The patient's latest AMPAC (Activity Measure for Post Acute Care) Score is listed below.    AM-PAC Score - How much help does the patient need for each activity listed  Basic Mobility Total Score: 17  Turning over in bed (including adjusting bedclothes, sheets and blankets)?: A little  Sitting down on and standing up from a chair with arms (e.g., wheelchair, bedside commode, etc.): A little  Moving from lying on back to sitting on the side of the bed?: A little  Moving to and from a bed to a chair (including a wheelchair)?: A little  Need to walk in hospital room?: A little  Climbing 3-5 steps with a railing?: A lot    Plan  - Progressive mobility protocol initated  - PT/OT consulted  - Fall precautions in place          Counseling regarding goals of care  Code status discussion as per AV block problem.     Patient has living will and HCPOA documents uploaded, she does not have an LAPOST completed. Would recommend completion of LAPOST prior to hospital discharge.       Confusion  ED indicated pt referred for admit for confusion.  By CAM-ICU testing pt does not have  "delirium, some disorientation. She may have some underlying cognitive deficits based on grand-daughters description. Appears pt with altered sleep wake cycles more recently - schedule melatonin tonight to help normalize sleep-wake cycle, delirium precautions.   Repeat UA appears normal and no persistent symptoms or concerns for ongoing Acute cystitis adequately treated.   - delirium precautions      Acute cystitis without hematuria  Repeat UA appears normal and no persistent symptoms or concerns for ongoing Acute cystitis adequately treated.       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 Pt having 2:1 AV block atrial rates in 80s and HR in low 40s.  During cardiology evaluation pt with variable 2nd degree AV block. She is not on any AV node blocking agents. Discussed with cards/EP  - continue telemetry monitoring.     Per Dr. Cummings on admit: "Tonight discussed code status with patient - she has been Full Code and DNR during last 2 admits, initially indicated DNR but also states she would want resuscitative measures if she might not incur injury, reviewed probabilities with pt given advanced age and chronic medical conditiions indicated to her that under true cardio-respiratory arrest she would be left with subsequent injury and overall low likelihood of survival to discharge 5-10%.  Give this bradyarrhythmia I did discuss if patients HR was lower if she would want invasive measures such as transvenous pacing or transcutaneous pacing performed she responded in affirmative,  will place FULL CODE for now."       Coronary artery disease involving native coronary artery of native heart without angina pectoris  Patient with known CAD , which is uncontrolled Will continue ASA, Plavix, and Statin and monitor for S/Sx of angina/ACS. Continue to monitor on telemetry.     CKD (chronic kidney disease), stage IV  Creatine stable for now. BMP reviewed- noted Estimated " Creatinine Clearance: 19.8 mL/min (A) (based on SCr of 1.8 mg/dL (H)). according to latest data. Based on current GFR, CKD stage is stage 4 - GFR 15-29.  Monitor UOP and serial BMP and adjust therapy as needed. Renally dose meds. Avoid nephrotoxic medications and procedures.    Continue sodium bicarbonate and farxiga    Essential hypertension  Patient's blood pressure range in the last 24 hours was: BP  Min: 102/57  Max: 154/75.The patient's inpatient anti-hypertensive regimen is listed below:  Current Antihypertensives  nitroGLYCERIN SL tablet 0.4 mg, Every 5 min PRN, Sublingual  amLODIPine tablet 5 mg, Daily, Oral  losartan tablet 25 mg, Daily, Oral  amlodipine (NORVASC) tablet, Daily, Oral  isosorbide mononitrate 24 hr tablet 90 mg, Daily, Oral  isosorbide mononitrate (IMDUR) 24 hr tablet, Daily, Oral    Plan  - BP is controlled, no changes needed to their regimen  - adjustments to BP meds     PAF (paroxysmal atrial fibrillation)  Patient has paroxysmal (<7 days) atrial fibrillation. Patient is currently in sinus rhythm. [id number] Score: 4. The patients heart rate in the last 24 hours is as follows:  Pulse  Min: 58  Max: 82     Antiarrhythmics       Anticoagulants  heparin (porcine) injection 5,000 Units, Every 8 hours, Subcutaneous    Plan  - Replete lytes with a goal of K>4, Mg >2  - Patient is not anticoagulated due to unclear etiology  - Patient's afib is currently controlled          VTE Risk Mitigation (From admission, onward)           Ordered     heparin (porcine) injection 5,000 Units  Every 8 hours         01/20/25 0003     IP VTE HIGH RISK PATIENT  Once         01/20/25 0003     Place sequential compression device  Until discontinued         01/20/25 0003                    Discharge Planning   ROSEMARIE: 2/10/2025     Code Status: Full Code   Medical Readiness for Discharge Date:   Discharge Plan A: Skilled Nursing Facility   Discharge Delays: (!) Post-Acute Set-up            Maria E Bernstein MD  Department  of [facility] Medicine   [facility]

## 2025-02-09 PROCEDURE — 25000003 PHARM REV CODE 250: Performed by: STUDENT IN AN ORGANIZED HEALTH CARE EDUCATION/TRAINING PROGRAM

## 2025-02-09 PROCEDURE — 27000207 HC ISOLATION

## 2025-02-09 PROCEDURE — 63600175 PHARM REV CODE 636 W HCPCS: Performed by: HOSPITALIST

## 2025-02-09 PROCEDURE — 25000003 PHARM REV CODE 250: Performed by: HOSPITALIST

## 2025-02-09 PROCEDURE — 21400001 HC TELEMETRY ROOM

## 2025-02-09 RX ADMIN — SODIUM BICARBONATE 650 MG TABLET 650 MG: at 08:02

## 2025-02-09 RX ADMIN — ASPIRIN 81 MG: 81 TABLET, COATED ORAL at 08:02

## 2025-02-09 RX ADMIN — LOSARTAN POTASSIUM 25 MG: 25 TABLET, FILM COATED ORAL at 08:02

## 2025-02-09 RX ADMIN — FLUTICASONE PROPIONATE 100 MCG: 50 SPRAY, METERED NASAL at 08:02

## 2025-02-09 RX ADMIN — AMLODIPINE BESYLATE 5 MG: 5 TABLET ORAL at 08:02

## 2025-02-09 RX ADMIN — Medication 6 MG: at 08:02

## 2025-02-09 RX ADMIN — CLOPIDOGREL BISULFATE 75 MG: 75 TABLET ORAL at 08:02

## 2025-02-09 RX ADMIN — HEPARIN SODIUM 5000 UNITS: 5000 INJECTION INTRAVENOUS; SUBCUTANEOUS at 09:02

## 2025-02-09 RX ADMIN — HEPARIN SODIUM 5000 UNITS: 5000 INJECTION INTRAVENOUS; SUBCUTANEOUS at 02:02

## 2025-02-09 RX ADMIN — PANTOPRAZOLE SODIUM 20 MG: 20 TABLET, DELAYED RELEASE ORAL at 08:02

## 2025-02-09 RX ADMIN — HEPARIN SODIUM 5000 UNITS: 5000 INJECTION INTRAVENOUS; SUBCUTANEOUS at 06:02

## 2025-02-09 RX ADMIN — ISOSORBIDE MONONITRATE 90 MG: 60 TABLET, EXTENDED RELEASE ORAL at 08:02

## 2025-02-09 RX ADMIN — CHOLECALCIFEROL TAB 25 MCG (1000 UNIT) 2000 UNITS: 25 TAB at 08:02

## 2025-02-09 RX ADMIN — ALLOPURINOL 50 MG: 300 TABLET ORAL at 08:02

## 2025-02-09 RX ADMIN — GUAIFENESIN AND DEXTROMETHORPHAN 10 ML: 100; 10 SYRUP ORAL at 01:02

## 2025-02-09 RX ADMIN — ATORVASTATIN CALCIUM 80 MG: 40 TABLET, FILM COATED ORAL at 08:02

## 2025-02-09 RX ADMIN — DAPAGLIFLOZIN 10 MG: 10 TABLET, FILM COATED ORAL at 08:02

## 2025-02-09 RX ADMIN — FOLIC ACID 1 MG: 1 TABLET ORAL at 08:02

## 2025-02-09 NOTE — PROGRESS NOTES
"[facility] [address] [place] [address] Medicine  Progress Note    Patient Name: Anahy Evans  MRN: [id number]  Patient Class: IP- Inpatient   Admission Date: 1/19/2025  Length of Stay: 19 days  Attending Physician: Maria E Bernstein MD  Primary Care Provider: Elena Cabrera MD          Principal Problem:Anginal equivalent        HPI:  89 y/o AAF w/ CAD hx PCI, CKD 4, HTN, Afib, AAA, 2nd degree AV Block presents to the ED with complaints of dyspnea.     She was recently admitted to [facility] [place] from 1/13-1/17 per DC summary "Patient admitted for dyspnea on exertion. V/Q scan was completed - no pulmonary embolism. Echo ordered - regional wall motion abnormalities present. Low normal systolic ejection fraction 50-55%. Cardiology was consulted - feel origin of TAYLOR may be cardiac in nature. PET stress done 1/16. Two perfusion abnormalities seen. Interventional cardiology recommending medical management and close follow-up. Amlodipine increased, losartan decreased, and started on imdur."    She returns to ED with concerns of dyspnea on exertion.  She discharged to home after last admission, lives with her daughter.  Her grand-daughter accompanies her today and helps provide history.  Since discharge patient has had limited ADL capability, pt requires assistance w/ ambulation and use of walker, patient has home purewick and bedside commode.  She requires assistance with toileting, bathing, clothing.  She is adherent to medications. Yesterday patient appeared weak and when sitting upright had poor trunk stability would slump over and had poor appetite did not eat much and was noted with intermittent confusion.  Her confusion is described as poor short term memory, will intermittently forget recent events, granddaughter notes that patient usually answers orientation questions appropriately when evaluated by medical teams, but might forget later what was discussed or why she is in the hospital.    She was treated for acute cystitis " during last admit, pan-sensitive E.coli on urine culture received ceftriaxone inpatient and not discharged on any oral antibiotics.     She does report poor sleep, has had difficulty sleeping at night and will nap multiple times per day. No reported non-redirectable agitation.   Prior to this month, grand-daughter notes patient was performing more ADL on her own.  Patient had declined referral to SNF with recent admits, last PT/OT recs for low-intensity therapy, pt is more open to post-acute care if recommended.     In ED tonight pt with bradycardia noted, cardiology consulted, no acute medical management of her bradycardia recommended, she has 2:1 AV block on review of EKG/telemetry tonight, hx of Mobitz 1 2nd Deg AV block in past.       Overview/Hospital Course:  Evaluated by cardiology and imdur increased. Cardiology recommends medical management, continue Imdur, DAPT, and consider Ranolazine for angina. Imdur increased to 60mg.  Patient started ranolazine as per cardiology recs, then later discontinued due to renal function. Patient worked with PT/OT again. Patient is interested in placement option for therapy.  Patient is stable and pending SNF placement.  P2P denial upheld 1/29. Family submitted for fast appeal > successful. Patient has placement at SNF now pending quarantine period for flu.     Recurrent chest pain on 1/31 with slight elevation in trop. Cardiology consulted. Imdur and atorvastatin increased.     Fevering 2/1-2/3. CXR clear. UA negative. +flu. Started on tamiflu 2/3. Intermittently on 1L NC since flu diagnosis. No documented desaturations        Interval History:     Reports no new symptoms. Pending placement     Review of Systems  Objective:     Vital Signs (Most Recent):  Temp: 98.1 °F (36.7 °C) (02/09/25 1145)  Pulse: 66 (02/09/25 1145)  Resp: 17 (02/09/25 1145)  BP: (!) 142/67 (02/09/25 1145)  SpO2: (!) 93 % (02/09/25 1145) Vital Signs (24h Range):  Temp:  [97.6 °F (36.4 °C)-98.1 °F (36.7  °C)] 98.1 °F (36.7 °C)  Pulse:  [44-73] 66  Resp:  [16-18] 17  SpO2:  [93 %-94 %] 93 %  BP: (125-182)/(59-81) 142/67     Weight: 73.5 kg (162 lb 0.6 oz)  Body mass index is 25.38 kg/m².  No intake or output data in the 24 hours ending 02/09/25 1214      Physical Exam  Constitutional:       General: She is not in acute distress.     Appearance: She is not ill-appearing.   Cardiovascular:      Rate and Rhythm: Normal rate.      Heart sounds: Normal heart sounds.   Pulmonary:      Effort: Pulmonary effort is normal. No respiratory distress.   Abdominal:      General: Abdomen is flat.      Palpations: Abdomen is soft.      Tenderness: There is no abdominal tenderness.   Musculoskeletal:      Right lower leg: No edema.      Left lower leg: No edema.   Neurological:      Mental Status: She is alert and oriented to person, place, and time. Mental status is at baseline.             Significant Labs: All pertinent labs within the past 24 hours have been reviewed.    Significant Imaging: I have reviewed all pertinent imaging results/findings within the past 24 hours.    Assessment and Plan     * Anginal equivalent  Seen by cardiology - her intermittent dyspnea symptoms are considered an anginal equivalent.  Recommendation to titrate Imdur dosage to 60mg. BP now borderline hypotensive with this increased. Patient took AM imdur 30mg, was hypertensive on presentation - gave 30mg imdur 1/19 and started 60mg in AM.  Recurrent chest pain on 1/31 with trop of 71. Discussed with cardiology. Continuing medical management.   - cardiology signed off  - PT/OT consulted  - increase imdur to 90mg on 2/1  - increase atorvastatin to 80mg   - ranolazine discontinued due to renal function    Influenza A  Fever, cough, malaise. Flu A positive 2/3. CXR clear  - tamiflu started 2/3  - supportive care      Febrile  Temp to 101 on 2/1. No leukocytosis or tachycardia. + cough, wheeze > see flu       Moderate protein-calorie malnutrition  Nutrition  consulted. Most recent weight and BMI monitored-     Measurements:  Wt Readings from Last 1 Encounters:   02/03/25 73.5 kg (162 lb 0.6 oz)   Body mass index is 25.38 kg/m².    Patient has been screened and assessed by RD.    Malnutrition Type:  Context: acute illness or injury  Level: moderate    Malnutrition Characteristic Summary:  Weight Loss (Malnutrition): 1-2% in 1 week (-4% x 2 weeks)  Subcutaneous Fat (Malnutrition): mild depletion  Muscle Mass (Malnutrition): mild depletion    Interventions/Recommendations (treatment strategy):  1.)      Debility  Patient with Acute debility due to age-related physical debility and cardiac disease . The patient's latest AMPAC (Activity Measure for Post Acute Care) Score is listed below.    AM-PAC Score - How much help does the patient need for each activity listed  Basic Mobility Total Score: 17  Turning over in bed (including adjusting bedclothes, sheets and blankets)?: A little  Sitting down on and standing up from a chair with arms (e.g., wheelchair, bedside commode, etc.): A little  Moving from lying on back to sitting on the side of the bed?: A little  Moving to and from a bed to a chair (including a wheelchair)?: A little  Need to walk in hospital room?: A little  Climbing 3-5 steps with a railing?: A lot    Plan  - Progressive mobility protocol initated  - PT/OT consulted  - Fall precautions in place          Counseling regarding goals of care  Code status discussion as per AV block problem.     Patient has living will and HCPOA documents uploaded, she does not have an LAPOST completed. Would recommend completion of LAPOST prior to hospital discharge.       Confusion  ED indicated pt referred for admit for confusion.  By CAM-ICU testing pt does not have delirium, some disorientation. She may have some underlying cognitive deficits based on grand-daughters description. Appears pt with altered sleep wake cycles more recently - schedule melatonin tonight to help  "normalize sleep-wake cycle, delirium precautions.   Repeat UA appears normal and no persistent symptoms or concerns for ongoing Acute cystitis adequately treated.   - delirium precautions      Acute cystitis without hematuria  Repeat UA appears normal and no persistent symptoms or concerns for ongoing Acute cystitis adequately treated.       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 Pt having 2:1 AV block atrial rates in 80s and HR in low 40s.  During cardiology evaluation pt with variable 2nd degree AV block. She is not on any AV node blocking agents. Discussed with cards/EP  - continue telemetry monitoring.     Per Dr. Cummings on admit: "Tonight discussed code status with patient - she has been Full Code and DNR during last 2 admits, initially indicated DNR but also states she would want resuscitative measures if she might not incur injury, reviewed probabilities with pt given advanced age and chronic medical conditiions indicated to her that under true cardio-respiratory arrest she would be left with subsequent injury and overall low likelihood of survival to discharge 5-10%.  Give this bradyarrhythmia I did discuss if patients HR was lower if she would want invasive measures such as transvenous pacing or transcutaneous pacing performed she responded in affirmative,  will place FULL CODE for now."       Coronary artery disease involving native coronary artery of native heart without angina pectoris  Patient with known CAD , which is uncontrolled Will continue ASA, Plavix, and Statin and monitor for S/Sx of angina/ACS. Continue to monitor on telemetry.     CKD (chronic kidney disease), stage IV  Creatine stable for now. BMP reviewed- noted Estimated Creatinine Clearance: 19.8 mL/min (A) (based on SCr of 1.8 mg/dL (H)). according to latest data. Based on current GFR, CKD stage is stage 4 - GFR 15-29.  Monitor UOP and serial BMP and adjust therapy as needed. Renally " dose meds. Avoid nephrotoxic medications and procedures.    Continue sodium bicarbonate and farxiga    Essential hypertension  Patient's blood pressure range in the last 24 hours was: BP  Min: 102/57  Max: 154/75.The patient's inpatient anti-hypertensive regimen is listed below:  Current Antihypertensives  nitroGLYCERIN SL tablet 0.4 mg, Every 5 min PRN, Sublingual  amLODIPine tablet 5 mg, Daily, Oral  losartan tablet 25 mg, Daily, Oral  amlodipine (NORVASC) tablet, Daily, Oral  isosorbide mononitrate 24 hr tablet 90 mg, Daily, Oral  isosorbide mononitrate (IMDUR) 24 hr tablet, Daily, Oral    Plan  - BP is controlled, no changes needed to their regimen  - adjustments to BP meds     PAF (paroxysmal atrial fibrillation)  Patient has paroxysmal (<7 days) atrial fibrillation. Patient is currently in sinus rhythm. [id number] Score: 4. The patients heart rate in the last 24 hours is as follows:  Pulse  Min: 58  Max: 82     Antiarrhythmics       Anticoagulants  heparin (porcine) injection 5,000 Units, Every 8 hours, Subcutaneous    Plan  - Replete lytes with a goal of K>4, Mg >2  - Patient is not anticoagulated due to unclear etiology  - Patient's afib is currently controlled          VTE Risk Mitigation (From admission, onward)           Ordered     heparin (porcine) injection 5,000 Units  Every 8 hours         01/20/25 0003     IP VTE HIGH RISK PATIENT  Once         01/20/25 0003     Place sequential compression device  Until discontinued         01/20/25 0003                    Discharge Planning   ROSEMARIE: 2/10/2025     Code Status: Full Code   Medical Readiness for Discharge Date:   Discharge Plan A: Skilled Nursing Facility   Discharge Delays: (!) Post-Acute Set-up              Maria E Bernstein MD  Department of Hospital Medicine   [facility]

## 2025-02-09 NOTE — SUBJECTIVE & OBJECTIVE
Interval History:     Reports no new symptoms. Pending placement     Review of Systems  Objective:     Vital Signs (Most Recent):  Temp: 98.1 °F (36.7 °C) (02/09/25 1145)  Pulse: 66 (02/09/25 1145)  Resp: 17 (02/09/25 1145)  BP: (!) 142/67 (02/09/25 1145)  SpO2: (!) 93 % (02/09/25 1145) Vital Signs (24h Range):  Temp:  [97.6 °F (36.4 °C)-98.1 °F (36.7 °C)] 98.1 °F (36.7 °C)  Pulse:  [44-73] 66  Resp:  [16-18] 17  SpO2:  [93 %-94 %] 93 %  BP: (125-182)/(59-81) 142/67     Weight: 73.5 kg (162 lb 0.6 oz)  Body mass index is 25.38 kg/m².  No intake or output data in the 24 hours ending 02/09/25 1214      Physical Exam  Constitutional:       General: She is not in acute distress.     Appearance: She is not ill-appearing.   Cardiovascular:      Rate and Rhythm: Normal rate.      Heart sounds: Normal heart sounds.   Pulmonary:      Effort: Pulmonary effort is normal. No respiratory distress.   Abdominal:      General: Abdomen is flat.      Palpations: Abdomen is soft.      Tenderness: There is no abdominal tenderness.   Musculoskeletal:      Right lower leg: No edema.      Left lower leg: No edema.   Neurological:      Mental Status: She is alert and oriented to person, place, and time. Mental status is at baseline.             Significant Labs: All pertinent labs within the past 24 hours have been reviewed.    Significant Imaging: I have reviewed all pertinent imaging results/findings within the past 24 hours.

## 2025-02-10 PROCEDURE — 97535 SELF CARE MNGMENT TRAINING: CPT

## 2025-02-10 PROCEDURE — 21400001 HC TELEMETRY ROOM

## 2025-02-10 PROCEDURE — 25000003 PHARM REV CODE 250: Performed by: HOSPITALIST

## 2025-02-10 PROCEDURE — 25000003 PHARM REV CODE 250: Performed by: STUDENT IN AN ORGANIZED HEALTH CARE EDUCATION/TRAINING PROGRAM

## 2025-02-10 PROCEDURE — 97116 GAIT TRAINING THERAPY: CPT

## 2025-02-10 PROCEDURE — 63600175 PHARM REV CODE 636 W HCPCS: Performed by: HOSPITALIST

## 2025-02-10 RX ORDER — GUAIFENESIN AND DEXTROMETHORPHAN HYDROBROMIDE 10; 100 MG/5ML; MG/5ML
10 SYRUP ORAL EVERY 6 HOURS PRN
Qty: 118 ML | Refills: 0 | Status: SHIPPED | OUTPATIENT
Start: 2025-02-10 | End: 2025-02-17

## 2025-02-10 RX ADMIN — Medication 6 MG: at 09:02

## 2025-02-10 RX ADMIN — CLOPIDOGREL BISULFATE 75 MG: 75 TABLET ORAL at 08:02

## 2025-02-10 RX ADMIN — ATORVASTATIN CALCIUM 80 MG: 40 TABLET, FILM COATED ORAL at 08:02

## 2025-02-10 RX ADMIN — SODIUM BICARBONATE 650 MG TABLET 650 MG: at 08:02

## 2025-02-10 RX ADMIN — ALLOPURINOL 50 MG: 300 TABLET ORAL at 08:02

## 2025-02-10 RX ADMIN — SODIUM BICARBONATE 650 MG TABLET 650 MG: at 09:02

## 2025-02-10 RX ADMIN — PANTOPRAZOLE SODIUM 20 MG: 20 TABLET, DELAYED RELEASE ORAL at 06:02

## 2025-02-10 RX ADMIN — POLYETHYLENE GLYCOL 3350 17 G: 17 POWDER, FOR SOLUTION ORAL at 08:02

## 2025-02-10 RX ADMIN — AMLODIPINE BESYLATE 5 MG: 5 TABLET ORAL at 08:02

## 2025-02-10 RX ADMIN — ASPIRIN 81 MG: 81 TABLET, COATED ORAL at 08:02

## 2025-02-10 RX ADMIN — FOLIC ACID 1 MG: 1 TABLET ORAL at 08:02

## 2025-02-10 RX ADMIN — DAPAGLIFLOZIN 10 MG: 10 TABLET, FILM COATED ORAL at 08:02

## 2025-02-10 RX ADMIN — HEPARIN SODIUM 5000 UNITS: 5000 INJECTION INTRAVENOUS; SUBCUTANEOUS at 02:02

## 2025-02-10 RX ADMIN — BENZONATATE 200 MG: 100 CAPSULE ORAL at 05:02

## 2025-02-10 RX ADMIN — HEPARIN SODIUM 5000 UNITS: 5000 INJECTION INTRAVENOUS; SUBCUTANEOUS at 09:02

## 2025-02-10 RX ADMIN — ISOSORBIDE MONONITRATE 90 MG: 60 TABLET, EXTENDED RELEASE ORAL at 08:02

## 2025-02-10 RX ADMIN — LOSARTAN POTASSIUM 25 MG: 25 TABLET, FILM COATED ORAL at 08:02

## 2025-02-10 RX ADMIN — CHOLECALCIFEROL TAB 25 MCG (1000 UNIT) 2000 UNITS: 25 TAB at 08:02

## 2025-02-10 RX ADMIN — GUAIFENESIN AND DEXTROMETHORPHAN 10 ML: 100; 10 SYRUP ORAL at 08:02

## 2025-02-10 RX ADMIN — HEPARIN SODIUM 5000 UNITS: 5000 INJECTION INTRAVENOUS; SUBCUTANEOUS at 05:02

## 2025-02-10 RX ADMIN — FLUTICASONE PROPIONATE 100 MCG: 50 SPRAY, METERED NASAL at 08:02

## 2025-02-10 NOTE — PLAN OF CARE
Problem: Physical Therapy  Goal: Physical Therapy Goal  Description: Goals to be met by: 2/10     Patient will increase functional independence with mobility by performin. Supine to sit with Modified [place]  2. Sit to supine with Modified [place]  3. Sit to stand transfer with Modified [place]  4. Gait  x 200 feet with Supervision using LRAD.   5. Ascend/descend 4 stair with left Handrails Contact Guard Assistance using LRAD.     Outcome: Progressing   Goals remain appropriate. 2/10/2025

## 2025-02-10 NOTE — PLAN OF CARE
Problem: Adult Inpatient Plan of Care  Goal: Plan of Care Review  Outcome: Progressing  Goal: Patient-Specific Goal (Individualized)  Outcome: Progressing  Goal: Absence of Hospital-Acquired Illness or Injury  Outcome: Progressing  Goal: Optimal Comfort and Wellbeing  Outcome: Progressing     Problem: Infection  Goal: Absence of Infection Signs and Symptoms  Outcome: Progressing     Problem: Acute Kidney Injury/Impairment  Goal: Improved Oral Intake  Outcome: Progressing     Problem: Fall Injury Risk  Goal: Absence of Fall and Fall-Related Injury  Outcome: Progressing     Problem: Skin Injury Risk Increased  Goal: Skin Health and Integrity  Outcome: Progressing

## 2025-02-10 NOTE — PLAN OF CARE
Problem: Adult Inpatient Plan of Care  Goal: Plan of Care Review  Outcome: Progressing  Goal: Patient-Specific Goal (Individualized)  Outcome: Progressing  Goal: Absence of Hospital-Acquired Illness or Injury  Outcome: Progressing  Goal: Optimal Comfort and Wellbeing  Outcome: Progressing  Goal: Readiness for Transition of Care  Outcome: Progressing     Problem: Infection  Goal: Absence of Infection Signs and Symptoms  Outcome: Progressing     Problem: Acute Kidney Injury/Impairment  Goal: Fluid and Electrolyte Balance  Outcome: Progressing  Goal: Improved Oral Intake  Outcome: Progressing  Goal: Effective Renal Function  Outcome: Progressing     Problem: Fall Injury Risk  Goal: Absence of Fall and Fall-Related Injury  Outcome: Progressing     Problem: Pain Acute  Goal: Optimal Pain Control and Function  Outcome: Progressing   Pt Alert &  able to express needs.  No C/o pain .   Tessalon Pearls given for cough.  Safety maintained.  Bed in low position,  call  light in reach.

## 2025-02-10 NOTE — PT/OT/SLP PROGRESS
Physical Therapy Treatment    Patient Name:  Anahy Evans   MRN:  [id number]    Recommendations:     Discharge Recommendations: Moderate Intensity Therapy  Discharge Equipment Recommendations: none  Barriers to discharge: Decreased caregiver support    Assessment:     Anahy Evans is a 89 y.o. female admitted with a medical diagnosis of Anginal equivalent.  She presents with the following impairments/functional limitations: impaired endurance, impaired functional mobility, gait instability, impaired balance, decreased safety awareness pt tolerated treatment better being able to gait train farther. Pt will benefit from cont skilled PT 4x/wk to progress physically.  Pt is significantly below previous functional level, increased risk of falls and increased burden of care currently. Patient continues to demonstrate the need for moderate intensity therapy on a daily basis post acute exhibited by decreased independence with functional mobility  .  Rehab Prognosis: Good; patient would benefit from acute skilled PT services to address these deficits and reach maximum level of function.    Recent Surgery: * No surgery found *      Plan:     During this hospitalization, patient to be seen 4 x/week to address the identified rehab impairments via gait training, therapeutic activities, therapeutic exercises and progress toward the following goals:    Plan of Care Expires:  02/23/25    Subjective     Chief Complaint: pt had no complaints.   Patient/Family Comments/goals:  to get better and go home.   Pain/Comfort:  Pain Rating 1: 0/10  Pain Rating Post-Intervention 1: 0/10      Objective:     Communicated with nurse  prior to session.  Patient found supine with telemetry, bed alarm (hep lock IV) upon PT entry to room.     General Precautions: Standard, fall  Orthopedic Precautions: N/A  Braces: N/A  Respiratory Status: Room air     Functional Mobility:  Bed Mobility:  pt needed verbal cues for hand placement and sequencing for  functional mobility.    Rolling Left:  contact guard assistance  Supine to Sit: contact guard assistance    Transfers:    Sit to Stand:  contact guard assistance with rolling walker from bed and from bedside chair    Gait: pt received gait training ~ 16 ft then 24 ft (40 ft total)  with RW and CGA.  Pt had sitting rest period between distances gait trained. Pt was followed by chair for safety .     Pt white board updated with current therapists name and level of mobility assistance needed.      AM-PAC 6 CLICK MOBILITY  Turning over in bed (including adjusting bedclothes, sheets and blankets)?: 3  Sitting down on and standing up from a chair with arms (e.g., wheelchair, bedside commode, etc.): 3  Moving from lying on back to sitting on the side of the bed?: 3  Moving to and from a bed to a chair (including a wheelchair)?: 3  Need to walk in hospital room?: 3  Climbing 3-5 steps with a railing?: 2  Basic Mobility Total Score: 17       Treatment & Education:  Pt received verbal instructions in PT POC and verbally expressed understanding of such.     Patient left up in chair with all lines intact and call button in reach..    GOALS:   Multidisciplinary Problems       Physical Therapy Goals          Problem: Physical Therapy    Goal Priority Disciplines Outcome Interventions   Physical Therapy Goal     PT, PT/OT Progressing    Description: Goals to be met by: 2/10     Patient will increase functional independence with mobility by performin. Supine to sit with Modified [place]  2. Sit to supine with Modified [place]  3. Sit to stand transfer with Modified [place]  4. Gait  x 200 feet with Supervision using LRAD.   5. Ascend/descend 4 stair with left Handrails Contact Guard Assistance using LRAD.                          DME Justifications:  No DME recommended requiring DME justifications    Time Tracking:     PT Received On: 02/10/25  PT Start Time: 1015     PT Stop Time: 1028  PT Total Time (min):  13 min     Billable Minutes: Gait Training 13 min    Treatment Type: Treatment  PT/PTA: PT     Number of PTA visits since last PT visit: 0     02/10/2025

## 2025-02-10 NOTE — PLAN OF CARE
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]  Yes    Influenza A [J10.1]  No    Febrile [R50.9]  No    Moderate protein-calorie malnutrition [E44.0]  No    Debility [R53.81]  Yes    Counseling regarding goals of care [Z71.89]  Not Applicable    Confusion [R41.0]  Yes    Acute cystitis without hematuria [N30.00]  Yes    Second degree heart block by electrocardiogram (ECG) [I44.1]  Yes    Coronary artery disease involving native coronary artery of native heart without angina pectoris [I25.10]  Yes    CKD (chronic kidney disease), stage IV [N18.4]  Yes     Chronic    Essential hypertension [I10]  Yes    PAF (paroxysmal atrial fibrillation) [I48.0]  Yes      Resolved Hospital Problems   No resolved problems to display.       Patient is homebound due to:  Anginal equivalent    Allergies:  Review of patient's allergies indicates:   Allergen Reactions    Clindamycin Anaphylaxis    Penicillins Rash       Vitals:  Routine    Diet: 2 gram sodium diet    Activities:   Activity as tolerated    Goals of Care Treatment Preferences:  Code Status: Full Code    Living Will: Yes              Labs:  as needed     Nursing Precautions:  routine    Consults:   PT to evaluate and treat- 5 times a week and OT to evaluate and treat- .5 times a week     Miscellaneous Care:                    Diabetes Care:        Medications: Discontinue all previous medication orders, if any. See new list below.    KERENDIA 10 mg Tab - can be started once grand daughter brings home medication to SNF.   Generic drug: finerenone  Take 1 tablet by mouth Daily.       Medication List        START taking these medications      benzonatate 200 MG capsule  Commonly known as: TESSALON  Take 1 capsule (200 mg total) by mouth 3 (three)  times daily as needed for Cough.     dextromethorphan-guaiFENesin  mg/5 ml  mg/5 mL liquid  Commonly known as: ROBITUSSIN-DM  Take 10 mLs by mouth every 6 (six) hours as needed.     fluticasone propionate 50 mcg/actuation nasal spray  Commonly known as: FLONASE  2 sprays (100 mcg total) by Each Nostril route once daily.     polyethylene glycol 17 gram/dose powder  Commonly known as: GLYCOLAX  Use cap to measure 17g, mix with liquid, and drink by mouth daily as needed for Constipation.            CHANGE how you take these medications      amLODIPine 10 MG tablet  Commonly known as: NORVASC  Take 0.5 tablets (5 mg total) by mouth once daily.  What changed: how much to take     atorvastatin 80 MG tablet  Commonly known as: LIPITOR  Take 1 tablet (80 mg total) by mouth once daily.  What changed:   medication strength  how much to take     isosorbide mononitrate 30 MG 24 hr tablet  Commonly known as: IMDUR  Take 3 tablets (90 mg total) by mouth once daily.  What changed: how much to take            CONTINUE taking these medications      albuterol 90 mcg/actuation inhaler  Commonly known as: PROVENTIL/VENTOLIN HFA  Inhale 1 puff into the lungs every 4 (four) hours as needed for Wheezing.     allopurinoL 100 MG tablet  Commonly known as: ZYLOPRIM  Take 0.5 tablets by mouth once daily.     aspirin 81 MG EC tablet  Commonly known as: ECOTRIN  Take 81 mg by mouth once daily.     cholecalciferol (vitamin D3) 50 mcg (2,000 unit) Cap capsule  Commonly known as: VITAMIN D3  Take 1 capsule by mouth once daily.     clopidogreL 75 mg tablet  Commonly known as: PLAVIX  Take 75 mg by mouth once daily.     FARXIGA 10 mg tablet  Generic drug: dapagliflozin propanediol  Take 1 tablet by mouth once daily.     folic acid 1 MG tablet  Commonly known as: FOLVITE  Take 1 mg by mouth once daily.          losartan 25 MG tablet  Commonly known as: COZAAR  Take 1 tablet (25 mg total) by mouth once daily.     nitroGLYCERIN 0.4 MG SL  tablet  Commonly known as: NITROSTAT  Place 0.4 mg under the tongue every 5 (five) minutes as needed for Chest pain.     pantoprazole 20 MG tablet  Commonly known as: PROTONIX  Take 20 mg by mouth every morning.     sodium bicarbonate 650 MG tablet  Take 650 mg by mouth 2 (two) times daily.                Immunizations Administered as of 2/10/2025       Name Date Dose VIS Date Route Exp Date    COVID-19, MRNA, LN-S, PF (Moderna) 10/7/2021 10:36 AM 0.5 mL 12/19/2020 Intramuscular 1/15/2022    Site: Left deltoid     Given By: Saumya Mccracken, PharmD     : [employer]     Lot: [id number]     COVID-19, MRNA, LN-S, PF (Moderna) 3/8/2021 0.5 mL -- Intramuscular --    Site: Left arm     : [employer]     Lot: [id number]     Comment: Adminis     COVID-19, MRNA, LN-S, PF (Moderna) 2/9/2021 0.5 mL -- Intramuscular --    Site: Left deltoid     : [employer]     Lot: [id number]     Comment: Adminis             This patient has had both covid vaccinations    Some patients may experience side effects after vaccination.  These may include fever, headache, muscle or joint aches.  Most symptoms resolve with 24-48 hours and do not require urgent medical evaluation unless they persist for more than 72 hours or symptoms are concerning for an unrelated medical condition.          _________________________________  Maria E Bernstein MD  02/11/2025

## 2025-02-10 NOTE — PLAN OF CARE
Problem: Physical Therapy  Goal: Physical Therapy Goal  Description: Goals to be met by: 2/10     Patient will increase functional independence with mobility by performin. Supine to sit with Modified [place]  2. Sit to supine with Modified [place]  3. Sit to stand transfer with Modified [place]  4. Gait  x 200 feet with Supervision using LRAD.   5. Ascend/descend 4 stair with left Handrails Contact Guard Assistance using LRAD.     2/10/2025 1351 by Linda Patel, PT  Outcome: Progressing    Goals remain appropriate. 2/10/2025

## 2025-02-10 NOTE — PT/OT/SLP PROGRESS
Occupational Therapy   Treatment    Name: Anahy Evans  MRN: [id number]  Admitting Diagnosis:  Anginal equivalent       Recommendations:     Discharge Recommendations: Moderate Intensity Therapy  Discharge Equipment Recommendations:  none  Barriers to discharge:  Decreased caregiver support    Assessment:     Anahy Evans is a 89 y.o. female with a medical diagnosis of Anginal equivalent.  She presents with deficits in self-care tasks as well as mobility. Pt. Required CGA/Min A for functional mobility to and from bathroom. Pt. Tolerated mobility and standing at sink better on this date. Pain noted in warner region from purewick (nurse aware). Performance deficits affecting function are weakness, impaired endurance, impaired self care skills, impaired functional mobility, gait instability, decreased safety awareness, impaired cardiopulmonary response to activity.     Rehab Prognosis:  Good; patient would benefit from acute skilled OT services to address these deficits and reach maximum level of function.       Plan:     Patient to be seen 4 x/week to address the above listed problems via self-care/home management, therapeutic activities, therapeutic exercises, neuromuscular re-education  Plan of Care Expires: 02/23/25  Plan of Care Reviewed with: patient    Subjective     Chief Complaint: Pain from purewick  Patient/Family Comments/goals: to get better  Pain/Comfort:  Pain Rating 1:  (did not rate)  Location 1: perineum  Pain Addressed 1: Reposition (removed pure wick as pt. reported causing pain)  Pain Rating Post-Intervention 1: 0/10    Objective:     Communicated with: nurse prior to session.  Patient found up in chair with telemetry upon OT entry to room.    General Precautions: Standard, fall    Orthopedic Precautions:N/A  Braces: N/A  Respiratory Status: Room air     Occupational Performance:     Bed Mobility:    Not tested as pt. Was up in chair     Functional Mobility/Transfers:  Patient completed Sit <>  Stand Transfer with contact guard assistance  with  rolling walker   Patient completed Toilet Transfer (bedside commode over toilet) Step Transfer technique with minimum assistance with  rolling walker  Functional Mobility: Pt. Performed functional mobility to and from bathroom with RW and CGA    Activities of Daily Living:  Grooming: contact guard assistance in stand at sink to wash hands  Toileting: minimum assistance for balance when managing brief      AMPAC 6 Click ADL: 19    Treatment & Education:  Pt. Educated on importance of OOB therapy and need for staff assist for all mobility      Patient left up in chair with call button in reach and nurse notified    GOALS:   Multidisciplinary Problems       Occupational Therapy Goals          Problem: Occupational Therapy    Goal Priority Disciplines Outcome Interventions   Occupational Therapy Goal     OT, PT/OT Progressing    Description: Goals to be met by: 2/23/25     Patient will increase functional independence with ADLs by performing:    UE Dressing with Modified [place].  LE Dressing with Modified [place].  Grooming while standing with Modified [place].  Toileting from toilet with Modified [place] for hygiene and clothing management.   Supine to sit with Modified [place].  Step transfer with Modified [place]  Toilet transfer to toilet with Modified [place].                         DME Justifications:  No DME recommended requiring DME justifications    Time Tracking:     OT Date of Treatment: 02/10/25  OT Start Time: 1432  OT Stop Time: 1446  OT Total Time (min): 14 min    Billable Minutes:Self Care/Home Management 14    OT/AMAURY: OT          2/10/2025

## 2025-02-10 NOTE — SUBJECTIVE & OBJECTIVE
Interval History:     No new symptoms  Pending auth    Review of Systems  Objective:     Vital Signs (Most Recent):  Temp: 97.9 °F (36.6 °C) (02/10/25 1136)  Pulse: (!) 55 (02/10/25 1136)  Resp: 18 (02/10/25 1136)  BP: (!) 114/55 (02/10/25 1136)  SpO2: (!) 94 % (02/10/25 1136) Vital Signs (24h Range):  Temp:  [97 °F (36.1 °C)-98.2 °F (36.8 °C)] 97.9 °F (36.6 °C)  Pulse:  [35-73] 55  Resp:  [17-20] 18  SpO2:  [91 %-97 %] 94 %  BP: (114-186)/(55-84) 114/55     Weight: 73.5 kg (162 lb 0.6 oz)  Body mass index is 25.38 kg/m².    Intake/Output Summary (Last 24 hours) at 2/10/2025 1426  Last data filed at 2/10/2025 0746  Gross per 24 hour   Intake 180 ml   Output 1150 ml   Net -970 ml         Physical Exam  Constitutional:       General: She is not in acute distress.     Appearance: She is not ill-appearing.   Cardiovascular:      Rate and Rhythm: Normal rate.      Heart sounds: Normal heart sounds.   Pulmonary:      Effort: Pulmonary effort is normal.      Breath sounds: Normal breath sounds.   Abdominal:      General: Abdomen is flat.      Palpations: Abdomen is soft.      Tenderness: There is no abdominal tenderness.   Musculoskeletal:      Right lower leg: No edema.      Left lower leg: No edema.   Neurological:      Mental Status: She is alert and oriented to person, place, and time. Mental status is at baseline.             Significant Labs: All pertinent labs within the past 24 hours have been reviewed.    Significant Imaging: I have reviewed all pertinent imaging results/findings within the past 24 hours.

## 2025-02-11 ENCOUNTER — TELEPHONE (OUTPATIENT)
Dept: NEPHROLOGY | Facility: CLINIC | Age: OVER 89
End: 2025-02-11
Payer: MEDICARE

## 2025-02-11 VITALS
DIASTOLIC BLOOD PRESSURE: 64 MMHG | HEIGHT: 67 IN | WEIGHT: 162.06 LBS | TEMPERATURE: 98 F | RESPIRATION RATE: 17 BRPM | OXYGEN SATURATION: 98 % | BODY MASS INDEX: 25.44 KG/M2 | SYSTOLIC BLOOD PRESSURE: 134 MMHG | HEART RATE: 81 BPM

## 2025-02-11 PROCEDURE — 97535 SELF CARE MNGMENT TRAINING: CPT

## 2025-02-11 PROCEDURE — 63600175 PHARM REV CODE 636 W HCPCS: Performed by: HOSPITALIST

## 2025-02-11 PROCEDURE — 25000003 PHARM REV CODE 250: Performed by: STUDENT IN AN ORGANIZED HEALTH CARE EDUCATION/TRAINING PROGRAM

## 2025-02-11 PROCEDURE — 97110 THERAPEUTIC EXERCISES: CPT

## 2025-02-11 PROCEDURE — 25000003 PHARM REV CODE 250: Performed by: HOSPITALIST

## 2025-02-11 RX ADMIN — HEPARIN SODIUM 5000 UNITS: 5000 INJECTION INTRAVENOUS; SUBCUTANEOUS at 06:02

## 2025-02-11 RX ADMIN — FLUTICASONE PROPIONATE 100 MCG: 50 SPRAY, METERED NASAL at 09:02

## 2025-02-11 RX ADMIN — SODIUM BICARBONATE 650 MG TABLET 650 MG: at 09:02

## 2025-02-11 RX ADMIN — FOLIC ACID 1 MG: 1 TABLET ORAL at 09:02

## 2025-02-11 RX ADMIN — ASPIRIN 81 MG: 81 TABLET, COATED ORAL at 09:02

## 2025-02-11 RX ADMIN — PANTOPRAZOLE SODIUM 20 MG: 20 TABLET, DELAYED RELEASE ORAL at 06:02

## 2025-02-11 RX ADMIN — ALLOPURINOL 50 MG: 300 TABLET ORAL at 09:02

## 2025-02-11 RX ADMIN — ATORVASTATIN CALCIUM 80 MG: 40 TABLET, FILM COATED ORAL at 09:02

## 2025-02-11 RX ADMIN — CHOLECALCIFEROL TAB 25 MCG (1000 UNIT) 2000 UNITS: 25 TAB at 09:02

## 2025-02-11 RX ADMIN — LOSARTAN POTASSIUM 25 MG: 25 TABLET, FILM COATED ORAL at 09:02

## 2025-02-11 RX ADMIN — ISOSORBIDE MONONITRATE 90 MG: 60 TABLET, EXTENDED RELEASE ORAL at 09:02

## 2025-02-11 RX ADMIN — AMLODIPINE BESYLATE 5 MG: 5 TABLET ORAL at 09:02

## 2025-02-11 RX ADMIN — CLOPIDOGREL BISULFATE 75 MG: 75 TABLET ORAL at 09:02

## 2025-02-11 RX ADMIN — DAPAGLIFLOZIN 10 MG: 10 TABLET, FILM COATED ORAL at 09:02

## 2025-02-11 NOTE — TELEPHONE ENCOUNTER
----- Message from Estefanía sent at 2/11/2025  2:39 PM CST -----  Regarding: [facility]  Contact: Danika/[facility]  Pharmacy Needing Assistance    Name of Pharmacy: [facility]    Name of Caller: Danika    Regarding Rx: Wegovy 0.25mg    Reason for Call:  Need priior approvel for this med also a medical necessitation    Call Back Number:  [phone]

## 2025-02-11 NOTE — PT/OT/SLP PROGRESS
Occupational Therapy   Treatment    Name: Anahy Evans  MRN: [id number]  Admitting Diagnosis:  Anginal equivalent       Recommendations:     Discharge Recommendations: Moderate Intensity Therapy  Discharge Equipment Recommendations:  none  Barriers to discharge:  Decreased caregiver support    Assessment:     Anahy Evans is a 89 y.o. female with a medical diagnosis of Anginal equivalent.  She presents with good participation and motivation. Pt continues to experience dizziness/lightheadedness during activities in standing. Performance deficits affecting function are weakness, impaired endurance, impaired self care skills, impaired functional mobility, impaired balance, decreased lower extremity function, decreased upper extremity function.     Rehab Prognosis:  Good; patient would benefit from acute skilled OT services to address these deficits and reach maximum level of function.       Plan:     Patient to be seen 4 x/week to address the above listed problems via self-care/home management, therapeutic activities, therapeutic exercises  Plan of Care Expires: 02/23/25  Plan of Care Reviewed with: patient    Subjective     Chief Complaint: lightheadedness when grooming standing in bathroom  Patient/Family Comments/goals: Pt reported that she was tired today.  Pain/Comfort:  Pain Rating 1: 0/10  Pain Rating Post-Intervention 1: 0/10    Objective:     Communicated with: RN prior to session.  Patient found supine with telemetry (no family present) upon OT entry to room.    General Precautions: Standard, fall    Orthopedic Precautions:N/A  Braces: N/A  Respiratory Status: Room air     Occupational Performance:     Bed Mobility:    Patient completed Supine to Sit with stand by assistance     Functional Mobility/Transfers:  Patient completed Sit <> Stand Transfer with contact guard assistance  with  rolling walker   Patient completed Toilet Transfer Step Transfer technique with minimum assistance with  rolling  walker  Functional Mobility: CGA using RW with functional mobility to bathroom & back during ADL's    Activities of Daily Living:  Grooming: contact guard assistance with brushing teeth while standing at bathroom sink  Upper Body Dressing: stand by assistance donning gown around back while seated EOB  Lower Body Dressing: stand by assistance donning socks seated EOB      AMPAC 6 Click ADL: 19    Treatment & Education:  Pt performed AROM exercises with BUE in 3 planes x 10 reps each seated in chair.  Pt with increasing lightheadedness while standing in bathroom grooming therefore assisted pt to transitioning to sitting on toilet in bathroom to decrease lightheadedness & SOB.  Notified RN.  Pt had no further questions & when asked whether there were any concerns pt reported none.      Patient left up in chair with all lines intact, call button in reach, and RN notified    GOALS:   Multidisciplinary Problems       Occupational Therapy Goals          Problem: Occupational Therapy    Goal Priority Disciplines Outcome Interventions   Occupational Therapy Goal     OT, PT/OT Progressing    Description: Goals to be met by: 2/23/25     Patient will increase functional independence with ADLs by performing:    UE Dressing with Modified [place].  LE Dressing with Modified [place].  Grooming while standing with Modified [place].  Toileting from toilet with Modified [place] for hygiene and clothing management.   Supine to sit with Modified [place].  Step transfer with Modified [place]  Toilet transfer to toilet with Modified [place].                         Time Tracking:     OT Date of Treatment: 02/11/25  OT Start Time: 1118  OT Stop Time: 1146  OT Total Time (min): 28 min    Billable Minutes:Self Care/Home Management 18  Therapeutic Exercise 10    OT/AMAURY: OT          2/11/2025

## 2025-02-11 NOTE — PLAN OF CARE
CM was notified of placement. Patient was accepted to [facility] on 2/11/25. Report can be called to Ms. cShaeffer at [phone]. Pt will admit to room 110B.   CM arranged Ambulance transport via [facility]. Requested  time is  1315 .  Requested  time does not guarantee arrival time.  If transport does not arrive by 1415 please contact assigned SW or on-call for assistance.     02/11/25 1210   Post-Acute Status   Post-Acute Authorization Placement   Post-Acute Placement Status Set-up Complete/Auth obtained   Discharge Plan   Discharge Plan A Skilled Nursing Facility   Discharge Plan B Skilled Nursing Facility     No future appointments.  Ryan Quick, RN,BSN

## 2025-02-11 NOTE — TELEPHONE ENCOUNTER
----- Message from Estefanía sent at 2/11/2025  2:39 PM CST -----  Regarding: [facility]  Contact: Danika/[facility]  Pharmacy Needing Assistance    Name of Pharmacy: [facility]    Name of Caller: Danika    Regarding Rx: Wegovy 0.25mg    Reason for Call:  Need priior approvel for this med also a medical necessitation    Call Back Number:  [phone]

## 2025-02-11 NOTE — DISCHARGE SUMMARY
"[facility] [address] [place] [address] Medicine  Discharge Summary      Patient Name: Anahy Evans  MRN: [id number]  OLAYINKA: [id number]  Patient Class: IP- Inpatient  Admission Date: 1/19/2025  Hospital Length of Stay: 21 days  Discharge Date and Time: No discharge date for patient encounter.  Attending Physician: Maria E Bernstein MD   Discharging Provider: Maria E Bernstein MD  Primary Care Provider: Elena Cabrera MD  [facility] Medicine Team: [facility] HOSP MED  Maria E Bernstein MD  Primary Care Team: [facility] HOSP [facility]    HPI:   89 y/o AAF w/ CAD hx PCI, CKD 4, HTN, Afib, AAA, 2nd degree AV Block presents to the ED with complaints of dyspnea.     She was recently admitted to [facility] from 1/13-1/17 per DC summary "Patient admitted for dyspnea on exertion. V/Q scan was completed - no pulmonary embolism. Echo ordered - regional wall motion abnormalities present. Low normal systolic ejection fraction 50-55%. Cardiology was consulted - feel origin of TAYLOR may be cardiac in nature. PET stress done 1/16. Two perfusion abnormalities seen. Interventional cardiology recommending medical management and close follow-up. Amlodipine increased, losartan decreased, and started on imdur."    She returns to ED with concerns of dyspnea on exertion.  She discharged to home after last admission, lives with her daughter.  Her grand-daughter accompanies her today and helps provide history.  Since discharge patient has had limited ADL capability, pt requires assistance w/ ambulation and use of walker, patient has home purewick and bedside commode.  She requires assistance with toileting, bathing, clothing.  She is adherent to medications. Yesterday patient appeared weak and when sitting upright had poor trunk stability would slump over and had poor appetite did not eat much and was noted with intermittent confusion.  Her confusion is described as poor short term memory, will intermittently forget recent events, granddaughter notes that patient usually " answers orientation questions appropriately when evaluated by medical teams, but might forget later what was discussed or why she is in the hospital.    She was treated for acute cystitis during last admit, pan-sensitive E.coli on urine culture received ceftriaxone inpatient and not discharged on any oral antibiotics.     She does report poor sleep, has had difficulty sleeping at night and will nap multiple times per day. No reported non-redirectable agitation.   Prior to this month, grand-daughter notes patient was performing more ADL on her own.  Patient had declined referral to SNF with recent admits, last PT/OT recs for low-intensity therapy, pt is more open to post-acute care if recommended.     In ED tonight pt with bradycardia noted, cardiology consulted, no acute medical management of her bradycardia recommended, she has 2:1 AV block on review of EKG/telemetry tonight, hx of [place] 1 2nd Deg AV block in past.       * No surgery found *      Hospital Course:   Evaluated by cardiology and imdur increased. Cardiology recommends medical management, continue Imdur, DAPT, and consider Ranolazine for angina. Imdur increased to 60mg.  Patient started ranolazine as per cardiology recs, then later discontinued due to renal function. Patient worked with PT/OT again. Patient is interested in placement option for therapy.  Patient is stable and pending SNF placement.  P2P denial upheld 1/29. Family submitted for fast appeal > successful. Patient has placement at SNF now pending quarantine period for flu.   Recurrent chest pain on 1/31 with slight elevation in trop. Cardiology consulted. Imdur and atorvastatin increased.   Fevering 2/1-2/3. CXR clear. UA negative. +flu. Started on tamiflu 2/3. Intermittently on 1L NC since flu diagnosis. Patient symptomatically improved and weaned off oxygen.   Patient is hemodynamically stable and feeling back to baseline. She is ready for discharge to SNF for rehab.   Outpatient  cardiology and nephrology referrals placed.          Goals of Care Treatment Preferences:  Code Status: Full Code    Living Will: Yes              SDOH Screening:  The patient was screened for utility difficulties, food insecurity, transport difficulties, housing insecurity, and interpersonal safety and there were no concerns identified this admission.     Consults:   Consults (From admission, onward)          Status Ordering Provider     Inpatient consult to Cardiology  Once        Provider:  (Not yet assigned)    TULIO Mendoza     Inpatient consult to Cardiology  Once        Provider:  (Not yet assigned)    Completed RICHARD MONTELONGO              Final Active Diagnoses:    Diagnosis Date Noted POA    PRINCIPAL PROBLEM:  Anginal equivalent [I20.89] 11/22/2017 Yes    Influenza A [J10.1] 02/03/2025 No    Febrile [R50.9] 02/02/2025 No    Moderate protein-calorie malnutrition [E44.0] 01/31/2025 No    Debility [R53.81] 01/23/2025 Yes    Counseling regarding goals of care [Z71.89] 01/20/2025 Not Applicable    Confusion [R41.0] 01/19/2025 Yes    Acute cystitis without hematuria [N30.00] 01/13/2025 Yes    Second degree heart block by electrocardiogram (ECG) [I44.1] 05/10/2024 Yes    Coronary artery disease involving native coronary artery of native heart without angina pectoris [I25.10] 05/09/2019 Yes    CKD (chronic kidney disease), stage IV [N18.4] 06/29/2017 Yes     Chronic    Essential hypertension [I10] 11/10/2016 Yes    PAF (paroxysmal atrial fibrillation) [I48.0] 07/05/2013 Yes      Problems Resolved During this Admission:       Discharged Condition: stable    Disposition: Home or Self Care    Follow Up:    Patient Instructions:      Ambulatory referral/consult to Cardiology   Standing Status: Future   Referral Priority: Routine Referral Type: Consultation   Referral Reason: Specialty Services Required   Requested Specialty: Cardiology   Number of Visits Requested: 1     Ambulatory referral/consult to  Nephrology   Standing Status: Future   Referral Priority: Routine Referral Type: Consultation   Referral Reason: Specialty Services Required   Requested Specialty: Nephrology   Number of Visits Requested: 1       Significant Diagnostic Studies: N/A    Pending Diagnostic Studies:       None           Medications:  Reconciled Home Medications:      Medication List        START taking these medications      benzonatate 200 MG capsule  Commonly known as: TESSALON  Take 1 capsule (200 mg total) by mouth 3 (three) times daily as needed for Cough.     dextromethorphan-guaiFENesin  mg/5 ml  mg/5 mL liquid  Commonly known as: ROBITUSSIN-DM  Take 10 mLs by mouth every 6 (six) hours as needed.     fluticasone propionate 50 mcg/actuation nasal spray  Commonly known as: FLONASE  2 sprays (100 mcg total) by Each Nostril route once daily.     polyethylene glycol 17 gram/dose powder  Commonly known as: GLYCOLAX  Use cap to measure 17g, mix with liquid, and drink by mouth daily as needed for Constipation.            CHANGE how you take these medications      amLODIPine 10 MG tablet  Commonly known as: NORVASC  Take 0.5 tablets (5 mg total) by mouth once daily.  What changed: how much to take     atorvastatin 80 MG tablet  Commonly known as: LIPITOR  Take 1 tablet (80 mg total) by mouth once daily.  What changed:   medication strength  how much to take     isosorbide mononitrate 30 MG 24 hr tablet  Commonly known as: IMDUR  Take 3 tablets (90 mg total) by mouth once daily.  What changed: how much to take            CONTINUE taking these medications      albuterol 90 mcg/actuation inhaler  Commonly known as: PROVENTIL/VENTOLIN HFA  Inhale 1 puff into the lungs every 4 (four) hours as needed for Wheezing.     allopurinoL 100 MG tablet  Commonly known as: ZYLOPRIM  Take 0.5 tablets by mouth once daily.     aspirin 81 MG EC tablet  Commonly known as: ECOTRIN  Take 81 mg by mouth once daily.     cholecalciferol (vitamin D3) 50  mcg (2,000 unit) Cap capsule  Commonly known as: VITAMIN D3  Take 1 capsule by mouth once daily.     clopidogreL 75 mg tablet  Commonly known as: PLAVIX  Take 75 mg by mouth once daily.     FARXIGA 10 mg tablet  Generic drug: dapagliflozin propanediol  Take 1 tablet by mouth once daily.     folic acid 1 MG tablet  Commonly known as: FOLVITE  Take 1 mg by mouth once daily.     KERENDIA 10 mg Tab  Generic drug: finerenone  Take 1 tablet by mouth Daily.     losartan 25 MG tablet  Commonly known as: COZAAR  Take 1 tablet (25 mg total) by mouth once daily.     nitroGLYCERIN 0.4 MG SL tablet  Commonly known as: NITROSTAT  Place 0.4 mg under the tongue every 5 (five) minutes as needed for Chest pain.     pantoprazole 20 MG tablet  Commonly known as: PROTONIX  Take 20 mg by mouth every morning.     sodium bicarbonate 650 MG tablet  Take 650 mg by mouth 2 (two) times daily.              Indwelling Lines/Drains at time of discharge:   Lines/Drains/Airways       Drain  Duration             Female External Urinary Catheter w/ Suction 01/19/25 3868 22 days                    Time spent on the discharge of patient: 45 minutes         Maria E Brenstein MD  Department of Hospital Medicine  [facility] 11H

## 2025-02-20 ENCOUNTER — HOSPITAL ENCOUNTER (INPATIENT)
Facility: HOSPITAL | Age: OVER 89
LOS: 3 days | Discharge: NURSING, REHAB OR LONG-TERM CARE | DRG: 536 | End: 2025-02-24
Attending: STUDENT IN AN ORGANIZED HEALTH CARE EDUCATION/TRAINING PROGRAM | Admitting: INTERNAL MEDICINE
Payer: MEDICARE

## 2025-02-20 DIAGNOSIS — M25.551 RIGHT HIP PAIN: ICD-10-CM

## 2025-02-20 DIAGNOSIS — I10 HYPERTENSION: ICD-10-CM

## 2025-02-20 DIAGNOSIS — M97.01XD PERIPROSTHETIC FRACTURE AROUND INTERNAL PROSTHETIC RIGHT HIP JOINT, SUBSEQUENT ENCOUNTER: Primary | ICD-10-CM

## 2025-02-20 DIAGNOSIS — I25.10 CORONARY ARTERY DISEASE INVOLVING NATIVE CORONARY ARTERY OF NATIVE HEART WITHOUT ANGINA PECTORIS: ICD-10-CM

## 2025-02-20 DIAGNOSIS — R07.9 CHEST PAIN: ICD-10-CM

## 2025-02-20 DIAGNOSIS — I10 PRIMARY HYPERTENSION: ICD-10-CM

## 2025-02-20 LAB
ABO + RH BLD: NORMAL
ALBUMIN SERPL BCP-MCNC: 2.9 G/DL (ref 3.5–5.2)
ALP SERPL-CCNC: 63 U/L (ref 40–150)
ALT SERPL W/O P-5'-P-CCNC: 16 U/L (ref 10–44)
ANION GAP SERPL CALC-SCNC: 8 MMOL/L (ref 8–16)
AST SERPL-CCNC: 16 U/L (ref 10–40)
BASOPHILS # BLD AUTO: 0.05 K/UL (ref 0–0.2)
BASOPHILS NFR BLD: 0.5 % (ref 0–1.9)
BILIRUB SERPL-MCNC: 0.9 MG/DL (ref 0.1–1)
BLD GP AB SCN CELLS X3 SERPL QL: NORMAL
BUN SERPL-MCNC: 34 MG/DL (ref 10–30)
CALCIUM SERPL-MCNC: 9.4 MG/DL (ref 8.7–10.5)
CHLORIDE SERPL-SCNC: 110 MMOL/L (ref 95–110)
CO2 SERPL-SCNC: 22 MMOL/L (ref 23–29)
CREAT SERPL-MCNC: 1.7 MG/DL (ref 0.5–1.4)
DIFFERENTIAL METHOD BLD: ABNORMAL
EOSINOPHIL # BLD AUTO: 0.1 K/UL (ref 0–0.5)
EOSINOPHIL NFR BLD: 1 % (ref 0–8)
ERYTHROCYTE [DISTWIDTH] IN BLOOD BY AUTOMATED COUNT: 15.9 % (ref 11.5–14.5)
EST. GFR  (NO RACE VARIABLE): 28.1 ML/MIN/1.73 M^2
GLUCOSE SERPL-MCNC: 96 MG/DL (ref 70–110)
HCT VFR BLD AUTO: 31.5 % (ref 37–48.5)
HGB BLD-MCNC: 9.7 G/DL (ref 12–16)
IMM GRANULOCYTES # BLD AUTO: 0.04 K/UL (ref 0–0.04)
IMM GRANULOCYTES NFR BLD AUTO: 0.4 % (ref 0–0.5)
LYMPHOCYTES # BLD AUTO: 1.5 K/UL (ref 1–4.8)
LYMPHOCYTES NFR BLD: 15.4 % (ref 18–48)
MCH RBC QN AUTO: 28.2 PG (ref 27–31)
MCHC RBC AUTO-ENTMCNC: 30.8 G/DL (ref 32–36)
MCV RBC AUTO: 92 FL (ref 82–98)
MONOCYTES # BLD AUTO: 1.1 K/UL (ref 0.3–1)
MONOCYTES NFR BLD: 10.8 % (ref 4–15)
NEUTROPHILS # BLD AUTO: 7 K/UL (ref 1.8–7.7)
NEUTROPHILS NFR BLD: 71.9 % (ref 38–73)
NRBC BLD-RTO: 0 /100 WBC
PLATELET # BLD AUTO: 186 K/UL (ref 150–450)
PMV BLD AUTO: 12.1 FL (ref 9.2–12.9)
POTASSIUM SERPL-SCNC: 3.9 MMOL/L (ref 3.5–5.1)
PROT SERPL-MCNC: 6.6 G/DL (ref 6–8.4)
RBC # BLD AUTO: 3.44 M/UL (ref 4–5.4)
SODIUM SERPL-SCNC: 140 MMOL/L (ref 136–145)
SPECIMEN OUTDATE: NORMAL
WBC # BLD AUTO: 9.72 K/UL (ref 3.9–12.7)

## 2025-02-20 PROCEDURE — 86900 BLOOD TYPING SEROLOGIC ABO: CPT | Performed by: STUDENT IN AN ORGANIZED HEALTH CARE EDUCATION/TRAINING PROGRAM

## 2025-02-20 PROCEDURE — 85025 COMPLETE CBC W/AUTO DIFF WBC: CPT | Performed by: STUDENT IN AN ORGANIZED HEALTH CARE EDUCATION/TRAINING PROGRAM

## 2025-02-20 PROCEDURE — 93005 ELECTROCARDIOGRAM TRACING: CPT

## 2025-02-20 PROCEDURE — 80053 COMPREHEN METABOLIC PANEL: CPT | Performed by: STUDENT IN AN ORGANIZED HEALTH CARE EDUCATION/TRAINING PROGRAM

## 2025-02-20 PROCEDURE — 99285 EMERGENCY DEPT VISIT HI MDM: CPT | Mod: 25

## 2025-02-20 PROCEDURE — 93010 ELECTROCARDIOGRAM REPORT: CPT | Mod: ,,, | Performed by: INTERNAL MEDICINE

## 2025-02-20 PROCEDURE — 25000003 PHARM REV CODE 250: Performed by: STUDENT IN AN ORGANIZED HEALTH CARE EDUCATION/TRAINING PROGRAM

## 2025-02-20 RX ORDER — OXYCODONE HYDROCHLORIDE 5 MG/1
5 TABLET ORAL
Refills: 0 | Status: COMPLETED | OUTPATIENT
Start: 2025-02-20 | End: 2025-02-20

## 2025-02-20 RX ADMIN — OXYCODONE 5 MG: 5 TABLET ORAL at 09:02

## 2025-02-21 PROBLEM — I49.9 ARRHYTHMIA: Status: ACTIVE | Noted: 2025-02-21

## 2025-02-21 PROBLEM — M97.01XA PERIPROSTHETIC FRACTURE AROUND INTERNAL PROSTHETIC RIGHT HIP JOINT: Status: ACTIVE | Noted: 2019-05-09

## 2025-02-21 PROBLEM — I49.9 ARRHYTHMIA: Status: RESOLVED | Noted: 2025-02-21 | Resolved: 2025-02-21

## 2025-02-21 PROBLEM — I44.30 AV BLOCK: Status: ACTIVE | Noted: 2025-02-21

## 2025-02-21 PROBLEM — N18.4 CKD (CHRONIC KIDNEY DISEASE) STAGE 4, GFR 15-29 ML/MIN: Status: ACTIVE | Noted: 2017-06-29

## 2025-02-21 LAB
25(OH)D3+25(OH)D2 SERPL-MCNC: 43 NG/ML (ref 30–96)
ESTIMATED AVG GLUCOSE: 111 MG/DL (ref 68–131)
HBA1C MFR BLD: 5.5 % (ref 4–5.6)
INR PPP: 1.1 (ref 0.8–1.2)
MAGNESIUM SERPL-MCNC: 1.6 MG/DL (ref 1.6–2.6)
OHS QRS DURATION: 74 MS
OHS QRS DURATION: 80 MS
OHS QRS DURATION: 82 MS
OHS QTC CALCULATION: 410 MS
OHS QTC CALCULATION: 453 MS
OHS QTC CALCULATION: 476 MS
PREALB SERPL-MCNC: 15 MG/DL (ref 20–43)
PROTHROMBIN TIME: 12.4 SEC (ref 9–12.5)
TRANSFERRIN SERPL-MCNC: 149 MG/DL (ref 200–375)

## 2025-02-21 PROCEDURE — 85610 PROTHROMBIN TIME: CPT

## 2025-02-21 PROCEDURE — 83036 HEMOGLOBIN GLYCOSYLATED A1C: CPT

## 2025-02-21 PROCEDURE — 83735 ASSAY OF MAGNESIUM: CPT

## 2025-02-21 PROCEDURE — 99223 1ST HOSP IP/OBS HIGH 75: CPT | Mod: ,,, | Performed by: ORTHOPAEDIC SURGERY

## 2025-02-21 PROCEDURE — 84466 ASSAY OF TRANSFERRIN: CPT

## 2025-02-21 PROCEDURE — 84134 ASSAY OF PREALBUMIN: CPT

## 2025-02-21 PROCEDURE — 99223 1ST HOSP IP/OBS HIGH 75: CPT | Mod: GC,,, | Performed by: INTERNAL MEDICINE

## 2025-02-21 PROCEDURE — 21400001 HC TELEMETRY ROOM

## 2025-02-21 PROCEDURE — 82306 VITAMIN D 25 HYDROXY: CPT

## 2025-02-21 PROCEDURE — 63600175 PHARM REV CODE 636 W HCPCS

## 2025-02-21 PROCEDURE — 94761 N-INVAS EAR/PLS OXIMETRY MLT: CPT

## 2025-02-21 PROCEDURE — 11000001 HC ACUTE MED/SURG PRIVATE ROOM

## 2025-02-21 PROCEDURE — 25000003 PHARM REV CODE 250: Performed by: STUDENT IN AN ORGANIZED HEALTH CARE EDUCATION/TRAINING PROGRAM

## 2025-02-21 RX ORDER — ACETAMINOPHEN 325 MG/1
650 TABLET ORAL EVERY 4 HOURS PRN
Status: DISCONTINUED | OUTPATIENT
Start: 2025-02-21 | End: 2025-02-21

## 2025-02-21 RX ORDER — MORPHINE SULFATE 2 MG/ML
1 INJECTION, SOLUTION INTRAMUSCULAR; INTRAVENOUS EVERY 4 HOURS PRN
Status: DISCONTINUED | OUTPATIENT
Start: 2025-02-21 | End: 2025-02-22

## 2025-02-21 RX ORDER — PREGABALIN 75 MG/1
75 CAPSULE ORAL NIGHTLY
Status: DISCONTINUED | OUTPATIENT
Start: 2025-02-21 | End: 2025-02-24 | Stop reason: HOSPADM

## 2025-02-21 RX ORDER — SODIUM BICARBONATE 650 MG/1
650 TABLET ORAL 2 TIMES DAILY
Status: DISCONTINUED | OUTPATIENT
Start: 2025-02-21 | End: 2025-02-24 | Stop reason: HOSPADM

## 2025-02-21 RX ORDER — TALC
6 POWDER (GRAM) TOPICAL NIGHTLY PRN
Status: DISCONTINUED | OUTPATIENT
Start: 2025-02-21 | End: 2025-02-24 | Stop reason: HOSPADM

## 2025-02-21 RX ORDER — ONDANSETRON 4 MG/1
4 TABLET, ORALLY DISINTEGRATING ORAL EVERY 8 HOURS PRN
Status: DISCONTINUED | OUTPATIENT
Start: 2025-02-21 | End: 2025-02-24 | Stop reason: HOSPADM

## 2025-02-21 RX ORDER — SODIUM CHLORIDE 0.9 % (FLUSH) 0.9 %
10 SYRINGE (ML) INJECTION EVERY 12 HOURS PRN
Status: DISCONTINUED | OUTPATIENT
Start: 2025-02-21 | End: 2025-02-21

## 2025-02-21 RX ORDER — OXYCODONE HYDROCHLORIDE 5 MG/1
5 TABLET ORAL EVERY 6 HOURS PRN
Refills: 0 | Status: DISCONTINUED | OUTPATIENT
Start: 2025-02-21 | End: 2025-02-22

## 2025-02-21 RX ORDER — AMLODIPINE BESYLATE 5 MG/1
5 TABLET ORAL DAILY
Status: DISCONTINUED | OUTPATIENT
Start: 2025-02-21 | End: 2025-02-24

## 2025-02-21 RX ORDER — BENZONATATE 200 MG/1
200 CAPSULE ORAL EVERY 8 HOURS PRN
COMMUNITY

## 2025-02-21 RX ORDER — BISACODYL 10 MG/1
10 SUPPOSITORY RECTAL DAILY PRN
Status: DISCONTINUED | OUTPATIENT
Start: 2025-02-21 | End: 2025-02-24 | Stop reason: HOSPADM

## 2025-02-21 RX ORDER — ISOSORBIDE MONONITRATE 30 MG/1
90 TABLET, EXTENDED RELEASE ORAL DAILY
Status: DISCONTINUED | OUTPATIENT
Start: 2025-02-21 | End: 2025-02-24 | Stop reason: HOSPADM

## 2025-02-21 RX ORDER — OXYCODONE HYDROCHLORIDE 5 MG/1
5 TABLET ORAL EVERY 6 HOURS PRN
Refills: 0 | Status: DISCONTINUED | OUTPATIENT
Start: 2025-02-21 | End: 2025-02-21

## 2025-02-21 RX ORDER — SODIUM CHLORIDE 0.9 % (FLUSH) 0.9 %
10 SYRINGE (ML) INJECTION
Status: DISCONTINUED | OUTPATIENT
Start: 2025-02-21 | End: 2025-02-24 | Stop reason: HOSPADM

## 2025-02-21 RX ORDER — SODIUM CHLORIDE 9 MG/ML
INJECTION, SOLUTION INTRAVENOUS CONTINUOUS
Status: DISCONTINUED | OUTPATIENT
Start: 2025-02-21 | End: 2025-02-21

## 2025-02-21 RX ORDER — PANTOPRAZOLE SODIUM 20 MG/1
20 TABLET, DELAYED RELEASE ORAL EVERY MORNING
Status: DISCONTINUED | OUTPATIENT
Start: 2025-02-21 | End: 2025-02-24 | Stop reason: HOSPADM

## 2025-02-21 RX ORDER — ACETAMINOPHEN 500 MG
1000 TABLET ORAL EVERY 8 HOURS
Status: DISPENSED | OUTPATIENT
Start: 2025-02-21 | End: 2025-02-22

## 2025-02-21 RX ORDER — GLUCAGON 1 MG
1 KIT INJECTION
Status: DISCONTINUED | OUTPATIENT
Start: 2025-02-21 | End: 2025-02-24 | Stop reason: HOSPADM

## 2025-02-21 RX ORDER — CHOLECALCIFEROL (VITAMIN D3) 25 MCG
2000 TABLET ORAL DAILY
Status: DISCONTINUED | OUTPATIENT
Start: 2025-02-21 | End: 2025-02-24 | Stop reason: HOSPADM

## 2025-02-21 RX ORDER — LOSARTAN POTASSIUM 25 MG/1
25 TABLET ORAL DAILY
Status: DISCONTINUED | OUTPATIENT
Start: 2025-02-21 | End: 2025-02-24

## 2025-02-21 RX ORDER — IBUPROFEN 200 MG
24 TABLET ORAL
Status: DISCONTINUED | OUTPATIENT
Start: 2025-02-21 | End: 2025-02-24 | Stop reason: HOSPADM

## 2025-02-21 RX ORDER — ATROPINE SULFATE 0.1 MG/ML
0.5 INJECTION INTRAVENOUS ONCE
Status: COMPLETED | OUTPATIENT
Start: 2025-02-21 | End: 2025-02-21

## 2025-02-21 RX ORDER — NALOXONE HCL 0.4 MG/ML
0.2 VIAL (ML) INJECTION
Status: DISCONTINUED | OUTPATIENT
Start: 2025-02-21 | End: 2025-02-24 | Stop reason: HOSPADM

## 2025-02-21 RX ORDER — ATORVASTATIN CALCIUM 40 MG/1
80 TABLET, FILM COATED ORAL DAILY
Status: DISCONTINUED | OUTPATIENT
Start: 2025-02-21 | End: 2025-02-24 | Stop reason: HOSPADM

## 2025-02-21 RX ORDER — PROCHLORPERAZINE MALEATE 5 MG
5 TABLET ORAL EVERY 6 HOURS PRN
Status: DISCONTINUED | OUTPATIENT
Start: 2025-02-21 | End: 2025-02-24 | Stop reason: HOSPADM

## 2025-02-21 RX ORDER — IBUPROFEN 200 MG
16 TABLET ORAL
Status: DISCONTINUED | OUTPATIENT
Start: 2025-02-21 | End: 2025-02-24 | Stop reason: HOSPADM

## 2025-02-21 RX ADMIN — CHOLECALCIFEROL TAB 25 MCG (1000 UNIT) 2000 UNITS: 25 TAB at 09:02

## 2025-02-21 RX ADMIN — ATORVASTATIN CALCIUM 80 MG: 40 TABLET, FILM COATED ORAL at 09:02

## 2025-02-21 RX ADMIN — PREGABALIN 75 MG: 75 CAPSULE ORAL at 08:02

## 2025-02-21 RX ADMIN — SODIUM BICARBONATE 650 MG: 650 TABLET ORAL at 08:02

## 2025-02-21 RX ADMIN — LOSARTAN POTASSIUM 25 MG: 25 TABLET, FILM COATED ORAL at 09:02

## 2025-02-21 RX ADMIN — PANTOPRAZOLE SODIUM 20 MG: 20 TABLET, DELAYED RELEASE ORAL at 06:02

## 2025-02-21 RX ADMIN — SODIUM CHLORIDE: 9 INJECTION, SOLUTION INTRAVENOUS at 06:02

## 2025-02-21 RX ADMIN — ALLOPURINOL 50 MG: 300 TABLET ORAL at 09:02

## 2025-02-21 RX ADMIN — ATROPINE SULFATE 0.5 MG: 0.1 INJECTION INTRAVENOUS at 11:02

## 2025-02-21 RX ADMIN — ISOSORBIDE MONONITRATE 90 MG: 60 TABLET, EXTENDED RELEASE ORAL at 09:02

## 2025-02-21 RX ADMIN — SODIUM BICARBONATE 650 MG: 650 TABLET ORAL at 09:02

## 2025-02-21 RX ADMIN — ACETAMINOPHEN 1000 MG: 500 TABLET ORAL at 08:02

## 2025-02-21 RX ADMIN — OXYCODONE 5 MG: 5 TABLET ORAL at 06:02

## 2025-02-21 RX ADMIN — ACETAMINOPHEN 1000 MG: 500 TABLET ORAL at 06:02

## 2025-02-21 RX ADMIN — AMLODIPINE BESYLATE 5 MG: 5 TABLET ORAL at 09:02

## 2025-02-21 NOTE — HPI
Anahy Evans is a 89 y.o. 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 Patient was trying to transition from a chair and get into her bed when she fell onto her right hip. Immediate pain and difficulty bearing weight. Denies head injury or LOC. Denies other MSK pains. Denies numbness, tingling, or paresthesias to the RLE.  Prior to this fall, the patient reports that her right hip was doing well.  She has had no issues with her right hip hemiarthroplasty since surgery and 2019.  Denies antecedent hip or thigh pain.    Implants:  HEAD FEM 12/14 TAPE VERSYS 47  HEAD VERSYS ENDO  STEM FEMORAL OFFST 6IN [id number]    She currently is ambulating in and about her house with a cane.  She states that over the past several weeks, she has been recovering from a recent hospitalization and has been practicing with a wheelchair while at home.  At the beginning of 2025, the patient was ambulating without assistance, driving, and performing all of her own ADLs.  Tobacco Use:  Quit smoking 60 years ago  Lives with her daughter in [place], [place]    Anticoagulation:  ASA and Plavix   MI in 1998 s/p PCI x4  No hx of CVA, DVT/PE  She has a history of breast cancer in 1999 s/p R lumpectomy.  She did not require radiation or chemotherapy.  No recurrence.  She has a history of cervical cancer in the 1960s s/p cone biopsy; she reports that she did not require chemotherapy or radiation for this.      Echo 1/14/25:     Left Ventricle: The left ventricle is normal in size. Normal wall thickness. Regional wall motion abnormalities present. See diagram for wall motion findings. There is low normal systolic function with a visually estimated ejection fraction of 50 - 55%.    Pulmonary Artery: There is mild to moderate pulmonary  hypertension. The estimated pulmonary artery systolic pressure is 49 mmHg.    DEXA 7/23/19: Normal bone mineral density. Ten year probability of major osteoporotic fracture is 7.6%, and hip fracture is 1.5%.

## 2025-02-21 NOTE — ASSESSMENT & PLAN NOTE
Patient's blood pressure range in the last 24 hours was: BP  Min: 154/54  Max: 207/86.The patient's inpatient anti-hypertensive regimen is listed below:  Current Antihypertensives  amLODIPine tablet 5 mg, Daily, Oral  isosorbide mononitrate 24 hr tablet 90 mg, Daily, Oral  losartan tablet 25 mg, Daily, Oral    Plan  - Restart pt's home medications and work on pain control as also likely contributing factor. Adjust PRN.

## 2025-02-21 NOTE — ASSESSMENT & PLAN NOTE
Anahy Evans is a 89 y.o. 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  X-ray of the right hip demonstrates a nondisplaced fracture of the greater trochanter with extension along the lateral cortex around the proximal aspect of her hemiarthroplasty stem.  In comparing x-rays from prior, there was no obvious evidence of loosening or subsidence.  Based on her CT scan, there appears to be a proximally 4 cm of extension of the fracture distal to the proximal tip of the stem; however, the stem appears to be stable and well fixed distally.  As such, this is most likely a [place] B1 right periprosthetic femur fracture.  Prior to this injury, the patient was able to ambulate independently, intermittently using a cane due to difficulties with maintaining her balance.  As such, she would likely benefit from ORIF to stabilize her fracture.  Potential revision arthroplasty to a long, diaphyseal fit stem would be based on intraoperative stability of her implant.  Given her extensive cardiac history, we are awaiting Cardiology clearance prior to proceeding to the operating room.    Admitted to hospital medicine.    Nonweightbearing right lower extremity.  Marked and consented for operative fixation of right femur versus revision of right hip hemiarthroplasty.  Surgery pending OR availability and cardiology clearance.    Bedrest, Baker.  Hold anticoagulation.    Labs:  WBC 9.7, HGB 9.7, HCT 31.5, platelets 186, creatinine 1.7, albumin 2.9, total protein 6.6, A1c 5.5, INR 1.1, pre-albumin 15, vitamin-D 43  Urinalysis pending.    Multimodal pain control limiting narcotics.

## 2025-02-21 NOTE — HPI
Anahy Evans is a 89 y.o. female with w/ CAD hx PCI, CKD 4, HTN, Afib, AAA, 2nd degree AV Block who presents for right hip pain after a fall. Pt has been largely confined to a WC due to TAYLOR after her recent admission and was at her facility, in her WC with a glass of water when she stood to put the glass of water onto a table and lost her balance. She dropped the glass and then tried to slow her fall but couldn't. She denied any head injury or syncope when she fell or before and also denied preceding chest pain, dizziness, headache, dyspnea, nausea, vomiting, abd pain and other symptoms. She has had TAYLOR since prior admission which has been not much worse due to her lack of activity and she denied any development of dyspnea at rest or other changes. She denied feeling ill otherwise including fever, chills, URI sxs, dysuria, diarrhea.

## 2025-02-21 NOTE — SUBJECTIVE & OBJECTIVE
Past Medical History:   Diagnosis Date    Acute blood loss anemia 10/26/2021    Anticoagulant long-term use     Breast deformity 09/07/2022    Cervical cancer 1968    breast cancer right    Coronary artery disease     Gout     High cholesterol     History of cervical dysplasia 10/28/2014    Hypertension     MI (myocardial infarction) 1999    Right axillary hidradenitis 07/11/2022       Past Surgical History:   Procedure Laterality Date    BREAST LUMPECTOMY      right    CARDIAC SURGERY      multiple cardiac stents    CORONARY ANGIOGRAPHY Right 1/21/2022    Procedure: ANGIOGRAM, CORONARY ARTERY;  Surgeon: Asim Canela MD;  Location: [facility] CATH LAB;  Service: Cardiology;  Laterality: Right;    CORONARY ANGIOGRAPHY Right 5/10/2024    Procedure: ANGIOGRAM, CORONARY ARTERY POSSIBLE LV COR;  Surgeon: Asim Canela MD;  Location: [facility] CATH LAB;  Service: Cardiology;  Laterality: Right;    CORONARY STENT PLACEMENT      HIP REPLACEMENT ARTHROPLASTY Right 2018    JOINT REPLACEMENT      right       Review of patient's allergies indicates:   Allergen Reactions    Clindamycin Anaphylaxis    Penicillins Rash       No current facility-administered medications on file prior to encounter.     Current Outpatient Medications on File Prior to Encounter   Medication Sig    albuterol (PROVENTIL/VENTOLIN HFA) 90 mcg/actuation inhaler Inhale 1 puff into the lungs every 4 (four) hours as needed for Wheezing.    allopurinoL (ZYLOPRIM) 100 MG tablet Take 0.5 tablets by mouth once daily.    amLODIPine (NORVASC) 10 MG tablet Take 0.5 tablets (5 mg total) by mouth once daily.    aspirin (ECOTRIN) 81 MG EC tablet Take 81 mg by mouth once daily.    atorvastatin (LIPITOR) 80 MG tablet Take 1 tablet (80 mg total) by mouth once daily.    cholecalciferol, vitamin D3, (VITAMIN D3) 50 mcg (2,000 unit) Cap Take 1 capsule by mouth once daily.    clopidogreL (PLAVIX) 75 mg tablet Take 75 mg by mouth once daily.    FARXIGA 10 mg tablet Take 1  tablet by mouth once daily.    fluticasone propionate (FLONASE) 50 mcg/actuation nasal spray 2 sprays (100 mcg total) by Each Nostril route once daily.    folic acid (FOLVITE) 1 MG tablet Take 1 mg by mouth once daily.    isosorbide mononitrate (IMDUR) 30 MG 24 hr tablet Take 3 tablets (90 mg total) by mouth once daily.    KERENDIA 10 mg Tab Take 1 tablet by mouth Daily.    losartan (COZAAR) 25 MG tablet Take 1 tablet (25 mg total) by mouth once daily.    nitroGLYCERIN (NITROSTAT) 0.4 MG SL tablet Place 0.4 mg under the tongue every 5 (five) minutes as needed for Chest pain.    pantoprazole (PROTONIX) 20 MG tablet Take 20 mg by mouth every morning.    sodium bicarbonate 650 MG tablet Take 650 mg by mouth 2 (two) times daily.     Family History       Problem Relation (Age of Onset)    Cancer Mother, Father          Tobacco Use    Smoking status: Former    Smokeless tobacco: Never   Substance and Sexual Activity    Alcohol use: Yes     Comment: rare    Drug use: No    Sexual activity: Not Currently     Review of Systems   Constitutional: Negative. Negative for chills, diaphoresis and night sweats.   HENT: Negative.     Cardiovascular:  Positive for irregular heartbeat. Negative for chest pain, dyspnea on exertion, leg swelling, near-syncope and palpitations.   Respiratory:  Positive for shortness of breath. Negative for cough and wheezing.    Skin:  Negative for color change and dry skin.   Musculoskeletal:  Positive for joint pain. Negative for joint swelling.   Gastrointestinal:  Negative for abdominal pain, diarrhea, nausea and vomiting.   Neurological:  Positive for weakness. Negative for dizziness, headaches and light-headedness.   All other systems reviewed and are negative.    Objective:     Vital Signs (Most Recent):  Temp: 97.7 °F (36.5 °C) (02/21/25 0545)  Pulse: 60 (02/21/25 1230)  Resp: 19 (02/21/25 1230)  BP: 124/73 (02/21/25 1230)  SpO2: 98 % (02/21/25 0930) Vital Signs (24h Range):  Temp:  [97.7 °F  (36.5 °C)-98.2 °F (36.8 °C)] 97.7 °F (36.5 °C)  Pulse:  [42-86] 60  Resp:  [17-26] 19  SpO2:  [96 %-99 %] 98 %  BP: (121-207)/(54-95) 124/73        There is no height or weight on file to calculate BMI.    SpO2: 98 %         Intake/Output Summary (Last 24 hours) at 2/21/2025 1402  Last data filed at 2/21/2025 0338  Gross per 24 hour   Intake --   Output 150 ml   Net -150 ml       Lines/Drains/Airways       Drain  Duration                  Urethral Catheter 02/21/25 0952 16 Fr. <1 day              Peripheral Intravenous Line  Duration                  Peripheral IV - Single Lumen 02/20/25 0000 20 G Left Antecubital 1 day                     Physical Exam  Vitals and nursing note reviewed.   Constitutional:       General: She is not in acute distress.     Appearance: Normal appearance. She is normal weight. She is not ill-appearing, toxic-appearing or diaphoretic.   HENT:      Head: Normocephalic and atraumatic.      Right Ear: Tympanic membrane normal.      Left Ear: Tympanic membrane normal.      Nose: Nose normal.      Mouth/Throat:      Mouth: Mucous membranes are moist.   Eyes:      Extraocular Movements: Extraocular movements intact.      Pupils: Pupils are equal, round, and reactive to light.   Cardiovascular:      Rate and Rhythm: Normal rate and regular rhythm.      Pulses: Normal pulses.      Heart sounds: Normal heart sounds.   Pulmonary:      Effort: Pulmonary effort is normal.      Breath sounds: Normal breath sounds.   Abdominal:      General: Abdomen is flat.   Musculoskeletal:      Cervical back: Normal range of motion.   Neurological:      Mental Status: She is alert.          Significant Labs: INR   Recent Labs   Lab 02/21/25  0351   INR 1.1

## 2025-02-21 NOTE — CARE UPDATE
Care Update Orthopedic Surgery     Discussed case with staff, R hemiarthroplasty with long stem press fit prosthesis and minimally displaced fracture through the greater trochanter. Stem appears stable for weight bearing as tolerated. Okay for diety today, PT/OT daily. We will follow her progress with therapy.    Glen Sanchez PGY-4  Orthopedic Surgery Resident

## 2025-02-21 NOTE — H&P
[facility] - Emergency Dept  [facility] Medicine  History & Physical    Patient Name: Anahy Evans  MRN: [id number]  Patient Class: IP- Inpatient  Admission Date: 2/20/2025  Attending Physician: Estefanía Pena MD   Primary Care Provider: Elena Cabrera MD         Patient information was obtained from patient, past medical records, and ER records.     Subjective:     Principal Problem:Closed right hip fracture    Chief Complaint:   Chief Complaint   Patient presents with    Fall    Hip Pain     Pt presents from [facility], unwitnessed fall earlier today, found down on L side, c/o R hip pain, 10/10, hx of R hip surgery, + blood thinners, + head trauma, - LOC        HPI: Anahy Evans is a 89 y.o. female with w/ CAD hx PCI, CKD 4, HTN, Afib, AAA, 2nd degree AV Block who presents for right hip pain after a fall. Pt has been largely confined to a WC due to TAYLOR after her recent admission and was at her facility, in her WC with a glass of water when she stood to put the glass of water onto a table and lost her balance. She dropped the glass and then tried to slow her fall but couldn't. She denied any head injury or syncope when she fell or before and also denied preceding chest pain, dizziness, headache, dyspnea, nausea, vomiting, abd pain and other symptoms. She has had TAYLOR since prior admission which has been not much worse due to her lack of activity and she denied any development of dyspnea at rest or other changes. She denied feeling ill otherwise including fever, chills, URI sxs, dysuria, diarrhea.     Past Medical History:   Diagnosis Date    Acute blood loss anemia 10/26/2021    Anticoagulant long-term use     Breast deformity 09/07/2022    Cervical cancer 1968    breast cancer right    Coronary artery disease     Gout     High cholesterol     History of cervical dysplasia 10/28/2014    Hypertension     MI (myocardial infarction) 1999    Right axillary hidradenitis 07/11/2022       Past  Surgical History:   Procedure Laterality Date    BREAST LUMPECTOMY      right    CARDIAC SURGERY      multiple cardiac stents    CORONARY ANGIOGRAPHY Right 1/21/2022    Procedure: ANGIOGRAM, CORONARY ARTERY;  Surgeon: Asim Canela MD;  Location: [facility] CATH LAB;  Service: Cardiology;  Laterality: Right;    CORONARY ANGIOGRAPHY Right 5/10/2024    Procedure: ANGIOGRAM, CORONARY ARTERY POSSIBLE LV COR;  Surgeon: Asim Canela MD;  Location: [facility] CATH LAB;  Service: Cardiology;  Laterality: Right;    CORONARY STENT PLACEMENT      HIP REPLACEMENT ARTHROPLASTY Right 2018    JOINT REPLACEMENT      right       Review of patient's allergies indicates:   Allergen Reactions    Clindamycin Anaphylaxis    Penicillins Rash       No current facility-administered medications on file prior to encounter.     Current Outpatient Medications on File Prior to Encounter   Medication Sig    albuterol (PROVENTIL/VENTOLIN HFA) 90 mcg/actuation inhaler Inhale 1 puff into the lungs every 4 (four) hours as needed for Wheezing.    allopurinoL (ZYLOPRIM) 100 MG tablet Take 0.5 tablets by mouth once daily.    amLODIPine (NORVASC) 10 MG tablet Take 0.5 tablets (5 mg total) by mouth once daily.    aspirin (ECOTRIN) 81 MG EC tablet Take 81 mg by mouth once daily.    atorvastatin (LIPITOR) 80 MG tablet Take 1 tablet (80 mg total) by mouth once daily.    cholecalciferol, vitamin D3, (VITAMIN D3) 50 mcg (2,000 unit) Cap Take 1 capsule by mouth once daily.    clopidogreL (PLAVIX) 75 mg tablet Take 75 mg by mouth once daily.    FARXIGA 10 mg tablet Take 1 tablet by mouth once daily.    fluticasone propionate (FLONASE) 50 mcg/actuation nasal spray 2 sprays (100 mcg total) by Each Nostril route once daily.    folic acid (FOLVITE) 1 MG tablet Take 1 mg by mouth once daily.    isosorbide mononitrate (IMDUR) 30 MG 24 hr tablet Take 3 tablets (90 mg total) by mouth once daily.    KERENDIA 10 mg Tab Take 1 tablet by mouth Daily.    losartan (COZAAR)  25 MG tablet Take 1 tablet (25 mg total) by mouth once daily.    nitroGLYCERIN (NITROSTAT) 0.4 MG SL tablet Place 0.4 mg under the tongue every 5 (five) minutes as needed for Chest pain.    pantoprazole (PROTONIX) 20 MG tablet Take 20 mg by mouth every morning.    sodium bicarbonate 650 MG tablet Take 650 mg by mouth 2 (two) times daily.     Family History       Problem Relation (Age of Onset)    Cancer Mother, Father          Tobacco Use    Smoking status: Former    Smokeless tobacco: Never   Substance and Sexual Activity    Alcohol use: Yes     Comment: rare    Drug use: No    Sexual activity: Not Currently     Review of Systems   Constitutional:  Positive for appetite change. Negative for fever.   HENT:  Negative for congestion, ear discharge, rhinorrhea and sore throat.    Eyes:  Negative for visual disturbance.   Respiratory:  Positive for shortness of breath (reported TAYLOR which is chronic and at baseline but none at rest currently). Negative for cough.    Cardiovascular:  Negative for chest pain, palpitations and leg swelling.   Gastrointestinal:  Negative for abdominal pain, diarrhea, nausea and vomiting.   Genitourinary:  Negative for decreased urine volume and dysuria.   Musculoskeletal:  Positive for arthralgias.   Skin:  Negative for rash.   Neurological:  Negative for dizziness, syncope, light-headedness and headaches.     Objective:     Vital Signs (Most Recent):  Temp: 98.2 °F (36.8 °C) (02/21/25 0305)  Pulse: (!) 51 (02/21/25 0045)  Resp: (!) 21 (02/21/25 0045)  BP: (!) 193/95 (02/21/25 0045)  SpO2: 96 % (02/21/25 0045) Vital Signs (24h Range):  Temp:  [98 °F (36.7 °C)-98.2 °F (36.8 °C)] 98.2 °F (36.8 °C)  Pulse:  [45-77] 51  Resp:  [18-26] 21  SpO2:  [96 %-97 %] 96 %  BP: (154-193)/(54-95) 193/95        There is no height or weight on file to calculate BMI.     Physical Exam  Vitals reviewed.   Constitutional:       Appearance: She is not ill-appearing.      Comments: Intermittently cringing in pain  due to hip pain but largely in no distress and able to participate in interview and exam.   HENT:      Head: Normocephalic and atraumatic.      Nose: No rhinorrhea.      Mouth/Throat:      Mouth: Mucous membranes are moist.   Eyes:      Conjunctiva/sclera: Conjunctivae normal.      Pupils: Pupils are equal, round, and reactive to light.   Cardiovascular:      Rate and Rhythm: Bradycardia present. Rhythm irregular.      Heart sounds: Normal heart sounds.   Pulmonary:      Effort: Pulmonary effort is normal. No respiratory distress.      Breath sounds: Normal breath sounds. No wheezing or rales.   Abdominal:      General: Abdomen is flat. Bowel sounds are normal. There is no distension.      Palpations: Abdomen is soft.      Tenderness: There is no abdominal tenderness. There is no guarding or rebound.   Musculoskeletal:         General: No deformity.      Cervical back: Normal range of motion and neck supple.      Right lower leg: No edema.      Left lower leg: No edema.      Comments: Pain of pt's left hip that occurs even without palpation. DP pulse and sensation intact.   Skin:     General: Skin is warm and dry.   Neurological:      Mental Status: She is alert and oriented to person, place, and time.      Comments: Only able to move her R hip minimally due to pain but otherwise able to move her toes and moving her other extremities spontaneously.   Psychiatric:         Mood and Affect: Mood normal.         Behavior: Behavior normal.              CRANIAL NERVES     CN III, IV, VI   Pupils are equal, round, and reactive to light.       Significant Labs: All pertinent labs within the past 24 hours have been reviewed.  Recent Lab Results         02/21/25  0351   02/20/25  2150        Albumin   2.9       ALP   63       ALT   16       Anion Gap   8       AST   16       Baso #   0.05       Basophil %   0.5       BILIRUBIN TOTAL   0.9  Comment: For infants and newborns, interpretation of results should be based  on  gestational age, weight and in agreement with clinical  observations.    Premature Infant recommended reference ranges:  Up to 24 hours.............<8.0 mg/dL  Up to 48 hours............<12.0 mg/dL  3-5 days..................<15.0 mg/dL  6-29 days.................<15.0 mg/dL         BUN   34       Calcium   9.4       Chloride   110       CO2   22       Creatinine   1.7       Differential Method   Automated       eGFR   28.1       Eos #   0.1       Eos %   1.0       Estimated Avg Glucose 111         Glucose   96       Gran # (ANC)   7.0       Gran %   71.9       Group & Rh   O POS       Hematocrit   31.5       Hemoglobin   9.7       Hemoglobin A1C External 5.5  Comment: ADA Screening Guidelines:  5.7-6.4%  Consistent with prediabetes  >or=6.5%  Consistent with diabetes    High levels of fetal hemoglobin interfere with the HbA1C  assay. Heterozygous hemoglobin variants (HbS, HgC, etc)do  not significantly interfere with this assay.   However, presence of multiple variants may affect accuracy.           Immature Grans (Abs)   0.04  Comment: Mild elevation in immature granulocytes is non specific and   can be seen in a variety of conditions including stress response,   acute inflammation, trauma and pregnancy. Correlation with other   laboratory and clinical findings is essential.         Immature Granulocytes   0.4       INDIRECT BIRD   NEG       INR 1.1  Comment: Coumadin Therapy:  2.0 - 3.0 for INR for all indicators except mechanical heart valves  and antiphospholipid syndromes which should use 2.5 - 3.5.           Lymph #   1.5       Lymph %   15.4       Magnesium  1.6         MCH   28.2       MCHC   30.8       MCV   92       Mono #   1.1       Mono %   10.8       MPV   12.1       nRBC   0       Platelet Count   186       Potassium   3.9       Prealbumin 15         PROTEIN TOTAL   6.6       PT 12.4         RBC   3.44       RDW   15.9       Sodium   140       Specimen Outdate   02/23/2025 23:59       Transferrin  149         Vitamin D 43  Comment: Vitamin D deficiency.........<10 ng/mL                              Vitamin D insufficiency......10-29 ng/mL       Vitamin D sufficiency........> or equal to 30 ng/mL  Vitamin D toxicity............>100 ng/mL           WBC   9.72               Significant Imaging: I have reviewed all pertinent imaging results/findings within the past 24 hours.  Imaging Results              CT Head Without Contrast (Final result)  Result time 02/21/25 00:50:15      Final result by Shyla Higgins MD (02/21/25 00:50:15)                   Impression:      1. Allowing for motion artifact, no CT evidence of acute intracranial abnormality. Clinical correlation and further evaluation as warranted.  2. Generalized cerebral volume loss and findings suggestive of chronic microvascular ischemic change.      Electronically signed by: Shyla Higgins MD  Date:    02/21/2025  Time:    00:50               Narrative:    EXAMINATION:  CT HEAD WITHOUT CONTRAST    CLINICAL HISTORY:  Head trauma, minor (Age >= 65y);    TECHNIQUE:  Low dose axial images were obtained through the head.  Coronal and sagittal reformations were also performed. Contrast was not administered.    COMPARISON:  Head CT 01/19/2025    FINDINGS:  Image quality is degraded by patient motion artifact.  Allowing for this, there is no acute intracranial hemorrhage, hydrocephalus, midline shift or mass effect. There is generalized cerebral volume loss.  Brain parenchyma appears unchanged relative to prior exam of 01/19/2025 with confluent and patchy hypoattenuation throughout the supratentorial white matter, nonspecific but likely reflecting sequela of chronic microvascular ischemic change.  The visualized paranasal sinuses and mastoid air cells are clear of acute process.  The visualized bones of the calvarium demonstrate no acute osseous abnormality.                                        CT Pelvis Without Contrast (Final result)  Result time 02/21/25  01:32:47      Final result by Girma Wilkins MD (02/21/25 01:32:47)                   Impression:      Operative change of right hip arthroplasty.    Acute minimally displaced comminuted fracture of the right greater trochanter.    Diffuse osteopenia.    This report was flagged in Epic as abnormal.    Electronically signed by resident: Ranjeet Finley  Date:    02/21/2025  Time:    00:44    Electronically signed by: Girma Wilkins  Date:    02/21/2025  Time:    01:32               Narrative:    EXAMINATION:  CT PELVIS WITHOUT CONTRAST; CT 3D RENDERING WO INDEPENDENT WORKSTATION    CLINICAL HISTORY:  Pelvic fracture;With thin cuts and 3D recon of right hip;; Pelvic fracture;With thin cuts and 3D recon of right hip;3d;    TECHNIQUE:  Low dose axial images, sagittal and coronal reformations were obtained from the iliac crests to the pubic symphysis without the administration of intravenous contrast.  Oral contrast was not administered.    3D reconstructed images were acquired by post processing on an independent workstation with concurrent supervision and archived for permanent record.    COMPARISON:  Same-date femur and hip radiographs.    FINDINGS:  BOWEL/MESENTERY: No evidence of bowel obstruction or inflammatory process.  Extensive colonic diverticulitis.  Appendix is unremarkable.    REPRODUCTIVE: Unremarkable.    URINARY BLADDER: Partially obscured by streak artifact.  Unremarkable.    VASCULATURE: 3.9 cm infrarenal abdominal aortic aneurysm, incompletely visualized but its visualized portion appears similar to the comparison study.  Ectatic caliber of the left common iliac artery, similar to prior.  Moderate aortoiliac calcific atherosclerosis.    BONES: Diffuse osteopenia.  Operative change of right hip arthroplasty.  Acute minimally displaced comminuted fracture of the right greater trochanter.    Degenerative changes of the lower lumbar spine, with grade 1 anterolisthesis of L4 on L5.    EXTRAPERITONEAL SOFT  TISSUES: Small fat-containing umbilical hernia. Subcutaneous nodularity over the ventral abdominal wall, and calcifications over the bilateral gluteal musculature, possibly injection related.    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                                        CT 3D Rendering WO Independent Workstation (Final result)  Result time 02/21/25 01:32:47      Final result by Girma Wilkins MD (02/21/25 01:32:47)                   Impression:      Operative change of right hip arthroplasty.    Acute minimally displaced comminuted fracture of the right greater trochanter.    Diffuse osteopenia.    This report was flagged in Epic as abnormal.    Electronically signed by resident: Ranjeet Finley  Date:    02/21/2025  Time:    00:44    Electronically signed by: Girma Wilkins  Date:    02/21/2025  Time:    01:32               Narrative:    EXAMINATION:  CT PELVIS WITHOUT CONTRAST; CT 3D RENDERING WO INDEPENDENT WORKSTATION    CLINICAL HISTORY:  Pelvic fracture;With thin cuts and 3D recon of right hip;; Pelvic fracture;With thin cuts and 3D recon of right hip;3d;    TECHNIQUE:  Low dose axial images, sagittal and coronal reformations were obtained from the iliac crests to the pubic symphysis without the administration of intravenous contrast.  Oral contrast was not administered.    3D reconstructed images were acquired by post processing on an independent workstation with concurrent supervision and archived for permanent record.    COMPARISON:  Same-date femur and hip radiographs.    FINDINGS:  BOWEL/MESENTERY: No evidence of bowel obstruction or inflammatory process.  Extensive colonic diverticulitis.  Appendix is unremarkable.    REPRODUCTIVE: Unremarkable.    URINARY BLADDER: Partially obscured by streak artifact.  Unremarkable.    VASCULATURE: 3.9 cm infrarenal abdominal aortic aneurysm, incompletely visualized but its visualized portion appears similar to the comparison study.   Ectatic caliber of the left common iliac artery, similar to prior.  Moderate aortoiliac calcific atherosclerosis.    BONES: Diffuse osteopenia.  Operative change of right hip arthroplasty.  Acute minimally displaced comminuted fracture of the right greater trochanter.    Degenerative changes of the lower lumbar spine, with grade 1 anterolisthesis of L4 on L5.    EXTRAPERITONEAL SOFT TISSUES: Small fat-containing umbilical hernia. Subcutaneous nodularity over the ventral abdominal wall, and calcifications over the bilateral gluteal musculature, possibly injection related.    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                                       X-Ray Chest AP Portable (Final result)  Result time 02/20/25 23:50:27      Final result by Shyla Higgins MD (02/20/25 23:50:27)                   Impression:      Please see above.      Electronically signed by: Shyla Higgins MD  Date:    02/20/2025  Time:    23:50               Narrative:    EXAMINATION:  XR CHEST AP PORTABLE    CLINICAL HISTORY:  FALL;    TECHNIQUE:  Single frontal view of the chest was performed.    COMPARISON:  02/02/2025    FINDINGS:  The cardiomediastinal silhouette is unchanged in size and configuration noting atherosclerosis of the thoracic aorta.  Mediastinal structures are midline.  The lungs are symmetrically expanded with diffuse coarse/increased interstitial prominence similar to prior examination.  No definite large region of confluent airspace consolidation, substantial volume of pleural fluid or pneumothorax.  Osseous structures demonstrate degenerative change.                                        X-Ray Femur 2 View Right (Final result)  Result time 02/21/25 00:42:49      Final result by Glen Higgins MD (02/21/25 00:42:49)                   Impression:      Acute periprosthetic fracture of the proximal right femur as above.    This report was flagged in Epic as abnormal.      Electronically  signed by: Glen Higgins MD  Date:    02/21/2025  Time:    00:42               Narrative:    EXAMINATION:  XR PELVIS ROUTINE AP; XR FEMUR 2 VIEW RIGHT    CLINICAL HISTORY:  FALL;  Pain in right hip    TECHNIQUE:  AP view of the pelvis was performed.  AP and lateral views of the right femur were performed.    COMPARISON:  05/13/2019    FINDINGS:  There is postoperative change of prior right hip arthroplasty.  There is an acute mildly displaced periprosthetic fracture at the level of the proximal femoral component.  The fracture predominantly involves the base of the greater trochanter with likely involvement of the proximal subtrochanteric region.  No evidence of dislocation.    There is diffuse osteopenia.  The ilioischial and iliopectineal lines appear maintained.  The left femoral head is appropriately seated within the acetabulum.  The more distal right femur is intact.  There are advanced degenerative changes of the right knee.  Scattered vascular calcifications present.                                        X-Ray Pelvis Routine AP (Final result)  Result time 02/21/25 00:42:49   Procedure changed from X-Ray Hip 2 or 3 views Right with Pelvis when performed     Final result by Glen Higgins MD (02/21/25 00:42:49)                   Impression:      Acute periprosthetic fracture of the proximal right femur as above.    This report was flagged in Epic as abnormal.      Electronically signed by: Glen Higgins MD  Date:    02/21/2025  Time:    00:42               Narrative:    EXAMINATION:  XR PELVIS ROUTINE AP; XR FEMUR 2 VIEW RIGHT    CLINICAL HISTORY:  FALL;  Pain in right hip    TECHNIQUE:  AP view of the pelvis was performed.  AP and lateral views of the right femur were performed.    COMPARISON:  05/13/2019    FINDINGS:  There is postoperative change of prior right hip arthroplasty.  There is an acute mildly displaced periprosthetic fracture at the level of the proximal femoral component.  The  fracture predominantly involves the base of the greater trochanter with likely involvement of the proximal subtrochanteric region.  No evidence of dislocation.    There is diffuse osteopenia.  The ilioischial and iliopectineal lines appear maintained.  The left femoral head is appropriately seated within the acetabulum.  The more distal right femur is intact.  There are advanced degenerative changes of the right knee.  Scattered vascular calcifications present.                                      Assessment/Plan:     * Closed right hip fracture  Fracture  Surgical Risk Assessment:    Active Cardiac Issues:  Active decompensated heart failure? No   Unstable angina?  No   Significant uncontrolled arrhythmias? Yes   Severe valvular heart disease-Aortic or Mitral Stenosis? No   Recent MI or coronary revascularization < 30 days? No     Cardiac Risk Factors:  Intermediate/high risk surgery? No   History of CAD/ischemic heart disease? Yes   History of cerebrovascular disease? No   History of compensated heart failure? No   Type 2 diabetes requiring insulin? No   Serum Creatinine > 2? No   Total cardiac risk factors 1     Functional mets poor    < 4 METs -unable to walk > 2 blocks on level ground without stopping due to symptoms  - eating, dressing, toileting, walking indoors, light housework. POOR   > 4 METs -climbing > 1 flight of stairs without stopping  -walking up hill > 1-2 blocks  -scrubbing floors  -moving furniture  - golf, bowling, dancing or tennis  -running short distance MODERATE to EXCELLENT       Hip Fracture Pathway initiated in case of procedure pending other evaluations including cardiology and formal orthopedics staffed consult.  Orthopedics consulted.   Continue betablocker, statin; hold ACEi or ARB day of surgery   For high risk of post-op pulmonary complications, scheduled duonebs and incentive spirometry to start after surgery   For high risk of post-op delirium, minimize CNS-active meds (opiatess,  benzos, anticholinergics) and sleep hygiene with windowshades open during day, no daytime naps, frequent re-orientation, private room if feasible  For probable senile osteoporosis, check Vitamin D level.  If/for Vit D deficiency and probable senile osteopenia/osteoporosis, start Vit D and Ca, follow up in Ortho clinic per new protocol  DVT prophylaxis for 4 weeks post-op  PT/OT to start on POD 1  Baker to be removed on POD 1  Pain control:  Pregabalin 75mg PO qHS and scheduled Tylenol 1g TID.  Oxy PO prn with Morphine IV prn for pain not controlled with PO medications    Perioperative Risk Assessment:  RCRI Score 1 with 6% risk of cardiopulmonary complications      NSQIP SCORE:         Patient is at high risk for perioperative cardiopulmonary complications due to underlying cardiopulmonary conditions. She is overall high risk and particularly her history of asymptomatic intermittent Mobitz I AVB vs 2:1 AVB puts her at risk for cardiac complications with anesthesia. Will consult cardiology for evaluation of this pre-op. Discussed this plan with ortho who agreed with the plan.         AV block  Noted per review to have reported afib and asymptomatic intermittent Mobitz I AVB vs 2:1 AVB for which cardiology evaluated her for during previous admission with uncertain etiology though possibly some reversible ischemia felt possible so medical management of her HTN and other issues was continued with recommendation for EP and cardiology outpatient follow up. Remains asymptomatic and hemodynamically stable at this time.     Plan  - Cardiology consulted in light of her anesthesia risk.  - Monitor on tele.  - Continue HTN and other management.    ACP (advance care planning)  At prior, pt's code status was changed to full code as she had been full code/DNR during the prior to admits and after discussion with her in setting of her arrhythmias she indicated she would want pacing measures and would want resuscitative measures if  she might not incur injury and it was communicated to her the low likelihood of resuscitation occurring without injury. At that time her code status was kept full code during the admission and after. I discussed this with her again during admission and we agreed that the circumstances are similar to her prior admission except that now surgery with her conditions may incur serious risk. We discussed leaving her code status as full code at this time until further evaluation with cardiology and orthopedics after which time she would like to revisit.         AAA (abdominal aortic aneurysm)  3.9cm noted on last imaging in 02/2025. Continue BP and other management.    CAD (coronary artery disease)  Patient with known CAD s/p  PCI . Holding ASA/plavix in setting of possible procedure and continuing statin. Monitor for S/Sx of angina/ACS. Continue to monitor on telemetry.     GERD (gastroesophageal reflux disease)  Plan  - Continue home medication.    CKD (chronic kidney disease) stage 4, GFR 15-29 ml/min  CMP reviewed- noted CrCl cannot be calculated (Unknown ideal weight.). according to latest data. Based on current GFR, CKD stage is stage 4 - GFR 15-29.  Monitor UOP and serial BMP and adjust therapy as needed. Renally dose meds. Avoid nephrotoxic medications and procedures. Continue sodium bicarb. On Kerendia outpatient but not on formulary here.    HTN (hypertension)  Patient's blood pressure range in the last 24 hours was: BP  Min: 154/54  Max: 207/86.The patient's inpatient anti-hypertensive regimen is listed below:  Current Antihypertensives  amLODIPine tablet 5 mg, Daily, Oral  isosorbide mononitrate 24 hr tablet 90 mg, Daily, Oral  losartan tablet 25 mg, Daily, Oral    Plan  - Restart pt's home medications and work on pain control as also likely contributing factor. Adjust PRN.    Atrial fibrillation  Discussion patient's multiple arrythmia concerns as in AV block problem. Patient has paroxysmal (<7 days) atrial  fibrillation. Patient is currently in atrial fibrillation. [id number] Score: 4. The patients heart rate in the last 24 hours is as follows:  Pulse  Min: 45  Max: 77     Antiarrhythmics       Anticoagulants       Plan  - Replete lytes with a goal of K>4, Mg >2  - Patient is not anticoagulated due to possible procedure and reportedly is not chronically anticoagulated outpatient  - Cardiology consulted.        VTE Risk Mitigation (From admission, onward)           Ordered     IP VTE HIGH RISK PATIENT  Once         02/21/25 0516     Place sequential compression device  Until discontinued         02/21/25 0501                                    Demi Frank DO  Department of Hospital Medicine  [facility] - Emergency Dept

## 2025-02-21 NOTE — PROVIDER PROGRESS NOTES - EMERGENCY DEPT.
Encounter Date: 2/20/2025    ED Physician Progress Notes        Physician Note:   AOC @ 2300. Patient presented w/ R hip pain after a fall. Concern for possible R hip fracture    Pending studies include XR R hip/pelvis    Pending actions include f/u XR    Likely disposition is admit    Williams Shah    12:53 AM  X-ray concerning for periprosthetic fracture of right hip on independent review.  CT of hip and pelvis and orthopedic consultation ordered for definitive management for

## 2025-02-21 NOTE — ASSESSMENT & PLAN NOTE
Patient with known CAD s/p  PCI . Holding ASA/plavix in setting of possible procedure and continuing statin. Monitor for S/Sx of angina/ACS. Continue to monitor on telemetry.

## 2025-02-21 NOTE — ASSESSMENT & PLAN NOTE
Discussion patient's multiple arrythmia concerns as in AV block problem. Patient has paroxysmal (<7 days) atrial fibrillation. Patient is currently in atrial fibrillation. [id number] Score: 4. The patients heart rate in the last 24 hours is as follows:  Pulse  Min: 45  Max: 77     Antiarrhythmics       Anticoagulants       Plan  - Replete lytes with a goal of K>4, Mg >2  - Patient is not anticoagulated due to possible procedure and reportedly is not chronically anticoagulated outpatient  - Cardiology consulted.

## 2025-02-21 NOTE — ASSESSMENT & PLAN NOTE
At prior, pt's code status was changed to full code as she had been full code/DNR during the prior to admits and after discussion with her in setting of her arrhythmias she indicated she would want pacing measures and would want resuscitative measures if she might not incur injury and it was communicated to her the low likelihood of resuscitation occurring without injury. At that time her code status was kept full code during the admission and after. I discussed this with her again during admission and we agreed that the circumstances are similar to her prior admission except that now surgery with her conditions may incur serious risk. We discussed leaving her code status as full code at this time until further evaluation with cardiology and orthopedics after which time she would like to revisit.

## 2025-02-21 NOTE — ASSESSMENT & PLAN NOTE
Noted per review to have reported afib and asymptomatic intermittent Mobitz I AVB vs 2:1 AVB for which cardiology evaluated her for during previous admission with uncertain etiology though possibly some reversible ischemia felt possible so medical management of her HTN and other issues was continued with recommendation for EP and cardiology outpatient follow up. Remains asymptomatic and hemodynamically stable at this time.    Plan  - Cardiology consulted in light of her anesthesia risk.  - Monitor on tele.  - Continue HTN and other management.

## 2025-02-21 NOTE — PHARMACY MED REC
"Admission Medication History     The home medication history was taken by Dawn Singleton.    You may go to "Admission" then "Reconcile Home Medications" tabs to review and/or act upon these items.     The home medication list has been updated by the Pharmacy department.   Please read ALL comments highlighted in yellow.   Please address this information as you see fit.    Feel free to contact us if you have any questions or require assistance.      The medications listed below were removed from the home medication list. Please reorder if appropriate:  Patient reports no longer taking the following medication(s):  KERENDIA 10 MG    Medications listed below were obtained from: Nursing home  Current Outpatient Medications on File Prior to Encounter   Medication Sig    albuterol-budesonide (AIRSUPRA) 90-80 mcg/actuation Inhale 2 puffs into the lungs every 6 (six) hours as needed (wheezing).    benzonatate (TESSALON) 200 MG capsule Take 200 mg by mouth every 8 (eight) hours as needed for cough.    guaifenesin/dextromethorphan (GUAIFENESIN DM ORAL) Take 10 mLs by mouth every 6 (six) hours as needed (cough).    polyethylene glycol 3350 (GLYCOLAX ORAL) Take 17 g by mouth daily as needed (constipation).    allopurinoL (ZYLOPRIM) 100 MG tablet Take 0.5 tablets by mouth once daily.    amLODIPine (NORVASC) 10 MG tablet Take 0.5 tablets (5 mg total) by mouth once daily.    aspirin (ECOTRIN) 81 MG EC tablet Take 81 mg by mouth once daily.    atorvastatin (LIPITOR) 80 MG tablet Take 1 tablet (80 mg total) by mouth once daily.    cholecalciferol, vitamin D3, (VITAMIN D3) 50 mcg (2,000 unit) Cap Take 1 capsule by mouth once daily.    clopidogreL (PLAVIX) 75 mg tablet Take 75 mg by mouth once daily.    FARXIGA 10 mg tablet Take 1 tablet by mouth once daily.    fluticasone propionate (FLONASE) 50 mcg/actuation nasal spray 2 sprays (100 mcg total) by Each Nostril route once daily.    folic acid (FOLVITE) 1 MG tablet Take 1 mg by mouth " once daily.    isosorbide mononitrate (IMDUR) 30 MG 24 hr tablet Take 3 tablets (90 mg total) by mouth once daily.    losartan (COZAAR) 25 MG tablet Take 1 tablet (25 mg total) by mouth once daily.    nitroGLYCERIN (NITROSTAT) 0.4 MG SL tablet Place 0.4 mg under the tongue every 5 (five) minutes as needed for Chest pain.    pantoprazole (PROTONIX) 20 MG tablet Take 20 mg by mouth every morning.    sodium bicarbonate 650 MG tablet Take 650 mg by mouth 2 (two) times daily.       Potential issues to be addressed PRIOR TO DISCHARGE  The listed medications were obtained from another facility ([facility]). The patient may not have been able to fill these prescriptions prior to this admission and may require new scripts upon discharge.             Dawn Singleton  EXT [id number]                  .

## 2025-02-21 NOTE — ASSESSMENT & PLAN NOTE
CMP reviewed- noted CrCl cannot be calculated (Unknown ideal weight.). according to latest data. Based on current GFR, CKD stage is stage 4 - GFR 15-29.  Monitor UOP and serial BMP and adjust therapy as needed. Renally dose meds. Avoid nephrotoxic medications and procedures. Continue sodium bicarb. On Kerendia outpatient but not on formulary here.

## 2025-02-21 NOTE — ED TRIAGE NOTES
Pt arrives to via EMS from [facility] following an unwitnessed fall. Pt states she was trying to get up from her wheel chair to the bed when she went to the ground. She denies LOC and hitting her head. She does endorse taking blood thinners  and states she was on the ground for about 5 minutes or less. She is reporting right hip  pain and hs a hx of total  right hip replacement.

## 2025-02-21 NOTE — CONSULTS
[facility] - Emergency Dept  Orthopedics  Consult Note    Patient Name: Anahy Evans  MRN: [id number]  Admission Date: 2/20/2025  Hospital Length of Stay: 0 days  Attending Provider: Estefanía Pena MD  Primary Care Provider: Elena Cabrera MD    Inpatient consult to Orthopedic Surgery  Consult performed by: ISABELA Marinelli MD  Consult ordered by: Williams Shah MD        Subjective:     Principal Problem:Periprosthetic fracture around internal prosthetic right hip joint    Chief Complaint:   Chief Complaint   Patient presents with    Fall    Hip Pain     Pt presents from [facility], unwitnessed fall earlier today, found down on L side, c/o R hip pain, 10/10, hx of R hip surgery, + blood thinners, + head trauma, - LOC        HPI: Anahy Evans is a 89 y.o. 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 Patient was trying to transition from a chair and get into her bed when she fell onto her right hip. Immediate pain and difficulty bearing weight. Denies head injury or LOC. Denies other MSK pains. Denies numbness, tingling, or paresthesias to the RLE.  Prior to this fall, the patient reports that her right hip was doing well.  She has had no issues with her right hip hemiarthroplasty since surgery and 2019.  Denies antecedent hip or thigh pain.    Implants:  HEAD FEM 12/14 TAPE VERSYS 47  HEAD VERSYS ENDO  STEM FEMORAL OFFST 6IN [id number]    She currently is ambulating in and about her house with a cane.  She states that over the past several weeks, she has been recovering from a recent hospitalization and has been practicing with a wheelchair while at home.  At the beginning of 2025, the patient was ambulating without assistance, driving, and performing all of her own ADLs.  Tobacco  Use:  Quit smoking 60 years ago  Lives with her daughter in [place], [place]    Anticoagulation:  ASA and Plavix   MI in 1998 s/p PCI x4  No hx of CVA, DVT/PE  She has a history of breast cancer in 1999 s/p R lumpectomy.  She did not require radiation or chemotherapy.  No recurrence.  She has a history of cervical cancer in the 1960s s/p cone biopsy; she reports that she did not require chemotherapy or radiation for this.      Echo 1/14/25:     Left Ventricle: The left ventricle is normal in size. Normal wall thickness. Regional wall motion abnormalities present. See diagram for wall motion findings. There is low normal systolic function with a visually estimated ejection fraction of 50 - 55%.    Pulmonary Artery: There is mild to moderate pulmonary hypertension. The estimated pulmonary artery systolic pressure is 49 mmHg.    DEXA 7/23/19: Normal bone mineral density. Ten year probability of major osteoporotic fracture is 7.6%, and hip fracture is 1.5%.        Past Medical History:   Diagnosis Date    Acute blood loss anemia 10/26/2021    Anticoagulant long-term use     Breast deformity 09/07/2022    Cervical cancer 1968    breast cancer right    Coronary artery disease     Gout     High cholesterol     History of cervical dysplasia 10/28/2014    Hypertension     MI (myocardial infarction) 1999    Right axillary hidradenitis 07/11/2022       Past Surgical History:   Procedure Laterality Date    BREAST LUMPECTOMY      right    CARDIAC SURGERY      multiple cardiac stents    CORONARY ANGIOGRAPHY Right 1/21/2022    Procedure: ANGIOGRAM, CORONARY ARTERY;  Surgeon: Asim Canela MD;  Location: [facility] CATH LAB;  Service: Cardiology;  Laterality: Right;    CORONARY ANGIOGRAPHY Right 5/10/2024    Procedure: ANGIOGRAM, CORONARY ARTERY POSSIBLE LV COR;  Surgeon: Asim Canela MD;  Location: [facility] CATH LAB;  Service: Cardiology;  Laterality: Right;    CORONARY STENT PLACEMENT      HIP REPLACEMENT ARTHROPLASTY Right  2018    JOINT REPLACEMENT      right       Review of patient's allergies indicates:   Allergen Reactions    Clindamycin Anaphylaxis    Penicillins Rash       Current Facility-Administered Medications   Medication    0.9% NaCl infusion    acetaminophen tablet 1,000 mg    allopurinol split tablet 50 mg    amLODIPine tablet 5 mg    atorvastatin tablet 80 mg    bisacodyL suppository 10 mg    dextrose 50% injection 12.5 g    dextrose 50% injection 25 g    glucagon (human recombinant) injection 1 mg    glucose chewable tablet 16 g    glucose chewable tablet 24 g    isosorbide mononitrate 24 hr tablet 90 mg    losartan tablet 25 mg    melatonin tablet 6 mg    morphine injection 1 mg    naloxone 0.4 mg/mL injection 0.2 mg    ondansetron disintegrating tablet 4 mg    oxyCODONE immediate release tablet 5 mg    pantoprazole EC tablet 20 mg    pregabalin capsule 75 mg    prochlorperazine tablet 5 mg    sodium bicarbonate tablet 650 mg    sodium chloride 0.9% flush 10 mL    sodium chloride 0.9% flush 10 mL    vitamin D 1000 units tablet 2,000 Units     Current Outpatient Medications   Medication Sig    albuterol (PROVENTIL/VENTOLIN HFA) 90 mcg/actuation inhaler Inhale 1 puff into the lungs every 4 (four) hours as needed for Wheezing.    allopurinoL (ZYLOPRIM) 100 MG tablet Take 0.5 tablets by mouth once daily.    amLODIPine (NORVASC) 10 MG tablet Take 0.5 tablets (5 mg total) by mouth once daily.    aspirin (ECOTRIN) 81 MG EC tablet Take 81 mg by mouth once daily.    atorvastatin (LIPITOR) 80 MG tablet Take 1 tablet (80 mg total) by mouth once daily.    cholecalciferol, vitamin D3, (VITAMIN D3) 50 mcg (2,000 unit) Cap Take 1 capsule by mouth once daily.    clopidogreL (PLAVIX) 75 mg tablet Take 75 mg by mouth once daily.    FARXIGA 10 mg tablet Take 1 tablet by mouth once daily.    fluticasone propionate (FLONASE) 50 mcg/actuation nasal spray 2 sprays (100 mcg total) by Each Nostril route once daily.    folic acid (FOLVITE) 1 MG  tablet Take 1 mg by mouth once daily.    isosorbide mononitrate (IMDUR) 30 MG 24 hr tablet Take 3 tablets (90 mg total) by mouth once daily.    KERENDIA 10 mg Tab Take 1 tablet by mouth Daily.    losartan (COZAAR) 25 MG tablet Take 1 tablet (25 mg total) by mouth once daily.    nitroGLYCERIN (NITROSTAT) 0.4 MG SL tablet Place 0.4 mg under the tongue every 5 (five) minutes as needed for Chest pain.    pantoprazole (PROTONIX) 20 MG tablet Take 20 mg by mouth every morning.    sodium bicarbonate 650 MG tablet Take 650 mg by mouth 2 (two) times daily.     Family History       Problem Relation (Age of Onset)    Cancer Mother, Father          Tobacco Use    Smoking status: Former    Smokeless tobacco: Never   Substance and Sexual Activity    Alcohol use: Yes     Comment: rare    Drug use: No    Sexual activity: Not Currently     ROS  Constitutional: negative for fevers or chills  Eyes: negative visual changes or eye discharge  ENT: negative for ear pain or sore throat  Respiratory: negative for shortness of breath or cough  Cardiovascular: negative for chest pain or palpitations  Gastrointestinal: negative for abdominal pain, nausea, or vomiting  Genitourinary: negative for dysuria and flank pain  Neurological: negative for headaches or dizziness  Musculoskeletal: positive for right hip pain   Objective:     Vital Signs (Most Recent):  Temp: 97.7 °F (36.5 °C) (02/21/25 0545)  Pulse: (!) 45 (02/21/25 0603)  Resp: 18 (02/21/25 0639)  BP: (!) 187/79 (02/21/25 0603)  SpO2: 96 % (02/21/25 0603) Vital Signs (24h Range):  Temp:  [97.7 °F (36.5 °C)-98.2 °F (36.8 °C)] 97.7 °F (36.5 °C)  Pulse:  [45-77] 45  Resp:  [18-26] 18  SpO2:  [96 %-97 %] 96 %  BP: (154-207)/(54-95) 187/79           There is no height or weight on file to calculate BMI.      Intake/Output Summary (Last 24 hours) at 2/21/2025 0879  Last data filed at 2/21/2025 0334  Gross per 24 hour   Intake --   Output 150 ml   Net -150 ml        Ortho/SPM Exam  General:  no  acute distress, appears stated age   Neuro: alert and oriented x3  Psych: normal mood  Head: normocephalic, atraumatic.  Eyes: no scleral icterus  Mouth: moist mucous membranes  CV: extremities warm and well perfused  Pulm: breathing comfortably, equal chest rise bilat  Skin: clean, dry, intact (any exceptions noted in below musculoskeletal exam)    MSK:    RUE:  - Skin intact throughout, no open wounds  - No swelling  - No ecchymosis, erythema, or signs of cellulitis  - mild pain with palpation of the shoulder  - AROM and PROM of the shoulder, elbow, wrist, and hand intact without pain  - Axillary/AIN/PIN/Radial/Median/Ulnar Nerves assessed in isolation without deficit  - SILT throughout  - Compartments soft  - Radial artery palpated   - Capillary Refill <3s    LUE:  - Skin intact throughout, no open wounds  - No swelling  - No ecchymosis, erythema, or signs of cellulitis  - NonTTP throughout  - AROM and PROM of the shoulder, elbow, wrist, and hand intact without pain  - Axillary/AIN/PIN/Radial/Median/Ulnar Nerves assessed in isolation without deficit  - SILT throughout  - Compartments soft  - Radial artery palpated   - Capillary Refill <3s    RLE:  - Skin intact throughout, no open wounds.   - No swelling  - No ecchymosis, erythema, or signs of cellulitis  - TTP of the proximal femur   - AROM and PROM of the ankle and foot intact without pain  - TA/EHL/Gastroc/FHL assessed in isolation without deficit  - SILT throughout  - Compartments soft  - DP and PT palpated  - Negative Log roll  - Mild-moderate pain with axial loading    LLE:  - Skin intact throughout, no open wounds  - No swelling  - No ecchymosis, erythema, or signs of cellulitis  - NonTTP throughout  - AROM and PROM of the hip, knee, ankle, and foot intact without pain  - TA/EHL/Gastroc/FHL assessed in isolation without deficit  - SILT throughout  - Compartments soft  - DP and PT palpated  - Capillary Refill <3s  - Negative Log roll  - No pain with axial  loading     Spine/pelvis/axial body:  No pain with compression of pelvis      Significant Labs:   Recent Lab Results         02/21/25  0351   02/20/25  2150        Albumin   2.9       ALP   63       ALT   16       Anion Gap   8       AST   16       Baso #   0.05       Basophil %   0.5       BILIRUBIN TOTAL   0.9  Comment: For infants and newborns, interpretation of results should be based  on gestational age, weight and in agreement with clinical  observations.    Premature Infant recommended reference ranges:  Up to 24 hours.............<8.0 mg/dL  Up to 48 hours............<12.0 mg/dL  3-5 days..................<15.0 mg/dL  6-29 days.................<15.0 mg/dL         BUN   34       Calcium   9.4       Chloride   110       CO2   22       Creatinine   1.7       Differential Method   Automated       eGFR   28.1       Eos #   0.1       Eos %   1.0       Estimated Avg Glucose 111         Glucose   96       Gran # (ANC)   7.0       Gran %   71.9       Group & Rh   O POS       Hematocrit   31.5       Hemoglobin   9.7       Hemoglobin A1C External 5.5  Comment: ADA Screening Guidelines:  5.7-6.4%  Consistent with prediabetes  >or=6.5%  Consistent with diabetes    High levels of fetal hemoglobin interfere with the HbA1C  assay. Heterozygous hemoglobin variants (HbS, HgC, etc)do  not significantly interfere with this assay.   However, presence of multiple variants may affect accuracy.           Immature Grans (Abs)   0.04  Comment: Mild elevation in immature granulocytes is non specific and   can be seen in a variety of conditions including stress response,   acute inflammation, trauma and pregnancy. Correlation with other   laboratory and clinical findings is essential.         Immature Granulocytes   0.4       INDIRECT BIRD   NEG       INR 1.1  Comment: Coumadin Therapy:  2.0 - 3.0 for INR for all indicators except mechanical heart valves  and antiphospholipid syndromes which should use 2.5 - 3.5.           Lymph #    1.5       Lymph %   15.4       Magnesium  1.6         MCH   28.2       MCHC   30.8       MCV   92       Mono #   1.1       Mono %   10.8       MPV   12.1       nRBC   0       Platelet Count   186       Potassium   3.9       Prealbumin 15         PROTEIN TOTAL   6.6       PT 12.4         RBC   3.44       RDW   15.9       Sodium   140       Specimen Outdate   02/23/2025 23:59       Transferrin 149         Vitamin D 43  Comment: Vitamin D deficiency.........<10 ng/mL                              Vitamin D insufficiency......10-29 ng/mL       Vitamin D sufficiency........> or equal to 30 ng/mL  Vitamin D toxicity............>100 ng/mL           WBC   9.72             All pertinent labs within the past 24 hours have been reviewed.    Significant Imaging: CT: I have reviewed all pertinent results/findings and my personal findings are:  CT scan demonstrates a nondisplaced fracture of the greater trochanter with extension along the lateral cortex to the proximal metaphysis of the proximal aspect of her hemiarthroplasty stem.  The fracture line extends approximately 4 cm distal to the proximal tip of the stem.  X-Ray: I have reviewed all pertinent results/findings and my personal findings are:  X-ray of the right hip demonstrates a relatively nondisplaced fracture of the greater trochanter with the extension to the proximal metaphysis around her hemiarthroplasty stem.  Assessment/Plan:     * Periprosthetic fracture around internal prosthetic right hip joint  Anahy Evans is a 89 y.o. 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  X-ray of the right hip demonstrates a nondisplaced fracture of the greater trochanter with extension along the lateral cortex around the proximal aspect of her  hemiarthroplasty stem.  In comparing x-rays from prior, there was no obvious evidence of loosening or subsidence.  Based on her CT scan, there appears to be a proximally 4 cm of extension of the fracture distal to the proximal tip of the stem; however, the stem appears to be stable and well fixed distally.  As such, this is most likely a [place] B1 right periprosthetic femur fracture.  Prior to this injury, the patient was able to ambulate independently, intermittently using a cane due to difficulties with maintaining her balance.  As such, she would likely benefit from ORIF to stabilize her fracture.  Potential revision arthroplasty to a long, diaphyseal fit stem would be based on intraoperative stability of her implant.  Given her extensive cardiac history, we are awaiting Cardiology clearance prior to proceeding to the operating room.    Admitted to hospital medicine.    Keep NPO   Nonweightbearing right lower extremity.  Marked and consented for operative fixation of right femur versus revision of right hip hemiarthroplasty.  Surgery pending OR availability and cardiology clearance.    Bedrest, Baker.  Hold anticoagulation.    Labs:  WBC 9.7, HGB 9.7, HCT 31.5, platelets 186, creatinine 1.7, albumin 2.9, total protein 6.6, A1c 5.5, INR 1.1, pre-albumin 15, vitamin-D 43  Urinalysis pending.    Multimodal pain control limiting narcotics.        LEO Marinelli MD  Orthopedics  [facility] - Emergency Dept

## 2025-02-21 NOTE — SUBJECTIVE & OBJECTIVE
Past Medical History:   Diagnosis Date    Acute blood loss anemia 10/26/2021    Anticoagulant long-term use     Breast deformity 09/07/2022    Cervical cancer 1968    breast cancer right    Coronary artery disease     Gout     High cholesterol     History of cervical dysplasia 10/28/2014    Hypertension     MI (myocardial infarction) 1999    Right axillary hidradenitis 07/11/2022       Past Surgical History:   Procedure Laterality Date    BREAST LUMPECTOMY      right    CARDIAC SURGERY      multiple cardiac stents    CORONARY ANGIOGRAPHY Right 1/21/2022    Procedure: ANGIOGRAM, CORONARY ARTERY;  Surgeon: Asim Canela MD;  Location: [facility] CATH LAB;  Service: Cardiology;  Laterality: Right;    CORONARY ANGIOGRAPHY Right 5/10/2024    Procedure: ANGIOGRAM, CORONARY ARTERY POSSIBLE LV COR;  Surgeon: Asim Canela MD;  Location: [facility] CATH LAB;  Service: Cardiology;  Laterality: Right;    CORONARY STENT PLACEMENT      HIP REPLACEMENT ARTHROPLASTY Right 2018    JOINT REPLACEMENT      right       Review of patient's allergies indicates:   Allergen Reactions    Clindamycin Anaphylaxis    Penicillins Rash       No current facility-administered medications on file prior to encounter.     Current Outpatient Medications on File Prior to Encounter   Medication Sig    albuterol (PROVENTIL/VENTOLIN HFA) 90 mcg/actuation inhaler Inhale 1 puff into the lungs every 4 (four) hours as needed for Wheezing.    allopurinoL (ZYLOPRIM) 100 MG tablet Take 0.5 tablets by mouth once daily.    amLODIPine (NORVASC) 10 MG tablet Take 0.5 tablets (5 mg total) by mouth once daily.    aspirin (ECOTRIN) 81 MG EC tablet Take 81 mg by mouth once daily.    atorvastatin (LIPITOR) 80 MG tablet Take 1 tablet (80 mg total) by mouth once daily.    cholecalciferol, vitamin D3, (VITAMIN D3) 50 mcg (2,000 unit) Cap Take 1 capsule by mouth once daily.    clopidogreL (PLAVIX) 75 mg tablet Take 75 mg by mouth once daily.    FARXIGA 10 mg tablet Take 1  tablet by mouth once daily.    fluticasone propionate (FLONASE) 50 mcg/actuation nasal spray 2 sprays (100 mcg total) by Each Nostril route once daily.    folic acid (FOLVITE) 1 MG tablet Take 1 mg by mouth once daily.    isosorbide mononitrate (IMDUR) 30 MG 24 hr tablet Take 3 tablets (90 mg total) by mouth once daily.    KERENDIA 10 mg Tab Take 1 tablet by mouth Daily.    losartan (COZAAR) 25 MG tablet Take 1 tablet (25 mg total) by mouth once daily.    nitroGLYCERIN (NITROSTAT) 0.4 MG SL tablet Place 0.4 mg under the tongue every 5 (five) minutes as needed for Chest pain.    pantoprazole (PROTONIX) 20 MG tablet Take 20 mg by mouth every morning.    sodium bicarbonate 650 MG tablet Take 650 mg by mouth 2 (two) times daily.     Family History       Problem Relation (Age of Onset)    Cancer Mother, Father          Tobacco Use    Smoking status: Former    Smokeless tobacco: Never   Substance and Sexual Activity    Alcohol use: Yes     Comment: rare    Drug use: No    Sexual activity: Not Currently     Review of Systems   Constitutional:  Positive for appetite change. Negative for fever.   HENT:  Negative for congestion, ear discharge, rhinorrhea and sore throat.    Eyes:  Negative for visual disturbance.   Respiratory:  Positive for shortness of breath (reported TAYLOR which is chronic and at baseline but none at rest currently). Negative for cough.    Cardiovascular:  Negative for chest pain, palpitations and leg swelling.   Gastrointestinal:  Negative for abdominal pain, diarrhea, nausea and vomiting.   Genitourinary:  Negative for decreased urine volume and dysuria.   Musculoskeletal:  Positive for arthralgias.   Skin:  Negative for rash.   Neurological:  Negative for dizziness, syncope, light-headedness and headaches.     Objective:     Vital Signs (Most Recent):  Temp: 98.2 °F (36.8 °C) (02/21/25 0305)  Pulse: (!) 51 (02/21/25 0045)  Resp: (!) 21 (02/21/25 0045)  BP: (!) 193/95 (02/21/25 0045)  SpO2: 96 % (02/21/25  0045) Vital Signs (24h Range):  Temp:  [98 °F (36.7 °C)-98.2 °F (36.8 °C)] 98.2 °F (36.8 °C)  Pulse:  [45-77] 51  Resp:  [18-26] 21  SpO2:  [96 %-97 %] 96 %  BP: (154-193)/(54-95) 193/95        There is no height or weight on file to calculate BMI.     Physical Exam  Vitals reviewed.   Constitutional:       Appearance: She is not ill-appearing.      Comments: Intermittently cringing in pain due to hip pain but largely in no distress and able to participate in interview and exam.   HENT:      Head: Normocephalic and atraumatic.      Nose: No rhinorrhea.      Mouth/Throat:      Mouth: Mucous membranes are moist.   Eyes:      Conjunctiva/sclera: Conjunctivae normal.      Pupils: Pupils are equal, round, and reactive to light.   Cardiovascular:      Rate and Rhythm: Bradycardia present. Rhythm irregular.      Heart sounds: Normal heart sounds.   Pulmonary:      Effort: Pulmonary effort is normal. No respiratory distress.      Breath sounds: Normal breath sounds. No wheezing or rales.   Abdominal:      General: Abdomen is flat. Bowel sounds are normal. There is no distension.      Palpations: Abdomen is soft.      Tenderness: There is no abdominal tenderness. There is no guarding or rebound.   Musculoskeletal:         General: No deformity.      Cervical back: Normal range of motion and neck supple.      Right lower leg: No edema.      Left lower leg: No edema.      Comments: Pain of pt's left hip that occurs even without palpation. DP pulse and sensation intact.   Skin:     General: Skin is warm and dry.   Neurological:      Mental Status: She is alert and oriented to person, place, and time.      Comments: Only able to move her R hip minimally due to pain but otherwise able to move her toes and moving her other extremities spontaneously.   Psychiatric:         Mood and Affect: Mood normal.         Behavior: Behavior normal.              CRANIAL NERVES     CN III, IV, VI   Pupils are equal, round, and reactive to  light.       Significant Labs: All pertinent labs within the past 24 hours have been reviewed.  Recent Lab Results         02/21/25  0351   02/20/25  2150        Albumin   2.9       ALP   63       ALT   16       Anion Gap   8       AST   16       Baso #   0.05       Basophil %   0.5       BILIRUBIN TOTAL   0.9  Comment: For infants and newborns, interpretation of results should be based  on gestational age, weight and in agreement with clinical  observations.    Premature Infant recommended reference ranges:  Up to 24 hours.............<8.0 mg/dL  Up to 48 hours............<12.0 mg/dL  3-5 days..................<15.0 mg/dL  6-29 days.................<15.0 mg/dL         BUN   34       Calcium   9.4       Chloride   110       CO2   22       Creatinine   1.7       Differential Method   Automated       eGFR   28.1       Eos #   0.1       Eos %   1.0       Estimated Avg Glucose 111         Glucose   96       Gran # (ANC)   7.0       Gran %   71.9       Group & Rh   O POS       Hematocrit   31.5       Hemoglobin   9.7       Hemoglobin A1C External 5.5  Comment: ADA Screening Guidelines:  5.7-6.4%  Consistent with prediabetes  >or=6.5%  Consistent with diabetes    High levels of fetal hemoglobin interfere with the HbA1C  assay. Heterozygous hemoglobin variants (HbS, HgC, etc)do  not significantly interfere with this assay.   However, presence of multiple variants may affect accuracy.           Immature Grans (Abs)   0.04  Comment: Mild elevation in immature granulocytes is non specific and   can be seen in a variety of conditions including stress response,   acute inflammation, trauma and pregnancy. Correlation with other   laboratory and clinical findings is essential.         Immature Granulocytes   0.4       INDIRECT BIRD   NEG       INR 1.1  Comment: Coumadin Therapy:  2.0 - 3.0 for INR for all indicators except mechanical heart valves  and antiphospholipid syndromes which should use 2.5 - 3.5.           Lymph #    1.5       Lymph %   15.4       Magnesium  1.6         MCH   28.2       MCHC   30.8       MCV   92       Mono #   1.1       Mono %   10.8       MPV   12.1       nRBC   0       Platelet Count   186       Potassium   3.9       Prealbumin 15         PROTEIN TOTAL   6.6       PT 12.4         RBC   3.44       RDW   15.9       Sodium   140       Specimen Outdate   02/23/2025 23:59       Transferrin 149         Vitamin D 43  Comment: Vitamin D deficiency.........<10 ng/mL                              Vitamin D insufficiency......10-29 ng/mL       Vitamin D sufficiency........> or equal to 30 ng/mL  Vitamin D toxicity............>100 ng/mL           WBC   9.72               Significant Imaging: I have reviewed all pertinent imaging results/findings within the past 24 hours.  Imaging Results              CT Head Without Contrast (Final result)  Result time 02/21/25 00:50:15      Final result by Shyla Higgins MD (02/21/25 00:50:15)                   Impression:      1. Allowing for motion artifact, no CT evidence of acute intracranial abnormality. Clinical correlation and further evaluation as warranted.  2. Generalized cerebral volume loss and findings suggestive of chronic microvascular ischemic change.      Electronically signed by: Shyla Higgins MD  Date:    02/21/2025  Time:    00:50               Narrative:    EXAMINATION:  CT HEAD WITHOUT CONTRAST    CLINICAL HISTORY:  Head trauma, minor (Age >= 65y);    TECHNIQUE:  Low dose axial images were obtained through the head.  Coronal and sagittal reformations were also performed. Contrast was not administered.    COMPARISON:  Head CT 01/19/2025    FINDINGS:  Image quality is degraded by patient motion artifact.  Allowing for this, there is no acute intracranial hemorrhage, hydrocephalus, midline shift or mass effect. There is generalized cerebral volume loss.  Brain parenchyma appears unchanged relative to prior exam of 01/19/2025 with confluent and patchy hypoattenuation  throughout the supratentorial white matter, nonspecific but likely reflecting sequela of chronic microvascular ischemic change.  The visualized paranasal sinuses and mastoid air cells are clear of acute process.  The visualized bones of the calvarium demonstrate no acute osseous abnormality.                                        CT Pelvis Without Contrast (Final result)  Result time 02/21/25 01:32:47      Final result by Girma Wilkins MD (02/21/25 01:32:47)                   Impression:      Operative change of right hip arthroplasty.    Acute minimally displaced comminuted fracture of the right greater trochanter.    Diffuse osteopenia.    This report was flagged in Epic as abnormal.    Electronically signed by resident: Ranjeet Finley  Date:    02/21/2025  Time:    00:44    Electronically signed by: Girma Wilkins  Date:    02/21/2025  Time:    01:32               Narrative:    EXAMINATION:  CT PELVIS WITHOUT CONTRAST; CT 3D RENDERING WO INDEPENDENT WORKSTATION    CLINICAL HISTORY:  Pelvic fracture;With thin cuts and 3D recon of right hip;; Pelvic fracture;With thin cuts and 3D recon of right hip;3d;    TECHNIQUE:  Low dose axial images, sagittal and coronal reformations were obtained from the iliac crests to the pubic symphysis without the administration of intravenous contrast.  Oral contrast was not administered.    3D reconstructed images were acquired by post processing on an independent workstation with concurrent supervision and archived for permanent record.    COMPARISON:  Same-date femur and hip radiographs.    FINDINGS:  BOWEL/MESENTERY: No evidence of bowel obstruction or inflammatory process.  Extensive colonic diverticulitis.  Appendix is unremarkable.    REPRODUCTIVE: Unremarkable.    URINARY BLADDER: Partially obscured by streak artifact.  Unremarkable.    VASCULATURE: 3.9 cm infrarenal abdominal aortic aneurysm, incompletely visualized but its visualized portion appears similar to the comparison  study.  Ectatic caliber of the left common iliac artery, similar to prior.  Moderate aortoiliac calcific atherosclerosis.    BONES: Diffuse osteopenia.  Operative change of right hip arthroplasty.  Acute minimally displaced comminuted fracture of the right greater trochanter.    Degenerative changes of the lower lumbar spine, with grade 1 anterolisthesis of L4 on L5.    EXTRAPERITONEAL SOFT TISSUES: Small fat-containing umbilical hernia. Subcutaneous nodularity over the ventral abdominal wall, and calcifications over the bilateral gluteal musculature, possibly injection related.    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                                        CT 3D Rendering WO Independent Workstation (Final result)  Result time 02/21/25 01:32:47      Final result by Girma Wilkins MD (02/21/25 01:32:47)                   Impression:      Operative change of right hip arthroplasty.    Acute minimally displaced comminuted fracture of the right greater trochanter.    Diffuse osteopenia.    This report was flagged in Epic as abnormal.    Electronically signed by resident: Ranjeet Finley  Date:    02/21/2025  Time:    00:44    Electronically signed by: Girma Wilkins  Date:    02/21/2025  Time:    01:32               Narrative:    EXAMINATION:  CT PELVIS WITHOUT CONTRAST; CT 3D RENDERING WO INDEPENDENT WORKSTATION    CLINICAL HISTORY:  Pelvic fracture;With thin cuts and 3D recon of right hip;; Pelvic fracture;With thin cuts and 3D recon of right hip;3d;    TECHNIQUE:  Low dose axial images, sagittal and coronal reformations were obtained from the iliac crests to the pubic symphysis without the administration of intravenous contrast.  Oral contrast was not administered.    3D reconstructed images were acquired by post processing on an independent workstation with concurrent supervision and archived for permanent record.    COMPARISON:  Same-date femur and hip  radiographs.    FINDINGS:  BOWEL/MESENTERY: No evidence of bowel obstruction or inflammatory process.  Extensive colonic diverticulitis.  Appendix is unremarkable.    REPRODUCTIVE: Unremarkable.    URINARY BLADDER: Partially obscured by streak artifact.  Unremarkable.    VASCULATURE: 3.9 cm infrarenal abdominal aortic aneurysm, incompletely visualized but its visualized portion appears similar to the comparison study.  Ectatic caliber of the left common iliac artery, similar to prior.  Moderate aortoiliac calcific atherosclerosis.    BONES: Diffuse osteopenia.  Operative change of right hip arthroplasty.  Acute minimally displaced comminuted fracture of the right greater trochanter.    Degenerative changes of the lower lumbar spine, with grade 1 anterolisthesis of L4 on L5.    EXTRAPERITONEAL SOFT TISSUES: Small fat-containing umbilical hernia. Subcutaneous nodularity over the ventral abdominal wall, and calcifications over the bilateral gluteal musculature, possibly injection related.    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                                       X-Ray Chest AP Portable (Final result)  Result time 02/20/25 23:50:27      Final result by Shyla Higgins MD (02/20/25 23:50:27)                   Impression:      Please see above.      Electronically signed by: Shyla Higgins MD  Date:    02/20/2025  Time:    23:50               Narrative:    EXAMINATION:  XR CHEST AP PORTABLE    CLINICAL HISTORY:  FALL;    TECHNIQUE:  Single frontal view of the chest was performed.    COMPARISON:  02/02/2025    FINDINGS:  The cardiomediastinal silhouette is unchanged in size and configuration noting atherosclerosis of the thoracic aorta.  Mediastinal structures are midline.  The lungs are symmetrically expanded with diffuse coarse/increased interstitial prominence similar to prior examination.  No definite large region of confluent airspace consolidation, substantial volume of pleural  fluid or pneumothorax.  Osseous structures demonstrate degenerative change.                                        X-Ray Femur 2 View Right (Final result)  Result time 02/21/25 00:42:49      Final result by Glen Higgins MD (02/21/25 00:42:49)                   Impression:      Acute periprosthetic fracture of the proximal right femur as above.    This report was flagged in Epic as abnormal.      Electronically signed by: Glen Higgins MD  Date:    02/21/2025  Time:    00:42               Narrative:    EXAMINATION:  XR PELVIS ROUTINE AP; XR FEMUR 2 VIEW RIGHT    CLINICAL HISTORY:  FALL;  Pain in right hip    TECHNIQUE:  AP view of the pelvis was performed.  AP and lateral views of the right femur were performed.    COMPARISON:  05/13/2019    FINDINGS:  There is postoperative change of prior right hip arthroplasty.  There is an acute mildly displaced periprosthetic fracture at the level of the proximal femoral component.  The fracture predominantly involves the base of the greater trochanter with likely involvement of the proximal subtrochanteric region.  No evidence of dislocation.    There is diffuse osteopenia.  The ilioischial and iliopectineal lines appear maintained.  The left femoral head is appropriately seated within the acetabulum.  The more distal right femur is intact.  There are advanced degenerative changes of the right knee.  Scattered vascular calcifications present.                                        X-Ray Pelvis Routine AP (Final result)  Result time 02/21/25 00:42:49   Procedure changed from X-Ray Hip 2 or 3 views Right with Pelvis when performed     Final result by Glen Higgins MD (02/21/25 00:42:49)                   Impression:      Acute periprosthetic fracture of the proximal right femur as above.    This report was flagged in Epic as abnormal.      Electronically signed by: Glen Higgins MD  Date:    02/21/2025  Time:    00:42               Narrative:    EXAMINATION:  XR  PELVIS ROUTINE AP; XR FEMUR 2 VIEW RIGHT    CLINICAL HISTORY:  FALL;  Pain in right hip    TECHNIQUE:  AP view of the pelvis was performed.  AP and lateral views of the right femur were performed.    COMPARISON:  05/13/2019    FINDINGS:  There is postoperative change of prior right hip arthroplasty.  There is an acute mildly displaced periprosthetic fracture at the level of the proximal femoral component.  The fracture predominantly involves the base of the greater trochanter with likely involvement of the proximal subtrochanteric region.  No evidence of dislocation.    There is diffuse osteopenia.  The ilioischial and iliopectineal lines appear maintained.  The left femoral head is appropriately seated within the acetabulum.  The more distal right femur is intact.  There are advanced degenerative changes of the right knee.  Scattered vascular calcifications present.

## 2025-02-21 NOTE — ED PROVIDER NOTES
Encounter Date: 2/20/2025       History     Chief Complaint   Patient presents with    Fall    Hip Pain     Pt presents from [facility], unwitnessed fall earlier today, found down on L side, c/o R hip pain, 10/10, hx of R hip surgery, + blood thinners, + head trauma, - LOC     89 y.o. female with a history of CAD, HTN, prior MI and right hip replacement who presents to the ED for a fall and right hip pain. She is at an outpatient rehab facility and when attempting to transfer from her wheelchair to her bed she lost her balance and fell on her right side. She has since had right hip pain and has been unable to bear weight on it. Denies any dizziness, lightheadedness, chest pain, shortness of breath. States she did hit the right side of her face and head.        Review of patient's allergies indicates:   Allergen Reactions    Clindamycin Anaphylaxis    Penicillins Rash     Past Medical History:   Diagnosis Date    Acute blood loss anemia 10/26/2021    Anticoagulant long-term use     Breast deformity 09/07/2022    Cervical cancer 1968    breast cancer right    Coronary artery disease     Gout     High cholesterol     History of cervical dysplasia 10/28/2014    Hypertension     MI (myocardial infarction) 1999    Right axillary hidradenitis 07/11/2022     Past Surgical History:   Procedure Laterality Date    BREAST LUMPECTOMY      right    CARDIAC SURGERY      multiple cardiac stents    CORONARY ANGIOGRAPHY Right 1/21/2022    Procedure: ANGIOGRAM, CORONARY ARTERY;  Surgeon: Asim Canela MD;  Location: [facility] CATH LAB;  Service: Cardiology;  Laterality: Right;    CORONARY ANGIOGRAPHY Right 5/10/2024    Procedure: ANGIOGRAM, CORONARY ARTERY POSSIBLE LV COR;  Surgeon: Asim Canela MD;  Location: [facility] CATH LAB;  Service: Cardiology;  Laterality: Right;    CORONARY STENT PLACEMENT      HIP REPLACEMENT ARTHROPLASTY Right 2018    JOINT REPLACEMENT      right     Family History   Problem Relation Name Age  of Onset    Cancer Mother      Cancer Father       Social History[1]  Review of Systems   Constitutional:  Negative for fever.   HENT:  Negative for sore throat.    Respiratory:  Negative for shortness of breath.    Cardiovascular:  Negative for chest pain.   Gastrointestinal:  Negative for nausea.   Genitourinary:  Negative for dysuria.   Musculoskeletal:  Negative for back pain.   Skin:  Negative for rash.   Neurological:  Negative for weakness.   Hematological:  Does not bruise/bleed easily.       Physical Exam     Initial Vitals [02/20/25 1950]   BP Pulse Resp Temp SpO2   (!) 154/54 77 (!) 22 98 °F (36.7 °C) 97 %      MAP       --         Physical Exam    Nursing note and vitals reviewed.  Constitutional: She appears well-developed and well-nourished. She is not diaphoretic. No distress.   HENT:   Head: Normocephalic and atraumatic.   Eyes: EOM are normal. Pupils are equal, round, and reactive to light.   Neck: Neck supple.   Normal range of motion.  Cardiovascular:  Normal rate and regular rhythm.           2+ radial, DP, PT pulses.   Pulmonary/Chest: Breath sounds normal.   Abdominal: Abdomen is soft. Bowel sounds are normal. She exhibits no distension. There is no abdominal tenderness. There is no rebound.   Musculoskeletal:         General: Tenderness (rigth hip) present. No edema.      Cervical back: Normal range of motion and neck supple.      Comments: Limited ROM of the right hip.     Neurological: She is alert and oriented to person, place, and time.   Skin: Skin is warm and dry. Capillary refill takes less than 2 seconds.         ED Course   Procedures  Labs Reviewed   CBC W/ AUTO DIFFERENTIAL - Abnormal       Result Value    WBC 9.72      RBC 3.44 (*)     Hemoglobin 9.7 (*)     Hematocrit 31.5 (*)     MCV 92      MCH 28.2      MCHC 30.8 (*)     RDW 15.9 (*)     Platelets 186      MPV 12.1      Immature Granulocytes 0.4      Gran # (ANC) 7.0      Immature Grans (Abs) 0.04      Lymph # 1.5      Mono #  1.1 (*)     Eos # 0.1      Baso # 0.05      nRBC 0      Gran % 71.9      Lymph % 15.4 (*)     Mono % 10.8      Eosinophil % 1.0      Basophil % 0.5      Differential Method Automated     COMPREHENSIVE METABOLIC PANEL - Abnormal    Sodium 140      Potassium 3.9      Chloride 110      CO2 22 (*)     Glucose 96      BUN 34 (*)     Creatinine 1.7 (*)     Calcium 9.4      Total Protein 6.6      Albumin 2.9 (*)     Total Bilirubin 0.9      Alkaline Phosphatase 63      AST 16      ALT 16      eGFR 28.1 (*)     Anion Gap 8     TYPE & SCREEN    Group & Rh O POS      Indirect Aracelis NEG      Specimen Outdate 02/23/2025 23:59            Imaging Results              CT Pelvis Without Contrast (In process)                      CT 3D Rendering WO Independent Workstation (In process)                      CT Head Without Contrast (In process)                      X-Ray Chest AP Portable (In process)  Result time 02/20/25 23:50:29                     X-Ray Femur 2 View Right (In process)                      X-Ray Pelvis Routine AP (In process)  Result time 02/20/25 23:10:01   Procedure changed from X-Ray Hip 2 or 3 views Right with Pelvis when performed                    Medications   oxyCODONE immediate release tablet 5 mg (5 mg Oral Given 2/20/25 6522)     Medical Decision Making  This is a 89 y.o.female presents with right hip pain after a mechanical fall. Ddx for contusion vs fracture vs dislocation. Vitals stable, afebrile. Right hip TTP and limited ROM. X-rays show possible warner-prosthetic fracture. Ortho consulted. Patient care signed out to  Sessions at shift change pending ortho recs, and head ct due to head strike as well. Anticipate admission.    Amount and/or Complexity of Data Reviewed  Labs: ordered.  Radiology: ordered.    Risk  Prescription drug management.                                      Clinical Impression:  Final diagnoses:  [I10] Hypertension  [M25.551] Right hip pain                   [1]   Social  History  Tobacco Use    Smoking status: Former    Smokeless tobacco: Never   Substance Use Topics    Alcohol use: Yes     Comment: rare    Drug use: No        Isauro Flores MD  02/20/25 7793

## 2025-02-21 NOTE — ASSESSMENT & PLAN NOTE
89 y.o. female with a history of prior stenting of the LAD and LCx (patent on LHC), known  RCA, and 50% ostial stenosis of the LCx, along with HTN, HLD, CKD4, and bradycardia with Mobitz I AVB. Her PET stress test revealed two perfusion abnormalities on 01/26. HR in the high 40s and low 50s. Patient noted to have chronotropic competence after administration of 0.5 mg IV atropine with a corresponding verticular rate increase of 40bpm.    Telemetry reviewed. RCRI score 3pt with 15% cardiovascular risk.     -- She is at higher cardiovascular surgical risk because of her extensive cardiac history but otherwise clear for potential revision arthroplasty from a cardiac standpoint.

## 2025-02-21 NOTE — SUBJECTIVE & OBJECTIVE
Past Medical History:   Diagnosis Date    Acute blood loss anemia 10/26/2021    Anticoagulant long-term use     Breast deformity 09/07/2022    Cervical cancer 1968    breast cancer right    Coronary artery disease     Gout     High cholesterol     History of cervical dysplasia 10/28/2014    Hypertension     MI (myocardial infarction) 1999    Right axillary hidradenitis 07/11/2022       Past Surgical History:   Procedure Laterality Date    BREAST LUMPECTOMY      right    CARDIAC SURGERY      multiple cardiac stents    CORONARY ANGIOGRAPHY Right 1/21/2022    Procedure: ANGIOGRAM, CORONARY ARTERY;  Surgeon: Asim Canela MD;  Location: [facility] CATH LAB;  Service: Cardiology;  Laterality: Right;    CORONARY ANGIOGRAPHY Right 5/10/2024    Procedure: ANGIOGRAM, CORONARY ARTERY POSSIBLE LV COR;  Surgeon: Asim Canela MD;  Location: [facility] CATH LAB;  Service: Cardiology;  Laterality: Right;    CORONARY STENT PLACEMENT      HIP REPLACEMENT ARTHROPLASTY Right 2018    JOINT REPLACEMENT      right       Review of patient's allergies indicates:   Allergen Reactions    Clindamycin Anaphylaxis    Penicillins Rash       Current Facility-Administered Medications   Medication    0.9% NaCl infusion    acetaminophen tablet 1,000 mg    allopurinol split tablet 50 mg    amLODIPine tablet 5 mg    atorvastatin tablet 80 mg    bisacodyL suppository 10 mg    dextrose 50% injection 12.5 g    dextrose 50% injection 25 g    glucagon (human recombinant) injection 1 mg    glucose chewable tablet 16 g    glucose chewable tablet 24 g    isosorbide mononitrate 24 hr tablet 90 mg    losartan tablet 25 mg    melatonin tablet 6 mg    morphine injection 1 mg    naloxone 0.4 mg/mL injection 0.2 mg    ondansetron disintegrating tablet 4 mg    oxyCODONE immediate release tablet 5 mg    pantoprazole EC tablet 20 mg    pregabalin capsule 75 mg    prochlorperazine tablet 5 mg    sodium bicarbonate tablet 650 mg    sodium chloride 0.9% flush 10 mL     sodium chloride 0.9% flush 10 mL    vitamin D 1000 units tablet 2,000 Units     Current Outpatient Medications   Medication Sig    albuterol (PROVENTIL/VENTOLIN HFA) 90 mcg/actuation inhaler Inhale 1 puff into the lungs every 4 (four) hours as needed for Wheezing.    allopurinoL (ZYLOPRIM) 100 MG tablet Take 0.5 tablets by mouth once daily.    amLODIPine (NORVASC) 10 MG tablet Take 0.5 tablets (5 mg total) by mouth once daily.    aspirin (ECOTRIN) 81 MG EC tablet Take 81 mg by mouth once daily.    atorvastatin (LIPITOR) 80 MG tablet Take 1 tablet (80 mg total) by mouth once daily.    cholecalciferol, vitamin D3, (VITAMIN D3) 50 mcg (2,000 unit) Cap Take 1 capsule by mouth once daily.    clopidogreL (PLAVIX) 75 mg tablet Take 75 mg by mouth once daily.    FARXIGA 10 mg tablet Take 1 tablet by mouth once daily.    fluticasone propionate (FLONASE) 50 mcg/actuation nasal spray 2 sprays (100 mcg total) by Each Nostril route once daily.    folic acid (FOLVITE) 1 MG tablet Take 1 mg by mouth once daily.    isosorbide mononitrate (IMDUR) 30 MG 24 hr tablet Take 3 tablets (90 mg total) by mouth once daily.    KERENDIA 10 mg Tab Take 1 tablet by mouth Daily.    losartan (COZAAR) 25 MG tablet Take 1 tablet (25 mg total) by mouth once daily.    nitroGLYCERIN (NITROSTAT) 0.4 MG SL tablet Place 0.4 mg under the tongue every 5 (five) minutes as needed for Chest pain.    pantoprazole (PROTONIX) 20 MG tablet Take 20 mg by mouth every morning.    sodium bicarbonate 650 MG tablet Take 650 mg by mouth 2 (two) times daily.     Family History       Problem Relation (Age of Onset)    Cancer Mother, Father          Tobacco Use    Smoking status: Former    Smokeless tobacco: Never   Substance and Sexual Activity    Alcohol use: Yes     Comment: rare    Drug use: No    Sexual activity: Not Currently     ROS  Constitutional: negative for fevers or chills  Eyes: negative visual changes or eye discharge  ENT: negative for ear pain or sore  throat  Respiratory: negative for shortness of breath or cough  Cardiovascular: negative for chest pain or palpitations  Gastrointestinal: negative for abdominal pain, nausea, or vomiting  Genitourinary: negative for dysuria and flank pain  Neurological: negative for headaches or dizziness  Musculoskeletal: positive for right hip pain   Objective:     Vital Signs (Most Recent):  Temp: 97.7 °F (36.5 °C) (02/21/25 0545)  Pulse: (!) 45 (02/21/25 0603)  Resp: 18 (02/21/25 0639)  BP: (!) 187/79 (02/21/25 0603)  SpO2: 96 % (02/21/25 0603) Vital Signs (24h Range):  Temp:  [97.7 °F (36.5 °C)-98.2 °F (36.8 °C)] 97.7 °F (36.5 °C)  Pulse:  [45-77] 45  Resp:  [18-26] 18  SpO2:  [96 %-97 %] 96 %  BP: (154-207)/(54-95) 187/79           There is no height or weight on file to calculate BMI.      Intake/Output Summary (Last 24 hours) at 2/21/2025 0811  Last data filed at 2/21/2025 0338  Gross per 24 hour   Intake --   Output 150 ml   Net -150 ml        Ortho/SPM Exam  General:  no acute distress, appears stated age   Neuro: alert and oriented x3  Psych: normal mood  Head: normocephalic, atraumatic.  Eyes: no scleral icterus  Mouth: moist mucous membranes  CV: extremities warm and well perfused  Pulm: breathing comfortably, equal chest rise bilat  Skin: clean, dry, intact (any exceptions noted in below musculoskeletal exam)    MSK:    RUE:  - Skin intact throughout, no open wounds  - No swelling  - No ecchymosis, erythema, or signs of cellulitis  - mild pain with palpation of the shoulder  - AROM and PROM of the shoulder, elbow, wrist, and hand intact without pain  - Axillary/AIN/PIN/Radial/Median/Ulnar Nerves assessed in isolation without deficit  - SILT throughout  - Compartments soft  - Radial artery palpated   - Capillary Refill <3s    LUE:  - Skin intact throughout, no open wounds  - No swelling  - No ecchymosis, erythema, or signs of cellulitis  - NonTTP throughout  - AROM and PROM of the shoulder, elbow, wrist, and hand  intact without pain  - Axillary/AIN/PIN/Radial/Median/Ulnar Nerves assessed in isolation without deficit  - SILT throughout  - Compartments soft  - Radial artery palpated   - Capillary Refill <3s    RLE:  - Skin intact throughout, no open wounds.   - No swelling  - No ecchymosis, erythema, or signs of cellulitis  - TTP of the proximal femur   - AROM and PROM of the ankle and foot intact without pain  - TA/EHL/Gastroc/FHL assessed in isolation without deficit  - SILT throughout  - Compartments soft  - DP and PT palpated  - Negative Log roll  - Mild-moderate pain with axial loading    LLE:  - Skin intact throughout, no open wounds  - No swelling  - No ecchymosis, erythema, or signs of cellulitis  - NonTTP throughout  - AROM and PROM of the hip, knee, ankle, and foot intact without pain  - TA/EHL/Gastroc/FHL assessed in isolation without deficit  - SILT throughout  - Compartments soft  - DP and PT palpated  - Capillary Refill <3s  - Negative Log roll  - No pain with axial loading     Spine/pelvis/axial body:  No pain with compression of pelvis      Significant Labs:   Recent Lab Results         02/21/25  0351   02/20/25  2150        Albumin   2.9       ALP   63       ALT   16       Anion Gap   8       AST   16       Baso #   0.05       Basophil %   0.5       BILIRUBIN TOTAL   0.9  Comment: For infants and newborns, interpretation of results should be based  on gestational age, weight and in agreement with clinical  observations.    Premature Infant recommended reference ranges:  Up to 24 hours.............<8.0 mg/dL  Up to 48 hours............<12.0 mg/dL  3-5 days..................<15.0 mg/dL  6-29 days.................<15.0 mg/dL         BUN   34       Calcium   9.4       Chloride   110       CO2   22       Creatinine   1.7       Differential Method   Automated       eGFR   28.1       Eos #   0.1       Eos %   1.0       Estimated Avg Glucose 111         Glucose   96       Gran # (ANC)   7.0       Gran %   71.9        Group & Rh   O POS       Hematocrit   31.5       Hemoglobin   9.7       Hemoglobin A1C External 5.5  Comment: ADA Screening Guidelines:  5.7-6.4%  Consistent with prediabetes  >or=6.5%  Consistent with diabetes    High levels of fetal hemoglobin interfere with the HbA1C  assay. Heterozygous hemoglobin variants (HbS, HgC, etc)do  not significantly interfere with this assay.   However, presence of multiple variants may affect accuracy.           Immature Grans (Abs)   0.04  Comment: Mild elevation in immature granulocytes is non specific and   can be seen in a variety of conditions including stress response,   acute inflammation, trauma and pregnancy. Correlation with other   laboratory and clinical findings is essential.         Immature Granulocytes   0.4       INDIRECT BIRD   NEG       INR 1.1  Comment: Coumadin Therapy:  2.0 - 3.0 for INR for all indicators except mechanical heart valves  and antiphospholipid syndromes which should use 2.5 - 3.5.           Lymph #   1.5       Lymph %   15.4       Magnesium  1.6         MCH   28.2       MCHC   30.8       MCV   92       Mono #   1.1       Mono %   10.8       MPV   12.1       nRBC   0       Platelet Count   186       Potassium   3.9       Prealbumin 15         PROTEIN TOTAL   6.6       PT 12.4         RBC   3.44       RDW   15.9       Sodium   140       Specimen Outdate   02/23/2025 23:59       Transferrin 149         Vitamin D 43  Comment: Vitamin D deficiency.........<10 ng/mL                              Vitamin D insufficiency......10-29 ng/mL       Vitamin D sufficiency........> or equal to 30 ng/mL  Vitamin D toxicity............>100 ng/mL           WBC   9.72             All pertinent labs within the past 24 hours have been reviewed.    Significant Imaging: CT: I have reviewed all pertinent results/findings and my personal findings are:  CT scan demonstrates a nondisplaced fracture of the greater trochanter with extension along the lateral cortex to the  proximal metaphysis of the proximal aspect of her hemiarthroplasty stem.  The fracture line extends approximately 4 cm distal to the proximal tip of the stem.  X-Ray: I have reviewed all pertinent results/findings and my personal findings are:  X-ray of the right hip demonstrates a relatively nondisplaced fracture of the greater trochanter with the extension to the proximal metaphysis around her hemiarthroplasty stem.

## 2025-02-21 NOTE — CONSULTS
[facility] - Emergency Dept  Cardiology  Consult Note    Patient Name: Anahy Evans  MRN: [id number]  Admission Date: 2/20/2025  Hospital Length of Stay: 0 days  Code Status: Full Code   Attending Provider: Estefanía Pena MD   Consulting Provider: Kingsley Norton MD  Primary Care Physician: Elena Cabrera MD  Principal Problem:Periprosthetic fracture around internal prosthetic right hip joint    Patient information was obtained from patient and ER records.     Consults  Subjective:     Chief Complaint:  R hip pain after fall     HPI:   Anahy Evans is a 89 y.o. female with w/ CAD hx PCI, CKD 4, HTN, Afib, AAA, 2nd degree AV Block who presents for right hip pain after a fall. Pt has been largely confined to a WC due to TAYLOR after her recent admission and was at her facility, in her WC with a glass of water when she stood to put the glass of water onto a table and lost her balance. She dropped the glass and then tried to slow her fall but couldn't. She denied any head injury or syncope when she fell or before and also denied preceding chest pain, dizziness, headache, dyspnea, nausea, vomiting, abd pain and other symptoms. She has had TAYLOR since prior admission which has been not much worse due to her lack of activity and she denied any development of dyspnea at rest or other changes. She denied feeling ill otherwise including fever, chills, URI sxs, dysuria, diarrhea.     Past Medical History:   Diagnosis Date    Acute blood loss anemia 10/26/2021    Anticoagulant long-term use     Breast deformity 09/07/2022    Cervical cancer 1968    breast cancer right    Coronary artery disease     Gout     High cholesterol     History of cervical dysplasia 10/28/2014    Hypertension     MI (myocardial infarction) 1999    Right axillary hidradenitis 07/11/2022       Past Surgical History:   Procedure Laterality Date    BREAST LUMPECTOMY      right    CARDIAC SURGERY      multiple cardiac stents    CORONARY ANGIOGRAPHY  Right 1/21/2022    Procedure: ANGIOGRAM, CORONARY ARTERY;  Surgeon: Asim Canela MD;  Location: [facility] CATH LAB;  Service: Cardiology;  Laterality: Right;    CORONARY ANGIOGRAPHY Right 5/10/2024    Procedure: ANGIOGRAM, CORONARY ARTERY POSSIBLE LV COR;  Surgeon: Asim Canela MD;  Location: [facility] CATH LAB;  Service: Cardiology;  Laterality: Right;    CORONARY STENT PLACEMENT      HIP REPLACEMENT ARTHROPLASTY Right 2018    JOINT REPLACEMENT      right       Review of patient's allergies indicates:   Allergen Reactions    Clindamycin Anaphylaxis    Penicillins Rash       No current facility-administered medications on file prior to encounter.     Current Outpatient Medications on File Prior to Encounter   Medication Sig    albuterol (PROVENTIL/VENTOLIN HFA) 90 mcg/actuation inhaler Inhale 1 puff into the lungs every 4 (four) hours as needed for Wheezing.    allopurinoL (ZYLOPRIM) 100 MG tablet Take 0.5 tablets by mouth once daily.    amLODIPine (NORVASC) 10 MG tablet Take 0.5 tablets (5 mg total) by mouth once daily.    aspirin (ECOTRIN) 81 MG EC tablet Take 81 mg by mouth once daily.    atorvastatin (LIPITOR) 80 MG tablet Take 1 tablet (80 mg total) by mouth once daily.    cholecalciferol, vitamin D3, (VITAMIN D3) 50 mcg (2,000 unit) Cap Take 1 capsule by mouth once daily.    clopidogreL (PLAVIX) 75 mg tablet Take 75 mg by mouth once daily.    FARXIGA 10 mg tablet Take 1 tablet by mouth once daily.    fluticasone propionate (FLONASE) 50 mcg/actuation nasal spray 2 sprays (100 mcg total) by Each Nostril route once daily.    folic acid (FOLVITE) 1 MG tablet Take 1 mg by mouth once daily.    isosorbide mononitrate (IMDUR) 30 MG 24 hr tablet Take 3 tablets (90 mg total) by mouth once daily.    KERENDIA 10 mg Tab Take 1 tablet by mouth Daily.    losartan (COZAAR) 25 MG tablet Take 1 tablet (25 mg total) by mouth once daily.    nitroGLYCERIN (NITROSTAT) 0.4 MG SL tablet Place 0.4 mg under the tongue every 5  (five) minutes as needed for Chest pain.    pantoprazole (PROTONIX) 20 MG tablet Take 20 mg by mouth every morning.    sodium bicarbonate 650 MG tablet Take 650 mg by mouth 2 (two) times daily.     Family History       Problem Relation (Age of Onset)    Cancer Mother, Father          Tobacco Use    Smoking status: Former    Smokeless tobacco: Never   Substance and Sexual Activity    Alcohol use: Yes     Comment: rare    Drug use: No    Sexual activity: Not Currently     Review of Systems   Constitutional: Negative. Negative for chills, diaphoresis and night sweats.   HENT: Negative.     Cardiovascular:  Positive for irregular heartbeat. Negative for chest pain, dyspnea on exertion, leg swelling, near-syncope and palpitations.   Respiratory:  Positive for shortness of breath. Negative for cough and wheezing.    Skin:  Negative for color change and dry skin.   Musculoskeletal:  Positive for joint pain. Negative for joint swelling.   Gastrointestinal:  Negative for abdominal pain, diarrhea, nausea and vomiting.   Neurological:  Positive for weakness. Negative for dizziness, headaches and light-headedness.   All other systems reviewed and are negative.    Objective:     Vital Signs (Most Recent):  Temp: 97.7 °F (36.5 °C) (02/21/25 0545)  Pulse: 60 (02/21/25 1230)  Resp: 19 (02/21/25 1230)  BP: 124/73 (02/21/25 1230)  SpO2: 98 % (02/21/25 0930) Vital Signs (24h Range):  Temp:  [97.7 °F (36.5 °C)-98.2 °F (36.8 °C)] 97.7 °F (36.5 °C)  Pulse:  [42-86] 60  Resp:  [17-26] 19  SpO2:  [96 %-99 %] 98 %  BP: (121-207)/(54-95) 124/73        There is no height or weight on file to calculate BMI.    SpO2: 98 %         Intake/Output Summary (Last 24 hours) at 2/21/2025 1402  Last data filed at 2/21/2025 0338  Gross per 24 hour   Intake --   Output 150 ml   Net -150 ml       Lines/Drains/Airways       Drain  Duration                  Urethral Catheter 02/21/25 0900 16 Fr. <1 day              Peripheral Intravenous Line  Duration                   Peripheral IV - Single Lumen 02/20/25 0000 20 G Left Antecubital 1 day                     Physical Exam  Vitals and nursing note reviewed.   Constitutional:       General: She is not in acute distress.     Appearance: Normal appearance. She is normal weight. She is not ill-appearing, toxic-appearing or diaphoretic.   HENT:      Head: Normocephalic and atraumatic.      Right Ear: Tympanic membrane normal.      Left Ear: Tympanic membrane normal.      Nose: Nose normal.      Mouth/Throat:      Mouth: Mucous membranes are moist.   Eyes:      Extraocular Movements: Extraocular movements intact.      Pupils: Pupils are equal, round, and reactive to light.   Cardiovascular:      Rate and Rhythm: Normal rate and regular rhythm.      Pulses: Normal pulses.      Heart sounds: Normal heart sounds.   Pulmonary:      Effort: Pulmonary effort is normal.      Breath sounds: Normal breath sounds.   Abdominal:      General: Abdomen is flat.   Musculoskeletal:      Cervical back: Normal range of motion.   Neurological:      Mental Status: She is alert.          Significant Labs: INR   Recent Labs   Lab 02/21/25  0351   INR 1.1     Assessment and Plan:     AV block  89 y.o. female with a history of prior stenting of the LAD and LCx (patent on LHC), known  RCA, and 50% ostial stenosis of the LCx, along with HTN, HLD, CKD4, and bradycardia with Mobitz I AVB. Her PET stress test revealed two perfusion abnormalities on 01/26. HR in the high 40s and low 50s. Patient noted to have chronotropic competence after administration of 0.5 mg IV atropine with a corresponding verticular rate increase of 40bpm.    Telemetry reviewed. RCRI score 3pt with 15% cardiovascular risk.     -- She is at higher cardiovascular surgical risk because of her extensive cardiac history but with proper chronotropic competence, clear for potential revision arthroplasty from a cardiac standpoint.         VTE Risk Mitigation (From admission, onward)            Ordered     IP VTE HIGH RISK PATIENT  Once         02/21/25 0516     Place sequential compression device  Until discontinued         02/21/25 0501                    Thank you for this consult. These are preliminary recommendations. Please see attending attestation and follow recommendations. Please message with any questions or concerns.   I will follow-up with patient. Please contact us if you have any additional questions.    Kingsley Norton MD  Cardiology   [facility] - Emergency Dept

## 2025-02-21 NOTE — ASSESSMENT & PLAN NOTE
Fracture  Surgical Risk Assessment:    Active Cardiac Issues:  Active decompensated heart failure? No   Unstable angina?  No   Significant uncontrolled arrhythmias? Yes   Severe valvular heart disease-Aortic or Mitral Stenosis? No   Recent MI or coronary revascularization < 30 days? No     Cardiac Risk Factors:  Intermediate/high risk surgery? No   History of CAD/ischemic heart disease? Yes   History of cerebrovascular disease? No   History of compensated heart failure? No   Type 2 diabetes requiring insulin? No   Serum Creatinine > 2? No   Total cardiac risk factors 1     Functional mets poor    < 4 METs -unable to walk > 2 blocks on level ground without stopping due to symptoms  - eating, dressing, toileting, walking indoors, light housework. POOR   > 4 METs -climbing > 1 flight of stairs without stopping  -walking up hill > 1-2 blocks  -scrubbing floors  -moving furniture  - golf, bowling, dancing or tennis  -running short distance MODERATE to EXCELLENT       Hip Fracture Pathway initiated in case of procedure pending other evaluations including cardiology and formal orthopedics staffed consult.  Orthopedics consulted.   Continue betablocker, statin; hold ACEi or ARB day of surgery   For high risk of post-op pulmonary complications, scheduled duonebs and incentive spirometry to start after surgery   For high risk of post-op delirium, minimize CNS-active meds (opiatess, benzos, anticholinergics) and sleep hygiene with windowshades open during day, no daytime naps, frequent re-orientation, private room if feasible  For probable senile osteoporosis, check Vitamin D level.  If/for Vit D deficiency and probable senile osteopenia/osteoporosis, start Vit D and Ca, follow up in Ortho clinic per new protocol  DVT prophylaxis for 4 weeks post-op  PT/OT to start on POD 1  Baker to be removed on POD 1  Pain control:  Pregabalin 75mg PO qHS and scheduled Tylenol 1g TID.  Oxy PO prn with Morphine IV prn for pain not controlled  with PO medications    Perioperative Risk Assessment:  RCRI Score 1 with 6% risk of cardiopulmonary complications      NSQIP SCORE:         Patient is at high risk for perioperative cardiopulmonary complications due to underlying cardiopulmonary conditions. She is overall high risk and particularly her history of asymptomatic intermittent Mobitz I AVB vs 2:1 AVB puts her at risk for cardiac complications with anesthesia. Will consult cardiology for evaluation of this pre-op. Discussed this plan with ortho who agreed with the plan.

## 2025-02-22 PROBLEM — R06.09 DYSPNEA ON EXERTION: Status: RESOLVED | Noted: 2025-01-14 | Resolved: 2025-02-22

## 2025-02-22 PROBLEM — D63.1 ANEMIA DUE TO STAGE 4 CHRONIC KIDNEY DISEASE: Status: ACTIVE | Noted: 2025-01-16

## 2025-02-22 PROBLEM — R07.9 ACUTE CHEST PAIN: Status: RESOLVED | Noted: 2020-02-15 | Resolved: 2025-02-22

## 2025-02-22 PROBLEM — J10.1 INFLUENZA A: Status: RESOLVED | Noted: 2025-02-03 | Resolved: 2025-02-22

## 2025-02-22 PROBLEM — K21.00 GASTRO-ESOPHAGEAL REFLUX DISEASE WITH ESOPHAGITIS: Status: RESOLVED | Noted: 2019-05-16 | Resolved: 2025-02-22

## 2025-02-22 PROBLEM — B34.9 VIRAL ILLNESS: Status: RESOLVED | Noted: 2025-01-05 | Resolved: 2025-02-22

## 2025-02-22 PROBLEM — E87.1 HYPONATREMIA: Status: RESOLVED | Noted: 2024-04-25 | Resolved: 2025-02-22

## 2025-02-22 PROBLEM — N17.9 ACUTE RENAL FAILURE SUPERIMPOSED ON STAGE 3 CHRONIC KIDNEY DISEASE: Status: RESOLVED | Noted: 2019-05-13 | Resolved: 2025-02-22

## 2025-02-22 PROBLEM — R50.9 FEBRILE: Status: RESOLVED | Noted: 2025-02-02 | Resolved: 2025-02-22

## 2025-02-22 PROBLEM — R79.89 ELEVATED TROPONIN: Status: RESOLVED | Noted: 2025-01-05 | Resolved: 2025-02-22

## 2025-02-22 PROBLEM — R41.0 CONFUSION: Status: RESOLVED | Noted: 2025-01-19 | Resolved: 2025-02-22

## 2025-02-22 PROBLEM — N18.4 ANEMIA DUE TO STAGE 4 CHRONIC KIDNEY DISEASE: Status: ACTIVE | Noted: 2025-01-16

## 2025-02-22 PROBLEM — Z79.01 ANTICOAGULANT LONG-TERM USE: Status: RESOLVED | Noted: 2025-01-05 | Resolved: 2025-02-22

## 2025-02-22 PROBLEM — N18.32 CHRONIC KIDNEY DISEASE (CKD) STAGE G3B/A1, MODERATELY DECREASED GLOMERULAR FILTRATION RATE (GFR) BETWEEN 30-44 ML/MIN/1.73 SQUARE METER AND ALBUMINURIA CREATININE RATIO LESS THAN 30 MG/G: Status: RESOLVED | Noted: 2021-10-05 | Resolved: 2025-02-22

## 2025-02-22 PROBLEM — N30.00 ACUTE CYSTITIS WITHOUT HEMATURIA: Status: RESOLVED | Noted: 2025-01-13 | Resolved: 2025-02-22

## 2025-02-22 PROBLEM — N18.30 ACUTE RENAL FAILURE SUPERIMPOSED ON STAGE 3 CHRONIC KIDNEY DISEASE: Status: RESOLVED | Noted: 2019-05-13 | Resolved: 2025-02-22

## 2025-02-22 PROBLEM — R06.02 SHORTNESS OF BREATH: Status: RESOLVED | Noted: 2025-01-13 | Resolved: 2025-02-22

## 2025-02-22 PROBLEM — R42 DIZZINESS: Status: RESOLVED | Noted: 2019-05-14 | Resolved: 2025-02-22

## 2025-02-22 LAB
ANION GAP SERPL CALC-SCNC: 10 MMOL/L (ref 8–16)
BASOPHILS # BLD AUTO: 0.05 K/UL (ref 0–0.2)
BASOPHILS NFR BLD: 0.8 % (ref 0–1.9)
BUN SERPL-MCNC: 33 MG/DL (ref 10–30)
CALCIUM SERPL-MCNC: 9.4 MG/DL (ref 8.7–10.5)
CHLORIDE SERPL-SCNC: 109 MMOL/L (ref 95–110)
CO2 SERPL-SCNC: 21 MMOL/L (ref 23–29)
CREAT SERPL-MCNC: 1.5 MG/DL (ref 0.5–1.4)
DIFFERENTIAL METHOD BLD: ABNORMAL
EOSINOPHIL # BLD AUTO: 0.3 K/UL (ref 0–0.5)
EOSINOPHIL NFR BLD: 4.3 % (ref 0–8)
ERYTHROCYTE [DISTWIDTH] IN BLOOD BY AUTOMATED COUNT: 16 % (ref 11.5–14.5)
EST. GFR  (NO RACE VARIABLE): 32.7 ML/MIN/1.73 M^2
GLUCOSE SERPL-MCNC: 101 MG/DL (ref 70–110)
HCT VFR BLD AUTO: 30.1 % (ref 37–48.5)
HGB BLD-MCNC: 9.5 G/DL (ref 12–16)
IMM GRANULOCYTES # BLD AUTO: 0.01 K/UL (ref 0–0.04)
IMM GRANULOCYTES NFR BLD AUTO: 0.2 % (ref 0–0.5)
LYMPHOCYTES # BLD AUTO: 1.2 K/UL (ref 1–4.8)
LYMPHOCYTES NFR BLD: 19.5 % (ref 18–48)
MAGNESIUM SERPL-MCNC: 1.6 MG/DL (ref 1.6–2.6)
MCH RBC QN AUTO: 28.9 PG (ref 27–31)
MCHC RBC AUTO-ENTMCNC: 31.6 G/DL (ref 32–36)
MCV RBC AUTO: 92 FL (ref 82–98)
MONOCYTES # BLD AUTO: 0.9 K/UL (ref 0.3–1)
MONOCYTES NFR BLD: 13.9 % (ref 4–15)
NEUTROPHILS # BLD AUTO: 3.9 K/UL (ref 1.8–7.7)
NEUTROPHILS NFR BLD: 61.3 % (ref 38–73)
NRBC BLD-RTO: 0 /100 WBC
PHOSPHATE SERPL-MCNC: 3.8 MG/DL (ref 2.7–4.5)
PLATELET # BLD AUTO: 146 K/UL (ref 150–450)
PMV BLD AUTO: 11.8 FL (ref 9.2–12.9)
POCT GLUCOSE: 112 MG/DL (ref 70–110)
POCT GLUCOSE: 120 MG/DL (ref 70–110)
POTASSIUM SERPL-SCNC: 3.9 MMOL/L (ref 3.5–5.1)
RBC # BLD AUTO: 3.29 M/UL (ref 4–5.4)
SODIUM SERPL-SCNC: 140 MMOL/L (ref 136–145)
WBC # BLD AUTO: 6.32 K/UL (ref 3.9–12.7)

## 2025-02-22 PROCEDURE — 97116 GAIT TRAINING THERAPY: CPT

## 2025-02-22 PROCEDURE — 80048 BASIC METABOLIC PNL TOTAL CA: CPT | Performed by: STUDENT IN AN ORGANIZED HEALTH CARE EDUCATION/TRAINING PROGRAM

## 2025-02-22 PROCEDURE — 97162 PT EVAL MOD COMPLEX 30 MIN: CPT

## 2025-02-22 PROCEDURE — 83735 ASSAY OF MAGNESIUM: CPT | Performed by: STUDENT IN AN ORGANIZED HEALTH CARE EDUCATION/TRAINING PROGRAM

## 2025-02-22 PROCEDURE — 51798 US URINE CAPACITY MEASURE: CPT

## 2025-02-22 PROCEDURE — 97535 SELF CARE MNGMENT TRAINING: CPT

## 2025-02-22 PROCEDURE — 84100 ASSAY OF PHOSPHORUS: CPT | Performed by: STUDENT IN AN ORGANIZED HEALTH CARE EDUCATION/TRAINING PROGRAM

## 2025-02-22 PROCEDURE — 97165 OT EVAL LOW COMPLEX 30 MIN: CPT

## 2025-02-22 PROCEDURE — 25000003 PHARM REV CODE 250: Performed by: STUDENT IN AN ORGANIZED HEALTH CARE EDUCATION/TRAINING PROGRAM

## 2025-02-22 PROCEDURE — 21400001 HC TELEMETRY ROOM

## 2025-02-22 PROCEDURE — 63600175 PHARM REV CODE 636 W HCPCS: Performed by: INTERNAL MEDICINE

## 2025-02-22 PROCEDURE — 25000003 PHARM REV CODE 250: Performed by: INTERNAL MEDICINE

## 2025-02-22 PROCEDURE — 85025 COMPLETE CBC W/AUTO DIFF WBC: CPT | Performed by: STUDENT IN AN ORGANIZED HEALTH CARE EDUCATION/TRAINING PROGRAM

## 2025-02-22 PROCEDURE — 36415 COLL VENOUS BLD VENIPUNCTURE: CPT | Performed by: STUDENT IN AN ORGANIZED HEALTH CARE EDUCATION/TRAINING PROGRAM

## 2025-02-22 RX ORDER — MUPIROCIN 20 MG/G
1 OINTMENT TOPICAL 2 TIMES DAILY
Status: DISCONTINUED | OUTPATIENT
Start: 2025-02-22 | End: 2025-02-22

## 2025-02-22 RX ORDER — ACETAMINOPHEN 500 MG
1000 TABLET ORAL EVERY 6 HOURS
Status: DISCONTINUED | OUTPATIENT
Start: 2025-02-22 | End: 2025-02-22

## 2025-02-22 RX ORDER — AMOXICILLIN 250 MG
1 CAPSULE ORAL 2 TIMES DAILY
Status: DISCONTINUED | OUTPATIENT
Start: 2025-02-22 | End: 2025-02-24 | Stop reason: HOSPADM

## 2025-02-22 RX ORDER — LIDOCAINE HYDROCHLORIDE 10 MG/ML
1 INJECTION, SOLUTION EPIDURAL; INFILTRATION; INTRACAUDAL; PERINEURAL
Status: DISCONTINUED | OUTPATIENT
Start: 2025-02-22 | End: 2025-02-22

## 2025-02-22 RX ORDER — METHOCARBAMOL 500 MG/1
500 TABLET, FILM COATED ORAL EVERY 6 HOURS
Status: DISCONTINUED | OUTPATIENT
Start: 2025-02-22 | End: 2025-02-24 | Stop reason: HOSPADM

## 2025-02-22 RX ORDER — FENTANYL CITRATE 50 UG/ML
25 INJECTION, SOLUTION INTRAMUSCULAR; INTRAVENOUS EVERY 5 MIN PRN
Status: DISCONTINUED | OUTPATIENT
Start: 2025-02-22 | End: 2025-02-22

## 2025-02-22 RX ORDER — MIDAZOLAM HYDROCHLORIDE 1 MG/ML
0.5 INJECTION, SOLUTION INTRAMUSCULAR; INTRAVENOUS
Status: DISCONTINUED | OUTPATIENT
Start: 2025-02-22 | End: 2025-02-22

## 2025-02-22 RX ORDER — METHOCARBAMOL 500 MG/1
500 TABLET, FILM COATED ORAL EVERY 6 HOURS PRN
Status: DISCONTINUED | OUTPATIENT
Start: 2025-02-22 | End: 2025-02-24 | Stop reason: HOSPADM

## 2025-02-22 RX ORDER — ACETAMINOPHEN 500 MG
1000 TABLET ORAL EVERY 8 HOURS
Status: DISCONTINUED | OUTPATIENT
Start: 2025-02-22 | End: 2025-02-24 | Stop reason: HOSPADM

## 2025-02-22 RX ORDER — MUPIROCIN 20 MG/G
OINTMENT TOPICAL 2 TIMES DAILY
Status: DISCONTINUED | OUTPATIENT
Start: 2025-02-22 | End: 2025-02-22

## 2025-02-22 RX ORDER — ENOXAPARIN SODIUM 100 MG/ML
30 INJECTION SUBCUTANEOUS EVERY 24 HOURS
Status: DISCONTINUED | OUTPATIENT
Start: 2025-02-22 | End: 2025-02-24 | Stop reason: HOSPADM

## 2025-02-22 RX ORDER — FENTANYL CITRATE 50 UG/ML
25 INJECTION, SOLUTION INTRAMUSCULAR; INTRAVENOUS
Status: DISCONTINUED | OUTPATIENT
Start: 2025-02-22 | End: 2025-02-22

## 2025-02-22 RX ORDER — CLOPIDOGREL BISULFATE 75 MG/1
75 TABLET ORAL DAILY
Status: DISCONTINUED | OUTPATIENT
Start: 2025-02-22 | End: 2025-02-24 | Stop reason: HOSPADM

## 2025-02-22 RX ORDER — OXYCODONE HYDROCHLORIDE 5 MG/1
5 TABLET ORAL EVERY 4 HOURS PRN
Refills: 0 | Status: DISCONTINUED | OUTPATIENT
Start: 2025-02-22 | End: 2025-02-24 | Stop reason: HOSPADM

## 2025-02-22 RX ORDER — MUPIROCIN 20 MG/G
1 OINTMENT TOPICAL
Status: COMPLETED | OUTPATIENT
Start: 2025-02-22 | End: 2025-02-24

## 2025-02-22 RX ORDER — POLYETHYLENE GLYCOL 3350 17 G/17G
17 POWDER, FOR SOLUTION ORAL DAILY
Status: DISCONTINUED | OUTPATIENT
Start: 2025-02-22 | End: 2025-02-24 | Stop reason: HOSPADM

## 2025-02-22 RX ORDER — ASPIRIN 81 MG/1
81 TABLET ORAL DAILY
Status: DISCONTINUED | OUTPATIENT
Start: 2025-02-22 | End: 2025-02-24 | Stop reason: HOSPADM

## 2025-02-22 RX ORDER — CEFAZOLIN 2 G/1
2 INJECTION, POWDER, FOR SOLUTION INTRAMUSCULAR; INTRAVENOUS
Status: DISCONTINUED | OUTPATIENT
Start: 2025-02-22 | End: 2025-02-22

## 2025-02-22 RX ADMIN — ASPIRIN 81 MG: 81 TABLET, COATED ORAL at 03:02

## 2025-02-22 RX ADMIN — AMLODIPINE BESYLATE 5 MG: 5 TABLET ORAL at 07:02

## 2025-02-22 RX ADMIN — CHOLECALCIFEROL TAB 25 MCG (1000 UNIT) 2000 UNITS: 25 TAB at 07:02

## 2025-02-22 RX ADMIN — CLOPIDOGREL BISULFATE 75 MG: 75 TABLET ORAL at 03:02

## 2025-02-22 RX ADMIN — PREGABALIN 75 MG: 75 CAPSULE ORAL at 09:02

## 2025-02-22 RX ADMIN — SODIUM BICARBONATE 650 MG: 650 TABLET ORAL at 09:02

## 2025-02-22 RX ADMIN — LOSARTAN POTASSIUM 25 MG: 25 TABLET, FILM COATED ORAL at 07:02

## 2025-02-22 RX ADMIN — ACETAMINOPHEN 1000 MG: 500 TABLET ORAL at 03:02

## 2025-02-22 RX ADMIN — METHOCARBAMOL 500 MG: 500 TABLET ORAL at 10:02

## 2025-02-22 RX ADMIN — ACETAMINOPHEN 1000 MG: 500 TABLET ORAL at 10:02

## 2025-02-22 RX ADMIN — METHOCARBAMOL 500 MG: 500 TABLET ORAL at 05:02

## 2025-02-22 RX ADMIN — SODIUM BICARBONATE 650 MG: 650 TABLET ORAL at 07:02

## 2025-02-22 RX ADMIN — PANTOPRAZOLE SODIUM 20 MG: 20 TABLET, DELAYED RELEASE ORAL at 06:02

## 2025-02-22 RX ADMIN — ISOSORBIDE MONONITRATE 90 MG: 60 TABLET, EXTENDED RELEASE ORAL at 07:02

## 2025-02-22 RX ADMIN — ACETAMINOPHEN 1000 MG: 500 TABLET ORAL at 09:02

## 2025-02-22 RX ADMIN — POLYETHYLENE GLYCOL 3350 17 G: 17 POWDER, FOR SOLUTION ORAL at 03:02

## 2025-02-22 RX ADMIN — ALLOPURINOL 50 MG: 300 TABLET ORAL at 09:02

## 2025-02-22 RX ADMIN — SENNOSIDES AND DOCUSATE SODIUM 1 TABLET: 50; 8.6 TABLET ORAL at 09:02

## 2025-02-22 RX ADMIN — ENOXAPARIN SODIUM 30 MG: 30 INJECTION SUBCUTANEOUS at 03:02

## 2025-02-22 RX ADMIN — ATORVASTATIN CALCIUM 80 MG: 40 TABLET, FILM COATED ORAL at 07:02

## 2025-02-22 NOTE — ASSESSMENT & PLAN NOTE
Patient's blood pressure range in the last 24 hours was: BP  Min: 106/57  Max: 181/73.The patient's inpatient anti-hypertensive regimen is listed below:  Current Antihypertensives  amLODIPine tablet 5 mg, Daily, Oral  isosorbide mononitrate 24 hr tablet 90 mg, Daily, Oral  losartan tablet 25 mg, Daily, Oral    Plan  - BP controlled and continue patient's home medications in hospital to treat.  - Goal BP < 140/90.

## 2025-02-22 NOTE — ASSESSMENT & PLAN NOTE
Anemia is likely due to chronic disease due to Chronic Kidney Disease. Most recent hemoglobin and hematocrit are listed below.  Recent Labs     02/20/25  2150 02/22/25  0908   HGB 9.7* 9.5*   HCT 31.5* 30.1*     Plan  - Monitor serial CBC: Daily  - Transfuse PRBC if patient becomes hemodynamically unstable, symptomatic or H/H drops below 7/21.  - Patient has not received any PRBC transfusions to date  - Patient's anemia is currently stable.

## 2025-02-22 NOTE — PT/OT/SLP EVAL
Occupational Therapy   Evaluation    Name: Anahy Evans  MRN: [id number]  Admitting Diagnosis: Periprosthetic fracture around internal prosthetic right hip joint  Recent Surgery: * No surgery found *      Recommendations:     Discharge Recommendations: Moderate Intensity Therapy  Discharge Equipment Recommendations:  to be determined by next level of care  Barriers to discharge:  Other (Comment) (increased assistance)    Assessment:   CO-TX with PT for pt safety and max participation with both disciplines.     Anahy Evans is a 89 y.o. female with a medical diagnosis of Periprosthetic fracture around internal prosthetic right hip joint.  She presents with R hip pain. Performance deficits affecting function: weakness, impaired self care skills, impaired functional mobility, gait instability, impaired balance, decreased lower extremity function, pain, orthopedic precautions.  PTA, pt reports being Indp  with ADLs and mobility prior to SNF stay. Upon eval, pt needs increased assistance for transfers and ADLs.     Rehab Prognosis: Good; patient would benefit from acute skilled OT services to address these deficits and reach maximum level of function.       Plan:     Patient to be seen 4 x/week to address the above listed problems via self-care/home management, therapeutic activities, therapeutic exercises  Plan of Care Expires: 03/24/25  Plan of Care Reviewed with: patient    Subjective     Chief Complaint: R Hip  Patient/Family Comments/goals: return home    Occupational Profile:  Living Environment: Lives with dtr, in 2SH (lives on 1st floor), 3 BAIRON and L hand rail, Tub/shower in bathroom  Previous level of function: Indp  Roles and Routines: mother  Equipment Used at Home: walker, rolling, cane, straight, bath bench  Assistance upon Discharge: dtr    Pain/Comfort:  Pain Rating 1:  (not rated)  Location - Side 1: Right  Location - Orientation 1: generalized  Location 1: hip  Pain Addressed 1: Pre-medicate for  activity, Distraction, Cessation of Activity, Reposition    Patients cultural, spiritual, Sabianism conflicts given the current situation: no    Objective:     Communicated with: Nsg prior to session.  Patient found HOB elevated with telemetry, SCD upon OT entry to room.    General Precautions: Standard, fall  Orthopedic Precautions: RLE weight bearing as tolerated  Braces: N/A  Respiratory Status: Room air    Occupational Performance:    Bed Mobility:    Patient completed Supine to Sit with moderate assistance  Patient completed Sit to Supine with moderate assistance    Functional Mobility/Transfers:  Patient completed Sit <> Stand Transfer with minimum assistance  with  rolling walker   Functional Mobility: Pt taking lateral steps along EOB to HOB c/ Min A x2 using RW and verbal cues (refer to PT note)    Activities of Daily Living:  Grooming: supervision facial hygiene using wash cloth  Upper Body Dressing: minimum assistance adjust gown and tie in back    Cognitive/Visual Perceptual:  A&O x4    Physical Exam:  BUE WNL  Balance: Fair+-Good    AMPAC 6 Click ADL:  AMPAC Total Score: 16    Treatment & Education:  Pt educated on role and purpose of therapy  Pt educated on goal setting  Pt educated on benefits of OOB activity  Pt educated on self advocacy     Patient left HOB elevated with all lines intact, call button in reach, and nsg notified    GOALS:   Multidisciplinary Problems       Occupational Therapy Goals          Problem: Occupational Therapy    Goal Priority Disciplines Outcome Interventions   Occupational Therapy Goal     OT, PT/OT Progressing    Description: Goals to be met by: 3/24/25     Patient will increase functional independence with ADLs by performing:    UE Dressing with Supervision.  LE Dressing with Stand-by Assistance.  Grooming while standing at sink with Set-up Assistance and Stand-by Assistance.  Toileting from toilet with Stand-by Assistance for hygiene and clothing management.   All  functional transfers performed with SBA                         DME Justifications:  No DME recommended requiring DME justifications    History:     Past Medical History:   Diagnosis Date    Acute blood loss anemia 10/26/2021    Anticoagulant long-term use     Breast deformity 09/07/2022    Cervical cancer 1968    breast cancer right    Coronary artery disease     Gout     High cholesterol     History of cervical dysplasia 10/28/2014    Hypertension     MI (myocardial infarction) 1999    Right axillary hidradenitis 07/11/2022         Past Surgical History:   Procedure Laterality Date    BREAST LUMPECTOMY      right    CARDIAC SURGERY      multiple cardiac stents    CORONARY ANGIOGRAPHY Right 1/21/2022    Procedure: ANGIOGRAM, CORONARY ARTERY;  Surgeon: Asim Canela MD;  Location: [facility] CATH LAB;  Service: Cardiology;  Laterality: Right;    CORONARY ANGIOGRAPHY Right 5/10/2024    Procedure: ANGIOGRAM, CORONARY ARTERY POSSIBLE LV COR;  Surgeon: Asim Canela MD;  Location: [facility] CATH LAB;  Service: Cardiology;  Laterality: Right;    CORONARY STENT PLACEMENT      HIP REPLACEMENT ARTHROPLASTY Right 2018    JOINT REPLACEMENT      right       Time Tracking:     OT Date of Treatment: 02/22/25  OT Start Time: 1117  OT Stop Time: 1136  OT Total Time (min): 19 min    Billable Minutes:Evaluation 10  Self Care/Home Management 9    2/22/2025

## 2025-02-22 NOTE — ASSESSMENT & PLAN NOTE
CMP reviewed- noted Estimated Creatinine Clearance: 23.8 mL/min (A) (based on SCr of 1.5 mg/dL (H)). according to latest data. Based on current GFR, CKD stage is stage 4 - GFR 15-29.  Baseline Creatinine 1.5-2.0. Monitor UOP and serial BMP and adjust therapy as needed. Renally dose meds. Avoid nephrotoxic medications and procedures. Continue sodium bicarbonate replacement. On ACE outpatient but not on formulary here so hold in hospital.

## 2025-02-22 NOTE — HOSPITAL COURSE
89 y/o female with known previous right hip replacement, CAD with PCI x 4, CKD 4, primary hypertension, PAF, AAA, 2nd degree AV Block who presents for right hip pain after a fall transferring from wheelchair to bed at [facility] where she was receiving PT/OT after recent hospital discharge on 2/11/2025. Patient states she went forward and landed on her right hip and shoulder. Patient states had pain in right hip and shoulder after fall and brought to ED. X-rays of right shoulder showed no fracture or dislocation. X-ray and CT scan of pelvis revealed acute minimally displaced comminuted fracture of the right greater trochanter. Hardware in right hip in good alignment. Orthopedics consulted for periprosthetic greater trochanteric fracture of right hip. After discussion with Ortho staff, it was determined her prosthetic stem to right hip was stable for weight bearing as tolerated and recommended non operative management of her fracture. Patient to be weight bear as tolerated to right lower extremity as per Ortho. PT/OT consulted for evaluation and recommending moderate intensity therapy and plan for patient to return back to [facility] SNF on discharge where she was at prior to this admit. Pain management with scheduled Tylenol 1000 mg po every 8 hours + Robaxin 500 mg po every 6 hours + Pregabalin 75 mg po nightly with Oxycodone IR 5 mg p every 4 hours for breakthrough pain and pain controlled post-op. Patient placed on Lovenox 30 mg subcutaneous daily (renally dose) for DVT prophylaxis while in hospital. Pain controlled to right hip and patient medically ready for discharge on 2/24. [facility] contacted and accepted to return and discharged on 2/24. Patient's BP elevated on am of 2/24 so home Norvasc increased from 5 to 10 mg po daily to treat her HTN.BP improved in afternoon of 2/24 after increasing Norvasc. Patient reports pain controlled to right hip. Patient progressing with PT/OT and  discharged in good condition back to [facility] to continue rehab and recovery from fracture. Patient to follow-up in Ortho clinic in 2 weeks to follow-up fracture healing. Patient discharged on Tylenol, Robaxin, Lyrica and Oxycodone IR po prn for breakthrough pain.

## 2025-02-22 NOTE — SUBJECTIVE & OBJECTIVE
Interval History: 89 y/o female with known known previous right hip replacement, CAD with PCI x 4, CKD 4, primary hypertension, PAF, AAA, 2nd degree AV Block who presents for right hip pain after a fall transferring from wheelchair to bed at [facility] where she was receiving PT/OT after recent hospital discharge on 2/11/2025. Patient states she went forward and landed on her right hip and shoulder. Patient states had pain in right hip and shoulder after fall and brought to ED. X-rays of right shoulder showed no fracture or dislocation. X-ray and CT scan of pelvis revealed acute minimally displaced comminuted fracture of the right greater trochanter. Hardware in right hip in good alignment. Orthopedics consulted for periprosthetic greater trochanteric fracture of right hip. After discussion with Ortho staff, it was determined her prosthetic stem to right hip was stable for weight bearing as tolerated and recommended non operative management of her fracture. Patient to be weight bear as tolerated to right lower extremity as per Ortho. PT/OT consulted for evaluation. Pain management with scheduled Tylenol 1000 mg po every 8 hours + Robaxin 500 mg po every 6 hours + Pregabalin 75 mg po nightly with Oxycodone IR 5 mg p every 4 hours for breakthrough pain. Patient placed on Lovenox 30 mg subcutaneous daily (renally dose) for DVT prophylaxis. Patient to work with PT/OT today and see how she does with pain when mobilizing. Patient reports current pain to right hip as 3/10. Continue current pain regimen and adjust as needed. Plan is for patient to return back to [facility] pending progress with PT/OT and her pain management. Labs reviewed.     Review of Systems   Constitutional:  Negative for fever.   Respiratory:  Negative for cough and shortness of breath.    Cardiovascular:  Negative for chest pain and leg swelling.   Gastrointestinal:  Negative for abdominal pain and nausea.   Musculoskeletal:   Positive for arthralgias (Right hip) and myalgias (Right lateral thigh).   Psychiatric/Behavioral:  Negative for agitation and confusion.      Objective:     Vital Signs (Most Recent):  Temp: 98.2 °F (36.8 °C) (02/22/25 1147)  Pulse: 73 (02/22/25 1217)  Resp: 16 (02/22/25 1147)  BP: 108/56 (02/22/25 1147)  SpO2: 98 % (02/22/25 1147) on room air Vital Signs (24h Range):  Temp:  [97.6 °F (36.4 °C)-98.2 °F (36.8 °C)] 98.2 °F (36.8 °C)  Pulse:  [36-77] 73  Resp:  [16-26] 16  SpO2:  [92 %-98 %] 98 %  BP: (106-181)/(56-79) 108/56        There is no height or weight on file to calculate BMI.    Intake/Output Summary (Last 24 hours) at 2/22/2025 1348  Last data filed at 2/22/2025 1214  Gross per 24 hour   Intake 480 ml   Output 650 ml   Net -170 ml         Physical Exam  Vitals and nursing note reviewed.   Constitutional:       General: She is awake. She is not in acute distress.     Appearance: Normal appearance. She is normal weight. She is not ill-appearing.      Comments: Patient sitting up in hospital bed in no distress and awake and alert. Patient smiling on exam.    Eyes:      Conjunctiva/sclera: Conjunctivae normal.   Cardiovascular:      Rate and Rhythm: Normal rate and regular rhythm.      Heart sounds: Normal heart sounds. No murmur heard.  Pulmonary:      Effort: Pulmonary effort is normal. No respiratory distress.      Breath sounds: Normal breath sounds. No wheezing or rales.   Abdominal:      General: Abdomen is flat. Bowel sounds are normal. There is no distension.      Palpations: Abdomen is soft.      Tenderness: There is no abdominal tenderness. There is no guarding.   Musculoskeletal:      Right lower leg: No edema.      Left lower leg: No edema.   Skin:     General: Skin is warm.      Findings: No erythema.   Neurological:      Mental Status: She is alert and oriented to person, place, and time.   Psychiatric:         Mood and Affect: Mood normal.         Behavior: Behavior normal. Behavior is  cooperative.             Significant Labs: BMP:   Recent Labs   Lab 02/22/25  0908         K 3.9      CO2 21*   BUN 33*   CREATININE 1.5*   CALCIUM 9.4   MG 1.6     CBC:   Recent Labs   Lab 02/20/25  2150 02/22/25  0908   WBC 9.72 6.32   HGB 9.7* 9.5*   HCT 31.5* 30.1*    146*     Magnesium:   Recent Labs   Lab 02/21/25  0351 02/22/25  0908   MG 1.6 1.6       Significant Imaging: I have reviewed all pertinent imaging results/findings within the past 24 hours.

## 2025-02-22 NOTE — ASSESSMENT & PLAN NOTE
89 y/o female with known known previous right hip replacement, CAD with PCI x 4, CKD 4, primary hypertension, PAF, AAA, 2nd degree AV Block who presents for right hip pain after a fall transferring from wheelchair to bed at [facility] where she was receiving PT/OT after recent hospital discharge on 2/11/2025. Patient states she went forward and landed on her right hip and shoulder. Patient states had pain in right hip and shoulder after fall and brought to ED. X-rays of right shoulder showed no fracture or dislocation. X-ray and CT scan of pelvis revealed acute minimally displaced comminuted fracture of the right greater trochanter. Hardware in right hip in good alignment. Orthopedics consulted for periprosthetic greater trochanteric fracture of right hip. After discussion with Ortho staff, it was determined her prosthetic stem to right hip was stable for weight bearing as tolerated and recommended non operative management of her fracture. Patient to be weight bear as tolerated to right lower extremity as per Ortho. PT/OT consulted for evaluation. Pain management with scheduled Tylenol 1000 mg po every 8 hours + Robaxin 500 mg po every 6 hours + Pregabalin 75 mg po nightly with Oxycodone IR 5 mg p every 4 hours for breakthrough pain. Patient placed on Lovenox 30 mg subcutaneous daily (renally dose) for DVT prophylaxis.

## 2025-02-22 NOTE — PROGRESS NOTES
[facility]  Progress Note    Patient Name: Anahy Evans  MRN: [id number]  Patient Class: IP- Inpatient   Admission Date: 2/20/2025  Length of Stay: 1 days  Attending Physician: Estefanía Pena MD  Primary Care Provider: Elena Cabrera MD        Subjective     Principal Problem:Periprosthetic fracture around internal prosthetic right hip joint        HPI:  Anahy Evans is a 89 y.o. female with w/ CAD hx PCI, CKD 4, HTN, Afib, AAA, 2nd degree AV Block who presents for right hip pain after a fall. Pt has been largely confined to a WC due to TAYLOR after her recent admission and was at her facility, in her WC with a glass of water when she stood to put the glass of water onto a table and lost her balance. She dropped the glass and then tried to slow her fall but couldn't. She denied any head injury or syncope when she fell or before and also denied preceding chest pain, dizziness, headache, dyspnea, nausea, vomiting, abd pain and other symptoms. She has had TAYLOR since prior admission which has been not much worse due to her lack of activity and she denied any development of dyspnea at rest or other changes. She denied feeling ill otherwise including fever, chills, URI sxs, dysuria, diarrhea.     Overview/Hospital Course:  89 y/o female with known known previous right hip replacement, CAD with PCI x 4, CKD 4, primary hypertension, PAF, AAA, 2nd degree AV Block who presents for right hip pain after a fall transferring from wheelchair to bed at [facility] where she was receiving PT/OT after recent hospital discharge on 2/11/2025. Patient states she went forward and landed on her right hip and shoulder. Patient states had pain in right hip and shoulder after fall and brought to ED. X-rays of right shoulder showed no fracture or dislocation. X-ray and CT scan of pelvis revealed acute minimally displaced comminuted fracture of the right greater trochanter. Hardware in right hip  in good alignment. Orthopedics consulted for periprosthetic greater trochanteric fracture of right hip. After discussion with Ortho staff, it was determined her prosthetic stem to right hip was stable for weight bearing as tolerated and recommended non operative management of her fracture. Patient to be weight bear as tolerated to right lower extremity as per Ortho. PT/OT consulted for evaluation. Pain management with scheduled Tylenol 1000 mg po every 8 hours + Robaxin 500 mg po every 6 hours + Pregabalin 75 mg po nightly with Oxycodone IR 5 mg p every 4 hours for breakthrough pain. Patient placed on Lovenox 30 mg subcutaneous daily (renally dose) for DVT prophylaxis.     Interval History: 89 y/o female with known known previous right hip replacement, CAD with PCI x 4, CKD 4, primary hypertension, PAF, AAA, 2nd degree AV Block who presents for right hip pain after a fall transferring from wheelchair to bed at [facility] where she was receiving PT/OT after recent hospital discharge on 2/11/2025. Patient states she went forward and landed on her right hip and shoulder. Patient states had pain in right hip and shoulder after fall and brought to ED. X-rays of right shoulder showed no fracture or dislocation. X-ray and CT scan of pelvis revealed acute minimally displaced comminuted fracture of the right greater trochanter. Hardware in right hip in good alignment. Orthopedics consulted for periprosthetic greater trochanteric fracture of right hip. After discussion with Ortho staff, it was determined her prosthetic stem to right hip was stable for weight bearing as tolerated and recommended non operative management of her fracture. Patient to be weight bear as tolerated to right lower extremity as per Ortho. PT/OT consulted for evaluation. Pain management with scheduled Tylenol 1000 mg po every 8 hours + Robaxin 500 mg po every 6 hours + Pregabalin 75 mg po nightly with Oxycodone IR 5 mg p every 4 hours for  breakthrough pain. Patient placed on Lovenox 30 mg subcutaneous daily (renally dose) for DVT prophylaxis. Patient to work with PT/OT today and see how she does with pain when mobilizing. Patient reports current pain to right hip as 3/10. Continue current pain regimen and adjust as needed. Plan is for patient to return back to [facility] pending progress with PT/OT and her pain management. Labs reviewed.     Review of Systems   Constitutional:  Negative for fever.   Respiratory:  Negative for cough and shortness of breath.    Cardiovascular:  Negative for chest pain and leg swelling.   Gastrointestinal:  Negative for abdominal pain and nausea.   Musculoskeletal:  Positive for arthralgias (Right hip) and myalgias (Right lateral thigh).   Psychiatric/Behavioral:  Negative for agitation and confusion.      Objective:     Vital Signs (Most Recent):  Temp: 98.2 °F (36.8 °C) (02/22/25 1147)  Pulse: 73 (02/22/25 1217)  Resp: 16 (02/22/25 1147)  BP: 108/56 (02/22/25 1147)  SpO2: 98 % (02/22/25 1147) on room air Vital Signs (24h Range):  Temp:  [97.6 °F (36.4 °C)-98.2 °F (36.8 °C)] 98.2 °F (36.8 °C)  Pulse:  [36-77] 73  Resp:  [16-26] 16  SpO2:  [92 %-98 %] 98 %  BP: (106-181)/(56-79) 108/56        There is no height or weight on file to calculate BMI.    Intake/Output Summary (Last 24 hours) at 2/22/2025 1348  Last data filed at 2/22/2025 1214  Gross per 24 hour   Intake 480 ml   Output 650 ml   Net -170 ml         Physical Exam  Vitals and nursing note reviewed.   Constitutional:       General: She is awake. She is not in acute distress.     Appearance: Normal appearance. She is normal weight. She is not ill-appearing.      Comments: Patient sitting up in hospital bed in no distress and awake and alert. Patient smiling on exam.    Eyes:      Conjunctiva/sclera: Conjunctivae normal.   Cardiovascular:      Rate and Rhythm: Normal rate and regular rhythm.      Heart sounds: Normal heart sounds. No murmur  heard.  Pulmonary:      Effort: Pulmonary effort is normal. No respiratory distress.      Breath sounds: Normal breath sounds. No wheezing or rales.   Abdominal:      General: Abdomen is flat. Bowel sounds are normal. There is no distension.      Palpations: Abdomen is soft.      Tenderness: There is no abdominal tenderness. There is no guarding.   Musculoskeletal:      Right lower leg: No edema.      Left lower leg: No edema.   Skin:     General: Skin is warm.      Findings: No erythema.   Neurological:      Mental Status: She is alert and oriented to person, place, and time.   Psychiatric:         Mood and Affect: Mood normal.         Behavior: Behavior normal. Behavior is cooperative.             Significant Labs: BMP:   Recent Labs   Lab 02/22/25  0908         K 3.9      CO2 21*   BUN 33*   CREATININE 1.5*   CALCIUM 9.4   MG 1.6     CBC:   Recent Labs   Lab 02/20/25  2150 02/22/25  0908   WBC 9.72 6.32   HGB 9.7* 9.5*   HCT 31.5* 30.1*    146*     Magnesium:   Recent Labs   Lab 02/21/25  0351 02/22/25  0908   MG 1.6 1.6       Significant Imaging: I have reviewed all pertinent imaging results/findings within the past 24 hours.    Assessment and Plan     * Periprosthetic fracture around internal prosthetic right hip joint  89 y/o female with known known previous right hip replacement, CAD with PCI x 4, CKD 4, primary hypertension, PAF, AAA, 2nd degree AV Block who presents for right hip pain after a fall transferring from wheelchair to bed at [facility] where she was receiving PT/OT after recent hospital discharge on 2/11/2025. Patient states she went forward and landed on her right hip and shoulder. Patient states had pain in right hip and shoulder after fall and brought to ED. X-rays of right shoulder showed no fracture or dislocation. X-ray and CT scan of pelvis revealed acute minimally displaced comminuted fracture of the right greater trochanter. Hardware in right hip in  good alignment. Orthopedics consulted for periprosthetic greater trochanteric fracture of right hip. After discussion with Ortho staff, it was determined her prosthetic stem to right hip was stable for weight bearing as tolerated and recommended non operative management of her fracture. Patient to be weight bear as tolerated to right lower extremity as per Ortho. PT/OT consulted for evaluation. Pain management with scheduled Tylenol 1000 mg po every 8 hours + Robaxin 500 mg po every 6 hours + Pregabalin 75 mg po nightly with Oxycodone IR 5 mg p every 4 hours for breakthrough pain. Patient placed on Lovenox 30 mg subcutaneous daily (renally dose) for DVT prophylaxis.      Paroxysmal atrial fibrillation  Discussion patient's multiple arrythmia concerns also has AV block. Patient has paroxysmal (<7 days) atrial fibrillation. Patient is currently in atrial fibrillation. [id number] Score: 4. The patients heart rate in the last 24 hours is as follows:  Pulse  Min: 36  Max: 77     Antiarrhythmics   None due to known AV block and bradycardiac at baseline.    Anticoagulants  enoxaparin injection 30 mg, Every 24 hours, Subcutaneous    Plan  - Replete lytes with a goal of K>4, Mg >2  - Patient is is not chronically anticoagulated as outpatient due to high fall risk.   - Rate is controlled.       CKD (chronic kidney disease) stage 4, GFR 15-29 ml/min  CMP reviewed- noted Estimated Creatinine Clearance: 23.8 mL/min (A) (based on SCr of 1.5 mg/dL (H)). according to latest data. Based on current GFR, CKD stage is stage 4 - GFR 15-29.  Baseline Creatinine 1.5-2.0. Monitor UOP and serial BMP and adjust therapy as needed. Renally dose meds. Avoid nephrotoxic medications and procedures. Continue sodium bicarbonate replacement. On [employer] outpatient but not on formulary here so hold in hospital.    Coronary artery disease involving native coronary artery of native heart without angina pectoris  Patient with known CAD s/p  PCI x 4 in  the past and followed by Cardiology as outpatient. Since no surgery planned then will resume her outpatient Aspirin 81 mg po daily and Plavix 75 mg po daily for her known CAD and continue home Lipitor 80 mg po daily to treat. Monitor for S/Sx of angina/ACS. Continue to monitor on telemetry.     AV block  Noted per review to have reported  atrial fibrillation and asymptomatic intermittent Mobitz I AVB vs 2:1 AVB for which Cardiology evaluated her for during previous admission with uncertain etiology though possibly some reversible ischemia felt possible so medical management of her HTN and other issues was continued with recommendation for EP and Cardiology outpatient follow up. Remains asymptomatic and hemodynamically stable at this time.     Plan  - Monitor on telemetry.  - Continue HTN and other management.    Primary hypertension  Patient's blood pressure range in the last 24 hours was: BP  Min: 106/57  Max: 181/73.The patient's inpatient anti-hypertensive regimen is listed below:  Current Antihypertensives  amLODIPine tablet 5 mg, Daily, Oral  isosorbide mononitrate 24 hr tablet 90 mg, Daily, Oral  losartan tablet 25 mg, Daily, Oral    Plan  - BP controlled and continue patient's home medications in hospital to treat.  - Goal BP < 140/90.     AAA (abdominal aortic aneurysm)  Patient noted to have 3.9 cm AAA noted on last imaging in 02/2025. Continue BP and other management. Patient asymptomatic.     Gastroesophageal reflux disease without esophagitis  Chronic and controlled. Continue home Protonix 20 mg po daily to treat.     ACP (advance care planning)  At prior, pt's code status was changed to full code as she had been full code/DNR during the prior to admits and after discussion with her in setting of her arrhythmias she indicated she would want pacing measures and would want resuscitative measures if she might not incur injury and it was communicated to her the low likelihood of resuscitation occurring  without injury. At that time her code status was kept full code during the admission and after. I discussed this with her again during admission and we agreed that the circumstances are similar to her prior admission except that now surgery with her conditions may incur serious risk. We discussed leaving her code status as full code at this time until further evaluation with cardiology and orthopedics after which time she would like to revisit.         Anemia due to stage 4 chronic kidney disease  Anemia is likely due to chronic disease due to Chronic Kidney Disease. Most recent hemoglobin and hematocrit are listed below.  Recent Labs     02/20/25  2150 02/22/25  0908   HGB 9.7* 9.5*   HCT 31.5* 30.1*     Plan  - Monitor serial CBC: Daily  - Transfuse PRBC if patient becomes hemodynamically unstable, symptomatic or H/H drops below 7/21.  - Patient has not received any PRBC transfusions to date  - Patient's anemia is currently stable.      VTE Risk Mitigation (From admission, onward)           Ordered     enoxaparin injection 30 mg  Every 24 hours         02/22/25 1346     IP VTE HIGH RISK PATIENT  Once         02/21/25 0516     Place sequential compression device  Until discontinued         02/21/25 0501                    Discharge Planning   ROSEMARIE: 2/24/2025     Code Status: Full Code   Medical Readiness for Discharge Date: 2/24/2025  Discharge Plan A: Skilled Nursing Facility; Return back to [facility]         Estefanía Pena MD  Department of Hospital Medicine   [facility] - Surgery

## 2025-02-22 NOTE — PLAN OF CARE
[facility] - Surgery  Initial Discharge Assessment       Primary Care Provider: Elena Cabrera MD    Admission Diagnosis: Hypertension [I10]  Right hip pain [M25.551]  Chest pain [R07.9]    Admission Date: 2/20/2025  Expected Discharge Date: 2/24/2025    Transition of Care Barriers: None    Payor: [facility] MCARE / Plan: [employer] [facility] MEDICARE ADVANTAGE / Product Type: Medicare Advantage /     Extended Emergency Contact Information  Primary Emergency Contact: Ziggy Mina  Mobile Phone: [phone]  Relation: Grandchild   needed? No  Secondary Emergency Contact: Malina Anaya   [facility]  Home Phone: [phone]  Mobile Phone: [phone]  Relation: Daughter   needed? No    Discharge Plan A: Skilled Nursing Facility  Discharge Plan B: Home with family, Home Health      [id number] - [place] - [address]  [address]  [place] [address]  Phone: [phone] Fax: [phone]    CM obtained discharge planning assessment with patient.  Patient was at [facility] for Skilled Nursing.  Initial Assessment (most recent)       Adult Discharge Assessment - 02/22/25 1036          Discharge Assessment    Assessment Type Discharge Planning Assessment     Confirmed/corrected address, phone number and insurance Yes     Confirmed Demographics Correct on Facesheet     Source of Information patient     Communicated ROSEMARIE with patient/caregiver Date not available/Unable to determine     Reason For Admission Periprosthetic fracture around internal prosthetic right hip joint     People in Home child(star), adult     Do you expect to return to your current living situation? Yes     Do you have help at home or someone to help you manage your care at home? Yes     Who are your caregiver(s) and their phone number(s)? Marixa Anaya - daughter [phone]     Prior to hospitilization cognitive status: Alert/Oriented     Current  cognitive status: Alert/Oriented     Walking or Climbing Stairs Difficulty no     Dressing/Bathing Difficulty no     Equipment Currently Used at Home none     Readmission within 30 days? Yes     Patient currently being followed by outpatient case management? No     Do you currently have service(s) that help you manage your care at home? No     Do you take prescription medications? Yes     Do you have prescription coverage? Yes     Coverage [facility] MEDICARE ADVANTAGE     Do you have any problems affording any of your prescribed medications? No     Is the patient taking medications as prescribed? yes     Who is going to help you get home at discharge? family     How do you get to doctors appointments? family or friend will provide     Are you on dialysis? No     Do you take coumadin? No     Discharge Plan A Skilled Nursing Facility     Discharge Plan B Home with family;Home Health     DME Needed Upon Discharge  wheelchair     Discharge Plan discussed with: Patient     Transition of Care Barriers None        Physical Activity    On average, how many days per week do you engage in moderate to strenuous exercise (like a brisk walk)? 2 days     On average, how many minutes do you engage in exercise at this level? 20 min        Financial Resource Strain    How hard is it for you to pay for the very basics like food, housing, medical care, and heating? Not hard at all        Housing Stability    In the last 12 months, was there a time when you were not able to pay the mortgage or rent on time? No     At any time in the past 12 months, were you homeless or living in a shelter (including now)? No        Transportation Needs    Has the lack of transportation kept you from medical appointments, meetings, work or from getting things needed for daily living? No        Food Insecurity    Within the past 12 months, you worried that your food would run out before you got the money to  buy more. Never true     Within the past 12 months, the food you bought just didn't last and you didn't have money to get more. Never true        Stress    Do you feel stress - tense, restless, nervous, or anxious, or unable to sleep at night because your mind is troubled all the time - these days? Only a little        Social Isolation    How often do you feel lonely or isolated from those around you?  Patient unable to answer        Alcohol Use    Q1: How often do you have a drink containing alcohol? Never     Q2: How many drinks containing alcohol do you have on a typical day when you are drinking? Patient does not drink     Q3: How often do you have six or more drinks on one occasion? Never        Utilities    In the past 12 months has the electric, gas, oil, or water company threatened to shut off services in your home? No        Health Literacy    How often do you need to have someone help you when you read instructions, pamphlets, or other written material from your doctor or pharmacy? Sometimes        OTHER    Name(s) of People in Home Marixa - daughter

## 2025-02-22 NOTE — ASSESSMENT & PLAN NOTE
Discussion patient's multiple arrythmia concerns also has AV block. Patient has paroxysmal (<7 days) atrial fibrillation. Patient is currently in atrial fibrillation. [id number] Score: 4. The patients heart rate in the last 24 hours is as follows:  Pulse  Min: 36  Max: 77     Antiarrhythmics   None due to known AV block and bradycardiac at baseline.    Anticoagulants  enoxaparin injection 30 mg, Every 24 hours, Subcutaneous    Plan  - Replete lytes with a goal of K>4, Mg >2  - Patient is is not chronically anticoagulated as outpatient due to high fall risk.   - Rate is controlled.

## 2025-02-22 NOTE — PLAN OF CARE
Eval completed; POC established    Problem: Physical Therapy  Goal: Physical Therapy Goal  Description: Goals to be met by: 3/22/2025     Patient will increase functional independence with mobility by performin. Supine to sit with Mod(I)  2. Sit to supine with Mod(I)  3. Sit to stand transfer with Stand-by Assistance using LRAD  4. Bed to chair transfer with Stand-by Assistance using LRAD  5. Gait  x 50 feet with Stand-by Assistance using LRAD.   6. Ascend/descend 3 stairs using L HR with Stand-By Assistance using LRAD    Outcome: Progressing

## 2025-02-22 NOTE — PLAN OF CARE
Problem: Occupational Therapy  Goal: Occupational Therapy Goal  Description: Goals to be met by: 3/24/25     Patient will increase functional independence with ADLs by performing:    UE Dressing with Supervision.  LE Dressing with Stand-by Assistance.  Grooming while standing at sink with Set-up Assistance and Stand-by Assistance.  Toileting from toilet with Stand-by Assistance for hygiene and clothing management.   All functional transfers performed with SBA    Outcome: Progressing

## 2025-02-22 NOTE — ASSESSMENT & PLAN NOTE
Noted per review to have reported  atrial fibrillation and asymptomatic intermittent Mobitz I AVB vs 2:1 AVB for which Cardiology evaluated her for during previous admission with uncertain etiology though possibly some reversible ischemia felt possible so medical management of her HTN and other issues was continued with recommendation for EP and Cardiology outpatient follow up. Remains asymptomatic and hemodynamically stable at this time.     Plan  - Monitor on telemetry.  - Continue HTN and other management.

## 2025-02-22 NOTE — PLAN OF CARE
Patient is moderate intensity therapy level. Patient was at [facility]. Per MD note, plan for patient to return to [facility].  Referral sent.   02/22/25 1180   Post-Acute Status   Post-Acute Authorization Placement   Post-Acute Placement Status Referrals Sent

## 2025-02-22 NOTE — ED NOTES
Pt care assumed. Report received by Jose Manuel SAINI. Pt lying in stretcher in low and locked position and side rails raised x2. Call light, pt's belongings, and bedside table within pt's reach.

## 2025-02-22 NOTE — ASSESSMENT & PLAN NOTE
Patient noted to have 3.9 cm AAA noted on last imaging in 02/2025. Continue BP and other management. Patient asymptomatic.

## 2025-02-22 NOTE — PT/OT/SLP EVAL
Physical Therapy Co-Evaluation    Patient Name:  Anahy Evans   MRN:  [id number]    Recommendations:     Discharge Recommendations: Moderate Intensity Therapy   Discharge Equipment Recommendations: wheelchair (TBD, pend progress)   Barriers to discharge: Inaccessible home and current level of assist required    Co-eval performed to appropriately and safely assess patient's strength and endurance while facilitating functional tasks in addition to accommodating for patient's activity/pain tolerance.    Assessment:     Anahy Evans is a 89 y.o. female admitted with a medical diagnosis of Periprosthetic fracture around internal prosthetic right hip joint.  She presents with the following impairments/functional limitations: weakness, impaired endurance, orthopedic precautions, impaired self care skills, impaired functional mobility, decreased lower extremity function, gait instability, impaired balance, pain. Pt admitted from SNF ([facility]) following fall. Has 3 BAIRON to enter home. Pt with good participation and tolerance to evaluation, but limited by R hip pain. Pt previously Mod(I) prior to SNF admit. Patient currently demonstrates a need for moderate intensity therapy on a daily basis post acute secondary to a decline in functional status due to injury.    Rehab Prognosis: Good; patient would benefit from acute skilled PT services to address these deficits and reach maximum level of function.    Recent Surgery: * No surgery found *      Plan:     During this hospitalization, patient to be seen 4 x/week to address the identified rehab impairments via gait training, therapeutic activities, therapeutic exercises, neuromuscular re-education and progress toward the following goals:    Plan of Care Expires:  03/22/25    Subjective     Chief Complaint: R hip pain  Patient/Family Comments/goals: to return home  Pain/Comfort:  Pain Rating 1:  (unrated)  Location - Side 1: Right  Location - Orientation 1:  generalized  Location 1: hip  Pain Addressed 1: Pre-medicate for activity, Distraction, Cessation of Activity, Reposition  Pain Rating Post-Intervention 1:  (unrated, increased with mobility)    Patients cultural, spiritual, Worship conflicts given the current situation: no    Living Environment:  Pt admitted from SNF. Pt lives with daughter in 2SH with 3 BAIRON & L HR to enter. All needs on first floor. Pt unsure if cane is SPC or QC, stating she just got it prior to sx.   Prior to admission, patients level of function was Mod(I) using cane.  Equipment used at home: walker, rolling, cane, straight, bath bench.  DME owned (not currently used): none.  Upon discharge, patient will have assistance from daughter.    Objective:     Communicated with RN prior to session.  Patient found HOB elevated with telemetry, SCD  upon PT entry to room.    General Precautions: Standard, fall  Orthopedic Precautions:RLE weight bearing as tolerated   Braces: N/A  Respiratory Status: Room air    Exams:  Cognitive Exam:  Patient is oriented to Person, Place, Time, and Situation  Sensation:    -       Intact  RLE ROM: WFL  RLE Strength: NT 2/2 pain with AROM  LLE ROM: WFL  LLE Strength: grossly 5/5    Functional Mobility:  Bed Mobility:     Supine to Sit: moderate assistance  Sit to Supine: moderate assistance  Transfers:     Sit to Stand:  minimum assistance with rolling walker  Gait: x4 small lateral steps to L towards HoB with Min A x2 using RW  Required assist for RW management and stability  Sliding LLE 2/2 inability to fully weight shift onto RLE d/t pain  Required consistent verbal cues    AM-PAC 6 CLICK MOBILITY  Total Score:15       Treatment & Education:  Patient educated on role of therapy, goals of session, and benefits of mobilizing.   Discussed PT plan of care during hospitalization.   Patient educated on calling for assistance.   Patient educated on how their diagnosis impacts their mobility within PT scope of practice.    All questions answered within PT scope of practice.      Patient left HOB elevated with all lines intact, call button in reach, bed alarm on, RN notified, and SCDs donned .    GOALS:   Multidisciplinary Problems       Physical Therapy Goals          Problem: Physical Therapy    Goal Priority Disciplines Outcome Interventions   Physical Therapy Goal     PT, PT/OT Progressing    Description: Goals to be met by: 3/22/2025     Patient will increase functional independence with mobility by performin. Supine to sit with Mod(I)  2. Sit to supine with Mod(I)  3. Sit to stand transfer with Stand-by Assistance using LRAD  4. Bed to chair transfer with Stand-by Assistance using LRAD  5. Gait  x 50 feet with Stand-by Assistance using LRAD.   6. Ascend/descend 3 stairs using L HR with Stand-By Assistance using LRAD                         DME Justifications:  No DME recommended requiring DME justifications    History:     Past Medical History:   Diagnosis Date    Acute blood loss anemia 10/26/2021    Anticoagulant long-term use     Breast deformity 2022    Cervical cancer 1968    breast cancer right    Coronary artery disease     Gout     High cholesterol     History of cervical dysplasia 10/28/2014    Hypertension     MI (myocardial infarction)     Right axillary hidradenitis 2022       Past Surgical History:   Procedure Laterality Date    BREAST LUMPECTOMY      right    CARDIAC SURGERY      multiple cardiac stents    CORONARY ANGIOGRAPHY Right 2022    Procedure: ANGIOGRAM, CORONARY ARTERY;  Surgeon: Asim Canela MD;  Location: [facility] CATH LAB;  Service: Cardiology;  Laterality: Right;    CORONARY ANGIOGRAPHY Right 5/10/2024    Procedure: ANGIOGRAM, CORONARY ARTERY POSSIBLE LV COR;  Surgeon: Asim Canela MD;  Location: [facility] CATH LAB;  Service: Cardiology;  Laterality: Right;    CORONARY STENT PLACEMENT      HIP REPLACEMENT ARTHROPLASTY Right 2018    JOINT REPLACEMENT      right        Time Tracking:     PT Received On: 02/22/25  PT Start Time: 1118     PT Stop Time: 1135  PT Total Time (min): 17 min     Billable Minutes: Evaluation 9 and Gait Training 8      02/22/2025

## 2025-02-22 NOTE — ED NOTES
Telemetry Verification   Patient placed on Telemetry Box  Verified with War Room  Box #  [id number]   Monitor Tech     Rate  56   Rhythm  Sinus Prabhakar

## 2025-02-22 NOTE — ASSESSMENT & PLAN NOTE
Patient with known CAD s/p  PCI x 4 in the past and followed by Cardiology as outpatient. Since no surgery planned then will resume her outpatient Aspirin 81 mg po daily and Plavix 75 mg po daily for her known CAD and continue home Lipitor 80 mg po daily to treat. Monitor for S/Sx of angina/ACS. Continue to monitor on telemetry.

## 2025-02-23 PROBLEM — D69.6 THROMBOCYTOPENIA: Status: ACTIVE | Noted: 2025-02-23

## 2025-02-23 LAB
ANION GAP SERPL CALC-SCNC: 9 MMOL/L (ref 8–16)
BASOPHILS # BLD AUTO: 0.04 K/UL (ref 0–0.2)
BASOPHILS NFR BLD: 0.7 % (ref 0–1.9)
BUN SERPL-MCNC: 39 MG/DL (ref 10–30)
CALCIUM SERPL-MCNC: 8.9 MG/DL (ref 8.7–10.5)
CHLORIDE SERPL-SCNC: 106 MMOL/L (ref 95–110)
CO2 SERPL-SCNC: 24 MMOL/L (ref 23–29)
CREAT SERPL-MCNC: 1.8 MG/DL (ref 0.5–1.4)
DIFFERENTIAL METHOD BLD: ABNORMAL
EOSINOPHIL # BLD AUTO: 0.3 K/UL (ref 0–0.5)
EOSINOPHIL NFR BLD: 5 % (ref 0–8)
ERYTHROCYTE [DISTWIDTH] IN BLOOD BY AUTOMATED COUNT: 15.9 % (ref 11.5–14.5)
EST. GFR  (NO RACE VARIABLE): 26.3 ML/MIN/1.73 M^2
GLUCOSE SERPL-MCNC: 79 MG/DL (ref 70–110)
HCT VFR BLD AUTO: 26.8 % (ref 37–48.5)
HGB BLD-MCNC: 8.5 G/DL (ref 12–16)
IMM GRANULOCYTES # BLD AUTO: 0.02 K/UL (ref 0–0.04)
IMM GRANULOCYTES NFR BLD AUTO: 0.4 % (ref 0–0.5)
LYMPHOCYTES # BLD AUTO: 1.7 K/UL (ref 1–4.8)
LYMPHOCYTES NFR BLD: 30.6 % (ref 18–48)
MCH RBC QN AUTO: 29.1 PG (ref 27–31)
MCHC RBC AUTO-ENTMCNC: 31.7 G/DL (ref 32–36)
MCV RBC AUTO: 92 FL (ref 82–98)
MONOCYTES # BLD AUTO: 0.9 K/UL (ref 0.3–1)
MONOCYTES NFR BLD: 16.3 % (ref 4–15)
NEUTROPHILS # BLD AUTO: 2.6 K/UL (ref 1.8–7.7)
NEUTROPHILS NFR BLD: 47 % (ref 38–73)
NRBC BLD-RTO: 0 /100 WBC
PLATELET # BLD AUTO: 133 K/UL (ref 150–450)
PMV BLD AUTO: 12.9 FL (ref 9.2–12.9)
POCT GLUCOSE: 102 MG/DL (ref 70–110)
POCT GLUCOSE: 106 MG/DL (ref 70–110)
POTASSIUM SERPL-SCNC: 4 MMOL/L (ref 3.5–5.1)
RBC # BLD AUTO: 2.92 M/UL (ref 4–5.4)
SODIUM SERPL-SCNC: 139 MMOL/L (ref 136–145)
WBC # BLD AUTO: 5.59 K/UL (ref 3.9–12.7)

## 2025-02-23 PROCEDURE — 25000003 PHARM REV CODE 250: Performed by: INTERNAL MEDICINE

## 2025-02-23 PROCEDURE — 80048 BASIC METABOLIC PNL TOTAL CA: CPT | Performed by: STUDENT IN AN ORGANIZED HEALTH CARE EDUCATION/TRAINING PROGRAM

## 2025-02-23 PROCEDURE — 63600175 PHARM REV CODE 636 W HCPCS: Performed by: INTERNAL MEDICINE

## 2025-02-23 PROCEDURE — 94761 N-INVAS EAR/PLS OXIMETRY MLT: CPT

## 2025-02-23 PROCEDURE — 21400001 HC TELEMETRY ROOM

## 2025-02-23 PROCEDURE — 25000003 PHARM REV CODE 250: Performed by: STUDENT IN AN ORGANIZED HEALTH CARE EDUCATION/TRAINING PROGRAM

## 2025-02-23 PROCEDURE — 36415 COLL VENOUS BLD VENIPUNCTURE: CPT | Performed by: STUDENT IN AN ORGANIZED HEALTH CARE EDUCATION/TRAINING PROGRAM

## 2025-02-23 PROCEDURE — 85025 COMPLETE CBC W/AUTO DIFF WBC: CPT | Performed by: STUDENT IN AN ORGANIZED HEALTH CARE EDUCATION/TRAINING PROGRAM

## 2025-02-23 PROCEDURE — 94799 UNLISTED PULMONARY SVC/PX: CPT

## 2025-02-23 RX ADMIN — AMLODIPINE BESYLATE 5 MG: 5 TABLET ORAL at 09:02

## 2025-02-23 RX ADMIN — CLOPIDOGREL BISULFATE 75 MG: 75 TABLET ORAL at 09:02

## 2025-02-23 RX ADMIN — METHOCARBAMOL 500 MG: 500 TABLET ORAL at 06:02

## 2025-02-23 RX ADMIN — METHOCARBAMOL 500 MG: 500 TABLET ORAL at 12:02

## 2025-02-23 RX ADMIN — ATORVASTATIN CALCIUM 80 MG: 40 TABLET, FILM COATED ORAL at 09:02

## 2025-02-23 RX ADMIN — ASPIRIN 81 MG: 81 TABLET, COATED ORAL at 09:02

## 2025-02-23 RX ADMIN — ACETAMINOPHEN 1000 MG: 500 TABLET ORAL at 12:02

## 2025-02-23 RX ADMIN — ALLOPURINOL 50 MG: 300 TABLET ORAL at 09:02

## 2025-02-23 RX ADMIN — SODIUM BICARBONATE 650 MG: 650 TABLET ORAL at 09:02

## 2025-02-23 RX ADMIN — ENOXAPARIN SODIUM 30 MG: 30 INJECTION SUBCUTANEOUS at 05:02

## 2025-02-23 RX ADMIN — LOSARTAN POTASSIUM 25 MG: 25 TABLET, FILM COATED ORAL at 09:02

## 2025-02-23 RX ADMIN — ACETAMINOPHEN 1000 MG: 500 TABLET ORAL at 06:02

## 2025-02-23 RX ADMIN — OXYCODONE HYDROCHLORIDE 5 MG: 5 TABLET ORAL at 09:02

## 2025-02-23 RX ADMIN — SENNOSIDES AND DOCUSATE SODIUM 1 TABLET: 50; 8.6 TABLET ORAL at 09:02

## 2025-02-23 RX ADMIN — PANTOPRAZOLE SODIUM 20 MG: 20 TABLET, DELAYED RELEASE ORAL at 06:02

## 2025-02-23 RX ADMIN — METHOCARBAMOL 500 MG: 500 TABLET ORAL at 05:02

## 2025-02-23 RX ADMIN — CHOLECALCIFEROL TAB 25 MCG (1000 UNIT) 2000 UNITS: 25 TAB at 09:02

## 2025-02-23 RX ADMIN — ACETAMINOPHEN 1000 MG: 500 TABLET ORAL at 09:02

## 2025-02-23 RX ADMIN — ISOSORBIDE MONONITRATE 90 MG: 60 TABLET, EXTENDED RELEASE ORAL at 09:02

## 2025-02-23 RX ADMIN — POLYETHYLENE GLYCOL 3350 17 G: 17 POWDER, FOR SOLUTION ORAL at 09:02

## 2025-02-23 RX ADMIN — PREGABALIN 75 MG: 75 CAPSULE ORAL at 09:02

## 2025-02-23 NOTE — ASSESSMENT & PLAN NOTE
Anemia is likely due to chronic disease due to Chronic Kidney Disease. Most recent hemoglobin and hematocrit are listed below.  Recent Labs     02/20/25  2150 02/22/25  0908 02/23/25  0317   HGB 9.7* 9.5* 8.5*   HCT 31.5* 30.1* 26.8*       Plan  - Monitor serial CBC: Daily  - Transfuse PRBC if patient becomes hemodynamically unstable, symptomatic or H/H drops below 7/21.  - Patient has not received any PRBC transfusions to date  - Patient's anemia is currently worsening. Patient with no clinical signs of bleeding. Vital signs stable. Will monitor for now. No indication for blood transfusion.

## 2025-02-23 NOTE — PROGRESS NOTES
[facility]  Progress Note    Patient Name: Anahy Evans  MRN: [id number]  Patient Class: IP- Inpatient   Admission Date: 2/20/2025  Length of Stay: 2 days  Attending Physician: Estefanía Pena MD  Primary Care Provider: Elena Cabrera MD        Subjective     Principal Problem:Periprosthetic fracture around internal prosthetic right hip joint        HPI:  Anahy Evans is a 89 y.o. female with w/ CAD hx PCI, CKD 4, HTN, Afib, AAA, 2nd degree AV Block who presents for right hip pain after a fall. Pt has been largely confined to a WC due to TAYLOR after her recent admission and was at her facility, in her WC with a glass of water when she stood to put the glass of water onto a table and lost her balance. She dropped the glass and then tried to slow her fall but couldn't. She denied any head injury or syncope when she fell or before and also denied preceding chest pain, dizziness, headache, dyspnea, nausea, vomiting, abd pain and other symptoms. She has had TAYLOR since prior admission which has been not much worse due to her lack of activity and she denied any development of dyspnea at rest or other changes. She denied feeling ill otherwise including fever, chills, URI sxs, dysuria, diarrhea.     Overview/Hospital Course:  89 y/o female with known previous right hip replacement, CAD with PCI x 4, CKD 4, primary hypertension, PAF, AAA, 2nd degree AV Block who presents for right hip pain after a fall transferring from wheelchair to bed at [facility] where she was receiving PT/OT after recent hospital discharge on 2/11/2025. Patient states she went forward and landed on her right hip and shoulder. Patient states had pain in right hip and shoulder after fall and brought to ED. X-rays of right shoulder showed no fracture or dislocation. X-ray and CT scan of pelvis revealed acute minimally displaced comminuted fracture of the right greater trochanter. Hardware in right hip in good  alignment. Orthopedics consulted for periprosthetic greater trochanteric fracture of right hip. After discussion with Ortho staff, it was determined her prosthetic stem to right hip was stable for weight bearing as tolerated and recommended non operative management of her fracture. Patient to be weight bear as tolerated to right lower extremity as per Ortho. PT/OT consulted for evaluation and recommending moderate intensity therapy and plan for patient to return back to [facility] on discharge where she was at prior to this admit. Pain management with scheduled Tylenol 1000 mg po every 8 hours + Robaxin 500 mg po every 6 hours + Pregabalin 75 mg po nightly with Oxycodone IR 5 mg p every 4 hours for breakthrough pain and pain controlled post-op. Patient placed on Lovenox 30 mg subcutaneous daily (renally dose) for DVT prophylaxis.     Interval History: Patient worked with therapy and they are recommending moderate intensity therapy and plan for patient to return back to [facility] where she can from to continue PT/OT at SNF level to recover from this fracture. Patient reports pain is better in her right hip area today compared to yesterday. Patient states pain 3/10 to right hip today which is an improvement. Patient in good spirits. Likely return back to [facility] tomorrow, 2/24. Labs reviewed. Hgb 8.5 today and slightly down from 9.5 yesterday. Platelets down to 133,000 today from 146,000 yesterday and need to monitor both Hgb and platelet count and make sure they are stable prior to discharging patient from hospital. Patient with no clinical signs of bleeding. Creatinine 1.8 today and within her baseline range as has known CKD stage 4.      Review of Systems   Constitutional:  Negative for fever.   Respiratory:  Negative for cough and shortness of breath.    Cardiovascular:  Negative for chest pain and leg swelling.   Gastrointestinal:  Negative for abdominal pain and nausea.    Musculoskeletal:  Positive for arthralgias (Right hip) and myalgias (Right lateral thigh).   Psychiatric/Behavioral:  Negative for agitation and confusion.      Objective:     Vital Signs (Most Recent):  Temp: 98.4 °F (36.9 °C) (02/23/25 1507)  Pulse: 53 (02/23/25 1536)  Resp: 16 (02/23/25 1507)  BP: 135/61 (02/23/25 1507)  SpO2: 93 % (02/23/25 1507) on room air Vital Signs (24h Range):  Temp:  [97.5 °F (36.4 °C)-98.4 °F (36.9 °C)] 98.4 °F (36.9 °C)  Pulse:  [37-71] 53  Resp:  [16-22] 16  SpO2:  [92 %-97 %] 93 %  BP: (135-167)/() 135/61     Weight: 73 kg (160 lb 15 oz)  Body mass index is 25.21 kg/m².    Intake/Output Summary (Last 24 hours) at 2/23/2025 1721  Last data filed at 2/23/2025 0613  Gross per 24 hour   Intake --   Output 150 ml   Net -150 ml         Physical Exam  Vitals and nursing note reviewed.   Constitutional:       General: She is awake. She is not in acute distress.     Appearance: Normal appearance. She is normal weight. She is not ill-appearing.      Comments: Patient sitting up in hospital bed in no distress and awake and alert. Patient smiling on exam.    Eyes:      Conjunctiva/sclera: Conjunctivae normal.   Cardiovascular:      Rate and Rhythm: Regular rhythm. Bradycardia present.      Heart sounds: Normal heart sounds. No murmur heard.  Pulmonary:      Effort: Pulmonary effort is normal. No respiratory distress.      Breath sounds: Normal breath sounds. No wheezing or rales.   Abdominal:      General: Abdomen is flat. Bowel sounds are normal. There is no distension.      Palpations: Abdomen is soft.      Tenderness: There is no abdominal tenderness. There is no guarding.   Musculoskeletal:      Right lower leg: No edema.      Left lower leg: No edema.   Skin:     General: Skin is warm.      Findings: No erythema.   Neurological:      Mental Status: She is alert and oriented to person, place, and time.   Psychiatric:         Mood and Affect: Mood normal.         Behavior: Behavior  normal. Behavior is cooperative.             Significant Labs: BMP:   Recent Labs   Lab 02/22/25  0908 02/23/25  0317    79    139   K 3.9 4.0    106   CO2 21* 24   BUN 33* 39*   CREATININE 1.5* 1.8*   CALCIUM 9.4 8.9   MG 1.6  --      CBC:   Recent Labs   Lab 02/22/25  0908 02/23/25  0317   WBC 6.32 5.59   HGB 9.5* 8.5*   HCT 30.1* 26.8*   * 133*     Magnesium:   Recent Labs   Lab 02/22/25  0908   MG 1.6       Significant Imaging: I have reviewed all pertinent imaging results/findings within the past 24 hours.    Assessment and Plan     * Periprosthetic fracture around internal prosthetic right hip joint  89 y/o female with known known previous right hip replacement, CAD with PCI x 4, CKD 4, primary hypertension, PAF, AAA, 2nd degree AV Block who presents for right hip pain after a fall transferring from wheelchair to bed at [facility] where she was receiving PT/OT after recent hospital discharge on 2/11/2025. Patient states she went forward and landed on her right hip and shoulder. Patient states had pain in right hip and shoulder after fall and brought to ED. X-rays of right shoulder showed no fracture or dislocation. X-ray and CT scan of pelvis revealed acute minimally displaced comminuted fracture of the right greater trochanter. Hardware in right hip in good alignment.   - Orthopedics consulted for periprosthetic greater trochanteric fracture of right hip. After discussion with Ortho staff, it was determined her prosthetic stem to right hip was stable for weight bearing as tolerated and recommended non operative management of her fracture.   - Patient to be weight bear as tolerated to right lower extremity as per Ortho and to continue.   - PT/OT consulted for evaluation. Patient worked with therapy and they are recommending moderate intensity therapy and plan for patient to return back to [facility] where she can from to continue PT/OT at SNF level to recover from  this fracture and hopeful to discharge on 2/24.  - Pain improving to right hip on 2/23. Pain management with scheduled Tylenol 1000 mg po every 8 hours + Robaxin 500 mg po every 6 hours + Pregabalin 75 mg po nightly with Oxycodone IR 5 mg p every 4 hours for breakthrough pain and will continue.   - Patient placed on Lovenox 30 mg subcutaneous daily (renally dose) for DVT prophylaxis and will continue.    Paroxysmal atrial fibrillation  Discussion patient's multiple arrythmia concerns also has AV block. Patient has paroxysmal (<7 days) atrial fibrillation. Patient is currently in atrial fibrillation. [id number] Score: 4. The patients heart rate in the last 24 hours is as follows:  Pulse  Min: 37  Max: 71     Antiarrhythmics   None due to known AV block and bradycardiac at baseline.    Anticoagulants  enoxaparin injection 30 mg, Every 24 hours, Subcutaneous    Plan  - Replete lytes with a goal of K>4, Mg >2  - Patient is is not chronically anticoagulated as outpatient due to high fall risk.   - Rate is controlled.     CKD (chronic kidney disease) stage 4, GFR 15-29 ml/min  CMP reviewed- noted Estimated Creatinine Clearance: 19.8 mL/min (A) (based on SCr of 1.8 mg/dL (H)). according to latest data. Based on current GFR, CKD stage is stage 4 - GFR 15-29.  Baseline Creatinine 1.5-2.0. Monitor UOP and serial BMP and adjust therapy as needed. Renally dose meds. Avoid nephrotoxic medications and procedures. Continue sodium bicarbonate replacement. On [facility] outpatient but not on formulary here so hold in hospital.    Coronary artery disease involving native coronary artery of native heart without angina pectoris  Patient with known CAD s/p  PCI x 4 in the past and followed by Cardiology as outpatient. Since no surgery planned then will resume her outpatient Aspirin 81 mg po daily and Plavix 75 mg po daily for her known CAD and continue home Lipitor 80 mg po daily to treat. Monitor for S/Sx of angina/ACS. Continue to  monitor on telemetry.     AV block  Noted per review to have reported atrial fibrillation and asymptomatic intermittent Mobitz I AVB vs 2:1 AVB for which Cardiology evaluated her for during previous admission with uncertain etiology though possibly some reversible ischemia felt possible so medical management of her HTN and other issues was continued with recommendation for EP and Cardiology outpatient follow up. Remains asymptomatic and hemodynamically stable at this time.     Plan  - Monitor on telemetry.  - Continue HTN and other management.  - Avoid AV nellie blocking medications and agents.     Primary hypertension  Patient's blood pressure range in the last 24 hours was: BP  Min: 135/61  Max: 167/74.The patient's inpatient anti-hypertensive regimen is listed below:  Current Antihypertensives  amLODIPine tablet 5 mg, Daily, Oral  isosorbide mononitrate 24 hr tablet 90 mg, Daily, Oral  losartan tablet 25 mg, Daily, Oral    Plan  - BP controlled and continue patient's home medications in hospital to treat.  - Goal BP < 140/90.     AAA (abdominal aortic aneurysm)  Patient noted to have 3.9 cm AAA noted on last imaging in 02/2025. Continue BP and other management. Patient asymptomatic.     Gastroesophageal reflux disease without esophagitis  Chronic and controlled. Continue home Protonix 20 mg po daily to treat.     ACP (advance care planning)  At prior, pt's code status was changed to full code as she had been full code/DNR during the prior to admits and after discussion with her in setting of her arrhythmias she indicated she would want pacing measures and would want resuscitative measures if she might not incur injury and it was communicated to her the low likelihood of resuscitation occurring without injury. At that time her code status was kept full code during the admission and after. I discussed this with her again during admission and we agreed that the circumstances are similar to her prior admission except  that now surgery with her conditions may incur serious risk. We discussed leaving her code status as full code at this time until further evaluation with cardiology and Orthopedics after which time she would like to revisit.         Anemia due to stage 4 chronic kidney disease  Anemia is likely due to chronic disease due to Chronic Kidney Disease. Most recent hemoglobin and hematocrit are listed below.  Recent Labs     02/20/25 2150 02/22/25  0908 02/23/25  0317   HGB 9.7* 9.5* 8.5*   HCT 31.5* 30.1* 26.8*       Plan  - Monitor serial CBC: Daily  - Transfuse PRBC if patient becomes hemodynamically unstable, symptomatic or H/H drops below 7/21.  - Patient has not received any PRBC transfusions to date  - Patient's anemia is currently worsening. Patient with no clinical signs of bleeding. Vital signs stable. Will monitor for now. No indication for blood transfusion.     Thrombocytopenia  The likely etiology of thrombocytopenia is  stress/trauma related to femur fracture . The patients 3 most recent labs are listed below.  Recent Labs     02/20/25 2150 02/22/25  0908 02/23/25  0317    146* 133*     Plan  - Will transfuse if platelet count is <10k.  - Monitor daily CBC.         VTE Risk Mitigation (From admission, onward)           Ordered     enoxaparin injection 30 mg  Every 24 hours         02/22/25 1346     IP VTE HIGH RISK PATIENT  Once         02/21/25 0516     Place sequential compression device  Until discontinued         02/21/25 0501                    Discharge Planning   ROSEMARIE: 2/24/2025     Code Status: Full Code   Medical Readiness for Discharge Date: 2/24/2025  Discharge Plan A: Skilled Nursing Facility ([facility] to [facility]) as long as HGb and platelet count are stable.          Estefanía Pena MD  Department of Hospital Medicine   [facility] - Surgery

## 2025-02-23 NOTE — SUBJECTIVE & OBJECTIVE
Interval History: Patient worked with therapy and they are recommending moderate intensity therapy and plan for patient to return back to [facility] where she can from to continue PT/OT at SNF level to recover from this fracture. Patient reports pain is better in her right hip area today compared to yesterday. Patient states pain 3/10 to right hip today which is an improvement. Patient in good spirits. Likely return back to [facility] tomorrow, 2/24. Labs reviewed. Hgb 8.5 today and slightly down from 9.5 yesterday. Platelets down to 133,000 today from 146,000 yesterday and need to monitor both Hgb and platelet count and make sure they are stable prior to discharging patient from hospital. Patient with no clinical signs of bleeding. Creatinine 1.8 today and within her baseline range as has known CKD stage 4.      Review of Systems   Constitutional:  Negative for fever.   Respiratory:  Negative for cough and shortness of breath.    Cardiovascular:  Negative for chest pain and leg swelling.   Gastrointestinal:  Negative for abdominal pain and nausea.   Musculoskeletal:  Positive for arthralgias (Right hip) and myalgias (Right lateral thigh).   Psychiatric/Behavioral:  Negative for agitation and confusion.      Objective:     Vital Signs (Most Recent):  Temp: 98.4 °F (36.9 °C) (02/23/25 1507)  Pulse: 53 (02/23/25 1536)  Resp: 16 (02/23/25 1507)  BP: 135/61 (02/23/25 1507)  SpO2: 93 % (02/23/25 1507) on room air Vital Signs (24h Range):  Temp:  [97.5 °F (36.4 °C)-98.4 °F (36.9 °C)] 98.4 °F (36.9 °C)  Pulse:  [37-71] 53  Resp:  [16-22] 16  SpO2:  [92 %-97 %] 93 %  BP: (135-167)/() 135/61     Weight: 73 kg (160 lb 15 oz)  Body mass index is 25.21 kg/m².    Intake/Output Summary (Last 24 hours) at 2/23/2025 1721  Last data filed at 2/23/2025 0613  Gross per 24 hour   Intake --   Output 150 ml   Net -150 ml         Physical Exam  Vitals and nursing note reviewed.   Constitutional:       General: She  is awake. She is not in acute distress.     Appearance: Normal appearance. She is normal weight. She is not ill-appearing.      Comments: Patient sitting up in hospital bed in no distress and awake and alert. Patient smiling on exam.    Eyes:      Conjunctiva/sclera: Conjunctivae normal.   Cardiovascular:      Rate and Rhythm: Regular rhythm. Bradycardia present.      Heart sounds: Normal heart sounds. No murmur heard.  Pulmonary:      Effort: Pulmonary effort is normal. No respiratory distress.      Breath sounds: Normal breath sounds. No wheezing or rales.   Abdominal:      General: Abdomen is flat. Bowel sounds are normal. There is no distension.      Palpations: Abdomen is soft.      Tenderness: There is no abdominal tenderness. There is no guarding.   Musculoskeletal:      Right lower leg: No edema.      Left lower leg: No edema.   Skin:     General: Skin is warm.      Findings: No erythema.   Neurological:      Mental Status: She is alert and oriented to person, place, and time.   Psychiatric:         Mood and Affect: Mood normal.         Behavior: Behavior normal. Behavior is cooperative.             Significant Labs: BMP:   Recent Labs   Lab 02/22/25  0908 02/23/25  0317    79    139   K 3.9 4.0    106   CO2 21* 24   BUN 33* 39*   CREATININE 1.5* 1.8*   CALCIUM 9.4 8.9   MG 1.6  --      CBC:   Recent Labs   Lab 02/22/25  0908 02/23/25  0317   WBC 6.32 5.59   HGB 9.5* 8.5*   HCT 30.1* 26.8*   * 133*     Magnesium:   Recent Labs   Lab 02/22/25  0908   MG 1.6       Significant Imaging: I have reviewed all pertinent imaging results/findings within the past 24 hours.

## 2025-02-23 NOTE — ASSESSMENT & PLAN NOTE
CMP reviewed- noted Estimated Creatinine Clearance: 19.8 mL/min (A) (based on SCr of 1.8 mg/dL (H)). according to latest data. Based on current GFR, CKD stage is stage 4 - GFR 15-29.  Baseline Creatinine 1.5-2.0. Monitor UOP and serial BMP and adjust therapy as needed. Renally dose meds. Avoid nephrotoxic medications and procedures. Continue sodium bicarbonate replacement. On [employer] outpatient but not on formulary here so hold in hospital.

## 2025-02-23 NOTE — ASSESSMENT & PLAN NOTE
The likely etiology of thrombocytopenia is  stress/trauma related to femur fracture . The patients 3 most recent labs are listed below.  Recent Labs     02/20/25  2150 02/22/25  0908 02/23/25  0317    146* 133*     Plan  - Will transfuse if platelet count is <10k.  - Monitor daily CBC.

## 2025-02-23 NOTE — ASSESSMENT & PLAN NOTE
89 y/o female with known known previous right hip replacement, CAD with PCI x 4, CKD 4, primary hypertension, PAF, AAA, 2nd degree AV Block who presents for right hip pain after a fall transferring from wheelchair to bed at [facility] where she was receiving PT/OT after recent hospital discharge on 2/11/2025. Patient states she went forward and landed on her right hip and shoulder. Patient states had pain in right hip and shoulder after fall and brought to ED. X-rays of right shoulder showed no fracture or dislocation. X-ray and CT scan of pelvis revealed acute minimally displaced comminuted fracture of the right greater trochanter. Hardware in right hip in good alignment.   - Orthopedics consulted for periprosthetic greater trochanteric fracture of right hip. After discussion with Ortho staff, it was determined her prosthetic stem to right hip was stable for weight bearing as tolerated and recommended non operative management of her fracture.   - Patient to be weight bear as tolerated to right lower extremity as per Ortho and to continue.   - PT/OT consulted for evaluation. Patient worked with therapy and they are recommending moderate intensity therapy and plan for patient to return back to [facility] where she can from to continue PT/OT at SNF level to recover from this fracture and hopeful to discharge on 2/24.  - Pain improving to right hip on 2/23. Pain management with scheduled Tylenol 1000 mg po every 8 hours + Robaxin 500 mg po every 6 hours + Pregabalin 75 mg po nightly with Oxycodone IR 5 mg p every 4 hours for breakthrough pain and will continue.   - Patient placed on Lovenox 30 mg subcutaneous daily (renally dose) for DVT prophylaxis and will continue.

## 2025-02-23 NOTE — ASSESSMENT & PLAN NOTE
Discussion patient's multiple arrythmia concerns also has AV block. Patient has paroxysmal (<7 days) atrial fibrillation. Patient is currently in atrial fibrillation. [id number] Score: 4. The patients heart rate in the last 24 hours is as follows:  Pulse  Min: 37  Max: 71     Antiarrhythmics   None due to known AV block and bradycardiac at baseline.    Anticoagulants  enoxaparin injection 30 mg, Every 24 hours, Subcutaneous    Plan  - Replete lytes with a goal of K>4, Mg >2  - Patient is is not chronically anticoagulated as outpatient due to high fall risk.   - Rate is controlled.

## 2025-02-23 NOTE — ASSESSMENT & PLAN NOTE
Noted per review to have reported atrial fibrillation and asymptomatic intermittent Mobitz I AVB vs 2:1 AVB for which Cardiology evaluated her for during previous admission with uncertain etiology though possibly some reversible ischemia felt possible so medical management of her HTN and other issues was continued with recommendation for EP and Cardiology outpatient follow up. Remains asymptomatic and hemodynamically stable at this time.     Plan  - Monitor on telemetry.  - Continue HTN and other management.  - Avoid AV nellie blocking medications and agents.

## 2025-02-23 NOTE — ASSESSMENT & PLAN NOTE
Patient's blood pressure range in the last 24 hours was: BP  Min: 135/61  Max: 167/74.The patient's inpatient anti-hypertensive regimen is listed below:  Current Antihypertensives  amLODIPine tablet 5 mg, Daily, Oral  isosorbide mononitrate 24 hr tablet 90 mg, Daily, Oral  losartan tablet 25 mg, Daily, Oral    Plan  - BP controlled and continue patient's home medications in hospital to treat.  - Goal BP < 140/90.

## 2025-02-24 VITALS
OXYGEN SATURATION: 94 % | HEART RATE: 71 BPM | BODY MASS INDEX: 25.26 KG/M2 | DIASTOLIC BLOOD PRESSURE: 56 MMHG | RESPIRATION RATE: 16 BRPM | TEMPERATURE: 99 F | HEIGHT: 67 IN | SYSTOLIC BLOOD PRESSURE: 105 MMHG | WEIGHT: 160.94 LBS

## 2025-02-24 PROBLEM — D69.6 THROMBOCYTOPENIA: Status: RESOLVED | Noted: 2025-02-23 | Resolved: 2025-02-24

## 2025-02-24 LAB
ABO + RH BLD: NORMAL
ANION GAP SERPL CALC-SCNC: 11 MMOL/L (ref 8–16)
BASOPHILS # BLD AUTO: 0.02 K/UL (ref 0–0.2)
BASOPHILS NFR BLD: 0.3 % (ref 0–1.9)
BLD GP AB SCN CELLS X3 SERPL QL: NORMAL
BUN SERPL-MCNC: 46 MG/DL (ref 10–30)
CALCIUM SERPL-MCNC: 9.4 MG/DL (ref 8.7–10.5)
CHLORIDE SERPL-SCNC: 107 MMOL/L (ref 95–110)
CO2 SERPL-SCNC: 22 MMOL/L (ref 23–29)
CREAT SERPL-MCNC: 1.7 MG/DL (ref 0.5–1.4)
DIFFERENTIAL METHOD BLD: ABNORMAL
EOSINOPHIL # BLD AUTO: 0.2 K/UL (ref 0–0.5)
EOSINOPHIL NFR BLD: 3.3 % (ref 0–8)
ERYTHROCYTE [DISTWIDTH] IN BLOOD BY AUTOMATED COUNT: 15.9 % (ref 11.5–14.5)
EST. GFR  (NO RACE VARIABLE): 28.1 ML/MIN/1.73 M^2
GLUCOSE SERPL-MCNC: 79 MG/DL (ref 70–110)
HCT VFR BLD AUTO: 29 % (ref 37–48.5)
HGB BLD-MCNC: 9 G/DL (ref 12–16)
IMM GRANULOCYTES # BLD AUTO: 0.03 K/UL (ref 0–0.04)
IMM GRANULOCYTES NFR BLD AUTO: 0.5 % (ref 0–0.5)
LYMPHOCYTES # BLD AUTO: 2.1 K/UL (ref 1–4.8)
LYMPHOCYTES NFR BLD: 32.8 % (ref 18–48)
MCH RBC QN AUTO: 28.4 PG (ref 27–31)
MCHC RBC AUTO-ENTMCNC: 31 G/DL (ref 32–36)
MCV RBC AUTO: 92 FL (ref 82–98)
MONOCYTES # BLD AUTO: 1.1 K/UL (ref 0.3–1)
MONOCYTES NFR BLD: 17.1 % (ref 4–15)
NEUTROPHILS # BLD AUTO: 3 K/UL (ref 1.8–7.7)
NEUTROPHILS NFR BLD: 46 % (ref 38–73)
NRBC BLD-RTO: 0 /100 WBC
PLATELET # BLD AUTO: 151 K/UL (ref 150–450)
PMV BLD AUTO: 13 FL (ref 9.2–12.9)
POCT GLUCOSE: 116 MG/DL (ref 70–110)
POCT GLUCOSE: 130 MG/DL (ref 70–110)
POTASSIUM SERPL-SCNC: 4.4 MMOL/L (ref 3.5–5.1)
RBC # BLD AUTO: 3.17 M/UL (ref 4–5.4)
SODIUM SERPL-SCNC: 140 MMOL/L (ref 136–145)
SPECIMEN OUTDATE: NORMAL
WBC # BLD AUTO: 6.43 K/UL (ref 3.9–12.7)

## 2025-02-24 PROCEDURE — 25000003 PHARM REV CODE 250

## 2025-02-24 PROCEDURE — 94799 UNLISTED PULMONARY SVC/PX: CPT

## 2025-02-24 PROCEDURE — 80048 BASIC METABOLIC PNL TOTAL CA: CPT | Performed by: STUDENT IN AN ORGANIZED HEALTH CARE EDUCATION/TRAINING PROGRAM

## 2025-02-24 PROCEDURE — 97116 GAIT TRAINING THERAPY: CPT

## 2025-02-24 PROCEDURE — 97530 THERAPEUTIC ACTIVITIES: CPT

## 2025-02-24 PROCEDURE — 85025 COMPLETE CBC W/AUTO DIFF WBC: CPT | Performed by: STUDENT IN AN ORGANIZED HEALTH CARE EDUCATION/TRAINING PROGRAM

## 2025-02-24 PROCEDURE — 36415 COLL VENOUS BLD VENIPUNCTURE: CPT | Performed by: PHYSICIAN ASSISTANT

## 2025-02-24 PROCEDURE — 86900 BLOOD TYPING SEROLOGIC ABO: CPT | Performed by: PHYSICIAN ASSISTANT

## 2025-02-24 PROCEDURE — 25000003 PHARM REV CODE 250: Performed by: INTERNAL MEDICINE

## 2025-02-24 PROCEDURE — 25000003 PHARM REV CODE 250: Performed by: STUDENT IN AN ORGANIZED HEALTH CARE EDUCATION/TRAINING PROGRAM

## 2025-02-24 PROCEDURE — 63600175 PHARM REV CODE 636 W HCPCS: Performed by: INTERNAL MEDICINE

## 2025-02-24 RX ORDER — LOSARTAN POTASSIUM 25 MG/1
25 TABLET ORAL DAILY
Status: DISCONTINUED | OUTPATIENT
Start: 2025-02-24 | End: 2025-02-24 | Stop reason: HOSPADM

## 2025-02-24 RX ORDER — AMLODIPINE BESYLATE 10 MG/1
10 TABLET ORAL DAILY
Start: 2025-02-25

## 2025-02-24 RX ORDER — OXYCODONE HYDROCHLORIDE 5 MG/1
5 TABLET ORAL EVERY 4 HOURS PRN
Qty: 20 TABLET | Refills: 0 | Status: SHIPPED | OUTPATIENT
Start: 2025-02-24

## 2025-02-24 RX ORDER — ACETAMINOPHEN 500 MG
1000 TABLET ORAL EVERY 8 HOURS
COMMUNITY
Start: 2025-02-24 | End: 2025-03-10

## 2025-02-24 RX ORDER — PREGABALIN 75 MG/1
75 CAPSULE ORAL NIGHTLY
Qty: 30 CAPSULE | Refills: 0 | Status: SHIPPED | OUTPATIENT
Start: 2025-02-24 | End: 2025-03-26

## 2025-02-24 RX ORDER — AMLODIPINE BESYLATE 5 MG/1
10 TABLET ORAL DAILY
Status: DISCONTINUED | OUTPATIENT
Start: 2025-02-25 | End: 2025-02-24 | Stop reason: HOSPADM

## 2025-02-24 RX ORDER — METHOCARBAMOL 500 MG/1
500 TABLET, FILM COATED ORAL 3 TIMES DAILY
Start: 2025-02-24 | End: 2025-03-10

## 2025-02-24 RX ADMIN — CLOPIDOGREL BISULFATE 75 MG: 75 TABLET ORAL at 08:02

## 2025-02-24 RX ADMIN — ATORVASTATIN CALCIUM 80 MG: 40 TABLET, FILM COATED ORAL at 08:02

## 2025-02-24 RX ADMIN — MUPIROCIN 1 G: 20 OINTMENT TOPICAL at 07:02

## 2025-02-24 RX ADMIN — METHOCARBAMOL 500 MG: 500 TABLET ORAL at 01:02

## 2025-02-24 RX ADMIN — ISOSORBIDE MONONITRATE 90 MG: 60 TABLET, EXTENDED RELEASE ORAL at 07:02

## 2025-02-24 RX ADMIN — ACETAMINOPHEN 1000 MG: 500 TABLET ORAL at 02:02

## 2025-02-24 RX ADMIN — ONDANSETRON 4 MG: 4 TABLET, ORALLY DISINTEGRATING ORAL at 06:02

## 2025-02-24 RX ADMIN — METHOCARBAMOL 500 MG: 500 TABLET ORAL at 06:02

## 2025-02-24 RX ADMIN — SENNOSIDES AND DOCUSATE SODIUM 1 TABLET: 50; 8.6 TABLET ORAL at 08:02

## 2025-02-24 RX ADMIN — AMLODIPINE BESYLATE 5 MG: 5 TABLET ORAL at 07:02

## 2025-02-24 RX ADMIN — ENOXAPARIN SODIUM 30 MG: 30 INJECTION SUBCUTANEOUS at 06:02

## 2025-02-24 RX ADMIN — ASPIRIN 81 MG: 81 TABLET, COATED ORAL at 08:02

## 2025-02-24 RX ADMIN — ALLOPURINOL 50 MG: 300 TABLET ORAL at 07:02

## 2025-02-24 RX ADMIN — SODIUM BICARBONATE 650 MG: 650 TABLET ORAL at 08:02

## 2025-02-24 RX ADMIN — PANTOPRAZOLE SODIUM 20 MG: 20 TABLET, DELAYED RELEASE ORAL at 06:02

## 2025-02-24 RX ADMIN — CHOLECALCIFEROL TAB 25 MCG (1000 UNIT) 2000 UNITS: 25 TAB at 08:02

## 2025-02-24 RX ADMIN — LOSARTAN POTASSIUM 25 MG: 25 TABLET, FILM COATED ORAL at 06:02

## 2025-02-24 RX ADMIN — OXYCODONE HYDROCHLORIDE 5 MG: 5 TABLET ORAL at 08:02

## 2025-02-24 RX ADMIN — ACETAMINOPHEN 1000 MG: 500 TABLET ORAL at 06:02

## 2025-02-24 RX ADMIN — POLYETHYLENE GLYCOL 3350 17 G: 17 POWDER, FOR SOLUTION ORAL at 08:02

## 2025-02-24 NOTE — PLAN OF CARE
Problem: Hip Fracture Medical Management  Goal: Pain Control and Function  Outcome: Progressing  Intervention: Manage Acute Orthopaedic-Related Pain  Flowsheets (Taken 2/24/2025 1129)  Pain Management Interventions:   prescribed exercises encouraged   relaxation techniques promoted     Problem: Fall Injury Risk  Goal: Absence of Fall and Fall-Related Injury  Outcome: Progressing  Intervention: Promote Injury-Free Environment  Flowsheets (Taken 2/24/2025 1129)  Safety Promotion/Fall Prevention:   assistive device/personal item within reach   side rails raised x 3     Problem: Infection  Goal: Absence of Infection Signs and Symptoms  Outcome: Progressing  Intervention: Prevent or Manage Infection  Flowsheets (Taken 2/24/2025 1129)  Infection Management: aseptic technique maintained  Isolation Precautions: precautions maintained

## 2025-02-24 NOTE — NURSING
Pt /89, HR 50, RR 17. Pt asymptomatic, no c./o pain, SOB, chest pain. THERESA Johnson notified. AM Losartan given early, endorsed to AM nurse. Call light within reach. Will continue plan of care

## 2025-02-24 NOTE — PLAN OF CARE
[facility]     Department of Hospital Medicine     [address] [place] [address]     [phone] after hours  [phone] fax       NURSING HOME ORDERS    02/24/2025    Admit to [facility]:  Skilled Bed                                            Diagnoses:  Active Hospital Problems    Diagnosis  POA    *Periprosthetic fracture around internal prosthetic right hip joint [M97.01XA]  Not Applicable     Priority: 1 - High    Paroxysmal atrial fibrillation [I48.0]  Yes     Priority: 2     CKD (chronic kidney disease) stage 4, GFR 15-29 ml/min [N18.4]  Yes     Priority: 3     Coronary artery disease involving native coronary artery of native heart without angina pectoris [I25.10]  Yes     Priority: 4     AV block [I44.30]  Yes     Priority: 5     Primary hypertension [I10]  Yes     Priority: 6     AAA (abdominal aortic aneurysm) [I71.40]  Yes     Priority: 7     Gastroesophageal reflux disease without esophagitis [K21.9]  Yes     Priority: 8     ACP (advance care planning) [Z71.89]  Not Applicable     Priority: 9     Anemia due to stage 4 chronic kidney disease [N18.4, D63.1]  Yes     Priority: 10       Resolved Hospital Problems    Diagnosis Date Resolved POA    Thrombocytopenia [D69.6] 02/24/2025 No     Priority: 11     Arrhythmia [I49.9] 02/21/2025 Yes       Patient is homebound due to:  Periprosthetic fracture around internal prosthetic right hip joint    Allergies:  Review of patient's allergies indicates:   Allergen Reactions    Clindamycin Anaphylaxis    Penicillins Rash       Vitals: Every shift (Skilled Nursing patients)        Code Status: Full Code     Diet: cardiac diet      Supplement: House supplement, one can by mouth with meals                             Activities:   - Up in a chair each morning as tolerated   - Ambulate with assistance to bathroom   - Scheduled walks once each shift (every 8 hours)   - May ambulate  independently   - May use walker, cane, or self-propelled wheelchair   - Weight bearing: Weight bear as tolerated to right lower extremity    LABS:  Per facility protocol      Nursing: Out of bed BID, Up with assistance    Nursing Precautions:       - Fall precautions per nursing home protocol      CONSULTS:       Physical Therapy to evaluate and treat 5 times a week     Occupational Therapy to evaluate and treat 5 times a week       MISCELLANEOUS CARE:  Routine Skin for Bedridden Patients: Instruct patient/caregiver to apply moisture barrier cream to all skin folds and wet areas in perineal area daily and after baths and all bowel movements.      Medications:        Medication List        START taking these medications      acetaminophen 500 MG tablet  Commonly known as: TYLENOL  Take 2 tablets (1,000 mg total) by mouth every 8 (eight) hours. for 14 days     methocarbamoL 500 MG Tab  Commonly known as: Robaxin  Take 1 tablet (500 mg total) by mouth 3 (three) times daily. for 14 days     oxyCODONE 5 MG immediate release tablet  Commonly known as: ROXICODONE  Take 1 tablet (5 mg total) by mouth every 4 (four) hours as needed (Moderate to severe pain).     pregabalin 75 MG capsule  Commonly known as: LYRICA  Take 1 capsule (75 mg total) by mouth every evening.            CHANGE how you take these medications      amLODIPine 10 MG tablet  Commonly known as: NORVASC  Take 1 tablet (10 mg total) by mouth once daily.  Start taking on: February 25, 2025  What changed: how much to take            CONTINUE taking these medications      albuterol-budesonide 90-80 mcg/actuation  Commonly known as: Airsupra  Inhale 2 puffs into the lungs every 6 (six) hours as needed (wheezing).     allopurinoL 100 MG tablet  Commonly known as: ZYLOPRIM  Take 0.5 tablets by mouth once daily.     aspirin 81 MG EC tablet  Commonly known as: ECOTRIN  Take 81 mg by mouth once daily.     atorvastatin 80 MG tablet  Commonly known as: LIPITOR  Take 1  tablet (80 mg total) by mouth once daily.     benzonatate 200 MG capsule  Commonly known as: TESSALON  Take 200 mg by mouth every 8 (eight) hours as needed for Cough.     cholecalciferol (vitamin D3) 50 mcg (2,000 unit) Cap capsule  Commonly known as: VITAMIN D3  Take 1 capsule by mouth once daily.     clopidogreL 75 mg tablet  Commonly known as: PLAVIX  Take 75 mg by mouth once daily.     FARXIGA 10 mg tablet  Generic drug: dapagliflozin propanediol  Take 1 tablet by mouth once daily.     fluticasone propionate 50 mcg/actuation nasal spray  Commonly known as: FLONASE  2 sprays (100 mcg total) by Each Nostril route once daily.     folic acid 1 MG tablet  Commonly known as: FOLVITE  Take 1 mg by mouth once daily.     GLYCOLAX ORAL  Take 17 g by mouth daily as needed (constipation).     GUAIFENESIN DM ORAL  Take 10 mLs by mouth every 6 (six) hours as needed (cough).     isosorbide mononitrate 30 MG 24 hr tablet  Commonly known as: IMDUR  Take 3 tablets (90 mg total) by mouth once daily.     losartan 25 MG tablet  Commonly known as: COZAAR  Take 1 tablet (25 mg total) by mouth once daily.     nitroGLYCERIN 0.4 MG SL tablet  Commonly known as: NITROSTAT  Place 0.4 mg under the tongue every 5 (five) minutes as needed for Chest pain.     pantoprazole 20 MG tablet  Commonly known as: PROTONIX  Take 20 mg by mouth every morning.     sodium bicarbonate 650 MG tablet  Take 650 mg by mouth 2 (two) times daily.                Follow-up:   Follow-up in Orthopedic clinic in 1 month with Dr. Rubens Loyola.     _________________________________  Estefanía Pena MD  02/24/2025

## 2025-02-24 NOTE — PLAN OF CARE
Problem: Fall Injury Risk  Goal: Absence of Fall and Fall-Related Injury  Outcome: Progressing  Intervention: Promote Injury-Free Environment  Flowsheets (Taken 2/23/2025 1809)  Safety Promotion/Fall Prevention:   assistive device/personal item within reach   side rails raised x 3     Problem: Infection  Goal: Absence of Infection Signs and Symptoms  Outcome: Progressing  Intervention: Prevent or Manage Infection  Flowsheets (Taken 2/23/2025 1809)  Infection Management: aseptic technique maintained  Isolation Precautions: precautions maintained

## 2025-02-24 NOTE — PLAN OF CARE
02/24/25 1108   Post-Acute Status   Post-Acute Authorization Placement   Post-Acute Placement Status Pending payor review/awaiting authorization (if required)   Discharge Plan   Discharge Plan A Skilled Nursing Facility     SW called [facility], sent updated clinicals via Epic. SW awaiting return call to confirm pt's eligible to return.     [facility] submitted authorization for pt to return.    1:10 PM  SW called [facility], auth remains pending.    3:11 PM  SW called SNF, informed auth remains pending. SW to send to [facility], if not obtained by end of day.     Xuan Hall LCSW  Case Management   [facility]   Ext. [phone]

## 2025-02-24 NOTE — PT/OT/SLP PROGRESS
Occupational Therapy   Co-Treatment  Co-treatment performed due to patient's multiple deficits requiring two skilled therapists to appropriately and safely assess patient's strength and endurance while facilitating functional tasks in addition to accommodating for patient's activity tolerance.      Name: Anahy Evans  MRN: [id number]  Admitting Diagnosis:  Periprosthetic fracture around internal prosthetic right hip joint       Recommendations:     Discharge Recommendations: Moderate Intensity Therapy  Discharge Equipment Recommendations:  bedside commode  Barriers to discharge:  Other (Comment) (increased skilled (A) required)    Assessment:     Anahy Evans is a 89 y.o. female with a medical diagnosis of Periprosthetic fracture around internal prosthetic right hip joint.  She presents with the following performance deficits affecting function are weakness, impaired endurance, impaired self care skills, impaired functional mobility, gait instability, impaired balance, decreased coordination, decreased lower extremity function, decreased safety awareness, pain, impaired cardiopulmonary response to activity, orthopedic precautions.     Rehab Prognosis:  Good; patient would benefit from acute skilled OT services to address these deficits and reach maximum level of function.       Plan:     Patient to be seen 4 x/week to address the above listed problems via self-care/home management, therapeutic activities, therapeutic exercises, neuromuscular re-education  Plan of Care Expires: 03/24/25  Plan of Care Reviewed with: patient    Subjective     Chief Complaint: pain with fx'l mobility  Patient/Family Comments/goals: to return to PLOF  Pain/Comfort:  Pain Rating 1: 0/10 (at rest)  Location - Side 1: Right  Location - Orientation 1: generalized  Location 1: hip  Pain Addressed 1: Pre-medicate for activity, Reposition, Distraction  Pain Rating Post-Intervention 1: other (see comments) (unrated)    Objective:   Additional  staff present:  HARDY Mckee    Communicated with: RN prior to session.  Patient found HOB elevated with TEOFILO Chao, telemetry upon OT entry to room.    General Precautions: Standard, fall    Orthopedic Precautions:RLE weight bearing as tolerated  Braces: N/A  Respiratory Status: Room air     Occupational Performance:     Bed Mobility:    Patient completed Scooting/Bridging with stand by assistance  Patient completed Supine to Sit with minimum assistance     Functional Mobility/Transfers:  Patient completed Sit <> Stand Transfer with minimum assistance and of 2 persons  with  rolling walker   Patient completed Bed <> Chair Transfer using Step Transfer technique with moderate assistance and of 2 persons with rolling walker  No LOB or SOB noted  Cues for sequencing, upright posture, and RW management    Activities of Daily Living:  Upper Body Dressing: stand by assistance to don hospital gown over back at EOB      [facility] 6 Click ADL: 16    Treatment & Education:  -Pt educated to dress surgical site first and further dressing techniques s/p surgery  -Pt educated on hand placement for transfers  -Pt educated on RW placement for ADLs  -Pt educated on proper foot wear s/p surgery  -Pt educated on positioning of sx LE during performance of functional activities vs rest/sleep  -Pt educated on weightbearing precautions  -Pt educated to call for assistance and to transfer with hospital staff only  -Pt educated on role of OT and plan of care s/p surgery, white board updated     Patient left up in chair with all lines intact, call button in reach, and RN/PCT notified    GOALS:   Multidisciplinary Problems       Occupational Therapy Goals          Problem: Occupational Therapy    Goal Priority Disciplines Outcome Interventions   Occupational Therapy Goal     OT, PT/OT Progressing    Description: Goals to be met by: 3/24/25     Patient will increase functional independence with ADLs by performing:    UE Dressing with  Supervision.  LE Dressing with Stand-by Assistance.  Grooming while standing at sink with Set-up Assistance and Stand-by Assistance.  Toileting from toilet with Stand-by Assistance for hygiene and clothing management.   All functional transfers performed with SBA                         DME Justifications:   Anahy requires a commode for home use because she is confined to a single room.    Time Tracking:     OT Date of Treatment: 02/24/25  OT Start Time: 1508  OT Stop Time: 1525  OT Total Time (min): 17 min    Billable Minutes:Therapeutic Activity 17    OT/AMAURY: OT          2/24/2025

## 2025-02-24 NOTE — PT/OT/SLP PROGRESS
"Physical Therapy   Co-Treatment  Co-treatment with OT due to patient's poor activity tolerance and medical complexity requiring skilled assistance from 2 therapists.    Patient Name:  Anahy Evans   MRN:  [id number]    Recommendations:     Discharge Recommendations: Moderate Intensity Therapy  Discharge Equipment Recommendations: bedside commode  Barriers to discharge:  requiring increased level of skilled assist    Assessment:     Anahy Evans is a 89 y.o. female admitted with a medical diagnosis of Periprosthetic fracture around internal prosthetic right hip joint.  She presents with the following impairments/functional limitations: weakness, impaired endurance, orthopedic precautions, impaired self care skills, impaired functional mobility, decreased lower extremity function, gait instability, impaired balance, pain Patient with good progression this date, able to transfer to chair with skilled assist using RW. Patient will continue to benefit from skilled PT during this admit to address BLE strength and endurance deficits, and maximize independence with functional mobility.    Rehab Prognosis: Good; patient would benefit from acute skilled PT services to address these deficits and reach maximum level of function.    Recent Surgery: * No surgery found *      Plan:     During this hospitalization, patient to be seen 4 x/week to address the identified rehab impairments via gait training, therapeutic activities, therapeutic exercises, neuromuscular re-education and progress toward the following goals:    Plan of Care Expires:  03/22/25    Subjective     Chief Complaint: "I'm not sure how much I can do"  Patient/Family Comments/goals: gain strength, return to PLOF  Pain/Comfort:  Pain Rating 1: 0/10  Location - Side 1: Right  Location - Orientation 1: generalized  Location 1: hip  Pain Addressed 1: Pre-medicate for activity, Reposition, Distraction  Pain Rating Post-Intervention 1:  (no pain rating stated, but " increased with movement)      Objective:     Communicated with RN prior to session.  Patient found HOB elevated with TEOFILO Chao, telemetry upon PT entry to room.     General Precautions: Standard, fall  Orthopedic Precautions: RLE weight bearing as tolerated  Braces: N/A  Respiratory Status: Room air     Functional Mobility:  Bed Mobility:     Scooting: stand by assistance  Supine to Sit: minimum assistance  Transfers:     Sit to Stand:  minimum assistance and of 2 persons with rolling walker  Bed to Chair: moderate assistance and of 2 persons with  rolling walker  using  Step Transfer  Gait: steps bed>chair with mod x2 and RW  FFP with downward gaze, no LOB, cues for weight shifting and limb advancement, light assist with AD management  Balance:   Sitting: good at EOB  Standing: mod x2 with BUE support on RW      AM-PAC 6 CLICK MOBILITY  Turning over in bed (including adjusting bedclothes, sheets and blankets)?: 3  Sitting down on and standing up from a chair with arms (e.g., wheelchair, bedside commode, etc.): 3  Moving from lying on back to sitting on the side of the bed?: 3  Moving to and from a bed to a chair (including a wheelchair)?: 3  Need to walk in hospital room?: 2  Climbing 3-5 steps with a railing?: 1  Basic Mobility Total Score: 15       Treatment & Education:  Co-treatment with OT due to patient's poor activity tolerance and medical complexity requiring skilled assistance from 2 therapists.  All items placed within reach, and notified on call light usage for assistance with any needs for fall prevention.  Patient educated on importance of OOB activity to promote overall endurance.  Patient educated on current level of function and progression towards therapeutic goals.    Patient left up in chair with all lines intact, call button in reach, and nursing notified..    GOALS:   Multidisciplinary Problems       Physical Therapy Goals          Problem: Physical Therapy    Goal Priority Disciplines  Outcome Interventions   Physical Therapy Goal     PT, PT/OT Progressing    Description: Goals to be met by: 3/22/2025     Patient will increase functional independence with mobility by performin. Supine to sit with Mod(I)  2. Sit to supine with Mod(I)  3. Sit to stand transfer with Stand-by Assistance using LRAD  4. Bed to chair transfer with Stand-by Assistance using LRAD  5. Gait  x 50 feet with Stand-by Assistance using LRAD.   6. Ascend/descend 3 stairs using L HR with Stand-By Assistance using LRAD                         DME Justifications:   Anahy requires a commode for home use because she is confined to a single room.    Time Tracking:     PT Received On: 25  PT Start Time: 1508     PT Stop Time: 1522  PT Total Time (min): 14 min     Billable Minutes: Gait Training 14       PT/PTA: PT     Number of PTA visits since last PT visit: 0     2025

## 2025-02-24 NOTE — PLAN OF CARE
[facility]  Discharge Final Note    Primary Care Provider: Elena Cabrera MD    Expected Discharge Date: 2/24/2025    Patient to be discharged to [facility] to provide stretcher transportation.    Nurse to call report to [phone], Room 110B.  Stretcher requested for 6:30 which is not a guaranteed arrival time.      Final Discharge Note (most recent)       Final Note - 02/24/25 1724          Final Note    Assessment Type Final Discharge Note     Anticipated Discharge Disposition Skilled Nursing Facility        Post-Acute Status    Post-Acute Authorization Placement     Post-Acute Placement Status Set-up Complete/Auth obtained     Discharge Delays None known at this time                     Important Message from Medicare  Important Message from Medicare regarding Discharge Appeal Rights: Given to patient/caregiver, Explained to patient/caregiver, Signed/date by patient/caregiver, Other (comments) (phone/verbal daughter Ziggy gutierres witness CHW)     Date IMM was signed: 02/24/25  Time IMM was signed: 3012

## 2025-02-24 NOTE — CARE UPDATE
"RAPID RESPONSE NURSE CHART REVIEW        Chart Reviewed: 02/24/2025, 7:07 AM    MRN: [id number]  Bed: 528/528 A    Dx: Periprosthetic fracture around internal prosthetic right hip joint    Anahy Evans has a past medical history of Acute blood loss anemia, Anticoagulant long-term use, Breast deformity, Cervical cancer, Coronary artery disease, Gout, High cholesterol, History of cervical dysplasia, Hypertension, MI (myocardial infarction), and Right axillary hidradenitis.    Last VS: BP (!) 201/89   Pulse (!) 50   Temp 98.3 °F (36.8 °C) (Oral)   Resp 17   Ht 5' 7" (1.702 m)   Wt 73 kg (160 lb 15 oz)   LMP  (LMP Unknown)   SpO2 (!) 93%   Breastfeeding No   BMI 25.21 kg/m²     24H Vital Sign Range:  Temp:  [97.5 °F (36.4 °C)-98.7 °F (37.1 °C)]   Pulse:  [42-71]   Resp:  [16-22]   BP: (131-201)/()   SpO2:  [92 %-100 %]     Level of Consciousness (AVPU): alert    Recent Labs     02/22/25  0908 02/23/25  0317   WBC 6.32 5.59   HGB 9.5* 8.5*   HCT 30.1* 26.8*   * 133*       Recent Labs     02/22/25  0908 02/23/25  0317 02/24/25  0547    139 140   K 3.9 4.0 4.4    106 107   CO2 21* 24 22*   BUN 33* 39* 46*   CREATININE 1.5* 1.8* 1.7*    79 79   PHOS 3.8  --   --    MG 1.6  --   --         OXYGEN: RA    MEWS score: 2    Rounding completed at 1012.    Rounding completed with bedside PARVEEN Alcaraz for HTN and bradycardia reports primary team aware, oral AM medications given. Requested she message team to discuss replacing patient Mg of 1.6. No acute concerns verbalized at this time. Instructed to call [phone] for further concerns or assistance.    Muriel Toth RN      " Yes

## 2025-02-24 NOTE — NURSING
Report provided to Jeri at the [facility] @17:30 along w/info on the expected ETA for transport  at 18:30.

## 2025-02-25 NOTE — DISCHARGE SUMMARY
[facility] Medicine  Discharge Summary      Patient Name: Anahy Evans  MRN: [id number]  OLAYINKA: [id number]  Patient Class: IP- Inpatient  Admission Date: 2/20/2025  Hospital Length of Stay: 3 days  Discharge Date and Time: 2/24/2025  9:26 PM  Attending Physician: Estefanía Pena MD   Discharging Provider: Estefanía Pena MD  Primary Care Provider: Elena Cabrera MD  [facility] Medicine Team: [facility]  Estefanía Pena MD  Primary Care Team: [facility]    HPI:   Anahy Evans is a 89 y.o. female with w/ CAD hx PCI, CKD 4, HTN, Afib, AAA, 2nd degree AV Block who presents for right hip pain after a fall. Pt has been largely confined to a WC due to TYALOR after her recent admission and was at her facility, in her WC with a glass of water when she stood to put the glass of water onto a table and lost her balance. She dropped the glass and then tried to slow her fall but couldn't. She denied any head injury or syncope when she fell or before and also denied preceding chest pain, dizziness, headache, dyspnea, nausea, vomiting, abd pain and other symptoms. She has had TAYLOR since prior admission which has been not much worse due to her lack of activity and she denied any development of dyspnea at rest or other changes. She denied feeling ill otherwise including fever, chills, URI sxs, dysuria, diarrhea.     * No surgery found *      Hospital Course:   89 y/o female with known previous right hip replacement, CAD with PCI x 4, CKD 4, primary hypertension, PAF, AAA, 2nd degree AV Block who presents for right hip pain after a fall transferring from wheelchair to bed at [facility] where she was receiving PT/OT after recent hospital discharge on 2/11/2025. Patient states she went forward and landed on her right hip and shoulder. Patient states had pain in right hip and shoulder after fall and brought to ED. X-rays of right shoulder showed no fracture or dislocation. X-ray and CT  scan of pelvis revealed acute minimally displaced comminuted fracture of the right greater trochanter. Hardware in right hip in good alignment. Orthopedics consulted for periprosthetic greater trochanteric fracture of right hip. After discussion with Ortho staff, it was determined her prosthetic stem to right hip was stable for weight bearing as tolerated and recommended non operative management of her fracture. Patient to be weight bear as tolerated to right lower extremity as per Ortho. PT/OT consulted for evaluation and recommending moderate intensity therapy and plan for patient to return back to [facility] on discharge where she was at prior to this admit. Pain management with scheduled Tylenol 1000 mg po every 8 hours + Robaxin 500 mg po every 6 hours + Pregabalin 75 mg po nightly with Oxycodone IR 5 mg p every 4 hours for breakthrough pain and pain controlled post-op. Patient placed on Lovenox 30 mg subcutaneous daily (renally dose) for DVT prophylaxis while in hospital. Pain controlled to right hip and patient medically ready for discharge on 2/24. [facility] contacted and accepted to return and discharged on 2/24. Patient's BP elevated on am of 2/24 so home Norvasc increased from 5 to 10 mg po daily to treat her HTN.BP improved in afternoon of 2/24 after increasing Norvasc. Patient reports pain controlled to right hip. Patient progressing with PT/OT and discharged in good condition back to [facility] to continue rehab and recovery from fracture. Patient to follow-up in Ortho clinic in 2 weeks to follow-up fracture healing. Patient discharged on Tylenol, Robaxin, Lyrica and Oxycodone IR po prn for breakthrough pain.      Goals of Care Treatment Preferences:  Code Status: Full Code    Living Will: Yes              SDOH Screening:  The patient was screened for utility difficulties, food insecurity, transport difficulties, housing insecurity, and interpersonal safety and there  were no concerns identified this admission.     Consults:   Consults (From admission, onward)          Status Ordering Provider     Inpatient consult to Social Work/Case Management  Once        Provider:  (Not yet assigned)    Acknowledged CYNTHIA GALSS     Inpatient consult to Orthopedic Surgery  Once        Provider:  (Not yet assigned)    Completed SESSIONS, KAUSHIK ESCOBAR            * Periprosthetic fracture around internal prosthetic right hip joint  89 y/o female with known known previous right hip replacement, CAD with PCI x 4, CKD 4, primary hypertension, PAF, AAA, 2nd degree AV Block who presents for right hip pain after a fall transferring from wheelchair to bed at [facility] where she was receiving PT/OT after recent hospital discharge on 2/11/2025. Patient states she went forward and landed on her right hip and shoulder. Patient states had pain in right hip and shoulder after fall and brought to ED. X-rays of right shoulder showed no fracture or dislocation. X-ray and CT scan of pelvis revealed acute minimally displaced comminuted fracture of the right greater trochanter. Hardware in right hip in good alignment.   - Orthopedics consulted for periprosthetic greater trochanteric fracture of right hip. After discussion with Ortho staff, it was determined her prosthetic stem to right hip was stable for weight bearing as tolerated and recommended non operative management of her fracture.   - Patient to be weight bear as tolerated to right lower extremity as per Ortho and to continue on discharge.   - PT/OT consulted for evaluation. Patient worked with therapy and they are recommending moderate intensity therapy and plan for patient to return back to [facility] where she can from to continue PT/OT at SNF level to recover from this fracture and discharged in good condition back to [facility] SNF on 2/24.   - Pain controlled to right hip on discharge. Pain management with scheduled Tylenol  1000 mg po every 8 hours + Robaxin 500 mg po every 6 hours + Pregabalin 75 mg po nightly with Oxycodone IR 5 mg p every 4 hours for breakthrough pain and will continue on discharge.       Paroxysmal atrial fibrillation  Discussion patient's multiple arrythmia concerns also has AV block. Patient has paroxysmal (<7 days) atrial fibrillation. Patient is currently in atrial fibrillation. [id number] Score: 4. The patients heart rate in the last 24 hours is as follows:  Pulse  Min: 42  Max: 91     Antiarrhythmics   None due to known AV block and bradycardiac at baseline.    Anticoagulants  None    Plan  - Replete lytes with a goal of K>4, Mg >2  - Patient is is not chronically anticoagulated as outpatient due to high fall risk.   - Rate is controlled on discharge.     CKD (chronic kidney disease) stage 4, GFR 15-29 ml/min  CMP reviewed- noted Estimated Creatinine Clearance: 21 mL/min (A) (based on SCr of 1.7 mg/dL (H)). according to latest data. Based on current GFR, CKD stage is stage 4 - GFR 15-29.  Baseline Creatinine 1.5-2.0. Patient at renal baseline function at time of discharge. Renally dose meds. Avoid nephrotoxic medications and procedures. Continue sodium bicarbonate replacement on discharge. On Kerendia outpatient but not on formulary here so hold in hospital but resume on discharge.    Coronary artery disease involving native coronary artery of native heart without angina pectoris  Patient with known CAD s/p  PCI x 4 in the past and followed by Cardiology as outpatient. Since no surgery planned then will resume her outpatient Aspirin 81 mg po daily and Plavix 75 mg po daily for her known CAD and continue home Lipitor 80 mg po daily to treat and continue on discharge.    AV block  Noted per review to have reported atrial fibrillation and asymptomatic intermittent Mobitz I AVB vs 2:1 AVB for which Cardiology evaluated her for during previous admission with uncertain etiology though possibly some reversible  ischemia felt possible so medical management of her HTN and other issues was continued with recommendation for EP and Cardiology outpatient follow up. Remains asymptomatic and hemodynamically stable at this time.     Plan  - Continue HTN and other management.  - Avoid AV nellie blocking medications and agents.     Primary hypertension  Patient's blood pressure range in the last 24 hours was: BP  Min: 105/56  Max: 201/89.The patient's inpatient anti-hypertensive regimen is listed below:  Current Antihypertensives  isosorbide mononitrate 24 hr tablet 90 mg, Daily, Oral  losartan tablet 25 mg, Daily, Oral  amLODIPine tablet 10 mg, Daily, Oral  amlodipine (NORVASC) tablet, Daily, Oral    Plan  - BP controlled on discharge after increasing Norvasc from 5 to 10 mg po daily on 2/24. Patient discharged on above BP regimen on discharge.   - Goal BP < 140/90.     AAA (abdominal aortic aneurysm)  Patient noted to have 3.9 cm AAA noted on last imaging in 02/2025. Continue BP and other management. Patient asymptomatic.     Gastroesophageal reflux disease without esophagitis  Chronic and controlled. Continue home Protonix 20 mg po daily to treat on discharge.     ACP (advance care planning)  At prior, pt's code status was changed to full code as she had been full code/DNR during the prior to admits and after discussion with her in setting of her arrhythmias she indicated she would want pacing measures and would want resuscitative measures if she might not incur injury and it was communicated to her the low likelihood of resuscitation occurring without injury. At that time her code status was kept full code during the admission and after. I discussed this with her again during admission and we agreed that the circumstances are similar to her prior admission except that now surgery with her conditions may incur serious risk. We discussed leaving her code status as full code at this time until further evaluation with cardiology and  Orthopedics after which time she would like to revisit.         Anemia due to stage 4 chronic kidney disease  Anemia is controlled on discharge. Anemia is likely due to chronic disease due to Chronic Kidney Disease. Most recent hemoglobin and hematocrit are listed below.  Recent Labs     02/22/25  0908 02/23/25  0317 02/24/25  0547   HGB 9.5* 8.5* 9.0*   HCT 30.1* 26.8* 29.0*       Plan  - Transfuse PRBC if patient becomes hemodynamically unstable, symptomatic or H/H drops below 7/21.  - Patient has not received any PRBC transfusions to date  - Patient's anemia is currently controlled on discharge.       Final Active Diagnoses:    Diagnosis Date Noted POA    PRINCIPAL PROBLEM:  Periprosthetic fracture around internal prosthetic right hip joint [M97.01XA] 05/09/2019 Not Applicable    Paroxysmal atrial fibrillation [I48.0] 07/05/2013 Yes    CKD (chronic kidney disease) stage 4, GFR 15-29 ml/min [N18.4] 06/29/2017 Yes    Coronary artery disease involving native coronary artery of native heart without angina pectoris [I25.10] 05/09/2019 Yes    AV block [I44.30] 02/21/2025 Yes    Primary hypertension [I10] 11/10/2016 Yes    AAA (abdominal aortic aneurysm) [I71.40] 02/11/2019 Yes    Gastroesophageal reflux disease without esophagitis [K21.9] 09/14/2012 Yes    ACP (advance care planning) [Z71.89] 01/20/2025 Not Applicable    Anemia due to stage 4 chronic kidney disease [N18.4, D63.1] 01/16/2025 Yes      Problems Resolved During this Admission:    Diagnosis Date Noted Date Resolved POA    Thrombocytopenia [D69.6] 02/23/2025 02/24/2025 No    Arrhythmia [I49.9] 02/21/2025 02/21/2025 Yes       Discharged Condition: good    Disposition: Skilled Nursing Facility ([facility])    Follow Up:  Follow-up in Orthopedic clinic in 2 weeks.     Patient Instructions:      Diet Cardiac     Notify your health care provider if you experience any of the following:  temperature >100.4     Notify your health care provider if you  experience any of the following:  persistent nausea and vomiting or diarrhea     Notify your health care provider if you experience any of the following:  severe uncontrolled pain     Notify your health care provider if you experience any of the following:  redness, tenderness, or signs of infection (pain, swelling, redness, odor or green/yellow discharge around incision site)     Notify your health care provider if you experience any of the following:  difficulty breathing or increased cough     Notify your health care provider if you experience any of the following:  severe persistent headache     Notify your health care provider if you experience any of the following:  worsening rash     Notify your health care provider if you experience any of the following:  persistent dizziness, light-headedness, or visual disturbances     Notify your health care provider if you experience any of the following:  increased confusion or weakness     Weight bearing restrictions (specify):   Order Comments: Weight bear as tolerated to right lower extremity       Significant Diagnostic Studies: Labs: BMP:   Recent Labs   Lab 02/23/25  0317 02/24/25  0547   GLU 79 79    140   K 4.0 4.4    107   CO2 24 22*   BUN 39* 46*   CREATININE 1.8* 1.7*   CALCIUM 8.9 9.4    and CBC   Recent Labs   Lab 02/23/25  0317 02/24/25  0547   WBC 5.59 6.43   HGB 8.5* 9.0*   HCT 26.8* 29.0*   * 151       Medications:  Reconciled Home Medications:      Medication List        START taking these medications      acetaminophen 500 MG tablet  Commonly known as: TYLENOL  Take 2 tablets (1,000 mg total) by mouth every 8 (eight) hours. for 14 days     methocarbamoL 500 MG Tab  Commonly known as: Robaxin  Take 1 tablet (500 mg total) by mouth 3 (three) times daily. for 14 days     oxyCODONE 5 MG immediate release tablet  Commonly known as: ROXICODONE  Take 1 tablet (5 mg total) by mouth every 4 (four) hours as needed (Moderate to severe pain).      pregabalin 75 MG capsule  Commonly known as: LYRICA  Take 1 capsule (75 mg total) by mouth every evening.            CHANGE how you take these medications      amLODIPine 10 MG tablet  Commonly known as: NORVASC  Take 1 tablet (10 mg total) by mouth once daily.  Start taking on: February 25, 2025  What changed: how much to take            CONTINUE taking these medications      albuterol-budesonide 90-80 mcg/actuation  Commonly known as: Airsupra  Inhale 2 puffs into the lungs every 6 (six) hours as needed (wheezing).     allopurinoL 100 MG tablet  Commonly known as: ZYLOPRIM  Take 0.5 tablets by mouth once daily.     aspirin 81 MG EC tablet  Commonly known as: ECOTRIN  Take 81 mg by mouth once daily.     atorvastatin 80 MG tablet  Commonly known as: LIPITOR  Take 1 tablet (80 mg total) by mouth once daily.     benzonatate 200 MG capsule  Commonly known as: TESSALON  Take 200 mg by mouth every 8 (eight) hours as needed for Cough.     cholecalciferol (vitamin D3) 50 mcg (2,000 unit) Cap capsule  Commonly known as: VITAMIN D3  Take 1 capsule by mouth once daily.     clopidogreL 75 mg tablet  Commonly known as: PLAVIX  Take 75 mg by mouth once daily.     FARXIGA 10 mg tablet  Generic drug: dapagliflozin propanediol  Take 1 tablet by mouth once daily.     fluticasone propionate 50 mcg/actuation nasal spray  Commonly known as: FLONASE  2 sprays (100 mcg total) by Each Nostril route once daily.     folic acid 1 MG tablet  Commonly known as: FOLVITE  Take 1 mg by mouth once daily.     GLYCOLAX ORAL  Take 17 g by mouth daily as needed (constipation).     GUAIFENESIN DM ORAL  Take 10 mLs by mouth every 6 (six) hours as needed (cough).     isosorbide mononitrate 30 MG 24 hr tablet  Commonly known as: IMDUR  Take 3 tablets (90 mg total) by mouth once daily.     losartan 25 MG tablet  Commonly known as: COZAAR  Take 1 tablet (25 mg total) by mouth once daily.     nitroGLYCERIN 0.4 MG SL tablet  Commonly known as:  NITROSTAT  Place 0.4 mg under the tongue every 5 (five) minutes as needed for Chest pain.     pantoprazole 20 MG tablet  Commonly known as: PROTONIX  Take 20 mg by mouth every morning.     sodium bicarbonate 650 MG tablet  Take 650 mg by mouth 2 (two) times daily.              Indwelling Lines/Drains at time of discharge: None      32 minutes of time spent on discharge, including examining the patient, providing discharge instructions, arranging follow-up and documentation.         Estefanía Pena MD  Department of Hospital Medicine  [facility] - Surgery

## 2025-02-25 NOTE — ASSESSMENT & PLAN NOTE
Patient's blood pressure range in the last 24 hours was: BP  Min: 105/56  Max: 201/89.The patient's inpatient anti-hypertensive regimen is listed below:  Current Antihypertensives  isosorbide mononitrate 24 hr tablet 90 mg, Daily, Oral  losartan tablet 25 mg, Daily, Oral  amLODIPine tablet 10 mg, Daily, Oral  amlodipine (NORVASC) tablet, Daily, Oral    Plan  - BP controlled on discharge after increasing Norvasc from 5 to 10 mg po daily on 2/24. Patient discharged on above BP regimen on discharge.   - Goal BP < 140/90.

## 2025-02-25 NOTE — ASSESSMENT & PLAN NOTE
CMP reviewed- noted Estimated Creatinine Clearance: 21 mL/min (A) (based on SCr of 1.7 mg/dL (H)). according to latest data. Based on current GFR, CKD stage is stage 4 - GFR 15-29.  Baseline Creatinine 1.5-2.0. Patient at renal baseline function at time of discharge. Renally dose meds. Avoid nephrotoxic medications and procedures. Continue sodium bicarbonate replacement on discharge. On Kerendia outpatient but not on formulary here so hold in hospital but resume on discharge.

## 2025-02-25 NOTE — ASSESSMENT & PLAN NOTE
Anemia is controlled on discharge. Anemia is likely due to chronic disease due to Chronic Kidney Disease. Most recent hemoglobin and hematocrit are listed below.  Recent Labs     02/22/25  0908 02/23/25  0317 02/24/25  0547   HGB 9.5* 8.5* 9.0*   HCT 30.1* 26.8* 29.0*       Plan  - Transfuse PRBC if patient becomes hemodynamically unstable, symptomatic or H/H drops below 7/21.  - Patient has not received any PRBC transfusions to date  - Patient's anemia is currently controlled on discharge.

## 2025-02-25 NOTE — ASSESSMENT & PLAN NOTE
Discussion patient's multiple arrythmia concerns also has AV block. Patient has paroxysmal (<7 days) atrial fibrillation. Patient is currently in atrial fibrillation. [id number] Score: 4. The patients heart rate in the last 24 hours is as follows:  Pulse  Min: 42  Max: 91     Antiarrhythmics   None due to known AV block and bradycardiac at baseline.    Anticoagulants  None    Plan  - Replete lytes with a goal of K>4, Mg >2  - Patient is is not chronically anticoagulated as outpatient due to high fall risk.   - Rate is controlled on discharge.

## 2025-02-25 NOTE — ASSESSMENT & PLAN NOTE
Patient with known CAD s/p  PCI x 4 in the past and followed by Cardiology as outpatient. Since no surgery planned then will resume her outpatient Aspirin 81 mg po daily and Plavix 75 mg po daily for her known CAD and continue home Lipitor 80 mg po daily to treat and continue on discharge.

## 2025-02-25 NOTE — ASSESSMENT & PLAN NOTE
89 y/o female with known known previous right hip replacement, CAD with PCI x 4, CKD 4, primary hypertension, PAF, AAA, 2nd degree AV Block who presents for right hip pain after a fall transferring from wheelchair to bed at [facility] where she was receiving PT/OT after recent hospital discharge on 2/11/2025. Patient states she went forward and landed on her right hip and shoulder. Patient states had pain in right hip and shoulder after fall and brought to ED. X-rays of right shoulder showed no fracture or dislocation. X-ray and CT scan of pelvis revealed acute minimally displaced comminuted fracture of the right greater trochanter. Hardware in right hip in good alignment.   - Orthopedics consulted for periprosthetic greater trochanteric fracture of right hip. After discussion with Ortho staff, it was determined her prosthetic stem to right hip was stable for weight bearing as tolerated and recommended non operative management of her fracture.   - Patient to be weight bear as tolerated to right lower extremity as per Ortho and to continue on discharge.   - PT/OT consulted for evaluation. Patient worked with therapy and they are recommending moderate intensity therapy and plan for patient to return back to [facility] where she can from to continue PT/OT at SNF level to recover from this fracture and discharged in good condition back to [facility] SNF on 2/24.   - Pain controlled to right hip on discharge. Pain management with scheduled Tylenol 1000 mg po every 8 hours + Robaxin 500 mg po every 6 hours + Pregabalin 75 mg po nightly with Oxycodone IR 5 mg p every 4 hours for breakthrough pain and will continue on discharge.

## 2025-02-25 NOTE — ASSESSMENT & PLAN NOTE
Resolved on discharge. The likely etiology of thrombocytopenia is  stress/trauma related to femur fracture . The patients 3 most recent labs are listed below.  Recent Labs     02/22/25  0908 02/23/25  0317 02/24/25  0547   * 133* 151

## 2025-02-25 NOTE — ASSESSMENT & PLAN NOTE
Noted per review to have reported atrial fibrillation and asymptomatic intermittent Mobitz I AVB vs 2:1 AVB for which Cardiology evaluated her for during previous admission with uncertain etiology though possibly some reversible ischemia felt possible so medical management of her HTN and other issues was continued with recommendation for EP and Cardiology outpatient follow up. Remains asymptomatic and hemodynamically stable at this time.     Plan  - Continue HTN and other management.  - Avoid AV nellie blocking medications and agents.

## 2025-02-26 ENCOUNTER — TELEPHONE (OUTPATIENT)
Dept: ORTHOPEDICS | Facility: CLINIC | Age: OVER 89
End: 2025-02-26
Payer: MEDICARE

## 2025-02-26 NOTE — TELEPHONE ENCOUNTER
----- Message from Rafael Rossi MD sent at 2/26/2025  2:58 PM CST -----  Non op greater trochanteric fracture seen in hospital vascular calcification.  Please schedule follow up with Taiwo in 2 weeks.  We will need right hip x-rays at follow up.  Thank you.

## 2025-02-26 NOTE — TELEPHONE ENCOUNTER
Spoke with pt granddaughter gave her appt details     I also gave appt details to [facility]  they will transport patient to her appt

## 2025-02-26 NOTE — PLAN OF CARE
[facility] - [facility]  Discharge Final Note    Primary Care Provider: Elena Cabrera MD    Expected Discharge Date: 2/24/2025    Final Discharge Note (most recent)       Final Note - 02/24/25 2126          Final Note    Assessment Type Final Discharge Note     Anticipated Discharge Disposition Skilled Nursing Facility   [place]     Hospital Resources/Appts/Education Provided Provided patient/caregiver with written discharge plan information;Provided education on problems/symptoms using teachback                   Future Appointments   Date Time Provider Department Center   3/12/2025  1:00 PM Taiwo Medel, NP [facility]       Important Message from Medicare  Important Message from Medicare regarding Discharge Appeal Rights: Given to patient/caregiver, Explained to patient/caregiver, Signed/date by patient/caregiver, Other (comments) (phone/verbal daughter Ziggy gutierres witness CHW)     Date IMM was signed: 02/24/25  Time IMM was signed: [id number]

## 2025-03-12 ENCOUNTER — OFFICE VISIT (OUTPATIENT)
Dept: ORTHOPEDICS | Facility: CLINIC | Age: OVER 89
End: 2025-03-12
Payer: MEDICARE

## 2025-03-12 ENCOUNTER — TELEPHONE (OUTPATIENT)
Dept: ORTHOPEDICS | Facility: CLINIC | Age: OVER 89
End: 2025-03-12
Payer: MEDICARE

## 2025-03-12 ENCOUNTER — HOSPITAL ENCOUNTER (OUTPATIENT)
Dept: RADIOLOGY | Facility: HOSPITAL | Age: OVER 89
Discharge: HOME | End: 2025-03-12
Attending: NURSE PRACTITIONER
Payer: MEDICARE

## 2025-03-12 VITALS — DIASTOLIC BLOOD PRESSURE: 75 MMHG | TEMPERATURE: 98 F | HEART RATE: 60 BPM | SYSTOLIC BLOOD PRESSURE: 151 MMHG

## 2025-03-12 DIAGNOSIS — M97.01XA PERIPROSTHETIC FRACTURE AROUND INTERNAL PROSTHETIC RIGHT HIP JOINT, INITIAL ENCOUNTER: Primary | ICD-10-CM

## 2025-03-12 DIAGNOSIS — M25.551 RIGHT HIP PAIN: Primary | ICD-10-CM

## 2025-03-12 DIAGNOSIS — M25.551 RIGHT HIP PAIN: ICD-10-CM

## 2025-03-12 PROCEDURE — 99999 PR PBB SHADOW E&M-EST. PATIENT-LVL III: CPT | Mod: PBBFAC,,, | Performed by: NURSE PRACTITIONER

## 2025-03-12 PROCEDURE — 1160F RVW MEDS BY RX/DR IN RCRD: CPT | Mod: CPTII,S$GLB,, | Performed by: NURSE PRACTITIONER

## 2025-03-12 PROCEDURE — 1125F AMNT PAIN NOTED PAIN PRSNT: CPT | Mod: CPTII,S$GLB,, | Performed by: NURSE PRACTITIONER

## 2025-03-12 PROCEDURE — 73502 X-RAY EXAM HIP UNI 2-3 VIEWS: CPT | Mod: TC,RT

## 2025-03-12 PROCEDURE — 1101F PT FALLS ASSESS-DOCD LE1/YR: CPT | Mod: CPTII,S$GLB,, | Performed by: NURSE PRACTITIONER

## 2025-03-12 PROCEDURE — 1159F MED LIST DOCD IN RCRD: CPT | Mod: CPTII,S$GLB,, | Performed by: NURSE PRACTITIONER

## 2025-03-12 PROCEDURE — 99213 OFFICE O/P EST LOW 20 MIN: CPT | Mod: S$GLB,,, | Performed by: NURSE PRACTITIONER

## 2025-03-12 PROCEDURE — 3288F FALL RISK ASSESSMENT DOCD: CPT | Mod: CPTII,S$GLB,, | Performed by: NURSE PRACTITIONER

## 2025-03-12 PROCEDURE — 1157F ADVNC CARE PLAN IN RCRD: CPT | Mod: CPTII,S$GLB,, | Performed by: NURSE PRACTITIONER

## 2025-03-12 PROCEDURE — 73502 X-RAY EXAM HIP UNI 2-3 VIEWS: CPT | Mod: 26,RT,, | Performed by: RADIOLOGY

## 2025-03-12 PROCEDURE — 1111F DSCHRG MED/CURRENT MED MERGE: CPT | Mod: CPTII,S$GLB,, | Performed by: NURSE PRACTITIONER

## 2025-03-12 NOTE — PROGRESS NOTES
Subjective     Patient ID: Anahy Evans is a 89 y.o. female.    Chief Complaint: Pain of the Right Hip    Anahy Evans is a 89 y.o. female with PMH of CKD4, vitamin-D deficiency, osteopenia, AFib, HTN, CAD s/p PCI x4, breast cancer, that history of second-degree heart block, gout, iron deficiency anemia, and R FNF s/p R hemiarthroplasty by Dr. Torres in 2019 seen in the emergency department on February 20, 2025 with chief complaint of right hip pain status post a fall when transferring from a chair to her bed.  Patient was taken to the emergency department where she underwent an x-ray and CT of her pelvis which showed a periprosthetic femur fracture at the greater trochanteric.  She was evaluated by Orthopedics and deemed a nonsurgical candidate.  She was eventually stabilized and transferred over to a nursing facility for skilled nursing.    Currently, the patient reports intermittent pain to the lateral side of the right hip.  She reports she has no pain when sitting.  Denies any groin pain.  Denies any falls or injuries since last discharge from the hospital.  She normally lives independently and walks without an assistive device.        Review of Systems   Musculoskeletal:         Right hip pain   All other systems reviewed and are negative.           General    Vitals reviewed.  Constitutional: She is oriented to person, place, and time. She appears well-developed and well-nourished. No distress.   HENT:   Head: Normocephalic and atraumatic.   Eyes: Conjunctivae are normal. Pupils are equal, round, and reactive to light.   Neck: Neck supple.   Cardiovascular:  Intact distal pulses.            Pulmonary/Chest: Effort normal.   Neurological: She is alert and oriented to person, place, and time. She has normal reflexes.   Psychiatric: She has a normal mood and affect. Her behavior is normal. Judgment and thought content normal.             Right Hip Exam     Inspection   Swelling: absent  Bruising:  absent  No deformity of hip.    Tenderness   The patient tender to palpation of the trochanteric bursa.    Range of Motion   Extension:  normal   Flexion:  normal   External rotation:  60   Internal rotation:  20     Tests   Log Roll: negative    Other   Sensation: normal      Muscle Strength   Right Lower Extremity   Hip Abduction: 4/5   Hip Adduction: 4/5   Hip Flexion: 4/5   Ankle Dorsiflexion:  4/5     Vascular Exam     Right Pulses  Dorsalis Pedis:      2+  Posterior Tibial:      2+          Physical Exam  Vitals reviewed.   Constitutional:       General: She is not in acute distress.     Appearance: She is well-developed and well-nourished. She is not diaphoretic.   HENT:      Head: Normocephalic and atraumatic.   Eyes:      Conjunctiva/sclera: Conjunctivae normal.      Pupils: Pupils are equal, round, and reactive to light.   Cardiovascular:      Pulses: Intact distal pulses.           Dorsalis pedis pulses are 2+ on the right side.        Posterior tibial pulses are 2+ on the right side.   Pulmonary:      Effort: Pulmonary effort is normal.   Musculoskeletal:      Cervical back: Neck supple.      Right hip: No swelling or deformity.   Neurological:      Mental Status: She is alert and oriented to person, place, and time.      Deep Tendon Reflexes: Reflexes are normal and symmetric.   Psychiatric:         Mood and Affect: Mood and affect normal.         Behavior: Behavior normal.         Thought Content: Thought content normal.         Judgment: Judgment normal.        Rads:  Right hip x-ray was obtained, findings show she has a periprosthetic hip fracture at the greater trochanteric.  Fracture component appears to be stable when compared to prior x-ray dated February 20, 2025.  Hardware remains stable.  No evidence of new fracture or hardware failure seen.       Assessment and Plan     Encounter Diagnosis   Name Primary?    Periprosthetic fracture around internal prosthetic right hip joint, initial encounter  Yes         Anahy was seen today for pain.    Diagnoses and all orders for this visit:    Periprosthetic fracture around internal prosthetic right hip joint, initial encounter    89-year-old female who has a periprosthetic right hip fracture secondary to a fall which occurred around February 20, 2025.  She is currently in a nursing home for skilled nursing therapy.  She was seen and evaluated by the orthopedic team while in the emergency room who recommended non operative management.    Currently, the patient reports her pain is slowly improving.  She is working with physical therapy and walking with a walker.  She denies any groin pain.    Recommend continue non operative management as outlined by the trauma surgeon.  She will continue to work with physical therapy and use her walker minimum 6 weeks.  We will have the patient come back in 4 weeks for repeat imaging of her right hip.

## 2025-03-14 ENCOUNTER — PATIENT MESSAGE (OUTPATIENT)
Dept: ORTHOPEDICS | Facility: CLINIC | Age: OVER 89
End: 2025-03-14
Payer: MEDICARE

## 2025-03-14 NOTE — TELEPHONE ENCOUNTER
I discussed the xray findings with Dr. Loyola, he recommends to continue non-op management, weight bear as tolerated and avoid anything that may cause pain.

## 2025-03-15 ENCOUNTER — EXTERNAL HOME HEALTH (OUTPATIENT)
Dept: HOME HEALTH SERVICES | Facility: HOSPITAL | Age: OVER 89
End: 2025-03-15
Payer: MEDICARE

## 2025-04-14 ENCOUNTER — OFFICE VISIT (OUTPATIENT)
Dept: URGENT CARE | Facility: CLINIC | Age: OVER 89
End: 2025-04-14
Payer: MEDICARE

## 2025-04-14 VITALS
BODY MASS INDEX: 25.11 KG/M2 | RESPIRATION RATE: 20 BRPM | SYSTOLIC BLOOD PRESSURE: 172 MMHG | DIASTOLIC BLOOD PRESSURE: 77 MMHG | HEART RATE: 60 BPM | WEIGHT: 160 LBS | HEIGHT: 67 IN | TEMPERATURE: 99 F | OXYGEN SATURATION: 98 %

## 2025-04-14 DIAGNOSIS — N30.01 ACUTE CYSTITIS WITH HEMATURIA: Primary | ICD-10-CM

## 2025-04-14 DIAGNOSIS — R30.0 DYSURIA: ICD-10-CM

## 2025-04-14 LAB
BILIRUBIN, UA POC OHS: NEGATIVE
BLOOD, UA POC OHS: ABNORMAL
CLARITY, UA POC OHS: ABNORMAL
COLOR, UA POC OHS: YELLOW
GLUCOSE, UA POC OHS: 500
KETONES, UA POC OHS: NEGATIVE
LEUKOCYTES, UA POC OHS: ABNORMAL
NITRITE, UA POC OHS: NEGATIVE
PH, UA POC OHS: 6
PROTEIN, UA POC OHS: >=300
SPECIFIC GRAVITY, UA POC OHS: 1.02
UROBILINOGEN, UA POC OHS: 0.2

## 2025-04-14 PROCEDURE — 81003 URINALYSIS AUTO W/O SCOPE: CPT | Mod: QW,S$GLB,,

## 2025-04-14 PROCEDURE — 87086 URINE CULTURE/COLONY COUNT: CPT

## 2025-04-14 PROCEDURE — 99214 OFFICE O/P EST MOD 30 MIN: CPT | Mod: S$GLB,,,

## 2025-04-14 RX ORDER — SULFAMETHOXAZOLE AND TRIMETHOPRIM 800; 160 MG/1; MG/1
1 TABLET ORAL DAILY
Qty: 3 TABLET | Refills: 0 | Status: SHIPPED | OUTPATIENT
Start: 2025-04-14 | End: 2025-04-17

## 2025-04-14 NOTE — PATIENT INSTRUCTIONS
Please return here or go to the Emergency Department for any concerns or worsening of condition.  If you were prescribed antibiotics, please take them to completion.  If you were prescribed a narcotic medication, do not drive or operate heavy equipment or machinery while taking these medications.  Please follow up with your primary care doctor or specialist as needed.  Please drink plenty of fluids.  Please get plenty of rest.  If you were prescribed Pyridium (phenazopyridine), please be aware that if you wear contact lens that this medication may stain your contacts.  While taking this medication it is recommended that you do not wear your contacts until 24 hours after your last dose. You may want to wear a panty liner with Pyridium as it may stain your underwear.  Cranberry juice may help. Get the 100% cranberry juice and mix 4 oz of juice with 4 oz of water and drink this 8 oz glass of liquid once a day.   Increase water intake to at least 8-10 glasses/day.  Avoid caffeine, alcohol, or spicy foods as they irritate the bladder.    If you take OTC AZO/Pyridium/Phenazopyridine, follow instructions as directed.  Do NOT take for more than 2 days.  If you had cultures done it will take 3-5 days to result. We will call you with the result.  If you are are female and on birth control pills use additional methods (such as condom use) to prevent pregnancy while on the antibiotics and for one cycle after.   If your condition worsens or fails to improve we recommend that you receive another evaluation at the ER immediately or contact your PCP to discuss your concerns or return here.     Please follow up with your primary care doctor or specialist as needed.  If you  smoke, please stop smoking.

## 2025-04-14 NOTE — PROGRESS NOTES
"Subjective:      Patient ID: Anahy Evans is a 89 y.o. female.    Vitals:  height is 5' 7" (1.702 m) and weight is 72.6 kg (160 lb). Her oral temperature is 98.5 °F (36.9 °C). Her blood pressure is 172/77 (abnormal) and her pulse is 60. Her respiration is 20 and oxygen saturation is 98%.     Chief Complaint: Dysuria    89-year-old female with PMH of CKD stage 4, HLD, HTN, CAD presents to the clinic today with chief complaint of dysuria, frequency, urgency. Symptoms started 3 days ago and have not improved.   Patient has not taken any medication for sx relief.  She does struggle with urinary incontinence, but she denies anything out of the abnormal. Denies any urinary hematuria, vaginal discharge, vaginal odor, vaginal pain, pelvic pain, flank pain, back pain or abdominal pain.  Denies hx of recurrent kidney stones, but she does suffer from frequent UTI's.   Denies fever, chills, body aches, chest pain, shortness of breath, wheezing, abdominal pain, nausea, vomiting, diarrhea, or rashes.          Dysuria   This is a new problem. The current episode started in the past 7 days. The problem occurs every urination. The problem has been unchanged. The quality of the pain is described as burning. The pain is at a severity of 6/10. The pain is moderate. There has been no fever. She is Not sexually active. There is No history of pyelonephritis. Associated symptoms include frequency and urgency. Pertinent negatives include no behavior changes, chills, discharge, flank pain, hematuria, hesitancy, nausea, possible pregnancy, sweats, vomiting, weight loss, bubble bath use, constipation, rash or withholding.     Constitution: Negative for appetite change, chills, sweating, fatigue and fever.   Cardiovascular:  Negative for chest pain.   Gastrointestinal:  Negative for abdominal pain, nausea, vomiting, constipation and diarrhea.   Genitourinary:  Positive for dysuria, frequency and urgency. Negative for urine decreased, flank " pain, bladder incontinence, bed wetting, hematuria, history of kidney stones, vaginal pain, vaginal discharge, vaginal bleeding, vaginal odor, genital sore and pelvic pain.   Skin:  Negative for rash.      Objective:     Physical Exam   Constitutional: She is oriented to person, place, and time. She appears well-developed.  Non-toxic appearance. She does not appear ill. No distress.   HENT:   Head: Normocephalic and atraumatic.   Ears:   Right Ear: External ear normal.   Left Ear: External ear normal.   Nose: Nose normal.   Mouth/Throat: Mucous membranes are normal.   Eyes: Conjunctivae and lids are normal.   Neck: Trachea normal. Neck supple.   Cardiovascular: Normal rate, regular rhythm and normal heart sounds.   Pulmonary/Chest: Effort normal and breath sounds normal. No respiratory distress.   Abdominal: Normal appearance and bowel sounds are normal. She exhibits no distension and no mass. Soft. There is no abdominal tenderness. There is no rebound, no guarding, no left CVA tenderness and no right CVA tenderness.   Musculoskeletal: Normal range of motion.         General: Normal range of motion.   Neurological: She is alert and oriented to person, place, and time. She has normal strength.   Skin: Skin is warm, dry, intact, not diaphoretic and not pale.   Psychiatric: Her speech is normal and behavior is normal. Judgment and thought content normal.   Nursing note and vitals reviewed.      Assessment:     1. Acute cystitis with hematuria    2. Dysuria      Results for orders placed or performed in visit on 04/14/25   POCT Urinalysis(Instrument)    Collection Time: 04/14/25  1:27 PM   Result Value Ref Range    Color, POC UA Yellow Yellow, Straw, Colorless    Clarity, POC UA Cloudy (A) Clear    Glucose, POC  (A) Negative    Bilirubin, POC UA Negative Negative    Ketones, POC UA Negative Negative    Spec Grav POC UA 1.025 1.005 - 1.030    Blood, POC UA Moderate (A) Negative    pH, POC UA 6.0 5.0 - 8.0     Protein, POC UA >=300 (A) Negative    Urobilinogen, POC UA 0.2 <=1.0    Nitrite, POC UA Negative Negative    WBC, POC UA Trace (A) Negative       Plan:       Acute cystitis with hematuria  -     Urine Culture High Risk  -     sulfamethoxazole-trimethoprim 800-160mg (BACTRIM DS) 800-160 mg Tab; Take 1 tablet by mouth once daily. for 3 days  Dispense: 3 tablet; Refill: 0    Dysuria  -     POCT Urinalysis(Instrument)

## 2025-04-16 LAB — BACTERIA UR CULT: ABNORMAL

## 2025-04-18 ENCOUNTER — TELEPHONE (OUTPATIENT)
Dept: URGENT CARE | Facility: CLINIC | Age: OVER 89
End: 2025-04-18
Payer: MEDICARE

## 2025-04-18 ENCOUNTER — RESULTS FOLLOW-UP (OUTPATIENT)
Dept: URGENT CARE | Facility: CLINIC | Age: OVER 89
End: 2025-04-18

## 2025-04-18 NOTE — TELEPHONE ENCOUNTER
Called patient and reviewed positive urine culture with her. Patient was treated appropriately with Bactrim and feeling much better. She completed her Bactrim and is asymptomatic at this time. She notes she will follow up with her PCP this week if needed.

## 2025-04-19 ENCOUNTER — TELEPHONE (OUTPATIENT)
Dept: URGENT CARE | Facility: CLINIC | Age: OVER 89
End: 2025-04-19
Payer: MEDICARE

## 2025-04-19 DIAGNOSIS — N39.0 URINARY TRACT INFECTION WITHOUT HEMATURIA, SITE UNSPECIFIED: Primary | ICD-10-CM

## 2025-04-19 RX ORDER — SULFAMETHOXAZOLE AND TRIMETHOPRIM 400; 80 MG/1; MG/1
1 TABLET ORAL 2 TIMES DAILY
Qty: 14 TABLET | Refills: 0 | Status: SHIPPED | OUTPATIENT
Start: 2025-04-19 | End: 2025-04-26

## 2025-04-19 NOTE — TELEPHONE ENCOUNTER
Patient's daughter called states patient actually told her that her dysuria has worsened in the last day.  Patient was started on Bactrim renally dose for 3 days for UTI.  May likely need longer antibiotic course.    We will treat additional 7 days.    Patient has a allergy to penicillin so avoided penicillin and cephalosporins.  She has a history of AAA so avoiding the fluoroquinolones.  Nitrofurantoin contraindicated.  Left really only with Bactrim at this time.  Without ID consult.    May need Urology or ID if symptoms persist

## 2025-04-23 NOTE — OR NURSING
-screenings reviewed, as expected with chronic hip/back issues, lung cancer in active treatment  -no HCPOA on file, blank HCPOA form given today, to return copy of completed form to office to be scanned into chart  -mammo scheduled  -colonoscopy due, will defer due to active cancer treatment  -recommend to discuss timing of PCV20 with oncology   Dr. Adams notified of patients heart rate in the forties.  No new orders notified

## 2025-06-11 ENCOUNTER — HOSPITAL ENCOUNTER (OUTPATIENT)
Dept: RADIOLOGY | Facility: OTHER | Age: OVER 89
Discharge: HOME | End: 2025-06-11
Attending: INTERNAL MEDICINE
Payer: MEDICARE

## 2025-06-11 DIAGNOSIS — R14.0 BLOATING: ICD-10-CM

## 2025-06-11 DIAGNOSIS — R19.4 CHANGE IN BOWEL HABITS: Primary | ICD-10-CM

## 2025-06-11 DIAGNOSIS — R19.4 CHANGE IN BOWEL HABITS: ICD-10-CM

## 2025-06-11 DIAGNOSIS — R19.5 LOOSE STOOLS: ICD-10-CM

## 2025-06-11 PROCEDURE — 74019 RADEX ABDOMEN 2 VIEWS: CPT | Mod: TC,FY

## 2025-06-11 PROCEDURE — 74019 RADEX ABDOMEN 2 VIEWS: CPT | Mod: 26,,, | Performed by: RADIOLOGY

## 2025-06-13 ENCOUNTER — LAB VISIT (OUTPATIENT)
Dept: LAB | Facility: OTHER | Age: OVER 89
End: 2025-06-13
Attending: INTERNAL MEDICINE
Payer: MEDICARE

## 2025-06-13 DIAGNOSIS — R19.4 CHANGE IN BOWEL HABITS: Primary | ICD-10-CM

## 2025-06-13 DIAGNOSIS — R14.0 BLOATING: ICD-10-CM

## 2025-06-13 DIAGNOSIS — R19.5 LOOSE STOOLS: ICD-10-CM

## 2025-06-13 LAB
ABSOLUTE EOSINOPHIL (OHS): 0.04 K/UL
ABSOLUTE MONOCYTE (OHS): 0.8 K/UL (ref 0.3–1)
ABSOLUTE NEUTROPHIL COUNT (OHS): 4.39 K/UL (ref 1.8–7.7)
ALBUMIN SERPL BCP-MCNC: 2.4 G/DL (ref 3.5–5.2)
ALP SERPL-CCNC: 76 UNIT/L (ref 40–150)
ALT SERPL W/O P-5'-P-CCNC: 10 UNIT/L (ref 10–44)
ANION GAP (OHS): 10 MMOL/L (ref 8–16)
AST SERPL-CCNC: 15 UNIT/L (ref 11–45)
BASOPHILS # BLD AUTO: 0.04 K/UL
BASOPHILS NFR BLD AUTO: 0.5 %
BILIRUB SERPL-MCNC: 0.6 MG/DL (ref 0.1–1)
BUN SERPL-MCNC: 21 MG/DL (ref 10–30)
CALCIUM SERPL-MCNC: 8.8 MG/DL (ref 8.7–10.5)
CHLORIDE SERPL-SCNC: 102 MMOL/L (ref 95–110)
CO2 SERPL-SCNC: 24 MMOL/L (ref 23–29)
CREAT SERPL-MCNC: 1.3 MG/DL (ref 0.5–1.4)
ERYTHROCYTE [DISTWIDTH] IN BLOOD BY AUTOMATED COUNT: 14.3 % (ref 11.5–14.5)
GFR SERPLBLD CREATININE-BSD FMLA CKD-EPI: 39 ML/MIN/1.73/M2
GLUCOSE SERPL-MCNC: 78 MG/DL (ref 70–110)
HCT VFR BLD AUTO: 33.2 % (ref 37–48.5)
HGB BLD-MCNC: 11 GM/DL (ref 12–16)
IMM GRANULOCYTES # BLD AUTO: 0.02 K/UL (ref 0–0.04)
IMM GRANULOCYTES NFR BLD AUTO: 0.3 % (ref 0–0.5)
LYMPHOCYTES # BLD AUTO: 2.09 K/UL (ref 1–4.8)
MCH RBC QN AUTO: 28.9 PG (ref 27–31)
MCHC RBC AUTO-ENTMCNC: 33.1 G/DL (ref 32–36)
MCV RBC AUTO: 87 FL (ref 82–98)
NUCLEATED RBC (/100WBC) (OHS): 0 /100 WBC
PLATELET # BLD AUTO: 240 K/UL (ref 150–450)
PMV BLD AUTO: 10.9 FL (ref 9.2–12.9)
POTASSIUM SERPL-SCNC: 3.6 MMOL/L (ref 3.5–5.1)
PROT SERPL-MCNC: 6.8 GM/DL (ref 6–8.4)
RBC # BLD AUTO: 3.8 M/UL (ref 4–5.4)
RELATIVE EOSINOPHIL (OHS): 0.5 %
RELATIVE LYMPHOCYTE (OHS): 28.3 % (ref 18–48)
RELATIVE MONOCYTE (OHS): 10.8 % (ref 4–15)
RELATIVE NEUTROPHIL (OHS): 59.6 % (ref 38–73)
SODIUM SERPL-SCNC: 136 MMOL/L (ref 136–145)
WBC # BLD AUTO: 7.38 K/UL (ref 3.9–12.7)

## 2025-06-13 PROCEDURE — 84295 ASSAY OF SERUM SODIUM: CPT

## 2025-06-13 PROCEDURE — 85025 COMPLETE CBC W/AUTO DIFF WBC: CPT

## 2025-06-13 PROCEDURE — 36415 COLL VENOUS BLD VENIPUNCTURE: CPT

## 2025-06-17 ENCOUNTER — LAB VISIT (OUTPATIENT)
Dept: LAB | Facility: OTHER | Age: OVER 89
End: 2025-06-17
Attending: INTERNAL MEDICINE
Payer: MEDICARE

## 2025-06-17 DIAGNOSIS — R19.4 CHANGE IN BOWEL HABITS: ICD-10-CM

## 2025-06-17 DIAGNOSIS — R19.5 LOOSE STOOLS: ICD-10-CM

## 2025-06-17 DIAGNOSIS — R14.0 BLOATING: ICD-10-CM

## 2025-06-17 PROCEDURE — 83993 ASSAY FOR CALPROTECTIN FECAL: CPT

## 2025-06-17 PROCEDURE — 82653 EL-1 FECAL QUANTITATIVE: CPT

## 2025-06-17 PROCEDURE — 87328 CRYPTOSPORIDIUM AG IA: CPT

## 2025-06-18 LAB
CALPROTECTIN INTERP (OHS): ABNORMAL
CALPROTECTIN STOOL (OHS): >8000 ΜG/G
CRYPTOSP AG SPEC QL: NEGATIVE
ELASTASE, STOOL INTERPRETATION (OHS): NORMAL
G LAMBLIA AG STL QL IA: NEGATIVE
PANCREATIC ELASTASE, FECAL (OHS): 222 ΜG/G

## 2025-06-29 ENCOUNTER — OFFICE VISIT (OUTPATIENT)
Dept: URGENT CARE | Facility: CLINIC | Age: OVER 89
End: 2025-06-29
Payer: MEDICARE

## 2025-06-29 VITALS
OXYGEN SATURATION: 98 % | SYSTOLIC BLOOD PRESSURE: 125 MMHG | HEART RATE: 70 BPM | BODY MASS INDEX: 25.12 KG/M2 | TEMPERATURE: 98 F | RESPIRATION RATE: 18 BRPM | WEIGHT: 160.06 LBS | HEIGHT: 67 IN | DIASTOLIC BLOOD PRESSURE: 71 MMHG

## 2025-06-29 DIAGNOSIS — H92.02 ACUTE OTALGIA, LEFT: Primary | ICD-10-CM

## 2025-06-29 PROCEDURE — 99213 OFFICE O/P EST LOW 20 MIN: CPT | Mod: S$GLB,,, | Performed by: NURSE PRACTITIONER

## 2025-06-29 RX ORDER — DOXYCYCLINE 100 MG/1
100 CAPSULE ORAL 2 TIMES DAILY
Qty: 14 CAPSULE | Refills: 0 | Status: SHIPPED | OUTPATIENT
Start: 2025-06-29 | End: 2025-06-30 | Stop reason: ALTCHOICE

## 2025-06-29 RX ORDER — FINERENONE 10 MG/1
1 TABLET, FILM COATED ORAL DAILY
COMMUNITY
Start: 2024-11-01

## 2025-06-29 NOTE — PATIENT INSTRUCTIONS
- You must understand that you have received an Urgent Care treatment only and that you may be released before all of your medical problems are known or treated.   - You, the patient, will arrange for follow up care as instructed.   - If your condition worsens or fails to improve we recommend that you receive another evaluation at the ER immediately or contact your PCP to discuss your concerns.   - You can call [phone] or [phone] to help schedule an appointment with the appropriate provider.    Drink plenty of fluids   Get lots of rest  Tylenol or ibuprofen for pain/fever

## 2025-06-29 NOTE — PROGRESS NOTES
"Subjective:      Patient ID: Anahy Evans is a 89 y.o. female.    Vitals:  height is 5' 7" (1.702 m) and weight is 72.6 kg (160 lb 0.9 oz). Her oral temperature is 98.1 °F (36.7 °C). Her blood pressure is 125/71 and her pulse is 70. Her respiration is 18 and oxygen saturation is 98%.     Chief Complaint: Otalgia    Pt is a 89 yo female w/ c/o left ear pain that began yesterday. Ear was itching and used a qtip which made it ache more. Tx with otc ear drops without relief. Pain began at her earlobe and worsened.     Otalgia       HENT:  Positive for ear pain.       Objective:     Physical Exam   Constitutional: She is oriented to person, place, and time.   HENT:   Head: Normocephalic.   Ears:   Right Ear: External ear normal.   Left Ear: External ear normal. There is tenderness. Tympanic membrane is not erythematous.  No middle ear effusion.   Ears:    Nose: Nose normal.   Mouth/Throat: Mucous membranes are moist.   Eyes: Conjunctivae are normal.   Cardiovascular: Normal rate.   Pulmonary/Chest: Effort normal.   Musculoskeletal: Normal range of motion.         General: Normal range of motion.   Neurological: She is alert and oriented to person, place, and time.   Skin: Skin is dry.   Psychiatric: Her behavior is normal.   Nursing note and vitals reviewed.      Assessment:     1. Acute otalgia, left        Plan:       Acute otalgia, left  -     doxycycline (VIBRAMYCIN) 100 MG Cap; Take 1 capsule (100 mg total) by mouth 2 (two) times daily. for 7 days  Dispense: 14 capsule; Refill: 0      Patient Instructions   - You must understand that you have received an Urgent Care treatment only and that you may be released before all of your medical problems are known or treated.   - You, the patient, will arrange for follow up care as instructed.   - If your condition worsens or fails to improve we recommend that you receive another evaluation at the ER immediately or contact your PCP to discuss your concerns.   - You can " call [phone] or [phone] to help schedule an appointment with the appropriate provider.    Drink plenty of fluids   Get lots of rest  Tylenol or ibuprofen for pain/fever

## 2025-06-30 ENCOUNTER — HOSPITAL ENCOUNTER (EMERGENCY)
Facility: OTHER | Age: OVER 89
Discharge: HOME | End: 2025-06-30
Attending: EMERGENCY MEDICINE
Payer: MEDICARE

## 2025-06-30 VITALS
HEIGHT: 67 IN | BODY MASS INDEX: 21.97 KG/M2 | OXYGEN SATURATION: 99 % | RESPIRATION RATE: 17 BRPM | SYSTOLIC BLOOD PRESSURE: 157 MMHG | TEMPERATURE: 98 F | WEIGHT: 140 LBS | HEART RATE: 71 BPM | DIASTOLIC BLOOD PRESSURE: 71 MMHG

## 2025-06-30 DIAGNOSIS — B02.8 HERPES ZOSTER WITH COMPLICATION: ICD-10-CM

## 2025-06-30 DIAGNOSIS — R51.9 NONINTRACTABLE HEADACHE, UNSPECIFIED CHRONICITY PATTERN, UNSPECIFIED HEADACHE TYPE: Primary | ICD-10-CM

## 2025-06-30 PROCEDURE — 99284 EMERGENCY DEPT VISIT MOD MDM: CPT | Mod: 25

## 2025-06-30 PROCEDURE — 25000003 PHARM REV CODE 250: Performed by: EMERGENCY MEDICINE

## 2025-06-30 RX ORDER — VALACYCLOVIR HYDROCHLORIDE 1 G/1
1000 TABLET, FILM COATED ORAL 2 TIMES DAILY
Qty: 20 TABLET | Refills: 0 | Status: SHIPPED | OUTPATIENT
Start: 2025-06-30 | End: 2025-06-30

## 2025-06-30 RX ORDER — VALACYCLOVIR HYDROCHLORIDE 500 MG/1
1000 TABLET, FILM COATED ORAL ONCE
Status: COMPLETED | OUTPATIENT
Start: 2025-06-30 | End: 2025-06-30

## 2025-06-30 RX ORDER — ACETAMINOPHEN 500 MG
1000 TABLET ORAL EVERY 6 HOURS PRN
Qty: 50 TABLET | Refills: 0 | Status: SHIPPED | OUTPATIENT
Start: 2025-06-30

## 2025-06-30 RX ORDER — ACETAMINOPHEN 500 MG
1000 TABLET ORAL EVERY 6 HOURS PRN
Qty: 50 TABLET | Refills: 0 | Status: SHIPPED | OUTPATIENT
Start: 2025-06-30 | End: 2025-06-30

## 2025-06-30 RX ORDER — PREGABALIN 75 MG/1
75 CAPSULE ORAL 2 TIMES DAILY
Qty: 60 CAPSULE | Refills: 0 | Status: SHIPPED | OUTPATIENT
Start: 2025-06-30 | End: 2025-06-30

## 2025-06-30 RX ORDER — PREGABALIN 75 MG/1
75 CAPSULE ORAL 2 TIMES DAILY
Qty: 60 CAPSULE | Refills: 0 | Status: SHIPPED | OUTPATIENT
Start: 2025-06-30 | End: 2025-07-30

## 2025-06-30 RX ORDER — VALACYCLOVIR HYDROCHLORIDE 1 G/1
1000 TABLET, FILM COATED ORAL 2 TIMES DAILY
Qty: 20 TABLET | Refills: 0 | Status: SHIPPED | OUTPATIENT
Start: 2025-06-30 | End: 2025-07-10

## 2025-06-30 RX ORDER — PREGABALIN 75 MG/1
75 CAPSULE ORAL ONCE
Status: COMPLETED | OUTPATIENT
Start: 2025-06-30 | End: 2025-06-30

## 2025-06-30 RX ADMIN — VALACYCLOVIR HYDROCHLORIDE 1000 MG: 500 TABLET, FILM COATED ORAL at 07:06

## 2025-06-30 RX ADMIN — PREGABALIN 75 MG: 75 CAPSULE ORAL at 07:06

## 2025-06-30 NOTE — FIRST PROVIDER EVALUATION
Medical screening examination initiated.  I have conducted a focused provider triage encounter, findings are as follows:    Brief history of present illness:  Intermittent L ear pain described as shooting when it happens, X a few days. Pt endorses a fall about a week ago and she landed on that side. Was seen at  who prescribed doxycycline, but no improvement. No hearing loss, no discharge, no vision changes. + blood thinners    Vitals:    06/30/25 1817   BP: (!) 150/86   BP Location: Left arm   Patient Position: Sitting   Pulse: 65   Resp: 17   Temp: 97.9 °F (36.6 °C)   TempSrc: Oral   SpO2: 100%       Pertinent physical exam:  Ear exam unremarkable.     Brief workup plan:  CT head    Preliminary workup initiated; this workup will be continued and followed by the physician or advanced practice provider that is assigned to the patient when roomed.

## 2025-07-01 NOTE — DISCHARGE INSTRUCTIONS
Mrs. Evasn,    Thank you for letting me care for you today! It was nice meeting you, and I hope you feel better soon.   If you would like access to your chart and what was done today please utilize the [facility].   Please come back to [facility] for all of your future medical needs.    Our goal in the emergency department is to always give you outstanding care and exceptional service. You may receive a survey by mail or e-mail in the next week regarding your experience in our ED. We would greatly appreciate you completing and returning the survey. Your feedback provides us with a way to recognize our staff who give very good care and it helps us learn how to improve when your experience was below our aspiration of excellence.     Sincerely,    Christopher Zamudio MD  Board Certified Emergency Physician

## 2025-07-01 NOTE — ED TRIAGE NOTES
Patient presents to ED with complaints of headache to left-side of head x2 days, seen at  recently for same complaint, also recent fall x 10 days ago hitting left-side of head nausea, denies cough, fever, chills dizziness, and vomiting, Hx of HTN, Kidney disease, and MI, recent pacemaker implant, AAOx3, NAD noted.

## 2025-07-01 NOTE — ED PROVIDER NOTES
"Encounter Date: 6/30/2025    SCRIBE #1 NOTE: I, Eleonora Fermin, am scribing for, and in the presence of,  Christopher Zamudio MD. I have scribed the following portions of the note - Other sections scribed: HPI, ROS.       History     Chief Complaint   Patient presents with    Headache     "Shooting pains" to L side of head x several days. Pt recently seen at  for L ear pain and prescribed doxycycline. She states she no longer has ear pain at this time. Denies hearing/vision change. Fall x 10 days ago landing on L side and hitting head. Denies LOC. Takes blood thinners.      89 y.o. female, with past medical history of HTN, CAD, MI, coronary angiography, coronary stent placement, and kidney disease, presents to the ED with a headache that began as left-sided ear pain 2 days ago. She reports that her left ear began to ache, prompting her to attempt cleaning it with a Q-tip, which seemed to worsen the pain. She tried over-the-counter ear drops and Tylenol, but neither provided relief. She was seen at urgent care yesterday and was prescribed doxycycline, although she was told there was no evidence of an ear infection. Since then, the pain has radiated from the ear to the left side of her head, described as intermittent "shooting" pain-brief but painful. She reports rarely experiencing headaches and denies any visual changes. She notes a fall onto her left side approximately two weeks ago and believes she may have hit her head, though she has been able to walk since and denies any LOC. She has no history of head surgeries. She reports currently taking blood thinners.    The history is provided by the patient. No  was used.     Review of patient's allergies indicates:   Allergen Reactions    Clindamycin Anaphylaxis    Penicillins Rash     Past Medical History:   Diagnosis Date    Acute blood loss anemia 10/26/2021    Anticoagulant long-term use     Breast deformity 09/07/2022    Cervical cancer " 1968    breast cancer right    Coronary artery disease     Gout     High cholesterol     History of cervical dysplasia 10/28/2014    Hypertension     MI (myocardial infarction) 1999    Right axillary hidradenitis 07/11/2022     Past Surgical History:   Procedure Laterality Date    BREAST LUMPECTOMY      right    CARDIAC SURGERY      multiple cardiac stents    CORONARY ANGIOGRAPHY Right 1/21/2022    Procedure: ANGIOGRAM, CORONARY ARTERY;  Surgeon: Asim Canela MD;  Location: [facility] CATH LAB;  Service: Cardiology;  Laterality: Right;    CORONARY ANGIOGRAPHY Right 5/10/2024    Procedure: ANGIOGRAM, CORONARY ARTERY POSSIBLE LV COR;  Surgeon: Asim Canela MD;  Location: [facility] CATH LAB;  Service: Cardiology;  Laterality: Right;    CORONARY STENT PLACEMENT      HIP REPLACEMENT ARTHROPLASTY Right 2018    JOINT REPLACEMENT      right     Family History   Problem Relation Name Age of Onset    Cancer Mother      Cancer Father       Social History[1]  Review of Systems  Constitutional-no fever  HEENT-no congestion, Ear pain (left)   Eyes-no redness  Respiratory-no shortness of breath  Cardio-no chest pain  GI-no abdominal pain  Endocrine-no cold intolerance  -no difficulty urinating  MSK-no myalgias  Skin-no rashes  Allergy-no environmental allergy  Neurologic- headache (left sided)  Hematology-no swollen nodes  Behavioral-no confusion    Physical Exam     Initial Vitals [06/30/25 1817]   BP Pulse Resp Temp SpO2   (!) 150/86 65 17 97.9 °F (36.6 °C) 100 %      MAP       --         Physical Exam  Constitutional:  Uncomfortable appearing 89-year-old female  Eyes: Conjunctivae normal.  ENT       Head: Normocephalic, atraumatic.       Nose: No congestion.       Mouth/Throat: Mucous membranes are moist.  Hematological/Lymphatic/Immunilogical: No cervical lymphadenopathy.  Cardiovascular: Normal rate, regular rhythm. Normal and symmetric distal pulses.  Respiratory: Normal respiratory effort. Breath sounds are  normal.  Gastrointestinal: Soft, nontender.   Musculoskeletal: Normal range of motion in all extremities. No obvious deformities or swelling.  Neurologic: Alert, oriented. Normal speech and language. No gross focal neurologic deficits are appreciated.  NIH 0  GCS- 15  CN2-12 intact  Normal sensation to light and deep touch in the bilateral face  Normal sensation to light and deep touch in the bilateral upper extremities  Normal sensation to light deep touch in the bilateral lower extremities  5/5 strength all 4 extremities  Normal gait  Negative rhomberg  No appreciable drift   Skin: slightly raised vesicular rash extending over the left ear, no erythema, no fluctuance   Psychiatric: Mood and affect are normal.    ED Course   Procedures  Labs Reviewed - No data to display       Imaging Results              CT Head Without Contrast (Final result)  Result time 06/30/25 19:12:24      Final result by Rosanna Gabriel MD (06/30/25 19:12:24)                   Impression:    IMPRESSION:    No radiographic evidence of acute intracranial process. No significant interval change.    -Electronically Signed By: Rosanna Gabriel MD   -Electronically Signed On:  6/30/2025 7:12 PM      Report Ends               Narrative:    EXAM:CT HEAD WITHOUT CONTRAST    HISTORY: , , Headache, new or worsening (Age >= 50y),    TECHNIQUE:  Noncontrast CT head is performed with contiguous 5 mm axial images. Study is performed within 24 hour of hospital presentation.  CT dose reduction protocol was used per ALARA and Image Gently principles.    COMPARISON: 2/20/2025    FINDINGS:    Mild/moderate atrophy with severe periventricular white matter disease, stable.  No new high or low density intraparenchymal lesion is seen. No intra or extra-axial fluid collection identified. The ventricles and sulci are stable in size and configuration without evidence of hydrocephalus, midline shift, or mass effect. The paranasal sinuses and mastoid air cells are clear.  No suspicious osseous lesions seen.                                       Medications   valACYclovir tablet 1,000 mg (1,000 mg Oral Given 6/30/25 1957)   pregabalin capsule 75 mg (75 mg Oral Given 6/30/25 1957)     Medical Decision Making  Differential diagnosis-temporal arteritis, shingles, otitis externa     Ear exam is grossly normal   Slight rash appearing superior to the ear on the left.  Likely consistent with early shingles.  Discussed with patient and her daughter at the bedside, will plan for treatment of the same, will decrease dosing related to patient's renal insufficiency.  Encouraged returning case of any worsening.  Given history of previous fall imaging was obtained which demonstrated no acute intracranial injury.    Problems Addressed:  Herpes zoster with complication: acute illness or injury  Nonintractable headache, unspecified chronicity pattern, unspecified headache type: acute illness or injury    Amount and/or Complexity of Data Reviewed  External Data Reviewed: labs, radiology, ECG and notes.     Details: History of complete heart block and pacemaker implantation  Radiology: ordered and independent interpretation performed. Decision-making details documented in ED Course.    Risk  OTC drugs.  Prescription drug management.            Scribe Attestation:   Scribe #1: I performed the above scribed service and the documentation accurately describes the services I performed. I attest to the accuracy of the note.              Physician Attestation for Scribe: I, susie khanna, reviewed documentation as scribed in my presence, which is both accurate and complete.                   Clinical Impression:  Final diagnoses:  [R51.9] Nonintractable headache, unspecified chronicity pattern, unspecified headache type (Primary)  [B02.8] Herpes zoster with complication          ED Disposition Condition    Discharge Stable          ED Prescriptions       Medication Sig Dispense Start Date End Date Auth.  Provider    valACYclovir (VALTREX) 1000 MG tablet  (Status: Discontinued) Take 1 tablet (1,000 mg total) by mouth 2 (two) times a day. for 10 days 20 tablet 6/30/2025 6/30/2025 Christopher Zamudio MD    pregabalin (LYRICA) 75 MG capsule  (Status: Discontinued) Take 1 capsule (75 mg total) by mouth 2 (two) times daily. 60 capsule 6/30/2025 6/30/2025 Christopher Zamudio MD    acetaminophen (TYLENOL) 500 MG tablet  (Status: Discontinued) Take 2 tablets (1,000 mg total) by mouth every 6 (six) hours as needed. 50 tablet 6/30/2025 6/30/2025 Christopher Zamudio MD    acetaminophen (TYLENOL) 500 MG tablet Take 2 tablets (1,000 mg total) by mouth every 6 (six) hours as needed for Pain. 50 tablet 6/30/2025 -- Christopher Zamudio MD    pregabalin (LYRICA) 75 MG capsule Take 1 capsule (75 mg total) by mouth 2 (two) times daily. 60 capsule 6/30/2025 7/30/2025 Christopher Zamudio MD    valACYclovir (VALTREX) 1000 MG tablet Take 1 tablet (1,000 mg total) by mouth 2 (two) times a day. for 10 days 20 tablet 6/30/2025 7/10/2025 Christopher Zamudio MD          Follow-up Information       Follow up With Specialties Details Why Contact Elena Carbajal MD Internal Medicine Call in 1 day If symptoms worsen, For a follow up visit about today [address] [place] [id number]  [phone]            Launch MDCalc MDM  MDCalc MDM Module  Jun 30 2025 11:11 PM [Christopher Zamudio]  Data:  - Independent interpretation: I independently reviewed the CT Head WO contr. It showed no acute abnormality. [Christopher Zamudio]  - Test/documents/historian: 1 test ordered  2 tests reviewed (chest xray reveiwed as well as EKG with complete heart bock)  2 external notes reviewed  Problems: Concern for Potentially Disabling Neurologic Condition  Additional encounter diagnoses: Nonintractable headache, unspecified chronicity pattern, unspecified headache type, Herpes zoster with complication  Risk: RX: pregabalin capsule + 3  more (Rx drug management), CT Head WO contr (CT w/o IV contrast)             [1]   Social History  Tobacco Use    Smoking status: Former    Smokeless tobacco: Never   Substance Use Topics    Alcohol use: Not Currently     Comment: rare    Drug use: No        Christopher Zamudio MD  06/30/25 [id number]

## (undated) DEVICE — SOL 9P NACL IRR PIC IL

## (undated) DEVICE — PAD UNDERPAD 30X30

## (undated) DEVICE — DRESSING AQUACEL AG 3.5X10IN

## (undated) DEVICE — TRAY CORONARY CUSTOM BAPTIST

## (undated) DEVICE — TRAY ANGIO BAPTIST

## (undated) DEVICE — POSITIONER IV ARMBOARD FOAM

## (undated) DEVICE — Device

## (undated) DEVICE — DRAPE STERI INSTRUMENT 1018

## (undated) DEVICE — COVER MAYO STAND REINFRCD 30

## (undated) DEVICE — PILLOW ABDUCTION MED

## (undated) DEVICE — STAPLER SKIN PROXIMATE WIDE

## (undated) DEVICE — SHEET HIP ABSORB CIRC ELAS 8

## (undated) DEVICE — SUT VICRYL+ 1 CTX 18IN VIOL

## (undated) DEVICE — DRAPE CAMERA/VIDEO 5 X 96

## (undated) DEVICE — SEE MEDLINE ITEM 157131

## (undated) DEVICE — SYR 30CC LUER LOCK

## (undated) DEVICE — GLOVE BIOGEL SKINSENSE PI 8.5

## (undated) DEVICE — GLOVE BIOGEL SKINSENSE PI 7.0

## (undated) DEVICE — SPONGE GAUZE 16PLY 4X4

## (undated) DEVICE — CATH DXTERITY JL40 100CM 6FR

## (undated) DEVICE — SEE MEDLINE ITEM 157117

## (undated) DEVICE — CATH DXTERITY PIGSTR 110CM 6FR

## (undated) DEVICE — DRAPE INCISE IOBAN 2 23X17IN

## (undated) DEVICE — SYR 0.9% NACL 10ML STERILE

## (undated) DEVICE — CONTAINER SPECIMEN STRL 4OZ

## (undated) DEVICE — GLOVE BIOGEL SKINSENSE PI 8.0

## (undated) DEVICE — MASK FLYTE HOOD PEEL AWAY

## (undated) DEVICE — TRAY FOLEY 16FR INFECTION CONT

## (undated) DEVICE — APPLICATOR CHLORAPREP ORN 26ML

## (undated) DEVICE — ALCOHOL 70% ISOP W/GREEN 16OZ

## (undated) DEVICE — GUIDEWIRE SAFE T .035IN 180CM

## (undated) DEVICE — NDL 21GA X1 1/2 REG BEVEL

## (undated) DEVICE — GOWN SMART IMP BREATHABLE XXLG

## (undated) DEVICE — BLADE SAG DUAL 18MMX1.27MMX90M

## (undated) DEVICE — DEVICE MYNX GRIP 6/7F

## (undated) DEVICE — ELECTRODE REM PLYHSV RETURN 9

## (undated) DEVICE — DRESSING TRANS 4X4 TEGADERM

## (undated) DEVICE — CATH DXTERITY JR40 100CM 6FR

## (undated) DEVICE — UNDERGLOVES BIOGEL PI SIZE 8.5

## (undated) DEVICE — DRAPE STERI U-SHAPED 47X51IN

## (undated) DEVICE — DRESSING LEUKOPLAST FLEX 1X3IN

## (undated) DEVICE — GUIDEWIRE 145CM .035

## (undated) DEVICE — NDL PERC ENTRY BSDN 18-7.0

## (undated) DEVICE — SEE MEDLINE ITEM 153151

## (undated) DEVICE — INTRODUCER CATH 6F 11CM

## (undated) DEVICE — SUT VICRYL PLUS 2-0 CT1 18

## (undated) DEVICE — UNDERGLOVE BIOGEL PI SZ 6.5 LF

## (undated) DEVICE — COVER BACK TABLE 72X21

## (undated) DEVICE — SOL IRR NACL .9% 3000ML

## (undated) DEVICE — SPONGE LAP 18X18 PREWASHED

## (undated) DEVICE — UNDERGLOVES BIOGEL PI SZ 7 LF

## (undated) DEVICE — CONTRAST VISIPAQUE 150ML

## (undated) DEVICE — SUT STRATAFIX PDS 1 CTX 18IN

## (undated) DEVICE — SYR 10ML LIDOCAINE

## (undated) DEVICE — SUT ETHIBOND EXCEL 5-0 V-40

## (undated) DEVICE — OMNIPAQUE 350MG 150ML VIAL

## (undated) DEVICE — UNDERGLOVES BIOGEL PI SIZE 7.5

## (undated) DEVICE — PULSAVAC ZIMMER

## (undated) DEVICE — DRESSING COVER AQUACEL AG SURG